# Patient Record
Sex: FEMALE | Race: WHITE | NOT HISPANIC OR LATINO | Employment: PART TIME | URBAN - METROPOLITAN AREA
[De-identification: names, ages, dates, MRNs, and addresses within clinical notes are randomized per-mention and may not be internally consistent; named-entity substitution may affect disease eponyms.]

---

## 2017-01-03 ENCOUNTER — ALLSCRIPTS OFFICE VISIT (OUTPATIENT)
Dept: OTHER | Facility: OTHER | Age: 77
End: 2017-01-03

## 2017-01-03 ENCOUNTER — HOSPITAL ENCOUNTER (OUTPATIENT)
Dept: RADIOLOGY | Facility: HOSPITAL | Age: 77
Discharge: HOME | End: 2017-01-03
Attending: ORTHOPAEDIC SURGERY
Payer: COMMERCIAL

## 2017-01-03 DIAGNOSIS — Z47.1 AFTERCARE FOLLOWING JOINT REPLACEMENT SURGERY: ICD-10-CM

## 2017-01-03 PROCEDURE — 73502 X-RAY EXAM HIP UNI 2-3 VIEWS: CPT

## 2017-01-19 ENCOUNTER — LAB REQUISITION (OUTPATIENT)
Dept: LAB | Facility: HOSPITAL | Age: 77
End: 2017-01-19
Payer: COMMERCIAL

## 2017-01-19 DIAGNOSIS — N30.10 CHRONIC INTERSTITIAL CYSTITIS WITHOUT HEMATURIA: ICD-10-CM

## 2017-01-19 PROCEDURE — 87086 URINE CULTURE/COLONY COUNT: CPT | Performed by: OBSTETRICS & GYNECOLOGY

## 2017-01-21 LAB — BACTERIA UR CULT: NORMAL

## 2017-02-07 ENCOUNTER — ALLSCRIPTS OFFICE VISIT (OUTPATIENT)
Dept: OTHER | Facility: OTHER | Age: 77
End: 2017-02-07

## 2017-02-20 ENCOUNTER — TRANSCRIBE ORDERS (OUTPATIENT)
Dept: LAB | Facility: HOSPITAL | Age: 77
End: 2017-02-20

## 2017-02-20 ENCOUNTER — APPOINTMENT (OUTPATIENT)
Dept: LAB | Facility: HOSPITAL | Age: 77
End: 2017-02-20
Payer: COMMERCIAL

## 2017-02-20 DIAGNOSIS — D64.9 ANEMIA, UNSPECIFIED: Primary | ICD-10-CM

## 2017-02-20 DIAGNOSIS — I10 UNSPECIFIED ESSENTIAL HYPERTENSION: ICD-10-CM

## 2017-02-20 DIAGNOSIS — E03.9 UNSPECIFIED HYPOTHYROIDISM: ICD-10-CM

## 2017-02-20 DIAGNOSIS — D64.9 ANEMIA, UNSPECIFIED: ICD-10-CM

## 2017-02-20 LAB
ALBUMIN SERPL BCP-MCNC: 3.2 G/DL (ref 3.5–5)
ALP SERPL-CCNC: 81 U/L (ref 46–116)
ALT SERPL W P-5'-P-CCNC: 18 U/L (ref 12–78)
ANION GAP SERPL CALCULATED.3IONS-SCNC: 9 MMOL/L (ref 4–13)
AST SERPL W P-5'-P-CCNC: 17 U/L (ref 5–45)
BASOPHILS # BLD AUTO: 0.05 THOUSANDS/ΜL (ref 0–0.1)
BASOPHILS NFR BLD AUTO: 1 % (ref 0–1)
BILIRUB SERPL-MCNC: 0.46 MG/DL (ref 0.2–1)
BUN SERPL-MCNC: 19 MG/DL (ref 5–25)
CALCIUM SERPL-MCNC: 8.9 MG/DL (ref 8.3–10.1)
CHLORIDE SERPL-SCNC: 105 MMOL/L (ref 100–108)
CO2 SERPL-SCNC: 25 MMOL/L (ref 21–32)
CREAT SERPL-MCNC: 0.96 MG/DL (ref 0.6–1.3)
EOSINOPHIL # BLD AUTO: 0.2 THOUSAND/ΜL (ref 0–0.61)
EOSINOPHIL NFR BLD AUTO: 4 % (ref 0–6)
ERYTHROCYTE [DISTWIDTH] IN BLOOD BY AUTOMATED COUNT: 14.3 % (ref 11.6–15.1)
GFR SERPL CREATININE-BSD FRML MDRD: 56.7 ML/MIN/1.73SQ M
GLUCOSE SERPL-MCNC: 89 MG/DL (ref 65–140)
HCT VFR BLD AUTO: 35.1 % (ref 34.8–46.1)
HDLC SERPL-MCNC: 90 MG/DL (ref 40–60)
HGB BLD-MCNC: 11.3 G/DL (ref 11.5–15.4)
LDLC SERPL DIRECT ASSAY-MCNC: 127 MG/DL (ref 0–100)
LYMPHOCYTES # BLD AUTO: 1.33 THOUSANDS/ΜL (ref 0.6–4.47)
LYMPHOCYTES NFR BLD AUTO: 27 % (ref 14–44)
MCH RBC QN AUTO: 29.1 PG (ref 26.8–34.3)
MCHC RBC AUTO-ENTMCNC: 32.2 G/DL (ref 31.4–37.4)
MCV RBC AUTO: 91 FL (ref 82–98)
MONOCYTES # BLD AUTO: 0.5 THOUSAND/ΜL (ref 0.17–1.22)
MONOCYTES NFR BLD AUTO: 10 % (ref 4–12)
NEUTROPHILS # BLD AUTO: 2.77 THOUSANDS/ΜL (ref 1.85–7.62)
NEUTS SEG NFR BLD AUTO: 58 % (ref 43–75)
NRBC BLD AUTO-RTO: 0 /100 WBCS
PLATELET # BLD AUTO: 309 THOUSANDS/UL (ref 149–390)
PMV BLD AUTO: 10.2 FL (ref 8.9–12.7)
POTASSIUM SERPL-SCNC: 3.9 MMOL/L (ref 3.5–5.3)
PROT SERPL-MCNC: 7.7 G/DL (ref 6.4–8.2)
RBC # BLD AUTO: 3.88 MILLION/UL (ref 3.81–5.12)
SODIUM SERPL-SCNC: 139 MMOL/L (ref 136–145)
T4 FREE SERPL-MCNC: 0.77 NG/DL (ref 0.76–1.46)
TSH SERPL DL<=0.05 MIU/L-ACNC: 3.28 UIU/ML (ref 0.36–3.74)
WBC # BLD AUTO: 4.86 THOUSAND/UL (ref 4.31–10.16)

## 2017-02-20 PROCEDURE — 36415 COLL VENOUS BLD VENIPUNCTURE: CPT

## 2017-02-20 PROCEDURE — 85025 COMPLETE CBC W/AUTO DIFF WBC: CPT

## 2017-02-20 PROCEDURE — 84439 ASSAY OF FREE THYROXINE: CPT

## 2017-02-20 PROCEDURE — 83718 ASSAY OF LIPOPROTEIN: CPT

## 2017-02-20 PROCEDURE — 84443 ASSAY THYROID STIM HORMONE: CPT

## 2017-02-20 PROCEDURE — 83721 ASSAY OF BLOOD LIPOPROTEIN: CPT

## 2017-02-20 PROCEDURE — 80053 COMPREHEN METABOLIC PANEL: CPT

## 2017-02-28 ENCOUNTER — HOSPITAL ENCOUNTER (OUTPATIENT)
Dept: RADIOLOGY | Age: 77
Discharge: HOME | End: 2017-02-28
Payer: COMMERCIAL

## 2017-02-28 DIAGNOSIS — Z12.31 OTHER SCREENING MAMMOGRAM: ICD-10-CM

## 2017-02-28 DIAGNOSIS — M81.0 OSTEOPOROSIS, UNSPECIFIED: ICD-10-CM

## 2017-02-28 PROCEDURE — G0202 SCR MAMMO BI INCL CAD: HCPCS

## 2017-02-28 PROCEDURE — 77080 DXA BONE DENSITY AXIAL: CPT

## 2017-03-01 ENCOUNTER — GENERIC CONVERSION - ENCOUNTER (OUTPATIENT)
Dept: OTHER | Facility: OTHER | Age: 77
End: 2017-03-01

## 2017-05-30 ENCOUNTER — ALLSCRIPTS OFFICE VISIT (OUTPATIENT)
Dept: OTHER | Facility: OTHER | Age: 77
End: 2017-05-30

## 2017-07-01 ENCOUNTER — APPOINTMENT (OUTPATIENT)
Dept: LAB | Facility: HOSPITAL | Age: 77
End: 2017-07-01
Payer: COMMERCIAL

## 2017-07-01 ENCOUNTER — TRANSCRIBE ORDERS (OUTPATIENT)
Dept: LAB | Facility: HOSPITAL | Age: 77
End: 2017-07-01

## 2017-07-01 DIAGNOSIS — Z00.8 HEALTH EXAMINATION IN POPULATION SURVEY: ICD-10-CM

## 2017-07-01 DIAGNOSIS — Z00.8 HEALTH EXAMINATION IN POPULATION SURVEY: Primary | ICD-10-CM

## 2017-07-01 LAB
CHOLEST SERPL-MCNC: 245 MG/DL (ref 50–200)
EST. AVERAGE GLUCOSE BLD GHB EST-MCNC: 111 MG/DL
HBA1C MFR BLD: 5.5 % (ref 4.2–6.3)
HDLC SERPL-MCNC: 101 MG/DL (ref 40–60)
LDLC SERPL CALC-MCNC: 124 MG/DL (ref 0–100)
TRIGL SERPL-MCNC: 98 MG/DL

## 2017-07-01 PROCEDURE — 36415 COLL VENOUS BLD VENIPUNCTURE: CPT

## 2017-07-01 PROCEDURE — 80061 LIPID PANEL: CPT

## 2017-07-01 PROCEDURE — 83036 HEMOGLOBIN GLYCOSYLATED A1C: CPT

## 2017-09-05 ENCOUNTER — ALLSCRIPTS OFFICE VISIT (OUTPATIENT)
Dept: OTHER | Facility: OTHER | Age: 77
End: 2017-09-05

## 2017-09-09 ENCOUNTER — TRANSCRIBE ORDERS (OUTPATIENT)
Dept: LAB | Facility: HOSPITAL | Age: 77
End: 2017-09-09

## 2017-09-09 ENCOUNTER — APPOINTMENT (OUTPATIENT)
Dept: LAB | Facility: HOSPITAL | Age: 77
End: 2017-09-09
Attending: INTERNAL MEDICINE
Payer: COMMERCIAL

## 2017-09-09 DIAGNOSIS — E55.9 UNSPECIFIED VITAMIN D DEFICIENCY: ICD-10-CM

## 2017-09-09 DIAGNOSIS — E03.9 UNSPECIFIED HYPOTHYROIDISM: ICD-10-CM

## 2017-09-09 DIAGNOSIS — D64.9 ANEMIA, UNSPECIFIED: ICD-10-CM

## 2017-09-09 DIAGNOSIS — I10 ESSENTIAL HYPERTENSION, MALIGNANT: Primary | ICD-10-CM

## 2017-09-09 DIAGNOSIS — E78.2 MIXED HYPERLIPIDEMIA: ICD-10-CM

## 2017-09-09 DIAGNOSIS — R35.0 URINARY FREQUENCY: ICD-10-CM

## 2017-09-09 DIAGNOSIS — I10 ESSENTIAL HYPERTENSION, MALIGNANT: ICD-10-CM

## 2017-09-09 LAB
25(OH)D3 SERPL-MCNC: 32.8 NG/ML (ref 30–100)
ALBUMIN SERPL BCP-MCNC: 3.4 G/DL (ref 3.5–5)
ALP SERPL-CCNC: 66 U/L (ref 46–116)
ALT SERPL W P-5'-P-CCNC: 9 U/L (ref 12–78)
ANION GAP SERPL CALCULATED.3IONS-SCNC: 6 MMOL/L (ref 4–13)
AST SERPL W P-5'-P-CCNC: 12 U/L (ref 5–45)
BACTERIA UR QL AUTO: ABNORMAL /HPF
BASOPHILS # BLD AUTO: 0.05 THOUSANDS/ΜL (ref 0–0.1)
BASOPHILS NFR BLD AUTO: 1 % (ref 0–1)
BILIRUB SERPL-MCNC: 0.45 MG/DL (ref 0.2–1)
BILIRUB UR QL STRIP: NEGATIVE
BUN SERPL-MCNC: 24 MG/DL (ref 5–25)
CALCIUM SERPL-MCNC: 8.3 MG/DL (ref 8.3–10.1)
CHLORIDE SERPL-SCNC: 110 MMOL/L (ref 100–108)
CHOLEST SERPL-MCNC: 228 MG/DL (ref 50–200)
CLARITY UR: CLEAR
CO2 SERPL-SCNC: 22 MMOL/L (ref 21–32)
COLOR UR: YELLOW
CREAT SERPL-MCNC: 1.34 MG/DL (ref 0.6–1.3)
EOSINOPHIL # BLD AUTO: 0.18 THOUSAND/ΜL (ref 0–0.61)
EOSINOPHIL NFR BLD AUTO: 3 % (ref 0–6)
ERYTHROCYTE [DISTWIDTH] IN BLOOD BY AUTOMATED COUNT: 13.8 % (ref 11.6–15.1)
GFR SERPL CREATININE-BSD FRML MDRD: 39 ML/MIN/1.73SQ M
GLUCOSE P FAST SERPL-MCNC: 78 MG/DL (ref 65–99)
GLUCOSE UR STRIP-MCNC: NEGATIVE MG/DL
HCT VFR BLD AUTO: 33.7 % (ref 34.8–46.1)
HDLC SERPL-MCNC: 106 MG/DL (ref 40–60)
HGB BLD-MCNC: 11.3 G/DL (ref 11.5–15.4)
HGB UR QL STRIP.AUTO: ABNORMAL
HYALINE CASTS #/AREA URNS LPF: ABNORMAL /LPF
KETONES UR STRIP-MCNC: NEGATIVE MG/DL
LDLC SERPL CALC-MCNC: 113 MG/DL (ref 0–100)
LEUKOCYTE ESTERASE UR QL STRIP: ABNORMAL
LYMPHOCYTES # BLD AUTO: 2.01 THOUSANDS/ΜL (ref 0.6–4.47)
LYMPHOCYTES NFR BLD AUTO: 27 % (ref 14–44)
MCH RBC QN AUTO: 29.5 PG (ref 26.8–34.3)
MCHC RBC AUTO-ENTMCNC: 33.5 G/DL (ref 31.4–37.4)
MCV RBC AUTO: 88 FL (ref 82–98)
MONOCYTES # BLD AUTO: 0.61 THOUSAND/ΜL (ref 0.17–1.22)
MONOCYTES NFR BLD AUTO: 8 % (ref 4–12)
NEUTROPHILS # BLD AUTO: 4.47 THOUSANDS/ΜL (ref 1.85–7.62)
NEUTS SEG NFR BLD AUTO: 61 % (ref 43–75)
NITRITE UR QL STRIP: NEGATIVE
NON-SQ EPI CELLS URNS QL MICRO: ABNORMAL /HPF
NRBC BLD AUTO-RTO: 0 /100 WBCS
PH UR STRIP.AUTO: 6.5 [PH] (ref 4.5–8)
PLATELET # BLD AUTO: 282 THOUSANDS/UL (ref 149–390)
PMV BLD AUTO: 9.7 FL (ref 8.9–12.7)
POTASSIUM SERPL-SCNC: 3.8 MMOL/L (ref 3.5–5.3)
PROT SERPL-MCNC: 7.2 G/DL (ref 6.4–8.2)
PROT UR STRIP-MCNC: NEGATIVE MG/DL
RBC # BLD AUTO: 3.83 MILLION/UL (ref 3.81–5.12)
RBC #/AREA URNS AUTO: ABNORMAL /HPF
SODIUM SERPL-SCNC: 138 MMOL/L (ref 136–145)
SP GR UR STRIP.AUTO: 1.02 (ref 1–1.03)
T4 FREE SERPL-MCNC: 0.82 NG/DL (ref 0.76–1.46)
TRIGL SERPL-MCNC: 47 MG/DL
TSH SERPL DL<=0.05 MIU/L-ACNC: 3.68 UIU/ML (ref 0.36–3.74)
UROBILINOGEN UR QL STRIP.AUTO: 0.2 E.U./DL
WBC # BLD AUTO: 7.34 THOUSAND/UL (ref 4.31–10.16)
WBC #/AREA URNS AUTO: ABNORMAL /HPF

## 2017-09-09 PROCEDURE — 81001 URINALYSIS AUTO W/SCOPE: CPT | Performed by: INTERNAL MEDICINE

## 2017-09-09 PROCEDURE — 84439 ASSAY OF FREE THYROXINE: CPT

## 2017-09-09 PROCEDURE — 84443 ASSAY THYROID STIM HORMONE: CPT

## 2017-09-09 PROCEDURE — 80061 LIPID PANEL: CPT

## 2017-09-09 PROCEDURE — 36415 COLL VENOUS BLD VENIPUNCTURE: CPT

## 2017-09-09 PROCEDURE — 80053 COMPREHEN METABOLIC PANEL: CPT

## 2017-09-09 PROCEDURE — 82306 VITAMIN D 25 HYDROXY: CPT

## 2017-09-09 PROCEDURE — 85025 COMPLETE CBC W/AUTO DIFF WBC: CPT

## 2017-09-28 ENCOUNTER — ALLSCRIPTS OFFICE VISIT (OUTPATIENT)
Dept: OTHER | Facility: OTHER | Age: 77
End: 2017-09-28

## 2017-10-09 ENCOUNTER — ALLSCRIPTS OFFICE VISIT (OUTPATIENT)
Dept: OTHER | Facility: OTHER | Age: 77
End: 2017-10-09

## 2017-10-16 ENCOUNTER — LAB REQUISITION (OUTPATIENT)
Dept: LAB | Facility: HOSPITAL | Age: 77
End: 2017-10-16
Payer: COMMERCIAL

## 2017-10-16 DIAGNOSIS — N30.20 OTHER CHRONIC CYSTITIS WITHOUT HEMATURIA: ICD-10-CM

## 2017-10-16 PROCEDURE — 87186 SC STD MICRODIL/AGAR DIL: CPT | Performed by: OBSTETRICS & GYNECOLOGY

## 2017-10-16 PROCEDURE — 87077 CULTURE AEROBIC IDENTIFY: CPT | Performed by: OBSTETRICS & GYNECOLOGY

## 2017-10-16 PROCEDURE — 87086 URINE CULTURE/COLONY COUNT: CPT | Performed by: OBSTETRICS & GYNECOLOGY

## 2017-10-18 LAB — BACTERIA UR CULT: ABNORMAL

## 2017-10-27 PROCEDURE — 87086 URINE CULTURE/COLONY COUNT: CPT | Performed by: OBSTETRICS & GYNECOLOGY

## 2017-10-28 ENCOUNTER — LAB REQUISITION (OUTPATIENT)
Dept: LAB | Facility: HOSPITAL | Age: 77
End: 2017-10-28
Payer: COMMERCIAL

## 2017-10-28 DIAGNOSIS — N30.20 OTHER CHRONIC CYSTITIS WITHOUT HEMATURIA: ICD-10-CM

## 2017-10-29 LAB — BACTERIA UR CULT: NORMAL

## 2017-12-05 ENCOUNTER — GENERIC CONVERSION - ENCOUNTER (OUTPATIENT)
Dept: OTHER | Facility: OTHER | Age: 77
End: 2017-12-05

## 2018-01-10 ENCOUNTER — ALLSCRIPTS OFFICE VISIT (OUTPATIENT)
Dept: OTHER | Facility: OTHER | Age: 78
End: 2018-01-10

## 2018-01-10 NOTE — PSYCH
Message  Message Free Text Note Form: states despite her report of doing "a little better' in her not using the walker "all the time" in the house, she reports uses it when leaving the home due to her report of "unsteady on my feet;" She noted has a neurology appointment for today and and a pain mgt  appointment for 5/1/616 due to her report of persistent pain in her L buttocks  She reports she continues to remain on a medical leave of absence from work and expressed her desire to return to work; she noted, "I will keep you posted "      Active Problems    1  Adjustment disorder with depressed mood (309 0) (F43 21)   2  Aftercare following hip joint replacement surgery (V54 81,V43 64) (Z47 1,Z96 649)   3  Arthralgia of hip, left (719 45) (M25 552)   4  Arthralgia of hip, right (719 45) (M25 551)   5  Bursitis of left hip (726 5) (M70 72)   6  Bursitis of right hip (726 5) (M70 71)   7  Encounter for routine gynecological examination (V72 31) (Z01 419)   8  Encounter for screening mammogram for malignant neoplasm of breast (V76 12)   (Z12 31)   9  First degree uterine prolapse (618 1) (N81 2)   10  Hamstring tendinitis at origin (727 09) (M76 899)   11  History of total hip replacement, right (V43 64) (Z96 641)   12  Inconclusive mammogram (793 82) (R92 2)   13  Osteopenia (733 90) (M85 80)   14  Primary osteoarthritis of right hip (715 15) (M16 11)   15  Sacral insufficiency fracture (733 13) (M84 48XA)   16  Screening for osteoporosis (V82 81) (Z13 820)   17  Spondylolisthesis at L4-L5 level (756 12) (M43 16)   18  Spondylolisthesis at L5-S1 level (756 12) (M43 17)   19  Urinary retention (788 20) (R33 9)   20  Visit for screening mammogram (V76 12) (Z12 31)    Current Meds   1  Calcium TABS; Therapy: (Danielle Ritchie) to Recorded   2  Ibuprofen 600 MG Oral Tablet; TAKE 1 TABLET EVERY 6 HOURS WITH FOOD AS   NEEDED;    Therapy: 66Sjt4334 to (Evaluate:94Uuy6926)  Requested for: 18Apr2016; Last   Rx:18Apr2016 Ordered   3  Methocarbamol 500 MG Oral Tablet; Therapy: (Recorded:14Apr2016) to Recorded   4  Multi-Day Vitamins TABS; Therapy: (Vin Blackmon) to Recorded   5  Ondansetron 4 MG Oral Tablet Dispersible; TAKE 1 TABLET Every 6 hours PRN nausea; Therapy: 28Apr2016 to (Last Rx:28Apr2016)  Requested for: 28Apr2016 Ordered   6  TraMADol HCl - 50 MG Oral Tablet; TAKE 1 TO 2 TABLETS EVERY 6 HOURS AS NEEDED   FOR PAIN;   Therapy: 13Apr2016 to (Evaluate:33Tew7259); Last PZ:36JWQ4932 Ordered    Allergies    1   No Known Drug Allergies    Signatures   Electronically signed by : Parris Webb, MSWLCSWMSW,JOSEMANUELW; May  9 2016  9:30AM EST                       (Author)

## 2018-01-10 NOTE — MISCELLANEOUS
Message   Recorded as Task   Date: 06/13/2016 03:09 PM, Created By: Beatriz Arnold   Task Name: Miscellaneous   Assigned To: [facility]   Regarding Patient: Tian Valdez, Status: Active   Comment:    Beatriz Arnold - 13 Jun 2016 3:09 PM     TASK CREATED  Patient came in after a office visit w/ Dr Guerrero Shed  She stated he would like her to schedule another injection  Is this ok to schedule or should it be a f/u 1st? Patient trying to go back to work 4 hour day on July 1st   Please advise  Thank you  Maria Teresa Rios - 13 Jun 2016 4:19 PM     TASK REPLIED TO: Previously Assigned To [facility],Team  If its the same area of pain, it is too early to repeat injection  Zahraa Edwards - 20 Jun 0831 34:77 AM     TASK EDITED  Patient has same pain  Please advise  Thanks   Zahraa Edwards - 22 Jun 7291 7:30 PM     TASK EDITED  Pt called to inquiry about injection - Explained to early for injection - Patient states she is not sure why Dr Jim Conway suggested this injection as she is not having much pain in that area - She is having pain on the right side  Patient scheduled for OV to discuss tx for 6/30/2016   Maria Teresa Rios - 22 Jun 2016 4:33 PM     TASK REPLIED TO: Previously Assigned To Maria Teresa Rios  If its right side, she needs to be re-evaluated  Will see her during ov  Zahraa Edwards - 23 Jun 7029 5:00 PM     TASK EDITED        Active Problems    1  Adjustment disorder with depressed mood (309 0) (F43 21)   2  Aftercare following hip joint replacement surgery (V54 81,V43 64) (Z47 1,Z96 649)   3  Arthralgia of hip, left (719 45) (M25 552)   4  Arthralgia of hip, right (719 45) (M25 551)   5  Bursitis of left hip (726 5) (M70 72)   6  Bursitis of right hip (726 5) (M70 71)   7  Encounter for routine gynecological examination (V72 31) (Z01 419)   8  Encounter for screening mammogram for malignant neoplasm of breast (V76 12)   (Z12 31)   9   First degree uterine prolapse (618  1) (N81 2)   10  Hamstring tendinitis at origin (727 09) (M76 899)   11  History of total hip replacement, right (V43 64) (Z96 641)   12  Inconclusive mammogram (793 82) (R92 2)   13  Ischial bursitis of left side (726 5) (M70 72)   14  Osteopenia (733 90) (M85 80)   15  Primary osteoarthritis of right hip (715 15) (M16 11)   16  Sacral insufficiency fracture (733 13) (M84 48XA)   17  Screening for osteoporosis (V82 81) (Z13 820)   18  Spondylolisthesis at L4-L5 level (756 12) (M43 16)   19  Spondylolisthesis at L5-S1 level (756 12) (M43 17)   20  Urinary retention (788 20) (R33 9)   21  Visit for screening mammogram (V76 12) (Z12 31)    Current Meds   1  Calcium TABS; Therapy: (Spencer Todd) to Recorded   2  Ibuprofen 600 MG Oral Tablet; TAKE 1 TABLET EVERY 6 HOURS WITH FOOD AS   NEEDED; Therapy: 36Dik4102 to (Evaluate:57Mpz6213)  Requested for: 00NQP4421; Last   Rx:69Ohb1489 Ordered   3  Methocarbamol 500 MG Oral Tablet; Therapy: (Recorded:30Uql7147) to Recorded   4  Multi-Day Vitamins TABS; Therapy: (Spencer Todd) to Recorded   5  Ondansetron 4 MG Oral Tablet Dispersible; TAKE 1 TABLET Every 6 hours PRN nausea; Therapy: 28Ifh5398 to (Last Rx:06Vtg5487)  Requested for: 83Wsb3470 Ordered   6  TraMADol HCl - 50 MG Oral Tablet; TAKE 1 TO 2 TABLETS EVERY 6 HOURS AS   NEEDED FOR PAIN;   Therapy: 56Lpu6225 to (Evaluate:28Htl8774); Last VH:04OWF6881 Ordered    Allergies    1   No Known Drug Allergies    Signatures   Electronically signed by : Jessica Baker, ; Jun 23 2016  1:57PM EST                       (Author)

## 2018-01-11 NOTE — PSYCH
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  reports continues to enjoy participating in the [employer]; discussed her efforts to maintain her quality of life as she continues to come to terms with her granddaughter's passing ; A: presents as having a mostly positive outlook; She continues to "look for the good " P: (G#1 ) will continue to pace her relationship with her daughter re daughter's emotional well-being;   Pain Scale and Suicide Risk [facility]: On a scale of 0 to 10, the patient rates current pain at 0   Current suicide risk is low   Behavioral Health Treatment Plan [facility]: Diagnosis and Treatment Plan explained to patient, patient relates understanding diagnosis and is agreeable to Treatment Plan  Assessment    1   Adjustment disorder with depressed mood (309 0) (F43 21)    Signatures   Electronically signed by : NITZA Mccormick,DANIELLE; Sep 28 2017  1:19PM EST                       (Author)

## 2018-01-11 NOTE — PSYCH
Progress Note  Psychotherapy Provided [facility]: Individual Psychotherapy 45 mins  minutes provided today  Goals addressed in session:   (G#1 ) reports was "cleared" to return to work "with no restrictions" including her report of her being permitted to work an 8 hour day; reports will, again, be able to have alternate week-ends off; "I am kind of glad to be back (to work) " She states had been off from work since July, 2015  She offered comments about her daughter's progress in her bereavement with the loss of her daughter ([place]'s granddaughter) and noted that her daughter has made a new friend who is reportedly more supportive than a long time friend of her daughter's who [place] a stated, "has not stepped up" during her daughter's bereavement  discussed/reviewed the importance of her taking care of herself; A: presents as making progress with her bereavement regarding her granddaughter's passing, not as sad; She is 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  She remains more at ease with her daughter being more accessible to her regarding emotional support; P:(G#1) will continue to work on her revised expectations of what she brings to the relationship with her daughter in response to her granddaughter's passing;   Pain Scale and Suicide Risk [facility]: On a scale of 0 to 10, the patient rates current pain at 0   Current suicide risk is low   Behavioral Health Treatment Plan [facility]: Diagnosis and Treatment Plan explained to patient, patient relates understanding diagnosis and is agreeable to Treatment Plan  Assessment    1   Adjustment disorder with depressed mood (309 0) (F43 21)    Signatures   Electronically signed by : DAISY Atkins; Jan 14 2016  1:55PM EST                       (Author)

## 2018-01-11 NOTE — PSYCH
1  Adjustment disorder with depressed mood (309 0) (F43 21)      Date of Initial Treatment Plan: 10/15/14  Date of Current Treatment Plan: 10/05/16  Treatment Plan 6  Strengths/Personal Resources for Self Care: "I am strong in some points  "  Diagnosis:   Axis I: F43 21   Axis II: deferred   Axis III: reported recently treated regarding bladder issues; Area of Needs: family matters;  Long Term Goals:   #1 I want to continue to improve/maintain my relationship with my daughter  Target Date: 2/5/17      #2 I will continue in my grieving process regarding with my granddaughter's passing  Target Date: 2/5/17         Short Term Objectives:   Goal 1:   A  I will continue to keep space with my daughter  Karri Wolf tto continue to work through my granddaughter's passing  C  I will continue with self-care to help me continue to cope effectively  D  I will continue with my involvement with [facility]  E  I will continue to accept that "It will never be the same "  Target Date: 2/5/17      Goal 2:   A  I will continue to participate in [facility] as needed  B  I will continue to be available to my daughter as she needs me  C  I will continue to make certain I am taking care of myself (ex ,participating in my activities)  D  I will continue making progress in dealing with the anger with my loss ("I feel that will be taken care of in time  ")  Target Date: 2/5/17          GOAL 1: Modality: Individual 1 x per month Target Date: 2/5/17       The person(s) responsible for carrying out the plan is Aruna  GOAL 2: Modality: Individual 1 x per month Target Date: 2/5/17       The person(s) responsible for carrying out the plan is Aruna  The first scheduled review date is 2/5/17  The expected length of service is ongoing  Level of functioning at initial assessment: 60  The highest level of functioning in the past year was 66  The current level of functioning is 66  Patient Signature: _________________________________ Date/Time: ______________       Electronically signed by : Meño Pedersen, MSWLCSWMSW,DANIELLE; Oct  5 2016 10:23AM EST                       (Author)

## 2018-01-11 NOTE — RESULT NOTES
Message   Recorded as Task   Date: 05/25/2016 01:57 PM, Created By: Steven Srivastava   Task Name: Follow Up   Assigned To: [facility],Team   Regarding Patient: Akilah Posada, Status: Active   CommentDierdrfrancesco Loftonlore - 25 May 2016 1:57 PM     TASK CREATED  Pt  is S/P LT ISCHIAL BURSA INJ performed on 5/18/16 by Dr Rashel Funez  no F/U  Lupe Ramos - 26 May 6473 20:48 AM     TASK EDITED  Pt reports no pain she just has some acheyness -   She is able to sit better now  She is going to see ortho in June, she states she will talk to them about maybe some Physical Therapy and she will f/u here PRN  Thanks   Maria Teresa Mallory - 26 May 2016 12:45 PM     TASK REPLIED TO: Previously Assigned To Breann Saldana  Thank you          Signatures   Electronically signed by : Calos Ramos, ; May 26 2016  1:15PM EST                       (Author)

## 2018-01-11 NOTE — RESULT NOTES
Message   Recorded as Task   Date: 12/28/2016 09:55 AM, Created By: Marisa Cruz   Task Name: Follow Up   Assigned To: [facility]   Regarding Patient: Layla Mccarthy, Status: Active   CommentDexter Orn - 28 Dec 8503 3:05 AM     TASK CREATED  S/P RIGHT SIJ INJ ON 12/21/2016 W/ DR Ashley Hernandez - F/U SCHEDULED 2/7/2017 W/ Zahraa Ritter - 29 Dec 1700 54:01 AM     TASK EDITED  Pt reports 50% relief so far - Confirmed f/u - Patient will call if any issues prior to f/u     Aurora Freeman - 29 Dec 2016 1:04 PM     TASK REPLIED TO: Previously Assigned To [address]   Electronically signed by : Chaz Harris, ; Dec 29 2016  2:14PM EST                       (Author)

## 2018-01-11 NOTE — PSYCH
Message  Message Free Text Note Form: was informed "Jyoti " called on 1/29/16 at 8:42 AM apologizing for missing her 1/28/16 counseling appointment; reported she had to work late that day; placed on Feb , 2016 waiting list and scheduled an appointment for March, 2016; Active Problems    1  Adjustment disorder with depressed mood (309 0) (F43 21)   2  Aftercare following hip joint replacement surgery (V54 81,V43 64) (Z47 1,Z96 649)   3  Arthralgia of hip, left (719 45) (M25 552)   4  Arthralgia of hip, right (719 45) (M25 551)   5  Bursitis of left hip (726 5) (M70 72)   6  Bursitis of right hip (726 5) (M70 71)   7  Encounter for routine gynecological examination (V72 31) (Z01 419)   8  Encounter for screening mammogram for malignant neoplasm of breast (V76 12)   (Z12 31)   9  First degree uterine prolapse (618 1) (N81 2)   10  History of total hip replacement, right (V43 64) (Z96 641)   11  Osteopenia (733 90) (M85 80)   12  Primary osteoarthritis of right hip (715 15) (M16 11)   13  Screening for osteoporosis (V82 81) (Z13 820)   14  Visit for screening mammogram (V76 12) (Z12 31)    Current Meds   1  Calcium TABS; Therapy: (0660 303 88 06) to Recorded   2  Multi-Day Vitamins TABS; Therapy: (Recorded:21Jan2016) to Recorded    Allergies    1   No Known Drug Allergies    Signatures   Electronically signed by : DAISY Saenz; Feb 1 2016  9:10AM EST                       (Author)

## 2018-01-12 NOTE — MISCELLANEOUS
Message  Return to work or school:   Cyndi Marino is under my professional care  She was seen in my office on 05/04/2016    She is not able to return to work until June 13, 2016     Further work status will be determined at follow-up visit          Signatures   Electronically signed by : Génesis Boswell, [facility]; May  6 2016  1:17PM EST                       (Author)

## 2018-01-12 NOTE — PSYCH
Message  Message Free Text Note Form: She lm to cancel her 12/14/16 ind  psych  appt  for 9 AM due to a work schedule conflict  Active Problems    1  Adjustment disorder with depressed mood (309 0) (F43 21)   2  Aftercare following hip joint replacement surgery (V54 81,V43 64) (Z47 1,Z96 649)   3  Arthralgia of hip, left (719 45) (M25 552)   4  Arthralgia of hip, right (719 45) (M25 551)   5  Bursitis of left hip (726 5) (M70 72)   6  Bursitis of right hip (726 5) (M70 71)   7  Chronic low back pain (724 2,338 29) (M54 5,G89 29)   8  Chronic pain syndrome (338 4) (G89 4)   9  Continuous opioid dependence (304 01) (F11 20)   10  Encounter for long-term use of opiate analgesic (V58 69) (Z79 891)   11  Encounter for routine gynecological examination (V72 31) (Z01 419)   12  Encounter for screening mammogram for malignant neoplasm of breast (V76 12)    (Z12 31)   13  First degree uterine prolapse (618 1) (N81 2)   14  Hamstring tendinitis at origin (727 09) (M76 899)   15  History of total hip replacement, right (V43 64) (Z96 641)   16  Inconclusive mammogram (793 82) (R92 2)   17  Ischial bursitis of left side (726 5) (M70 72)   18  Osteopenia (733 90) (M85 80)   19  Pain of right sacroiliac joint (724 6) (M53 3)   20  Primary osteoarthritis of right hip (715 15) (M16 11)   21  Right hip pain (719 45) (M25 551)   22  Right inguinal pain (789 09) (R10 30)   23  Sacral insufficiency fracture (733 13) (M84 48XA)   24  Screening for osteoporosis (V82 81) (Z13 820)   25  Spondylolisthesis at L4-L5 level (756 12) (M43 16)   26  Spondylolisthesis at L5-S1 level (756 12) (M43 17)   27  Urinary retention (788 20) (R33 9)   28  Visit for screening mammogram (V76 12) (Z12 31)    Current Meds   1  Ibuprofen 600 MG Oral Tablet; TAKE 1 TABLET TWICE DAILY with MEALS AS NEEDED; Therapy: 18Apr2016 to (Evaluate:69Sai1369)  Requested for: 40Eaf9946; Last   Rx:24Byq9517 Ordered   2   Lidocaine 5 % External Ointment; APPLY TO AFFECTED AREAS AS DIRECTED; Therapy: 25Aug2016 to (Evaluate:73Xri8642)  Requested for: 35Jbr9680; Last   Rx:49Nvs5045 Ordered   3  TraMADol HCl - 50 MG Oral Tablet; TAKE 1 TABLET Daily PRN pain; Therapy: 13Apr2016 to (Evaluate:96Mgy4596); Last Rx:03Nov2016 Ordered    Allergies    1   No Known Drug Allergies    Signatures   Electronically signed by : Prosper Banegas, MSWLCSWMSTARIK,DANIELLE; Dec 12 2016  1:14PM EST                       (Author)

## 2018-01-13 VITALS
SYSTOLIC BLOOD PRESSURE: 127 MMHG | HEART RATE: 102 BPM | DIASTOLIC BLOOD PRESSURE: 75 MMHG | BODY MASS INDEX: 23.59 KG/M2 | WEIGHT: 129 LBS

## 2018-01-13 VITALS
DIASTOLIC BLOOD PRESSURE: 82 MMHG | BODY MASS INDEX: 23.03 KG/M2 | SYSTOLIC BLOOD PRESSURE: 133 MMHG | HEIGHT: 62 IN | WEIGHT: 125.13 LBS | HEART RATE: 112 BPM

## 2018-01-14 VITALS
WEIGHT: 133 LBS | HEIGHT: 62 IN | HEART RATE: 89 BPM | BODY MASS INDEX: 24.48 KG/M2 | SYSTOLIC BLOOD PRESSURE: 146 MMHG | DIASTOLIC BLOOD PRESSURE: 76 MMHG

## 2018-01-14 VITALS
HEART RATE: 93 BPM | DIASTOLIC BLOOD PRESSURE: 77 MMHG | TEMPERATURE: 97.8 F | SYSTOLIC BLOOD PRESSURE: 132 MMHG | BODY MASS INDEX: 23.4 KG/M2 | HEIGHT: 62 IN | WEIGHT: 127.13 LBS

## 2018-01-14 NOTE — MISCELLANEOUS
Message   Recorded as Task   Date: 03/01/2017 10:05 AM, Created By: Editha Paget   Task Name: Follow Up   Assigned To: Alysha Gordillo   Regarding Patient: Vanessa Ferguson, Status: In Progress   Comment:    JaneEvgeny - 01 Mar 2017 10:05 AM     TASK CREATED  Please let [place] know that her BMD is slightly worse, and now she has mild osteoporosis (only -2 6 however)  While it is barely in that range, she might want to see Dr Cezar Beck for further Rx   If not, she can continue with current Rx and we'll follow  I'd rather she do the former, with referral  Eliu Ji - 01 Mar 2017 10:35 AM     TASK IN PROGRESS   Chaz Soler - 01 Mar 2017 10:41 AM     TASK EDITED  Pt is now seeing Jameson Nath as she is convenient for her  I explained about rheumatologist but pt would prefer to just stay on Fosamax and ca and vit d        Active Problems    1  Adjustment disorder with depressed mood (309 0) (F43 21)   2  Aftercare following hip joint replacement surgery (V54 81,V43 64) (Z47 1,Z96 649)   3  Arthralgia of hip, left (719 45) (M25 552)   4  Arthralgia of hip, right (719 45) (M25 551)   5  Bursitis of left hip (726 5) (M70 72)   6  Bursitis of right hip (726 5) (M70 71)   7  Chronic low back pain (724 2,338 29) (M54 5,G89 29)   8  Chronic pain syndrome (338 4) (G89 4)   9  Continuous opioid dependence (304 01) (F11 20)   10  Encounter for long-term use of opiate analgesic (V58 69) (Z79 891)   11  Encounter for routine gynecological examination (V72 31) (Z01 419)   12  Encounter for screening mammogram for malignant neoplasm of breast (V76 12)    (Z12 31)   13  First degree uterine prolapse (618 1) (N81 2)   14  Hamstring tendinitis at origin (727 09) (M76 899)   15  History of total hip replacement, right (V43 64) (Z96 641)   16  Inconclusive mammogram (793 82) (R92 2)   17  Ischial bursitis of left side (726 5) (M70 72)   18  Osteopenia (733 90) (M85 80)   19   Pain of right sacroiliac joint (724 6) (M53 3)   20  Primary osteoarthritis of right hip (715 15) (M16 11)   21  Right hip pain (719 45) (M25 551)   22  Right inguinal pain (789 09) (R10 30)   23  Sacral insufficiency fracture (733 13) (M84 48XA)   24  Screening for osteoporosis (V82 81) (Z13 820)   25  Spondylolisthesis at L4-L5 level (756 12) (M43 16)   26  Spondylolisthesis at L5-S1 level (756 12) (M43 17)   27  Urinary retention (788 20) (R33 9)   28  Visit for screening mammogram (V76 12) (Z12 31)    Current Meds   1  Ibuprofen 600 MG Oral Tablet; TAKE 1 TABLET TWICE DAILY with MEALS AS NEEDED; Therapy: 64Nlh7514 to (Evaluate:57Yov8651)  Requested for: 17Fho4540; Last   Rx:11Cwt7752 Ordered   2  Lidocaine 5 % External Ointment; APPLY TO AFFECTED AREAS AS DIRECTED; Therapy: 39Pwu6923 to (Evaluate:18Wlx4970)  Requested for: 67Xyz9405; Last   Rx:64Xqu6740 Ordered    Allergies    1  No Known Drug Allergies    Signatures   Electronically signed by :  Kip Schreiber, ; Mar  1 2017 10:42AM EST                       (Author)

## 2018-01-14 NOTE — PROGRESS NOTES
Chief Complaint  PVR; Urinary retention  Patient had her Plummer removed @ [facility] with Dr Nya Rojo last week when pessary was placed  Patient stated she has been voiding without difficulty  Active Problems    1  Adjustment disorder with depressed mood (309 0) (F43 21)   2  Aftercare following hip joint replacement surgery (V54 81,V43 64) (Z47 1,Z96 649)   3  Arthralgia of hip, left (719 45) (M25 552)   4  Arthralgia of hip, right (719 45) (M25 551)   5  Bursitis of left hip (726 5) (M70 72)   6  Bursitis of right hip (726 5) (M70 71)   7  Encounter for routine gynecological examination (V72 31) (Z01 419)   8  Encounter for screening mammogram for malignant neoplasm of breast (V76 12)   (Z12 31)   9  First degree uterine prolapse (618 1) (N81 2)   10  Hamstring tendinitis at origin (727 09) (M76 899)   11  History of total hip replacement, right (V43 64) (Z96 641)   12  Inconclusive mammogram (793 82) (R92 2)   13  Osteopenia (733 90) (M85 80)   14  Primary osteoarthritis of right hip (715 15) (M16 11)   15  Sacral insufficiency fracture (733 13) (M84 48XA)   16  Screening for osteoporosis (V82 81) (Z13 820)   17  Spondylolisthesis at L4-L5 level (756 12) (M43 16)   18  Spondylolisthesis at L5-S1 level (756 12) (M43 17)   19  Urinary retention (788 20) (R33 9)   20  Visit for screening mammogram (V76 12) (Z12 31)    Current Meds   1  Calcium TABS; Therapy: (Jono Pickens) to Recorded   2  Ibuprofen 600 MG Oral Tablet; TAKE 1 TABLET EVERY 6 HOURS WITH FOOD AS   NEEDED; Therapy: 92Uif9314 to (Evaluate:82Mog9910)  Requested for: 18Apr2016; Last   Rx:18Apr2016 Ordered   3  Methocarbamol 500 MG Oral Tablet; Therapy: (Recorded:78Zjp0705) to Recorded   4  Multi-Day Vitamins TABS; Therapy: (Jono Pickens) to Recorded   5  Ondansetron 4 MG Oral Tablet Dispersible; TAKE 1 TABLET Every 6 hours PRN nausea; Therapy: 28Apr2016 to (Last Rx:28Apr2016)  Requested for: 28Apr2016 Ordered   6   TraMADol HCl - 50 MG Oral Tablet; TAKE 1 TO 2 TABLETS EVERY 6 HOURS AS   NEEDED FOR PAIN;   Therapy: 50Ymm8232 to (Evaluate:71Ckd8492); Last SILVANA:43ZEY5392 Ordered    Allergies    1  No Known Drug Allergies    Vitals  Signs [Data Includes: Current Encounter]    Heart Rate: 90  Systolic: 333  Diastolic: 82  Height: 5 ft 2 in  Weight: 132 lb   BMI Calculated: 24 14  BSA Calculated: 1 60    Procedure    Procedure: Bladder Ultrasound Post Void Residual    Test indication: Urinary Retention  The procedure's were discussed with the patient  Equipment And Procedure: The patient voided prior to PVR  Amount not measured  U/S Findings: US PVR 42 54 ml  Assessment    1  Urinary retention (788 20) (R33 9)    Plan  Urinary retention    · Follow-up PRN Evaluation and Treatment  Follow-up  Status: Complete - Retrospective  By Protocol Authorization  Done: 43HQF3444 02:02PM   Ordered; For: Urinary retention; Ordered ByLeisa Ing Performed:  Due: 46MUC8497    Discussion/Summary    Discussed follow up with Emmy Boone PA-C  She ordered follow up PRN  Patient was instructed to call office with any urinary symptoms  Future Appointments    Date/Time Provider Specialty Site   07/05/2016 09:00 AM WESLEY Rodriguez  Orthopedic Surgery [facility]   05/16/2016 08:00 AM Nino Astudillo MD Pain Management [facility]   06/13/2016 01:50 PM WESELY Hernandez   Orthopedic Surgery [facility]     Signatures   Electronically signed by : Freddy Aleman, ; May  9 2016  2:03PM EST                       (Author)    Electronically signed by : WESLEY Pimentel ; May 11 2016  8:32PM EST

## 2018-01-15 NOTE — PSYCH
1  Adjustment disorder with depressed mood (309 0) (F43 21)      Date of Initial Treatment Plan: 10/15/14  Date of Current Treatment Plan: 3/15/16  Treatment Plan 5  Strengths/Personal Resources for Self Care: "I am strong in some points  "  Diagnosis:   Axis I: F43 21   Axis II: 799 9     Current Challenges/Problems/Needs: family matters;  Long Term Goals:   #1 I want to continue improve/maintain my relationship with my daughter  Target Date: 7/15/16      #2 I will continue in my grieving process regarding my granddaughter's passing  Target Date: 7/15/16           Short Term Objectives:   Goal 1:   A  I will continue keeping space with my daughter  B  I want to continue to work through my granddaughter's passing  C  I want to continue with self-care to help me continue to cope effectively  D  I will continue with my involvement with [facility]  E  I will continue to accept that "It will never be the same "  Target Date: 7/15/16      Goal 2:   A  I will continue to participate in Grief Share as needed  B  I will continue to be available to my daughter as she needs me  C  I will continue to make certain I am taking care of myself (ex,  participating in my activities)  Target Date: 7/15/16          GOAL 1: Modality: Individual 1 x per month Target Date: 7/15/16         GOAL 2: Modality: Individual 1 x per month Target Date: 7/15/16                The first scheduled review date is 7/15/16  The expected length of service is ongoing  Level of functioning at initial assessment: 60  The highest level of functioning in the past year was 60  The current level of functioning is 66             Patient Signature: _________________________________ Date/Time: ______________       Electronically signed by : Blanka Comer MSWLCSWMSTARIK,DANIELLE; Mar 15 2016 10:01AM EST                       (Author)

## 2018-01-15 NOTE — PSYCH
Treatment Plan Tracking    #1 Treatment Plan not completed within required time limits due to:  Other: 9/38/27did not schedule within the four month time frame required to update tx plan;           Signatures   Electronically signed by : NITZA Gonzalez,DANIELLE; Oct  5 2017  5:01PM EST                       (Author)

## 2018-01-15 NOTE — MISCELLANEOUS
Message  Return to work or school:   Brandy Beltre is under my professional care  She was seen in my office on recent Hospital consult    She is not able to return to work until seen, cleared by Dr Guadalupe Sy   Electronically signed by : Joan Lal, [facility];  Apr 11 2016  4:20PM EST                       (Author)

## 2018-01-15 NOTE — PSYCH
Progress Note  Psychotherapy Provided [facility]: Individual Psychotherapy 45 mins  minutes provided today  Goals addressed in session:   (G# 1) "I am good  " states she and her  have plans to go to the beach this summer and are hoping their daughter will accompany them (she reports her daughter declined to do so last summer due to reported bereavement issues); Regarding the passing of her niece, "I think she was destined to go " She noted, "I cry  I miss her " She elaborated on many concerns about her job  "The relationship with my daughter is getting better  She does not call me all of the time (but) she does return my calls " She stated continues to participate in the [facility] support group which she states has found to be helpful  discussed/reviewed the importance of establishing/maintaining boundaries in her relationships with her family; A: presents as not as consistently sad nor for prolonged periods of time as reported previously; She confirms she wants to keep busy and her job serves as one outlet as well as her participation in her [employer] high [employer] band  P: (G#1 ) will continue with self-care strategies in her ongoing efforts to maintain a quality of life;   Pain Scale and Suicide Risk [facility]: On a scale of 0 to 10, the patient rates current pain at 0   Current suicide risk is low   Behavioral Health Treatment Plan [facility]: Diagnosis and Treatment Plan explained to patient, patient relates understanding diagnosis and is agreeable to Treatment Plan  Assessment    1   Adjustment disorder with depressed mood (309 0) (F43 21)    Signatures   Electronically signed by : DAISY Mathis; Feb 11 2016 11:22AM EST                       (Author)

## 2018-01-15 NOTE — MISCELLANEOUS
Message  Return to work or school:        Patient was recently discharged from the hospital with diagnosis of a sacral fracture  She will be following up in the office 04/20/2016  No work at this time  Signatures   Electronically signed by : Orlin Chester, [facility];  Apr 12 2016  1:15PM EST                       (Author)

## 2018-01-15 NOTE — PSYCH
Message  Message Free Text Note Form: was informed she had left a message today a 11:32 AM to cancel her ind  psych  appt  for 9/26 @ 11AM due to reported schedule conflict; r/s'd for 10/2/56 @ 10 AM;      Active Problems    1  Adjustment disorder with depressed mood (309 0) (F43 21)   2  Aftercare following hip joint replacement surgery (V54 81,V43 64) (Z47 1,Z96 649)   3  Arthralgia of hip, left (719 45) (M25 552)   4  Arthralgia of hip, right (719 45) (M25 551)   5  Bursitis of left hip (726 5) (M70 72)   6  Bursitis of right hip (726 5) (M70 71)   7  Chronic low back pain (724 2,338 29) (M54 5,G89 29)   8  Chronic pain syndrome (338 4) (G89 4)   9  Continuous opioid dependence (304 01) (F11 20)   10  Encounter for long-term use of opiate analgesic (V58 69) (Z79 891)   11  Encounter for routine gynecological examination (V72 31) (Z01 419)   12  Encounter for screening mammogram for malignant neoplasm of breast (V76 12)    (Z12 31)   13  First degree uterine prolapse (618 1) (N81 2)   14  Hamstring tendinitis at origin (727 09) (M76 899)   15  History of total hip replacement, right (V43 64) (Z96 641)   16  Inconclusive mammogram (793 82) (R92 2)   17  Ischial bursitis of left side (726 5) (M70 72)   18  Osteopenia (733 90) (M85 80)   19  Pain of right sacroiliac joint (724 6) (M53 3)   20  Primary osteoarthritis of right hip (715 15) (M16 11)   21  Right hip pain (719 45) (M25 551)   22  Right inguinal pain (789 09) (R10 30)   23  Sacral insufficiency fracture (733 13) (M84 48XA)   24  Screening for osteoporosis (V82 81) (Z13 820)   25  Spondylolisthesis at L4-L5 level (756 12) (M43 16)   26  Spondylolisthesis at L5-S1 level (756 12) (M43 17)   27  Urinary retention (788 20) (R33 9)   28  Visit for screening mammogram (V76 12) (Z12 31)    Current Meds   1  Ibuprofen 600 MG Oral Tablet; TAKE 1 TABLET TWICE DAILY with MEALS AS NEEDED;    Therapy: 18Apr2016 to (Evaluate:44Gja1888)  Requested for: 30Gvg7748; Last Rx: 34JZG9510 Ordered   2  Lidocaine 5 % External Ointment; APPLY TO AFFECTED AREAS AS DIRECTED; Therapy: 43Umg3682 to (Evaluate:45Fzr5944)  Requested for: 33Hms6398; Last   Rx:64Jxv3703 Ordered   3  TraMADol HCl - 50 MG Oral Tablet; TAKE 1 TABLET Daily PRN pain; Therapy: 13Apr2016 to (Evaluate:25Oct2016); Last Rx:75Hsu7817 Ordered    Allergies    1   No Known Drug Allergies    Signatures   Electronically signed by : Parris Webb, MSWLCSWMSTARIK,JOSEMANUELW; Sep 19 2016  1:12PM EST                       (Author)

## 2018-01-15 NOTE — PSYCH
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; will be having occupational therapy and physical therapy; Active Problems    1  Adjustment disorder with depressed mood (309 0) (F43 21)   2  Aftercare following hip joint replacement surgery (V54 81,V43 64) (Z47 1,Z96 649)   3  Arthralgia of hip, left (719 45) (M25 552)   4  Arthralgia of hip, right (719 45) (M25 551)   5  Bursitis of left hip (726 5) (M70 72)   6  Bursitis of right hip (726 5) (M70 71)   7  Encounter for routine gynecological examination (V72 31) (Z01 419)   8  Encounter for screening mammogram for malignant neoplasm of breast (V76 12)   (Z12 31)   9  First degree uterine prolapse (618 1) (N81 2)   10  History of total hip replacement, right (V43 64) (Z96 641)   11  Inconclusive mammogram (793 82) (R92 2)   12  Osteopenia (733 90) (M85 80)   13  Primary osteoarthritis of right hip (715 15) (M16 11)   14  Screening for osteoporosis (V82 81) (Z13 820)   15  Visit for screening mammogram (V76 12) (Z12 31)    Current Meds   1  Calcium TABS; Therapy: (Marshal Villegas) to Recorded   2  Multi-Day Vitamins TABS; Therapy: (Recorded:21Jan2016) to Recorded    Allergies    1   No Known Drug Allergies    Signatures   Electronically signed by : DONTE HowardSWTIANNA,JOSEMANUELW; Apr 11 2016  9:22AM EST                       (Author)

## 2018-01-16 NOTE — MISCELLANEOUS
Message   Recorded as Task   Date: 02/26/2016 09:28 AM, Created By: Soledad Rodríguez   Task Name: Follow Up   Assigned To: Carlo Matias   Regarding Patient: Fabby Connolly, Status: In Progress   Comment:    Evgeny Lara - 26 Feb 2016 9:28 AM     TASK CREATED  While the Nurse Jessica Quintero has (or will) be in touch, please be certain pt knows she is to have additional imaging on right breast  She tends to be anxious, so I would be gentle with the information  Julio Cesar pillai - 29 Feb 2016 7:57 AM     TASK IN PROGRESS   Melissa Ruiz - 29 Feb 2016 8:04 AM     TASK EDITED  spoke with pt - she received a call from [facility] on Friday 2/26 but was at work and couldn't return their call  I explained mammo results to her - she will call the person back who called her on Friday at the [facility])  Active Problems    1  Adjustment disorder with depressed mood (309 0) (F43 21)   2  Aftercare following hip joint replacement surgery (V54 81,V43 64) (Z47 1,Z96 649)   3  Arthralgia of hip, left (719 45) (M25 552)   4  Arthralgia of hip, right (719 45) (M25 551)   5  Bursitis of left hip (726 5) (M70 72)   6  Bursitis of right hip (726 5) (M70 71)   7  Encounter for routine gynecological examination (V72 31) (Z01 419)   8  Encounter for screening mammogram for malignant neoplasm of breast (V76 12)   (Z12 31)   9  First degree uterine prolapse (618 1) (N81 2)   10  History of total hip replacement, right (V43 64) (Z96 641)   11  Osteopenia (733 90) (M85 80)   12  Primary osteoarthritis of right hip (715 15) (M16 11)   13  Screening for osteoporosis (V82 81) (Z13 820)   14  Visit for screening mammogram (V76 12) (Z12 31)    Current Meds   1  Calcium TABS; Therapy: (Johnnie Alert) to Recorded   2  Multi-Day Vitamins TABS; Therapy: (Recorded:21Jan2016) to Recorded    Allergies    1   No Known Drug Allergies    Signatures   Electronically signed by : Fabiola Keane, ; Feb 29 2016 8:04AM JAMIE                       (Author)

## 2018-01-16 NOTE — MISCELLANEOUS
Message   Recorded as Task   Date: 08/02/2016 10:15 AM, Created By: Contreras Ya   Task Name: Call Back   Assigned To: Jack Jauregui   Regarding Patient: Zaid Campbell, Status: In Progress   Comment:    Eileen Briseno - 02 Aug 2016 10:15 AM     TASK CREATED  PT CALLED C/O VAGINAL ITCHING AND REDDNESS  [address] [facility]  HER # [phone]   Cialin Leblanc - 02 Aug 2016 10:18 AM     TASK IN PROGRESS   Cailin Friedman - 02 Aug 2016 10:24 AM     TASK EDITED                 pt states outside of vagina is red and burns to touch, states this started 1 week ago, states she is on ABT for a UTI primary care doctor 2xs daily cipro 500mg,  advised to apply hydrocortisone crm 3xs day on outside of vagina only  call back if this does nto help  Active Problems    1  Adjustment disorder with depressed mood (309 0) (F43 21)   2  Aftercare following hip joint replacement surgery (V54 81,V43 64) (Z47 1,Z96 649)   3  Arthralgia of hip, left (719 45) (M25 552)   4  Arthralgia of hip, right (719 45) (M25 551)   5  Bursitis of left hip (726 5) (M70 72)   6  Bursitis of right hip (726 5) (M70 71)   7  Chronic low back pain (724 2,338 29) (M54 5,G89 29)   8  Chronic pain syndrome (338 4) (G89 4)   9  Continuous opioid dependence (304 01) (F11 20)   10  Encounter for long-term use of opiate analgesic (V58 69) (Z79 891)   11  Encounter for routine gynecological examination (V72 31) (Z01 419)   12  Encounter for screening mammogram for malignant neoplasm of breast (V76 12)    (Z12 31)   13  First degree uterine prolapse (618 1) (N81 2)   14  Hamstring tendinitis at origin (727 09) (M76 899)   15  History of total hip replacement, right (V43 64) (Z96 641)   16  Inconclusive mammogram (793 82) (R92 2)   17  Ischial bursitis of left side (726 5) (M70 72)   18  Osteopenia (733 90) (M85 80)   19  Pain of right sacroiliac joint (724 6) (M53 3)   20  Primary osteoarthritis of right hip (715 15) (M16 11)   21   Right hip pain (719 45) (M25 551)   22  Right inguinal pain (789 09) (R10 30)   23  Sacral insufficiency fracture (733 13) (M84 48XA)   24  Screening for osteoporosis (V82 81) (Z13 820)   25  Spondylolisthesis at L4-L5 level (756 12) (M43 16)   26  Spondylolisthesis at L5-S1 level (756 12) (M43 17)   27  Urinary retention (788 20) (R33 9)   28  Visit for screening mammogram (V76 12) (Z12 31)    Current Meds   1  Ibuprofen 600 MG Oral Tablet; TAKE 1 TABLET TWICE DAILY with MEALS AS NEEDED; Therapy: 62Rzi9547 to (Evaluate:46Esq2557)  Requested for: 66Gol1944; Last   Rx:54Xkm1665 Ordered   2  TraMADol HCl - 50 MG Oral Tablet; TAKE 1 TABLET Daily PRN pain; Therapy: 45Vdb8810 to (Evaluate:08Idm7830); Last Rx:78Gqs7369 Ordered    Allergies    1  No Known Drug Allergies    Signatures   Electronically signed by : Ellen Freitas LPN;  Aug  2 2016 10:24AM EST                       (Author)

## 2018-01-16 NOTE — PSYCH
Progress Note  Psychotherapy Provided [facility]: Individual Psychotherapy 45 mins  minutes provided today  Goals addressed in session:   (G# 1 )  continues to participate in [facility] "when I can get there;" states her work shifts 2 PM - 8:30 PM during weekdays and week-ends from 5:30 AM - 2 PM;  is participating in physical therapy for reported burscistis in the L hip; "I have a prolapsed uterus  I have had it for years " She noted changes in the workplace regarding renovations and staffing issues  She noted she and her daughter's relationship continues to improve which she attributes, in part, to her giving her daughter "space:" discussed her efforts to remain objective in her expectations of hers and her daughter's relationship and her efforts to continue to cope with the loss of her granddaughter; A: presents as feeling better regarding her relationship with her daughter and in her efforts to continue to work through her grieving issues; P:(G# 1 ) will continue maintaining her progress including her participation in her community activities; Pain Scale and Suicide Risk [facility]: On a scale of 0 to 10, the patient rates current pain at 0   Current suicide risk is low   Behavioral Health Treatment Plan [address] [place] [address] [address]: Diagnosis and Treatment Plan explained to patient, patient relates understanding diagnosis and is agreeable to Treatment Plan  Assessment    1   Adjustment disorder with depressed mood (309 0) (F43 21)    Signatures   Electronically signed by : NITZA Barros,DANIELLE; Oct  5 2016 10:58AM EST                       (Author)

## 2018-01-17 NOTE — PSYCH
Progress Note  Psychotherapy Provided [facility]: Individual Psychotherapy 45 mins  minutes provided today  Goals addressed in session:   (G# 1 ) "I've had some issues and referred to a hospitalization in 4/2016  It was Burcitis " She noted had been referred to pain mgt  "I have a prolapsed uterus (and reports had a device inserted which she had noted was helpful)  " states has been participating in phys  ther  for her reported Burcitis; "We (she and her daughter) have moved on to a different (better) level  The relationship with Av Porras (her daughter) has much improved " She states it transporting her daughter to the [facility] for an additional test  She expressed concern about the newly renovated work space re her job  "It is non functional " states via the passing of her granddaughter due to a reported heroin overdose, via contributions in her memory a [employer] Group was started in [facility] which she states is thriving; discussed her ongoing efforts to continue to cope with the loss of her granddaughter including her acknowledgement of giving her daughter "space" to accommodate her daughter's grieving process; A: "I have to think positive today (regarding her daughter's impending breast test)  " She presents as feeling better regarding the status of hers and her daughter's relationship  P:(G#1) will continue to be mindful of her daughter's need for "space" in her grieving process;   Pain Scale and Suicide Risk [facility]: On a scale of 0 to 10, the patient rates current pain at 0   Current suicide risk is low   Behavioral Health Treatment Plan [facility]: Diagnosis and Treatment Plan explained to patient, patient relates understanding diagnosis and is agreeable to Treatment Plan  Assessment    1   Adjustment disorder with depressed mood (309 0) (F43 21)    Signatures   Electronically signed by : Wilman Reid, TIANNALCSWTIANNA,DANIELLE; Dec  1 2016 10:05AM EST (Author)

## 2018-01-18 NOTE — PSYCH
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 who reportedly "called off" during a recent week-end; She shared some recent events with her daughter and reiterated her feeling good about the improvement in their relationship  She states while she is unable to attend the [address] [place] [address] support group, she attends a women's group at her [employer] which she states finds to be satisfying to her  discussed her ongoing efforts at self-care (ex , her decision to continue to work part time and to 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); A: presents as maintaining her sense of humor and in obtaining satisfaction in her relationships with her family and friends; P: (G# 1) will continue to pace her relationship with her daughter and continue with her recreational outlets;   Pain Scale and Suicide Risk [facility]: On a scale of 0 to 10, the patient rates current pain at 0   Current suicide risk is low   Behavioral Health Treatment Plan [facility]: Diagnosis and Treatment Plan explained to patient, patient relates understanding diagnosis and is agreeable to Treatment Plan  Assessment    1   Adjustment disorder with depressed mood (309 0) (F43 21)    Signatures   Electronically signed by : TIANNA AtkinsLCSWMSTARIK,DANIELLE; Oct  9 2017 10:03AM EST                       (Author)

## 2018-01-18 NOTE — PSYCH
1  Adjustment disorder with depressed mood (309 0) (F43 21)      Date of Initial Treatment Plan: 10/15/14  Date of Current Treatment Plan: 9/28/17  Treatment Plan 7  Strengths/Personal Resources for Self Care: "I am strong in some points  "  Diagnosis:   Axis I: F43 21   Axis II: deferred     Area of Needs: family matters;  Long Term Goals:   # 1 I want to continue to improve/maintain my relationship with my daughter  Target Date: 1/28/18      # 2 I will continue in my grieving process regarding with my granddaughter's passing  Target Date: 1/28/18         Short Term Objectives:   Goal 1:   A  I will continue to keep space with my daughter  B  I want to continue to work through my granddaughter's passing  C  I will continue with self-care to help me continue to cope effectively  D  I will continue with my involvement with GriefShare  E  I will continue to accept that "it will never be the same "  Target Date: 1/28/18      Goal 2:   A  I will continue to participate in Grief Share as needed  B  I will continue to be available to my daughter as she needs me  C  I will continue to make certain I am taking care of myself (ex , participating in my activities)  D  I will continue making progress in dealing with the anger with my loss ("I feel that will be taken care of in time  )  Target Date: 1/28/18          GOAL 1: Modality: Individual 1 x per month Target Date: 1/28/18       The person(s) responsible for carrying out the plan is [place]  GOAL 2: Modality: Individual 1 x per month Target Date: 1/28/18       The person(s) responsible for carrying out the plan is [place]  The first scheduled review date is 1/28/18  The expected length of service is 6 months  Level of functioning at initial assessment: 60  The highest level of functioning in the past year was 66             Patient Signature: _________________________________ Date/Time: ______________ Electronically signed by : NITZA Barros LCSW; Sep 28 2017  1:06PM EST                       (Author)

## 2018-01-23 NOTE — PSYCH
Progress Note  Psychotherapy Provided [facility]: Individual Psychotherapy 45 mins  minutes provided today  Goals addressed in session:   (G# 2) "I have been having days off, because the kids (who work with her) go back to college " She noted more current stressors in her workplace  "Reynold Bassett (her  granddaughter) must be around  She came to me " She described her daughter as, "She has moved on with her co workers " She contends her daughter's coworkers have been judgemental of her daughter regarding how her daughter is processing the passing of her daughter ([place]'s granddaughter)  She noted her daughter has been recently promoted at her workplace  "This is good " discussed her own self-care as she continues with her bereavement issues with the passing of her granddaughter; A: She commented while she misses her granddaughter, she is trying to make progress in her bereavement  She remains pleasant and reports 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)  P: (G#2 ) will participate in [address] [place] [address] [place] if/as needed; will continue with her self-care which includes, per her report, spending time with her daughter and her "granddog;"   Pain Scale and Suicide Risk [facility]: On a scale of 0 to 10, the patient rates current pain at 0   Current suicide risk is low   Behavioral Health Treatment Plan Chel[facility] Mayes: Diagnosis and Treatment Plan explained to patient, patient relates understanding diagnosis and is agreeable to Treatment Plan  Assessment    1   Adjustment disorder with depressed mood (309 0) (F43 21)    Signatures   Electronically signed by : Lora Johnson, MSWLCSWMSTARIK,DANIELLE; Dominguez 10 2018 11:22AM EST                       (Author)

## 2018-01-23 NOTE — PSYCH
Date of Initial Treatment Plan: 10/1514  Date of Current Treatment Plan: 1/10/18  Treatment Plan 8  Strengths/Personal Resources for Self Care: "I am strong in some points  "  Diagnosis:   Axis I: F43 21   Axis II: deferred     Area of Needs: family matters;  Long Term Goals:   # 1 I want to continue to improve/maintain my relationship with my daughter  Completion Date: 1/10/18     # 2 I will continue to effectively deal with change  Target Date: 5/10/18         Short Term Objectives:   Goal 1:   Long Term Goal # 1 is completed  Completion Date: 5/10/18I      Goal 2:   A  I will continue to participate in [employer] as I am able  B  I will continue to make sure I am taking care of myself  C  I will continue to have realistic expectations of myself as I deal with stressors  Target Date: 5/10/18          GOAL 1: Modality: Individual Completion Date: 5/10/18       The person(s) responsible for carrying out the plan is [place]  GOAL 2: Modality: Individual 1 x per month Target Date: 5/10/18       The person(s) responsible for carrying out the plan is [place]  The first scheduled review date is 5/10/18  The expected length of service is 6 months  Level of functioning at initial assessment: 60  The highest level of functioning in the past year was unknown  The current level of functioning is 68             Patient Signature: _________________________________ Date/Time: ______________       Electronically signed by : Blanka Comer MSWLCSWMSTARIK,DANIELLE; Dominguez 10 2018 10:00AM EST                       (Author)

## 2018-01-24 VITALS
HEART RATE: 89 BPM | HEIGHT: 62 IN | BODY MASS INDEX: 24.68 KG/M2 | WEIGHT: 134.13 LBS | SYSTOLIC BLOOD PRESSURE: 157 MMHG | DIASTOLIC BLOOD PRESSURE: 65 MMHG

## 2018-02-08 ENCOUNTER — OFFICE VISIT (OUTPATIENT)
Dept: BEHAVIORAL/MENTAL HEALTH CLINIC | Facility: CLINIC | Age: 78
End: 2018-02-08
Payer: COMMERCIAL

## 2018-02-08 DIAGNOSIS — F43.21 ADJUSTMENT DISORDER WITH DEPRESSED MOOD: Primary | ICD-10-CM

## 2018-02-08 PROCEDURE — 90834 PSYTX W PT 45 MINUTES: CPT | Performed by: SOCIAL WORKER

## 2018-02-08 NOTE — PSYCH
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) which she states impacts hers and her colleagues' work load  "  She states continues to enjoy her family including her "granddog (which she states is a type of replacement for granddaughter)  " She spoke of plans for the family to go to the beach  States her daughter continues to co facilitate a Grief Share Group for those who have lost children; She states she continues to participate in the Grief Share with another group regarding her bereavement concerns for her  granddaughter  She states she is looking forward to volunteering at a local [employer] event to support [facility]  Discussed her ongoing efforts to take care of and advocate for herself as she continues with the grief process; A: She confirms she is making progress with her grief which she states is encouraging to her  She confirmed her feeling proud of her daughter's accomplishments including her report of her daughter having had a recent promotion  P:(G# 2) will continue with her process of bereavement and other changes in her life;       Length of time in session: 45 minutes, follow up in 4 month    Goals addressed in session: Goal 1     Pain:      none    0    Current suicide risk : [address]: Diagnosis and Treatment Plan explained to [place], [place] relates understanding diagnosis and is agreeable to Treatment Plan   Yes

## 2018-03-06 ENCOUNTER — OFFICE VISIT (OUTPATIENT)
Dept: OBGYN CLINIC | Facility: HOSPITAL | Age: 78
End: 2018-03-06
Payer: COMMERCIAL

## 2018-03-06 ENCOUNTER — HOSPITAL ENCOUNTER (OUTPATIENT)
Dept: RADIOLOGY | Facility: HOSPITAL | Age: 78
Discharge: HOME | End: 2018-03-06
Attending: ORTHOPAEDIC SURGERY
Payer: COMMERCIAL

## 2018-03-06 VITALS
HEART RATE: 96 BPM | BODY MASS INDEX: 25.14 KG/M2 | SYSTOLIC BLOOD PRESSURE: 136 MMHG | WEIGHT: 136.6 LBS | HEIGHT: 62 IN | DIASTOLIC BLOOD PRESSURE: 77 MMHG

## 2018-03-06 DIAGNOSIS — M25.551 PAIN IN RIGHT HIP: Primary | ICD-10-CM

## 2018-03-06 DIAGNOSIS — Z96.641 HISTORY OF HIP REPLACEMENT, TOTAL, RIGHT: ICD-10-CM

## 2018-03-06 DIAGNOSIS — M70.61 TROCHANTERIC BURSITIS OF RIGHT HIP: ICD-10-CM

## 2018-03-06 PROCEDURE — 73502 X-RAY EXAM HIP UNI 2-3 VIEWS: CPT

## 2018-03-06 PROCEDURE — 20610 DRAIN/INJ JOINT/BURSA W/O US: CPT | Performed by: PHYSICIAN ASSISTANT

## 2018-03-06 PROCEDURE — 99213 OFFICE O/P EST LOW 20 MIN: CPT | Performed by: PHYSICIAN ASSISTANT

## 2018-03-06 RX ORDER — LIDOCAINE HYDROCHLORIDE 10 MG/ML
2 INJECTION, SOLUTION INFILTRATION; PERINEURAL
Status: COMPLETED | OUTPATIENT
Start: 2018-03-06 | End: 2018-03-06

## 2018-03-06 RX ORDER — CONJUGATED ESTROGENS 0.62 MG/G
CREAM VAGINAL
COMMUNITY
Start: 2017-11-29 | End: 2021-09-02 | Stop reason: ALTCHOICE

## 2018-03-06 RX ORDER — BUPIVACAINE HYDROCHLORIDE 2.5 MG/ML
2 INJECTION, SOLUTION INFILTRATION; PERINEURAL
Status: COMPLETED | OUTPATIENT
Start: 2018-03-06 | End: 2018-03-06

## 2018-03-06 RX ORDER — PHENAZOPYRIDINE HYDROCHLORIDE 100 MG/1
TABLET, FILM COATED ORAL
COMMUNITY
Start: 2018-01-25 | End: 2018-10-31 | Stop reason: HOSPADM

## 2018-03-06 RX ORDER — BETAMETHASONE SODIUM PHOSPHATE AND BETAMETHASONE ACETATE 3; 3 MG/ML; MG/ML
12 INJECTION, SUSPENSION INTRA-ARTICULAR; INTRALESIONAL; INTRAMUSCULAR; SOFT TISSUE
Status: COMPLETED | OUTPATIENT
Start: 2018-03-06 | End: 2018-03-06

## 2018-03-06 RX ORDER — ALENDRONATE SODIUM 70 MG/1
TABLET ORAL
Status: ON HOLD | COMMUNITY
Start: 2018-01-25 | End: 2018-10-26 | Stop reason: ALTCHOICE

## 2018-03-06 RX ADMIN — LIDOCAINE HYDROCHLORIDE 2 ML: 10 INJECTION, SOLUTION INFILTRATION; PERINEURAL at 09:33

## 2018-03-06 RX ADMIN — BUPIVACAINE HYDROCHLORIDE 2 ML: 2.5 INJECTION, SOLUTION INFILTRATION; PERINEURAL at 09:33

## 2018-03-06 RX ADMIN — BETAMETHASONE SODIUM PHOSPHATE AND BETAMETHASONE ACETATE 12 MG: 3; 3 INJECTION, SUSPENSION INTRA-ARTICULAR; INTRALESIONAL; INTRAMUSCULAR; SOFT TISSUE at 09:33

## 2018-03-06 NOTE — PROGRESS NOTES
69 y o female with a history of right total hip replacement  She also has past clinical history of need for treatment right hip bursitis  She achieves temporary benefit from periodic injections to the trochanteric flare of her right hip  This is why she presents the office today her last visit was in December 2017  X-rays are year old of her right hip new x-rays were taken on arrival to the office    Review of Systems  Review of systems negative unless otherwise specified in HPI    Past Medical History  Past Medical History:   Diagnosis Date    GERD (gastroesophageal reflux disease) 4/4/2016    Osteoarthritis of hip 4/4/2016    SI (sacroiliac) joint inflammation ([facility] [address] ) 4/4/2016       Past Surgical History  Past Surgical History:   Procedure Laterality Date    JOINT REPLACEMENT      right hip 8/15       Current Medications  Current Outpatient Prescriptions on File Prior to Visit   Medication Sig Dispense Refill    acetaminophen (TYLENOL) 325 mg tablet 975 mg every 8 hours 30 tablet 0    ibuprofen (MOTRIN) 600 mg tablet Take 600 mg by mouth every 6 (six) hours as needed for mild pain  No current facility-administered medications on file prior to visit  Recent Labs [facility])    0  Lab Value Date/Time   HCT 33 7 (L) 09/09/2017 0741   HCT 37 9 11/20/2015 0737   HGB 11 3 (L) 09/09/2017 0741   HGB 12 3 11/20/2015 0737   WBC 7 34 09/09/2017 0741   WBC 6 17 11/20/2015 0737   INR 1 07 04/07/2016 0452   INR 1 03 08/18/2015 0912   GLUCOSE 89 02/20/2017 0826   GLUCOSE 100 11/20/2015 0737   HGBA1C 5 5 07/01/2017 0834   HGBA1C 5 8 (H) 06/16/2015 0941         Physical exam  Patient walks without an assistant device  Patient is able to stand from a seated position without significant irritation or weakness  She has point focal pain to palpation over the lateral flare the trochanteric eminence of her right hip    She has no pain of the intra-articular hip with range of motion    Imaging  Hip x-rays right hip total hip replacement, shows unchanged alignment and placement of total hip components and no signs of loosening    Procedure  Large joint arthrocentesis  Date/Time: 3/6/2018 9:33 AM  Consent given by: patient  Supporting Documentation  Indications: pain   Procedure Details  Location: hip -   Needle size: 22 G  Ultrasound guidance: no  Approach: lateral  Medications administered: 2 mL bupivacaine 0 25 %; 2 mL lidocaine 1 %; 12 mg betamethasone acetate-betamethasone sodium phosphate 6 (3-3) mg/mL    Patient tolerance: patient tolerated the procedure well with no immediate complications  Dressing:  Sterile dressing applied          Assessment/Plan:   69 y  o female patient with history of right total hip replacement and re-ccurrence of lateral based hip pain  She underwent safe injection to the lateral aspect of her proximal femur,     Without event  Her exam was provided by and plan formulated by the attending surgeon    It was my privilege to assist him in its delivery

## 2018-03-12 ENCOUNTER — TRANSCRIBE ORDERS (OUTPATIENT)
Dept: ADMINISTRATIVE | Facility: HOSPITAL | Age: 78
End: 2018-03-12

## 2018-03-12 DIAGNOSIS — Z12.39 SCREENING BREAST EXAMINATION: Primary | ICD-10-CM

## 2018-03-14 ENCOUNTER — HOSPITAL ENCOUNTER (OUTPATIENT)
Dept: RADIOLOGY | Age: 78
Discharge: HOME | End: 2018-03-14
Payer: COMMERCIAL

## 2018-03-14 DIAGNOSIS — Z12.39 SCREENING BREAST EXAMINATION: ICD-10-CM

## 2018-03-14 PROCEDURE — 77067 SCR MAMMO BI INCL CAD: CPT

## 2018-03-14 PROCEDURE — G0124 SCREEN C/V THIN LAYER BY MD: HCPCS | Performed by: PATHOLOGY

## 2018-03-14 PROCEDURE — G0145 SCR C/V CYTO,THINLAYER,RESCR: HCPCS | Performed by: PATHOLOGY

## 2018-03-15 ENCOUNTER — LAB REQUISITION (OUTPATIENT)
Dept: LAB | Facility: HOSPITAL | Age: 78
End: 2018-03-15
Payer: COMMERCIAL

## 2018-03-15 DIAGNOSIS — Z01.419 ENCOUNTER FOR GYNECOLOGICAL EXAMINATION WITHOUT ABNORMAL FINDING: ICD-10-CM

## 2018-03-20 LAB
LAB AP GYN PRIMARY INTERPRETATION: NORMAL
Lab: NORMAL
PATH INTERP SPEC-IMP: NORMAL

## 2018-03-27 ENCOUNTER — HOSPITAL ENCOUNTER (OUTPATIENT)
Dept: MAMMOGRAPHY | Facility: CLINIC | Age: 78
Discharge: HOME | End: 2018-03-27
Payer: COMMERCIAL

## 2018-03-27 ENCOUNTER — HOSPITAL ENCOUNTER (OUTPATIENT)
Dept: ULTRASOUND IMAGING | Facility: CLINIC | Age: 78
Discharge: HOME | End: 2018-03-27
Payer: COMMERCIAL

## 2018-03-27 DIAGNOSIS — R92.8 ABNORMAL MAMMOGRAM: ICD-10-CM

## 2018-03-27 PROCEDURE — G0279 TOMOSYNTHESIS, MAMMO: HCPCS

## 2018-03-27 PROCEDURE — 77065 DX MAMMO INCL CAD UNI: CPT

## 2018-03-28 PROCEDURE — 87086 URINE CULTURE/COLONY COUNT: CPT | Performed by: OBSTETRICS & GYNECOLOGY

## 2018-04-02 ENCOUNTER — LAB REQUISITION (OUTPATIENT)
Dept: LAB | Facility: HOSPITAL | Age: 78
End: 2018-04-02
Payer: COMMERCIAL

## 2018-04-02 DIAGNOSIS — N30.20 OTHER CHRONIC CYSTITIS WITHOUT HEMATURIA: ICD-10-CM

## 2018-04-02 LAB — BACTERIA UR CULT: NORMAL

## 2018-04-11 ENCOUNTER — OFFICE VISIT (OUTPATIENT)
Dept: BEHAVIORAL/MENTAL HEALTH CLINIC | Facility: CLINIC | Age: 78
End: 2018-04-11
Payer: COMMERCIAL

## 2018-04-11 DIAGNOSIS — F43.21 ADJUSTMENT DISORDER WITH DEPRESSED MOOD: Primary | ICD-10-CM

## 2018-04-11 PROCEDURE — 90834 PSYTX W PT 45 MINUTES: CPT | Performed by: SOCIAL WORKER

## 2018-04-11 NOTE — PSYCH
Psychotherapy Provided: Individual Psychotherapy 45 minutes states the (fourth) anniversary of her granddaughter's passing () is approaching and "It is softball season " states her granddaughter excelled in softball;  completed another Grief Share series with plans to participate in another support group in May, 2018; She reviewed details of her granddaughter's   states continues looking forward to her volunteer work for an [address]; She states the event is a social time and enjoys meeting the people  states her daughter "is much better (regarding Aruna's granddaughter's passing);"   has periodic dreams about her granddaughter; discussed/reviewed her ongoing efforts to take care of herself as she works her through the grieving; discussed/reviewed her efforts to maintain the improvements she has helped create in her relationship with her daughter; A: remains cheerful, family-oriented and committed to her part time job; P:(G# 1 ) will continue in her efforts to maintain the positives in her relationship with her daughter;     Length of time in session: 45 minutes, follow up in 1 month    Goals addressed in session: Goal 1     Pain:      none    0    Current suicide risk : [address]: Diagnosis and Treatment Plan explained to [place], [place] relates understanding diagnosis and is agreeable to Treatment Plan   Yes

## 2018-06-12 ENCOUNTER — OFFICE VISIT (OUTPATIENT)
Dept: OBGYN CLINIC | Facility: HOSPITAL | Age: 78
End: 2018-06-12
Payer: COMMERCIAL

## 2018-06-12 ENCOUNTER — APPOINTMENT (OUTPATIENT)
Dept: LAB | Facility: HOSPITAL | Age: 78
End: 2018-06-12
Payer: COMMERCIAL

## 2018-06-12 ENCOUNTER — APPOINTMENT (OUTPATIENT)
Dept: LAB | Facility: HOSPITAL | Age: 78
End: 2018-06-12
Attending: INTERNAL MEDICINE
Payer: COMMERCIAL

## 2018-06-12 ENCOUNTER — TRANSCRIBE ORDERS (OUTPATIENT)
Dept: LAB | Facility: HOSPITAL | Age: 78
End: 2018-06-12

## 2018-06-12 VITALS
HEIGHT: 62 IN | SYSTOLIC BLOOD PRESSURE: 139 MMHG | HEART RATE: 92 BPM | WEIGHT: 136.69 LBS | BODY MASS INDEX: 25.15 KG/M2 | DIASTOLIC BLOOD PRESSURE: 83 MMHG

## 2018-06-12 DIAGNOSIS — I10 ESSENTIAL HYPERTENSION, MALIGNANT: ICD-10-CM

## 2018-06-12 DIAGNOSIS — R35.0 URINARY FREQUENCY: ICD-10-CM

## 2018-06-12 DIAGNOSIS — E03.9 MYXEDEMA HEART DISEASE: ICD-10-CM

## 2018-06-12 DIAGNOSIS — I51.9 MYXEDEMA HEART DISEASE: ICD-10-CM

## 2018-06-12 DIAGNOSIS — Z00.8 HEALTH EXAMINATION IN POPULATION SURVEY: Primary | ICD-10-CM

## 2018-06-12 DIAGNOSIS — Z96.641 HISTORY OF TOTAL HIP REPLACEMENT, RIGHT: ICD-10-CM

## 2018-06-12 DIAGNOSIS — Z00.8 HEALTH EXAMINATION IN POPULATION SURVEY: ICD-10-CM

## 2018-06-12 DIAGNOSIS — D64.9 ANEMIA, UNSPECIFIED TYPE: ICD-10-CM

## 2018-06-12 DIAGNOSIS — M25.551 RIGHT HIP PAIN: Primary | ICD-10-CM

## 2018-06-12 DIAGNOSIS — M70.61 TROCHANTERIC BURSITIS OF RIGHT HIP: ICD-10-CM

## 2018-06-12 LAB
ALBUMIN SERPL BCP-MCNC: 3.7 G/DL (ref 3.5–5)
ALP SERPL-CCNC: 68 U/L (ref 46–116)
ALT SERPL W P-5'-P-CCNC: 17 U/L (ref 12–78)
ANION GAP SERPL CALCULATED.3IONS-SCNC: 6 MMOL/L (ref 4–13)
AST SERPL W P-5'-P-CCNC: 15 U/L (ref 5–45)
BASOPHILS # BLD AUTO: 0.06 THOUSANDS/ΜL (ref 0–0.1)
BASOPHILS NFR BLD AUTO: 1 % (ref 0–1)
BILIRUB SERPL-MCNC: 0.46 MG/DL (ref 0.2–1)
BILIRUB UR QL STRIP: NEGATIVE
BUN SERPL-MCNC: 16 MG/DL (ref 5–25)
CALCIUM SERPL-MCNC: 9.1 MG/DL (ref 8.3–10.1)
CHLORIDE SERPL-SCNC: 107 MMOL/L (ref 100–108)
CHOLEST SERPL-MCNC: 246 MG/DL (ref 50–200)
CLARITY UR: NORMAL
CO2 SERPL-SCNC: 26 MMOL/L (ref 21–32)
COLOR UR: YELLOW
CREAT SERPL-MCNC: 0.95 MG/DL (ref 0.6–1.3)
EOSINOPHIL # BLD AUTO: 0.16 THOUSAND/ΜL (ref 0–0.61)
EOSINOPHIL NFR BLD AUTO: 3 % (ref 0–6)
ERYTHROCYTE [DISTWIDTH] IN BLOOD BY AUTOMATED COUNT: 13.2 % (ref 11.6–15.1)
EST. AVERAGE GLUCOSE BLD GHB EST-MCNC: 108 MG/DL
GFR SERPL CREATININE-BSD FRML MDRD: 58 ML/MIN/1.73SQ M
GLUCOSE P FAST SERPL-MCNC: 89 MG/DL (ref 65–99)
GLUCOSE UR STRIP-MCNC: NEGATIVE MG/DL
HBA1C MFR BLD: 5.4 % (ref 4.2–6.3)
HCT VFR BLD AUTO: 38 % (ref 34.8–46.1)
HDLC SERPL-MCNC: 97 MG/DL (ref 40–60)
HGB BLD-MCNC: 12.1 G/DL (ref 11.5–15.4)
HGB UR QL STRIP.AUTO: NEGATIVE
IMM GRANULOCYTES # BLD AUTO: 0.01 THOUSAND/UL (ref 0–0.2)
IMM GRANULOCYTES NFR BLD AUTO: 0 % (ref 0–2)
KETONES UR STRIP-MCNC: NEGATIVE MG/DL
LDLC SERPL CALC-MCNC: 136 MG/DL (ref 0–100)
LEUKOCYTE ESTERASE UR QL STRIP: NEGATIVE
LYMPHOCYTES # BLD AUTO: 1.45 THOUSANDS/ΜL (ref 0.6–4.47)
LYMPHOCYTES NFR BLD AUTO: 28 % (ref 14–44)
MCH RBC QN AUTO: 29.7 PG (ref 26.8–34.3)
MCHC RBC AUTO-ENTMCNC: 31.8 G/DL (ref 31.4–37.4)
MCV RBC AUTO: 93 FL (ref 82–98)
MONOCYTES # BLD AUTO: 0.57 THOUSAND/ΜL (ref 0.17–1.22)
MONOCYTES NFR BLD AUTO: 11 % (ref 4–12)
NEUTROPHILS # BLD AUTO: 2.93 THOUSANDS/ΜL (ref 1.85–7.62)
NEUTS SEG NFR BLD AUTO: 57 % (ref 43–75)
NITRITE UR QL STRIP: NEGATIVE
NRBC BLD AUTO-RTO: 0 /100 WBCS
PH UR STRIP.AUTO: 7 [PH] (ref 4.5–8)
PLATELET # BLD AUTO: 275 THOUSANDS/UL (ref 149–390)
PMV BLD AUTO: 10.3 FL (ref 8.9–12.7)
POTASSIUM SERPL-SCNC: 4.1 MMOL/L (ref 3.5–5.3)
PROT SERPL-MCNC: 7.5 G/DL (ref 6.4–8.2)
PROT UR STRIP-MCNC: NEGATIVE MG/DL
RBC # BLD AUTO: 4.08 MILLION/UL (ref 3.81–5.12)
SODIUM SERPL-SCNC: 139 MMOL/L (ref 136–145)
SP GR UR STRIP.AUTO: 1.01 (ref 1–1.03)
TRIGL SERPL-MCNC: 64 MG/DL
TSH SERPL DL<=0.05 MIU/L-ACNC: 3.35 UIU/ML (ref 0.36–3.74)
UROBILINOGEN UR QL STRIP.AUTO: 0.2 E.U./DL
WBC # BLD AUTO: 5.18 THOUSAND/UL (ref 4.31–10.16)

## 2018-06-12 PROCEDURE — 84443 ASSAY THYROID STIM HORMONE: CPT

## 2018-06-12 PROCEDURE — 20610 DRAIN/INJ JOINT/BURSA W/O US: CPT | Performed by: ORTHOPAEDIC SURGERY

## 2018-06-12 PROCEDURE — 80053 COMPREHEN METABOLIC PANEL: CPT

## 2018-06-12 PROCEDURE — 99213 OFFICE O/P EST LOW 20 MIN: CPT | Performed by: ORTHOPAEDIC SURGERY

## 2018-06-12 PROCEDURE — 36415 COLL VENOUS BLD VENIPUNCTURE: CPT

## 2018-06-12 PROCEDURE — 83036 HEMOGLOBIN GLYCOSYLATED A1C: CPT

## 2018-06-12 PROCEDURE — 80061 LIPID PANEL: CPT

## 2018-06-12 PROCEDURE — 85025 COMPLETE CBC W/AUTO DIFF WBC: CPT

## 2018-06-12 PROCEDURE — 81003 URINALYSIS AUTO W/O SCOPE: CPT

## 2018-06-12 RX ORDER — BETAMETHASONE SODIUM PHOSPHATE AND BETAMETHASONE ACETATE 3; 3 MG/ML; MG/ML
12 INJECTION, SUSPENSION INTRA-ARTICULAR; INTRALESIONAL; INTRAMUSCULAR; SOFT TISSUE
Status: COMPLETED | OUTPATIENT
Start: 2018-06-12 | End: 2018-06-12

## 2018-06-12 RX ORDER — LIDOCAINE HYDROCHLORIDE 10 MG/ML
2 INJECTION, SOLUTION INFILTRATION; PERINEURAL
Status: COMPLETED | OUTPATIENT
Start: 2018-06-12 | End: 2018-06-12

## 2018-06-12 RX ORDER — BUPIVACAINE HYDROCHLORIDE 2.5 MG/ML
2 INJECTION, SOLUTION INFILTRATION; PERINEURAL
Status: COMPLETED | OUTPATIENT
Start: 2018-06-12 | End: 2018-06-12

## 2018-06-12 RX ADMIN — BUPIVACAINE HYDROCHLORIDE 2 ML: 2.5 INJECTION, SOLUTION INFILTRATION; PERINEURAL at 09:46

## 2018-06-12 RX ADMIN — LIDOCAINE HYDROCHLORIDE 2 ML: 10 INJECTION, SOLUTION INFILTRATION; PERINEURAL at 09:46

## 2018-06-12 RX ADMIN — BETAMETHASONE SODIUM PHOSPHATE AND BETAMETHASONE ACETATE 12 MG: 3; 3 INJECTION, SUSPENSION INTRA-ARTICULAR; INTRALESIONAL; INTRAMUSCULAR; SOFT TISSUE at 09:46

## 2018-06-12 NOTE — PROGRESS NOTES
69 y o female with a Hx of right LAURI and recurrent right trochanteric bursitis in which she has been getting by with intermittent injections with relief  She works part time in [employer]  Review of Systems  Review of systems negative unless otherwise specified in HPI    Past Medical History  Past Medical History:   Diagnosis Date    GERD (gastroesophageal reflux disease) 4/4/2016    Osteoarthritis of hip 4/4/2016    SI (sacroiliac) joint inflammation ([address] ) 4/4/2016       Past Surgical History  Past Surgical History:   Procedure Laterality Date    JOINT REPLACEMENT      right hip 8/15       Current Medications  Current Outpatient Prescriptions on File Prior to Visit   Medication Sig Dispense Refill    acetaminophen (TYLENOL) 325 mg tablet 975 mg every 8 hours 30 tablet 0    alendronate (FOSAMAX) 70 mg tablet       ibuprofen (MOTRIN) 600 mg tablet Take 600 mg by mouth every 6 (six) hours as needed for mild pain   phenazopyridine (PYRIDIUM) 100 mg tablet       PREMARIN vaginal cream        No current facility-administered medications on file prior to visit          Recent Labs [facility])    0  Lab Value Date/Time   HCT 33 7 (L) 09/09/2017 0741   HCT 37 9 11/20/2015 0737   HGB 11 3 (L) 09/09/2017 0741   HGB 12 3 11/20/2015 0737   WBC 7 34 09/09/2017 0741   WBC 6 17 11/20/2015 0737   INR 1 07 04/07/2016 0452   INR 1 03 08/18/2015 0912   GLUCOSE 89 02/20/2017 0826   GLUCOSE 100 11/20/2015 0737   HGBA1C 5 5 07/01/2017 0834   HGBA1C 5 8 (H) 06/16/2015 0941         Physical exam  · General: Awake, Alert, Oriented  · Eyes: Pupils equal, round and reactive to light  · Heart: regular rate and rhythm  · Lungs: No audible wheezing  · Abdomen: soft    Right hip  Well healed incision without signs of infection  Good ROM without pain and 5/5 strength  NVID      Imaging  None performed    Procedure  Large joint arthrocentesis  Date/Time: 6/12/2018 9:46 AM  Consent given by: patient  Site marked: site marked  Supporting Documentation  Indications: pain   Procedure Details  Location: hip - R greater trochanteric bursa  Preparation: Patient was prepped and draped in the usual sterile fashion  Needle size: 22 G  Ultrasound guidance: no  Approach: lateral  Medications administered: 2 mL bupivacaine 0 25 %; 2 mL lidocaine 1 %; 12 mg betamethasone acetate-betamethasone sodium phosphate 6 (3-3) mg/mL    Patient tolerance: patient tolerated the procedure well with no immediate complications  Dressing:  Sterile dressing applied          Assessment/Plan:   69 y  o female with recurrent right trochanteric bursitis, with a Hx of a right LAURI    Repeat bursal injection today, ICE as indicated  WBAT  Total hip precautions  Re-check in 3 months

## 2018-06-21 ENCOUNTER — TELEPHONE (OUTPATIENT)
Dept: BEHAVIORAL/MENTAL HEALTH CLINIC | Facility: CLINIC | Age: 78
End: 2018-06-21

## 2018-07-11 ENCOUNTER — DOCUMENTATION (OUTPATIENT)
Dept: BEHAVIORAL/MENTAL HEALTH CLINIC | Facility: CLINIC | Age: 78
End: 2018-07-11

## 2018-07-11 ENCOUNTER — OFFICE VISIT (OUTPATIENT)
Dept: BEHAVIORAL/MENTAL HEALTH CLINIC | Facility: CLINIC | Age: 78
End: 2018-07-11
Payer: COMMERCIAL

## 2018-07-11 DIAGNOSIS — F43.21 ADJUSTMENT DISORDER WITH DEPRESSED MOOD: Primary | ICD-10-CM

## 2018-07-11 PROCEDURE — 90834 PSYTX W PT 45 MINUTES: CPT | Performed by: SOCIAL WORKER

## 2018-07-11 NOTE — PSYCH
Psychotherapy Provided: Individual Psychotherapy 45 minutes PHQ9 = 0; states has been crying "on and off" during the past two weeks regarding her granddaughter's (Lurdes's) passing 7/12/18 approximately four years ago (allegedly a heroin overdose); "This is sad  I wish she was here  I feel she is out of (her) turmoil " I can watch DVD's of Gaetano Esposito on our trips  My  cannot watch them  My daughter can (watch Lurdes's DVD's)>" She referred to Grief Share which she states had been helpful to her  States her daughter (the daughter who has reportedly lost her daughter to heroin overdose) is participating in Grief Share as a co facilitator  She spokes favorably of her daughter's dog whom she states she and her  watch for their daughter  states she continues to work part time at a [employer]; She spoke of her being able to maintain her positivity with her relationship with her daughter which is very meaningful to her  She spoke favorably of her son whom she states is enjoying his new job  She noted her workplace will be having a new  management program about which she states has heard positive comments  Discussed/reviewed her efforts to maintain her progress in her dealing with her granddaughter's passing including her efforts to maintain a more positive outlook; A: presents as more upbeat and as happy to be able to have a positive relationship with her daughter; P: (G#1 ) will continue in her efforts to maintain a positive relationship with her daughter;    Length of time in session: 45 minutes, follow up in 1 month    Goals addressed in session: Goal 1     Pain:      none    0    Current suicide risk : [address]: Diagnosis and Treatment Plan explained to [place], [place] relates understanding diagnosis and is agreeable to Treatment Plan   Yes

## 2018-07-11 NOTE — PROGRESS NOTES
Treatment Plan Tracking    # 1Treatment Plan not completed within required time limits due to: Client did not schedule within the four month time frame required to update a treatment plan  Maranda Wolf

## 2018-07-19 ENCOUNTER — LAB REQUISITION (OUTPATIENT)
Dept: LAB | Facility: HOSPITAL | Age: 78
End: 2018-07-19
Payer: COMMERCIAL

## 2018-07-19 DIAGNOSIS — N30.10 CHRONIC INTERSTITIAL CYSTITIS WITHOUT HEMATURIA: ICD-10-CM

## 2018-07-19 PROCEDURE — 87086 URINE CULTURE/COLONY COUNT: CPT | Performed by: OBSTETRICS & GYNECOLOGY

## 2018-07-19 PROCEDURE — 87186 SC STD MICRODIL/AGAR DIL: CPT | Performed by: OBSTETRICS & GYNECOLOGY

## 2018-07-19 PROCEDURE — 87077 CULTURE AEROBIC IDENTIFY: CPT | Performed by: OBSTETRICS & GYNECOLOGY

## 2018-07-21 LAB — BACTERIA UR CULT: ABNORMAL

## 2018-07-30 PROCEDURE — 87086 URINE CULTURE/COLONY COUNT: CPT | Performed by: OBSTETRICS & GYNECOLOGY

## 2018-07-31 ENCOUNTER — LAB REQUISITION (OUTPATIENT)
Dept: LAB | Facility: HOSPITAL | Age: 78
End: 2018-07-31
Payer: COMMERCIAL

## 2018-07-31 DIAGNOSIS — N30.20 OTHER CHRONIC CYSTITIS WITHOUT HEMATURIA: ICD-10-CM

## 2018-08-01 LAB — BACTERIA UR CULT: NORMAL

## 2018-09-18 ENCOUNTER — OFFICE VISIT (OUTPATIENT)
Dept: OBGYN CLINIC | Facility: HOSPITAL | Age: 78
End: 2018-09-18
Payer: COMMERCIAL

## 2018-09-18 VITALS
SYSTOLIC BLOOD PRESSURE: 147 MMHG | HEART RATE: 80 BPM | WEIGHT: 137.6 LBS | HEIGHT: 62 IN | DIASTOLIC BLOOD PRESSURE: 77 MMHG | BODY MASS INDEX: 25.32 KG/M2

## 2018-09-18 DIAGNOSIS — Z96.641 HISTORY OF TOTAL RIGHT HIP REPLACEMENT: ICD-10-CM

## 2018-09-18 DIAGNOSIS — M70.61 TROCHANTERIC BURSITIS OF RIGHT HIP: Primary | ICD-10-CM

## 2018-09-18 PROCEDURE — 99213 OFFICE O/P EST LOW 20 MIN: CPT | Performed by: ORTHOPAEDIC SURGERY

## 2018-09-18 PROCEDURE — 20610 DRAIN/INJ JOINT/BURSA W/O US: CPT | Performed by: ORTHOPAEDIC SURGERY

## 2018-09-18 RX ORDER — FLUCONAZOLE 150 MG/1
TABLET ORAL
COMMUNITY
Start: 2018-07-18 | End: 2018-11-15 | Stop reason: ALTCHOICE

## 2018-09-18 RX ORDER — LIDOCAINE HYDROCHLORIDE 10 MG/ML
2 INJECTION, SOLUTION INFILTRATION; PERINEURAL
Status: COMPLETED | OUTPATIENT
Start: 2018-09-18 | End: 2018-09-18

## 2018-09-18 RX ORDER — BETAMETHASONE SODIUM PHOSPHATE AND BETAMETHASONE ACETATE 3; 3 MG/ML; MG/ML
12 INJECTION, SUSPENSION INTRA-ARTICULAR; INTRALESIONAL; INTRAMUSCULAR; SOFT TISSUE
Status: COMPLETED | OUTPATIENT
Start: 2018-09-18 | End: 2018-09-18

## 2018-09-18 RX ORDER — SULFAMETHOXAZOLE AND TRIMETHOPRIM 800; 160 MG/1; MG/1
TABLET ORAL
COMMUNITY
Start: 2018-07-18 | End: 2018-10-31 | Stop reason: HOSPADM

## 2018-09-18 RX ORDER — BUPIVACAINE HYDROCHLORIDE 2.5 MG/ML
2 INJECTION, SOLUTION INFILTRATION; PERINEURAL
Status: COMPLETED | OUTPATIENT
Start: 2018-09-18 | End: 2018-09-18

## 2018-09-18 RX ADMIN — LIDOCAINE HYDROCHLORIDE 2 ML: 10 INJECTION, SOLUTION INFILTRATION; PERINEURAL at 09:59

## 2018-09-18 RX ADMIN — BUPIVACAINE HYDROCHLORIDE 2 ML: 2.5 INJECTION, SOLUTION INFILTRATION; PERINEURAL at 09:59

## 2018-09-18 RX ADMIN — BETAMETHASONE SODIUM PHOSPHATE AND BETAMETHASONE ACETATE 12 MG: 3; 3 INJECTION, SUSPENSION INTRA-ARTICULAR; INTRALESIONAL; INTRAMUSCULAR; SOFT TISSUE at 09:59

## 2018-09-18 NOTE — PATIENT INSTRUCTIONS

## 2018-09-18 NOTE — PROGRESS NOTES
Assessment:  1  Trochanteric bursitis of right hip  Large joint arthrocentesis   2  History of total right hip replacement  Large joint arthrocentesis       Plan:  Greater than 5 years removed right total hip arthroplasty with recurrent trochanteric bursitis  Cortisone injection to her right trochanteric bursa area performed today  Weight-bearing and activities as tolerated  Ice as indicated    To do next visit:  Return in about 3 months (around 12/18/2018) for re-check  Scribe Attestation    I,:   Sundar Mendoza am acting as a scribe while in the presence of the attending physician :        I,:   Pam Iglesias MD personally performed the services described in this documentation    as scribed in my presence :              Subjective:   Laurie is a 69 y o  female who presents for repeat evaluation due to the lateral right hip pain  She has a history of a right total hip arthroplasty greater than 5 years ago with resolution of her groin pain  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   She does find relief with periodic cortisone injections to her trochanteric bursa is her last  was 3 months ago  Review of systems negative unless otherwise specified in HPI    Past Medical History:   Diagnosis Date    GERD (gastroesophageal reflux disease) 4/4/2016    Osteoarthritis of hip 4/4/2016    SI (sacroiliac) joint inflammation ([facility] [address] ) 4/4/2016       Past Surgical History:   Procedure Laterality Date    JOINT REPLACEMENT      right hip 8/15       History reviewed  No pertinent family history  Social History     Occupational History    Not on file       Social History Main Topics    Smoking status: Never Smoker    Smokeless tobacco: Never Used    Alcohol use No    Drug use: No    Sexual activity: Not on file         Current Outpatient Prescriptions:     acetaminophen (TYLENOL) 325 mg tablet, 975 mg every 8 hours, Disp: 30 tablet, Rfl: 0    alendronate (FOSAMAX) 70 mg tablet, , Disp: , Rfl:     fluconazole (DIFLUCAN) 150 mg tablet, , Disp: , Rfl:     ibuprofen (MOTRIN) 600 mg tablet, Take 600 mg by mouth every 6 (six) hours as needed for mild pain , Disp: , Rfl:     phenazopyridine (PYRIDIUM) 100 mg tablet, , Disp: , Rfl:     PREMARIN vaginal cream, , Disp: , Rfl:     sulfamethoxazole-trimethoprim (BACTRIM DS) 800-160 mg per tablet, , Disp: , Rfl:     No Known Allergies         Vitals:    09/18/18 0922   BP: 147/77   Pulse: 80       Objective:          Physical Exam                    Right Hip Exam     Tenderness   The patient is experiencing tenderness in the greater trochanter  Range of Motion   The patient has normal right hip ROM  Muscle Strength   Right hip normal muscle strength: Mild hip ABDs weakness with increased pain laterally with resistance to a ABD  Other   Erythema: absent  Sensation: normal    Comments:    A well-healed incision, no signs of infection    Her calf and thigh are nontender without signs of DVT  NVID            Diagnostics, reviewed and taken today if performed as documented:    None performed      Procedures, if performed today:  Large joint arthrocentesis  Date/Time: 9/18/2018 9:59 AM  Consent given by: patient  Site marked: site marked  Supporting Documentation  Indications: pain   Procedure Details  Location: hip - R greater trochanteric bursa  Preparation: Patient was prepped and draped in the usual sterile fashion  Needle size: 22 G  Ultrasound guidance: no  Approach: lateral  Medications administered: 2 mL bupivacaine 0 25 %; 2 mL lidocaine 1 %; 12 mg betamethasone acetate-betamethasone sodium phosphate 6 (3-3) mg/mL    Patient tolerance: patient tolerated the procedure well with no immediate complications  Dressing:  Sterile dressing applied

## 2018-09-24 ENCOUNTER — LAB REQUISITION (OUTPATIENT)
Dept: LAB | Facility: HOSPITAL | Age: 78
End: 2018-09-24
Payer: COMMERCIAL

## 2018-09-24 DIAGNOSIS — N39.0 URINARY TRACT INFECTION: ICD-10-CM

## 2018-09-24 PROCEDURE — 87086 URINE CULTURE/COLONY COUNT: CPT | Performed by: OBSTETRICS & GYNECOLOGY

## 2018-09-24 PROCEDURE — 87186 SC STD MICRODIL/AGAR DIL: CPT | Performed by: OBSTETRICS & GYNECOLOGY

## 2018-09-24 PROCEDURE — 87077 CULTURE AEROBIC IDENTIFY: CPT | Performed by: OBSTETRICS & GYNECOLOGY

## 2018-09-25 LAB
BACTERIA UR CULT: ABNORMAL
BACTERIA UR CULT: ABNORMAL

## 2018-09-26 ENCOUNTER — OFFICE VISIT (OUTPATIENT)
Dept: BEHAVIORAL/MENTAL HEALTH CLINIC | Facility: CLINIC | Age: 78
End: 2018-09-26
Payer: COMMERCIAL

## 2018-09-26 DIAGNOSIS — F43.21 ADJUSTMENT DISORDER WITH DEPRESSED MOOD: Primary | ICD-10-CM

## 2018-09-26 PROCEDURE — 90834 PSYTX W PT 45 MINUTES: CPT | Performed by: SOCIAL WORKER

## 2018-09-26 NOTE — PSYCH
Psychotherapy Provided: Individual Psychotherapy 45 minutes PHQ 9 = 3; "We had a good time at the beach " "We were spoiled three years before (3 years ago had traveled to [place]) " She reports her daughter "did not want to come back (home following their family beach trip)  " Her daughter ([place]'s granddaughter) is not there (her granddaughter  of a heroin overdose approximately four years ago)  " states her daughter would have been 22years old on 2018; "I am glad she is where she is  It could have been a lot worse  I think my daughter needs closure from the parents (of the other young woman who was allegedly in the presence of her granddaughter at her passing)  " She expressed concern about her workplace and added she will be at her current job for 23 years in   Discussed/reviewed her reported ongoing efforts to "speak up (assertiveness)" at her job; discussed/reviewed AIDET (A: acknowledge, I:  introduce, D: duration, E: explanation, T: thank you); Discussed/reviewed self-care as part of her dealing with the reported changes in her workplace; A: presents as determined to own her job and do well at her job regardless of her reported dissatisfaction; P:(G#1 ) will continue with self-care;      Length of time in session: 45 minutes, follow up in 1 month    Goals addressed in session: Goal 1     Pain:      none    0    Current suicide risk : [facility]: Diagnosis and Treatment Plan explained to [place], [place] relates understanding diagnosis and is agreeable to Treatment Plan   Yes

## 2018-10-02 ENCOUNTER — TRANSCRIBE ORDERS (OUTPATIENT)
Dept: LAB | Facility: HOSPITAL | Age: 78
End: 2018-10-02

## 2018-10-02 ENCOUNTER — APPOINTMENT (OUTPATIENT)
Dept: LAB | Facility: HOSPITAL | Age: 78
End: 2018-10-02
Payer: COMMERCIAL

## 2018-10-02 ENCOUNTER — HOSPITAL ENCOUNTER (OUTPATIENT)
Dept: RADIOLOGY | Facility: HOSPITAL | Age: 78
Discharge: HOME | End: 2018-10-02
Payer: COMMERCIAL

## 2018-10-02 DIAGNOSIS — M15.0 PRIMARY GENERALIZED HYPERTROPHIC OSTEOARTHROSIS: ICD-10-CM

## 2018-10-02 DIAGNOSIS — M81.0 SENILE OSTEOPOROSIS: Primary | ICD-10-CM

## 2018-10-02 DIAGNOSIS — M72.2 PLANTAR FASCIAL FIBROMATOSIS: ICD-10-CM

## 2018-10-02 DIAGNOSIS — E55.9 AVITAMINOSIS D: ICD-10-CM

## 2018-10-02 DIAGNOSIS — M81.0 SENILE OSTEOPOROSIS: ICD-10-CM

## 2018-10-02 DIAGNOSIS — M47.812 CERVICAL SPONDYLOSIS WITHOUT MYELOPATHY: ICD-10-CM

## 2018-10-02 DIAGNOSIS — M47.816 LUMBAR SPONDYLOSIS: ICD-10-CM

## 2018-10-02 LAB
25(OH)D3 SERPL-MCNC: 31 NG/ML (ref 30–100)
ALBUMIN SERPL BCP-MCNC: 3.4 G/DL (ref 3.5–5)
ALP SERPL-CCNC: 65 U/L (ref 46–116)
ALT SERPL W P-5'-P-CCNC: 15 U/L (ref 12–78)
ANION GAP SERPL CALCULATED.3IONS-SCNC: 6 MMOL/L (ref 4–13)
AST SERPL W P-5'-P-CCNC: 11 U/L (ref 5–45)
BASOPHILS # BLD AUTO: 0.05 THOUSANDS/ΜL (ref 0–0.1)
BASOPHILS NFR BLD AUTO: 1 % (ref 0–1)
BILIRUB SERPL-MCNC: 0.34 MG/DL (ref 0.2–1)
BUN SERPL-MCNC: 22 MG/DL (ref 5–25)
CALCIUM SERPL-MCNC: 9.5 MG/DL (ref 8.3–10.1)
CHLORIDE SERPL-SCNC: 109 MMOL/L (ref 100–108)
CO2 SERPL-SCNC: 24 MMOL/L (ref 21–32)
CREAT SERPL-MCNC: 1.12 MG/DL (ref 0.6–1.3)
EOSINOPHIL # BLD AUTO: 0.12 THOUSAND/ΜL (ref 0–0.61)
EOSINOPHIL NFR BLD AUTO: 2 % (ref 0–6)
ERYTHROCYTE [DISTWIDTH] IN BLOOD BY AUTOMATED COUNT: 13.2 % (ref 11.6–15.1)
ERYTHROCYTE [SEDIMENTATION RATE] IN BLOOD: 22 MM/HOUR (ref 0–20)
GFR SERPL CREATININE-BSD FRML MDRD: 47 ML/MIN/1.73SQ M
GLUCOSE P FAST SERPL-MCNC: 84 MG/DL (ref 65–99)
HCT VFR BLD AUTO: 34.1 % (ref 34.8–46.1)
HGB BLD-MCNC: 10.9 G/DL (ref 11.5–15.4)
IMM GRANULOCYTES # BLD AUTO: 0.02 THOUSAND/UL (ref 0–0.2)
IMM GRANULOCYTES NFR BLD AUTO: 0 % (ref 0–2)
LYMPHOCYTES # BLD AUTO: 1.46 THOUSANDS/ΜL (ref 0.6–4.47)
LYMPHOCYTES NFR BLD AUTO: 23 % (ref 14–44)
MCH RBC QN AUTO: 29.9 PG (ref 26.8–34.3)
MCHC RBC AUTO-ENTMCNC: 32 G/DL (ref 31.4–37.4)
MCV RBC AUTO: 93 FL (ref 82–98)
MONOCYTES # BLD AUTO: 0.5 THOUSAND/ΜL (ref 0.17–1.22)
MONOCYTES NFR BLD AUTO: 8 % (ref 4–12)
NEUTROPHILS # BLD AUTO: 4.28 THOUSANDS/ΜL (ref 1.85–7.62)
NEUTS SEG NFR BLD AUTO: 66 % (ref 43–75)
NRBC BLD AUTO-RTO: 0 /100 WBCS
PLATELET # BLD AUTO: 313 THOUSANDS/UL (ref 149–390)
PMV BLD AUTO: 10.4 FL (ref 8.9–12.7)
POTASSIUM SERPL-SCNC: 3.5 MMOL/L (ref 3.5–5.3)
PROT SERPL-MCNC: 6.9 G/DL (ref 6.4–8.2)
PTH-INTACT SERPL-MCNC: 10.9 PG/ML (ref 18.4–80.1)
RBC # BLD AUTO: 3.65 MILLION/UL (ref 3.81–5.12)
SODIUM SERPL-SCNC: 139 MMOL/L (ref 136–145)
WBC # BLD AUTO: 6.43 THOUSAND/UL (ref 4.31–10.16)

## 2018-10-02 PROCEDURE — 85025 COMPLETE CBC W/AUTO DIFF WBC: CPT

## 2018-10-02 PROCEDURE — 86038 ANTINUCLEAR ANTIBODIES: CPT

## 2018-10-02 PROCEDURE — 85652 RBC SED RATE AUTOMATED: CPT

## 2018-10-02 PROCEDURE — 84165 PROTEIN E-PHORESIS SERUM: CPT

## 2018-10-02 PROCEDURE — 87077 CULTURE AEROBIC IDENTIFY: CPT

## 2018-10-02 PROCEDURE — 83516 IMMUNOASSAY NONANTIBODY: CPT

## 2018-10-02 PROCEDURE — 83970 ASSAY OF PARATHORMONE: CPT

## 2018-10-02 PROCEDURE — 36415 COLL VENOUS BLD VENIPUNCTURE: CPT

## 2018-10-02 PROCEDURE — 80053 COMPREHEN METABOLIC PANEL: CPT

## 2018-10-02 PROCEDURE — 87086 URINE CULTURE/COLONY COUNT: CPT

## 2018-10-02 PROCEDURE — 87186 SC STD MICRODIL/AGAR DIL: CPT

## 2018-10-02 PROCEDURE — 72072 X-RAY EXAM THORAC SPINE 3VWS: CPT

## 2018-10-02 PROCEDURE — 82306 VITAMIN D 25 HYDROXY: CPT

## 2018-10-02 PROCEDURE — 84165 PROTEIN E-PHORESIS SERUM: CPT | Performed by: PATHOLOGY

## 2018-10-02 PROCEDURE — 72100 X-RAY EXAM L-S SPINE 2/3 VWS: CPT

## 2018-10-02 PROCEDURE — 81374 HLA I TYPING 1 ANTIGEN LR: CPT

## 2018-10-03 LAB
ALBUMIN SERPL ELPH-MCNC: 3.81 G/DL (ref 3.5–5)
ALBUMIN SERPL ELPH-MCNC: 58.6 % (ref 52–65)
ALPHA1 GLOB SERPL ELPH-MCNC: 0.36 G/DL (ref 0.1–0.4)
ALPHA1 GLOB SERPL ELPH-MCNC: 5.5 % (ref 2.5–5)
ALPHA2 GLOB SERPL ELPH-MCNC: 0.7 G/DL (ref 0.4–1.2)
ALPHA2 GLOB SERPL ELPH-MCNC: 10.7 % (ref 7–13)
BETA GLOB ABNORMAL SERPL ELPH-MCNC: 0.36 G/DL (ref 0.4–0.8)
BETA1 GLOB SERPL ELPH-MCNC: 5.6 % (ref 5–13)
BETA2 GLOB SERPL ELPH-MCNC: 4.9 % (ref 2–8)
BETA2+GAMMA GLOB SERPL ELPH-MCNC: 0.32 G/DL (ref 0.2–0.5)
GAMMA GLOB ABNORMAL SERPL ELPH-MCNC: 0.96 G/DL (ref 0.5–1.6)
GAMMA GLOB SERPL ELPH-MCNC: 14.7 % (ref 12–22)
IGG/ALB SER: 1.42 {RATIO} (ref 1.1–1.8)
PROT PATTERN SERPL ELPH-IMP: ABNORMAL
PROT SERPL-MCNC: 6.5 G/DL (ref 6.4–8.2)
RYE IGE QN: NEGATIVE
TTG IGA SER-ACNC: <2 U/ML (ref 0–3)
TTG IGG SER-ACNC: <2 U/ML (ref 0–5)

## 2018-10-04 LAB — BACTERIA UR CULT: ABNORMAL

## 2018-10-08 LAB — HLA-B27 QL NAA+PROBE: NEGATIVE

## 2018-10-10 ENCOUNTER — LAB REQUISITION (OUTPATIENT)
Dept: LAB | Facility: HOSPITAL | Age: 78
End: 2018-10-10
Payer: COMMERCIAL

## 2018-10-10 DIAGNOSIS — N30.20 OTHER CHRONIC CYSTITIS WITHOUT HEMATURIA: ICD-10-CM

## 2018-10-10 PROCEDURE — 87086 URINE CULTURE/COLONY COUNT: CPT | Performed by: OBSTETRICS & GYNECOLOGY

## 2018-10-10 PROCEDURE — 87077 CULTURE AEROBIC IDENTIFY: CPT | Performed by: OBSTETRICS & GYNECOLOGY

## 2018-10-10 PROCEDURE — 87186 SC STD MICRODIL/AGAR DIL: CPT | Performed by: OBSTETRICS & GYNECOLOGY

## 2018-10-12 LAB — BACTERIA UR CULT: ABNORMAL

## 2018-10-13 ENCOUNTER — OFFICE VISIT (OUTPATIENT)
Dept: URGENT CARE | Age: 78
End: 2018-10-13
Payer: COMMERCIAL

## 2018-10-13 VITALS
HEIGHT: 61 IN | RESPIRATION RATE: 20 BRPM | OXYGEN SATURATION: 99 % | TEMPERATURE: 97.9 F | WEIGHT: 131 LBS | HEART RATE: 94 BPM | SYSTOLIC BLOOD PRESSURE: 147 MMHG | BODY MASS INDEX: 24.73 KG/M2 | DIASTOLIC BLOOD PRESSURE: 92 MMHG

## 2018-10-13 DIAGNOSIS — R05.9 COUGH: ICD-10-CM

## 2018-10-13 DIAGNOSIS — J04.0 ACUTE LARYNGITIS: Primary | ICD-10-CM

## 2018-10-13 PROCEDURE — 99213 OFFICE O/P EST LOW 20 MIN: CPT | Performed by: NURSE PRACTITIONER

## 2018-10-13 PROCEDURE — S9088 SERVICES PROVIDED IN URGENT: HCPCS | Performed by: NURSE PRACTITIONER

## 2018-10-13 RX ORDER — PREDNISONE 10 MG/1
TABLET ORAL
Qty: 24 TABLET | Refills: 0 | Status: ON HOLD | OUTPATIENT
Start: 2018-10-13 | End: 2018-10-26 | Stop reason: ALTCHOICE

## 2018-10-13 NOTE — PATIENT INSTRUCTIONS
You have a cough and laryngitis that appear to be viral   You have been placed on steroids for inflammation of the vocal cords  You are to continue deslym  Drink lots of water  Follow up with your PCP  Go to the ED if symptoms worsen  Laryngitis   WHAT YOU NEED TO KNOW:   Laryngitis is when your larynx is swollen because of an infection or irritation  The larynx is also called the voice box because it contains your vocal cords  Your vocal cords also swell and change shape, which can cause your voice to sound different  DISCHARGE INSTRUCTIONS:   Take your medicine as directed  Contact your healthcare provider if you think your medicine is not helping or if you have side effects  Tell him of her if you are allergic to any medicine  Keep a list of the medicines, vitamins, and herbs you take  Include the amounts, and when and why you take them  Bring the list or the pill bottles to follow-up visits  Carry your medicine list with you in case of an emergency  Prevent laryngitis:   · Rest your voice:  Do not shout or scream if you get laryngitis often  This will help prevent swelling and irritation of your larynx  · Avoid irritants and harmful substances:  Do not breathe in chemicals or allergens, such as pollen  Alcohol and tobacco can also irritate your larynx  · Avoid foods and liquids that can cause acid reflux: These may include carbonated drinks, spicy foods and sauces, citrus fruit, peppermint, and chocolate  · Avoid the spread of germs:        [facility] your hands often with soap and water  Carry germ-killing gel with you  You can use the gel to clean your hands when there is no soap and water available  ¨ Do not touch your eyes, nose, or mouth unless you have washed your hands first     ¨ Always cover your mouth when you cough  Cough into a tissue or your shirtsleeve so you do not spread germs from your hands  ¨ Try to avoid people who have a cold or the flu   If you are sick, stay away from others as much as possible  Follow up with your healthcare provider as directed:  Write down your questions so you remember to ask them during your visits  Contact your healthcare provider if:   · You have a fever  · You feel large, tender lumps in your neck  · You are hoarse for more than 7 days  · You have new or increased throat pain  · You have questions about your condition or care  Return to the emergency department if:   · Your throat is bleeding  · You are hoarse for more than 7 days and your chest feels tight  · You are drooling and have trouble swallowing  · You have trouble breathing  © 2017 [facility] Information is for End User's use only and may not be sold, redistributed or otherwise used for commercial purposes  All illustrations and images included in CareNotes® are the copyrighted property of [employer]  or [employer]  The above information is an  only  It is not intended as medical advice for individual conditions or treatments  Talk to your doctor, nurse or pharmacist before following any medical regimen to see if it is safe and effective for you

## 2018-10-13 NOTE — PROGRESS NOTES
[facility]        NAME: Mayte Blankenship is a 69 y o  female  : 1940    MRN: [id number]  DATE: 2018  TIME: 10:28 AM    Assessment and Plan   Acute laryngitis [J04 0]  1  Acute laryngitis  predniSONE 10 mg tablet   2  Cough  predniSONE 10 mg tablet         Patient Instructions       Follow up with PCP in 3-5 days  Proceed to  ER if symptoms worsen  You have a cough and laryngitis that appear to be viral   You have been placed on steroids for inflammation of the vocal cords  You are to continue deslym  Drink lots of water    Chief Complaint     Chief Complaint   Patient presents with    Cough     patient states she been having laryngitis for 2 weeks  cough for 3 days  History of Present Illness       This is a 69year old female who states she has had a cough and laryngitis for about 1-2 weeks  Denies fevers, chills, v/d  She is on bactrim DS for a bladder infection and is nauseated  She states that she has been taking delsym  She is gargling as well  She states she is clearing her throat at lot  She has not seen her PCP for this  Cough         Review of Systems   Review of Systems   Constitutional: Negative  HENT: Negative  Eyes: Negative  Respiratory: Positive for cough  Cardiovascular: Negative  Gastrointestinal: Negative  Endocrine: Negative  Genitourinary: Negative  Musculoskeletal:        Rib pain    Skin: Negative  Allergic/Immunologic: Negative  Neurological: Negative  Hematological: Negative  Psychiatric/Behavioral: Negative            Current Medications       Current Outpatient Prescriptions:     acetaminophen (TYLENOL) 325 mg tablet, 975 mg every 8 hours, Disp: 30 tablet, Rfl: 0    alendronate (FOSAMAX) 70 mg tablet, , Disp: , Rfl:     fluconazole (DIFLUCAN) 150 mg tablet, , Disp: , Rfl:     ibuprofen (MOTRIN) 600 mg tablet, Take 600 mg by mouth every 6 (six) hours as needed for mild pain , Disp: , Rfl:    phenazopyridine (PYRIDIUM) 100 mg tablet, , Disp: , Rfl:     predniSONE 10 mg tablet, Take 5 tabs po x 2 days; 4 tabs po x 2 days; 3 tabs po x 1 day; 2 tabs po x 1 day; 1 tab po x 1 day , Disp: 24 tablet, Rfl: 0    PREMARIN vaginal cream, , Disp: , Rfl:     sulfamethoxazole-trimethoprim (BACTRIM DS) 800-160 mg per tablet, , Disp: , Rfl:     Current Allergies     Allergies as of 10/13/2018    (No Known Allergies)            The following portions of the patient's history were reviewed and updated as appropriate: allergies, current medications, past family history, past medical history, past social history, past surgical history and problem list      Past Medical History:   Diagnosis Date    GERD (gastroesophageal reflux disease) 4/4/2016    Osteoarthritis of hip 4/4/2016    SI (sacroiliac) joint inflammation ([facility] [address] ) 4/4/2016       Past Surgical History:   Procedure Laterality Date    JOINT REPLACEMENT      right hip 8/15       History reviewed  No pertinent family history  Medications have been verified  Objective   /92   Pulse 94   Temp 97 9 °F (36 6 °C) (Temporal)   Resp 20   Ht 5' 1" (1 549 m)   Wt 59 4 kg (131 lb)   SpO2 99%   BMI 24 75 kg/m²        Physical Exam     Physical Exam   Constitutional: She is oriented to person, place, and time  She appears well-developed and well-nourished  No distress  HENT:   Head: Normocephalic and atraumatic  Right Ear: External ear normal    Left Ear: External ear normal    + PND   Oropharyngeal injection  + laryngitis with speaking    Eyes: Pupils are equal, round, and reactive to light  Conjunctivae and EOM are normal    Neck: Normal range of motion  Neck supple  Cardiovascular: Normal rate, regular rhythm and normal heart sounds  Pulmonary/Chest: Effort normal and breath sounds normal  No respiratory distress  She has no wheezes  She has no rales  She exhibits no tenderness  Abdominal: Soft   Bowel sounds are normal  Musculoskeletal: Normal range of motion  Neurological: She is alert and oriented to person, place, and time  She has normal reflexes  Skin: Skin is warm and dry  She is not diaphoretic  Psychiatric: She has a normal mood and affect  Her behavior is normal  Judgment and thought content normal    Nursing note and vitals reviewed

## 2018-10-13 NOTE — LETTER
October 13, 2018     Patient: Yeyo Good   YOB: 1940   Date of Visit: 10/13/2018       To Whom It May Concern: It is my medical opinion that Yeyo Good may return to work on 10/15/18  If you have any questions or concerns, please don't hesitate to call           Sincerely,        CYDNEY Lo    CC: No Recipients

## 2018-10-13 NOTE — LETTER
October 13, 2018     Patient: Mickey Garcia   YOB: 1940   Date of Visit: 10/13/2018       To Whom It May Concern: It is my medical opinion that Mickey Garcia may return to work on 10/15/18  If you have any questions or concerns, please don't hesitate to call           Sincerely,        CYDNEY Magaña    CC: No Recipients

## 2018-10-26 ENCOUNTER — APPOINTMENT (EMERGENCY)
Dept: RADIOLOGY | Facility: HOSPITAL | Age: 78
DRG: 683 | End: 2018-10-26
Payer: COMMERCIAL

## 2018-10-26 ENCOUNTER — HOSPITAL ENCOUNTER (INPATIENT)
Facility: HOSPITAL | Age: 78
LOS: 5 days | Discharge: HOME | DRG: 683 | End: 2018-10-31
Attending: EMERGENCY MEDICINE | Admitting: HOSPITALIST
Payer: COMMERCIAL

## 2018-10-26 ENCOUNTER — APPOINTMENT (INPATIENT)
Dept: RADIOLOGY | Facility: HOSPITAL | Age: 78
DRG: 683 | End: 2018-10-26
Payer: COMMERCIAL

## 2018-10-26 DIAGNOSIS — E83.52 HYPERCALCEMIA: ICD-10-CM

## 2018-10-26 DIAGNOSIS — R41.82 ALTERED MENTAL STATUS: Primary | ICD-10-CM

## 2018-10-26 DIAGNOSIS — R93.89 CHEST X-RAY ABNORMALITY: ICD-10-CM

## 2018-10-26 DIAGNOSIS — N39.0 ENTEROCOCCUS UTI: ICD-10-CM

## 2018-10-26 DIAGNOSIS — N17.9 AKI (ACUTE KIDNEY INJURY) (HCC): ICD-10-CM

## 2018-10-26 DIAGNOSIS — B95.2 ENTEROCOCCUS UTI: ICD-10-CM

## 2018-10-26 DIAGNOSIS — I10 ACCELERATED HYPERTENSION: ICD-10-CM

## 2018-10-26 PROBLEM — R41.0 DISORIENTATION: Status: ACTIVE | Noted: 2018-10-26

## 2018-10-26 LAB
ALBUMIN SERPL BCP-MCNC: 3.4 G/DL (ref 3.5–5)
ALP SERPL-CCNC: 72 U/L (ref 46–116)
ALT SERPL W P-5'-P-CCNC: 18 U/L (ref 12–78)
ANION GAP SERPL CALCULATED.3IONS-SCNC: 8 MMOL/L (ref 4–13)
AST SERPL W P-5'-P-CCNC: 20 U/L (ref 5–45)
ATRIAL RATE: 80 BPM
ATRIAL RATE: 90 BPM
BACTERIA UR QL AUTO: ABNORMAL /HPF
BASOPHILS # BLD AUTO: 0.1 THOUSANDS/ΜL (ref 0–0.1)
BASOPHILS NFR BLD AUTO: 1 % (ref 0–1)
BILIRUB SERPL-MCNC: 0.34 MG/DL (ref 0.2–1)
BILIRUB UR QL STRIP: NEGATIVE
BUN SERPL-MCNC: 30 MG/DL (ref 5–25)
CALCIUM SERPL-MCNC: 15.2 MG/DL (ref 8.3–10.1)
CHLORIDE SERPL-SCNC: 98 MMOL/L (ref 100–108)
CLARITY UR: CLEAR
CO2 SERPL-SCNC: 28 MMOL/L (ref 21–32)
COLOR UR: YELLOW
COLOR, POC: NORMAL
CREAT SERPL-MCNC: 3.68 MG/DL (ref 0.6–1.3)
EOSINOPHIL # BLD AUTO: 0.06 THOUSAND/ΜL (ref 0–0.61)
EOSINOPHIL NFR BLD AUTO: 1 % (ref 0–6)
ERYTHROCYTE [DISTWIDTH] IN BLOOD BY AUTOMATED COUNT: 12.6 % (ref 11.6–15.1)
GFR SERPL CREATININE-BSD FRML MDRD: 11 ML/MIN/1.73SQ M
GLUCOSE SERPL-MCNC: 101 MG/DL (ref 65–140)
GLUCOSE SERPL-MCNC: 113 MG/DL (ref 65–140)
GLUCOSE UR STRIP-MCNC: NEGATIVE MG/DL
HCT VFR BLD AUTO: 35.2 % (ref 34.8–46.1)
HGB BLD-MCNC: 11.4 G/DL (ref 11.5–15.4)
HGB UR QL STRIP.AUTO: ABNORMAL
HYALINE CASTS #/AREA URNS LPF: ABNORMAL /LPF
IMM GRANULOCYTES # BLD AUTO: 0.05 THOUSAND/UL (ref 0–0.2)
IMM GRANULOCYTES NFR BLD AUTO: 0 % (ref 0–2)
KETONES UR STRIP-MCNC: NEGATIVE MG/DL
LACTATE SERPL-SCNC: 1.4 MMOL/L (ref 0.5–2)
LEUKOCYTE ESTERASE UR QL STRIP: ABNORMAL
LYMPHOCYTES # BLD AUTO: 0.88 THOUSANDS/ΜL (ref 0.6–4.47)
LYMPHOCYTES NFR BLD AUTO: 7 % (ref 14–44)
MCH RBC QN AUTO: 29.3 PG (ref 26.8–34.3)
MCHC RBC AUTO-ENTMCNC: 32.4 G/DL (ref 31.4–37.4)
MCV RBC AUTO: 91 FL (ref 82–98)
MONOCYTES # BLD AUTO: 0.68 THOUSAND/ΜL (ref 0.17–1.22)
MONOCYTES NFR BLD AUTO: 6 % (ref 4–12)
NEUTROPHILS # BLD AUTO: 10.27 THOUSANDS/ΜL (ref 1.85–7.62)
NEUTS SEG NFR BLD AUTO: 85 % (ref 43–75)
NITRITE UR QL STRIP: NEGATIVE
NON-SQ EPI CELLS URNS QL MICRO: ABNORMAL /HPF
NRBC BLD AUTO-RTO: 0 /100 WBCS
P AXIS: 78 DEGREES
P AXIS: 79 DEGREES
PH UR STRIP.AUTO: 7 [PH] (ref 4.5–8)
PLATELET # BLD AUTO: 321 THOUSANDS/UL (ref 149–390)
PMV BLD AUTO: 10.2 FL (ref 8.9–12.7)
POTASSIUM SERPL-SCNC: 3.3 MMOL/L (ref 3.5–5.3)
PR INTERVAL: 184 MS
PR INTERVAL: 200 MS
PROCALCITONIN SERPL-MCNC: 0.09 NG/ML
PROT SERPL-MCNC: 7.8 G/DL (ref 6.4–8.2)
PROT UR STRIP-MCNC: NEGATIVE MG/DL
QRS AXIS: 34 DEGREES
QRS AXIS: 38 DEGREES
QRSD INTERVAL: 74 MS
QRSD INTERVAL: 74 MS
QT INTERVAL: 326 MS
QT INTERVAL: 342 MS
QTC INTERVAL: 394 MS
QTC INTERVAL: 398 MS
RBC # BLD AUTO: 3.89 MILLION/UL (ref 3.81–5.12)
RBC #/AREA URNS AUTO: ABNORMAL /HPF
S PNEUM AG UR QL: NEGATIVE
SODIUM SERPL-SCNC: 134 MMOL/L (ref 136–145)
SP GR UR STRIP.AUTO: 1.01 (ref 1–1.03)
T WAVE AXIS: 66 DEGREES
T WAVE AXIS: 67 DEGREES
TROPONIN I SERPL-MCNC: <0.02 NG/ML
TSH SERPL DL<=0.05 MIU/L-ACNC: 2.81 UIU/ML (ref 0.36–3.74)
UROBILINOGEN UR QL STRIP.AUTO: 0.2 E.U./DL
VENTRICULAR RATE: 80 BPM
VENTRICULAR RATE: 90 BPM
WBC # BLD AUTO: 12.04 THOUSAND/UL (ref 4.31–10.16)
WBC #/AREA URNS AUTO: ABNORMAL /HPF

## 2018-10-26 PROCEDURE — 87631 RESP VIRUS 3-5 TARGETS: CPT | Performed by: HOSPITALIST

## 2018-10-26 PROCEDURE — 87086 URINE CULTURE/COLONY COUNT: CPT

## 2018-10-26 PROCEDURE — 80053 COMPREHEN METABOLIC PANEL: CPT | Performed by: EMERGENCY MEDICINE

## 2018-10-26 PROCEDURE — 81001 URINALYSIS AUTO W/SCOPE: CPT

## 2018-10-26 PROCEDURE — 87077 CULTURE AEROBIC IDENTIFY: CPT

## 2018-10-26 PROCEDURE — 87186 SC STD MICRODIL/AGAR DIL: CPT

## 2018-10-26 PROCEDURE — 96361 HYDRATE IV INFUSION ADD-ON: CPT

## 2018-10-26 PROCEDURE — 71046 X-RAY EXAM CHEST 2 VIEWS: CPT

## 2018-10-26 PROCEDURE — 85025 COMPLETE CBC W/AUTO DIFF WBC: CPT | Performed by: EMERGENCY MEDICINE

## 2018-10-26 PROCEDURE — 71250 CT THORAX DX C-: CPT

## 2018-10-26 PROCEDURE — 36415 COLL VENOUS BLD VENIPUNCTURE: CPT | Performed by: EMERGENCY MEDICINE

## 2018-10-26 PROCEDURE — 87081 CULTURE SCREEN ONLY: CPT | Performed by: HOSPITALIST

## 2018-10-26 PROCEDURE — 83605 ASSAY OF LACTIC ACID: CPT | Performed by: EMERGENCY MEDICINE

## 2018-10-26 PROCEDURE — 70450 CT HEAD/BRAIN W/O DYE: CPT

## 2018-10-26 PROCEDURE — 84145 PROCALCITONIN (PCT): CPT | Performed by: HOSPITALIST

## 2018-10-26 PROCEDURE — 96374 THER/PROPH/DIAG INJ IV PUSH: CPT

## 2018-10-26 PROCEDURE — 72125 CT NECK SPINE W/O DYE: CPT

## 2018-10-26 PROCEDURE — 82948 REAGENT STRIP/BLOOD GLUCOSE: CPT

## 2018-10-26 PROCEDURE — 93010 ELECTROCARDIOGRAM REPORT: CPT | Performed by: INTERNAL MEDICINE

## 2018-10-26 PROCEDURE — 87040 BLOOD CULTURE FOR BACTERIA: CPT | Performed by: EMERGENCY MEDICINE

## 2018-10-26 PROCEDURE — 87449 NOS EACH ORGANISM AG IA: CPT | Performed by: HOSPITALIST

## 2018-10-26 PROCEDURE — 84484 ASSAY OF TROPONIN QUANT: CPT | Performed by: EMERGENCY MEDICINE

## 2018-10-26 PROCEDURE — 99223 1ST HOSP IP/OBS HIGH 75: CPT | Performed by: HOSPITALIST

## 2018-10-26 PROCEDURE — 84443 ASSAY THYROID STIM HORMONE: CPT | Performed by: EMERGENCY MEDICINE

## 2018-10-26 PROCEDURE — 93005 ELECTROCARDIOGRAM TRACING: CPT

## 2018-10-26 PROCEDURE — 99285 EMERGENCY DEPT VISIT HI MDM: CPT

## 2018-10-26 RX ORDER — SODIUM CHLORIDE 9 MG/ML
50 INJECTION, SOLUTION INTRAVENOUS CONTINUOUS
Status: DISCONTINUED | OUTPATIENT
Start: 2018-10-26 | End: 2018-10-30

## 2018-10-26 RX ORDER — PHENAZOPYRIDINE HYDROCHLORIDE 100 MG/1
100 TABLET, FILM COATED ORAL
Status: DISCONTINUED | OUTPATIENT
Start: 2018-10-27 | End: 2018-10-31 | Stop reason: HOSPADM

## 2018-10-26 RX ORDER — HYDRALAZINE HYDROCHLORIDE 20 MG/ML
5 INJECTION INTRAMUSCULAR; INTRAVENOUS EVERY 6 HOURS PRN
Status: DISCONTINUED | OUTPATIENT
Start: 2018-10-26 | End: 2018-10-31 | Stop reason: HOSPADM

## 2018-10-26 RX ORDER — ACETAMINOPHEN 325 MG/1
650 TABLET ORAL EVERY 6 HOURS PRN
Status: DISCONTINUED | OUTPATIENT
Start: 2018-10-26 | End: 2018-10-31 | Stop reason: HOSPADM

## 2018-10-26 RX ORDER — HYDRALAZINE HYDROCHLORIDE 25 MG/1
25 TABLET, FILM COATED ORAL EVERY 8 HOURS SCHEDULED
Status: DISCONTINUED | OUTPATIENT
Start: 2018-10-26 | End: 2018-10-30

## 2018-10-26 RX ADMIN — CEFTRIAXONE SODIUM 1000 MG: 10 INJECTION, POWDER, FOR SOLUTION INTRAVENOUS at 12:39

## 2018-10-26 RX ADMIN — SODIUM CHLORIDE 100 ML/HR: 0.9 INJECTION, SOLUTION INTRAVENOUS at 15:30

## 2018-10-26 RX ADMIN — SODIUM CHLORIDE 100 ML/HR: 0.9 INJECTION, SOLUTION INTRAVENOUS at 21:17

## 2018-10-26 RX ADMIN — HYDRALAZINE HYDROCHLORIDE 25 MG: 25 TABLET, FILM COATED ORAL at 21:17

## 2018-10-26 RX ADMIN — SODIUM CHLORIDE 1000 ML: 0.9 INJECTION, SOLUTION INTRAVENOUS at 11:47

## 2018-10-26 RX ADMIN — HYDRALAZINE HYDROCHLORIDE 5 MG: 20 INJECTION INTRAMUSCULAR; INTRAVENOUS at 14:23

## 2018-10-26 RX ADMIN — AZITHROMYCIN MONOHYDRATE 500 MG: 500 INJECTION, POWDER, LYOPHILIZED, FOR SOLUTION INTRAVENOUS at 12:53

## 2018-10-26 RX ADMIN — SODIUM CHLORIDE 1000 ML: 0.9 INJECTION, SOLUTION INTRAVENOUS at 12:58
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
## 2018-10-26 NOTE — ASSESSMENT & PLAN NOTE
Baseline creatinine  1 2-1 3  Presents with creatinine 3 68  History of NSAID use with accelerated hypertension admission    Continue IV fluid hydration  Renal sonogram  Monitor urine output
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
## 2018-10-26 NOTE — ASSESSMENT & PLAN NOTE
Oriented x3  Infiltrates found on chest x-ray  Pharyngitis for over 1 month with chronic nonproductive cough  Disorientation for more than 3 days as per family  Recently treated for Proteus mirabilis urinary tract infection with Bactrim outpatient    Plan  Continue ceftriaxone azithromycin  Follow-up procalcitonin  Follow-up urine culture  Vital signs  Follow-up a m   Labs  IV fluid hydration

## 2018-10-26 NOTE — ASSESSMENT & PLAN NOTE
Systolic pressure >823  No medications  Give hydralazine   Avoid diuretics for now due to renal impairment

## 2018-10-27 ENCOUNTER — APPOINTMENT (INPATIENT)
Dept: RADIOLOGY | Facility: HOSPITAL | Age: 78
DRG: 683 | End: 2018-10-27
Payer: COMMERCIAL

## 2018-10-27 PROBLEM — E83.52 HYPERCALCEMIA: Status: ACTIVE | Noted: 2018-10-27

## 2018-10-27 PROBLEM — G93.40 ENCEPHALOPATHY: Status: ACTIVE | Noted: 2018-10-26

## 2018-10-27 PROBLEM — E87.6 HYPOKALEMIA: Status: ACTIVE | Noted: 2018-10-27

## 2018-10-27 PROBLEM — D64.9 ANEMIA: Status: ACTIVE | Noted: 2018-10-27

## 2018-10-27 PROBLEM — D72.829 LEUKOCYTOSIS: Status: ACTIVE | Noted: 2018-10-27

## 2018-10-27 LAB
25(OH)D3 SERPL-MCNC: 26.3 NG/ML (ref 30–100)
ALBUMIN SERPL BCP-MCNC: 2.4 G/DL (ref 3.5–5)
ALP SERPL-CCNC: 62 U/L (ref 46–116)
ALT SERPL W P-5'-P-CCNC: 14 U/L (ref 12–78)
ANION GAP SERPL CALCULATED.3IONS-SCNC: 7 MMOL/L (ref 4–13)
ANION GAP SERPL CALCULATED.3IONS-SCNC: 7 MMOL/L (ref 4–13)
AST SERPL W P-5'-P-CCNC: 13 U/L (ref 5–45)
BILIRUB SERPL-MCNC: 0.28 MG/DL (ref 0.2–1)
BUN SERPL-MCNC: 26 MG/DL (ref 5–25)
BUN SERPL-MCNC: 28 MG/DL (ref 5–25)
CALCIUM SERPL-MCNC: 11.8 MG/DL (ref 8.3–10.1)
CALCIUM SERPL-MCNC: 12.1 MG/DL (ref 8.3–10.1)
CHLORIDE SERPL-SCNC: 105 MMOL/L (ref 100–108)
CHLORIDE SERPL-SCNC: 105 MMOL/L (ref 100–108)
CO2 SERPL-SCNC: 26 MMOL/L (ref 21–32)
CO2 SERPL-SCNC: 26 MMOL/L (ref 21–32)
CREAT SERPL-MCNC: 3 MG/DL (ref 0.6–1.3)
CREAT SERPL-MCNC: 3.11 MG/DL (ref 0.6–1.3)
ERYTHROCYTE [DISTWIDTH] IN BLOOD BY AUTOMATED COUNT: 12.8 % (ref 11.6–15.1)
FLUAV AG SPEC QL: NORMAL
FLUBV AG SPEC QL: NORMAL
GFR SERPL CREATININE-BSD FRML MDRD: 14 ML/MIN/1.73SQ M
GFR SERPL CREATININE-BSD FRML MDRD: 14 ML/MIN/1.73SQ M
GLUCOSE SERPL-MCNC: 91 MG/DL (ref 65–140)
GLUCOSE SERPL-MCNC: 99 MG/DL (ref 65–140)
HCT VFR BLD AUTO: 27.9 % (ref 34.8–46.1)
HGB BLD-MCNC: 8.9 G/DL (ref 11.5–15.4)
L PNEUMO1 AG UR QL IA.RAPID: NEGATIVE
MAGNESIUM SERPL-MCNC: 1.9 MG/DL (ref 1.6–2.6)
MCH RBC QN AUTO: 29.3 PG (ref 26.8–34.3)
MCHC RBC AUTO-ENTMCNC: 31.9 G/DL (ref 31.4–37.4)
MCV RBC AUTO: 92 FL (ref 82–98)
PHOSPHATE SERPL-MCNC: 3.3 MG/DL (ref 2.3–4.1)
PLATELET # BLD AUTO: 240 THOUSANDS/UL (ref 149–390)
PLATELET # BLD AUTO: 268 THOUSANDS/UL (ref 149–390)
PMV BLD AUTO: 10.9 FL (ref 8.9–12.7)
PMV BLD AUTO: 9.8 FL (ref 8.9–12.7)
POTASSIUM SERPL-SCNC: 2.7 MMOL/L (ref 3.5–5.3)
POTASSIUM SERPL-SCNC: 3 MMOL/L (ref 3.5–5.3)
PROT SERPL-MCNC: 5.9 G/DL (ref 6.4–8.2)
PTH-INTACT SERPL-MCNC: 6.9 PG/ML (ref 18.4–80.1)
RBC # BLD AUTO: 3.04 MILLION/UL (ref 3.81–5.12)
RSV B RNA SPEC QL NAA+PROBE: NORMAL
SODIUM SERPL-SCNC: 138 MMOL/L (ref 136–145)
SODIUM SERPL-SCNC: 138 MMOL/L (ref 136–145)
WBC # BLD AUTO: 7.63 THOUSAND/UL (ref 4.31–10.16)

## 2018-10-27 PROCEDURE — 83970 ASSAY OF PARATHORMONE: CPT | Performed by: INTERNAL MEDICINE

## 2018-10-27 PROCEDURE — 94760 N-INVAS EAR/PLS OXIMETRY 1: CPT

## 2018-10-27 PROCEDURE — 84166 PROTEIN E-PHORESIS/URINE/CSF: CPT | Performed by: INTERNAL MEDICINE

## 2018-10-27 PROCEDURE — 80053 COMPREHEN METABOLIC PANEL: CPT | Performed by: HOSPITALIST

## 2018-10-27 PROCEDURE — 74176 CT ABD & PELVIS W/O CONTRAST: CPT

## 2018-10-27 PROCEDURE — 84166 PROTEIN E-PHORESIS/URINE/CSF: CPT | Performed by: PATHOLOGY

## 2018-10-27 PROCEDURE — 90670 PCV13 VACCINE IM: CPT | Performed by: HOSPITALIST

## 2018-10-27 PROCEDURE — 84165 PROTEIN E-PHORESIS SERUM: CPT | Performed by: PATHOLOGY

## 2018-10-27 PROCEDURE — 83735 ASSAY OF MAGNESIUM: CPT | Performed by: HOSPITALIST

## 2018-10-27 PROCEDURE — 84165 PROTEIN E-PHORESIS SERUM: CPT | Performed by: INTERNAL MEDICINE

## 2018-10-27 PROCEDURE — 85027 COMPLETE CBC AUTOMATED: CPT | Performed by: HOSPITALIST

## 2018-10-27 PROCEDURE — 99232 SBSQ HOSP IP/OBS MODERATE 35: CPT | Performed by: NURSE PRACTITIONER

## 2018-10-27 PROCEDURE — 82306 VITAMIN D 25 HYDROXY: CPT | Performed by: INTERNAL MEDICINE

## 2018-10-27 PROCEDURE — 84100 ASSAY OF PHOSPHORUS: CPT | Performed by: HOSPITALIST

## 2018-10-27 PROCEDURE — 99252 IP/OBS CONSLTJ NEW/EST SF 35: CPT | Performed by: INTERNAL MEDICINE

## 2018-10-27 PROCEDURE — 80048 BASIC METABOLIC PNL TOTAL CA: CPT | Performed by: INTERNAL MEDICINE

## 2018-10-27 PROCEDURE — 85049 AUTOMATED PLATELET COUNT: CPT | Performed by: HOSPITALIST

## 2018-10-27 RX ORDER — POTASSIUM CHLORIDE 20 MEQ/1
40 TABLET, EXTENDED RELEASE ORAL ONCE
Status: COMPLETED | OUTPATIENT
Start: 2018-10-27 | End: 2018-10-27

## 2018-10-27 RX ORDER — POTASSIUM CHLORIDE 20 MEQ/1
40 TABLET, EXTENDED RELEASE ORAL ONCE
Status: CANCELLED | OUTPATIENT
Start: 2018-10-27

## 2018-10-27 RX ADMIN — SODIUM CHLORIDE 100 ML/HR: 0.9 INJECTION, SOLUTION INTRAVENOUS at 05:30

## 2018-10-27 RX ADMIN — PHENAZOPYRIDINE HYDROCHLORIDE 100 MG: 100 TABLET ORAL at 12:16

## 2018-10-27 RX ADMIN — POTASSIUM CHLORIDE 40 MEQ: 1500 TABLET, EXTENDED RELEASE ORAL at 16:42

## 2018-10-27 RX ADMIN — POTASSIUM CHLORIDE 40 MEQ: 1500 TABLET, EXTENDED RELEASE ORAL at 08:24

## 2018-10-27 RX ADMIN — PNEUMOCOCCAL 13-VALENT CONJUGATE VACCINE 0.5 ML: 2.2; 2.2; 2.2; 2.2; 2.2; 4.4; 2.2; 2.2; 2.2; 2.2; 2.2; 2.2; 2.2 INJECTION, SUSPENSION INTRAMUSCULAR at 16:46

## 2018-10-27 RX ADMIN — PHENAZOPYRIDINE HYDROCHLORIDE 100 MG: 100 TABLET ORAL at 08:06

## 2018-10-27 RX ADMIN — HYDRALAZINE HYDROCHLORIDE 25 MG: 25 TABLET, FILM COATED ORAL at 21:21

## 2018-10-27 RX ADMIN — PHENAZOPYRIDINE HYDROCHLORIDE 100 MG: 100 TABLET ORAL at 16:42

## 2018-10-27 RX ADMIN — HYDRALAZINE HYDROCHLORIDE 25 MG: 25 TABLET, FILM COATED ORAL at 13:43

## 2018-10-27 RX ADMIN — ENOXAPARIN SODIUM 30 MG: 30 INJECTION SUBCUTANEOUS at 08:06

## 2018-10-27 RX ADMIN — SODIUM CHLORIDE 100 ML/HR: 0.9 INJECTION, SOLUTION INTRAVENOUS at 15:46

## 2018-10-27 RX ADMIN — HYDRALAZINE HYDROCHLORIDE 25 MG: 25 TABLET, FILM COATED ORAL at 05:32

## 2018-10-27 RX ADMIN — POTASSIUM CHLORIDE 40 MEQ: 1500 TABLET, EXTENDED RELEASE ORAL at 21:21

## 2018-10-27 NOTE — PLAN OF CARE
DISCHARGE PLANNING     Discharge to home or other facility with appropriate resources Progressing        GENITOURINARY - ADULT     Maintains or returns to baseline urinary function Progressing     Absence of urinary retention Progressing        INFECTION - ADULT     Absence or prevention of progression during hospitalization Progressing        Knowledge Deficit     Patient/family/caregiver demonstrates understanding of disease process, treatment plan, medications, and discharge instructions Progressing        PAIN - ADULT     Verbalizes/displays adequate comfort level or baseline comfort level Progressing        Potential for Falls     Patient will remain free of falls Progressing        RESPIRATORY - ADULT     Achieves optimal ventilation and oxygenation Progressing        SAFETY ADULT     Maintain or return to baseline ADL function Progressing     Maintain or return mobility status to optimal level Progressing
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
## 2018-10-27 NOTE — ASSESSMENT & PLAN NOTE
Unclear etiology   Recently treated on 10/13 /18 for laryngitis on steroids completed after 7 day taper  Also treated prior to that for uti on bactrim which pt was still on when she was admitted  There was suspicion for pna due to chest xray will , however ct ro these findings  Wbc count improving with hydration pt reports no fever  procalcitonin negative > 0 06 would stop all abx treatment at this time

## 2018-10-27 NOTE — ASSESSMENT & PLAN NOTE
Seems to have progressed since admission sp 2 liters iv fluids and normal saline at 100ml/hr   H &H 27 9/ 8 9 now no signs of obvious sign of bleeding   Pt reports taking some sort of a vitamin taking

## 2018-10-27 NOTE — PROGRESS NOTES
Progress Note - [place] 1940, 69 y o  female MRN: [id number]    Unit/Bed#: [facility] 701-01 Encounter: [id number]    Primary Care Provider: Therese Melendrez DO   Date and time admitted to hospital: 10/26/2018 10:20 AM        Hypercalcemia   Assessment & Plan    POA not noted to be chronic, unclear etiology   Discussed with nephrology , much appreciated   Pt on home supplement "caltrate" reports daily use of possibly two tabs a day   Prior pth subtherapeutic will obtain repeat  Order repeat labs at 1400, spep /upep bmp, vit d   Improving overall from admission with iv fluid hydration   Will obtain ct abdomen and pelvis r/o malignancy      * Encephalopathy   Assessment & Plan    Oriented x 2 pt is unable to answer questions appropriately (will answer one question with another)   Infiltrates found on chest x-ray,r/o infectious process  Pharyngitis noted at pcp office visit on 10/13 with chronic nonproductive cough, treated with po prednisone   Disorientation for more than 3 days as per family, just rode into her garage  Recently treated for Proteus mirabilis urinary tract infection with Bactrim outpatient for an apparent 10 day course  Will now stop all abx treatment, obtain more labs see below, monitor vitals and hydrate pt          VINNY (acute kidney injury) ([facility] [address] )   Assessment & Plan    Baseline creatinine  1 2-1 3  Presents with creatinine 3 68 down in 24 hrs to 3 11   History of NSAID us but very limited and occasional per pt   Appreciated nephrology  Also noted to be hypokalemic and hypercalcemic   Will obtain renal ultrasound and ct abdomen and pelvis wo contrast        Accelerated hypertension   Assessment & Plan    Systolic pressure >996  No medications  Give hydralazine   Avoid diuretics for now due to renal impairment     Osteoarthritis of hip   Assessment & Plan    Stable  On calcium supplement will stop at this time due to hypercalcemia      Hypokalemia   Assessment & Plan    In setting of bhargavi  repleted thus far with 40meq   Will chk labs later and replete as needed      Anemia   Assessment & Plan    Seems to have progressed since admission sp 2 liters iv fluids and normal saline at 100ml/hr   H &H 27  9 now no signs of obvious sign of bleeding   Pt reports taking some sort of a vitamin taking      Leukocytosis   Assessment & Plan    Unclear etiology   Recently treated on 10/13 /18 for laryngitis on steroids completed after 7 day taper  Also treated prior to that for uti on bactrim which pt was still on when she was admitted  There was suspicion for pna due to chest xray will , however ct ro these findings  Wbc count improving with hydration pt reports no fever  procalcitonin negative > 0 06 would stop all abx treatment at this time            VTE Pharmacologic Prophylaxis:   Pharmacologic: Enoxaparin (Lovenox)  Mechanical VTE Prophylaxis in Place: Yes    Patient Centered Rounds: I have performed bedside rounds with nursing staff today  Discussions with Specialists or Other Care Team Provider: nursing     Education and Discussions with Family / Patient: patient     Time Spent for Care: 30 minutes  More than 50% of total time spent on counseling and coordination of care as described above  Current Length of Stay: 1 day(s)    Current Patient Status: Inpatient   Certification Statement: The patient will continue to require additional inpatient hospital stay due to ongoing treatment for hypercalcemia and bhargavi     Discharge Plan: home when medically stable     Code Status: Level 1 - Full Code      Subjective:   Significant discussion with attending , nephrology and nursing   Pt is confused although appears better than admission  Pt with no symptoms at this time  Denies n/v/d       Objective:     Vitals:   Temp (24hrs), Av °F (36 7 °C), Min:97 9 °F (36 6 °C), Max:98 3 °F (36 8 °C)    Temp:  [97 9 °F (36 6 °C)-98 3 °F (36 8 °C)] 97 9 °F (36 6 °C)  HR:  [78-92] 80  Resp:  [15-27] 16  BP: (146-191)/(70-88) 168/71  SpO2:  [92 %-99 %] 94 %  Body mass index is 24 19 kg/m²  Input and Output Summary (last 24 hours): Intake/Output Summary (Last 24 hours) at 10/27/18 1135  Last data filed at 10/27/18 0801   Gross per 24 hour   Intake             1650 ml   Output             1050 ml   Net              600 ml       Physical Exam:     Physical Exam   Constitutional: She appears well-developed  No distress  HENT:   Head: Normocephalic    midforehead abrasion nasal abrasion    Eyes: Conjunctivae are normal  Right eye exhibits no discharge  Left eye exhibits no discharge  No scleral icterus  Neck: Normal range of motion  No JVD present  No tracheal deviation present  No thyromegaly present  Cardiovascular: Exam reveals no gallop  No murmur heard  Pulmonary/Chest: No stridor  No respiratory distress  She has no wheezes  She has no rales  She exhibits no tenderness  Abdominal: She exhibits no distension  There is no tenderness  There is no rebound and no guarding  Musculoskeletal: She exhibits no edema, tenderness or deformity  Lymphadenopathy:     She has no cervical adenopathy  Neurological: She is alert  X 2 and very forgetful and distracted    Skin: No rash noted  She is not diaphoretic  No erythema  No pallor     Psychiatric:   Talkative          Additional Data:     Labs:      Results from last 7 days  Lab Units 10/27/18  0506 10/26/18  1147   WBC Thousand/uL 7 63 12 04*   HEMOGLOBIN g/dL 8 9* 11 4*   HEMATOCRIT % 27 9* 35 2   PLATELETS Thousands/uL 240 321   NEUTROS PCT %  --  85*   LYMPHS PCT %  --  7*   MONOS PCT %  --  6   EOS PCT %  --  1       Results from last 7 days  Lab Units 10/27/18  0506   SODIUM mmol/L 138   POTASSIUM mmol/L 2 7*   CHLORIDE mmol/L 105   CO2 mmol/L 26   BUN mg/dL 26*   CREATININE mg/dL 3 11*   ANION GAP mmol/L 7   CALCIUM mg/dL 12 1*   ALBUMIN g/dL 2 4*   TOTAL BILIRUBIN mg/dL 0 28   ALK PHOS U/L 62   ALT U/L 14   AST U/L 13           Results from last 7 days  Lab Units 10/26/18  1109   POC GLUCOSE mg/dl 113           Results from last 7 days  Lab Units 10/26/18  2004 10/26/18  1244   LACTIC ACID mmol/L  --  1 4   PROCALCITONIN ng/ml 0 09  --            * I Have Reviewed All Lab Data Listed Above  * Additional Pertinent Lab Tests Reviewed: All Labs Within Last 24 Hours Reviewed    Imaging:    Imaging Reports Reviewed Today Include: reviewed     Recent Cultures (last 7 days):       Results from last 7 days  Lab Units 10/26/18  2120 10/26/18  2004   INFLUENZA A PCR   --  None Detected   INFLUENZA B PCR   --  None Detected   RSV PCR   --  None Detected   LEGIONELLA URINARY ANTIGEN  Negative  --        Last 24 Hours Medication List:     Current Facility-Administered Medications:  acetaminophen 650 mg Oral Q6H PRN Renetta Hartley MD    enoxaparin 30 mg Subcutaneous Daily Renetta Hartley MD    hydrALAZINE 5 mg Intravenous Q6H PRN Renetta Hartley MD    hydrALAZINE 25 mg Oral Q8H [address] Renetta Hartley MD    phenazopyridine 100 mg Oral TID With Meals Renetta Hartley MD    pneumococcal 13-valent conjugate vaccine 0 5 mL Intramuscular Prior to discharge Renetta Hartley MD    sodium chloride 100 mL/hr Intravenous Continuous Renetta Hartley MD Last Rate: 100 mL/hr (10/27/18 0530)        Today, Patient Was Seen By: CYDNEY Malave    ** Please Note: Dictation voice to text software may have been used in the creation of this document   **

## 2018-10-27 NOTE — ASSESSMENT & PLAN NOTE
Baseline creatinine  1 2-1 3  Presents with creatinine 3 68 down in 24 hrs to 3 11   History of NSAID us but very limited and occasional per pt   Appreciated nephrology  Also noted to be hypokalemic and hypercalcemic   Will obtain renal ultrasound and ct abdomen and pelvis wo contrast

## 2018-10-27 NOTE — RESPIRATORY THERAPY NOTE
RT Protocol Note  [place] 69 y o  female MRN: [id number]  Unit/Bed#: [facility] 701-01 Encounter: [id number]    Assessment    Principal Problem:    Disorientation  Active Problems:    Osteoarthritis of hip    VINNY (acute kidney injury) ([facility] )    Accelerated hypertension      Home Pulmonary Medications:  na       Past Medical History:   Diagnosis Date    GERD (gastroesophageal reflux disease) 4/4/2016    Osteoarthritis of hip 4/4/2016    SI (sacroiliac) joint inflammation ([place] [address] ) 4/4/2016    Substance addiction ([place] [address] )      Social History     Social History    Marital status: /Civil Union     Spouse name: N/A    Number of children: N/A    Years of education: N/A     Social History Main Topics    Smoking status: Never Smoker    Smokeless tobacco: Never Used    Alcohol use No    Drug use: Yes     Types: Prescription      Comment: rocovering last used 1996    Sexual activity: Not Asked     Other Topics Concern    None     Social History Narrative    None       Subjective         Objective    Physical Exam:   Assessment Type: Assess only  General Appearance: Alert, Awake  Respiratory Pattern: Normal  Chest Assessment: Chest expansion symmetrical  Bilateral Breath Sounds: Clear, Diminished  Cough: Non-productive  O2 Device: RA    Vitals:  Blood pressure 153/72, pulse 89, temperature 97 9 °F (36 6 °C), temperature source Oral, resp  rate 16, height 5' 1" (1 549 m), weight 58 1 kg (128 lb), SpO2 96 %  Imaging and other studies: I have personally reviewed pertinent reports  O2 Device: RA     Plan    Respiratory Plan: No distress/Pulmonary history        Resp Comments: Pt  evaluated at bedside per Respiratory Protocol  Pt  admitted for Change in Mental Status  Pt's breath sounds diminished but clear bilaterally and is is no distress at this time  Will D/C Respiratory Protocol at this time
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
## 2018-10-27 NOTE — ASSESSMENT & PLAN NOTE
Systolic pressure >416  No medications  Give hydralazine   Avoid diuretics for now due to renal impairment

## 2018-10-27 NOTE — MEDICAL STUDENT
[address] Internal Medicine Progress Note  Patient: Elvira Sales 69 y o  female   MRN: [id number]  PCP: Zeb Piedra DO  [facility] Encounter: [id number]  Date Of Visit: 10/27/18    Assessment:    Principal Problem:    Disorientation  Active Problems:    Osteoarthritis of hip    VINNY (acute kidney injury) ([facility] [address] )    Accelerated hypertension      Plan:    · Encephalopathy  · Disoriented/confused for several days prior to admission per family, reported to have driven her car into her garage  · Alert and oriented on admission, on exam today she answers orientation questions appropriately but becomes tangential   · Recent Proteus mirabilis UTI, being treated with PO bactrim as outpatient  · Also seen as an outpatient on 10/13 for chronic cough, diagnosed with viral pharyngitis and started on 7 day prednisone taper at that time  · Slight leukocytosis on admission that has now corrected following 2L NSS bolus   · Afebrile, hypertensive this admission  · Procalcitonin 0 9  · UA with WBC 30-50 and small amount of leukocytes  · Found to have VINNY with creat 3 6 on admission, correcting to 3 1 this am  · CXR chest showed new right upper lobe opacity, CT chest shows no acute pathology  · CT head/spine with no acute findings  · Disorientation/confusion likely multifactorial, including metabolic disturbance, hypercalcinemia, possible infection and recent steroid use  · Will continue to monitor mental status    · Acute kidney injury  · Baseline creat 1 1-1 2  · Creat 3 68 on admission  · Hypercalcemic at 15, hypokalemic at 2 7  · Recent Proteus mirabilis UTI being treated with bactrim as outpt  · Pt states the bactrim had been making her nauseous and reports poor PO intake over several days prior to admission, along with decreased urine output and feeling of "bladder pressure" at that time  · UA with WBC 30-50 and small amount of leukocytes  · Creat down to 3 1 this am following 2L IVF  · Nephrology consulted  · Renal US, CT abd/pelv wo contrast pending  · Trend BMP, continue with IVF fluids  · Avoid nephrotoxins     · Hypercalcemia  · Calcium 15 on admission, trending down to 12 this am following 2L NSS  · Calcium normal less than a month ago   · Possibly related to bactrim use, dehydration, unknown quantity of calcium supplementation  · Will check renal US to r/o hydro, CT abd/pelvis to r/o malignancy   · Nephrology ordering spep, upep, vit D, repeat BMP  · Continue with IV hydration and trend labs    · Hypokalemia  · K 2 7 this am, 40meq PO given  · Repeat BMP this afternoon, replace K as needed    · Leukocytosis  · Unclear etiology  · WBC slightly elevated at 12 on admission  · Trending down to 7 this am  · Recently treated for UTI with bactrim and laryngitis with prednisone as outpt  · UA with WBC 30-50 and small amount of leukocytes, strep pneumo/ legionella negative  · CXR showed RUL opacity that was not seen on CT chest  · Procal 0 9, urine culture and blood cultures pending  · Flu panel negative  · No signs or symptoms of active infection currently, will stop Abx pending culture results    · Anemia  · Baseline Hgb 11-12, 10 9 on admission  · Trending down to 8 9 this am  · Likely dilutional given IVF boluses, but could be due to acute kidney failure   · No active signs of bleeding   · Will trend daily CBC    · Hypertension  · No hx of HTN  · -190 this admission  · Likely elevated in setting of VINNY  · Scheduled hydralazine 25mg Q8hr, PRN IV hydralazine for SBP>160       VTE Pharmacologic Prophylaxis:   Pharmacologic: Enoxaparin (Lovenox)  Mechanical VTE Prophylaxis in Place: Yes    Patient Centered Rounds: I have performed bedside rounds with nursing staff today  Discussions with Specialists or Other Care Team Provider: Bedside, RN    Education and Discussions with Family / Patient: Patient    Time Spent for Care: 45 minutes    More than 50% of total time spent on counseling and coordination of care as described above  Current Length of Stay: 1 day(s)    Current Patient Status: Inpatient   Certification Statement: The patient will continue to require additional inpatient hospital stay due to acute kidney injury, possible pneumonia/UTI    Discharge Plan / Estimated Discharge Date: discharge date unknown at this time    Code Status: Level 1 - Full Code      Subjective:   Patient is alert and oriented, pleasant  Denies chest pain, palpitations, N/V, SOB, abd pain, dysuria, dizziness, lightheadedness  Only positive is for infrequent non-productive cough which has improved since admission  Plan of care reviewed and patient expressed understanding  Objective:     Vitals:   Temp (24hrs), Av 2 °F (36 8 °C), Min:97 9 °F (36 6 °C), Max:98 5 °F (36 9 °C)    Temp:  [97 9 °F (36 6 °C)-98 5 °F (36 9 °C)] 97 9 °F (36 6 °C)  HR:  [] 80  Resp:  [15-27] 16  BP: (146-194)/(70-89) 168/71  SpO2:  [92 %-99 %] 94 %  Body mass index is 24 19 kg/m²  Input and Output Summary (last 24 hours): Intake/Output Summary (Last 24 hours) at 10/27/18 0930  Last data filed at 10/27/18 0801   Gross per 24 hour   Intake             1650 ml   Output             1050 ml   Net              600 ml       Physical Exam:     Physical Exam   Constitutional: She is oriented to person, place, and time  She appears well-developed and well-nourished  No distress  HENT:   Head: Normocephalic and atraumatic  Mouth/Throat: No oropharyngeal exudate  Eyes: Pupils are equal, round, and reactive to light  Conjunctivae and EOM are normal  Right eye exhibits no discharge  Left eye exhibits no discharge  No scleral icterus  Neck: Normal range of motion  No JVD present  No tracheal deviation present  Cardiovascular: Normal rate, regular rhythm, normal heart sounds and intact distal pulses  Exam reveals no gallop and no friction rub  Pulmonary/Chest: Effort normal  No stridor  No respiratory distress  She has no wheezes   She has no rales  She exhibits no tenderness  Diminished LLL breath sounds   Abdominal: Soft  Bowel sounds are normal  She exhibits no distension and no mass  There is no tenderness  There is no rebound and no guarding  Musculoskeletal: Normal range of motion  She exhibits no edema, tenderness or deformity  Neurological: She is alert and oriented to person, place, and time  No cranial nerve deficit  Coordination normal    Skin: Skin is warm and dry  No rash noted  She is not diaphoretic  No erythema  No pallor  Psychiatric: She has a normal mood and affect  Her behavior is normal  Judgment and thought content normal          Additional Data:     Labs:      Results from last 7 days  Lab Units 10/27/18  0506 10/26/18  1147   WBC Thousand/uL 7 63 12 04*   HEMOGLOBIN g/dL 8 9* 11 4*   HEMATOCRIT % 27 9* 35 2   PLATELETS Thousands/uL 240 321   NEUTROS PCT %  --  85*   LYMPHS PCT %  --  7*   MONOS PCT %  --  6   EOS PCT %  --  1       Results from last 7 days  Lab Units 10/27/18  0506   SODIUM mmol/L 138   POTASSIUM mmol/L 2 7*   CHLORIDE mmol/L 105   CO2 mmol/L 26   BUN mg/dL 26*   CREATININE mg/dL 3 11*   CALCIUM mg/dL 12 1*   ALK PHOS U/L 62   ALT U/L 14   AST U/L 13           * I Have Reviewed All Lab Data Listed Above  * Additional Pertinent Lab Tests Reviewed: All Labs For Current Hospital Admission Reviewed    Imaging:    Imaging Reports Reviewed Today Include: All  Imaging Personally Reviewed by Myself Includes:   All    Recent Cultures (last 7 days):       Results from last 7 days  Lab Units 10/26/18  2120 10/26/18  2004   INFLUENZA A PCR   --  None Detected   INFLUENZA B PCR   --  None Detected   RSV PCR   --  None Detected   LEGIONELLA URINARY ANTIGEN  Negative  --        Last 24 Hours Medication List:     Current Facility-Administered Medications:  acetaminophen 650 mg Oral Q6H PRN Ladonna Denis MD    cefTRIAXone 1,000 mg Intravenous Q24H Ladonna Denis MD    And        azithromycin 500 mg Intravenous Q24H Darrell Pryor MD    enoxaparin 30 mg Subcutaneous Daily Darrell Pryor MD    hydrALAZINE 5 mg Intravenous Q6H PRN Darrell Pryor MD    hydrALAZINE 25 mg Oral Q8H [facility] Darrell Pryor MD    phenazopyridine 100 mg Oral TID With Meals Darrell Pryor MD    pneumococcal 13-valent conjugate vaccine 0 5 mL Intramuscular Prior to discharge Darrell Pryor MD    sodium chloride 100 mL/hr Intravenous Continuous Darrell Pryor MD Last Rate: 100 mL/hr (10/27/18 0530)        Today, Patient Was Seen By: Timmy Chapman, Student Nurse Practitioner    ** Please Note: This note has been constructed using a voice recognition system   **

## 2018-10-27 NOTE — ASSESSMENT & PLAN NOTE
POA not noted to be chronic, unclear etiology   Discussed with nephrology , much appreciated   Pt on home supplement "caltrate" reports daily use of possibly two tabs a day   Prior pth subtherapeutic will obtain repeat  Order repeat labs at 1400, spep /upep bmp, vit d   Improving overall from admission with iv fluid hydration   Will obtain ct abdomen and pelvis r/o malignancy

## 2018-10-28 ENCOUNTER — APPOINTMENT (INPATIENT)
Dept: RADIOLOGY | Facility: HOSPITAL | Age: 78
DRG: 683 | End: 2018-10-28
Payer: COMMERCIAL

## 2018-10-28 LAB
ANION GAP SERPL CALCULATED.3IONS-SCNC: 8 MMOL/L (ref 4–13)
BACTERIA UR CULT: ABNORMAL
BACTERIA UR CULT: ABNORMAL
BASOPHILS # BLD AUTO: 0.1 THOUSANDS/ΜL (ref 0–0.1)
BASOPHILS NFR BLD AUTO: 1 % (ref 0–1)
BUN SERPL-MCNC: 25 MG/DL (ref 5–25)
CALCIUM SERPL-MCNC: 11.1 MG/DL (ref 8.3–10.1)
CHLORIDE SERPL-SCNC: 109 MMOL/L (ref 100–108)
CO2 SERPL-SCNC: 24 MMOL/L (ref 21–32)
CREAT SERPL-MCNC: 2.7 MG/DL (ref 0.6–1.3)
EOSINOPHIL # BLD AUTO: 0.27 THOUSAND/ΜL (ref 0–0.61)
EOSINOPHIL NFR BLD AUTO: 3 % (ref 0–6)
ERYTHROCYTE [DISTWIDTH] IN BLOOD BY AUTOMATED COUNT: 12.9 % (ref 11.6–15.1)
GFR SERPL CREATININE-BSD FRML MDRD: 16 ML/MIN/1.73SQ M
GLUCOSE SERPL-MCNC: 90 MG/DL (ref 65–140)
HCT VFR BLD AUTO: 32.4 % (ref 34.8–46.1)
HGB BLD-MCNC: 10.1 G/DL (ref 11.5–15.4)
IMM GRANULOCYTES # BLD AUTO: 0.02 THOUSAND/UL (ref 0–0.2)
IMM GRANULOCYTES NFR BLD AUTO: 0 % (ref 0–2)
LYMPHOCYTES # BLD AUTO: 1.17 THOUSANDS/ΜL (ref 0.6–4.47)
LYMPHOCYTES NFR BLD AUTO: 14 % (ref 14–44)
MCH RBC QN AUTO: 28.9 PG (ref 26.8–34.3)
MCHC RBC AUTO-ENTMCNC: 31.2 G/DL (ref 31.4–37.4)
MCV RBC AUTO: 93 FL (ref 82–98)
MONOCYTES # BLD AUTO: 0.64 THOUSAND/ΜL (ref 0.17–1.22)
MONOCYTES NFR BLD AUTO: 8 % (ref 4–12)
MRSA NOSE QL CULT: NORMAL
NEUTROPHILS # BLD AUTO: 6.24 THOUSANDS/ΜL (ref 1.85–7.62)
NEUTS SEG NFR BLD AUTO: 74 % (ref 43–75)
NRBC BLD AUTO-RTO: 0 /100 WBCS
PLATELET # BLD AUTO: 316 THOUSANDS/UL (ref 149–390)
PMV BLD AUTO: 10.4 FL (ref 8.9–12.7)
POTASSIUM SERPL-SCNC: 3.4 MMOL/L (ref 3.5–5.3)
RBC # BLD AUTO: 3.49 MILLION/UL (ref 3.81–5.12)
SODIUM SERPL-SCNC: 141 MMOL/L (ref 136–145)
WBC # BLD AUTO: 8.44 THOUSAND/UL (ref 4.31–10.16)

## 2018-10-28 PROCEDURE — 99232 SBSQ HOSP IP/OBS MODERATE 35: CPT | Performed by: NURSE PRACTITIONER

## 2018-10-28 PROCEDURE — 76770 US EXAM ABDO BACK WALL COMP: CPT

## 2018-10-28 PROCEDURE — 80048 BASIC METABOLIC PNL TOTAL CA: CPT | Performed by: NURSE PRACTITIONER

## 2018-10-28 PROCEDURE — 85025 COMPLETE CBC W/AUTO DIFF WBC: CPT | Performed by: NURSE PRACTITIONER

## 2018-10-28 PROCEDURE — 99232 SBSQ HOSP IP/OBS MODERATE 35: CPT | Performed by: INTERNAL MEDICINE

## 2018-10-28 RX ORDER — HYDRALAZINE HYDROCHLORIDE 50 MG/1
50 TABLET, FILM COATED ORAL EVERY 8 HOURS SCHEDULED
Status: CANCELLED | OUTPATIENT
Start: 2018-10-28

## 2018-10-28 RX ORDER — POTASSIUM CHLORIDE 20 MEQ/1
40 TABLET, EXTENDED RELEASE ORAL ONCE
Status: COMPLETED | OUTPATIENT
Start: 2018-10-28 | End: 2018-10-28

## 2018-10-28 RX ADMIN — POTASSIUM CHLORIDE 40 MEQ: 1500 TABLET, EXTENDED RELEASE ORAL at 10:41

## 2018-10-28 RX ADMIN — ENOXAPARIN SODIUM 30 MG: 30 INJECTION SUBCUTANEOUS at 08:05

## 2018-10-28 RX ADMIN — HYDRALAZINE HYDROCHLORIDE 25 MG: 25 TABLET, FILM COATED ORAL at 05:32

## 2018-10-28 RX ADMIN — PHENAZOPYRIDINE HYDROCHLORIDE 100 MG: 100 TABLET ORAL at 08:06

## 2018-10-28 RX ADMIN — SODIUM CHLORIDE 100 ML/HR: 0.9 INJECTION, SOLUTION INTRAVENOUS at 23:23

## 2018-10-28 RX ADMIN — HYDRALAZINE HYDROCHLORIDE 25 MG: 25 TABLET, FILM COATED ORAL at 13:36

## 2018-10-28 RX ADMIN — PHENAZOPYRIDINE HYDROCHLORIDE 100 MG: 100 TABLET ORAL at 17:16

## 2018-10-28 RX ADMIN — SODIUM CHLORIDE 100 ML/HR: 0.9 INJECTION, SOLUTION INTRAVENOUS at 10:43

## 2018-10-28 RX ADMIN — PHENAZOPYRIDINE HYDROCHLORIDE 100 MG: 100 TABLET ORAL at 13:36

## 2018-10-28 RX ADMIN — HYDRALAZINE HYDROCHLORIDE 25 MG: 25 TABLET, FILM COATED ORAL at 20:19

## 2018-10-28 NOTE — ASSESSMENT & PLAN NOTE
Systolic pressure >081  No medications  Give hydralazine   Avoid diuretics for now due to renal impairment

## 2018-10-28 NOTE — PROGRESS NOTES
Progress Note - [place] 1940, 69 y o  female MRN: [id number]    Unit/Bed#: [facility] 701-01 Encounter: [id number]    Primary Care Provider: Jabari Romero DO   Date and time admitted to hospital: 10/26/2018 10:20 AM        Hypercalcemia   Assessment & Plan    POA not noted to be chronic, unclear etiology   Discussed with nephrology , much appreciated   Pt on home supplement "caltrate" reports daily use of possibly two tabs a day   Prior pth subtherapeutic will obtain repeat  Order repeat labs at 1400, spep /upep bmp, vit d   Improving overall from admission with iv fluid hydration   Will obtain ct abdomen and pelvis r/o malignancy      * Encephalopathy   Assessment & Plan    Oriented x 2 pt is unable to answer questions appropriately (will answer one question with another)   Infiltrates found on chest x-ray,r/o infectious process  Pharyngitis noted at pcp office visit on 10/13 with chronic nonproductive cough, treated with po prednisone   Disorientation for more than 3 days as per family, just rode into her garage  Recently treated for Proteus mirabilis urinary tract infection with Bactrim outpatient for an apparent 10 day course  Will now stop all abx treatment, obtain more labs see below, monitor vitals and hydrate pt          VINNY (acute kidney injury) ([facility] [address] )   Assessment & Plan    Baseline creatinine  1 2-1 3  Presents with creatinine 3 68 down in 24 hrs to 3 11   History of NSAID us but very limited and occasional per pt   Appreciated nephrology  Also noted to be hypokalemic and hypercalcemic   Will obtain renal ultrasound and ct abdomen and pelvis wo contrast        Accelerated hypertension   Assessment & Plan    Systolic pressure >067  No medications  Give hydralazine   Avoid diuretics for now due to renal impairment     Osteoarthritis of hip   Assessment & Plan    Stable  On calcium supplement will stop at this time due to hypercalcemia      Hypokalemia   Assessment & Plan    In setting of bhargavi  repleted thus far with 40meq   Will chk labs later and replete as needed      Anemia   Assessment & Plan    Seems to have progressed since admission sp 2 liters iv fluids and normal saline at 100ml/hr   H &H 27 9/ 8 9 now no signs of obvious sign of bleeding   Pt reports taking some sort of a vitamin taking      Leukocytosis   Assessment & Plan    Unclear etiology   Recently treated on 10/13 /18 for laryngitis on steroids completed after 7 day taper  Also treated prior to that for uti on bactrim which pt was still on when she was admitted  There was suspicion for pna due to chest xray will , however ct ro these findings  Wbc count improving with hydration pt reports no fever  procalcitonin negative > 0 06 would stop all abx treatment at this time            VTE Pharmacologic Prophylaxis:   Pharmacologic: Enoxaparin (Lovenox)  Mechanical VTE Prophylaxis in Place: Yes    Patient Centered Rounds: I have performed bedside rounds with nursing staff today  Discussions with Specialists or Other Care Team Provider: nursing   Education and Discussions with Family / Patient: patient and her  at the bedside     Time Spent for Care: 45 minutes  More than 50% of total time spent on counseling and coordination of care as described above  Current Length of Stay: 2 day(s)    Current Patient Status: Inpatient   Certification Statement: The patient will continue to require additional inpatient hospital stay due to ongoing need for iv fluid hydration pt with elevated creatinine bhargavi above baseline     Discharge Plan: home when medically stable     Code Status: Level 1 - Full Code      Subjective:   Pt is of clearer mind  Pt per  has been having on and off confusion at baseline and this is her baseline  Understands still needs iv fluids due to above noted labs  Pt has no pain no sob no chest pain or palpitations  Pt reports no problems with voiding   No n/v/d needs to focus on eating and drinking she admits this  She understands should not be taking her calcium supplement  Objective:     Vitals:   Temp (24hrs), Av 1 °F (36 7 °C), Min:97 5 °F (36 4 °C), Max:98 3 °F (36 8 °C)    Temp:  [97 5 °F (36 4 °C)-98 3 °F (36 8 °C)] 98 3 °F (36 8 °C)  HR:  [79-88] 83  Resp:  [16] 16  BP: (150-171)/(67-92) 169/79  SpO2:  [97 %-98 %] 98 %  Body mass index is 24 19 kg/m²  Input and Output Summary (last 24 hours): Intake/Output Summary (Last 24 hours) at 10/28/18 1035  Last data filed at 10/28/18 0801   Gross per 24 hour   Intake          2058  33 ml   Output             1570 ml   Net           488 33 ml       Physical Exam:     Physical Exam   Constitutional: She is oriented to person, place, and time  She appears well-developed  No distress  HENT:   Head: Normocephalic  Pt healing facial abrasions forehead nasal bone area and cheeks   Eyes: Conjunctivae are normal  Right eye exhibits no discharge  Left eye exhibits no discharge  No scleral icterus  Neck: No JVD present  No tracheal deviation present  No thyromegaly present  Cardiovascular: Normal rate and regular rhythm  Exam reveals no gallop and no friction rub  No murmur heard  Pulmonary/Chest: No stridor  She has no wheezes  She has no rales (fine bl crackles )  She exhibits no tenderness  Abdominal: Soft  She exhibits no distension and no mass  There is no tenderness  There is no rebound and no guarding  Musculoskeletal: She exhibits no edema, tenderness or deformity  Lymphadenopathy:     She has no cervical adenopathy  Neurological: She is oriented to person, place, and time  Skin: No rash noted  She is not diaphoretic  No erythema  No pallor  Psychiatric: She has a normal mood and affect           Additional Data:     Labs:      Results from last 7 days  Lab Units 10/28/18  0512   WBC Thousand/uL 8 44   HEMOGLOBIN g/dL 10 1*   HEMATOCRIT % 32 4*   PLATELETS Thousands/uL 316   NEUTROS PCT % 74   LYMPHS PCT % 14   MONOS PCT % 8   EOS PCT % 3       Results from last 7 days  Lab Units 10/28/18  0512  10/27/18  0506   SODIUM mmol/L 141  < > 138   POTASSIUM mmol/L 3 4*  < > 2 7*   CHLORIDE mmol/L 109*  < > 105   CO2 mmol/L 24  < > 26   BUN mg/dL 25  < > 26*   CREATININE mg/dL 2 70*  < > 3 11*   ANION GAP mmol/L 8  < > 7   CALCIUM mg/dL 11 1*  < > 12 1*   ALBUMIN g/dL  --   --  2 4*   TOTAL BILIRUBIN mg/dL  --   --  0 28   ALK PHOS U/L  --   --  62   ALT U/L  --   --  14   AST U/L  --   --  13   < > = values in this interval not displayed  Results from last 7 days  Lab Units 10/26/18  1109   POC GLUCOSE mg/dl 113           Results from last 7 days  Lab Units 10/26/18  2004 10/26/18  1244   LACTIC ACID mmol/L  --  1 4   PROCALCITONIN ng/ml 0 09  --            * I Have Reviewed All Lab Data Listed Above  * Additional Pertinent Lab Tests Reviewed: All Labs Within Last 24 Hours Reviewed    Imaging:    Imaging Reports Reviewed Today Include: reviewed     Recent Cultures (last 7 days):       Results from last 7 days  Lab Units 10/26/18  2120 10/26/18  2004 10/26/18  1233 10/26/18  1229   BLOOD CULTURE   --   --  No Growth at 24 hrs    No Growth at 24 hrs   --    URINE CULTURE   --   --   --  10,000-19,000 cfu/ml Gram Negative Jameson Enteric Like*  <10,000 cfu/ml Proteus species*   INFLUENZA A PCR   --  None Detected  --   --    INFLUENZA B PCR   --  None Detected  --   --    RSV PCR   --  None Detected  --   --    LEGIONELLA URINARY ANTIGEN  Negative  --   --   --        Last 24 Hours Medication List:     Current Facility-Administered Medications:  acetaminophen 650 mg Oral Q6H PRN Jack Killian MD    enoxaparin 30 mg Subcutaneous Daily Jack Killian MD    hydrALAZINE 5 mg Intravenous Q6H PRN Jack Killian MD    hydrALAZINE 25 mg Oral Q8H [address] Jack Killian MD    phenazopyridine 100 mg Oral TID With Meals Jack Killian MD    potassium chloride 40 mEq Oral Once Glennie Dye, CRNP    sodium chloride 100 mL/hr Intravenous Continuous Olivia Criss Saucedo MD Last Rate: 100 mL/hr (10/27/18 3811)        Today, Patient Was Seen By: CYDNEY Jimenez    ** Please Note: Dictation voice to text software may have been used in the creation of this document   **

## 2018-10-28 NOTE — PROGRESS NOTES
NEPHROLOGY PROGRESS NOTE   Laurie 69 y o  female MRN: [id number]  Unit/Bed#: [facility] 701-01 Encounter: [id number]      ASSESSMENT & PLAN:  1  Acute kidney injury (POA) on top of CKD stage IIIA with a baseline creatinine around 0 9-1 1  VINNY multifactorial in the setting of recent Bactrim use, hypercalcemia, and pre- renal component due poor p o  Intake  Continue with IVF resuscitation  Serum creatinine improving from 3 68 on admission down to 2 7 this morning  Avoid hypotension, no NSAIDs      2  Hypercalcemia, unknown etiology  PTH appropriately suppressed, vitamin-D mildly low - do not supplement due to hypercalcemia, SPEP no monoclonal bands and UPEP pending  CT scan of the chest, abdomen and pelvis WITHOUT contrast did not show any abnormalities  Patient was taking a with Vit D 1 tablets daily as an outpatient as per her daughter  Continue with intravenously fluids, serum calcium down to 11 1 from 15 on admission  Follow serial labs      3  Hypokalemia, continue with replacement, follow labs in the afternoon and continue with replacement as needed      4  Altered mental status, suspected secondary to acute renal failure and hypercalcemia, continue with intravenously fluids, mental status improved  Recently treated for urinary tract infection  Follow pancultures      5  Hypertension, continue with intravenously fluids, avoid hypotension in the setting of acute renal failure      6  Anemia, follow H&H and transfusion as needed, consider checking iron studies      7  Recent episode UTI treated with Bactrim  Discussed with SLIM team     SUBJECTIVE:  Feeling OK, no SOB or CP, no abdominal pain  Family a bedside      OBJECTIVE:  Current Weight: Weight - Scale: 58 1 kg (128 lb)  Vitals:    10/28/18 0703   BP: 169/79   Pulse: 83   Resp: 16   Temp: 98 3 °F (36 8 °C)   SpO2: 98%       Intake/Output Summary (Last 24 hours) at 10/28/18 1046  Last data filed at 10/28/18 0801   Gross per 24 hour   Intake 2058 33 ml   Output             1570 ml   Net           488 33 ml     General: conscious, cooperative, in not acute distress  Eyes: conjunctivae pale, anicteric sclerae  ENT: lips and mucous membranes moist  Neck: supple, no JVD  Chest: clear breath sounds bilateral, no crackles, ronchus or wheezings  CVS: distinct S1 & S2, normal rate, regular rhythm  Abdomen: soft, non-tender, non-distended, normoactive bowel sounds  Extremities: no edema of both legs  Skin: no rash, pale  Neuro: awake, alert, oriented    Medications:    Current Facility-Administered Medications:     acetaminophen (TYLENOL) tablet 650 mg, 650 mg, Oral, Q6H PRN, Renetta Hartley MD    enoxaparin (LOVENOX) subcutaneous injection 30 mg, 30 mg, Subcutaneous, Daily, Renetta Hartley MD, 30 mg at 10/28/18 0805    hydrALAZINE (APRESOLINE) injection 5 mg, 5 mg, Intravenous, Q6H PRN, Renetta Hartley MD, 5 mg at 10/26/18 1423    hydrALAZINE (APRESOLINE) tablet 25 mg, 25 mg, Oral, Q8H [facility], Renetta Hartley MD, 25 mg at 10/28/18 0532    phenazopyridine (PYRIDIUM) tablet 100 mg, 100 mg, Oral, TID With Meals, Renetta Hartley MD, 100 mg at 10/28/18 0806    sodium chloride 0 9 % infusion, 100 mL/hr, Intravenous, Continuous, Renetta Hartley MD, Last Rate: 100 mL/hr at 10/28/18 1043, 100 mL/hr at 10/28/18 1043    Invasive Devices:        Lab Results:     Results from last 7 days  Lab Units 10/28/18  0512 10/27/18  1417 10/27/18  0506 10/26/18  1147  10/26/18  1146   WBC Thousand/uL 8 44  --  7 63 12 04*  --   --    HEMOGLOBIN g/dL 10 1*  --  8 9* 11 4*  --   --    HEMATOCRIT % 32 4*  --  27 9* 35 2  --   --    PLATELETS Thousands/uL 316 268 240 321  < >  --    SODIUM mmol/L 141 138 138  --   --  134*   POTASSIUM mmol/L 3 4* 3 0* 2 7*  --   --  3 3*   CHLORIDE mmol/L 109* 105 105  --   --  98*   CO2 mmol/L 24 26 26  --   --  28   BUN mg/dL 25 28* 26*  --   --  30*   CREATININE mg/dL 2 70* 3 00* 3 11*  --   --  3 68*   CALCIUM mg/dL 11 1* 11 8* 12 1*  -- --  15 2*   MAGNESIUM mg/dL  --   --  1 9  --   --   --    PHOSPHORUS mg/dL  --   --  3 3  --   --   --    ALK PHOS U/L  --   --  62  --   --  72   ALT U/L  --   --  14  --   --  18   AST U/L  --   --  13  --   --  20   < > = values in this interval not displayed  Previous work up:  PTH 6 9  25 - Vit D 26 3  SPEP no monoclonal gammopathy  UPEP pending  CT scan chest, abdomen and pelvis w/o contrast - no acute abnormlities        Portions of the record may have been created with voice recognition software  Occasional wrong word or "sound a like" substitutions may have occurred due to the inherent limitations of voice recognition software  Read the chart carefully and recognize, using context, where substitutions have occurred  If you have any questions, please contact the dictating provider

## 2018-10-28 NOTE — MEDICAL STUDENT
[address] Internal Medicine Progress Note  Patient: Mickey Garcia 69 y o  female   MRN: [id number]  PCP: Amelie Jernigan DO  Unit/Bed#: [facility] 701-01 Encounter: [id number]  Date Of Visit: 10/28/18    Assessment:    Principal Problem:    Encephalopathy  Active Problems:    Osteoarthritis of hip    VINNY (acute kidney injury) ([address] )    Accelerated hypertension    Hypercalcemia    Leukocytosis    Anemia    Hypokalemia      Plan:    · Encephalopathy  ? Disoriented/confused for several days prior to admission per family, reported to have driven her car into her garage  ? Alert and oriented on admission, on exam today she answers orientation questions appropriately   ? Recent Proteus mirabilis UTI, being treated with PO bactrim as outpatient  ? Also seen as an outpatient on 10/13 for chronic cough, diagnosed with viral pharyngitis and started on 7 day prednisone taper at that time  ? Slight leukocytosis on admission that has now corrected following IVF   ? Afebrile, hypertensive this admission  ? Procalcitonin 0 9  ? Found to have VINNY with creat 3 6 on admission, correcting to 2 7 this am  ? CXR chest showed new right upper lobe opacity, CT chest shows no acute pathology  ? CT head/spine with no acute findings  ? Disorientation/confusion likely multifactorial, including metabolic disturbance, hypercalcemia and recent steroid use, but is significantly improved from yesterday  ? Will continue to monitor mental status     · Acute kidney injury  ? Baseline creat 1 1-1 2  ? Creat 3 68 on admission  ? Hypercalcemic at 13, hypokalemic at 2 7  ? Recent Proteus mirabilis UTI being treated with bactrim as outpt  ? Pt states the bactrim had been making her nauseous and reports poor PO intake over several days prior to admission, along with decreased urine output and feeling of "bladder pressure" at that time  ? UA with WBC 30-50 and small amount of leukocytes  ? Creat down to 2 7 this am   ? Nephrology following  ?  Renal US pending, CT abd/pelvis without acute findings  ? Trend BMP, continue with IVF fluids  ? Avoid nephrotoxins      · Hypercalcemia  ? Calcium 15 on admission, trending down to 11 1 this am  ? Calcium normal less than a month ago   ? Possibly related to bactrim use, dehydration, unknown quantity of calcium supplementation  ? Will check renal US to r/o hydro (pending), CT abd/pelvis without acute findings  ? PTH 6 9, Vit D 25-hydroxy 26 3  ? Spep, upep in process  ? Continue with IV hydration and trend labs     · Hypokalemia  ? K 3 4 this am, 40meq PO K ordered  ? Repeat BMP tomorrow am, replace K as needed     · Leukocytosis  ? Unclear etiology  ? WBC slightly elevated at 12 on admission  ? Trending down to 8 44 this am  ? Recently treated for UTI with bactrim and laryngitis with prednisone as outpt  ? UA with WBC 30-50 and small amount of leukocytes, strep pneumo/ legionella negative  · Urine cx prelim results show 10,000-19,000 cfu/ml Gram Negative Jameson Enteric Like, <10,000 cfu/ml Proteus species   · No signs of active UTI, will hold abx for now  ? CXR showed RUL opacity that was not seen on CT chest  ? Procal 0 9, blood cultures with no growth at 24hrs  ? Flu panel negative  ? No signs or symptoms of active infection currently     · Anemia  ? Baseline Hgb 11-12, 10 9 on admission  ? Hgb 10 1 this am  ? Likely dilutional given IVF hydration, but could be due to acute kidney failure   ? No active signs of bleeding   ? Will trend daily CBC     · Hypertension  ? Not on home medications for HTN  ? -190 this admission  ? Likely elevated in setting of VINNY  ? Scheduled hydralazine 25mg Q8hr, PRN IV hydralazine for SBP>160       VTE Pharmacologic Prophylaxis:   Pharmacologic: Enoxaparin (Lovenox)  Mechanical VTE Prophylaxis in Place: Yes    Patient Centered Rounds: I have performed bedside rounds with nursing staff today      Discussions with Specialists or Other Care Team Provider: Bedside RN, Nephrology    Education and Discussions with Family / Patient: Patient    Time Spent for Care: 45 minutes  More than 50% of total time spent on counseling and coordination of care as described above  Current Length of Stay: 2 day(s)    Current Patient Status: Inpatient   Certification Statement: The patient will continue to require additional inpatient hospital stay due to acute kidney injury not yet resolved    Discharge Plan / Estimated Discharge Date: likely discharge to home, discharge date unknown at this time    Code Status: Level 1 - Full Code      Subjective:   Alert and awake, pleasant   at bedside  Denies chest pain, palpitations, N/V, SOB, abd pain, dysuria, dizziness, lightheadedness  No complaints at this time  Plan of care reviewed and patient expressed understanding  Objective:     Vitals:   Temp (24hrs), Av 1 °F (36 7 °C), Min:97 5 °F (36 4 °C), Max:98 3 °F (36 8 °C)    Temp:  [97 5 °F (36 4 °C)-98 3 °F (36 8 °C)] 98 3 °F (36 8 °C)  HR:  [79-88] 83  Resp:  [16] 16  BP: (150-171)/(67-92) 169/79  SpO2:  [97 %-98 %] 98 %  Body mass index is 24 19 kg/m²  Input and Output Summary (last 24 hours): Intake/Output Summary (Last 24 hours) at 10/28/18 0913  Last data filed at 10/28/18 0801   Gross per 24 hour   Intake          2058  33 ml   Output             1570 ml   Net           488 33 ml       Physical Exam:     Physical Exam   Constitutional: She is oriented to person, place, and time  She appears well-developed and well-nourished  No distress  HENT:   Head: Normocephalic and atraumatic  Mouth/Throat: Oropharynx is clear and moist  No oropharyngeal exudate  Eyes: Pupils are equal, round, and reactive to light  Conjunctivae are normal  Right eye exhibits no discharge  Left eye exhibits no discharge  No scleral icterus  Neck: Normal range of motion  No JVD present  No tracheal deviation present  Cardiovascular: Normal rate, regular rhythm, normal heart sounds and intact distal pulses    Exam reveals no gallop and no friction rub  No murmur heard  Pulmonary/Chest: Effort normal and breath sounds normal  No stridor  No respiratory distress  She has no wheezes  She has no rales  She exhibits no tenderness  Abdominal: Soft  Bowel sounds are normal  She exhibits no distension and no mass  There is no tenderness  There is no rebound and no guarding  Musculoskeletal: Normal range of motion  She exhibits no edema, tenderness or deformity  Neurological: She is alert and oriented to person, place, and time  No cranial nerve deficit  Skin: Skin is warm and dry  No rash noted  She is not diaphoretic  No erythema  No pallor  Psychiatric: She has a normal mood and affect  Her behavior is normal  Judgment and thought content normal          Additional Data:     Labs:      Results from last 7 days  Lab Units 10/28/18  0512   WBC Thousand/uL 8 44   HEMOGLOBIN g/dL 10 1*   HEMATOCRIT % 32 4*   PLATELETS Thousands/uL 316   NEUTROS PCT % 74   LYMPHS PCT % 14   MONOS PCT % 8   EOS PCT % 3       Results from last 7 days  Lab Units 10/28/18  0512  10/27/18  0506   SODIUM mmol/L 141  < > 138   POTASSIUM mmol/L 3 4*  < > 2 7*   CHLORIDE mmol/L 109*  < > 105   CO2 mmol/L 24  < > 26   BUN mg/dL 25  < > 26*   CREATININE mg/dL 2 70*  < > 3 11*   CALCIUM mg/dL 11 1*  < > 12 1*   ALK PHOS U/L  --   --  62   ALT U/L  --   --  14   AST U/L  --   --  13   < > = values in this interval not displayed  * I Have Reviewed All Lab Data Listed Above  * Additional Pertinent Lab Tests Reviewed: All Labs For Current Hospital Admission Reviewed    Imaging:    Imaging Reports Reviewed Today Include: All  Imaging Personally Reviewed by Myself Includes: All    Recent Cultures (last 7 days):       Results from last 7 days  Lab Units 10/26/18  2120 10/26/18  2004 10/26/18  1233 10/26/18  1229   BLOOD CULTURE   --   --  No Growth at 24 hrs    No Growth at 24 hrs   --    URINE CULTURE   --   --   --  10,000-19,000 cfu/ml Gram Negative Jameson Enteric Like*  <10,000 cfu/ml Proteus species*   INFLUENZA A PCR   --  None Detected  --   --    INFLUENZA B PCR   --  None Detected  --   --    RSV PCR   --  None Detected  --   --    LEGIONELLA URINARY ANTIGEN  Negative  --   --   --        Last 24 Hours Medication List:     Current Facility-Administered Medications:  acetaminophen 650 mg Oral Q6H PRN Vedia Aid, MD    enoxaparin 30 mg Subcutaneous Daily Vedia Aid, MD    hydrALAZINE 5 mg Intravenous Q6H PRN Vedia Aid, MD    hydrALAZINE 25 mg Oral Q8H [facility] Vedia Aid, MD    phenazopyridine 100 mg Oral TID With Meals Vedia Aid, MD    potassium chloride 40 mEq Oral Once Aquino Sprout, CRNP    sodium chloride 100 mL/hr Intravenous Continuous Vedia Aid, MD Last Rate: 100 mL/hr (10/27/18 7182)        Today, Patient Was Seen By: Lazarus Som, Student Nurse Practitioner    ** Please Note: This note has been constructed using a voice recognition system   **

## 2018-10-28 NOTE — PLAN OF CARE
Problem: Potential for Falls  Goal: Patient will remain free of falls  INTERVENTIONS:  - Assess patient frequently for physical needs  -  Identify cognitive and physical deficits and behaviors that affect risk of falls    -  [place] fall precautions as indicated by assessment   - Educate patient/family on patient safety including physical limitations  - Instruct patient to call for assistance with activity based on assessment  - Modify environment to reduce risk of injury  - Consider OT/PT consult to assist with strengthening/mobility   Outcome: Progressing      Problem: PAIN - ADULT  Goal: Verbalizes/displays adequate comfort level or baseline comfort level  Interventions:  - Encourage patient to monitor pain and request assistance  - Assess pain using appropriate pain scale  - Administer analgesics based on type and severity of pain and evaluate response  - Implement non-pharmacological measures as appropriate and evaluate response  - Consider cultural and social influences on pain and pain management  - Notify physician/advanced practitioner if interventions unsuccessful or patient reports new pain   Outcome: Progressing      Problem: INFECTION - ADULT  Goal: Absence or prevention of progression during hospitalization  INTERVENTIONS:  - Assess and monitor for signs and symptoms of infection  - Monitor lab/diagnostic results  - Monitor all insertion sites, i e  indwelling lines, tubes, and drains  - Monitor endotracheal (as able) and nasal secretions for changes in amount and color  - [place] appropriate cooling/warming therapies per order  - Administer medications as ordered  - Instruct and encourage patient and family to use good hand hygiene technique  - Identify and instruct in appropriate isolation precautions for identified infection/condition   Outcome: Progressing      Problem: SAFETY ADULT  Goal: Maintain or return to baseline ADL function  INTERVENTIONS:  -  Assess patient's ability to carry out ADLs; assess patient's baseline for ADL function and identify physical deficits which impact ability to perform ADLs (bathing, care of mouth/teeth, toileting, grooming, dressing, etc )  - Assess/evaluate cause of self-care deficits   - Assess range of motion  - Assess patient's mobility; develop plan if impaired  - Assess patient's need for assistive devices and provide as appropriate  - Encourage maximum independence but intervene and supervise when necessary  ¯ Involve family in performance of ADLs  ¯ Assess for home care needs following discharge   ¯ Request OT consult to assist with ADL evaluation and planning for discharge  ¯ Provide patient education as appropriate   Outcome: Progressing    Goal: Maintain or return mobility status to optimal level  INTERVENTIONS:  - Assess patient's baseline mobility status (ambulation, transfers, stairs, etc )    - Identify cognitive and physical deficits and behaviors that affect mobility  - Identify mobility aids required to assist with transfers and/or ambulation (gait belt, sit-to-stand, lift, walker, cane, etc )  - [place] fall precautions as indicated by assessment  - Record patient progress and toleration of activity level on Mobility SBAR; progress patient to next Phase/Stage  - Instruct patient to call for assistance with activity based on assessment  - Request Rehabilitation consult to assist with strengthening/weightbearing, etc    Outcome: Progressing      Problem: DISCHARGE PLANNING  Goal: Discharge to home or other facility with appropriate resources  INTERVENTIONS:  - Identify barriers to discharge w/patient and caregiver  - Arrange for needed discharge resources and transportation as appropriate  - Identify discharge learning needs (meds, wound care, etc )  - Arrange for interpretive services to assist at discharge as needed  - Refer to Case Management Department for coordinating discharge planning if the patient needs post-hospital services based on physician/advanced practitioner order or complex needs related to functional status, cognitive ability, or social support system   Outcome: Progressing      Problem: Knowledge Deficit  Goal: Patient/family/caregiver demonstrates understanding of disease process, treatment plan, medications, and discharge instructions  Complete learning assessment and assess knowledge base    Interventions:  - Provide teaching at level of understanding  - Provide teaching via preferred learning methods   Outcome: Progressing      Problem: RESPIRATORY - ADULT  Goal: Achieves optimal ventilation and oxygenation  INTERVENTIONS:  - Assess for changes in respiratory status  - Assess for changes in mentation and behavior  - Position to facilitate oxygenation and minimize respiratory effort  - Oxygen administration by appropriate delivery method based on oxygen saturation (per order) or ABGs  - Initiate smoking cessation education as indicated  - Encourage broncho-pulmonary hygiene including cough, deep breathe, Incentive Spirometry  - Assess the need for suctioning and aspirate as needed  - Assess and instruct to report SOB or any respiratory difficulty  - Respiratory Therapy support as indicated   Outcome: Progressing      Problem: GENITOURINARY - ADULT  Goal: Maintains or returns to baseline urinary function  INTERVENTIONS:  - Assess urinary function  - Encourage oral fluids to ensure adequate hydration  - Administer IV fluids as ordered to ensure adequate hydration  - Administer ordered medications as needed  - Offer frequent toileting  - Follow urinary retention protocol if ordered   Outcome: Progressing    Goal: Absence of urinary retention  INTERVENTIONS:  - Assess patient's ability to void and empty bladder  - Monitor I/O  - Bladder scan as needed  - Discuss with physician/AP medications to alleviate retention as needed  - Discuss catheterization for long term situations as appropriate   Outcome: Progressing

## 2018-10-29 LAB
ALBUMIN SERPL ELPH-MCNC: 3.13 G/DL (ref 3.5–5)
ALBUMIN SERPL ELPH-MCNC: 52.2 % (ref 52–65)
ALBUMIN UR ELPH-MCNC: 100 %
ALPHA1 GLOB MFR UR ELPH: 0 %
ALPHA1 GLOB SERPL ELPH-MCNC: 0.46 G/DL (ref 0.1–0.4)
ALPHA1 GLOB SERPL ELPH-MCNC: 7.7 % (ref 2.5–5)
ALPHA2 GLOB MFR UR ELPH: 0 %
ALPHA2 GLOB SERPL ELPH-MCNC: 0.82 G/DL (ref 0.4–1.2)
ALPHA2 GLOB SERPL ELPH-MCNC: 13.6 % (ref 7–13)
ANION GAP SERPL CALCULATED.3IONS-SCNC: 7 MMOL/L (ref 4–13)
B-GLOBULIN MFR UR ELPH: 0 %
BASOPHILS # BLD AUTO: 0.08 THOUSANDS/ΜL (ref 0–0.1)
BASOPHILS NFR BLD AUTO: 1 % (ref 0–1)
BETA GLOB ABNORMAL SERPL ELPH-MCNC: 0.44 G/DL (ref 0.4–0.8)
BETA1 GLOB SERPL ELPH-MCNC: 7.3 % (ref 5–13)
BETA2 GLOB SERPL ELPH-MCNC: 5.8 % (ref 2–8)
BETA2+GAMMA GLOB SERPL ELPH-MCNC: 0.35 G/DL (ref 0.2–0.5)
BUN SERPL-MCNC: 22 MG/DL (ref 5–25)
CALCIUM SERPL-MCNC: 9.8 MG/DL (ref 8.3–10.1)
CHLORIDE SERPL-SCNC: 111 MMOL/L (ref 100–108)
CO2 SERPL-SCNC: 24 MMOL/L (ref 21–32)
CREAT SERPL-MCNC: 2.18 MG/DL (ref 0.6–1.3)
EOSINOPHIL # BLD AUTO: 0.14 THOUSAND/ΜL (ref 0–0.61)
EOSINOPHIL NFR BLD AUTO: 2 % (ref 0–6)
ERYTHROCYTE [DISTWIDTH] IN BLOOD BY AUTOMATED COUNT: 12.8 % (ref 11.6–15.1)
GAMMA GLOB ABNORMAL SERPL ELPH-MCNC: 0.8 G/DL (ref 0.5–1.6)
GAMMA GLOB MFR UR ELPH: 0 %
GAMMA GLOB SERPL ELPH-MCNC: 13.4 % (ref 12–22)
GFR SERPL CREATININE-BSD FRML MDRD: 21 ML/MIN/1.73SQ M
GLUCOSE SERPL-MCNC: 88 MG/DL (ref 65–140)
HCT VFR BLD AUTO: 28.1 % (ref 34.8–46.1)
HGB BLD-MCNC: 8.7 G/DL (ref 11.5–15.4)
IGG/ALB SER: 1.09 {RATIO} (ref 1.1–1.8)
IMM GRANULOCYTES # BLD AUTO: 0.02 THOUSAND/UL (ref 0–0.2)
IMM GRANULOCYTES NFR BLD AUTO: 0 % (ref 0–2)
LYMPHOCYTES # BLD AUTO: 0.97 THOUSANDS/ΜL (ref 0.6–4.47)
LYMPHOCYTES NFR BLD AUTO: 15 % (ref 14–44)
MCH RBC QN AUTO: 29 PG (ref 26.8–34.3)
MCHC RBC AUTO-ENTMCNC: 31 G/DL (ref 31.4–37.4)
MCV RBC AUTO: 94 FL (ref 82–98)
MONOCYTES # BLD AUTO: 0.55 THOUSAND/ΜL (ref 0.17–1.22)
MONOCYTES NFR BLD AUTO: 9 % (ref 4–12)
NEUTROPHILS # BLD AUTO: 4.74 THOUSANDS/ΜL (ref 1.85–7.62)
NEUTS SEG NFR BLD AUTO: 73 % (ref 43–75)
NRBC BLD AUTO-RTO: 0 /100 WBCS
PLATELET # BLD AUTO: 271 THOUSANDS/UL (ref 149–390)
PMV BLD AUTO: 10.6 FL (ref 8.9–12.7)
POTASSIUM SERPL-SCNC: 3.5 MMOL/L (ref 3.5–5.3)
PROT PATTERN SERPL ELPH-IMP: ABNORMAL
PROT PATTERN UR ELPH-IMP: ABNORMAL
PROT SERPL-MCNC: 6 G/DL (ref 6.4–8.2)
PROT UR-MCNC: 19 MG/DL
RBC # BLD AUTO: 3 MILLION/UL (ref 3.81–5.12)
SODIUM SERPL-SCNC: 142 MMOL/L (ref 136–145)
WBC # BLD AUTO: 6.5 THOUSAND/UL (ref 4.31–10.16)

## 2018-10-29 PROCEDURE — 99232 SBSQ HOSP IP/OBS MODERATE 35: CPT | Performed by: INTERNAL MEDICINE

## 2018-10-29 PROCEDURE — 99232 SBSQ HOSP IP/OBS MODERATE 35: CPT | Performed by: NURSE PRACTITIONER

## 2018-10-29 PROCEDURE — 85025 COMPLETE CBC W/AUTO DIFF WBC: CPT | Performed by: NURSE PRACTITIONER

## 2018-10-29 PROCEDURE — 80048 BASIC METABOLIC PNL TOTAL CA: CPT | Performed by: NURSE PRACTITIONER

## 2018-10-29 RX ORDER — POTASSIUM CHLORIDE 20 MEQ/1
40 TABLET, EXTENDED RELEASE ORAL ONCE
Status: COMPLETED | OUTPATIENT
Start: 2018-10-29 | End: 2018-10-29

## 2018-10-29 RX ADMIN — PHENAZOPYRIDINE HYDROCHLORIDE 100 MG: 100 TABLET ORAL at 11:26

## 2018-10-29 RX ADMIN — HYDRALAZINE HYDROCHLORIDE 25 MG: 25 TABLET, FILM COATED ORAL at 21:08

## 2018-10-29 RX ADMIN — HYDRALAZINE HYDROCHLORIDE 5 MG: 20 INJECTION INTRAMUSCULAR; INTRAVENOUS at 15:29

## 2018-10-29 RX ADMIN — ENOXAPARIN SODIUM 30 MG: 30 INJECTION SUBCUTANEOUS at 07:52

## 2018-10-29 RX ADMIN — PHENAZOPYRIDINE HYDROCHLORIDE 100 MG: 100 TABLET ORAL at 07:51

## 2018-10-29 RX ADMIN — POTASSIUM CHLORIDE 40 MEQ: 1500 TABLET, EXTENDED RELEASE ORAL at 10:21

## 2018-10-29 RX ADMIN — HYDRALAZINE HYDROCHLORIDE 25 MG: 25 TABLET, FILM COATED ORAL at 14:00

## 2018-10-29 RX ADMIN — PHENAZOPYRIDINE HYDROCHLORIDE 100 MG: 100 TABLET ORAL at 15:30

## 2018-10-29 RX ADMIN — HYDRALAZINE HYDROCHLORIDE 25 MG: 25 TABLET, FILM COATED ORAL at 05:04

## 2018-10-29 NOTE — PROGRESS NOTES
Progress Note - [place] 1940, 69 y o  female MRN: [id number]    Unit/Bed#: [facility] 701-01 Encounter: [id number]    Primary Care Provider: Reinier Melissa DO   Date and time admitted to hospital: 10/26/2018 10:20 AM    Daughter Claudia Belcher [phone], updated about plan of care     Hypercalcemia   Assessment & Plan    POA not noted to be chronic, unclear etiology although some suspicion for Vinny with decreased calcium excretion plus pts use of vit d and caltrate two tabs daily   Discussed with nephrology , much appreciated   Prior pth  Appropriately low in this situation   spep /upep  ARE NOT YET RETURNED, PLEASE FOLLOW THEY ARE IN PROCESS   bmp, vit d   Improving overall from admission with iv fluid hydration   Will obtain ct abdomen and pelvis r/o malignancy     * Encephalopathy   Assessment & Plan    Oriented x 3  Now with significant improvement in mental status , per  pt is doing better but has been getting a little more forgetful   Infiltrates found on chest x-ray,r/o infectious process, pt with no fever mild uri   Pharyngitis noted at pcp office visit on 10/13 with chronic nonproductive cough, treated with po prednisone   Disorientation for more than 3 days as per family, just rode into her garage prior to admission   Recently treated for Proteus mirabilis urinary tract infection with Bactrim outpatient for an apparent 10 day course   all abx treatment stopped on admissiont, obtain more labs see below, monitor vitals and hydrate pt          VINNY (acute kidney injury) ([facility] [address] )   Assessment & Plan    Baseline creatinine  1 2-1 3  Presents with creatinine 3 68 down in 24 hrs to 2 18  History of NSAID us but very limited and occasional per pt   Appreciated nephrology  Also noted to be hypokalemic and hypercalcemic now improving with iv fluid hydration   Will obtain renal ultrasound and ct abdomen and pelvis wo contrast        Accelerated hypertension   Assessment & Plan    Systolic pressure >131  No medications  Give hydralazine   Avoid diuretics for now due to renal impairment     Osteoarthritis of hip   Assessment & Plan    Stable  On calcium supplement will stop at this time due to hypercalcemia      Hypokalemia   Assessment & Plan    In setting of bhargavi, today 3 5  repleted thus far with 40meq per nephrology   Will chk labs later and replete as needed      Anemia   Assessment & Plan    Seems to have progressed since admission sp 2 liters iv fluids and normal saline at 100ml/hr  Continue to clamp in the am  H &H 28 1/8 7 now no signs of obvious sign of bleeding   Pt reports taking some sort of a vitamin taking      Leukocytosis   Assessment & Plan    Unclear etiology   Recently treated on 10/13 /18 for laryngitis on steroids completed after 7 day taper  Also treated prior to that for uti on bactrim which pt was still on when she was admitted  There was suspicion for pna due to chest xray will , however ct ro these findings  Wbc count improving with hydration pt reports no fever  procalcitonin negative > 0 06 would stop all abx treatment at this time            VTE Pharmacologic Prophylaxis:   Pharmacologic: Enoxaparin (Lovenox)  Mechanical VTE Prophylaxis in Place: Yes    Patient Centered Rounds: I have performed bedside rounds with nursing staff today  Discussions with Specialists or Other Care Team Provider: nursing     Education and Discussions with Family / Patient: patient     Time Spent for Care: 30 minutes  More than 50% of total time spent on counseling and coordination of care as described above  Current Length of Stay: 3 day(s)    Current Patient Status: Inpatient   Certification Statement: The patient will continue to require additional inpatient hospital stay due to discharge home when medically stable     Discharge Plan: Home when medically stable likely Wednesday sp one day off iv fluids to chk response    Code Status: Level 1 - Full Code      Subjective:   Patient doing lot better today  Alert and oriented x3  Pleasant states that she is trying to eat and hydrate well  Eating lots of fruit  No nausea vomiting or diarrhea normal bowel movement this morning  No chest pain palpitations discussed removing telemetry  Objective:     Vitals:   Temp (24hrs), Av 2 °F (36 8 °C), Min:98 °F (36 7 °C), Max:98 4 °F (36 9 °C)    Temp:  [98 °F (36 7 °C)-98 4 °F (36 9 °C)] 98 4 °F (36 9 °C)  HR:  [80-94] 80  Resp:  [16-18] 18  BP: (154-178)/() 162/82  SpO2:  [97 %-99 %] 97 %  Body mass index is 24 19 kg/m²  Input and Output Summary (last 24 hours): Intake/Output Summary (Last 24 hours) at 10/29/18 1129  Last data filed at 10/29/18 1022   Gross per 24 hour   Intake          2323 33 ml   Output             1575 ml   Net           748 33 ml       Physical Exam:     Physical Exam   Constitutional: She is oriented to person, place, and time  She appears well-developed and well-nourished  No distress  HENT:   Head: Normocephalic and atraumatic  Mouth/Throat: No oropharyngeal exudate  Eyes: Conjunctivae are normal  Right eye exhibits no discharge  Left eye exhibits no discharge  No scleral icterus  Neck: No JVD present  No tracheal deviation present  No thyromegaly present  Cardiovascular: Normal rate  Exam reveals no gallop and no friction rub  No murmur heard  Pulmonary/Chest: No stridor  No respiratory distress  She has no wheezes  She has rales (fine crackles )  She exhibits no tenderness  Abdominal: Soft  She exhibits no distension and no mass  There is no tenderness  There is no rebound and no guarding  Musculoskeletal: She exhibits no edema, tenderness or deformity  Lymphadenopathy:     She has no cervical adenopathy  Neurological: She is oriented to person, place, and time  Skin: No rash noted  She is not diaphoretic  No erythema  No pallor  Forehead and nasal abrasion intact light scabbing    Psychiatric: She has a normal mood and affect           Additional Data:     Labs:      Results from last 7 days  Lab Units 10/29/18  0452   WBC Thousand/uL 6 50   HEMOGLOBIN g/dL 8 7*   HEMATOCRIT % 28 1*   PLATELETS Thousands/uL 271   NEUTROS PCT % 73   LYMPHS PCT % 15   MONOS PCT % 9   EOS PCT % 2       Results from last 7 days  Lab Units 10/29/18  0452  10/27/18  0506   SODIUM mmol/L 142  < > 138   POTASSIUM mmol/L 3 5  < > 2 7*   CHLORIDE mmol/L 111*  < > 105   CO2 mmol/L 24  < > 26   BUN mg/dL 22  < > 26*   CREATININE mg/dL 2 18*  < > 3 11*   ANION GAP mmol/L 7  < > 7   CALCIUM mg/dL 9 8  < > 12 1*   ALBUMIN g/dL  --   --  2 4*   TOTAL BILIRUBIN mg/dL  --   --  0 28   ALK PHOS U/L  --   --  62   ALT U/L  --   --  14   AST U/L  --   --  13   < > = values in this interval not displayed  Results from last 7 days  Lab Units 10/26/18  1109   POC GLUCOSE mg/dl 113           Results from last 7 days  Lab Units 10/26/18  2004 10/26/18  1244   LACTIC ACID mmol/L  --  1 4   PROCALCITONIN ng/ml 0 09  --            * I Have Reviewed All Lab Data Listed Above  * Additional Pertinent Lab Tests Reviewed: All Labs Within Last 24 Hours Reviewed    Imaging:    Imaging Reports Reviewed Today Include: reviewed     Recent Cultures (last 7 days):       Results from last 7 days  Lab Units 10/26/18  2120 10/26/18  2004 10/26/18  1233 10/26/18  1229   BLOOD CULTURE   --   --  No Growth at 48 hrs  No Growth at 48 hrs    --    URINE CULTURE   --   --   --  10,000-19,000 cfu/ml Klebsiella pneumoniae*  <10,000 cfu/ml Proteus species*   INFLUENZA A PCR   --  None Detected  --   --    INFLUENZA B PCR   --  None Detected  --   --    RSV PCR   --  None Detected  --   --    LEGIONELLA URINARY ANTIGEN  Negative  --   --   --        Last 24 Hours Medication List:     Current Facility-Administered Medications:  acetaminophen 650 mg Oral Q6H PRN Jeniffer Garcia MD    enoxaparin 30 mg Subcutaneous Daily Jeniffer Garcia MD    hydrALAZINE 5 mg Intravenous Q6H PRN Jeniffer Garcia MD    hydrALAZINE 25 mg Oral Q8H [address] Asim Morales MD    phenazopyridine 100 mg Oral TID With Meals Asim Morales MD    sodium chloride 50 mL/hr Intravenous Continuous Beatris Hatchet, MD Last Rate: 50 mL/hr (10/29/18 1022)        Today, Patient Was Seen By: CYDNEY Melendez    ** Please Note: Dictation voice to text software may have been used in the creation of this document   **

## 2018-10-29 NOTE — ASSESSMENT & PLAN NOTE
Seems to have progressed since admission sp 2 liters iv fluids and normal saline at 100ml/hr  Continue to clamp in the am  H &H 28 1/8 7 now no signs of obvious sign of bleeding   Pt reports taking some sort of a vitamin taking

## 2018-10-29 NOTE — ASSESSMENT & PLAN NOTE
POA not noted to be chronic, unclear etiology although some suspicion for Deyanira with decreased calcium excretion plus pts use of vit d and caltrate two tabs daily   Discussed with nephrology , much appreciated   Prior pth  Appropriately low in this situation   spep /upep  ARE NOT YET RETURNED, PLEASE FOLLOW THEY ARE IN PROCESS   bmp, vit d   Improving overall from admission with iv fluid hydration   Will obtain ct abdomen and pelvis r/o malignancy

## 2018-10-29 NOTE — ASSESSMENT & PLAN NOTE
Systolic pressure >930  No medications  Give hydralazine   Avoid diuretics for now due to renal impairment

## 2018-10-29 NOTE — ASSESSMENT & PLAN NOTE
In setting of bhargavi, today 3 5  repleted thus far with 40meq per nephrology   Will chk labs later and replete as needed

## 2018-10-29 NOTE — RESTORATIVE TECHNICIAN NOTE
Restorative Specialist Mobility Note       Activity: Ambulate in [place], Ambulate in room, Bathroom privileges, Chair, Stand at bedside (Educated/encouraged pt to ambulate with assistance 3-4 x's/day  Chair alarm on   Pt callbell, phone/tray within reach  )     Assistive Device: Front wheel walker       Kevin MANDEL, Restorative Technician, [employer] [facility]

## 2018-10-29 NOTE — ASSESSMENT & PLAN NOTE
Oriented x 3  Now with significant improvement in mental status , per  pt is doing better but has been getting a little more forgetful   Infiltrates found on chest x-ray,r/o infectious process, pt with no fever mild uri   Pharyngitis noted at pcp office visit on 10/13 with chronic nonproductive cough, treated with po prednisone   Disorientation for more than 3 days as per family, just rode into her garage prior to admission   Recently treated for Proteus mirabilis urinary tract infection with Bactrim outpatient for an apparent 10 day course   all abx treatment stopped on admissiont, obtain more labs see below, monitor vitals and hydrate pt

## 2018-10-29 NOTE — PROGRESS NOTES
Progress Note - Nephrology   Keren Melo 69 y o  female MRN: [id number]  Unit/Bed#: [facility] 701-01 Encounter: [id number]    ASSESSMENT AND PLAN:  1  VINNY:  Baseline creatinine approximately 1 0 mg/dL suggestive CKD stage 3  VINNY felt secondary to Bactrim/hypercalcemia with vaso constriction/prerenal secondary to poor oral intake  Creatinine continues to improve on IV fluids and improvement with hypercalcemia  Recommendations:  -continue IV fluids and Hep-Lock in the next 24 hr if continues to improve  -continue to monitor and treat hypercalcemia  2  Hypercalcemia:  Unclear etiology  In retrospect, it may have been a combination of the VINNY with decrease calcium excretion plus vitamin-D plus dehydration  Calcium improving to 9 8  Workup:  -PTH 6 9 appropriately low  -TSH 2 8 normal  -vitamin-D 25:26 3 low  -SPEP normal  -CT of the chest abdomen pelvis was negative for any significant pathology he  Recommendations: Workup:  -Ace level  -PTH related protein  -UPEP/light chains  -vitamin-D 1, 25 0H level  -vitamin A level  Treatment:  -continue slow isotonic fluids  -avoid any calcium/vitamin-D products  3  Hypertension:  Still slightly elevated  Adjust medications but avoid significant hypotension given VINNY  4  Change in mental status has resolved  Felt secondary to hypercalcemia and VINNY and UT I   5  Recent UTI treated with Bactrim  6  Replete borderline hypokalemia  Subjective:   Patient feels great  No chest pain or shortness of Breath  No nausea vomiting or diarrhea  Urinating well  Ambulating  Objective:     Vitals: Blood pressure 162/82, pulse 80, temperature 98 4 °F (36 9 °C), temperature source Oral, resp  rate 18, height 5' 1" (1 549 m), weight 58 1 kg (128 lb), SpO2 97 %  ,Body mass index is 24 19 kg/m²      Weight (last 2 days)     None            Intake/Output Summary (Last 24 hours) at 10/29/18 0923  Last data filed at 10/29/18 0701   Gross per 24 hour   Intake          2503 33 ml Output             1650 ml   Net           853 33 ml            Physical Exam: General:  No acute distress walking around the halls at times  Skin:  No Acute rash  Eyes:  No Scleral icterus  ENT:  Moist mucous membranes  Neck:  Supple, no jugular venous distention  Chest:  Clear to auscultation  CVS:  Regular rate and rhythm without evidence of a cardiac rub or gallops  Abdomen:  Soft and nontender with normal bowel sounds  Extremities:  No edema no cyanosis  Neuro:  No gross and focality  Psych:  Alert and oriented                Medications:    Scheduled Meds:  Current Facility-Administered Medications:  acetaminophen 650 mg Oral Q6H PRN Dick Escalona MD    enoxaparin 30 mg Subcutaneous Daily Dick Escalona MD    hydrALAZINE 5 mg Intravenous Q6H PRN Dick Escaolna MD    hydrALAZINE 25 mg Oral Q8H [address] Dick Escalona MD    phenazopyridine 100 mg Oral TID With Meals Dick Escalona MD    sodium chloride 100 mL/hr Intravenous Continuous Dick Escalona MD Last Rate: 100 mL/hr (10/28/18 2323)       PRN Meds:   acetaminophen    hydrALAZINE    Continuous Infusions:  sodium chloride 100 mL/hr Last Rate: 100 mL/hr (10/28/18 2323)       Lab, Imaging and other studies: I have personally reviewed pertinent labs    Laboratory Results:    Results from last 7 days  Lab Units 10/29/18  0452 10/28/18  0512 10/27/18  1417 10/27/18  0506 10/26/18  1147 10/26/18  1146   WBC Thousand/uL 6 50 8 44  --  7 63 12 04*  --    HEMOGLOBIN g/dL 8 7* 10 1*  --  8 9* 11 4*  --    HEMATOCRIT % 28 1* 32 4*  --  27 9* 35 2  --    PLATELETS Thousands/uL 271 316 268 240 321  --    SODIUM mmol/L 142 141 138 138  --  134*   POTASSIUM mmol/L 3 5 3 4* 3 0* 2 7*  --  3 3*   CHLORIDE mmol/L 111* 109* 105 105  --  98*   CO2 mmol/L 24 24 26 26  --  28   BUN mg/dL 22 25 28* 26*  --  30*   CREATININE mg/dL 2 18* 2 70* 3 00* 3 11*  --  3 68*   CALCIUM mg/dL 9 8 11 1* 11 8* 12 1*  --  15 2*   MAGNESIUM mg/dL  --   --   --  1 9  --   --    PHOSPHORUS mg/dL  --   --   --  3 3  --   --      Urinalysis: Lab Results   Component Value Date    COLORU completed 10/26/2018    COLORU Yellow 10/26/2018    COLORU Reddish brown 04/26/2016    CLARITYU Clear 10/26/2018    CLARITYU Cloudy 04/26/2016    SPECGRAV 1 015 10/26/2018    SPECGRAV 1 020 04/26/2016    PHUR 7 0 10/26/2018    PHUR 6 04/26/2016    LEUKOCYTESUR Small (A) 10/26/2018    LEUKOCYTESUR large 04/26/2016    NITRITE Negative 10/26/2018    NITRITE positive 04/26/2016    PROTEINUA Negative 10/26/2018    PROTEINUA neg 04/26/2016    GLUCOSEU Negative 10/26/2018    GLUCOSEU Negative 11/20/2015    KETONESU Negative 10/26/2018    KETONESU neg 04/26/2016    BILIRUBINUR Negative 10/26/2018    BILIRUBINUR Negative 11/20/2015    BLOODU Trace (A) 10/26/2018    BLOODU large 04/26/2016     ABGs: No results found for: [facility]  Radiology review:     Portions of the record may have been created with voice recognition software   Occasional wrong word or "sound a like" substitutions may have occurred due to the inherent limitations of voice recognition software   Read the chart carefully and recognize, using context, where substitutions have occurred

## 2018-10-29 NOTE — ASSESSMENT & PLAN NOTE
Baseline creatinine  1 2-1 3  Presents with creatinine 3 68 down in 24 hrs to 2 18  History of NSAID us but very limited and occasional per pt   Appreciated nephrology  Also noted to be hypokalemic and hypercalcemic now improving with iv fluid hydration   Will obtain renal ultrasound and ct abdomen and pelvis wo contrast

## 2018-10-30 LAB
ANION GAP SERPL CALCULATED.3IONS-SCNC: 8 MMOL/L (ref 4–13)
BUN SERPL-MCNC: 21 MG/DL (ref 5–25)
CA-I BLD-SCNC: 1.18 MMOL/L (ref 1.12–1.32)
CALCIUM SERPL-MCNC: 8.7 MG/DL (ref 8.3–10.1)
CHLORIDE SERPL-SCNC: 110 MMOL/L (ref 100–108)
CO2 SERPL-SCNC: 21 MMOL/L (ref 21–32)
CREAT SERPL-MCNC: 1.82 MG/DL (ref 0.6–1.3)
GFR SERPL CREATININE-BSD FRML MDRD: 26 ML/MIN/1.73SQ M
GLUCOSE SERPL-MCNC: 92 MG/DL (ref 65–140)
PHOSPHATE SERPL-MCNC: 1.8 MG/DL (ref 2.3–4.1)
POTASSIUM SERPL-SCNC: 3.2 MMOL/L (ref 3.5–5.3)
SODIUM SERPL-SCNC: 139 MMOL/L (ref 136–145)

## 2018-10-30 PROCEDURE — 84590 ASSAY OF VITAMIN A: CPT | Performed by: INTERNAL MEDICINE

## 2018-10-30 PROCEDURE — 82652 VIT D 1 25-DIHYDROXY: CPT | Performed by: INTERNAL MEDICINE

## 2018-10-30 PROCEDURE — 99232 SBSQ HOSP IP/OBS MODERATE 35: CPT | Performed by: NURSE PRACTITIONER

## 2018-10-30 PROCEDURE — 83883 ASSAY NEPHELOMETRY NOT SPEC: CPT | Performed by: INTERNAL MEDICINE

## 2018-10-30 PROCEDURE — 84100 ASSAY OF PHOSPHORUS: CPT | Performed by: INTERNAL MEDICINE

## 2018-10-30 PROCEDURE — 82164 ANGIOTENSIN I ENZYME TEST: CPT | Performed by: INTERNAL MEDICINE

## 2018-10-30 PROCEDURE — 80048 BASIC METABOLIC PNL TOTAL CA: CPT | Performed by: INTERNAL MEDICINE

## 2018-10-30 PROCEDURE — 99232 SBSQ HOSP IP/OBS MODERATE 35: CPT | Performed by: INTERNAL MEDICINE

## 2018-10-30 PROCEDURE — 82330 ASSAY OF CALCIUM: CPT | Performed by: INTERNAL MEDICINE

## 2018-10-30 PROCEDURE — 82397 CHEMILUMINESCENT ASSAY: CPT | Performed by: INTERNAL MEDICINE

## 2018-10-30 RX ORDER — AMLODIPINE BESYLATE 5 MG/1
5 TABLET ORAL DAILY
Status: DISCONTINUED | OUTPATIENT
Start: 2018-10-30 | End: 2018-10-31 | Stop reason: HOSPADM

## 2018-10-30 RX ORDER — POTASSIUM CHLORIDE 20 MEQ/1
40 TABLET, EXTENDED RELEASE ORAL 2 TIMES DAILY
Status: COMPLETED | OUTPATIENT
Start: 2018-10-30 | End: 2018-10-30

## 2018-10-30 RX ADMIN — PHENAZOPYRIDINE HYDROCHLORIDE 100 MG: 100 TABLET ORAL at 08:26

## 2018-10-30 RX ADMIN — SODIUM CHLORIDE 50 ML/HR: 0.9 INJECTION, SOLUTION INTRAVENOUS at 05:34

## 2018-10-30 RX ADMIN — POTASSIUM CHLORIDE 40 MEQ: 1500 TABLET, EXTENDED RELEASE ORAL at 11:50

## 2018-10-30 RX ADMIN — DIBASIC SODIUM PHOSPHATE, MONOBASIC POTASSIUM PHOSPHATE AND MONOBASIC SODIUM PHOSPHATE 1 TABLET: 852; 155; 130 TABLET ORAL at 17:28

## 2018-10-30 RX ADMIN — PHENAZOPYRIDINE HYDROCHLORIDE 100 MG: 100 TABLET ORAL at 17:28

## 2018-10-30 RX ADMIN — AMLODIPINE BESYLATE 5 MG: 5 TABLET ORAL at 11:51

## 2018-10-30 RX ADMIN — DIBASIC SODIUM PHOSPHATE, MONOBASIC POTASSIUM PHOSPHATE AND MONOBASIC SODIUM PHOSPHATE 1 TABLET: 852; 155; 130 TABLET ORAL at 11:50

## 2018-10-30 RX ADMIN — ENOXAPARIN SODIUM 30 MG: 30 INJECTION SUBCUTANEOUS at 08:26

## 2018-10-30 RX ADMIN — PHENAZOPYRIDINE HYDROCHLORIDE 100 MG: 100 TABLET ORAL at 11:50

## 2018-10-30 RX ADMIN — HYDRALAZINE HYDROCHLORIDE 25 MG: 25 TABLET, FILM COATED ORAL at 05:30

## 2018-10-30 RX ADMIN — POTASSIUM CHLORIDE 40 MEQ: 1500 TABLET, EXTENDED RELEASE ORAL at 17:27

## 2018-10-30 NOTE — ASSESSMENT & PLAN NOTE
POA not noted to be chronic, unclear etiology although some suspicion for Deyanira with decreased calcium excretion plus pts use of vit d and caltrate two tabs daily   Discussed with nephrology , much appreciated   Prior pth  Appropriately low in this situation   spep /upep now negative reviewed with dr Suleman Nguyen  bmp, vit d   Improving overall from admission with iv fluid hydration   ct abdomen and pelvis r/o malignancy with no contrast dt deyanira, now negative  Bone scan ordered by nephro to completely ro metastatic disease   Once returned pt may be discharged

## 2018-10-30 NOTE — UTILIZATION REVIEW
Admission Authorization Determination    Receipt Date/Time:  Incoming or Outgoing:  Type of communication (fax, voicemail, e-mail, portal, etc ):  Insurance:  :  Phone:  Fax:  Type of Determination (Approved vs Denied): Authorization Number:  Approved Days:  Denied Days:  Last Approved Day (LAD or LCD):  Next Review Date (NRD):  Notes pertaining to the determination:     Continued Stay Review    Date: 10-30-18      Vital Signs: /78 Comment: HR 95  Pulse 98   Temp 98 4 °F (36 9 °C) (Oral)   Resp 18   Ht 5' 1" (1 549 m)   Wt 58 1 kg (128 lb)   SpO2 96%   BMI 24 19 kg/m²     Medications:       acetaminophen 650 mg Oral Q6H PRN   amLODIPine 5 mg Oral Daily   enoxaparin 30 mg Subcutaneous Daily   hydrALAZINE 5 mg Intravenous Q6H PRN   phenazopyridine 100 mg Oral TID With Meals   potassium chloride 40 mEq Oral BID   potassium-sodium phosphateS 1 tablet Oral TID With Meals     Abnormal Labs/Diagnostic Results:     Lab Units 10/30/18  0511 10/29/18  0452 10/28/18  0512 10/27/18  1417 10/27/18  0506 10/26/18  1147 10/26/18  1146   HEMOGLOBIN g/dL  --  8 7* 10 1*  --  8 9* 11 4*  --    HEMATOCRIT %  --  28 1* 32 4*  --  27 9* 35 2  --    POTASSIUM mmol/L 3 2* 3 5 3 4* 3 0* 2 7*  --  3 3*   CHLORIDE mmol/L 110* 111* 109* 105 105  --  98*   BUN mg/dL 21 22 25 28* 26*  --  30*   CREATININE mg/dL 1 82* 2 18* 2 70* 3 00* 3 11*  --  3 68*   CALCIUM mg/dL 8 7 9 8 11 1* 11 8* 12 1*  --  15 2*   PHOSPHORUS mg/dL 1 8*  --   --   --  3 3  --   --                46-year-old female who presented with confusion  Recently treated with for UTI on Bactrim  She was found to have significant hypercalcemia along with VINNY with max calcium of 15 and a creatinine of 3 68  Nephrology following her for hypercalcemia/VINNY  Assessment/Plan:    Nephrology     VINNY:  Baseline creatinine approximately 1 0 mg/dL suggestive CKD stage 3    VINNY felt secondary to Bactrim/hypercalcemia with vaso constriction/prerenal secondary to poor oral intake  The creatinine continues to improve down 1 82  Recommendations: Workup:  -Ace level:  Pending  -PTH related protein:  Pending  -light chains:  Pending  -vitamin-D 1, 25 0H level :  pending  -vitamin A level:  Pending  TO COMPLETE THE WORKUP I WOULD ORDER A BONE SCAN WHOLE BODY  Treatment:  -Hep-Lock IV fluids  -avoid any calcium/vitamin-D products  3  Hypertension:  Still slightly elevated   Adjust medications but avoid significant hypotension given VINNY  Please see orders  4  Change in mental status has resolved   Felt secondary to hypercalcemia and VINNY and UT I   5  Recent UTI treated with Bactrim  Avoid Bactrim  Nitrofurantoin for prophylaxis  6  Replete  hypokalemia    recheck magnesium in a m    7  Replete hypophosphatemia    Discharge Plan: TO BE DETERMINED

## 2018-10-30 NOTE — PLAN OF CARE
DISCHARGE PLANNING     Discharge to home or other facility with appropriate resources Progressing        DISCHARGE PLANNING - CARE MANAGEMENT     Discharge to post-acute care or home with appropriate resources Progressing        GENITOURINARY - ADULT     Maintains or returns to baseline urinary function Progressing     Absence of urinary retention Progressing        INFECTION - ADULT     Absence or prevention of progression during hospitalization Progressing        Knowledge Deficit     Patient/family/caregiver demonstrates understanding of disease process, treatment plan, medications, and discharge instructions Progressing        Nutrition/Hydration-ADULT     Nutrient/Hydration intake appropriate for improving, restoring or maintaining nutritional needs Progressing        PAIN - ADULT     Verbalizes/displays adequate comfort level or baseline comfort level Progressing        Potential for Falls     Patient will remain free of falls Progressing        RESPIRATORY - ADULT     Achieves optimal ventilation and oxygenation Progressing        SAFETY ADULT     Maintain or return to baseline ADL function Progressing     Maintain or return mobility status to optimal level Progressing

## 2018-10-30 NOTE — ASSESSMENT & PLAN NOTE
Oriented x 3  Now with significant improvement in mental status , per  pt is doing better but has been getting a little more forgetful, will have pt fu with geriatrics sp discharge   Infiltrates found on chest x-ray,r/o infectious process, pt with no fever mild uri   Pharyngitis noted at pcp office visit on 10/13 with chronic nonproductive cough, treated with po prednisone   Disorientation for more than 3 days as per family, just rode into her garage prior to admission   Recently treated for Proteus mirabilis urinary tract infection with Bactrim outpatient for an apparent 10 day course   all abx treatment stopped on admission,  Labs in am

## 2018-10-30 NOTE — MEDICAL STUDENT
[facility] Progress Note  Patient: Napolean Mortimer 69 y o  female   MRN: [id number]  PCP: Raven Schilling DO  [facility] Encounter: [id number]  Date Of Visit: 10/30/18    Assessment:    Principal Problem:    Encephalopathy  Active Problems:    Osteoarthritis of hip    VINNY (acute kidney injury) ([facility] [address] )    Accelerated hypertension    Hypercalcemia    Leukocytosis    Anemia    Hypokalemia      Plan:    · Hypercalcemia  · POA, not chronic, although unclear etiology  · Believe to be related to elements of dehydration, VINNY, plus patient uses vit D and caltrate as home medications   · Bone scan scheduled for today to r/o malignancy  · CT scan negative for malignancy, unable to complete with contrast d/t kidney function   · SPEP/UPEP negative, reviewed with nephrology, no bands presents  · Continue trend BMP  · Stop IVF  · Appreciate nephrology input  · Calcium 8 7 today, improving    ·  Encephalopathy  · Oriented x3  · Patient has elements of forgetfulness, somewhat tangential in thought processes   · Disoriented for more than 3 days PTA, per family, drove drove car into garage, has abrasions on face   · Recently treated with Bactrim x10 days for Proteus urinary tract infection      · Acute Kidney Injury  · Baseline creatinine 1 2-1 3, on admission 3 68  · Currently 1 82  · Occasional NSAID use at home  · Appreciate nephrology input  · Patient eating and drinking, stop IVF  · Renal US 10/28 - Normal sonographic appearance of the kidneys and urinary bladder       · Accelerated Hypertension  · Systolic BP >503  · initially started on hydralazine 25 mg Q8  · Nephrology changed to amlodipine 5mg   · Patient "never had any blood pressure issues before" - no home BP pressure medications      · Hypokalemia   · In setting of VINNY, K 3 2 today  · Replete 40 meq x 2, total of 80 meq today     · Anemia  · Continues to trend down, no obvious signs of bleeding, patient is not symptomatic   · H&H 8 7/28 1    VTE Pharmacologic Prophylaxis:   Pharmacologic: Enoxaparin (Lovenox)  Mechanical VTE Prophylaxis in Place: Yes    Patient Centered Rounds: I have performed bedside rounds with nursing staff today  Discussions with Specialists or Other Care Team Provider: nephrology    Education and Discussions with Family / Patient: patient    Time Spent for Care: 1 hour  More than 50% of total time spent on counseling and coordination of care as described above  Current Length of Stay: 4 day(s)    Current Patient Status: Inpatient   Certification Statement: The patient will continue to require additional inpatient hospital stay due to bone scan, lab trend    Discharge Plan / Estimated Discharge Date: 24 hours    Code Status: Level 1 - Full Code      Subjective:   Patient seen sitting at bedside  States that she is feeling better today, thought processes are slightly convoluted this AM  She is asking for a new urine culture to be drawn and sent to Dr Lamar Ayala as she was supposed to have a f/u with her today in  the office as she was supposed to have a repeat culture with her  Patient was educated about the signs and symptoms of a UTI and that with her pessary the pressure she feels that she is probably colonized she probably is not having recurrent UTI's and probably should not be getting antibiotics unless she has true urinary tract infection symptoms - burning, pain, fatigue, etc  Patient otherwise denies chest pain, headaches, dizziness, fatigue, burning or pressure or pain with urination  She is excited at the prospect of going home and walked with PT this morning  Objective:     Vitals:   Temp (24hrs), Av 6 °F (37 °C), Min:98 3 °F (36 8 °C), Max:99 °F (37 2 °C)    Temp:  [98 3 °F (36 8 °C)-99 °F (37 2 °C)] 98 4 °F (36 9 °C)  HR:  [92-98] 98  Resp:  [18] 18  BP: (158-170)/(72-90) 170/90  SpO2:  [96 %-98 %] 96 %  Body mass index is 24 19 kg/m²  Input and Output Summary (last 24 hours):        Intake/Output Summary (Last 24 hours) at 10/30/18 1036  Last data filed at 10/30/18 0901   Gross per 24 hour   Intake              460 ml   Output             1100 ml   Net             -640 ml       Physical Exam:     Physical Exam   Constitutional: She is oriented to person, place, and time  She appears well-developed and well-nourished  HENT:   Head: Normocephalic and atraumatic  Eyes: Right eye exhibits no discharge  Left eye exhibits no discharge  Neck: Normal range of motion  Neck supple  Cardiovascular: Normal rate and regular rhythm  Pulmonary/Chest: Effort normal  No respiratory distress  She has no wheezes  She has no rales  She exhibits no tenderness  Abdominal: Soft  Bowel sounds are normal  She exhibits no distension  There is no tenderness  Musculoskeletal: Normal range of motion  She exhibits edema (trace LE edema)  She exhibits no tenderness  Neurological: She is alert and oriented to person, place, and time  Skin: Skin is warm and dry  Abrasion (forehead) noted  Psychiatric: She has a normal mood and affect  Her behavior is normal  Her speech is tangential            Additional Data:     Labs:      Results from last 7 days  Lab Units 10/29/18  0452   WBC Thousand/uL 6 50   HEMOGLOBIN g/dL 8 7*   HEMATOCRIT % 28 1*   PLATELETS Thousands/uL 271   NEUTROS PCT % 73   LYMPHS PCT % 15   MONOS PCT % 9   EOS PCT % 2       Results from last 7 days  Lab Units 10/30/18  0511  10/27/18  0506   SODIUM mmol/L 139  < > 138   POTASSIUM mmol/L 3 2*  < > 2 7*   CHLORIDE mmol/L 110*  < > 105   CO2 mmol/L 21  < > 26   BUN mg/dL 21  < > 26*   CREATININE mg/dL 1 82*  < > 3 11*   CALCIUM mg/dL 8 7  < > 12 1*   ALK PHOS U/L  --   --  62   ALT U/L  --   --  14   AST U/L  --   --  13   < > = values in this interval not displayed  * I Have Reviewed All Lab Data Listed Above  * Additional Pertinent Lab Tests Reviewed:  All Labs For Current Hospital Admission Reviewed    Imaging:    Imaging Reports Reviewed Today Include: us kidney and bladder  Imaging Personally Reviewed by Myself Includes:  none    Recent Cultures (last 7 days):       Results from last 7 days  Lab Units 10/26/18  2120 10/26/18  2004 10/26/18  1233 10/26/18  1229   BLOOD CULTURE   --   --  No Growth at 72 hrs  No Growth at 72 hrs   --    URINE CULTURE   --   --   --  10,000-19,000 cfu/ml Klebsiella pneumoniae*  <10,000 cfu/ml Proteus species*   INFLUENZA A PCR   --  None Detected  --   --    INFLUENZA B PCR   --  None Detected  --   --    RSV PCR   --  None Detected  --   --    LEGIONELLA URINARY ANTIGEN  Negative  --   --   --        Last 24 Hours Medication List:     Current Facility-Administered Medications:  acetaminophen 650 mg Oral Q6H PRN Gosia Her MD    enoxaparin 30 mg Subcutaneous Daily Gosia Her MD    hydrALAZINE 5 mg Intravenous Q6H PRN Gosia Her MD    hydrALAZINE 25 mg Oral Q8H [address] Gosia Her MD    phenazopyridine 100 mg Oral TID With Meals Gosia Her MD    sodium chloride 50 mL/hr Intravenous Continuous Ana Navarro MD Last Rate: 50 mL/hr (10/30/18 0534)        Today, Patient Was Seen By: Jazmin Shields RN    ** Please Note: This note has been constructed using a voice recognition system   **

## 2018-10-30 NOTE — ASSESSMENT & PLAN NOTE
Systolic pressure >640  No medications at home discussed with nephro reluctant to increase meds at this timme  Given hydralazine po   Avoid diuretics for now due to renal impairment

## 2018-10-30 NOTE — RESTORATIVE TECHNICIAN NOTE
Restorative Specialist Mobility Note       Activity: Ambulate in [place], Ambulate in room, Bathroom privileges, Chair, Stand at bedside (Educated/encouraged pt to ambulate with assistance 3-4 x's/day  Chair alarm on   Pt callbell, phone/tray within reach )     Assistive Device: Lelo MANDEL, Restorative Technician, [employer]

## 2018-10-30 NOTE — ASSESSMENT & PLAN NOTE
Baseline creatinine  1 2-1 3  Presents with creatinine 3 68 trending down 1 82 today  History of NSAID us but very limited and occasional per pt   Appreciated nephrology  Also noted to be hypokalemic and hypercalcemic now improving with iv fluid hydration   renal ultrasound and ct abdomen and pelvis wo contrast both negative

## 2018-10-30 NOTE — PROGRESS NOTES
Progress Note - Nephrology   Cyndy Knox 69 y o  female MRN: [id number]  Unit/Bed#: [facility] 701-01 Encounter: [id number]    ASSESSMENT AND PLAN:  50-year-old female who presented with confusion  Recently treated with for UTI on Bactrim  She was found to have significant hypercalcemia along with VINNY with max calcium of 15 and a creatinine of 3 68  We are following her for hypercalcemia/VINNY:    1  VINNY:  Baseline creatinine approximately 1 0 mg/dL suggestive CKD stage 3  VINNY felt secondary to Bactrim/hypercalcemia with vaso constriction/prerenal secondary to poor oral intake  The creatinine continues to improve down 1 82  Recommendations:  -Hep-Lock IV fluids  -recheck creatinine in a m  2  Hypercalcemia:  Unclear etiology  In retrospect, it may have been a combination of the VINNY with decrease calcium excretion plus vitamin-D plus dehydration  Calcium improving to 8 7  Workup:  -PTH 6 9 appropriately low  -TSH 2 8 normal  -vitamin-D 25:26 3 low  -SPEP normal/UPEP normal  -CT of the chest abdomen pelvis was negative for any significant pathology he  Recommendations: Workup:  -Ace level:  Pending  -PTH related protein:  Pending  -light chains:  Pending  -vitamin-D 1, 25 0H level :  pending  -vitamin A level:  Pending  TO COMPLETE THE WORKUP I WOULD ORDER A BONE SCAN WHOLE BODY  Treatment:  -Hep-Lock IV fluids  -avoid any calcium/vitamin-D products  3  Hypertension:  Still slightly elevated  Adjust medications but avoid significant hypotension given VINNY  Please see orders  4  Change in mental status has resolved  Felt secondary to hypercalcemia and VINNY and UT I   5  Recent UTI treated with Bactrim  Avoid Bactrim  Nitrofurantoin for prophylaxis  6  Replete  hypokalemia  recheck magnesium in a m  7  Replete hypophosphatemia        Subjective: The patient is overall doing well  No chest pain or shortness of Breath per no nausea vomiting diarrhea  Urinating well  Eating well      Objective: Vitals: Blood pressure 170/90, pulse 98, temperature 98 4 °F (36 9 °C), temperature source Oral, resp  rate 18, height 5' 1" (1 549 m), weight 58 1 kg (128 lb), SpO2 96 %  ,Body mass index is 24 19 kg/m²  Weight (last 2 days)     None            Intake/Output Summary (Last 24 hours) at 10/30/18 1044  Last data filed at 10/30/18 0901   Gross per 24 hour   Intake              460 ml   Output             1100 ml   Net             -640 ml            Physical Exam: General:  No acute distress sitting out of bed  Skin:  No acute rash  Eyes:  No scleral icterus  ENT:  Moist mucous membranes  Neck:  Supple, no jugular venous distention  Chest:  Clear to auscultation  CVS:  Regular rate and rhythm without a cardiac rub or gallops  Abdomen:  Soft and nontender with normal bowel sounds  Extremities:  No edema and no cyanosis  Neuro:  Grossly intact  Psych:  Alert and oriented                Medications:    Scheduled Meds:  Current Facility-Administered Medications:  acetaminophen 650 mg Oral Q6H PRN Felipe Gil MD    enoxaparin 30 mg Subcutaneous Daily Felipe Gil MD    hydrALAZINE 5 mg Intravenous Q6H PRN Felipe Gil MD    hydrALAZINE 25 mg Oral Q8H [address] Felipe Gil MD    phenazopyridine 100 mg Oral TID With Meals Felipe Gil MD    sodium chloride 50 mL/hr Intravenous Continuous Shaila Baldwin MD Last Rate: 50 mL/hr (10/30/18 0534)       PRN Meds:   acetaminophen    hydrALAZINE    Continuous Infusions:  sodium chloride 50 mL/hr Last Rate: 50 mL/hr (10/30/18 0534)       Lab, Imaging and other studies: I have personally reviewed pertinent labs    Laboratory Results:    Results from last 7 days  Lab Units 10/30/18  0511 10/29/18  0452 10/28/18  0512 10/27/18  1417 10/27/18  0506 10/26/18  1147 10/26/18  1146   WBC Thousand/uL  --  6 50 8 44  --  7 63 12 04*  --    HEMOGLOBIN g/dL  --  8 7* 10 1*  --  8 9* 11 4*  --    HEMATOCRIT %  --  28 1* 32 4*  --  27 9* 35 2  --    PLATELETS Thousands/uL  --  271 316 268 240 321  --    SODIUM mmol/L 139 142 141 138 138  --  134*   POTASSIUM mmol/L 3 2* 3 5 3 4* 3 0* 2 7*  --  3 3*   CHLORIDE mmol/L 110* 111* 109* 105 105  --  98*   CO2 mmol/L 21 24 24 26 26  --  28   BUN mg/dL 21 22 25 28* 26*  --  30*   CREATININE mg/dL 1 82* 2 18* 2 70* 3 00* 3 11*  --  3 68*   CALCIUM mg/dL 8 7 9 8 11 1* 11 8* 12 1*  --  15 2*   MAGNESIUM mg/dL  --   --   --   --  1 9  --   --    PHOSPHORUS mg/dL 1 8*  --   --   --  3 3  --   --      Urinalysis: Lab Results   Component Value Date    COLORU completed 10/26/2018    COLORU Yellow 10/26/2018    COLORU Reddish brown 04/26/2016    CLARITYU Clear 10/26/2018    CLARITYU Cloudy 04/26/2016    SPECGRAV 1 015 10/26/2018    SPECGRAV 1 020 04/26/2016    PHUR 7 0 10/26/2018    PHUR 6 04/26/2016    LEUKOCYTESUR Small (A) 10/26/2018    LEUKOCYTESUR large 04/26/2016    NITRITE Negative 10/26/2018    NITRITE positive 04/26/2016    PROTEINUA Negative 10/26/2018    PROTEINUA neg 04/26/2016    GLUCOSEU Negative 10/26/2018    GLUCOSEU Negative 11/20/2015    KETONESU Negative 10/26/2018    KETONESU neg 04/26/2016    BILIRUBINUR Negative 10/26/2018    BILIRUBINUR Negative 11/20/2015    BLOODU Trace (A) 10/26/2018    BLOODU large 04/26/2016     ABGs: No results found for: [facility]  Radiology review:     Portions of the record may have been created with voice recognition software   Occasional wrong word or "sound a like" substitutions may have occurred due to the inherent limitations of voice recognition software   Read the chart carefully and recognize, using context, where substitutions have occurred

## 2018-10-31 ENCOUNTER — APPOINTMENT (INPATIENT)
Dept: RADIOLOGY | Facility: HOSPITAL | Age: 78
DRG: 683 | End: 2018-10-31
Payer: COMMERCIAL

## 2018-10-31 ENCOUNTER — TELEPHONE (OUTPATIENT)
Dept: NEPHROLOGY | Facility: CLINIC | Age: 78
End: 2018-10-31

## 2018-10-31 VITALS
OXYGEN SATURATION: 98 % | BODY MASS INDEX: 24.17 KG/M2 | RESPIRATION RATE: 18 BRPM | WEIGHT: 128 LBS | HEIGHT: 61 IN | SYSTOLIC BLOOD PRESSURE: 145 MMHG | TEMPERATURE: 98 F | DIASTOLIC BLOOD PRESSURE: 85 MMHG | HEART RATE: 92 BPM

## 2018-10-31 DIAGNOSIS — N17.9 AKI (ACUTE KIDNEY INJURY) (HCC): Primary | ICD-10-CM

## 2018-10-31 DIAGNOSIS — E83.52 HYPERCALCEMIA: ICD-10-CM

## 2018-10-31 LAB
1,25(OH)2D3 SERPL-MCNC: 5.8 PG/ML (ref 19.9–79.3)
ACE SERPL-CCNC: 25 U/L (ref 14–82)
ANION GAP SERPL CALCULATED.3IONS-SCNC: 6 MMOL/L (ref 4–13)
BACTERIA BLD CULT: NORMAL
BACTERIA BLD CULT: NORMAL
BASOPHILS # BLD AUTO: 0.08 THOUSANDS/ΜL (ref 0–0.1)
BASOPHILS NFR BLD AUTO: 1 % (ref 0–1)
BUN SERPL-MCNC: 22 MG/DL (ref 5–25)
CALCIUM SERPL-MCNC: 8.3 MG/DL (ref 8.3–10.1)
CHLORIDE SERPL-SCNC: 111 MMOL/L (ref 100–108)
CO2 SERPL-SCNC: 22 MMOL/L (ref 21–32)
CREAT SERPL-MCNC: 1.74 MG/DL (ref 0.6–1.3)
EOSINOPHIL # BLD AUTO: 0.18 THOUSAND/ΜL (ref 0–0.61)
EOSINOPHIL NFR BLD AUTO: 3 % (ref 0–6)
ERYTHROCYTE [DISTWIDTH] IN BLOOD BY AUTOMATED COUNT: 13.2 % (ref 11.6–15.1)
GFR SERPL CREATININE-BSD FRML MDRD: 28 ML/MIN/1.73SQ M
GLUCOSE SERPL-MCNC: 86 MG/DL (ref 65–140)
HCT VFR BLD AUTO: 27.8 % (ref 34.8–46.1)
HGB BLD-MCNC: 8.8 G/DL (ref 11.5–15.4)
IMM GRANULOCYTES # BLD AUTO: 0.02 THOUSAND/UL (ref 0–0.2)
IMM GRANULOCYTES NFR BLD AUTO: 0 % (ref 0–2)
KAPPA LC FREE SER-MCNC: 38.9 MG/L (ref 3.3–19.4)
KAPPA LC FREE/LAMBDA FREE SER: 1.11 {RATIO} (ref 0.26–1.65)
LAMBDA LC FREE SERPL-MCNC: 35.1 MG/L (ref 5.7–26.3)
LYMPHOCYTES # BLD AUTO: 1.3 THOUSANDS/ΜL (ref 0.6–4.47)
LYMPHOCYTES NFR BLD AUTO: 20 % (ref 14–44)
MAGNESIUM SERPL-MCNC: 1.5 MG/DL (ref 1.6–2.6)
MCH RBC QN AUTO: 29.3 PG (ref 26.8–34.3)
MCHC RBC AUTO-ENTMCNC: 31.7 G/DL (ref 31.4–37.4)
MCV RBC AUTO: 93 FL (ref 82–98)
MONOCYTES # BLD AUTO: 0.57 THOUSAND/ΜL (ref 0.17–1.22)
MONOCYTES NFR BLD AUTO: 9 % (ref 4–12)
NEUTROPHILS # BLD AUTO: 4.28 THOUSANDS/ΜL (ref 1.85–7.62)
NEUTS SEG NFR BLD AUTO: 67 % (ref 43–75)
NRBC BLD AUTO-RTO: 0 /100 WBCS
PHOSPHATE SERPL-MCNC: 2.1 MG/DL (ref 2.3–4.1)
PLATELET # BLD AUTO: 270 THOUSANDS/UL (ref 149–390)
PMV BLD AUTO: 10.3 FL (ref 8.9–12.7)
POTASSIUM SERPL-SCNC: 3.6 MMOL/L (ref 3.5–5.3)
RBC # BLD AUTO: 3 MILLION/UL (ref 3.81–5.12)
SODIUM SERPL-SCNC: 139 MMOL/L (ref 136–145)
WBC # BLD AUTO: 6.43 THOUSAND/UL (ref 4.31–10.16)

## 2018-10-31 PROCEDURE — 83735 ASSAY OF MAGNESIUM: CPT | Performed by: INTERNAL MEDICINE

## 2018-10-31 PROCEDURE — 84100 ASSAY OF PHOSPHORUS: CPT | Performed by: INTERNAL MEDICINE

## 2018-10-31 PROCEDURE — 80048 BASIC METABOLIC PNL TOTAL CA: CPT | Performed by: INTERNAL MEDICINE

## 2018-10-31 PROCEDURE — 99232 SBSQ HOSP IP/OBS MODERATE 35: CPT | Performed by: INTERNAL MEDICINE

## 2018-10-31 PROCEDURE — 78306 BONE IMAGING WHOLE BODY: CPT

## 2018-10-31 PROCEDURE — 85025 COMPLETE CBC W/AUTO DIFF WBC: CPT | Performed by: NURSE PRACTITIONER

## 2018-10-31 PROCEDURE — A9503 TC99M MEDRONATE: HCPCS

## 2018-10-31 RX ORDER — PHENAZOPYRIDINE HYDROCHLORIDE 100 MG/1
100 TABLET, FILM COATED ORAL
Qty: 15 TABLET | Refills: 0 | Status: SHIPPED | OUTPATIENT
Start: 2018-10-31 | End: 2018-11-05

## 2018-10-31 RX ORDER — AMLODIPINE BESYLATE 5 MG/1
5 TABLET ORAL DAILY
Qty: 30 TABLET | Refills: 1 | Status: SHIPPED | OUTPATIENT
Start: 2018-11-01 | End: 2019-01-02 | Stop reason: SDUPTHER

## 2018-10-31 RX ORDER — POTASSIUM CHLORIDE 20 MEQ/1
40 TABLET, EXTENDED RELEASE ORAL ONCE
Status: COMPLETED | OUTPATIENT
Start: 2018-10-31 | End: 2018-10-31

## 2018-10-31 RX ORDER — MAGNESIUM SULFATE HEPTAHYDRATE 40 MG/ML
2 INJECTION, SOLUTION INTRAVENOUS ONCE
Status: COMPLETED | OUTPATIENT
Start: 2018-10-31 | End: 2018-10-31

## 2018-10-31 RX ADMIN — DIBASIC SODIUM PHOSPHATE, MONOBASIC POTASSIUM PHOSPHATE AND MONOBASIC SODIUM PHOSPHATE 1 TABLET: 852; 155; 130 TABLET ORAL at 11:24

## 2018-10-31 RX ADMIN — PHENAZOPYRIDINE HYDROCHLORIDE 100 MG: 100 TABLET ORAL at 11:33

## 2018-10-31 RX ADMIN — AMLODIPINE BESYLATE 5 MG: 5 TABLET ORAL at 08:16

## 2018-10-31 RX ADMIN — POTASSIUM CHLORIDE 40 MEQ: 1500 TABLET, EXTENDED RELEASE ORAL at 11:24

## 2018-10-31 RX ADMIN — MAGNESIUM SULFATE HEPTAHYDRATE 2 G: 40 INJECTION, SOLUTION INTRAVENOUS at 11:25

## 2018-10-31 RX ADMIN — ENOXAPARIN SODIUM 30 MG: 30 INJECTION SUBCUTANEOUS at 08:16

## 2018-10-31 RX ADMIN — DIBASIC SODIUM PHOSPHATE, MONOBASIC POTASSIUM PHOSPHATE AND MONOBASIC SODIUM PHOSPHATE 1 TABLET: 852; 155; 130 TABLET ORAL at 08:16

## 2018-10-31 RX ADMIN — PHENAZOPYRIDINE HYDROCHLORIDE 100 MG: 100 TABLET ORAL at 08:16

## 2018-10-31 NOTE — PROGRESS NOTES
RN approached pt re wearing  pumps  Pt refused  Pt educated about purpose of  pumps and associated risks  Pt continued to refuse  Will continue to monitor

## 2018-10-31 NOTE — PROGRESS NOTES
Progress Note - Nephrology   Mickey Garcia 69 y o  female MRN: [id number]  Unit/Bed#: [facility] 701-01 Encounter: [id number]    ASSESSMENT AND PLAN:  61-year-old female who presented with confusion  Recently treated with for UTI on Bactrim  She was found to have significant hypercalcemia along with VINNY with max calcium of 15 and a creatinine of 3 68  We are following her for hypercalcemia/VINNY:     1  VINNY:  Baseline creatinine approximately 1 0 mg/dL suggestive CKD stage 3  VINNY felt secondary to Bactrim/hypercalcemia with vaso constriction/prerenal secondary to poor oral intake  The creatinine continues to improve down 1 74  Recommendations:  -recheck labs on Monday 11/05/2018  -oral intake  2  Hypercalcemia:  Unclear etiology   In retrospect, it may have been a combination of the VINNY with decreased calcium excretion plus vitamin-D plus dehydration  Calcium improving to 8 3  Workup:  -PTH 6 9 appropriately low  -TSH 2 8 normal  -vitamin-D 25:26 3 low  -SPEP normal/UPEP normal  -CT of the chest abdomen pelvis was negative for any significant pathology he  Recommendations: Workup:  -Ace level:  PENDING  -PTH related protein:  PENDING  -light chains:  Pending  -vitamin-D 1, 25 0H level :  PENDING  -vitamin A level:   PENDING  -bone scan:  PENDING  -avoid any calcium/vitamin-D products  3  Hypertension:  OVERALL IMPROVED ON AMLODIPINE 5 MG DAILY  MAINTAIN THIS AS AN OUTPATIENT  4  Change in mental status has resolved   Felt secondary to hypercalcemia and VINNY and UT I   5  Recent UTI treated with Bactrim  Avoid Bactrim  Nitrofurantoin for prophylaxis  6  Replete  hypokalemia  REPLETE WITH 1 DOSE OF POTASSIUM  7  Replete hypophosphatemia, improved  I WOULD DISCHARGE ON PHOSPHORUS SUPPLEMENTATION FOR 2 MORE DAYS 3 TIMES A DAY  8  Hypomagnesemia:  Replete with magnesium sulfate  Recheck as outpatient  9  Anemia:  Stable with hemoglobin 8 8  This most likely is related to blood drawn VINNY  No overt bleeding  Monitor as an outpatient      10  Other issues:  -GERD  -osteoarthritis of hip    Will follow as an outpatient  I have contacted the office  Patient can be discharged today  Discussed with [facility]  Subjective:   Patient is asymptomatic  No chest pain or shortness of Breath per no nausea vomiting or diarrhea  Urinating well  Ambulating well  Objective:     Vitals: Blood pressure 145/85, pulse 92, temperature 98 °F (36 7 °C), temperature source Oral, resp  rate 18, height 5' 1" (1 549 m), weight 58 1 kg (128 lb), SpO2 98 %  ,Body mass index is 24 19 kg/m²  Weight (last 2 days)     None            Intake/Output Summary (Last 24 hours) at 10/31/18 0905  Last data filed at 10/31/18 0557   Gross per 24 hour   Intake          2495 83 ml   Output              200 ml   Net          2295 83 ml            Physical Exam: General:  No acute distress  Skin:  No acute rash  Eyes:  No scleral icterus noninjected  ENT:  Moist mucous membranes  Neck:  Supple, no jugular venous distention  Chest:  Clear to auscultation  CVS:  No rubs or gallops appreciable, regular rate rhythm  Abdomen:  Soft and nontender with normal bowel sounds  Extremities:  No edema no cyanosis  Neuro:  No gross focality  Psych:  Alert and oriented                Medications:    Scheduled Meds:  Current Facility-Administered Medications:  acetaminophen 650 mg Oral Q6H PRN Minal Meehan MD   amLODIPine 5 mg Oral Daily Shannan Barrow MD   enoxaparin 30 mg Subcutaneous Daily Minal Meehan MD   hydrALAZINE 5 mg Intravenous Q6H PRN Minal Meehan MD   magnesium sulfate 2 g Intravenous Once Shannan Barrow MD   phenazopyridine 100 mg Oral TID With Meals Minal Meehan MD   potassium-sodium phosphateS 1 tablet Oral TID With Meals Shannan Barrow MD       PRN Meds:   acetaminophen    hydrALAZINE    Continuous Infusions:     Lab, Imaging and other studies: I have personally reviewed pertinent labs    Laboratory Results:    Results from last 7 days  Lab Units 10/31/18  0506 10/30/18  0511 10/29/18  0452 10/28/18  0512 10/27/18  1417 10/27/18  0506 10/26/18  1147 10/26/18  1146   WBC Thousand/uL 6 43  --  6 50 8 44  --  7 63 12 04*  --    HEMOGLOBIN g/dL 8 8*  --  8 7* 10 1*  --  8 9* 11 4*  --    HEMATOCRIT % 27 8*  --  28 1* 32 4*  --  27 9* 35 2  --    PLATELETS Thousands/uL 270  --  271 316 268 240 321  --    SODIUM mmol/L 139 139 142 141 138 138  --  134*   POTASSIUM mmol/L 3 6 3 2* 3 5 3 4* 3 0* 2 7*  --  3 3*   CHLORIDE mmol/L 111* 110* 111* 109* 105 105  --  98*   CO2 mmol/L 22 21 24 24 26 26  --  28   BUN mg/dL 22 21 22 25 28* 26*  --  30*   CREATININE mg/dL 1 74* 1 82* 2 18* 2 70* 3 00* 3 11*  --  3 68*   CALCIUM mg/dL 8 3 8 7 9 8 11 1* 11 8* 12 1*  --  15 2*   MAGNESIUM mg/dL 1 5*  --   --   --   --  1 9  --   --    PHOSPHORUS mg/dL 2 1* 1 8*  --   --   --  3 3  --   --      Urinalysis: Lab Results   Component Value Date    COLORU completed 10/26/2018    COLORU Yellow 10/26/2018    COLORU Reddish brown 04/26/2016    CLARITYU Clear 10/26/2018    CLARITYU Cloudy 04/26/2016    SPECGRAV 1 015 10/26/2018    SPECGRAV 1 020 04/26/2016    PHUR 7 0 10/26/2018    PHUR 6 04/26/2016    LEUKOCYTESUR Small (A) 10/26/2018    LEUKOCYTESUR large 04/26/2016    NITRITE Negative 10/26/2018    NITRITE positive 04/26/2016    PROTEINUA Negative 10/26/2018    PROTEINUA neg 04/26/2016    GLUCOSEU Negative 10/26/2018    GLUCOSEU Negative 11/20/2015    KETONESU Negative 10/26/2018    KETONESU neg 04/26/2016    BILIRUBINUR Negative 10/26/2018    BILIRUBINUR Negative 11/20/2015    BLOODU Trace (A) 10/26/2018    BLOODU large 04/26/2016     ABGs: No results found for: [address]  Radiology review:     Portions of the record may have been created with voice recognition software   Occasional wrong word or "sound a like" substitutions may have occurred due to the inherent limitations of voice recognition software   Read the chart carefully and recognize, using context, where substitutions have occurred

## 2018-10-31 NOTE — TELEPHONE ENCOUNTER
Per Dr Callie Irizarry:    Debbie Poole,  This patient is being discharged from [facility] today  Her name is Laurie  :  1940:    She was admitted with VINNY in hypercalcemia  My note below will delineate the course and labs that are pending  SHE NEEDS A BASIC METABOLIC PROFILE / PHOSPHORUS / MAGNESIUM / CBC/ IRON STUDIES ON 2018  I WOULD LIKE TO SEE HER IN ABOUT 2 WEEKS OR SO IF POSSIBLE,  [facility] BUT SHE IS WILLING TO GO TO [place] FOR THE 1ST VISIT  I WOULD NEED PROBABLY 45 MINUTES IF POSSIBLE OTHERWISE 30 MINUTES WOULD BE ACCEPTABLE  PLEASE OBTAIN THE PENDING STUDIES

## 2018-10-31 NOTE — DISCHARGE INSTRUCTIONS
Checks labs as recommended by Dr Bree Fam on Monday November 5th    Follow up with family doctor and/or Dr Bree Fam for results of bone scan, PTH related protein    Avoid any calcium/vitamin D products     Avoid Bactrim
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
## 2018-10-31 NOTE — RESTORATIVE TECHNICIAN NOTE
Restorative Specialist Mobility Note       Activity: Ambulate in [place], Ambulate in room, Bathroom privileges, Chair, Stand at bedside (Educated/encouraged pt to ambulate with assistance 3-4 x's/day   Pt callbell, phone/tray within reach )     Assistive Device: Pito MANDEL, Restorative Technician, [employer] [facility]

## 2018-10-31 NOTE — ASSESSMENT & PLAN NOTE
Unclear etiology   Recently treated on 10/13 /18 for laryngitis on steroids completed after 7 day taper  Also treated prior to that for uti on bactrim which pt was still on when she was admitted  There was suspicion for pna due to chest xray will , however ct ro these findings  Wbc count improving with hydration pt reports no fever  procalcitonin negative > 0 06  abx stopped day two of admissiion

## 2018-10-31 NOTE — ASSESSMENT & PLAN NOTE
In setting of bhargavi, today 3 2 rrepleted today  repleted 40meq bid x1 per nephrology   Will chk labs later and replete as needed

## 2018-10-31 NOTE — PROGRESS NOTES
Pt resting comfortably in the chair; a/o x4; neuro checks wnl ; abrasions noted to forehead/nose; right arm nsl intact; no complaints at this time

## 2018-10-31 NOTE — TELEPHONE ENCOUNTER
Will call the patient when she is discharged to set up the hospital follow up  Labs will be ordered

## 2018-10-31 NOTE — PROGRESS NOTES
Progress Note - [place] 1940, 69 y o  female MRN: [id number]    [facility] Encounter: [id number]    Primary Care Provider: Sudie Felty, DO   Date and time admitted to hospital: 10/26/2018 10:20 AM        Hypercalcemia   Assessment & Plan    POA not noted to be chronic, unclear etiology although some suspicion for Vinny with decreased calcium excretion plus pts use of vit d and caltrate two tabs daily   Discussed with nephrology , much appreciated   Prior pth  Appropriately low in this situation   spep /upep now negative reviewed with dr Nasir Stafford  bmp, vit d   Improving overall from admission with iv fluid hydration   ct abdomen and pelvis r/o malignancy with no contrast dt vinny, now negative  Bone scan ordered by nephro to completely ro metastatic disease   Once returned pt may be discharged      * Encephalopathy   Assessment & Plan    Oriented x 3  Now with significant improvement in mental status , per  pt is doing better but has been getting a little more forgetful, will have pt fu with geriatrics sp discharge   Infiltrates found on chest x-ray,r/o infectious process, pt with no fever mild uri   Pharyngitis noted at pcp office visit on 10/13 with chronic nonproductive cough, treated with po prednisone   Disorientation for more than 3 days as per family, just rode into her garage prior to admission   Recently treated for Proteus mirabilis urinary tract infection with Bactrim outpatient for an apparent 10 day course   all abx treatment stopped on admission,  Labs in am         Anemia   Assessment & Plan    Seems to have progressed since admission sp 2 liters iv fluids and normal saline at 100ml/hr  Continue to clamp in the am  H &H 28 1/8 7 now no signs of obvious sign of bleeding   Pt reports taking some sort of a vitamin taking      VINNY (acute kidney injury) ([facility] [address] )   Assessment & Plan    Baseline creatinine  1 2-1 3  Presents with creatinine 3 68 trending down 1 82 today  History of NSAID us but very limited and occasional per pt   Appreciated nephrology  Also noted to be hypokalemic and hypercalcemic now improving with iv fluid hydration   renal ultrasound and ct abdomen and pelvis wo contrast both negative        Accelerated hypertension   Assessment & Plan    Systolic pressure >847  No medications at home discussed with nephro reluctant to increase meds at this timme  Given hydralazine po   Avoid diuretics for now due to renal impairment     Osteoarthritis of hip   Assessment & Plan    Stable  On calcium supplement will stop at this time due to hypercalcemia      Hypokalemia   Assessment & Plan    In setting of bhargavi, today 3 2 rrepleted today  repleted 40meq bid x1 per nephrology   Will chk labs later and replete as needed      Leukocytosis   Assessment & Plan    Unclear etiology   Recently treated on 10/13 /18 for laryngitis on steroids completed after 7 day taper  Also treated prior to that for uti on bactrim which pt was still on when she was admitted  There was suspicion for pna due to chest xray will , however ct ro these findings  Wbc count improving with hydration pt reports no fever  procalcitonin negative > 0 06  abx stopped day two of admissiion             VTE Pharmacologic Prophylaxis:   Pharmacologic: Enoxaparin (Lovenox)  Mechanical VTE Prophylaxis in Place: Yes    Patient Centered Rounds: I have performed bedside rounds with nursing staff today  Discussions with Specialists or Other Care Team Provider: nursing    Education and Discussions with Family / Patient: patient    Time Spent for Care: 30 minutes  More than 50% of total time spent on counseling and coordination of care as described above      Current Length of Stay: 5 day(s)    Current Patient Status: Inpatient   Certification Statement: The patient will continue to require additional inpatient hospital stay due to ongoing monitoring of calcium and bone scan    Discharge Plan: home at discharge    Code Status: Level 1 - Full Code      Subjective:   Pt is very talkative sometimes conversation is a little disjointed  Pt is very excited to see dr Cary Gilliland  She wants to see him at her next appointment  She is not having pain mobility is improving  No sob or chest pain /palpitations   bms normal and voiding a lot she states    Objective:     Vitals:   Temp (24hrs), Av 1 °F (36 7 °C), Min:97 8 °F (36 6 °C), Max:98 4 °F (36 9 °C)    Temp:  [97 8 °F (36 6 °C)-98 4 °F (36 9 °C)] 98 °F (36 7 °C)  HR:  [95-98] 96  Resp:  [18] 18  BP: (138-170)/(78-90) 142/84  SpO2:  [96 %-98 %] 98 %  Body mass index is 24 19 kg/m²  Input and Output Summary (last 24 hours): Intake/Output Summary (Last 24 hours) at 10/31/18 0259  Last data filed at 10/30/18 1700   Gross per 24 hour   Intake          2135 83 ml   Output              750 ml   Net          1385 83 ml       Physical Exam:     Physical Exam   Constitutional: She is oriented to person, place, and time  She appears well-developed  No distress  HENT:   Head: Normocephalic  Small facial (forehead and nasal) abrasion now a little open    Eyes: Conjunctivae are normal  Right eye exhibits no discharge  Left eye exhibits no discharge  No scleral icterus  Neck: No JVD present  No tracheal deviation present  No thyromegaly present  Cardiovascular: Normal rate  Exam reveals no gallop and no friction rub  No murmur heard  Pulmonary/Chest: Effort normal  No stridor  No respiratory distress  She has no wheezes  She has no rales  She exhibits no tenderness  Abdominal: Soft  Bowel sounds are normal  She exhibits no distension and no mass  There is no tenderness  There is no rebound and no guarding  Musculoskeletal: Normal range of motion  She exhibits no edema, tenderness or deformity  Lymphadenopathy:     She has no cervical adenopathy  Neurological: She is oriented to person, place, and time  Skin: Skin is warm and dry  No rash noted  She is not diaphoretic   No erythema  No pallor  Psychiatric: She has a normal mood and affect  Additional Data:     Labs:      Results from last 7 days  Lab Units 10/29/18  0452   WBC Thousand/uL 6 50   HEMOGLOBIN g/dL 8 7*   HEMATOCRIT % 28 1*   PLATELETS Thousands/uL 271   NEUTROS PCT % 73   LYMPHS PCT % 15   MONOS PCT % 9   EOS PCT % 2       Results from last 7 days  Lab Units 10/30/18  0511  10/27/18  0506   SODIUM mmol/L 139  < > 138   POTASSIUM mmol/L 3 2*  < > 2 7*   CHLORIDE mmol/L 110*  < > 105   CO2 mmol/L 21  < > 26   BUN mg/dL 21  < > 26*   CREATININE mg/dL 1 82*  < > 3 11*   ANION GAP mmol/L 8  < > 7   CALCIUM mg/dL 8 7  < > 12 1*   ALBUMIN g/dL  --   --  2 4*   TOTAL BILIRUBIN mg/dL  --   --  0 28   ALK PHOS U/L  --   --  62   ALT U/L  --   --  14   AST U/L  --   --  13   < > = values in this interval not displayed  Results from last 7 days  Lab Units 10/26/18  1109   POC GLUCOSE mg/dl 113           Results from last 7 days  Lab Units 10/26/18  2004 10/26/18  1244   LACTIC ACID mmol/L  --  1 4   PROCALCITONIN ng/ml 0 09  --            * I Have Reviewed All Lab Data Listed Above  * Additional Pertinent Lab Tests Reviewed: All Labs Within Last 24 Hours Reviewed    Imaging:    Imaging Reports Reviewed Today Include: reviewed     Recent Cultures (last 7 days):       Results from last 7 days  Lab Units 10/26/18  2120 10/26/18  2004 10/26/18  1233 10/26/18  1229   BLOOD CULTURE   --   --  No Growth After 4 Days  No Growth After 4 Days    --    URINE CULTURE   --   --   --  10,000-19,000 cfu/ml Klebsiella pneumoniae*  <10,000 cfu/ml Proteus species*   INFLUENZA A PCR   --  None Detected  --   --    INFLUENZA B PCR   --  None Detected  --   --    RSV PCR   --  None Detected  --   --    LEGIONELLA URINARY ANTIGEN  Negative  --   --   --        Last 24 Hours Medication List:     Current Facility-Administered Medications:  acetaminophen 650 mg Oral Q6H PRN Carlita Stevens MD   amLODIPine 5 mg Oral Daily Khai Zaragoza MD   enoxaparin 30 mg Subcutaneous Daily Jack Killian MD   hydrALAZINE 5 mg Intravenous Q6H PRN Jack Killian MD   phenazopyridine 100 mg Oral TID With Meals Jack Killian MD   potassium-sodium phosphateS 1 tablet Oral TID With Meals Ricky Leal MD        Today, Patient Was Seen By: CYDNEY Garvin    ** Please Note: Dictation voice to text software may have been used in the creation of this document   **

## 2018-10-31 NOTE — ASSESSMENT & PLAN NOTE
· SBP improved, started on Norvasc, continue at discharge  · Follow up with nephrology and PCP after discharge

## 2018-11-02 ENCOUNTER — TELEPHONE (OUTPATIENT)
Dept: OTHER | Facility: HOSPITAL | Age: 78
End: 2018-11-02

## 2018-11-02 DIAGNOSIS — M16.9 OSTEOARTHRITIS OF HIP, UNSPECIFIED LATERALITY, UNSPECIFIED OSTEOARTHRITIS TYPE: Primary | ICD-10-CM

## 2018-11-02 LAB — VIT A SERPL-MCNC: 56.7 UG/DL (ref 36.4–108)

## 2018-11-02 NOTE — TELEPHONE ENCOUNTER
The patient called regarding results of her bone scan:  -arthritic changes  -right hip changes? Loosening of hardware no corresponding CT findings  -mild uptake in the right sacroiliac region could not rule out underlying fracture  -band across right distal radius thought from post trauma  No evidence of cancer  Recommendations:  -please order an x-ray plain of the right hip compare with bone scan  -please order x-ray plain of the right sacroiliac region to rule out fracture  -please order x-ray plain of the right forearm right radial area to rule out trauma please refer to bone scan  Patient agreed with this workup  Answered all questions

## 2018-11-03 NOTE — ASSESSMENT & PLAN NOTE
· Baseline creatinine  1 o per renal  · Presented with creatinine 3 68 trended down to 1 74  · History of NSAID us but very limited and occasional per pt   · Nephrology follow up after discharge  · Also noted to be hypokalemic and hypercalcemic but improved with supplementation  · renal ultrasound and ct abdomen and pelvis wo contrast both negative

## 2018-11-03 NOTE — ASSESSMENT & PLAN NOTE
· Present on admission, suspected secondary to hypercalcemia and VINNY  · Mental status appears at baseline, per  has been getting a little more forgetful, suggest OP follow up with geriatrics after discharge

## 2018-11-03 NOTE — ASSESSMENT & PLAN NOTE
· POA unclear etiology although some suspicion for Deyanira with decreased calcium excretion plus pts use of vit d and caltrate two tabs daily   · Nephrology following   · PTH 6 9  · TSH normal   · SPEP/UPEP normal  · Improving overall from admission with iv fluid hydration   · ct abdomen and pelvis r/o malignancy with no contrast dt deyanira, now negative  · Bone scan ordered by nephro to eval for metastatic disease   OP follow with renal  · Hold all calcium containing products

## 2018-11-05 ENCOUNTER — HOSPITAL ENCOUNTER (OUTPATIENT)
Dept: RADIOLOGY | Facility: HOSPITAL | Age: 78
Discharge: HOME | End: 2018-11-05
Attending: INTERNAL MEDICINE
Payer: COMMERCIAL

## 2018-11-05 ENCOUNTER — TRANSCRIBE ORDERS (OUTPATIENT)
Dept: RADIOLOGY | Facility: HOSPITAL | Age: 78
End: 2018-11-05

## 2018-11-05 ENCOUNTER — APPOINTMENT (OUTPATIENT)
Dept: LAB | Facility: HOSPITAL | Age: 78
End: 2018-11-05
Attending: INTERNAL MEDICINE
Payer: COMMERCIAL

## 2018-11-05 DIAGNOSIS — E83.52 HYPERCALCEMIA: ICD-10-CM

## 2018-11-05 DIAGNOSIS — M16.9 OSTEOARTHRITIS OF HIP, UNSPECIFIED LATERALITY, UNSPECIFIED OSTEOARTHRITIS TYPE: ICD-10-CM

## 2018-11-05 DIAGNOSIS — N17.9 AKI (ACUTE KIDNEY INJURY) (HCC): ICD-10-CM

## 2018-11-05 LAB
ANION GAP SERPL CALCULATED.3IONS-SCNC: 7 MMOL/L (ref 4–13)
BASOPHILS # BLD MANUAL: 0.11 THOUSAND/UL (ref 0–0.1)
BASOPHILS NFR MAR MANUAL: 2 % (ref 0–1)
BUN SERPL-MCNC: 13 MG/DL (ref 5–25)
BURR CELLS BLD QL SMEAR: PRESENT
CALCIUM SERPL-MCNC: 8.6 MG/DL (ref 8.3–10.1)
CHLORIDE SERPL-SCNC: 108 MMOL/L (ref 100–108)
CO2 SERPL-SCNC: 24 MMOL/L (ref 21–32)
CREAT SERPL-MCNC: 1.45 MG/DL (ref 0.6–1.3)
EOSINOPHIL # BLD MANUAL: 0.11 THOUSAND/UL (ref 0–0.4)
EOSINOPHIL NFR BLD MANUAL: 2 % (ref 0–6)
ERYTHROCYTE [DISTWIDTH] IN BLOOD BY AUTOMATED COUNT: 13.2 % (ref 11.6–15.1)
FERRITIN SERPL-MCNC: 128 NG/ML (ref 8–388)
GFR SERPL CREATININE-BSD FRML MDRD: 35 ML/MIN/1.73SQ M
GIANT PLATELETS BLD QL SMEAR: PRESENT
GLUCOSE SERPL-MCNC: 109 MG/DL (ref 65–140)
HCT VFR BLD AUTO: 31 % (ref 34.8–46.1)
HGB BLD-MCNC: 9.7 G/DL (ref 11.5–15.4)
IRON SATN MFR SERPL: 28 %
IRON SERPL-MCNC: 75 UG/DL (ref 50–170)
LYMPHOCYTES # BLD AUTO: 0.91 THOUSAND/UL (ref 0.6–4.47)
LYMPHOCYTES # BLD AUTO: 17 % (ref 14–44)
MAGNESIUM SERPL-MCNC: 1.8 MG/DL (ref 1.6–2.6)
MCH RBC QN AUTO: 29.4 PG (ref 26.8–34.3)
MCHC RBC AUTO-ENTMCNC: 31.3 G/DL (ref 31.4–37.4)
MCV RBC AUTO: 94 FL (ref 82–98)
MONOCYTES # BLD AUTO: 0.16 THOUSAND/UL (ref 0–1.22)
MONOCYTES NFR BLD: 3 % (ref 4–12)
NEUTROPHILS # BLD MANUAL: 3.78 THOUSAND/UL (ref 1.85–7.62)
NEUTS SEG NFR BLD AUTO: 71 % (ref 43–75)
NRBC BLD AUTO-RTO: 0 /100 WBCS
OVALOCYTES BLD QL SMEAR: PRESENT
PHOSPHATE SERPL-MCNC: 2.7 MG/DL (ref 2.3–4.1)
PLATELET # BLD AUTO: 346 THOUSANDS/UL (ref 149–390)
PLATELET BLD QL SMEAR: ADEQUATE
PMV BLD AUTO: 10.4 FL (ref 8.9–12.7)
POIKILOCYTOSIS BLD QL SMEAR: PRESENT
POTASSIUM SERPL-SCNC: 3.5 MMOL/L (ref 3.5–5.3)
PTH RELATED PROT SERPL-SCNC: <2 PMOL/L
RBC # BLD AUTO: 3.3 MILLION/UL (ref 3.81–5.12)
RBC MORPH BLD: PRESENT
SODIUM SERPL-SCNC: 139 MMOL/L (ref 136–145)
TIBC SERPL-MCNC: 272 UG/DL (ref 250–450)
VARIANT LYMPHS # BLD AUTO: 5 %
WBC # BLD AUTO: 5.33 THOUSAND/UL (ref 4.31–10.16)

## 2018-11-05 PROCEDURE — 36415 COLL VENOUS BLD VENIPUNCTURE: CPT

## 2018-11-05 PROCEDURE — 85027 COMPLETE CBC AUTOMATED: CPT

## 2018-11-05 PROCEDURE — 80048 BASIC METABOLIC PNL TOTAL CA: CPT

## 2018-11-05 PROCEDURE — 72202 X-RAY EXAM SI JOINTS 3/> VWS: CPT

## 2018-11-05 PROCEDURE — 73502 X-RAY EXAM HIP UNI 2-3 VIEWS: CPT

## 2018-11-05 PROCEDURE — 82728 ASSAY OF FERRITIN: CPT

## 2018-11-05 PROCEDURE — 73090 X-RAY EXAM OF FOREARM: CPT

## 2018-11-05 PROCEDURE — 85007 BL SMEAR W/DIFF WBC COUNT: CPT

## 2018-11-05 PROCEDURE — 84100 ASSAY OF PHOSPHORUS: CPT

## 2018-11-05 PROCEDURE — 83550 IRON BINDING TEST: CPT

## 2018-11-05 PROCEDURE — 83735 ASSAY OF MAGNESIUM: CPT

## 2018-11-05 PROCEDURE — 83540 ASSAY OF IRON: CPT

## 2018-11-06 ENCOUNTER — TELEPHONE (OUTPATIENT)
Dept: NEPHROLOGY | Facility: CLINIC | Age: 78
End: 2018-11-06

## 2018-11-06 NOTE — TELEPHONE ENCOUNTER
Sonal Linder called the office and stated that there was significant findings in the patients hip X-ray  Results final in chart

## 2018-11-08 ENCOUNTER — TELEPHONE (OUTPATIENT)
Dept: NEPHROLOGY | Facility: CLINIC | Age: 78
End: 2018-11-08

## 2018-11-08 NOTE — TELEPHONE ENCOUNTER
I attempted to reach the patient and there was a busy signal  I will try to call her again at a later time

## 2018-11-08 NOTE — TELEPHONE ENCOUNTER
Patient would like a call back regarding Xray orders she received in the mail, she is stating that she already had them done in November   ? Please give patient a call back

## 2018-11-13 ENCOUNTER — TELEPHONE (OUTPATIENT)
Dept: NEPHROLOGY | Facility: CLINIC | Age: 78
End: 2018-11-13

## 2018-11-13 NOTE — TELEPHONE ENCOUNTER
I just got the hip results back  She needs to call and make an appointment with her orthopedic surgeon that did the hip surgery in the next couple of weeks given the questionable loose screw

## 2018-11-13 NOTE — TELEPHONE ENCOUNTER
From Dr Viki Pagan:  just got the hip results back  She needs to call and make an appointment with her orthopedic surgeon that did the hip surgery in the next couple of weeks given the questionable loose screw      I called to notify patient of above message but there was no answer    I left a message for patient to return call to office on home and cell number    Thank you

## 2018-11-14 NOTE — TELEPHONE ENCOUNTER
I spoke with [place], she stated that she does have an appt with her ortho surgeon in December  I told her that Dr Callie Irizarry would like for her to be seen sooner due to a questionable loose screw  She will give them a call and try to get a sooner appt

## 2018-11-14 NOTE — TELEPHONE ENCOUNTER
[place] called back to let us know that she was able to move her ortho appointment up to November 29th  She also has an appt tomorrow with Dr Kendell Blank so they can discuss further

## 2018-11-15 ENCOUNTER — OFFICE VISIT (OUTPATIENT)
Dept: NEPHROLOGY | Facility: CLINIC | Age: 78
End: 2018-11-15
Payer: COMMERCIAL

## 2018-11-15 VITALS
HEIGHT: 61 IN | HEART RATE: 88 BPM | WEIGHT: 132.6 LBS | DIASTOLIC BLOOD PRESSURE: 62 MMHG | BODY MASS INDEX: 25.04 KG/M2 | SYSTOLIC BLOOD PRESSURE: 136 MMHG

## 2018-11-15 DIAGNOSIS — I12.9 HYPERTENSIVE CHRONIC KIDNEY DISEASE WITH STAGE 1 THROUGH STAGE 4 CHRONIC KIDNEY DISEASE, OR UNSPECIFIED CHRONIC KIDNEY DISEASE: ICD-10-CM

## 2018-11-15 DIAGNOSIS — N17.9 AKI (ACUTE KIDNEY INJURY) (HCC): Primary | ICD-10-CM

## 2018-11-15 DIAGNOSIS — E83.39 HYPOPHOSPHATEMIA: ICD-10-CM

## 2018-11-15 DIAGNOSIS — E87.6 HYPOKALEMIA: ICD-10-CM

## 2018-11-15 DIAGNOSIS — E83.42 HYPOMAGNESEMIA: ICD-10-CM

## 2018-11-15 DIAGNOSIS — E83.52 HYPERCALCEMIA: ICD-10-CM

## 2018-11-15 PROCEDURE — 99215 OFFICE O/P EST HI 40 MIN: CPT | Performed by: INTERNAL MEDICINE

## 2018-11-15 NOTE — LETTER
November 15, 2018     Tomas Pizano DO  [address] [facility]  [address]    Patient: Olimpia Kulkarni   YOB: 1940   Date of Visit: 11/15/2018       Dear Dr America Paulson: Thank you for referring Olimpia Kulkarni to me for evaluation  Below are my notes for this consultation  If you have questions, please do not hesitate to call me  I look forward to following your patient along with you  Sincerely,        Dionisio Gonzalez MD        CC: DO Andrade Che MD Jude Leather, MD  11/15/2018  3:34 PM  Sign at close encounter  RENAL FOLLOW UP NOTE: td    ASSESSMENT AND PLAN:  50-year-old female who presented with confusion   Recently treated with for UTI on Bactrim   She was found to have significant hypercalcemia along with VINNY with max calcium of 15 and a creatinine of 3 68   We are following her for hypercalcemia/VINNY:     1  VINNY:  Baseline creatinine approximately 1 0 mg/dL suggestive CKD stage 3  VINNY felt secondary to Bactrim/hypercalcemia with vaso constriction/prerenal secondary to poor oral intake  The creatinine  improved down to 1 74  Current creatinine:  1 45 which continues to trend in the proper direction  Recommendations:  -recheck labs in 1-2 weeks  -avoid Bactrim  2  Hypercalcemia:  Unclear etiology   In retrospect, it may have been a combination of the VINNY with decreased calcium excretion plus vitamin-D plus dehydration    Calcium improving to 8 3 upon discharge and is now 8 6  Workup:  -PTH 6 9 appropriately low  -TSH 2 8 normal  -vitamin-D 25:26 3 low  -SPEP normal/UPEP normal  -CT of the chest abdomen pelvis was negative for any significant pathology   -Ace level:   Normal  -PTH related protein:   Normal  -light chains:   Normal  -vitamin-D 1, 25 0H level :   Appropriately low  -vitamin A level:   Normal  -bone scan:   Demonstrated:     1   Scattered foci of increased radiotracer uptake as noted above likely related to underlying arthritic changes  2   Mildly increased periprosthetic radiotracer uptake at the right hip in the acetabular region, loosening of hardware is not excluded  No corresponding CT findings here  3  Asymmetric linear radiotracer uptake in the right sacroiliac region  No corresponding findings here on prior CT, underlying insufficiency fracture is not excluded  This could be further evaluated with MRI  4   Band of increased radiotracer uptake at the right distal radius  This could be posttraumatic  Correlate for prior injury here  Consider further evaluating with x-rays as warranted  Follow-up x-rays:  -forearm films showed chronic medial and lateral epicondylitis and arthritis  -sacroiliac joints are unremarkable  -hip films:  Possible loosening of superior acetabular screw and right hip arthroplasty    Recommendations:  -avoid any calcium/vitamin-D products  PERMANENTLY AVOID THIAZIDE DIURETICS  -follow up with Orthopedic surgery regarding the right hip  3  Hypertension:   Currently on amlodipine  Blood pressure elevated in the office  Recommendations:  -secondary workup:  · Aldosterone/renin ratio  · Pheochromocytoma:  Plasma free metanephrines  · Renal artery duplex  · TSH was normal  -Treatment:  · Continue amlodipine  · Avoid Ace inhibitors/angiotensin receptor 2 blocker/diuretics for the moment given VINNY diuretics  This is because the hypercalcemia  · IF BLOOD PRESSURE REMAINS ELEVATED CONSIDERATION FOR INCREASING AMLODIPINE, IF RENAL FUNCTION NORMALIZES CONSIDERATION FOR ADDING TORSEMIDE A VERY SMALL DOSE AND/OR POSSIBLY AN ACE-INHIBITOR ANGIOTENSIN RECEPTOR 2 BLOCKER  POSSIBLY A BETA-BLOCKER AS WELL IF SHE REMAINS MILDLY BORDERLINE TACHYCARDIC  · Push nonmedical regimen especially exercise, and avoidance of salt and mild weight loss  4  Change in mental status has resolved   Felt secondary to hypercalcemia and VINNY and UT I  This has resolved  5  Recent UTI treated with Bactrim   Avoid Bactrim   Nitrofurantoin for prophylaxis  6  Hypokalemia:  Remains borderline at 3 5  Increase potassium in the diet and recheck  7  Hypophosphatemia:  Resolved with a phosphorus of 2 7  8  Hypomagnesemia:   Resolved with magnesium level 1 8  9  Anemia:    last hemoglobin on hospital is 8 8  This most likely is related to blood draws VINNY  No overt bleeding  Monitor as an outpatient     -hemoglobin improving at 9 7 recheck at this time   -iron studies excellent with 28% saturation and 120 a ferritin   10  Other issues:  -GERD  -osteoarthritis of hip:  Recommend follow-up with Orthopedic surgery    PATIENT INSTRUCTIONS:    Patient Instructions   1  No medication changes today  2   Please take 1 week a blood pressure readings on your left arm at home  You will do morning and evening in the seated position  Take the morning readings before taking any medications  Take the evening readings closer to bedtime  After the week of readings please send them into the office  3   In about 2 weeks please go for repeat lab work in the morning but nonfasting  4  Please go for the ultrasound of your kidney arteries in the next few weeks at your convenience  5   Follow-up in 3 months with me:  -please bring in 1 week a blood pressure readings morning and evening and sitting and standing  When you stay and keep your arm supported at heart level  -please go for fasting lab work prior to your appointment  6  General instructions:  -avoid salt  -avoid medications such as Motrin, Naprosyn, ibuprofen, Aleve or Advil or Celebrex as they can affect your kidney function; you can use Tylenol as needed for pain or fevers if you have no liver problems  -avoid medications such as Sudafed or other medications with decongestants as they can raise your blood pressure  -try to exercise at least 30 minutes at least 3 days a week with an ultimate goal of 5 days a week  -try to lose 5-10 lb by your next visit  -please avoid calcium products, tums, or vitamin-D products at this time  7  Follow-up with your orthopedic surgeon regarding her right hip as outlined in our discussion  8   PLEASE INCREASE THE POTASSIUM IN YOUR DIET WHICH INCLUDES A LOT OF FRUITS AND VEGETABLES  Subjective: The patient overall is feeling well  No fevers chills cough or colds  Good appetite and good energy  No urinary symptoms including foamy urine or blood in the urine  No gastrointestinal symptoms  No cardiovascular symptoms including swelling of the legs  No headaches, dizziness or lightheadedness  Blood pressure medications:  -amlodipine 5 milligrams daily    ROS:  See HPI, otherwise review of systems as completely reviewed with the patient are negative    Past Medical History:   Diagnosis Date    GERD (gastroesophageal reflux disease) 4/4/2016    Osteoarthritis of hip 4/4/2016    SI (sacroiliac) joint inflammation ([facility] [address] ) 4/4/2016    Substance addiction ([facility] )      Past Surgical History:   Procedure Laterality Date    JOINT REPLACEMENT      right hip 8/15     No family history kidney disease  No family history of hypertension but her father's history is unknown   reports that she has never smoked  She has never used smokeless tobacco  She reports that she uses drugs, including Prescription  She reports that she does not drink alcohol  I COMPLETELY REVIEWED THE PAST MEDICAL HISTORY/PAST SURGICAL HISTORY/SOCIAL HISTORY/FAMILY HISTORY/AND MEDICATIONS  AND UPDATED ALL    Objective:     Vitals:   Vitals:    11/15/18 1418   BP: 136/62   Pulse: 88    BP sitting on left:  162/80 with a heart rate of 80 and regular  BP standing on left:  156/78 with a heart rate of 100 regular    Weight (last 2 days)     Date/Time   Weight    11/15/18 1418  60 1 (132 6)            Body mass index is 25 05 kg/m²      Physical Exam: General:  No acute distress  Skin:  No acute rash  Eyes:  No scleral icterus, noninjected  ENT:  Moist mucous membranes  Neck:  Supple, no jugular venous distention  Back   No CVAT  Chest:  Clear to auscultation and percussion  CVS:  Regular rate and rhythm without a rub, murmurs or gallops  Abdomen:  Soft and nontender with normal bowel sounds  Extremities:  No cyanosis and no edema  Neuro:  Grossly intact  Psych:  Alert, oriented x3 and appropriate      Medications:    Current Outpatient Prescriptions:     acetaminophen (TYLENOL) 325 mg tablet, 975 mg every 8 hours, Disp: 30 tablet, Rfl: 0    amLODIPine (NORVASC) 5 mg tablet, Take 1 tablet (5 mg total) by mouth daily, Disp: 30 tablet, Rfl: 1    PREMARIN vaginal cream, , Disp: , Rfl:     potassium-sodium phosphateS (K-PHOS,PHOSPHA 250) 155-852-130 mg tablet, Take 1 tablet by mouth 3 (three) times a day with meals for 2 days, Disp: 6 tablet, Rfl: 0    Lab, Imaging and other studies: I have personally reviewed pertinent labs    Laboratory Results:  Results for orders placed or performed in visit on 29/74/71   Basic metabolic panel   Result Value Ref Range    Sodium 139 136 - 145 mmol/L    Potassium 3 5 3 5 - 5 3 mmol/L    Chloride 108 100 - 108 mmol/L    CO2 24 21 - 32 mmol/L    ANION GAP 7 4 - 13 mmol/L    BUN 13 5 - 25 mg/dL    Creatinine 1 45 (H) 0 60 - 1 30 mg/dL    Glucose 109 65 - 140 mg/dL    Calcium 8 6 8 3 - 10 1 mg/dL    eGFR 35 ml/min/1 73sq m   Phosphorus   Result Value Ref Range    Phosphorus 2 7 2 3 - 4 1 mg/dL   Magnesium   Result Value Ref Range    Magnesium 1 8 1 6 - 2 6 mg/dL   CBC and differential   Result Value Ref Range    WBC 5 33 4 31 - 10 16 Thousand/uL    RBC 3 30 (L) 3 81 - 5 12 Million/uL    Hemoglobin 9 7 (L) 11 5 - 15 4 g/dL    Hematocrit 31 0 (L) 34 8 - 46 1 %    MCV 94 82 - 98 fL    MCH 29 4 26 8 - 34 3 pg    MCHC 31 3 (L) 31 4 - 37 4 g/dL    RDW 13 2 11 6 - 15 1 %    MPV 10 4 8 9 - 12 7 fL    Platelets 563 371 - 322 Thousands/uL    nRBC 0 /100 WBCs   Iron Saturation %   Result Value Ref Range    Iron Saturation 28 %    TIBC 272 250 - 450 ug/dL    Iron 75 50 - 170 ug/dL Ferritin   Result Value Ref Range    Ferritin 128 8 - 388 ng/mL   Manual Differential(PHLEBS Do Not Order)   Result Value Ref Range    Segmented % 71 43 - 75 %    Lymphocytes % 17 14 - 44 %    Monocytes % 3 (L) 4 - 12 %    Eosinophils, % 2 0 - 6 %    Basophils % 2 (H) 0 - 1 %    Atypical Lymphocytes % 5 (H) <=0 %    Absolute Neutrophils 3 78 1 85 - 7 62 Thousand/uL    Lymphocytes Absolute 0 91 0 60 - 4 47 Thousand/uL    Monocytes Absolute 0 16 0 00 - 1 22 Thousand/uL    Eosinophils Absolute 0 11 0 00 - 0 40 Thousand/uL    Basophils Absolute 0 11 (H) 0 00 - 0 10 Thousand/uL    Total Counted      RBC Morphology Present     [place] Cells Present     Ovalocytes Present     Poikilocytes Present     Platelet Estimate Adequate Adequate    Giant PLTs Present              Invalid input(s): ALBUMIN      Radiology review:   chest X-ray    Ultrasound      Portions of the record may have been created with voice recognition software   Occasional wrong word or "sound a like" substitutions may have occurred due to the inherent limitations of voice recognition software   Read the chart carefully and recognize, using context, where substitutions have occurred

## 2018-11-15 NOTE — PATIENT INSTRUCTIONS
1   No medication changes today  2   Please take 1 week a blood pressure readings on your left arm at home  You will do morning and evening in the seated position  Take the morning readings before taking any medications  Take the evening readings closer to bedtime  After the week of readings please send them into the office  3   In about 2 weeks please go for repeat lab work in the morning but nonfasting  4  Please go for the ultrasound of your kidney arteries in the next few weeks at your convenience  5   Follow-up in 3 months with me:  -please bring in 1 week a blood pressure readings morning and evening and sitting and standing  When you stay and keep your arm supported at heart level  -please go for fasting lab work prior to your appointment  -[employer] BLOOD PRESSURE MACHINE AT THE NEXT VISIT TO CORRELATE WITH THE OFFICE MACHINE  6  General instructions:  -avoid salt  -avoid medications such as Motrin, Naprosyn, ibuprofen, Aleve or Advil or Celebrex as they can affect your kidney function; you can use Tylenol as needed for pain or fevers if you have no liver problems  -avoid medications such as Sudafed or other medications with decongestants as they can raise your blood pressure  -try to exercise at least 30 minutes at least 3 days a week with an ultimate goal of 5 days a week  -try to lose 5-10 lb by your next visit  -please avoid calcium products, tums, or vitamin-D products at this time  7  Follow-up with your orthopedic surgeon regarding her right hip as outlined in our discussion  8   PLEASE INCREASE THE POTASSIUM IN YOUR DIET WHICH INCLUDES A LOT OF FRUITS AND VEGETABLES

## 2018-11-15 NOTE — PROGRESS NOTES
RENAL FOLLOW UP NOTE: td    ASSESSMENT AND PLAN:  52-year-old female who presented with confusion   Recently treated with for UTI on Bactrim   She was found to have significant hypercalcemia along with VINNY with max calcium of 15 and a creatinine of 3 68   We are following her for hypercalcemia/VINNY:     1  VINNY:  Baseline creatinine approximately 1 0 mg/dL suggestive CKD stage 3  VINNY felt secondary to Bactrim/hypercalcemia with vaso constriction/prerenal secondary to poor oral intake  The creatinine  improved down to 1 74  Current creatinine:  1 45 which continues to trend in the proper direction  Recommendations:  -recheck labs in 1-2 weeks  -avoid Bactrim  2  Hypercalcemia:  Unclear etiology   In retrospect, it may have been a combination of the VINNY with decreased calcium excretion plus vitamin-D plus dehydration  Calcium improving to 8 3 upon discharge and is now 8 6  Workup:  -PTH 6 9 appropriately low  -TSH 2 8 normal  -vitamin-D 25:26 3 low  -SPEP normal/UPEP normal  -CT of the chest abdomen pelvis was negative for any significant pathology   -Ace level:   Normal  -PTH related protein:   Normal  -light chains:   Normal  -vitamin-D 1, 25 0H level :   Appropriately low  -vitamin A level:   Normal  -bone scan:   Demonstrated:     1   Scattered foci of increased radiotracer uptake as noted above likely related to underlying arthritic changes  2   Mildly increased periprosthetic radiotracer uptake at the right hip in the acetabular region, loosening of hardware is not excluded  No corresponding CT findings here  3  Asymmetric linear radiotracer uptake in the right sacroiliac region  No corresponding findings here on prior CT, underlying insufficiency fracture is not excluded  This could be further evaluated with MRI  4   Band of increased radiotracer uptake at the right distal radius  This could be posttraumatic  Correlate for prior injury here    Consider further evaluating with x-rays as warranted  Follow-up x-rays:  -forearm films showed chronic medial and lateral epicondylitis and arthritis  -sacroiliac joints are unremarkable  -hip films:  Possible loosening of superior acetabular screw and right hip arthroplasty    Recommendations:  -avoid any calcium/vitamin-D products  PERMANENTLY AVOID THIAZIDE DIURETICS  -follow up with Orthopedic surgery regarding the right hip  3  Hypertension:   Currently on amlodipine  Blood pressure elevated in the office  Recommendations:  -secondary workup:  · Aldosterone/renin ratio  · Pheochromocytoma:  Plasma free metanephrines  · Renal artery duplex  · TSH was normal  -Treatment:  · Continue amlodipine  · Avoid Ace inhibitors/angiotensin receptor 2 blocker/diuretics for the moment given VINNY diuretics  This is because the hypercalcemia  · IF BLOOD PRESSURE REMAINS ELEVATED CONSIDERATION FOR INCREASING AMLODIPINE, IF RENAL FUNCTION NORMALIZES CONSIDERATION FOR ADDING TORSEMIDE A VERY SMALL DOSE AND/OR POSSIBLY AN ACE-INHIBITOR ANGIOTENSIN RECEPTOR 2 BLOCKER  POSSIBLY A BETA-BLOCKER AS WELL IF SHE REMAINS MILDLY BORDERLINE TACHYCARDIC  · Push nonmedical regimen especially exercise, and avoidance of salt and mild weight loss  4  Change in mental status has resolved   Felt secondary to hypercalcemia and VINNY and UT I  This has resolved  5  Recent UTI treated with Bactrim   Avoid Bactrim   Nitrofurantoin for prophylaxis  6  Hypokalemia:  Remains borderline at 3 5  Increase potassium in the diet and recheck  7  Hypophosphatemia:  Resolved with a phosphorus of 2 7  8  Hypomagnesemia:   Resolved with magnesium level 1 8  9  Anemia:   last hemoglobin on hospital is 8 8  This most likely is related to blood draws VINNY  No overt bleeding  Monitor as an outpatient     -hemoglobin improving at 9 7 recheck at this time   -iron studies excellent with 28% saturation and 120 a ferritin   10   Other issues:  -GERD  -osteoarthritis of hip:  Recommend follow-up with Orthopedic surgery    PATIENT INSTRUCTIONS:    Patient Instructions   1  No medication changes today  2   Please take 1 week a blood pressure readings on your left arm at home  You will do morning and evening in the seated position  Take the morning readings before taking any medications  Take the evening readings closer to bedtime  After the week of readings please send them into the office  3   In about 2 weeks please go for repeat lab work in the morning but nonfasting  4  Please go for the ultrasound of your kidney arteries in the next few weeks at your convenience  5   Follow-up in 3 months with me:  -please bring in 1 week a blood pressure readings morning and evening and sitting and standing  When you stay and keep your arm supported at heart level  -please go for fasting lab work prior to your appointment  -[employer] BLOOD PRESSURE MACHINE AT THE NEXT VISIT TO CORRELATE WITH THE OFFICE MACHINE  6  General instructions:  -avoid salt  -avoid medications such as Motrin, Naprosyn, ibuprofen, Aleve or Advil or Celebrex as they can affect your kidney function; you can use Tylenol as needed for pain or fevers if you have no liver problems  -avoid medications such as Sudafed or other medications with decongestants as they can raise your blood pressure  -try to exercise at least 30 minutes at least 3 days a week with an ultimate goal of 5 days a week  -try to lose 5-10 lb by your next visit  -please avoid calcium products, tums, or vitamin-D products at this time  7  Follow-up with your orthopedic surgeon regarding her right hip as outlined in our discussion  8   PLEASE INCREASE THE POTASSIUM IN YOUR DIET WHICH INCLUDES A LOT OF FRUITS AND VEGETABLES  I Spent an additional 25 minutes plus the 25 minutes appointment time reviewing all the results in counseling the patient and her  regarding the cause and course of the acute kidney injury, high calcium and also blood pressure    Answered all questions  Subjective: The patient overall is feeling well  No fevers chills cough or colds  Good appetite and good energy  No urinary symptoms including foamy urine or blood in the urine  No gastrointestinal symptoms  No cardiovascular symptoms including swelling of the legs  No headaches, dizziness or lightheadedness  Blood pressure medications:  -amlodipine 5 milligrams daily    ROS:  See HPI, otherwise review of systems as completely reviewed with the patient are negative    Past Medical History:   Diagnosis Date    GERD (gastroesophageal reflux disease) 4/4/2016    Osteoarthritis of hip 4/4/2016    SI (sacroiliac) joint inflammation ([facility] [address] ) 4/4/2016    Substance addiction ([facility] )      Past Surgical History:   Procedure Laterality Date    JOINT REPLACEMENT      right hip 8/15     No family history kidney disease  No family history of hypertension but her father's history is unknown   reports that she has never smoked  She has never used smokeless tobacco  She reports that she uses drugs, including Prescription  She reports that she does not drink alcohol  I COMPLETELY REVIEWED THE PAST MEDICAL HISTORY/PAST SURGICAL HISTORY/SOCIAL HISTORY/FAMILY HISTORY/AND MEDICATIONS  AND UPDATED ALL    Objective:     Vitals:   Vitals:    11/15/18 1418   BP: 136/62   Pulse: 88    BP sitting on left:  162/80 with a heart rate of 80 and regular  BP standing on left:  156/78 with a heart rate of 100 regular    Weight (last 2 days)     Date/Time   Weight    11/15/18 1418  60 1 (132 6)            Body mass index is 25 05 kg/m²      Physical Exam: General:  No acute distress  Skin:  No acute rash  Eyes:  No scleral icterus, noninjected  ENT:  Moist mucous membranes  Neck:  Supple, no jugular venous distention  Back   No CVAT  Chest:  Clear to auscultation and percussion  CVS:  Regular rate and rhythm without a rub, murmurs or gallops  Abdomen:  Soft and nontender with normal bowel sounds  Extremities:  No cyanosis and no edema  Neuro:  Grossly intact  Psych:  Alert, oriented x3 and appropriate      Medications:    Current Outpatient Prescriptions:     acetaminophen (TYLENOL) 325 mg tablet, 975 mg every 8 hours, Disp: 30 tablet, Rfl: 0    amLODIPine (NORVASC) 5 mg tablet, Take 1 tablet (5 mg total) by mouth daily, Disp: 30 tablet, Rfl: 1    PREMARIN vaginal cream, , Disp: , Rfl:     potassium-sodium phosphateS (K-PHOS,PHOSPHA 250) 155-852-130 mg tablet, Take 1 tablet by mouth 3 (three) times a day with meals for 2 days, Disp: 6 tablet, Rfl: 0    Lab, Imaging and other studies: I have personally reviewed pertinent labs    Laboratory Results:  Results for orders placed or performed in visit on 91/65/93   Basic metabolic panel   Result Value Ref Range    Sodium 139 136 - 145 mmol/L    Potassium 3 5 3 5 - 5 3 mmol/L    Chloride 108 100 - 108 mmol/L    CO2 24 21 - 32 mmol/L    ANION GAP 7 4 - 13 mmol/L    BUN 13 5 - 25 mg/dL    Creatinine 1 45 (H) 0 60 - 1 30 mg/dL    Glucose 109 65 - 140 mg/dL    Calcium 8 6 8 3 - 10 1 mg/dL    eGFR 35 ml/min/1 73sq m   Phosphorus   Result Value Ref Range    Phosphorus 2 7 2 3 - 4 1 mg/dL   Magnesium   Result Value Ref Range    Magnesium 1 8 1 6 - 2 6 mg/dL   CBC and differential   Result Value Ref Range    WBC 5 33 4 31 - 10 16 Thousand/uL    RBC 3 30 (L) 3 81 - 5 12 Million/uL    Hemoglobin 9 7 (L) 11 5 - 15 4 g/dL    Hematocrit 31 0 (L) 34 8 - 46 1 %    MCV 94 82 - 98 fL    MCH 29 4 26 8 - 34 3 pg    MCHC 31 3 (L) 31 4 - 37 4 g/dL    RDW 13 2 11 6 - 15 1 %    MPV 10 4 8 9 - 12 7 fL    Platelets 873 072 - 057 Thousands/uL    nRBC 0 /100 WBCs   Iron Saturation %   Result Value Ref Range    Iron Saturation 28 %    TIBC 272 250 - 450 ug/dL    Iron 75 50 - 170 ug/dL   Ferritin   Result Value Ref Range    Ferritin 128 8 - 388 ng/mL   Manual Differential(PHLEBS Do Not Order)   Result Value Ref Range    Segmented % 71 43 - 75 %    Lymphocytes % 17 14 - 44 %    Monocytes % 3 (L) 4 - 12 %    Eosinophils, % 2 0 - 6 %    Basophils % 2 (H) 0 - 1 %    Atypical Lymphocytes % 5 (H) <=0 %    Absolute Neutrophils 3 78 1 85 - 7 62 Thousand/uL    Lymphocytes Absolute 0 91 0 60 - 4 47 Thousand/uL    Monocytes Absolute 0 16 0 00 - 1 22 Thousand/uL    Eosinophils Absolute 0 11 0 00 - 0 40 Thousand/uL    Basophils Absolute 0 11 (H) 0 00 - 0 10 Thousand/uL    Total Counted      RBC Morphology Present     [place] Cells Present     Ovalocytes Present     Poikilocytes Present     Platelet Estimate Adequate Adequate    Giant PLTs Present              Invalid input(s): ALBUMIN      Radiology review:   chest X-ray    Ultrasound      Portions of the record may have been created with voice recognition software   Occasional wrong word or "sound a like" substitutions may have occurred due to the inherent limitations of voice recognition software   Read the chart carefully and recognize, using context, where substitutions have occurred

## 2018-11-16 ENCOUNTER — HOSPITAL ENCOUNTER (OUTPATIENT)
Dept: NON INVASIVE DIAGNOSTICS | Facility: CLINIC | Age: 78
Discharge: HOME | End: 2018-11-16
Payer: COMMERCIAL

## 2018-11-16 DIAGNOSIS — E83.39 HYPOPHOSPHATEMIA: ICD-10-CM

## 2018-11-16 DIAGNOSIS — E83.42 HYPOMAGNESEMIA: ICD-10-CM

## 2018-11-16 DIAGNOSIS — E87.6 HYPOKALEMIA: ICD-10-CM

## 2018-11-16 DIAGNOSIS — N17.9 AKI (ACUTE KIDNEY INJURY) (HCC): ICD-10-CM

## 2018-11-16 DIAGNOSIS — E83.52 HYPERCALCEMIA: ICD-10-CM

## 2018-11-16 DIAGNOSIS — I12.9 HYPERTENSIVE CHRONIC KIDNEY DISEASE WITH STAGE 1 THROUGH STAGE 4 CHRONIC KIDNEY DISEASE, OR UNSPECIFIED CHRONIC KIDNEY DISEASE: ICD-10-CM

## 2018-11-16 PROCEDURE — 93975 VASCULAR STUDY: CPT | Performed by: SURGERY

## 2018-11-16 PROCEDURE — 93975 VASCULAR STUDY: CPT

## 2018-11-29 ENCOUNTER — OFFICE VISIT (OUTPATIENT)
Dept: OBGYN CLINIC | Facility: HOSPITAL | Age: 78
End: 2018-11-29
Payer: COMMERCIAL

## 2018-11-29 VITALS
HEART RATE: 96 BPM | BODY MASS INDEX: 24.92 KG/M2 | HEIGHT: 61 IN | SYSTOLIC BLOOD PRESSURE: 166 MMHG | WEIGHT: 132 LBS | DIASTOLIC BLOOD PRESSURE: 74 MMHG

## 2018-11-29 DIAGNOSIS — M70.61 TROCHANTERIC BURSITIS OF RIGHT HIP: Primary | ICD-10-CM

## 2018-11-29 PROCEDURE — 20610 DRAIN/INJ JOINT/BURSA W/O US: CPT | Performed by: ORTHOPAEDIC SURGERY

## 2018-11-29 PROCEDURE — 99213 OFFICE O/P EST LOW 20 MIN: CPT | Performed by: ORTHOPAEDIC SURGERY

## 2018-11-29 RX ORDER — BUPIVACAINE HYDROCHLORIDE 2.5 MG/ML
2 INJECTION, SOLUTION INFILTRATION; PERINEURAL
Status: COMPLETED | OUTPATIENT
Start: 2018-11-29 | End: 2018-11-29

## 2018-11-29 RX ORDER — BETAMETHASONE SODIUM PHOSPHATE AND BETAMETHASONE ACETATE 3; 3 MG/ML; MG/ML
12 INJECTION, SUSPENSION INTRA-ARTICULAR; INTRALESIONAL; INTRAMUSCULAR; SOFT TISSUE
Status: COMPLETED | OUTPATIENT
Start: 2018-11-29 | End: 2018-11-29

## 2018-11-29 RX ORDER — LIDOCAINE HYDROCHLORIDE 10 MG/ML
2 INJECTION, SOLUTION INFILTRATION; PERINEURAL
Status: COMPLETED | OUTPATIENT
Start: 2018-11-29 | End: 2018-11-29

## 2018-11-29 RX ADMIN — BETAMETHASONE SODIUM PHOSPHATE AND BETAMETHASONE ACETATE 12 MG: 3; 3 INJECTION, SUSPENSION INTRA-ARTICULAR; INTRALESIONAL; INTRAMUSCULAR; SOFT TISSUE at 14:58

## 2018-11-29 RX ADMIN — LIDOCAINE HYDROCHLORIDE 2 ML: 10 INJECTION, SOLUTION INFILTRATION; PERINEURAL at 14:58

## 2018-11-29 RX ADMIN — BUPIVACAINE HYDROCHLORIDE 2 ML: 2.5 INJECTION, SOLUTION INFILTRATION; PERINEURAL at 14:58

## 2018-11-29 NOTE — PROGRESS NOTES
78 y o female presents 2 months since her last visit  In the intervening time frame, she has been in the mid to the hospital for acute kidney injury, an x-ray was obtained while in-house, there was cause for concern for her to have loosening of her acetabular implant  She denies groin pain, she admits to return of rather predictable lateral right hip pain  Review of Systems  Review of systems negative unless otherwise specified in HPI    Past Medical History  Past Medical History:   Diagnosis Date    GERD (gastroesophageal reflux disease) 4/4/2016    Osteoarthritis of hip 4/4/2016    SI (sacroiliac) joint inflammation (H[facility]) 4/4/2016    Substance addiction ([facility] [address] )        Past Surgical History  Past Surgical History:   Procedure Laterality Date    JOINT REPLACEMENT      right hip 8/15       Current Medications  Current Outpatient Prescriptions on File Prior to Visit   Medication Sig Dispense Refill    acetaminophen (TYLENOL) 325 mg tablet 975 mg every 8 hours 30 tablet 0    amLODIPine (NORVASC) 5 mg tablet Take 1 tablet (5 mg total) by mouth daily 30 tablet 1    potassium-sodium phosphateS (K-PHOS,PHOSPHA 250) 155-852-130 mg tablet Take 1 tablet by mouth 3 (three) times a day with meals for 2 days 6 tablet 0    PREMARIN vaginal cream        No current facility-administered medications on file prior to visit          Recent Labs [facility])    0  Lab Value Date/Time   HCT 31 0 (L) 11/05/2018 0933   HCT 37 9 11/20/2015 0737   HGB 9 7 (L) 11/05/2018 0933   HGB 12 3 11/20/2015 0737   WBC 5 33 11/05/2018 0933   WBC 6 17 11/20/2015 0737   INR 1 07 04/07/2016 0452   INR 1 03 08/18/2015 0912   ESR 22 (H) 10/02/2018 1138   GLUCOSE 100 11/20/2015 0737   HGBA1C 5 4 06/12/2018 0912   HGBA1C 5 8 (H) 06/16/2015 0941         Physical exam  · General: Awake, Alert, Oriented  · Eyes: Pupils equal, round and reactive to light  · Heart: regular rate and rhythm  · Lungs: No audible wheezing  · Abdomen: soft  Gait pattern has very little antalgia  She does have a hunched over gait pattern  Right hip is heel postal incision  She has marked tenderness the patient the trochanteric flare  There is no erythema or fluctuance  There is no groin pain with motion  Imaging  I have personally reviewed x-rays of the right hip and my interpretation is as follows: There is no interval change in the position of the right acetabular implant as reviewed on serial x-rays including most recent 1    Procedure  An injection of corticosteroid is provided for the right greater troch bursa  It is documented below    Large joint arthrocentesis  Date/Time: 11/29/2018 2:58 PM  Supporting Documentation  Indications: pain   Procedure Details  Location: hip - R greater trochanteric bursa  Needle size: 20 G  Approach: lateral  Medications administered: 2 mL bupivacaine 0 25 %; 2 mL lidocaine 1 %; 12 mg betamethasone acetate-betamethasone sodium phosphate 6 (3-3) mg/mL    Patient tolerance: patient tolerated the procedure well with no immediate complications  Dressing:  Sterile dressing applied          Assessment/Plan:   69 y  o female who has no evidence of a loose acetabular implant, today's exam appears consistent with trochanteric bursitis in the right hip  An injection of corticosteroid is indicated for pain relief purposes  It is advised, except, administers outlined above    I would welcome the opportunity see this patient back in the office in 3 months time for repeat physical exam

## 2018-12-04 ENCOUNTER — DOCUMENTATION (OUTPATIENT)
Dept: NEPHROLOGY | Facility: CLINIC | Age: 78
End: 2018-12-04

## 2018-12-04 ENCOUNTER — DOCUMENTATION (OUTPATIENT)
Dept: BEHAVIORAL/MENTAL HEALTH CLINIC | Facility: CLINIC | Age: 78
End: 2018-12-04

## 2018-12-04 ENCOUNTER — OFFICE VISIT (OUTPATIENT)
Dept: BEHAVIORAL/MENTAL HEALTH CLINIC | Facility: CLINIC | Age: 78
End: 2018-12-04
Payer: COMMERCIAL

## 2018-12-04 DIAGNOSIS — F43.21 ADJUSTMENT DISORDER WITH DEPRESSED MOOD: ICD-10-CM

## 2018-12-04 PROCEDURE — 90834 PSYTX W PT 45 MINUTES: CPT | Performed by: SOCIAL WORKER

## 2018-12-04 NOTE — PROGRESS NOTES
Home blood pressures:  -a m :  142/82  -p m :  135/83  Heart rate is 90s-low 100s  Recommendation:  1  Toprol XL 25 mg daily given mild tachycardia persistent hypertension  2  Please wait 2 weeks and then take another week a blood pressure readings morning and evening  Make sure to do the morning readings before taking any medications

## 2018-12-04 NOTE — PSYCH
Psychotherapy Provided: Individual Psychotherapy 45 minutes  was hospitalized (at the end of October, 2018 until early November, 2018) for renal failure which she contends was brought on by some medications,including Bactrum; She reports is no longer taking Fozomax (sp?)  She reported had become dehydrated  states currently drinks flavored water ("I get sick from regular water "); "I have Dr Callie Irizarry (nephrologist)  " states, as a result of the rerorted renal failure has, now developed HTN; states her daughter continues to participate in [employer] [place] (13 week program) as a participant and as a facilitator; regarding the passing of her granddaughter, "This is a hard time  We know she is out of turmoil "  Discussed/reviewed coping mechanisms and self-care strategies regarding the holidays and her reported grief; A: presents as trying to stay positive and shared openly the changes in her workplace and her efforts to cope; She reported feels good about her recuperation from her reported hospitalization and noted she was "frightened" by the experience with her report of the medical staff being unable to account for the reported occurrence of kidney failure;   P:(G#1) will continue with self-care;    Length of time in session: 45 minutes, follow up in 1 month    Goals addressed in session: Goal 1     Pain:      none    0    Current suicide risk : [address]: Diagnosis and Treatment Plan explained to [place] relates understanding diagnosis and is agreeable to Treatment Plan   Yes

## 2018-12-05 DIAGNOSIS — N17.9 AKI (ACUTE KIDNEY INJURY) (HCC): ICD-10-CM

## 2018-12-05 DIAGNOSIS — R00.0 TACHYCARDIA: ICD-10-CM

## 2018-12-05 DIAGNOSIS — I10 ESSENTIAL HYPERTENSION: Primary | ICD-10-CM

## 2018-12-05 RX ORDER — METOPROLOL SUCCINATE 25 MG/1
25 TABLET, EXTENDED RELEASE ORAL DAILY
Qty: 30 TABLET | Refills: 5 | Status: SHIPPED | OUTPATIENT
Start: 2018-12-05 | End: 2019-05-28 | Stop reason: SDUPTHER

## 2018-12-05 NOTE — PROGRESS NOTES
I spoke to patient and she is aware that she has to start Toprol XL 25 mg daily due to mild tachycardia persistent hypertension  Patient is also aware to wait 2 weeks and take another BP reading morning and evening, morning before any medications

## 2018-12-19 ENCOUNTER — TRANSCRIBE ORDERS (OUTPATIENT)
Dept: LAB | Facility: HOSPITAL | Age: 78
End: 2018-12-19

## 2018-12-19 ENCOUNTER — APPOINTMENT (OUTPATIENT)
Dept: LAB | Facility: HOSPITAL | Age: 78
End: 2018-12-19
Attending: INTERNAL MEDICINE
Payer: COMMERCIAL

## 2018-12-19 DIAGNOSIS — E78.2 MIXED HYPERLIPIDEMIA: ICD-10-CM

## 2018-12-19 DIAGNOSIS — R35.0 URINARY FREQUENCY: ICD-10-CM

## 2018-12-19 DIAGNOSIS — I10 ESSENTIAL HYPERTENSION, MALIGNANT: ICD-10-CM

## 2018-12-19 DIAGNOSIS — I51.9 MYXEDEMA HEART DISEASE: ICD-10-CM

## 2018-12-19 DIAGNOSIS — E03.9 MYXEDEMA HEART DISEASE: ICD-10-CM

## 2018-12-19 DIAGNOSIS — D64.9 ANEMIA, UNSPECIFIED TYPE: Primary | ICD-10-CM

## 2018-12-19 LAB
ALBUMIN SERPL BCP-MCNC: 3.5 G/DL (ref 3.5–5)
ALP SERPL-CCNC: 76 U/L (ref 46–116)
ALT SERPL W P-5'-P-CCNC: 22 U/L (ref 12–78)
ANION GAP SERPL CALCULATED.3IONS-SCNC: 4 MMOL/L (ref 4–13)
AST SERPL W P-5'-P-CCNC: 18 U/L (ref 5–45)
BACTERIA UR QL AUTO: ABNORMAL /HPF
BASOPHILS # BLD AUTO: 0.06 THOUSANDS/ΜL (ref 0–0.1)
BASOPHILS NFR BLD AUTO: 1 % (ref 0–1)
BILIRUB SERPL-MCNC: 0.32 MG/DL (ref 0.2–1)
BILIRUB UR QL STRIP: NEGATIVE
BUN SERPL-MCNC: 21 MG/DL (ref 5–25)
CALCIUM SERPL-MCNC: 9.1 MG/DL (ref 8.3–10.1)
CHLORIDE SERPL-SCNC: 108 MMOL/L (ref 100–108)
CHOLEST SERPL-MCNC: 243 MG/DL (ref 50–200)
CLARITY UR: ABNORMAL
CO2 SERPL-SCNC: 26 MMOL/L (ref 21–32)
COLOR UR: YELLOW
CREAT SERPL-MCNC: 1.33 MG/DL (ref 0.6–1.3)
EOSINOPHIL # BLD AUTO: 0.15 THOUSAND/ΜL (ref 0–0.61)
EOSINOPHIL NFR BLD AUTO: 3 % (ref 0–6)
ERYTHROCYTE [DISTWIDTH] IN BLOOD BY AUTOMATED COUNT: 13.6 % (ref 11.6–15.1)
GFR SERPL CREATININE-BSD FRML MDRD: 39 ML/MIN/1.73SQ M
GLUCOSE P FAST SERPL-MCNC: 90 MG/DL (ref 65–99)
GLUCOSE UR STRIP-MCNC: NEGATIVE MG/DL
HCT VFR BLD AUTO: 32.7 % (ref 34.8–46.1)
HDLC SERPL-MCNC: 79 MG/DL (ref 40–60)
HGB BLD-MCNC: 10.2 G/DL (ref 11.5–15.4)
HGB UR QL STRIP.AUTO: ABNORMAL
HYALINE CASTS #/AREA URNS LPF: ABNORMAL /LPF
IMM GRANULOCYTES # BLD AUTO: 0.02 THOUSAND/UL (ref 0–0.2)
IMM GRANULOCYTES NFR BLD AUTO: 0 % (ref 0–2)
KETONES UR STRIP-MCNC: NEGATIVE MG/DL
LDLC SERPL CALC-MCNC: 145 MG/DL (ref 0–100)
LEUKOCYTE ESTERASE UR QL STRIP: ABNORMAL
LYMPHOCYTES # BLD AUTO: 1.56 THOUSANDS/ΜL (ref 0.6–4.47)
LYMPHOCYTES NFR BLD AUTO: 31 % (ref 14–44)
MCH RBC QN AUTO: 29.8 PG (ref 26.8–34.3)
MCHC RBC AUTO-ENTMCNC: 31.2 G/DL (ref 31.4–37.4)
MCV RBC AUTO: 96 FL (ref 82–98)
MONOCYTES # BLD AUTO: 0.38 THOUSAND/ΜL (ref 0.17–1.22)
MONOCYTES NFR BLD AUTO: 8 % (ref 4–12)
NEUTROPHILS # BLD AUTO: 2.82 THOUSANDS/ΜL (ref 1.85–7.62)
NEUTS SEG NFR BLD AUTO: 57 % (ref 43–75)
NITRITE UR QL STRIP: NEGATIVE
NON-SQ EPI CELLS URNS QL MICRO: ABNORMAL /HPF
NRBC BLD AUTO-RTO: 0 /100 WBCS
PH UR STRIP.AUTO: 6.5 [PH] (ref 4.5–8)
PLATELET # BLD AUTO: 320 THOUSANDS/UL (ref 149–390)
PMV BLD AUTO: 10.1 FL (ref 8.9–12.7)
POTASSIUM SERPL-SCNC: 4.1 MMOL/L (ref 3.5–5.3)
PROT SERPL-MCNC: 7.2 G/DL (ref 6.4–8.2)
PROT UR STRIP-MCNC: ABNORMAL MG/DL
RBC # BLD AUTO: 3.42 MILLION/UL (ref 3.81–5.12)
RBC #/AREA URNS AUTO: ABNORMAL /HPF
SODIUM SERPL-SCNC: 138 MMOL/L (ref 136–145)
SP GR UR STRIP.AUTO: 1.02 (ref 1–1.03)
TRIGL SERPL-MCNC: 94 MG/DL
TSH SERPL DL<=0.05 MIU/L-ACNC: 4.31 UIU/ML (ref 0.36–3.74)
UROBILINOGEN UR QL STRIP.AUTO: 0.2 E.U./DL
WBC # BLD AUTO: 4.99 THOUSAND/UL (ref 4.31–10.16)
WBC #/AREA URNS AUTO: ABNORMAL /HPF

## 2018-12-19 PROCEDURE — 85025 COMPLETE CBC W/AUTO DIFF WBC: CPT

## 2018-12-19 PROCEDURE — 80061 LIPID PANEL: CPT

## 2018-12-19 PROCEDURE — 84443 ASSAY THYROID STIM HORMONE: CPT

## 2018-12-19 PROCEDURE — 80053 COMPREHEN METABOLIC PANEL: CPT

## 2018-12-19 PROCEDURE — 36415 COLL VENOUS BLD VENIPUNCTURE: CPT

## 2018-12-19 PROCEDURE — 81001 URINALYSIS AUTO W/SCOPE: CPT

## 2018-12-31 ENCOUNTER — TELEPHONE (OUTPATIENT)
Dept: OTHER | Facility: HOSPITAL | Age: 78
End: 2018-12-31

## 2019-01-02 ENCOUNTER — DOCUMENTATION (OUTPATIENT)
Dept: BEHAVIORAL/MENTAL HEALTH CLINIC | Facility: CLINIC | Age: 79
End: 2019-01-02

## 2019-01-02 ENCOUNTER — OFFICE VISIT (OUTPATIENT)
Dept: BEHAVIORAL/MENTAL HEALTH CLINIC | Facility: CLINIC | Age: 79
End: 2019-01-02
Payer: COMMERCIAL

## 2019-01-02 DIAGNOSIS — E87.6 HYPOKALEMIA: ICD-10-CM

## 2019-01-02 DIAGNOSIS — N17.9 AKI (ACUTE KIDNEY INJURY) (HCC): Primary | ICD-10-CM

## 2019-01-02 DIAGNOSIS — I10 ACCELERATED HYPERTENSION: ICD-10-CM

## 2019-01-02 DIAGNOSIS — E83.42 HYPOMAGNESEMIA: ICD-10-CM

## 2019-01-02 DIAGNOSIS — E83.51 HYPOCALCEMIA: ICD-10-CM

## 2019-01-02 DIAGNOSIS — F43.21 ADJUSTMENT DISORDER WITH DEPRESSED MOOD: Primary | ICD-10-CM

## 2019-01-02 DIAGNOSIS — I12.9 HYPERTENSIVE CHRONIC KIDNEY DISEASE, UNSPECIFIED CKD STAGE: ICD-10-CM

## 2019-01-02 PROCEDURE — 90834 PSYTX W PT 45 MINUTES: CPT | Performed by: SOCIAL WORKER

## 2019-01-02 RX ORDER — AMLODIPINE BESYLATE 5 MG/1
5 TABLET ORAL 2 TIMES DAILY
Qty: 180 TABLET | Refills: 3 | Status: SHIPPED | OUTPATIENT
Start: 2019-01-02 | End: 2019-07-18

## 2019-01-02 NOTE — PROGRESS NOTES
Jayne Lopez  1940       Date of Initial Treatment Plan:10/15/14  Date of Current Treatment Plan: 01/02/19    Treatment Plan Number: 10    Strengths/Personal Resources for Self Care: " I am strong In some points "    Diagnosis:   F43 21    Area of Needs: family matters; Long Term Goal 1: AI will continue to effectively to deal change  Target Date: 5/2/19  Completion Date: n/a         Short Term Objectives for Goal 1: AI will continue to support my daughter in Grief Share  B  I will continue to make sure I am taking care of myself  C  I will continue to have realistic expectations of myself as I deal  with stressors       Target Date: 5/2/19  Completion Date: n/a      GOAL 1: Modality: Individual 1x per month   Completion Date n/a and The person(s) responsible for carrying out the plan is  "Aruna"      [address]: Diagnosis and Treatment Plan explained to [place], [place] relates understanding diagnosis and is agreeable to Treatment Plan  yes      Client Comments : Please share your thoughts, feelings, need and/or experiences regarding your treatment plan:       __________________________________________________________________    __________________________________________________________________    __________________________________________________________________    __________________________________________________________________    _______________________________________                Patient signature, Date Time: __________________________________________             Physician cosigner signature, Date, Time: ________________________________

## 2019-01-02 NOTE — PROGRESS NOTES
Patient's blood pressure readings at home:  -a m :  152/88  -p m :  163/90  No orthostatic changes  Recommendations:  -amlodipine 5 mg in morning, but now add 2 5 mg in the evening  -wait 2-3 weeks and then send another week a blood pressure readings morning and evening, just sitting  The morning readings before taking any medications    Watch for swelling as a side effect from the increase amlodipine and call if the patient develops any  -please have the patient go for a basic metabolic profile at this time nonfasting

## 2019-01-02 NOTE — PSYCH
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  She noted her Ivon holiday was good (as she continues to grieve for the loss of her granddaughter jessie three years ago) despite her complaint about working for 3 5 hours on New Year's Day  She noted, "We decorated (for Ivon), and we had a brunch  It was something different (noting family losses) "  She noted the one year anniversary of her broth-in-law's passing on 18   "I know that she (her  granddaughter) is around " discussed/reviewed self-care and bereavement issues regarding the loss of her granddaughter and her job; A: presents as making efforts to continue to effectively cope with the loss of her granddaughter and the reported issues at her part time job; She remains assertive (per her reports at her job) as a key way of coping  She reports her efforts to be available to her daughter as the latter reportedly is also grieving  She reported her daughter's tests "were benign "  P: (G# 1) will continue to take care of herself re her grieving;    Length of time in session: 45 minutes, follow up in 1 month    Goals addressed in session: Goal 1     Pain:      none    0    Current suicide risk : [address]: Diagnosis and Treatment Plan explained to [place] relates understanding diagnosis and is agreeable to Treatment Plan   Yes

## 2019-01-02 NOTE — PROGRESS NOTES
Treatment Plan Tracking    # 1Treatment Plan not completed within required time limits due to: Client did not schedule within the four month time frame required to update txt plan;

## 2019-01-02 NOTE — TELEPHONE ENCOUNTER
BMP in system pt aware to have it done once received  Per pt she stopped her amlodipine when she started her metoprolol, aware she needs to start amlodipine 5 mg in morning and  2 5 in the evening  She is aware to watch for swelling due to increase in amlodipine  Aware to wait 2-3 weeks and take another week of blood pressure readings

## 2019-01-07 ENCOUNTER — APPOINTMENT (OUTPATIENT)
Dept: LAB | Facility: HOSPITAL | Age: 79
End: 2019-01-07
Attending: INTERNAL MEDICINE
Payer: COMMERCIAL

## 2019-01-07 ENCOUNTER — TELEPHONE (OUTPATIENT)
Dept: NEPHROLOGY | Facility: CLINIC | Age: 79
End: 2019-01-07

## 2019-01-07 DIAGNOSIS — N17.9 AKI (ACUTE KIDNEY INJURY) (HCC): ICD-10-CM

## 2019-01-07 DIAGNOSIS — E87.6 HYPOKALEMIA: ICD-10-CM

## 2019-01-07 DIAGNOSIS — E83.42 HYPOMAGNESEMIA: ICD-10-CM

## 2019-01-07 DIAGNOSIS — I12.9 HYPERTENSIVE CHRONIC KIDNEY DISEASE, UNSPECIFIED CKD STAGE: ICD-10-CM

## 2019-01-07 DIAGNOSIS — E83.51 HYPOCALCEMIA: ICD-10-CM

## 2019-01-07 LAB
ANION GAP SERPL CALCULATED.3IONS-SCNC: 6 MMOL/L (ref 4–13)
BUN SERPL-MCNC: 18 MG/DL (ref 5–25)
CALCIUM SERPL-MCNC: 8.9 MG/DL (ref 8.3–10.1)
CHLORIDE SERPL-SCNC: 108 MMOL/L (ref 100–108)
CO2 SERPL-SCNC: 25 MMOL/L (ref 21–32)
CREAT SERPL-MCNC: 1.09 MG/DL (ref 0.6–1.3)
GFR SERPL CREATININE-BSD FRML MDRD: 49 ML/MIN/1.73SQ M
GLUCOSE P FAST SERPL-MCNC: 89 MG/DL (ref 65–99)
POTASSIUM SERPL-SCNC: 4.2 MMOL/L (ref 3.5–5.3)
SODIUM SERPL-SCNC: 139 MMOL/L (ref 136–145)

## 2019-01-07 PROCEDURE — 36415 COLL VENOUS BLD VENIPUNCTURE: CPT

## 2019-01-07 PROCEDURE — 80048 BASIC METABOLIC PNL TOTAL CA: CPT

## 2019-01-31 ENCOUNTER — TELEPHONE (OUTPATIENT)
Dept: PSYCHIATRY | Facility: CLINIC | Age: 79
End: 2019-01-31

## 2019-02-04 ENCOUNTER — OFFICE VISIT (OUTPATIENT)
Dept: BEHAVIORAL/MENTAL HEALTH CLINIC | Facility: CLINIC | Age: 79
End: 2019-02-04
Payer: COMMERCIAL

## 2019-02-04 DIAGNOSIS — F43.20 ADJUSTMENT DISORDER, UNSPECIFIED TYPE: Primary | ICD-10-CM

## 2019-02-04 PROCEDURE — 90834 PSYTX W PT 45 MINUTES: CPT | Performed by: SOCIAL WORKER

## 2019-02-04 NOTE — PSYCH
Psychotherapy Provided: Individual Psychotherapy 45 minutes  She noted additional concerns about her workplace (as a continuum from her most recent appt on 1/2/19)  She also shared some positives about her daughter (who had lost her daughter to a drug overdose approximately 2 3- years ago)  Discussed communication strategies (re self-advocacy) regarding her reported concerns in the workplace; A: presents as wanting to do her best to cope with the loss of her granddaughter and as wanting to do a good job at her part time position she has held for a number of years; She remains pleasant and as enjoying being around people and as being productive  Regarding self-care, she maintains a positive attitude about her life despite her report of ongoing medical issues and her efforts to also advocate for herself in that area of her life  P: (G#1) will continue to advocate for herself;    Length of time in session: 45 minutes, follow up in 1 month    Goals addressed in session: Goal 1     Pain:      none    0    Current suicide risk : [address]: Diagnosis and Treatment Plan explained to [place], [place] relates understanding diagnosis and is agreeable to Treatment Plan   Yes

## 2019-02-26 ENCOUNTER — OFFICE VISIT (OUTPATIENT)
Dept: OBGYN CLINIC | Facility: HOSPITAL | Age: 79
End: 2019-02-26
Payer: COMMERCIAL

## 2019-02-26 VITALS
SYSTOLIC BLOOD PRESSURE: 129 MMHG | WEIGHT: 139 LBS | HEART RATE: 91 BPM | BODY MASS INDEX: 26.24 KG/M2 | HEIGHT: 61 IN | DIASTOLIC BLOOD PRESSURE: 79 MMHG

## 2019-02-26 DIAGNOSIS — M70.61 TROCHANTERIC BURSITIS OF RIGHT HIP: Primary | ICD-10-CM

## 2019-02-26 DIAGNOSIS — M25.551 RIGHT HIP PAIN: ICD-10-CM

## 2019-02-26 PROCEDURE — 99213 OFFICE O/P EST LOW 20 MIN: CPT | Performed by: ORTHOPAEDIC SURGERY

## 2019-02-26 PROCEDURE — 20610 DRAIN/INJ JOINT/BURSA W/O US: CPT | Performed by: ORTHOPAEDIC SURGERY

## 2019-02-26 RX ORDER — BUPIVACAINE HYDROCHLORIDE 2.5 MG/ML
2 INJECTION, SOLUTION INFILTRATION; PERINEURAL
Status: COMPLETED | OUTPATIENT
Start: 2019-02-26 | End: 2019-02-26

## 2019-02-26 RX ORDER — BETAMETHASONE SODIUM PHOSPHATE AND BETAMETHASONE ACETATE 3; 3 MG/ML; MG/ML
12 INJECTION, SUSPENSION INTRA-ARTICULAR; INTRALESIONAL; INTRAMUSCULAR; SOFT TISSUE
Status: COMPLETED | OUTPATIENT
Start: 2019-02-26 | End: 2019-02-26

## 2019-02-26 RX ORDER — ATORVASTATIN CALCIUM 10 MG/1
TABLET, FILM COATED ORAL
COMMUNITY
Start: 2018-12-28 | End: 2021-08-26 | Stop reason: SDUPTHER

## 2019-02-26 RX ORDER — IBUPROFEN 800 MG/1
TABLET ORAL
COMMUNITY
Start: 2019-02-16 | End: 2019-08-30

## 2019-02-26 RX ORDER — LIDOCAINE HYDROCHLORIDE 10 MG/ML
2 INJECTION, SOLUTION INFILTRATION; PERINEURAL
Status: COMPLETED | OUTPATIENT
Start: 2019-02-26 | End: 2019-02-26

## 2019-02-26 RX ADMIN — BUPIVACAINE HYDROCHLORIDE 2 ML: 2.5 INJECTION, SOLUTION INFILTRATION; PERINEURAL at 10:41

## 2019-02-26 RX ADMIN — LIDOCAINE HYDROCHLORIDE 2 ML: 10 INJECTION, SOLUTION INFILTRATION; PERINEURAL at 10:41

## 2019-02-26 RX ADMIN — BETAMETHASONE SODIUM PHOSPHATE AND BETAMETHASONE ACETATE 12 MG: 3; 3 INJECTION, SUSPENSION INTRA-ARTICULAR; INTRALESIONAL; INTRAMUSCULAR; SOFT TISSUE at 10:41

## 2019-02-26 NOTE — PATIENT INSTRUCTIONS

## 2019-02-26 NOTE — PROGRESS NOTES
Assessment:  1  Trochanteric bursitis of right hip         Plan:  Patient received a right trochanteric bursa injection today  Ice today   Activities as tolerated    To do next visit:  Return in about 3 months (around 5/26/2019)  The above stated was discussed in layman's terms and the patient expressed understanding  All questions were answered to the patient's satisfaction  Scribe Attestation    I,:   Cyndie Dhruv am acting as a scribe while in the presence of the attending physician :        I,:   Valeria Sommer MD personally performed the services described in this documentation    as scribed in my presence :              Subjective:   Laurie is a 69 y o  female who presents today with a chief complaint of right lateral hip pain  Patient has a history of a right total hip arthroplasty  Patient denies any groin pain today  Patient complains of only lateral hip pain over the trochanteric bursa  Pain worsens with WB activities and when lying on the right side  Patient received an injection on 11/29/18 which lasted until recently  Review of systems negative unless otherwise specified in HPI    Past Medical History:   Diagnosis Date    GERD (gastroesophageal reflux disease) 4/4/2016    Osteoarthritis of hip 4/4/2016    SI (sacroiliac) joint inflammation ([address] ) 4/4/2016    Substance addiction ([facility] )        Past Surgical History:   Procedure Laterality Date    JOINT REPLACEMENT      right hip 8/15       History reviewed  No pertinent family history      Social History     Occupational History    Not on file   Tobacco Use    Smoking status: Never Smoker    Smokeless tobacco: Never Used   Substance and Sexual Activity    Alcohol use: No    Drug use: Yes     Types: Prescription     Comment: rocovering last used 1996    Sexual activity: Not on file         Current Outpatient Medications:     acetaminophen (TYLENOL) 325 mg tablet, 975 mg every 8 hours, Disp: 30 tablet, Rfl: 0   amLODIPine (NORVASC) 5 mg tablet, Take 1 tablet (5 mg total) by mouth 2 (two) times a day 1 tablet in the Morning, 1/2 in the evening , Disp: 180 tablet, Rfl: 3    atorvastatin (LIPITOR) 10 mg tablet, , Disp: , Rfl:     ibuprofen (MOTRIN) 800 mg tablet, , Disp: , Rfl:     metoprolol succinate (TOPROL-XL) 25 mg 24 hr tablet, Take 1 tablet (25 mg total) by mouth daily, Disp: 30 tablet, Rfl: 5    potassium-sodium phosphateS (K-PHOS,PHOSPHA 250) 155-852-130 mg tablet, Take 1 tablet by mouth 3 (three) times a day with meals for 2 days, Disp: 6 tablet, Rfl: 0    PREMARIN vaginal cream, , Disp: , Rfl:     Allergies   Allergen Reactions    Bactrim [Sulfamethoxazole-Trimethoprim]      VINNY            Vitals:    02/26/19 1027   BP: 129/79   Pulse: 91       Objective:                    Right Hip Exam     Tenderness   The patient is experiencing tenderness in the greater trochanter  Range of Motion   The patient has normal right hip ROM  Muscle Strength   The patient has normal right hip strength      Other   Erythema: absent  Sensation: normal  Pulse: present            Diagnostics, reviewed and taken today if performed as documented:    None performed      Procedures, if performed today:    Large joint arthrocentesis: R greater trochanteric bursa  Date/Time: 2/26/2019 10:41 AM  Consent given by: patient  Site marked: site marked  Timeout: Immediately prior to procedure a time out was called to verify the correct patient, procedure, equipment, support staff and site/side marked as required   Supporting Documentation  Indications: pain   Procedure Details  Location: hip - R greater trochanteric bursa  Preparation: Patient was prepped and draped in the usual sterile fashion  Needle size: 22 G  Ultrasound guidance: no  Approach: lateral  Medications administered: 2 mL bupivacaine 0 25 %; 2 mL lidocaine 1 %; 12 mg betamethasone acetate-betamethasone sodium phosphate 6 (3-3) mg/mL    Patient tolerance: patient tolerated the procedure well with no immediate complications  Dressing:  Sterile dressing applied            Portions of the record may have been created with voice recognition software   Occasional wrong word or "sound a like" substitutions may have occurred due to the inherent limitations of voice recognition software   Read the chart carefully and recognize, using context, where substitutions have occurred

## 2019-03-04 ENCOUNTER — OFFICE VISIT (OUTPATIENT)
Dept: BEHAVIORAL/MENTAL HEALTH CLINIC | Facility: CLINIC | Age: 79
End: 2019-03-04
Payer: COMMERCIAL

## 2019-03-04 DIAGNOSIS — F43.20 ADJUSTMENT DISORDER, UNSPECIFIED TYPE: Primary | ICD-10-CM

## 2019-03-04 PROCEDURE — 90834 PSYTX W PT 45 MINUTES: CPT | Performed by: SOCIAL WORKER

## 2019-03-04 NOTE — PSYCH
Psychotherapy Provided: Individual Psychotherapy 45 minutes "It is hard, at this age, to do this (her job)  " states her son, Magnolia Tan, age 48, will be moving in with Aruna and her  due to reported difficulties with his current rental arrangement; She offered no complaints about this change in her son's liviing arrangements  states is looking forward to a local community dinner of an organization to which she has belonged for many years; states every high school softball season is a difficult time for her daughter (with the loss of Aruna's granddaughter who was reportedly an accomplished ); discussed/reviewed her own grieving and the importance of self-care; A: presents as working to continue to adjust/accept her granddaughter's passing; She remains active and social in her life  P: (G# 1) will continue to advocate for herself and maintain her social contacts and interests; Length of time in session: 45 minutes, follow up in 1 month    Goals addressed in session: Goal 1     Pain:      none    0    Current suicide risk : [address]: Diagnosis and Treatment Plan explained to [place] relates understanding diagnosis and is agreeable to Treatment Plan   Yes

## 2019-03-07 ENCOUNTER — TELEPHONE (OUTPATIENT)
Dept: NEPHROLOGY | Facility: CLINIC | Age: 79
End: 2019-03-07

## 2019-03-07 ENCOUNTER — DOCUMENTATION (OUTPATIENT)
Dept: OTHER | Facility: HOSPITAL | Age: 79
End: 2019-03-07

## 2019-03-07 ENCOUNTER — APPOINTMENT (OUTPATIENT)
Dept: LAB | Facility: HOSPITAL | Age: 79
End: 2019-03-07
Attending: INTERNAL MEDICINE
Payer: COMMERCIAL

## 2019-03-07 ENCOUNTER — LAB REQUISITION (OUTPATIENT)
Dept: LAB | Facility: HOSPITAL | Age: 79
End: 2019-03-07
Payer: COMMERCIAL

## 2019-03-07 DIAGNOSIS — N17.9 AKI (ACUTE KIDNEY INJURY) (HCC): ICD-10-CM

## 2019-03-07 DIAGNOSIS — E87.6 HYPOKALEMIA: ICD-10-CM

## 2019-03-07 DIAGNOSIS — E83.39 HYPOPHOSPHATEMIA: ICD-10-CM

## 2019-03-07 DIAGNOSIS — E83.52 HYPERCALCEMIA: ICD-10-CM

## 2019-03-07 DIAGNOSIS — I12.9 HYPERTENSIVE CHRONIC KIDNEY DISEASE WITH STAGE 1 THROUGH STAGE 4 CHRONIC KIDNEY DISEASE, OR UNSPECIFIED CHRONIC KIDNEY DISEASE: ICD-10-CM

## 2019-03-07 DIAGNOSIS — N30.20 OTHER CHRONIC CYSTITIS WITHOUT HEMATURIA: ICD-10-CM

## 2019-03-07 DIAGNOSIS — E83.42 HYPOMAGNESEMIA: ICD-10-CM

## 2019-03-07 LAB
25(OH)D3 SERPL-MCNC: 28.8 NG/ML (ref 30–100)
ALBUMIN SERPL BCP-MCNC: 3.6 G/DL (ref 3.5–5)
ALP SERPL-CCNC: 88 U/L (ref 46–116)
ALT SERPL W P-5'-P-CCNC: 16 U/L (ref 12–78)
ANION GAP SERPL CALCULATED.3IONS-SCNC: 8 MMOL/L (ref 4–13)
AST SERPL W P-5'-P-CCNC: 18 U/L (ref 5–45)
BACTERIA UR QL AUTO: ABNORMAL /HPF
BILIRUB SERPL-MCNC: 0.55 MG/DL (ref 0.2–1)
BILIRUB UR QL STRIP: NEGATIVE
BUN SERPL-MCNC: 18 MG/DL (ref 5–25)
CALCIUM SERPL-MCNC: 9.3 MG/DL (ref 8.3–10.1)
CHLORIDE SERPL-SCNC: 108 MMOL/L (ref 100–108)
CHOLEST SERPL-MCNC: 181 MG/DL (ref 50–200)
CLARITY UR: ABNORMAL
CO2 SERPL-SCNC: 20 MMOL/L (ref 21–32)
COLOR UR: YELLOW
CREAT SERPL-MCNC: 1.25 MG/DL (ref 0.6–1.3)
CREAT UR-MCNC: 88.1 MG/DL
ERYTHROCYTE [DISTWIDTH] IN BLOOD BY AUTOMATED COUNT: 12.7 % (ref 11.6–15.1)
FERRITIN SERPL-MCNC: 37 NG/ML (ref 8–388)
GFR SERPL CREATININE-BSD FRML MDRD: 42 ML/MIN/1.73SQ M
GLUCOSE P FAST SERPL-MCNC: 81 MG/DL (ref 65–99)
GLUCOSE UR STRIP-MCNC: NEGATIVE MG/DL
HCT VFR BLD AUTO: 37.9 % (ref 34.8–46.1)
HDLC SERPL-MCNC: 101 MG/DL (ref 40–60)
HGB BLD-MCNC: 11.7 G/DL (ref 11.5–15.4)
HGB UR QL STRIP.AUTO: ABNORMAL
IRON SATN MFR SERPL: 21 %
IRON SERPL-MCNC: 103 UG/DL (ref 50–170)
KETONES UR STRIP-MCNC: NEGATIVE MG/DL
LDLC SERPL CALC-MCNC: 68 MG/DL (ref 0–100)
LEUKOCYTE ESTERASE UR QL STRIP: ABNORMAL
MAGNESIUM SERPL-MCNC: 2.4 MG/DL (ref 1.6–2.6)
MCH RBC QN AUTO: 28.8 PG (ref 26.8–34.3)
MCHC RBC AUTO-ENTMCNC: 30.9 G/DL (ref 31.4–37.4)
MCV RBC AUTO: 93 FL (ref 82–98)
NITRITE UR QL STRIP: POSITIVE
NON-SQ EPI CELLS URNS QL MICRO: ABNORMAL /HPF
PH UR STRIP.AUTO: 6.5 [PH]
PHOSPHATE SERPL-MCNC: 4 MG/DL (ref 2.3–4.1)
PLATELET # BLD AUTO: 257 THOUSANDS/UL (ref 149–390)
PMV BLD AUTO: 10.8 FL (ref 8.9–12.7)
POTASSIUM SERPL-SCNC: 4.3 MMOL/L (ref 3.5–5.3)
PROT SERPL-MCNC: 7.6 G/DL (ref 6.4–8.2)
PROT UR STRIP-MCNC: ABNORMAL MG/DL
PROT UR-MCNC: 46 MG/DL
PROT/CREAT UR: 0.52 MG/G{CREAT} (ref 0–0.1)
PTH-INTACT SERPL-MCNC: 10.8 PG/ML (ref 18.4–80.1)
RBC # BLD AUTO: 4.06 MILLION/UL (ref 3.81–5.12)
RBC #/AREA URNS AUTO: ABNORMAL /HPF
SODIUM SERPL-SCNC: 136 MMOL/L (ref 136–145)
SP GR UR STRIP.AUTO: 1.02 (ref 1–1.03)
TIBC SERPL-MCNC: 488 UG/DL (ref 250–450)
TRIGL SERPL-MCNC: 61 MG/DL
UROBILINOGEN UR QL STRIP.AUTO: 0.2 E.U./DL
WBC # BLD AUTO: 7.53 THOUSAND/UL (ref 4.31–10.16)
WBC #/AREA URNS AUTO: ABNORMAL /HPF

## 2019-03-07 PROCEDURE — 87186 SC STD MICRODIL/AGAR DIL: CPT | Performed by: OBSTETRICS & GYNECOLOGY

## 2019-03-07 PROCEDURE — 82728 ASSAY OF FERRITIN: CPT

## 2019-03-07 PROCEDURE — 82570 ASSAY OF URINE CREATININE: CPT

## 2019-03-07 PROCEDURE — 83540 ASSAY OF IRON: CPT

## 2019-03-07 PROCEDURE — 87086 URINE CULTURE/COLONY COUNT: CPT | Performed by: OBSTETRICS & GYNECOLOGY

## 2019-03-07 PROCEDURE — 87077 CULTURE AEROBIC IDENTIFY: CPT | Performed by: OBSTETRICS & GYNECOLOGY

## 2019-03-07 PROCEDURE — 83735 ASSAY OF MAGNESIUM: CPT

## 2019-03-07 PROCEDURE — 81001 URINALYSIS AUTO W/SCOPE: CPT

## 2019-03-07 PROCEDURE — 82306 VITAMIN D 25 HYDROXY: CPT

## 2019-03-07 PROCEDURE — 36415 COLL VENOUS BLD VENIPUNCTURE: CPT

## 2019-03-07 PROCEDURE — 85027 COMPLETE CBC AUTOMATED: CPT

## 2019-03-07 PROCEDURE — 84100 ASSAY OF PHOSPHORUS: CPT

## 2019-03-07 PROCEDURE — 80053 COMPREHEN METABOLIC PANEL: CPT

## 2019-03-07 PROCEDURE — 80061 LIPID PANEL: CPT

## 2019-03-07 PROCEDURE — 83550 IRON BINDING TEST: CPT

## 2019-03-07 PROCEDURE — 83970 ASSAY OF PARATHORMONE: CPT

## 2019-03-07 PROCEDURE — 84156 ASSAY OF PROTEIN URINE: CPT

## 2019-03-07 NOTE — TELEPHONE ENCOUNTER
I spoke to pt and she stated that she is not having and symptoms only pressure when urinating  She stated that she has a pessary for a while now  Pt stated that she was hesitant on taking antibiotics but is concerned that the infection will not go away on its own

## 2019-03-07 NOTE — PROGRESS NOTES
PATIENT HAS SOME PYURIA/MICROSCOPIC HEMATURIA AND BACTERIURIA  HOWEVER SHE HAS A PESSARY AND IS FOLLOWED VERY CLOSELY BY UROGYNECOLOGY WHO TREATS POTENTIAL URINARY TRACT INFECTIONS ON AN ONGOING BASIS AS NEEDED  THE PATIENT HAS NO URINARY SYMPTOMS AT THIS TIME NO FEVERS TO SUGGEST UTI  MOST LIKELY SHE IS COLONIZED  I WOULD JUST REFER HER BACK TO UROGYNECOLOGY  PLEASE SEND UROGYNECOLOGY THE RESULTS OF THE UA

## 2019-03-07 NOTE — TELEPHONE ENCOUNTER
----- Message from Megan Chang MD sent at 3/7/2019  1:46 PM EST -----  Patient's urinalysis demonstrates white blood cells and red blood cells  And bacteria  PLEASE FIND OUT IF THE PATIENT IS HAVING ANY URINARY SYMPTOMS SUCH AS BURNING FREQUENCY OR FEVERS  IF SHE IS PLEASE LET ME KNOW RIGHT AWAY  ON THAT WE WILL ORDER URINE CULTURE

## 2019-03-08 ENCOUNTER — TELEPHONE (OUTPATIENT)
Dept: NEPHROLOGY | Facility: CLINIC | Age: 79
End: 2019-03-08

## 2019-03-09 LAB — BACTERIA UR CULT: ABNORMAL

## 2019-03-11 ENCOUNTER — OFFICE VISIT (OUTPATIENT)
Dept: NEPHROLOGY | Facility: CLINIC | Age: 79
End: 2019-03-11
Payer: COMMERCIAL

## 2019-03-11 VITALS
SYSTOLIC BLOOD PRESSURE: 112 MMHG | WEIGHT: 135.6 LBS | DIASTOLIC BLOOD PRESSURE: 56 MMHG | HEIGHT: 63 IN | BODY MASS INDEX: 24.03 KG/M2 | HEART RATE: 83 BPM

## 2019-03-11 DIAGNOSIS — I12.9 HYPERTENSIVE CHRONIC KIDNEY DISEASE WITH STAGE 1 THROUGH STAGE 4 CHRONIC KIDNEY DISEASE, OR UNSPECIFIED CHRONIC KIDNEY DISEASE: Primary | ICD-10-CM

## 2019-03-11 DIAGNOSIS — E55.9 VITAMIN D DEFICIENCY: ICD-10-CM

## 2019-03-11 DIAGNOSIS — E78.5 DYSLIPIDEMIA: ICD-10-CM

## 2019-03-11 DIAGNOSIS — N18.30 ANEMIA OF CHRONIC RENAL FAILURE, STAGE 3 (MODERATE) (HCC): ICD-10-CM

## 2019-03-11 DIAGNOSIS — E61.1 IRON DEFICIENCY: ICD-10-CM

## 2019-03-11 DIAGNOSIS — E83.52 HYPERCALCEMIA: ICD-10-CM

## 2019-03-11 DIAGNOSIS — D63.1 ANEMIA OF CHRONIC RENAL FAILURE, STAGE 3 (MODERATE) (HCC): ICD-10-CM

## 2019-03-11 DIAGNOSIS — N18.30 CHRONIC KIDNEY DISEASE, STAGE III (MODERATE) (HCC): ICD-10-CM

## 2019-03-11 PROCEDURE — 99214 OFFICE O/P EST MOD 30 MIN: CPT | Performed by: INTERNAL MEDICINE

## 2019-03-11 RX ORDER — DOXYCYCLINE HYCLATE 100 MG/1
100 CAPSULE ORAL 2 TIMES DAILY
COMMUNITY
Start: 2019-03-07 | End: 2019-08-30

## 2019-03-11 NOTE — LETTER
March 11, 2019     Nilson Syed DO  [address] [facility]  [address]    Patient: Bonnee Mcardle   YOB: 1940   Date of Visit: 3/11/2019       Dear Dr Lauryn Cowart: Thank you for referring Bonnee Mcardle to me for evaluation  Below are my notes for this consultation  If you have questions, please do not hesitate to call me  I look forward to following your patient along with you  Sincerely,        Divine Cosme MD        CC: Nicole Keith, Clarita Crocker, MD Francyne Angelucci, MD Deward Copier, MD  3/11/2019 11:25 AM  Sign at close encounter  RENAL FOLLOW UP NOTE: td    ASSESSMENT AND PLAN:  1   CKD stage 3 :  · Etiology:  AK I from Bactrim/hypercalcemia along with poor oral intake  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/arteriolar nephrosclerosis  · Baseline creatinine:  1 0  · Current creatinine:  1 25 slightly increased compared to 1 09 as of 1/7/19  · Urine protein creatinine ratio:  0 52 g at baseline  · UA:  Innumerable WBCs/innumerable bacteria/20-30 RBCs/trace proteinuria  This is in the setting of a pessary with chronic colonization  Would recommend follow-up with Uro gynecology  Of note:  Klebsiella was the organism; currently on doxycycline for 7 days per the urogynecology  Recommendations:  · Treat hypertension-please see below  · Treat dyslipidemia-please see below  · Maintain proteinuria less than 1 g or as low as possible  · Avoid nephrotoxic agents such as NSAIDs, patient counseled as such  2   Volume:  Euvolemic  3  Hypertension:  Secondary workup:  Was negative  · No current readings at this time  · Goal blood pressure:  Less than 135/85  Recommendations:  ·   Automated Oscillimetric reading was outstanding, I will have her do 1 week a blood pressure readings at this time  No adjustments at this time  4   Electrolytes:  Mild metabolic acidosis, recheck at this time may have been spurious    5   Mineral bone disorder:  · Calcium/magnesium/phosphorus:  All acceptable including a calcium 9 3  · PTH intact:  10 8 which is actually low  · Vitamin-D:  28 8:  VITAMIN-D SUPPLEMENTATION  6  Dyslipidemia:  · Goal LDL:  Less than 100  · Current lipid profile:  LDL 68//triglycerides 61    Recommendations:  No treatment  7  Anemia:  Acceptable at 11 7  IRON SATURATION IS ADEQUATE BUT FERRITIN LOW RECOMMEND ORAL IRON AS TOLERATED  8  Other problems:  · GERD  · Osteoarthritis of right hip          PATIENT INSTRUCTIONS:    Patient Instructions   1  Medication changes today:  · Please take over-the-counter iron 2 to 3 times a week as tolerated in regards to constipation, you can take a stool softener  · Please take vitamin-D over-the-counter 1000 units daily    2  Please send in 1 week a blood pressure readings now morning evening just sitting   -the morning readings are before taking any medications  -the evening readings are any time in the evening closer to bedtime    3  Please go for nonfasting lab work at your convenience in the next week    4  Follow-up in 4 months:  -please bring in 1 week a blood pressure readings morning evening, sitting and standing:  · Take the morning readings before any medications  · Take the evening readings closer to bedtime  · When taking standing readings, keep your arm supported at heart level and not dangling  -please go for fasting lab work prior to your appointment    5  General instructions:  -avoid salt  -avoid medications such as Motrin, Naprosyn, ibuprofen, Aleve or Advil or Celebrex as they can affect your kidney function; you can use Tylenol as needed for pain or fevers if you have no liver problems  -avoid medications such as Sudafed or other medications with decongestants as they can raise your blood pressure  -try to exercise at least 30 minutes at least 3 days a week with an ultimate goal of 5 days a week  -try to lose 5-10 lb by your next visit  -please discuss with Dr Hiram Awad about treating osteoporosis          Subjective: The patient overall is feeling well  No fevers chills cough or colds  Good appetite and good energy  No urinary symptoms including foamy urine or blood in the urine  She has chronic pressure but this is a very chronic symptom nothing acute  No gastrointestinal symptoms  No cardiovascular symptoms including swelling of the legs  No headaches, dizziness or lightheadedness  Blood pressure medications:  -Toprol XL 25 mg daily  -amlodipine 5 mg twice a day    ROS:  See HPI, otherwise review of systems as completely reviewed with the patient are negative    Past Medical History:   Diagnosis Date    GERD (gastroesophageal reflux disease) 4/4/2016    Osteoarthritis of hip 4/4/2016    SI ([facility]roiliac) joint inflammation ([facility] ) 4/4/2016    Substance addiction ([facility] )      Past Surgical History:   Procedure Laterality Date    JOINT REPLACEMENT      right hip 8/15     History reviewed  No pertinent family history  reports that she has never smoked  She has never used smokeless tobacco  She reports that she has current or past drug history  Drug: Prescription  She reports that she does not drink alcohol  I COMPLETELY REVIEWED THE PAST MEDICAL HISTORY/PAST SURGICAL HISTORY/SOCIAL HISTORY/FAMILY HISTORY/AND MEDICATIONS  AND UPDATED ALL    Objective:     Vitals:   Vitals:    03/11/19 1044   BP: 112/56   Pulse: 83     BP sitting on left:  126/70 with a heart rate of 84 and regular; same on right  BP standing on right:  124/70 with a heart of 100 regular    Weight (last 2 days)     Date/Time   Weight    03/11/19 1044   61 5 (135 6)            Wt Readings from Last 3 Encounters:   03/11/19 61 5 kg (135 lb 9 6 oz)   02/26/19 63 kg (139 lb)   11/29/18 59 9 kg (132 lb)     Body mass index is 24 02 kg/m²      Physical Exam: General:  No acute distress  Skin:  No acute rash  Eyes:  No scleral icterus, noninjected  ENT:  Moist mucous membranes  Neck:  Supple, no jugular venous distention  Back   No CVAT  Chest:  Clear to auscultation and percussion, good respiratory effort  CVS:  Regular rate and rhythm without a rub, murmurs or gallops  Abdomen:  Soft and nontender with normal bowel sounds  Extremities:  No cyanosis and no edema  Neuro:  Grossly intact  Psych:  Alert, oriented x3 and appropriate      Medications:    Current Outpatient Medications:     acetaminophen (TYLENOL) 325 mg tablet, 975 mg every 8 hours, Disp: 30 tablet, Rfl: 0    amLODIPine (NORVASC) 5 mg tablet, Take 1 tablet (5 mg total) by mouth 2 (two) times a day 1 tablet in the Morning, 1/2 in the evening , Disp: 180 tablet, Rfl: 3    atorvastatin (LIPITOR) 10 mg tablet, , Disp: , Rfl:     doxycycline hyclate (VIBRAMYCIN) 100 mg capsule, Take 100 mg by mouth 2 (two) times a day, Disp: , Rfl:     metoprolol succinate (TOPROL-XL) 25 mg 24 hr tablet, Take 1 tablet (25 mg total) by mouth daily, Disp: 30 tablet, Rfl: 5    PREMARIN vaginal cream, , Disp: , Rfl:     ibuprofen (MOTRIN) 800 mg tablet, , Disp: , Rfl:     potassium-sodium phosphateS (K-PHOS,PHOSPHA 250) 155-852-130 mg tablet, Take 1 tablet by mouth 3 (three) times a day with meals for 2 days (Patient not taking: Reported on 3/11/2019), Disp: 6 tablet, Rfl: 0    Lab, Imaging and other studies: I have personally reviewed pertinent labs    Laboratory Results:  Results for orders placed or performed in visit on 03/07/19   Urine culture   Result Value Ref Range    Urine Culture >100,000 cfu/ml Klebsiella pneumoniae (A)        Susceptibility    Klebsiella pneumoniae - CHALO     ZID Performed Yes       Amoxicillin + Clavulanate <4/2 Susceptible ug/ml     Ampicillin ($$) 16 00 Resistant ug/ml     Ampicillin + Sulbactam ($) 4/2 Susceptible ug/ml     Aztreonam ($$$)  <4 Susceptible ug/ml     Cefazolin ($) <2 00 Susceptible ug/ml     Ciprofloxacin ($)  <1 00 Susceptible ug/ml     Gentamicin ($$) <1 Susceptible ug/ml     Levofloxacin ($) <0 25 Susceptible ug/ml Nitrofurantoin <32 Susceptible ug/ml     Tetracycline <4 Susceptible ug/ml     Tobramycin ($) <1 Susceptible ug/ml     Trimethoprim + Sulfamethoxazole ($$$) <2/38 Susceptible ug/ml       Results from last 7 days   Lab Units 03/07/19  0930   WBC Thousand/uL 7 53   HEMOGLOBIN g/dL 11 7   HEMATOCRIT % 37 9   PLATELETS Thousands/uL 257   POTASSIUM mmol/L 4 3   CHLORIDE mmol/L 108   CO2 mmol/L 20*   BUN mg/dL 18   CREATININE mg/dL 1 25   CALCIUM mg/dL 9 3   MAGNESIUM mg/dL 2 4   PHOSPHORUS mg/dL 4 0         Radiology review:   chest X-ray    Ultrasound      Portions of the record may have been created with voice recognition software   Occasional wrong word or "sound a like" substitutions may have occurred due to the inherent limitations of voice recognition software   Read the chart carefully and recognize, using context, where substitutions have occurred

## 2019-03-11 NOTE — PATIENT INSTRUCTIONS
1  Medication changes today:  · Please take over-the-counter iron 2 to 3 times a week as tolerated in regards to constipation, you can take a stool softener  · Please take vitamin-D over-the-counter 1000 units daily    2  Please send in 1 week a blood pressure readings now morning evening just sitting   -the morning readings are before taking any medications  -the evening readings are any time in the evening closer to bedtime    3  Please go for nonfasting lab work at your convenience in the next week    4  Follow-up in 4 months:  -please bring in 1 week a blood pressure readings morning evening, sitting and standing:  · Take the morning readings before any medications  · Take the evening readings closer to bedtime  · When taking standing readings, keep your arm supported at heart level and not dangling  -please go for fasting lab work prior to your appointment    5  General instructions:  -avoid salt  -avoid medications such as Motrin, Naprosyn, ibuprofen, Aleve or Advil or Celebrex as they can affect your kidney function; you can use Tylenol as needed for pain or fevers if you have no liver problems  -avoid medications such as Sudafed or other medications with decongestants as they can raise your blood pressure  -try to exercise at least 30 minutes at least 3 days a week with an ultimate goal of 5 days a week  -try to lose 5-10 lb by your next visit  -please discuss with Dr Ronny Álvarez about treating osteoporosis

## 2019-03-11 NOTE — PROGRESS NOTES
RENAL FOLLOW UP NOTE: td    ASSESSMENT AND PLAN:  1   CKD stage 3 :  · Etiology:  AK I from Bactrim/hypercalcemia along with poor oral intake  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/arteriolar nephrosclerosis  · Baseline creatinine:  1 0  · Current creatinine:  1 25 slightly increased compared to 1 09 as of 1/7/19  · Urine protein creatinine ratio:  0 52 g at baseline  · UA:  Innumerable WBCs/innumerable bacteria/20-30 RBCs/trace proteinuria  This is in the setting of a pessary with chronic colonization  Would recommend follow-up with Uro gynecology  Of note:  Klebsiella was the organism; currently on doxycycline for 7 days per the urogynecology  Recommendations:  · Treat hypertension-please see below  · Treat dyslipidemia-please see below  · Maintain proteinuria less than 1 g or as low as possible  · Avoid nephrotoxic agents such as NSAIDs, patient counseled as such  2   Volume:  Euvolemic  3  Hypertension:  Secondary workup:  Was negative  · No current readings at this time  · Goal blood pressure:  Less than 135/85  Recommendations:  ·   Automated Oscillimetric reading was outstanding, I will have her do 1 week a blood pressure readings at this time  No adjustments at this time  4   Electrolytes:  Mild metabolic acidosis, recheck at this time may have been spurious  5   Mineral bone disorder:  · Calcium/magnesium/phosphorus:  All acceptable including a calcium 9 3  · PTH intact:  10 8 which is actually low  · Vitamin-D:  28 8:  VITAMIN-D SUPPLEMENTATION  6  Dyslipidemia:  · Goal LDL:  Less than 100  · Current lipid profile:  LDL 68//triglycerides 61    Recommendations:  No treatment  7  Anemia:  Acceptable at 11 7  IRON SATURATION IS ADEQUATE BUT FERRITIN LOW RECOMMEND ORAL IRON AS TOLERATED  8  Other problems:  · GERD  · Osteoarthritis of right hip          PATIENT INSTRUCTIONS:    Patient Instructions   1   Medication changes today:  · Please take over-the-counter iron 2 to 3 times a week as tolerated in regards to constipation, you can take a stool softener  · Please take vitamin-D over-the-counter 1000 units daily    2  Please send in 1 week a blood pressure readings now morning evening just sitting   -the morning readings are before taking any medications  -the evening readings are any time in the evening closer to bedtime    3  Please go for nonfasting lab work at your convenience in the next week    4  Follow-up in 4 months:  -please bring in 1 week a blood pressure readings morning evening, sitting and standing:  · Take the morning readings before any medications  · Take the evening readings closer to bedtime  · When taking standing readings, keep your arm supported at heart level and not dangling  -please go for fasting lab work prior to your appointment    5  General instructions:  -avoid salt  -avoid medications such as Motrin, Naprosyn, ibuprofen, Aleve or Advil or Celebrex as they can affect your kidney function; you can use Tylenol as needed for pain or fevers if you have no liver problems  -avoid medications such as Sudafed or other medications with decongestants as they can raise your blood pressure  -try to exercise at least 30 minutes at least 3 days a week with an ultimate goal of 5 days a week  -try to lose 5-10 lb by your next visit  -please discuss with Dr Marcso Ordonez about treating osteoporosis          Subjective: The patient overall is feeling well  No fevers chills cough or colds  Good appetite and good energy  No urinary symptoms including foamy urine or blood in the urine    She has chronic pressure but this is a very chronic symptom nothing acute  No gastrointestinal symptoms  No cardiovascular symptoms including swelling of the legs  No headaches, dizziness or lightheadedness  Blood pressure medications:  -Toprol XL 25 mg daily  -amlodipine 5 mg twice a day    ROS:  See HPI, otherwise review of systems as completely reviewed with the patient are negative    Past Medical History:   Diagnosis Date    GERD (gastroesophageal reflux disease) 4/4/2016    Osteoarthritis of hip 4/4/2016    SI (sacroiliac) [facility] [address] [address] 4/4/2016    Substance addiction ([facility] )      Past Surgical History:   Procedure Laterality Date    JOINT REPLACEMENT      right hip 8/15     History reviewed  No pertinent family history  reports that she has never smoked  She has never used smokeless tobacco  She reports that she has current or past drug history  Drug: Prescription  She reports that she does not drink alcohol  I COMPLETELY REVIEWED THE PAST MEDICAL HISTORY/PAST SURGICAL HISTORY/SOCIAL HISTORY/FAMILY HISTORY/AND MEDICATIONS  AND UPDATED ALL    Objective:     Vitals:   Vitals:    03/11/19 1044   BP: 112/56   Pulse: 83     BP sitting on left:  126/70 with a heart rate of 84 and regular; same on right  BP standing on right:  124/70 with a heart of 100 regular    Weight (last 2 days)     Date/Time   Weight    03/11/19 1044   61 5 (135 6)            Wt Readings from Last 3 Encounters:   03/11/19 61 5 kg (135 lb 9 6 oz)   02/26/19 63 kg (139 lb)   11/29/18 59 9 kg (132 lb)     Body mass index is 24 02 kg/m²      Physical Exam: General:  No acute distress  Skin:  No acute rash  Eyes:  No scleral icterus, noninjected  ENT:  Moist mucous membranes  Neck:  Supple, no jugular venous distention  Back   No CVAT  Chest:  Clear to auscultation and percussion, good respiratory effort  CVS:  Regular rate and rhythm without a rub, murmurs or gallops  Abdomen:  Soft and nontender with normal bowel sounds  Extremities:  No cyanosis and no edema  Neuro:  Grossly intact  Psych:  Alert, oriented x3 and appropriate      Medications:    Current Outpatient Medications:     acetaminophen (TYLENOL) 325 mg tablet, 975 mg every 8 hours, Disp: 30 tablet, Rfl: 0    amLODIPine (NORVASC) 5 mg tablet, Take 1 tablet (5 mg total) by mouth 2 (two) times a day 1 tablet in the Morning, 1/2 in the evening , Disp: 180 tablet, Rfl: 3    atorvastatin (LIPITOR) 10 mg tablet, , Disp: , Rfl:     doxycycline hyclate (VIBRAMYCIN) 100 mg capsule, Take 100 mg by mouth 2 (two) times a day, Disp: , Rfl:     metoprolol succinate (TOPROL-XL) 25 mg 24 hr tablet, Take 1 tablet (25 mg total) by mouth daily, Disp: 30 tablet, Rfl: 5    PREMARIN vaginal cream, , Disp: , Rfl:     ibuprofen (MOTRIN) 800 mg tablet, , Disp: , Rfl:     potassium-sodium phosphateS (K-PHOS,PHOSPHA 250) 155-852-130 mg tablet, Take 1 tablet by mouth 3 (three) times a day with meals for 2 days (Patient not taking: Reported on 3/11/2019), Disp: 6 tablet, Rfl: 0    Lab, Imaging and other studies: I have personally reviewed pertinent labs    Laboratory Results:  Results for orders placed or performed in visit on 03/07/19   Urine culture   Result Value Ref Range    Urine Culture >100,000 cfu/ml Klebsiella pneumoniae (A)        Susceptibility    Klebsiella pneumoniae - CHALO     ZID Performed Yes       Amoxicillin + Clavulanate <4/2 Susceptible ug/ml     Ampicillin ($$) 16 00 Resistant ug/ml     Ampicillin + Sulbactam ($) 4/2 Susceptible ug/ml     Aztreonam ($$$)  <4 Susceptible ug/ml     Cefazolin ($) <2 00 Susceptible ug/ml     Ciprofloxacin ($)  <1 00 Susceptible ug/ml     Gentamicin ($$) <1 Susceptible ug/ml     Levofloxacin ($) <0 25 Susceptible ug/ml     Nitrofurantoin <32 Susceptible ug/ml     Tetracycline <4 Susceptible ug/ml     Tobramycin ($) <1 Susceptible ug/ml     Trimethoprim + Sulfamethoxazole ($$$) <2/38 Susceptible ug/ml       Results from last 7 days   Lab Units 03/07/19  0930   WBC Thousand/uL 7 53   HEMOGLOBIN g/dL 11 7   HEMATOCRIT % 37 9   PLATELETS Thousands/uL 257   POTASSIUM mmol/L 4 3   CHLORIDE mmol/L 108   CO2 mmol/L 20*   BUN mg/dL 18   CREATININE mg/dL 1 25   CALCIUM mg/dL 9 3   MAGNESIUM mg/dL 2 4   PHOSPHORUS mg/dL 4 0         Radiology review:   chest X-ray    Ultrasound      Portions of the record may have been created with voice recognition software   Occasional wrong word or "sound a like" substitutions may have occurred due to the inherent limitations of voice recognition software   Read the chart carefully and recognize, using context, where substitutions have occurred

## 2019-03-12 ENCOUNTER — LAB REQUISITION (OUTPATIENT)
Dept: LAB | Facility: HOSPITAL | Age: 79
End: 2019-03-12
Payer: COMMERCIAL

## 2019-03-12 DIAGNOSIS — Z01.419 ENCOUNTER FOR GYNECOLOGICAL EXAMINATION WITHOUT ABNORMAL FINDING: ICD-10-CM

## 2019-03-12 PROCEDURE — G0145 SCR C/V CYTO,THINLAYER,RESCR: HCPCS | Performed by: OBSTETRICS & GYNECOLOGY

## 2019-03-15 LAB
LAB AP GYN PRIMARY INTERPRETATION: NORMAL
Lab: NORMAL
PATH INTERP SPEC-IMP: NORMAL

## 2019-03-18 ENCOUNTER — TELEPHONE (OUTPATIENT)
Dept: NEPHROLOGY | Facility: CLINIC | Age: 79
End: 2019-03-18

## 2019-03-18 NOTE — TELEPHONE ENCOUNTER
Patient came into the office asking if it was ok if she took the Vitamin D 300 over the counter she didn't want to take anything without checking it with you first

## 2019-03-20 ENCOUNTER — HOSPITAL ENCOUNTER (OUTPATIENT)
Dept: RADIOLOGY | Age: 79
Discharge: HOME | End: 2019-03-20
Payer: COMMERCIAL

## 2019-03-20 VITALS — HEIGHT: 62 IN | WEIGHT: 135 LBS | BODY MASS INDEX: 24.84 KG/M2

## 2019-03-20 DIAGNOSIS — Z12.31 ENCOUNTER FOR SCREENING MAMMOGRAM FOR MALIGNANT NEOPLASM OF BREAST: ICD-10-CM

## 2019-03-20 PROCEDURE — 77067 SCR MAMMO BI INCL CAD: CPT

## 2019-03-20 PROCEDURE — 77063 BREAST TOMOSYNTHESIS BI: CPT

## 2019-03-21 ENCOUNTER — TELEPHONE (OUTPATIENT)
Dept: NEPHROLOGY | Facility: CLINIC | Age: 79
End: 2019-03-21

## 2019-03-21 ENCOUNTER — APPOINTMENT (OUTPATIENT)
Dept: LAB | Facility: HOSPITAL | Age: 79
End: 2019-03-21
Attending: INTERNAL MEDICINE
Payer: COMMERCIAL

## 2019-03-21 DIAGNOSIS — E55.9 VITAMIN D DEFICIENCY: ICD-10-CM

## 2019-03-21 DIAGNOSIS — N18.30 ANEMIA OF CHRONIC RENAL FAILURE, STAGE 3 (MODERATE) (HCC): ICD-10-CM

## 2019-03-21 DIAGNOSIS — I12.9 HYPERTENSIVE CHRONIC KIDNEY DISEASE WITH STAGE 1 THROUGH STAGE 4 CHRONIC KIDNEY DISEASE, OR UNSPECIFIED CHRONIC KIDNEY DISEASE: ICD-10-CM

## 2019-03-21 DIAGNOSIS — E78.5 DYSLIPIDEMIA: ICD-10-CM

## 2019-03-21 DIAGNOSIS — N18.30 CHRONIC KIDNEY DISEASE, STAGE III (MODERATE) (HCC): ICD-10-CM

## 2019-03-21 DIAGNOSIS — E78.5 DYSLIPIDEMIA: Primary | ICD-10-CM

## 2019-03-21 DIAGNOSIS — D63.1 ANEMIA OF CHRONIC RENAL FAILURE, STAGE 3 (MODERATE) (HCC): ICD-10-CM

## 2019-03-21 DIAGNOSIS — E61.1 IRON DEFICIENCY: ICD-10-CM

## 2019-03-21 DIAGNOSIS — E83.52 HYPERCALCEMIA: ICD-10-CM

## 2019-03-21 LAB
ANION GAP SERPL CALCULATED.3IONS-SCNC: 7 MMOL/L (ref 4–13)
BUN SERPL-MCNC: 16 MG/DL (ref 5–25)
CALCIUM SERPL-MCNC: 9 MG/DL (ref 8.3–10.1)
CHLORIDE SERPL-SCNC: 111 MMOL/L (ref 100–108)
CO2 SERPL-SCNC: 24 MMOL/L (ref 21–32)
CREAT SERPL-MCNC: 1.2 MG/DL (ref 0.6–1.3)
GFR SERPL CREATININE-BSD FRML MDRD: 44 ML/MIN/1.73SQ M
GLUCOSE P FAST SERPL-MCNC: 112 MG/DL (ref 65–99)
POTASSIUM SERPL-SCNC: 3.4 MMOL/L (ref 3.5–5.3)
SODIUM SERPL-SCNC: 142 MMOL/L (ref 136–145)

## 2019-03-21 PROCEDURE — 36415 COLL VENOUS BLD VENIPUNCTURE: CPT

## 2019-03-21 PROCEDURE — 80048 BASIC METABOLIC PNL TOTAL CA: CPT

## 2019-03-21 NOTE — TELEPHONE ENCOUNTER
----- Message from Earlene Love MD sent at 3/21/2019 12:41 PM EDT -----  Her kidney function looks good and her bicarbonate is good; however her potassium is low:  Recommendations:  1  Please check with her to see if she is having any nausea vomiting or diarrhea  2  Please have her eat a high potassium diet including fruits and vegetables  3   Repeat a potassium in 1 week

## 2019-03-21 NOTE — TELEPHONE ENCOUNTER
I spoke with pt and she is aware that labs look good except potassium being low  She is aware that she will have to increase her potassium in food  Aware potassium need to be checked in 1 week  Pt has no nausea,vomitting or diarrhea

## 2019-03-27 ENCOUNTER — TELEPHONE (OUTPATIENT)
Dept: NEPHROLOGY | Facility: CLINIC | Age: 79
End: 2019-03-27

## 2019-03-27 NOTE — TELEPHONE ENCOUNTER
Carrie Mathews from Dr Read Hands office called ([phone]) wanting to know if the pt is okay to get Prolia shots every 6 months  They wanted Dr Mario Alberto Aguilar to approve this for her first before they start

## 2019-03-28 ENCOUNTER — LAB REQUISITION (OUTPATIENT)
Dept: LAB | Facility: HOSPITAL | Age: 79
End: 2019-03-28
Payer: COMMERCIAL

## 2019-03-28 DIAGNOSIS — N30.20 OTHER CHRONIC CYSTITIS WITHOUT HEMATURIA: ICD-10-CM

## 2019-03-28 PROCEDURE — 87086 URINE CULTURE/COLONY COUNT: CPT | Performed by: OBSTETRICS & GYNECOLOGY

## 2019-03-28 NOTE — TELEPHONE ENCOUNTER
That should be acceptable as her GFR is greater than 30 which is the caution Ciho Certain Level so this is cleared by me

## 2019-03-29 LAB — BACTERIA UR CULT: NORMAL

## 2019-04-01 ENCOUNTER — LAB (OUTPATIENT)
Dept: LAB | Facility: HOSPITAL | Age: 79
End: 2019-04-01
Attending: INTERNAL MEDICINE
Payer: COMMERCIAL

## 2019-04-01 ENCOUNTER — TRANSCRIBE ORDERS (OUTPATIENT)
Dept: LAB | Facility: HOSPITAL | Age: 79
End: 2019-04-01

## 2019-04-01 DIAGNOSIS — N18.30 ANEMIA OF CHRONIC RENAL FAILURE, STAGE 3 (MODERATE) (HCC): ICD-10-CM

## 2019-04-01 DIAGNOSIS — N18.9 CHRONIC KIDNEY DISEASE, UNSPECIFIED CKD STAGE: ICD-10-CM

## 2019-04-01 DIAGNOSIS — I12.9 HYPERTENSIVE CHRONIC KIDNEY DISEASE WITH STAGE 1 THROUGH STAGE 4 CHRONIC KIDNEY DISEASE, OR UNSPECIFIED CHRONIC KIDNEY DISEASE: ICD-10-CM

## 2019-04-01 DIAGNOSIS — N18.30 CHRONIC KIDNEY DISEASE, STAGE III (MODERATE) (HCC): ICD-10-CM

## 2019-04-01 DIAGNOSIS — D63.1 ANEMIA OF CHRONIC RENAL FAILURE, STAGE 3 (MODERATE) (HCC): ICD-10-CM

## 2019-04-01 DIAGNOSIS — E78.5 DYSLIPIDEMIA: ICD-10-CM

## 2019-04-01 DIAGNOSIS — E78.2 MIXED HYPERLIPIDEMIA: Primary | ICD-10-CM

## 2019-04-01 LAB
ANION GAP SERPL CALCULATED.3IONS-SCNC: 5 MMOL/L (ref 4–13)
BUN SERPL-MCNC: 18 MG/DL (ref 5–25)
CALCIUM SERPL-MCNC: 8.6 MG/DL (ref 8.3–10.1)
CHLORIDE SERPL-SCNC: 110 MMOL/L (ref 100–108)
CO2 SERPL-SCNC: 24 MMOL/L (ref 21–32)
CREAT SERPL-MCNC: 1.25 MG/DL (ref 0.6–1.3)
GFR SERPL CREATININE-BSD FRML MDRD: 42 ML/MIN/1.73SQ M
GLUCOSE P FAST SERPL-MCNC: 84 MG/DL (ref 65–99)
POTASSIUM SERPL-SCNC: 3.9 MMOL/L (ref 3.5–5.3)
PTH-INTACT SERPL-MCNC: 29.9 PG/ML (ref 18.4–80.1)
SODIUM SERPL-SCNC: 139 MMOL/L (ref 136–145)

## 2019-04-01 PROCEDURE — 36415 COLL VENOUS BLD VENIPUNCTURE: CPT

## 2019-04-01 PROCEDURE — 80048 BASIC METABOLIC PNL TOTAL CA: CPT

## 2019-04-01 PROCEDURE — 83970 ASSAY OF PARATHORMONE: CPT

## 2019-04-04 ENCOUNTER — OFFICE VISIT (OUTPATIENT)
Dept: BEHAVIORAL/MENTAL HEALTH CLINIC | Facility: CLINIC | Age: 79
End: 2019-04-04
Payer: COMMERCIAL

## 2019-04-04 DIAGNOSIS — F43.21 ADJUSTMENT DISORDER WITH DEPRESSED MOOD: Primary | ICD-10-CM

## 2019-04-04 PROCEDURE — 90834 PSYTX W PT 45 MINUTES: CPT | Performed by: SOCIAL WORKER

## 2019-04-09 NOTE — TELEPHONE ENCOUNTER
I spoke to the patient and she was upset, saying Dr Xiao Santizo office was supposed to contact us about Prolia  I assured her that they did on 3/27, and they were informed the patient can have the shot as long as her GFR was stable and above 30  She seemed to understand and said she will call Dr Xiao Santizo' office to set up her first shot

## 2019-04-22 ENCOUNTER — APPOINTMENT (OUTPATIENT)
Dept: LAB | Facility: HOSPITAL | Age: 79
End: 2019-04-22
Attending: INTERNAL MEDICINE
Payer: COMMERCIAL

## 2019-04-22 DIAGNOSIS — N18.9 CHRONIC KIDNEY DISEASE, UNSPECIFIED CKD STAGE: ICD-10-CM

## 2019-04-22 DIAGNOSIS — E78.2 MIXED HYPERLIPIDEMIA: ICD-10-CM

## 2019-04-22 LAB
ALBUMIN SERPL BCP-MCNC: 3.4 G/DL (ref 3.5–5)
ALP SERPL-CCNC: 68 U/L (ref 46–116)
ALT SERPL W P-5'-P-CCNC: 15 U/L (ref 12–78)
ANION GAP SERPL CALCULATED.3IONS-SCNC: 6 MMOL/L (ref 4–13)
AST SERPL W P-5'-P-CCNC: 11 U/L (ref 5–45)
BILIRUB SERPL-MCNC: 0.36 MG/DL (ref 0.2–1)
BUN SERPL-MCNC: 28 MG/DL (ref 5–25)
CALCIUM ALBUM COR SERPL-MCNC: 11.7 MG/DL (ref 8.3–10.1)
CALCIUM SERPL-MCNC: 11.2 MG/DL (ref 8.3–10.1)
CHLORIDE SERPL-SCNC: 104 MMOL/L (ref 100–108)
CO2 SERPL-SCNC: 27 MMOL/L (ref 21–32)
CREAT SERPL-MCNC: 1.79 MG/DL (ref 0.6–1.3)
GFR SERPL CREATININE-BSD FRML MDRD: 27 ML/MIN/1.73SQ M
GLUCOSE P FAST SERPL-MCNC: 92 MG/DL (ref 65–99)
LDLC SERPL DIRECT ASSAY-MCNC: 64 MG/DL (ref 0–100)
POTASSIUM SERPL-SCNC: 3.3 MMOL/L (ref 3.5–5.3)
PROT SERPL-MCNC: 7.2 G/DL (ref 6.4–8.2)
SODIUM SERPL-SCNC: 137 MMOL/L (ref 136–145)

## 2019-04-22 PROCEDURE — 80053 COMPREHEN METABOLIC PANEL: CPT

## 2019-04-22 PROCEDURE — 83721 ASSAY OF BLOOD LIPOPROTEIN: CPT

## 2019-04-22 PROCEDURE — 36415 COLL VENOUS BLD VENIPUNCTURE: CPT

## 2019-04-27 ENCOUNTER — APPOINTMENT (OUTPATIENT)
Dept: LAB | Facility: HOSPITAL | Age: 79
End: 2019-04-27
Attending: INTERNAL MEDICINE
Payer: COMMERCIAL

## 2019-04-27 ENCOUNTER — TRANSCRIBE ORDERS (OUTPATIENT)
Dept: LAB | Facility: HOSPITAL | Age: 79
End: 2019-04-27

## 2019-04-27 DIAGNOSIS — I10 ESSENTIAL HYPERTENSION, MALIGNANT: ICD-10-CM

## 2019-04-27 DIAGNOSIS — M81.0 SENILE OSTEOPOROSIS: ICD-10-CM

## 2019-04-27 DIAGNOSIS — I10 ESSENTIAL HYPERTENSION, MALIGNANT: Primary | ICD-10-CM

## 2019-04-27 LAB
ALBUMIN SERPL BCP-MCNC: 3.5 G/DL (ref 3.5–5)
ALP SERPL-CCNC: 74 U/L (ref 46–116)
ALT SERPL W P-5'-P-CCNC: 15 U/L (ref 12–78)
ANION GAP SERPL CALCULATED.3IONS-SCNC: 5 MMOL/L (ref 4–13)
AST SERPL W P-5'-P-CCNC: 12 U/L (ref 5–45)
BILIRUB SERPL-MCNC: 0.34 MG/DL (ref 0.2–1)
BUN SERPL-MCNC: 20 MG/DL (ref 5–25)
CALCIUM SERPL-MCNC: 9.3 MG/DL (ref 8.3–10.1)
CHLORIDE SERPL-SCNC: 111 MMOL/L (ref 100–108)
CO2 SERPL-SCNC: 24 MMOL/L (ref 21–32)
CREAT SERPL-MCNC: 1.48 MG/DL (ref 0.6–1.3)
GFR SERPL CREATININE-BSD FRML MDRD: 34 ML/MIN/1.73SQ M
GLUCOSE P FAST SERPL-MCNC: 104 MG/DL (ref 65–99)
POTASSIUM SERPL-SCNC: 3.6 MMOL/L (ref 3.5–5.3)
PROT SERPL-MCNC: 7.4 G/DL (ref 6.4–8.2)
SODIUM SERPL-SCNC: 140 MMOL/L (ref 136–145)

## 2019-04-27 PROCEDURE — 80053 COMPREHEN METABOLIC PANEL: CPT

## 2019-04-27 PROCEDURE — 36415 COLL VENOUS BLD VENIPUNCTURE: CPT

## 2019-05-09 RX ORDER — PHENAZOPYRIDINE HYDROCHLORIDE 100 MG/1
TABLET, FILM COATED ORAL
COMMUNITY
Start: 2019-03-14 | End: 2019-08-30

## 2019-05-09 RX ORDER — LIDOCAINE 50 MG/G
OINTMENT TOPICAL
COMMUNITY
Start: 2019-03-14 | End: 2021-09-02 | Stop reason: ALTCHOICE

## 2019-05-14 ENCOUNTER — OFFICE VISIT (OUTPATIENT)
Dept: OBGYN CLINIC | Facility: HOSPITAL | Age: 79
End: 2019-05-14
Payer: COMMERCIAL

## 2019-05-14 VITALS
DIASTOLIC BLOOD PRESSURE: 70 MMHG | WEIGHT: 133 LBS | HEART RATE: 91 BPM | BODY MASS INDEX: 24.48 KG/M2 | HEIGHT: 62 IN | SYSTOLIC BLOOD PRESSURE: 115 MMHG

## 2019-05-14 DIAGNOSIS — M25.551 RIGHT HIP PAIN: ICD-10-CM

## 2019-05-14 DIAGNOSIS — M70.61 TROCHANTERIC BURSITIS OF RIGHT HIP: Primary | ICD-10-CM

## 2019-05-14 PROCEDURE — 20610 DRAIN/INJ JOINT/BURSA W/O US: CPT | Performed by: ORTHOPAEDIC SURGERY

## 2019-05-14 PROCEDURE — 99213 OFFICE O/P EST LOW 20 MIN: CPT | Performed by: ORTHOPAEDIC SURGERY

## 2019-05-14 RX ORDER — BETAMETHASONE SODIUM PHOSPHATE AND BETAMETHASONE ACETATE 3; 3 MG/ML; MG/ML
6 INJECTION, SUSPENSION INTRA-ARTICULAR; INTRALESIONAL; INTRAMUSCULAR; SOFT TISSUE
Status: COMPLETED | OUTPATIENT
Start: 2019-05-14 | End: 2019-05-14

## 2019-05-14 RX ORDER — LIDOCAINE HYDROCHLORIDE 20 MG/ML
2 INJECTION, SOLUTION EPIDURAL; INFILTRATION; INTRACAUDAL; PERINEURAL
Status: COMPLETED | OUTPATIENT
Start: 2019-05-14 | End: 2019-05-14

## 2019-05-14 RX ORDER — BUPIVACAINE HYDROCHLORIDE 2.5 MG/ML
2 INJECTION, SOLUTION INFILTRATION; PERINEURAL
Status: COMPLETED | OUTPATIENT
Start: 2019-05-14 | End: 2019-05-14

## 2019-05-14 RX ADMIN — LIDOCAINE HYDROCHLORIDE 2 ML: 20 INJECTION, SOLUTION EPIDURAL; INFILTRATION; INTRACAUDAL; PERINEURAL at 10:08

## 2019-05-14 RX ADMIN — BETAMETHASONE SODIUM PHOSPHATE AND BETAMETHASONE ACETATE 6 MG: 3; 3 INJECTION, SUSPENSION INTRA-ARTICULAR; INTRALESIONAL; INTRAMUSCULAR; SOFT TISSUE at 10:08

## 2019-05-14 RX ADMIN — BUPIVACAINE HYDROCHLORIDE 2 ML: 2.5 INJECTION, SOLUTION INFILTRATION; PERINEURAL at 10:08

## 2019-05-20 ENCOUNTER — DOCUMENTATION (OUTPATIENT)
Dept: BEHAVIORAL/MENTAL HEALTH CLINIC | Facility: CLINIC | Age: 79
End: 2019-05-20

## 2019-05-20 ENCOUNTER — OFFICE VISIT (OUTPATIENT)
Dept: BEHAVIORAL/MENTAL HEALTH CLINIC | Facility: CLINIC | Age: 79
End: 2019-05-20
Payer: COMMERCIAL

## 2019-05-20 DIAGNOSIS — F43.20 ADJUSTMENT DISORDER, UNSPECIFIED TYPE: Primary | ICD-10-CM

## 2019-05-20 PROCEDURE — 90834 PSYTX W PT 45 MINUTES: CPT | Performed by: SOCIAL WORKER

## 2019-05-28 DIAGNOSIS — N17.9 AKI (ACUTE KIDNEY INJURY) (HCC): ICD-10-CM

## 2019-05-28 DIAGNOSIS — I10 ESSENTIAL HYPERTENSION: ICD-10-CM

## 2019-05-28 DIAGNOSIS — R00.0 TACHYCARDIA: ICD-10-CM

## 2019-05-28 RX ORDER — METOPROLOL SUCCINATE 25 MG/1
25 TABLET, EXTENDED RELEASE ORAL DAILY
Qty: 90 TABLET | Refills: 3 | Status: SHIPPED | OUTPATIENT
Start: 2019-05-28 | End: 2020-04-02

## 2019-06-17 ENCOUNTER — OFFICE VISIT (OUTPATIENT)
Dept: BEHAVIORAL/MENTAL HEALTH CLINIC | Facility: CLINIC | Age: 79
End: 2019-06-17
Payer: COMMERCIAL

## 2019-06-17 DIAGNOSIS — F43.20 ADJUSTMENT DISORDER, UNSPECIFIED TYPE: Primary | ICD-10-CM

## 2019-06-17 PROCEDURE — 90834 PSYTX W PT 45 MINUTES: CPT | Performed by: SOCIAL WORKER

## 2019-07-10 ENCOUNTER — APPOINTMENT (OUTPATIENT)
Dept: LAB | Facility: HOSPITAL | Age: 79
End: 2019-07-10
Attending: INTERNAL MEDICINE
Payer: COMMERCIAL

## 2019-07-10 DIAGNOSIS — N18.30 CHRONIC KIDNEY DISEASE, STAGE III (MODERATE) (HCC): ICD-10-CM

## 2019-07-10 DIAGNOSIS — E83.52 HYPERCALCEMIA: ICD-10-CM

## 2019-07-10 DIAGNOSIS — N18.30 ANEMIA OF CHRONIC RENAL FAILURE, STAGE 3 (MODERATE) (HCC): ICD-10-CM

## 2019-07-10 DIAGNOSIS — I12.9 HYPERTENSIVE CHRONIC KIDNEY DISEASE WITH STAGE 1 THROUGH STAGE 4 CHRONIC KIDNEY DISEASE, OR UNSPECIFIED CHRONIC KIDNEY DISEASE: ICD-10-CM

## 2019-07-10 DIAGNOSIS — E78.5 DYSLIPIDEMIA: ICD-10-CM

## 2019-07-10 DIAGNOSIS — E55.9 VITAMIN D DEFICIENCY: ICD-10-CM

## 2019-07-10 DIAGNOSIS — D63.1 ANEMIA OF CHRONIC RENAL FAILURE, STAGE 3 (MODERATE) (HCC): ICD-10-CM

## 2019-07-10 DIAGNOSIS — E61.1 IRON DEFICIENCY: ICD-10-CM

## 2019-07-10 LAB
25(OH)D3 SERPL-MCNC: 34.9 NG/ML (ref 30–100)
ALBUMIN SERPL BCP-MCNC: 3.7 G/DL (ref 3.5–5)
ALP SERPL-CCNC: 60 U/L (ref 46–116)
ALT SERPL W P-5'-P-CCNC: 19 U/L (ref 12–78)
ANION GAP SERPL CALCULATED.3IONS-SCNC: 8 MMOL/L (ref 4–13)
AST SERPL W P-5'-P-CCNC: 14 U/L (ref 5–45)
BILIRUB SERPL-MCNC: 0.47 MG/DL (ref 0.2–1)
BUN SERPL-MCNC: 20 MG/DL (ref 5–25)
CALCIUM SERPL-MCNC: 8.9 MG/DL (ref 8.3–10.1)
CHLORIDE SERPL-SCNC: 111 MMOL/L (ref 100–108)
CO2 SERPL-SCNC: 21 MMOL/L (ref 21–32)
CREAT SERPL-MCNC: 1.24 MG/DL (ref 0.6–1.3)
CREAT UR-MCNC: 106 MG/DL
ERYTHROCYTE [DISTWIDTH] IN BLOOD BY AUTOMATED COUNT: 13.1 % (ref 11.6–15.1)
FERRITIN SERPL-MCNC: 54 NG/ML (ref 8–388)
GFR SERPL CREATININE-BSD FRML MDRD: 42 ML/MIN/1.73SQ M
GLUCOSE P FAST SERPL-MCNC: 85 MG/DL (ref 65–99)
HCT VFR BLD AUTO: 33.2 % (ref 34.8–46.1)
HGB BLD-MCNC: 10.4 G/DL (ref 11.5–15.4)
IRON SATN MFR SERPL: 27 %
IRON SERPL-MCNC: 81 UG/DL (ref 50–170)
MAGNESIUM SERPL-MCNC: 2.2 MG/DL (ref 1.6–2.6)
MCH RBC QN AUTO: 29.5 PG (ref 26.8–34.3)
MCHC RBC AUTO-ENTMCNC: 31.3 G/DL (ref 31.4–37.4)
MCV RBC AUTO: 94 FL (ref 82–98)
PHOSPHATE SERPL-MCNC: 3.9 MG/DL (ref 2.3–4.1)
PLATELET # BLD AUTO: 238 THOUSANDS/UL (ref 149–390)
PMV BLD AUTO: 10.5 FL (ref 8.9–12.7)
POTASSIUM SERPL-SCNC: 3.6 MMOL/L (ref 3.5–5.3)
PROT SERPL-MCNC: 7 G/DL (ref 6.4–8.2)
PROT UR-MCNC: 48 MG/DL
PROT/CREAT UR: 0.45 MG/G{CREAT} (ref 0–0.1)
PTH-INTACT SERPL-MCNC: 16.2 PG/ML (ref 18.4–80.1)
RBC # BLD AUTO: 3.53 MILLION/UL (ref 3.81–5.12)
SODIUM SERPL-SCNC: 140 MMOL/L (ref 136–145)
TIBC SERPL-MCNC: 302 UG/DL (ref 250–450)
WBC # BLD AUTO: 7.01 THOUSAND/UL (ref 4.31–10.16)

## 2019-07-10 PROCEDURE — 84100 ASSAY OF PHOSPHORUS: CPT

## 2019-07-10 PROCEDURE — 82306 VITAMIN D 25 HYDROXY: CPT

## 2019-07-10 PROCEDURE — 83970 ASSAY OF PARATHORMONE: CPT

## 2019-07-10 PROCEDURE — 84156 ASSAY OF PROTEIN URINE: CPT

## 2019-07-10 PROCEDURE — 83735 ASSAY OF MAGNESIUM: CPT

## 2019-07-10 PROCEDURE — 82570 ASSAY OF URINE CREATININE: CPT

## 2019-07-10 PROCEDURE — 85027 COMPLETE CBC AUTOMATED: CPT

## 2019-07-10 PROCEDURE — 83550 IRON BINDING TEST: CPT

## 2019-07-10 PROCEDURE — 36415 COLL VENOUS BLD VENIPUNCTURE: CPT

## 2019-07-10 PROCEDURE — 80053 COMPREHEN METABOLIC PANEL: CPT

## 2019-07-10 PROCEDURE — 83540 ASSAY OF IRON: CPT

## 2019-07-10 PROCEDURE — 82728 ASSAY OF FERRITIN: CPT

## 2019-07-15 ENCOUNTER — SOCIAL WORK (OUTPATIENT)
Dept: BEHAVIORAL/MENTAL HEALTH CLINIC | Facility: CLINIC | Age: 79
End: 2019-07-15
Payer: COMMERCIAL

## 2019-07-15 DIAGNOSIS — F43.20 ADJUSTMENT DISORDER, UNSPECIFIED TYPE: Primary | ICD-10-CM

## 2019-07-15 PROCEDURE — 90834 PSYTX W PT 45 MINUTES: CPT | Performed by: SOCIAL WORKER

## 2019-07-15 NOTE — PSYCH
Psychotherapy Provided: Individual Psychotherapy 45 minutes "It was not a good day on Friday (July 12, 2019) " states that date was five year anniversary of the passing of her granddaughter, Aicha Curran, from a reported drug overdose; She reviewed some of what she recalled on the day of her granddaughter's passing; states July 3 "was the last time Jess Gresham (her daughter) was with Aicha Curran; "They Dorys Jimenes and Aicha Curran) had a good day, and that was it  Then on the 7th (of July), she (Aicha Curran) went into cardiac arrest " "We are keeping up with the [facility] " "I feel she (her grandaughter) is better off " "I think she (the new Temple ) is trying to bring back traditions in the Temple "  She noted she is missing the  who has changed churches within the denomination within the past two months  She elaborated on her job with some of the reported frustrations  Discussed/reviewed her ongoing efforts to put things in perspective regarding what she can handle and what she would need to address re her personal life and work life; discussed/reviewed her efforts at self-care regarding her ongoing bereavement regarding her granddaughter;  A: presents as making efforts to identify the positives in her life despite the reports challenges; P: (G#1) will continue with self-care;     Length of time in session: 45 minutes, follow up in 1 month    Goals addressed in session: Goal 1     Pain:      none    0    Current suicide risk : Rosemary 1153: Diagnosis and Treatment Plan explained to [place] relates understanding diagnosis and is agreeable to Treatment Plan   Yes

## 2019-07-17 NOTE — PROGRESS NOTES
RENAL FOLLOW UP NOTE: td    ASSESSMENT AND PLAN:  1   CKD stage 3 :  · Etiology:  AK I from Bactrim/hypercalcemia along with poor oral intake  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/arteriolar nephrosclerosis  · Baseline creatinine:  1 1 -1 79  · Current creatinine:  1 24 within her normal range at this time  · Urine protein creatinine ratio:  0 45 g at goal  Recommendations:  · Treat hypertension-please see below  · Treat dyslipidemia-please see below  · Maintain proteinuria less than 1 g or as low as possible  · Avoid nephrotoxic agents such as NSAIDs, patient counseled as such  2   Volume:  Euvolemic  3  Hypertension:  Secondary workup:  Was negative  · Home readings:  None at this time  Readings in the office or actually low although no orthostatic symptoms  · Goal blood pressure:  Less than 135/85  Recommendations:  ·  Push nonmedical regimen especially weight loss/isotonic exercise and low-salt diet  · I would decrease amlodipine to 5 mg at nighttime only and recheck blood pressures at home  4   Electrolytes:    · Mild metabolic acidosis:  Seems to have resolved at this time  · Borderline hypokalemia:  CONSIDER SPIRONOLACTONE AND A HIGH POTASSIUM DIET  5  Mineral bone disorder:  · Calcium/magnesium/phosphorus:  All acceptable including a calcium 8 9  · PTH intact:   16 2 which is actually low  · Vitamin-D:  34 9 at goal on supplementation  6  Dyslipidemia:  · Goal LDL:  Less than 100  · Current lipid profile:  LDL 68//triglycerides 61   from 03/07/2019  Recommendations:  No treatment  Repeat next visit  7  Anemia:   · Hemoglobin:  Slight decrease to 10 4 but this is been ongoing  · Iron studies:  Saturation 27%/ferritin 54:  REPLETE  · Stool for occult blood x3  · Multiple myeloma evaluation 10/2018 was negative  · TO CONSIDER GI EVALUATION  · TO CONSIDER HEMATOLOGY EVALUATION  8    Other problems:  · GERD  · Osteoarthritis of right hip       PATIENT INSTRUCTIONS:    Patient Instructions   1  Medication changes today:  -please decrease amlodipine to just 5 mg at bedtime  -please start spironolactone 25 mg daily:  Please call if you developed any breast soreness or tenderness    2  Please eat a high potassium diet: We will give you a diet sheet to help with this  In particular fruits and vegetables are good for potassium  3  Please go for nonfasting lab work 1-2 weeks after making the above medication changes  4  Please wait 2 weeks after making the medication changes, and then send in 1 week a blood pressure readings morning evening:  -please do the morning readings before taking any medications  -please do the evening readings before bedtime and before taking any of your nighttime medications      5  We will set up iron infusion to supplement your iron through the outpatient infusion center  6  I am going to consult Gastroenterology evaluation given low iron studies and your low blood count/anemia which we will help to set up  7  We will also consult hematology to also guide us regarding her low blood count/anemia; again we will help this at this appointment up  8   Please go for a repeat CBC/blood count in 1 month nonfasting    9  Follow-up in 4 months:  -please bring in 1 week a blood pressure readings morning evening, sitting and standing:  · Take the morning readings before any medications  · Take the evening readings closer to bedtime  · When taking standing readings, keep your arm supported at heart level and not dangling  -please go for fasting lab work prior to your appointment    10  General instructions:  -avoid salt  -avoid medications such as Motrin, Naprosyn, ibuprofen, Aleve or Advil or Celebrex as they can affect your kidney function; you can use Tylenol as needed for pain or fevers if you have no liver problems  -avoid medications such as Sudafed or other medications with decongestants as they can raise your blood pressure  -try to exercise at least 30 minutes at least 3 days a week with an ultimate goal of 5 days a week           Subjective: The patient overall is feeling well  No fevers chills cough or colds  Good appetite and good energy  No urinary symptoms including foamy urine or blood in the urine  No gastrointestinal symptoms  No cardiovascular symptoms including swelling of the legs  No headaches, dizziness or lightheadedness  Blood pressure medications:  -Toprol XL 25 mg daily  -amlodipine 5 mg twice a day    ROS:  See HPI, otherwise review of systems as completely reviewed with the patient are negative    Past Medical History:   Diagnosis Date    GERD (gastroesophageal reflux disease) 4/4/2016    Osteoarthritis of hip 4/4/2016    SI (sacroiliac) joint inflammation ([facility] [address] ) 4/4/2016    Substance addiction ([facility] )      Past Surgical History:   Procedure Laterality Date    BREAST EXCISIONAL BIOPSY Left 2000    benign    JOINT REPLACEMENT      right hip 8/15     Family History   Problem Relation Age of Onset    Breast cancer Daughter 48    Breast cancer Maternal Aunt 79      reports that she has never smoked  She has never used smokeless tobacco  She reports that she has current or past drug history  Drug: Prescription  She reports that she does not drink alcohol  I COMPLETELY REVIEWED THE PAST MEDICAL HISTORY/PAST SURGICAL HISTORY/SOCIAL HISTORY/FAMILY HISTORY/AND MEDICATIONS  AND UPDATED ALL    Objective:     Vitals:   Vitals:    07/18/19 0942   BP: 106/54   Pulse: 85   Resp: 18    BP sitting on left:  114/66 with a heart rate of 88 and regular  BP standing on left:  118/66 with a heart rate of 96 and regular    Weight (last 2 days)     Date/Time   Weight    07/18/19 0942   61 7 (136)            Wt Readings from Last 3 Encounters:   07/18/19 61 7 kg (136 lb)   05/14/19 60 3 kg (133 lb)   03/20/19 61 2 kg (135 lb)       Body mass index is 24 87 kg/m²      Physical Exam: General:  No acute distress  Skin:  No acute rash  Eyes: No scleral icterus, noninjected  ENT:  Moist mucous membranes  Neck:  Supple, no jugular venous distention  Back   No CVAT  Chest:  Clear to auscultation and percussion, good respiratory effort  CVS:  Regular rate and rhythm without a rub, murmurs or gallops  Abdomen:  Soft and nontender with normal bowel sounds  Extremities:  No cyanosis and no edema  Neuro:  Grossly intact  Psych:  Alert, oriented x3 and appropriate      Medications:    Current Outpatient Medications:     acetaminophen (TYLENOL) 325 mg tablet, 975 mg every 8 hours, Disp: 30 tablet, Rfl: 0    amLODIPine (NORVASC) 5 mg tablet, Take 1 tablet (5 mg total) by mouth daily at bedtime 1 tablet in the Morning, 1/2 in the evening , Disp: 180 tablet, Rfl: 3    atorvastatin (LIPITOR) 10 mg tablet, , Disp: , Rfl:     doxycycline hyclate (VIBRAMYCIN) 100 mg capsule, Take 100 mg by mouth 2 (two) times a day, Disp: , Rfl:     lidocaine (XYLOCAINE) 5 % ointment, , Disp: , Rfl:     metoprolol succinate (TOPROL-XL) 25 mg 24 hr tablet, Take 1 tablet (25 mg total) by mouth daily, Disp: 90 tablet, Rfl: 3    phenazopyridine (PYRIDIUM) 100 mg tablet, , Disp: , Rfl:     PREMARIN vaginal cream, , Disp: , Rfl:     ibuprofen (MOTRIN) 800 mg tablet, , Disp: , Rfl:     potassium-sodium phosphateS (K-PHOS,PHOSPHA 250) 155-852-130 mg tablet, Take 1 tablet by mouth 3 (three) times a day with meals for 2 days (Patient not taking: Reported on 3/11/2019), Disp: 6 tablet, Rfl: 0    spironolactone (ALDACTONE) 25 mg tablet, Take 1 tablet (25 mg total) by mouth daily, Disp: 30 tablet, Rfl: 5    Lab, Imaging and other studies: I have personally reviewed pertinent labs    Laboratory Results:  Results for orders placed or performed in visit on 07/10/19   Comprehensive metabolic panel   Result Value Ref Range    Sodium 140 136 - 145 mmol/L    Potassium 3 6 3 5 - 5 3 mmol/L    Chloride 111 (H) 100 - 108 mmol/L    CO2 21 21 - 32 mmol/L    ANION GAP 8 4 - 13 mmol/L    BUN 20 5 - 25 mg/dL    Creatinine 1 24 0 60 - 1 30 mg/dL    Glucose, Fasting 85 65 - 99 mg/dL    Calcium 8 9 8 3 - 10 1 mg/dL    AST 14 5 - 45 U/L    ALT 19 12 - 78 U/L    Alkaline Phosphatase 60 46 - 116 U/L    Total Protein 7 0 6 4 - 8 2 g/dL    Albumin 3 7 3 5 - 5 0 g/dL    Total Bilirubin 0 47 0 20 - 1 00 mg/dL    eGFR 42 ml/min/1 73sq m   CBC   Result Value Ref Range    WBC 7 01 4 31 - 10 16 Thousand/uL    RBC 3 53 (L) 3 81 - 5 12 Million/uL    Hemoglobin 10 4 (L) 11 5 - 15 4 g/dL    Hematocrit 33 2 (L) 34 8 - 46 1 %    MCV 94 82 - 98 fL    MCH 29 5 26 8 - 34 3 pg    MCHC 31 3 (L) 31 4 - 37 4 g/dL    RDW 13 1 11 6 - 15 1 %    Platelets 439 838 - 968 Thousands/uL    MPV 10 5 8 9 - 12 7 fL   Iron Saturation %   Result Value Ref Range    Iron Saturation 27 %    TIBC 302 250 - 450 ug/dL    Iron 81 50 - 170 ug/dL   Ferritin   Result Value Ref Range    Ferritin 54 8 - 388 ng/mL   Magnesium   Result Value Ref Range    Magnesium 2 2 1 6 - 2 6 mg/dL   Phosphorus   Result Value Ref Range    Phosphorus 3 9 2 3 - 4 1 mg/dL   Protein / creatinine ratio, urine   Result Value Ref Range    Creatinine, Ur 106 0 mg/dL    Protein Urine Random 48 mg/dL    Prot/Creat Ratio, Ur 0 45 (H) 0 00 - 0 10   Vitamin D 25 hydroxy   Result Value Ref Range    Vit D, 25-Hydroxy 34 9 30 0 - 100 0 ng/mL   PTH, intact   Result Value Ref Range    PTH 16 2 (L) 18 4 - 80 1 pg/mL             Invalid input(s): ALBUMIN      Radiology review:   chest X-ray    Ultrasound      Portions of the record may have been created with voice recognition software  Occasional wrong word or "sound a like" substitutions may have occurred due to the inherent limitations of voice recognition software  Read the chart carefully and recognize, using context, where substitutions have occurred

## 2019-07-18 ENCOUNTER — OFFICE VISIT (OUTPATIENT)
Dept: NEPHROLOGY | Facility: CLINIC | Age: 79
End: 2019-07-18
Payer: COMMERCIAL

## 2019-07-18 VITALS
BODY MASS INDEX: 25.03 KG/M2 | SYSTOLIC BLOOD PRESSURE: 106 MMHG | RESPIRATION RATE: 18 BRPM | HEIGHT: 62 IN | HEART RATE: 85 BPM | DIASTOLIC BLOOD PRESSURE: 54 MMHG | WEIGHT: 136 LBS

## 2019-07-18 DIAGNOSIS — E83.52 HYPERCALCEMIA: ICD-10-CM

## 2019-07-18 DIAGNOSIS — E87.6 HYPOKALEMIA: ICD-10-CM

## 2019-07-18 DIAGNOSIS — N18.30 CHRONIC KIDNEY DISEASE, STAGE III (MODERATE) (HCC): ICD-10-CM

## 2019-07-18 DIAGNOSIS — E55.9 VITAMIN D DEFICIENCY: ICD-10-CM

## 2019-07-18 DIAGNOSIS — E78.5 DYSLIPIDEMIA: ICD-10-CM

## 2019-07-18 DIAGNOSIS — I12.9 HYPERTENSIVE CHRONIC KIDNEY DISEASE WITH STAGE 1 THROUGH STAGE 4 CHRONIC KIDNEY DISEASE, OR UNSPECIFIED CHRONIC KIDNEY DISEASE: Primary | ICD-10-CM

## 2019-07-18 DIAGNOSIS — I10 ACCELERATED HYPERTENSION: ICD-10-CM

## 2019-07-18 DIAGNOSIS — E61.1 IRON DEFICIENCY: ICD-10-CM

## 2019-07-18 DIAGNOSIS — N18.30 ANEMIA OF CHRONIC RENAL FAILURE, STAGE 3 (MODERATE) (HCC): ICD-10-CM

## 2019-07-18 DIAGNOSIS — D63.1 ANEMIA OF CHRONIC RENAL FAILURE, STAGE 3 (MODERATE) (HCC): ICD-10-CM

## 2019-07-18 PROCEDURE — 3074F SYST BP LT 130 MM HG: CPT | Performed by: INTERNAL MEDICINE

## 2019-07-18 PROCEDURE — 99214 OFFICE O/P EST MOD 30 MIN: CPT | Performed by: INTERNAL MEDICINE

## 2019-07-18 RX ORDER — AMLODIPINE BESYLATE 5 MG/1
5 TABLET ORAL
Qty: 180 TABLET | Refills: 3 | Status: SHIPPED | OUTPATIENT
Start: 2019-07-18 | End: 2019-09-03

## 2019-07-18 RX ORDER — SODIUM CHLORIDE 9 MG/ML
20 INJECTION, SOLUTION INTRAVENOUS ONCE
Status: CANCELLED | OUTPATIENT
Start: 2019-07-22

## 2019-07-18 RX ORDER — SPIRONOLACTONE 25 MG/1
25 TABLET ORAL DAILY
Qty: 30 TABLET | Refills: 5 | Status: SHIPPED | OUTPATIENT
Start: 2019-07-18 | End: 2020-01-14 | Stop reason: SDUPTHER

## 2019-07-18 NOTE — PATIENT INSTRUCTIONS
1  Medication changes today:  -please decrease amlodipine to just 5 mg at bedtime  -please start spironolactone 25 mg daily:  Please call if you developed any breast soreness or tenderness    2  Please eat a high potassium diet: We will give you a diet sheet to help with this  In particular fruits and vegetables are good for potassium  3  Please go for nonfasting lab work 1-2 weeks after making the above medication changes  4  Please wait 2 weeks after making the medication changes, and then send in 1 week a blood pressure readings morning evening:  -please do the morning readings before taking any medications  -please do the evening readings before bedtime and before taking any of your nighttime medications      5  We will set up iron infusion to supplement your iron through the outpatient infusion center  6  I am going to consult Gastroenterology evaluation given low iron studies and your low blood count/anemia which we will help to set up  7  We will also consult hematology to also guide us regarding her low blood count/anemia; again we will help this at this appointment up  8   Please go for a repeat CBC/blood count in 1 month nonfasting    9  Follow-up in 4 months:  -please bring in 1 week a blood pressure readings morning evening, sitting and standing:  · Take the morning readings before any medications  · Take the evening readings closer to bedtime  · When taking standing readings, keep your arm supported at heart level and not dangling  -please go for fasting lab work prior to your appointment    10  General instructions:  -avoid salt  -avoid medications such as Motrin, Naprosyn, ibuprofen, Aleve or Advil or Celebrex as they can affect your kidney function; you can use Tylenol as needed for pain or fevers if you have no liver problems  -avoid medications such as Sudafed or other medications with decongestants as they can raise your blood pressure  -try to exercise at least 30 minutes at least 3 days a week with an ultimate goal of 5 days a week

## 2019-07-18 NOTE — LETTER
July 18, 2019     Yolande Johnson DO  [address]    Patient: Bladimir Laws   YOB: 1940   Date of Visit: 7/18/2019       Dear Dr Sj Pro: Thank you for referring Bladimir Laws to me for evaluation  Below are my notes for this consultation  If you have questions, please do not hesitate to call me  I look forward to following your patient along with you  Sincerely,        Nabil Rosen MD        CC: No Recipients  Nabil Rosen MD  7/18/2019 10:17 AM  Sign at close encounter  RENAL FOLLOW UP NOTE: td    ASSESSMENT AND PLAN:  1   CKD stage 3 :  · Etiology:  AK I from Bactrim/hypercalcemia along with poor oral intake  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/arteriolar nephrosclerosis  · Baseline creatinine:   1 1 -1 79  · Current creatinine:   1 24 within her normal range at this time  · Urine protein creatinine ratio:  0  45 g at goal  Recommendations:  · Treat hypertension-please see below  · Treat dyslipidemia-please see below  · Maintain proteinuria less than 1 g or as low as possible  · Avoid nephrotoxic agents such as NSAIDs, patient counseled as such  2   Volume:  Euvolemic  3  Hypertension:  Secondary workup:  Was negative  · Home readings:  None at this time  Readings in the office or actually low although no orthostatic symptoms  · Goal blood pressure:  Less than 135/85  Recommendations:  ·   Push nonmedical regimen especially weight loss/isotonic exercise and low-salt diet  · I would decrease amlodipine to 5 mg at nighttime only and recheck blood pressures at home  4   Electrolytes:    · Mild metabolic acidosis:  Seems to have resolved at this time  · Borderline hypokalemia:  CONSIDER SPIRONOLACTONE AND A HIGH POTASSIUM DIET  5    Mineral bone disorder:  · Calcium/magnesium/phosphorus:  All acceptable including a calcium 8 9  · PTH intact:   16 2 which is actually low  · Vitamin-D:   34 9 at goal on supplementation  6  Dyslipidemia:  · Goal LDL:  Less than 100  · Current lipid profile:  LDL 68//triglycerides 61    from 03/07/2019  Recommendations:  No treatment  Repeat next visit  7  Anemia:   · Hemoglobin:  Slight decrease to 10 4 but this is been ongoing  · Iron studies:  Saturation 27%/ferritin 54:  REPLETE  · Stool for occult blood x3  · Multiple myeloma evaluation 10/2018 was negative  · TO CONSIDER GI EVALUATION  · TO CONSIDER HEMATOLOGY EVALUATION  8  Other problems:  · GERD  · Osteoarthritis of right hip       PATIENT INSTRUCTIONS:    Patient Instructions   1  Medication changes today:  -please decrease amlodipine to just 5 mg at bedtime  -please start spironolactone 25 mg daily:  Please call if you developed any breast soreness or tenderness    2  Please eat a high potassium diet: We will give you a diet sheet to help with this  In particular fruits and vegetables are good for potassium  3  Please go for nonfasting lab work 1-2 weeks after making the above medication changes  4  Please wait 2 weeks after making the medication changes, and then send in 1 week a blood pressure readings morning evening:  -please do the morning readings before taking any medications  -please do the evening readings before bedtime and before taking any of your nighttime medications      5  We will set up iron infusion to supplement your iron through the outpatient infusion center  6  I am going to consult Gastroenterology evaluation given low iron studies and your low blood count/anemia which we will help to set up  7  We will also consult hematology to also guide us regarding her low blood count/anemia; again we will help this at this appointment up  8   Please go for a repeat CBC/blood count in 1 month nonfasting    9   Follow-up in 4 months:  -please bring in 1 week a blood pressure readings morning evening, sitting and standing:  · Take the morning readings before any medications  · Take the evening readings closer to bedtime  · When taking standing readings, keep your arm supported at heart level and not dangling  -please go for fasting lab work prior to your appointment    10  General instructions:  -avoid salt  -avoid medications such as Motrin, Naprosyn, ibuprofen, Aleve or Advil or Celebrex as they can affect your kidney function; you can use Tylenol as needed for pain or fevers if you have no liver problems  -avoid medications such as Sudafed or other medications with decongestants as they can raise your blood pressure  -try to exercise at least 30 minutes at least 3 days a week with an ultimate goal of 5 days a week           Subjective: The patient overall is feeling well  No fevers chills cough or colds  Good appetite and good energy  No urinary symptoms including foamy urine or blood in the urine  No gastrointestinal symptoms  No cardiovascular symptoms including swelling of the legs  No headaches, dizziness or lightheadedness  Blood pressure medications:  -Toprol XL 25 mg daily  -amlodipine 5 mg twice a day    ROS:  See HPI, otherwise review of systems as completely reviewed with the patient are negative    Past Medical History:   Diagnosis Date    GERD (gastroesophageal reflux disease) 4/4/2016    Osteoarthritis of hip 4/4/2016    SI (sacroiliac) joint inflammation ([facility] [address] ) 4/4/2016    Substance addiction ([facility] )      Past Surgical History:   Procedure Laterality Date    BREAST EXCISIONAL BIOPSY Left 2000    benign    JOINT REPLACEMENT      right hip 8/15     Family History   Problem Relation Age of Onset    Breast cancer Daughter 48    Breast cancer Maternal Aunt 79      reports that she has never smoked  She has never used smokeless tobacco  She reports that she has current or past drug history  Drug: Prescription  She reports that she does not drink alcohol      I COMPLETELY REVIEWED THE PAST MEDICAL HISTORY/PAST SURGICAL HISTORY/SOCIAL HISTORY/FAMILY HISTORY/AND MEDICATIONS  AND UPDATED ALL    Objective:     Vitals:   Vitals:    07/18/19 0942   BP: 106/54   Pulse: 85   Resp: 18    BP sitting on left:  114/66 with a heart rate of 88 and regular  BP standing on left:  118/66 with a heart rate of 96 and regular    Weight (last 2 days)     Date/Time   Weight    07/18/19 0942   61 7 (136)            Wt Readings from Last 3 Encounters:   07/18/19 61 7 kg (136 lb)   05/14/19 60 3 kg (133 lb)   03/20/19 61 2 kg (135 lb)       Body mass index is 24 87 kg/m²      Physical Exam: General:  No acute distress  Skin:  No acute rash  Eyes:  No scleral icterus, noninjected  ENT:  Moist mucous membranes  Neck:  Supple, no jugular venous distention  Back   No CVAT  Chest:  Clear to auscultation and percussion, good respiratory effort  CVS:  Regular rate and rhythm without a rub, murmurs or gallops  Abdomen:  Soft and nontender with normal bowel sounds  Extremities:  No cyanosis and no edema  Neuro:  Grossly intact  Psych:  Alert, oriented x3 and appropriate      Medications:    Current Outpatient Medications:     acetaminophen (TYLENOL) 325 mg tablet, 975 mg every 8 hours, Disp: 30 tablet, Rfl: 0    amLODIPine (NORVASC) 5 mg tablet, Take 1 tablet (5 mg total) by mouth daily at bedtime 1 tablet in the Morning, 1/2 in the evening , Disp: 180 tablet, Rfl: 3    atorvastatin (LIPITOR) 10 mg tablet, , Disp: , Rfl:     doxycycline hyclate (VIBRAMYCIN) 100 mg capsule, Take 100 mg by mouth 2 (two) times a day, Disp: , Rfl:     lidocaine (XYLOCAINE) 5 % ointment, , Disp: , Rfl:     metoprolol succinate (TOPROL-XL) 25 mg 24 hr tablet, Take 1 tablet (25 mg total) by mouth daily, Disp: 90 tablet, Rfl: 3    phenazopyridine (PYRIDIUM) 100 mg tablet, , Disp: , Rfl:     PREMARIN vaginal cream, , Disp: , Rfl:     ibuprofen (MOTRIN) 800 mg tablet, , Disp: , Rfl:     potassium-sodium phosphateS (K-PHOS,PHOSPHA 250) 155-852-130 mg tablet, Take 1 tablet by mouth 3 (three) times a day with meals for 2 days (Patient not taking: Reported on 3/11/2019), Disp: 6 tablet, Rfl: 0    spironolactone (ALDACTONE) 25 mg tablet, Take 1 tablet (25 mg total) by mouth daily, Disp: 30 tablet, Rfl: 5    Lab, Imaging and other studies: I have personally reviewed pertinent labs    Laboratory Results:  Results for orders placed or performed in visit on 07/10/19   Comprehensive metabolic panel   Result Value Ref Range    Sodium 140 136 - 145 mmol/L    Potassium 3 6 3 5 - 5 3 mmol/L    Chloride 111 (H) 100 - 108 mmol/L    CO2 21 21 - 32 mmol/L    ANION GAP 8 4 - 13 mmol/L    BUN 20 5 - 25 mg/dL    Creatinine 1 24 0 60 - 1 30 mg/dL    Glucose, Fasting 85 65 - 99 mg/dL    Calcium 8 9 8 3 - 10 1 mg/dL    AST 14 5 - 45 U/L    ALT 19 12 - 78 U/L    Alkaline Phosphatase 60 46 - 116 U/L    Total Protein 7 0 6 4 - 8 2 g/dL    Albumin 3 7 3 5 - 5 0 g/dL    Total Bilirubin 0 47 0 20 - 1 00 mg/dL    eGFR 42 ml/min/1 73sq m   CBC   Result Value Ref Range    WBC 7 01 4 31 - 10 16 Thousand/uL    RBC 3 53 (L) 3 81 - 5 12 Million/uL    Hemoglobin 10 4 (L) 11 5 - 15 4 g/dL    Hematocrit 33 2 (L) 34 8 - 46 1 %    MCV 94 82 - 98 fL    MCH 29 5 26 8 - 34 3 pg    MCHC 31 3 (L) 31 4 - 37 4 g/dL    RDW 13 1 11 6 - 15 1 %    Platelets 661 161 - 275 Thousands/uL    MPV 10 5 8 9 - 12 7 fL   Iron Saturation %   Result Value Ref Range    Iron Saturation 27 %    TIBC 302 250 - 450 ug/dL    Iron 81 50 - 170 ug/dL   Ferritin   Result Value Ref Range    Ferritin 54 8 - 388 ng/mL   Magnesium   Result Value Ref Range    Magnesium 2 2 1 6 - 2 6 mg/dL   Phosphorus   Result Value Ref Range    Phosphorus 3 9 2 3 - 4 1 mg/dL   Protein / creatinine ratio, urine   Result Value Ref Range    Creatinine, Ur 106 0 mg/dL    Protein Urine Random 48 mg/dL    Prot/Creat Ratio, Ur 0 45 (H) 0 00 - 0 10   Vitamin D 25 hydroxy   Result Value Ref Range    Vit D, 25-Hydroxy 34 9 30 0 - 100 0 ng/mL   PTH, intact   Result Value Ref Range    PTH 16 2 (L) 18 4 - 80 1 pg/mL             Invalid input(s): ALBUMIN      Radiology review:   chest X-ray    Ultrasound      Portions of the record may have been created with voice recognition software  Occasional wrong word or "sound a like" substitutions may have occurred due to the inherent limitations of voice recognition software  Read the chart carefully and recognize, using context, where substitutions have occurred

## 2019-07-26 PROBLEM — Z12.11 SCREENING FOR COLON CANCER: Status: ACTIVE | Noted: 2019-07-26

## 2019-07-29 ENCOUNTER — HOSPITAL ENCOUNTER (OUTPATIENT)
Dept: INFUSION CENTER | Facility: HOSPITAL | Age: 79
Discharge: HOME | End: 2019-07-29
Attending: INTERNAL MEDICINE
Payer: COMMERCIAL

## 2019-07-29 VITALS
HEART RATE: 76 BPM | DIASTOLIC BLOOD PRESSURE: 62 MMHG | RESPIRATION RATE: 20 BRPM | SYSTOLIC BLOOD PRESSURE: 128 MMHG | TEMPERATURE: 97.6 F

## 2019-07-29 DIAGNOSIS — I10 ACCELERATED HYPERTENSION: ICD-10-CM

## 2019-07-29 DIAGNOSIS — E87.6 HYPOKALEMIA: ICD-10-CM

## 2019-07-29 DIAGNOSIS — N18.30 CHRONIC KIDNEY DISEASE, STAGE III (MODERATE) (HCC): ICD-10-CM

## 2019-07-29 DIAGNOSIS — N39.0 ENTEROCOCCUS UTI: ICD-10-CM

## 2019-07-29 DIAGNOSIS — D63.1 ANEMIA OF CHRONIC RENAL FAILURE, STAGE 3 (MODERATE) (HCC): ICD-10-CM

## 2019-07-29 DIAGNOSIS — E83.42 HYPOMAGNESEMIA: ICD-10-CM

## 2019-07-29 DIAGNOSIS — Z96.641 HISTORY OF TOTAL RIGHT HIP REPLACEMENT: ICD-10-CM

## 2019-07-29 DIAGNOSIS — M25.551 RIGHT HIP PAIN: ICD-10-CM

## 2019-07-29 DIAGNOSIS — E78.5 DYSLIPIDEMIA: ICD-10-CM

## 2019-07-29 DIAGNOSIS — B95.2 ENTEROCOCCUS UTI: ICD-10-CM

## 2019-07-29 DIAGNOSIS — E55.9 VITAMIN D DEFICIENCY: ICD-10-CM

## 2019-07-29 DIAGNOSIS — M70.61 TROCHANTERIC BURSITIS OF RIGHT HIP: ICD-10-CM

## 2019-07-29 DIAGNOSIS — E83.39 HYPOPHOSPHATEMIA: ICD-10-CM

## 2019-07-29 DIAGNOSIS — R33.9 URINARY RETENTION: ICD-10-CM

## 2019-07-29 DIAGNOSIS — E83.52 HYPERCALCEMIA: Primary | ICD-10-CM

## 2019-07-29 DIAGNOSIS — I12.9 HYPERTENSIVE CHRONIC KIDNEY DISEASE WITH STAGE 1 THROUGH STAGE 4 CHRONIC KIDNEY DISEASE, OR UNSPECIFIED CHRONIC KIDNEY DISEASE: ICD-10-CM

## 2019-07-29 DIAGNOSIS — E61.1 IRON DEFICIENCY: ICD-10-CM

## 2019-07-29 DIAGNOSIS — M54.16 LEFT LUMBAR RADICULOPATHY: ICD-10-CM

## 2019-07-29 DIAGNOSIS — M46.1 SI (SACROILIAC) JOINT INFLAMMATION (HCC): ICD-10-CM

## 2019-07-29 DIAGNOSIS — N17.9 AKI (ACUTE KIDNEY INJURY) (HCC): ICD-10-CM

## 2019-07-29 DIAGNOSIS — N18.30 ANEMIA OF CHRONIC RENAL FAILURE, STAGE 3 (MODERATE) (HCC): ICD-10-CM

## 2019-07-29 DIAGNOSIS — G93.40 ENCEPHALOPATHY: ICD-10-CM

## 2019-07-29 PROCEDURE — 96365 THER/PROPH/DIAG IV INF INIT: CPT

## 2019-07-29 RX ORDER — SODIUM CHLORIDE 9 MG/ML
20 INJECTION, SOLUTION INTRAVENOUS ONCE
Status: CANCELLED | OUTPATIENT
Start: 2019-08-05

## 2019-07-29 RX ORDER — SODIUM CHLORIDE 9 MG/ML
20 INJECTION, SOLUTION INTRAVENOUS ONCE
Status: COMPLETED | OUTPATIENT
Start: 2019-07-29 | End: 2019-07-29

## 2019-07-29 RX ADMIN — FERUMOXYTOL 510 MG: 510 INJECTION INTRAVENOUS at 08:51

## 2019-07-29 RX ADMIN — SODIUM CHLORIDE 20 ML/HR: 0.9 INJECTION, SOLUTION INTRAVENOUS at 08:51
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
## 2019-07-29 NOTE — PLAN OF CARE
Problem: Potential for Falls  Goal: Patient will remain free of falls  Description  INTERVENTIONS:  - Assess patient frequently for physical needs  -  Identify cognitive and physical deficits and behaviors that affect risk of falls    -  [place] fall precautions as indicated by assessment   - Educate patient/family on patient safety including physical limitations  - Instruct patient to call for assistance with activity based on assessment  - Modify environment to reduce risk of injury  - Consider OT/PT consult to assist with strengthening/mobility  Outcome: Progressing

## 2019-07-31 ENCOUNTER — LAB REQUISITION (OUTPATIENT)
Dept: LAB | Facility: HOSPITAL | Age: 79
End: 2019-07-31
Payer: COMMERCIAL

## 2019-07-31 ENCOUNTER — TELEPHONE (OUTPATIENT)
Dept: NEPHROLOGY | Facility: CLINIC | Age: 79
End: 2019-07-31

## 2019-07-31 ENCOUNTER — TELEPHONE (OUTPATIENT)
Dept: OTHER | Facility: HOSPITAL | Age: 79
End: 2019-07-31

## 2019-07-31 DIAGNOSIS — N30.10 CHRONIC INTERSTITIAL CYSTITIS WITHOUT HEMATURIA: ICD-10-CM

## 2019-07-31 PROCEDURE — 87186 SC STD MICRODIL/AGAR DIL: CPT | Performed by: OBSTETRICS & GYNECOLOGY

## 2019-07-31 PROCEDURE — 87086 URINE CULTURE/COLONY COUNT: CPT | Performed by: OBSTETRICS & GYNECOLOGY

## 2019-07-31 PROCEDURE — 87077 CULTURE AEROBIC IDENTIFY: CPT | Performed by: OBSTETRICS & GYNECOLOGY

## 2019-07-31 NOTE — TELEPHONE ENCOUNTER
I called the patient regarding her urinary symptoms: now seen by urogynecology who is ruling out a UTI and will let me know how she is feeling  She was wondering if it could be the spironolactone but I told her that typically not a true diuretic but more potassium-sparing  She agrees with the plan

## 2019-07-31 NOTE — TELEPHONE ENCOUNTER
The patient called the office this afternoon stating that Dr Clive Jaquez put her on Spironolactone and she isn't doing well with being put on a water pill  She also thinks that she could possibly have a UTI

## 2019-07-31 NOTE — TELEPHONE ENCOUNTER
She just called back and said she has not heard back from anyone yet  Can you please give her a phone call back  Her call back number is [phone]  Thank you!

## 2019-08-02 LAB — BACTERIA UR CULT: ABNORMAL

## 2019-08-05 ENCOUNTER — HOSPITAL ENCOUNTER (OUTPATIENT)
Dept: INFUSION CENTER | Facility: HOSPITAL | Age: 79
Discharge: HOME | End: 2019-08-05
Attending: INTERNAL MEDICINE
Payer: COMMERCIAL

## 2019-08-05 VITALS
DIASTOLIC BLOOD PRESSURE: 59 MMHG | TEMPERATURE: 96.7 F | HEART RATE: 86 BPM | RESPIRATION RATE: 18 BRPM | SYSTOLIC BLOOD PRESSURE: 118 MMHG

## 2019-08-05 DIAGNOSIS — E55.9 VITAMIN D DEFICIENCY: ICD-10-CM

## 2019-08-05 DIAGNOSIS — E78.5 DYSLIPIDEMIA: ICD-10-CM

## 2019-08-05 DIAGNOSIS — R33.9 URINARY RETENTION: ICD-10-CM

## 2019-08-05 DIAGNOSIS — E83.42 HYPOMAGNESEMIA: ICD-10-CM

## 2019-08-05 DIAGNOSIS — B95.2 ENTEROCOCCUS UTI: ICD-10-CM

## 2019-08-05 DIAGNOSIS — N39.0 ENTEROCOCCUS UTI: ICD-10-CM

## 2019-08-05 DIAGNOSIS — N18.30 CHRONIC KIDNEY DISEASE, STAGE III (MODERATE) (HCC): ICD-10-CM

## 2019-08-05 DIAGNOSIS — M25.551 RIGHT HIP PAIN: ICD-10-CM

## 2019-08-05 DIAGNOSIS — E83.39 HYPOPHOSPHATEMIA: ICD-10-CM

## 2019-08-05 DIAGNOSIS — M70.61 TROCHANTERIC BURSITIS OF RIGHT HIP: ICD-10-CM

## 2019-08-05 DIAGNOSIS — I10 ACCELERATED HYPERTENSION: ICD-10-CM

## 2019-08-05 DIAGNOSIS — E87.6 HYPOKALEMIA: ICD-10-CM

## 2019-08-05 DIAGNOSIS — Z96.641 HISTORY OF TOTAL RIGHT HIP REPLACEMENT: ICD-10-CM

## 2019-08-05 DIAGNOSIS — G93.40 ENCEPHALOPATHY: ICD-10-CM

## 2019-08-05 DIAGNOSIS — D63.1 ANEMIA OF CHRONIC RENAL FAILURE, STAGE 3 (MODERATE) (HCC): ICD-10-CM

## 2019-08-05 DIAGNOSIS — M46.1 SI (SACROILIAC) JOINT INFLAMMATION (HCC): ICD-10-CM

## 2019-08-05 DIAGNOSIS — M54.16 LEFT LUMBAR RADICULOPATHY: ICD-10-CM

## 2019-08-05 DIAGNOSIS — N18.30 ANEMIA OF CHRONIC RENAL FAILURE, STAGE 3 (MODERATE) (HCC): ICD-10-CM

## 2019-08-05 DIAGNOSIS — I12.9 HYPERTENSIVE CHRONIC KIDNEY DISEASE WITH STAGE 1 THROUGH STAGE 4 CHRONIC KIDNEY DISEASE, OR UNSPECIFIED CHRONIC KIDNEY DISEASE: ICD-10-CM

## 2019-08-05 DIAGNOSIS — E61.1 IRON DEFICIENCY: Primary | ICD-10-CM

## 2019-08-05 DIAGNOSIS — E83.52 HYPERCALCEMIA: ICD-10-CM

## 2019-08-05 DIAGNOSIS — N17.9 AKI (ACUTE KIDNEY INJURY) (HCC): ICD-10-CM

## 2019-08-05 PROCEDURE — 96365 THER/PROPH/DIAG IV INF INIT: CPT

## 2019-08-05 RX ORDER — SODIUM CHLORIDE 9 MG/ML
20 INJECTION, SOLUTION INTRAVENOUS ONCE
Status: COMPLETED | OUTPATIENT
Start: 2019-08-05 | End: 2019-08-05

## 2019-08-05 RX ORDER — SODIUM CHLORIDE 9 MG/ML
20 INJECTION, SOLUTION INTRAVENOUS ONCE
Status: CANCELLED | OUTPATIENT
Start: 2019-08-05

## 2019-08-05 RX ADMIN — FERUMOXYTOL 510 MG: 510 INJECTION INTRAVENOUS at 09:36

## 2019-08-05 RX ADMIN — SODIUM CHLORIDE 20 ML/HR: 0.9 INJECTION, SOLUTION INTRAVENOUS at 09:16

## 2019-08-05 NOTE — PLAN OF CARE
Problem: Potential for Falls  Goal: Patient will remain free of falls  Description  INTERVENTIONS:  - Assess patient frequently for physical needs  -  Identify cognitive and physical deficits and behaviors that affect risk of falls    -  [place] fall precautions as indicated by assessment   - Educate patient/family on patient safety including physical limitations  - Instruct patient to call for assistance with activity based on assessment  - Modify environment to reduce risk of injury  - Consider OT/PT consult to assist with strengthening/mobility  Outcome: Progressing

## 2019-08-07 ENCOUNTER — TELEPHONE (OUTPATIENT)
Dept: NEPHROLOGY | Facility: CLINIC | Age: 79
End: 2019-08-07

## 2019-08-07 ENCOUNTER — APPOINTMENT (OUTPATIENT)
Dept: LAB | Facility: HOSPITAL | Age: 79
End: 2019-08-07
Attending: INTERNAL MEDICINE
Payer: COMMERCIAL

## 2019-08-07 DIAGNOSIS — E61.1 IRON DEFICIENCY: ICD-10-CM

## 2019-08-07 DIAGNOSIS — N18.30 CHRONIC KIDNEY DISEASE, STAGE III (MODERATE) (HCC): ICD-10-CM

## 2019-08-07 DIAGNOSIS — D63.1 ANEMIA OF CHRONIC RENAL FAILURE, STAGE 3 (MODERATE) (HCC): ICD-10-CM

## 2019-08-07 DIAGNOSIS — I12.9 HYPERTENSIVE CHRONIC KIDNEY DISEASE WITH STAGE 1 THROUGH STAGE 4 CHRONIC KIDNEY DISEASE, OR UNSPECIFIED CHRONIC KIDNEY DISEASE: ICD-10-CM

## 2019-08-07 DIAGNOSIS — E78.5 DYSLIPIDEMIA: ICD-10-CM

## 2019-08-07 DIAGNOSIS — N18.30 ANEMIA OF CHRONIC RENAL FAILURE, STAGE 3 (MODERATE) (HCC): ICD-10-CM

## 2019-08-07 DIAGNOSIS — E87.6 HYPOKALEMIA: ICD-10-CM

## 2019-08-07 DIAGNOSIS — I10 ACCELERATED HYPERTENSION: ICD-10-CM

## 2019-08-07 DIAGNOSIS — E55.9 VITAMIN D DEFICIENCY: ICD-10-CM

## 2019-08-07 DIAGNOSIS — E83.52 HYPERCALCEMIA: ICD-10-CM

## 2019-08-07 LAB
ANION GAP SERPL CALCULATED.3IONS-SCNC: 5 MMOL/L (ref 4–13)
BUN SERPL-MCNC: 27 MG/DL (ref 5–25)
CALCIUM SERPL-MCNC: 9.5 MG/DL (ref 8.3–10.1)
CHLORIDE SERPL-SCNC: 107 MMOL/L (ref 100–108)
CO2 SERPL-SCNC: 26 MMOL/L (ref 21–32)
CREAT SERPL-MCNC: 1.31 MG/DL (ref 0.6–1.3)
ERYTHROCYTE [DISTWIDTH] IN BLOOD BY AUTOMATED COUNT: 12.8 % (ref 11.6–15.1)
GFR SERPL CREATININE-BSD FRML MDRD: 39 ML/MIN/1.73SQ M
GLUCOSE SERPL-MCNC: 91 MG/DL (ref 65–140)
HCT VFR BLD AUTO: 34.9 % (ref 34.8–46.1)
HGB BLD-MCNC: 11.2 G/DL (ref 11.5–15.4)
MCH RBC QN AUTO: 29.9 PG (ref 26.8–34.3)
MCHC RBC AUTO-ENTMCNC: 32.1 G/DL (ref 31.4–37.4)
MCV RBC AUTO: 93 FL (ref 82–98)
PLATELET # BLD AUTO: 262 THOUSANDS/UL (ref 149–390)
PMV BLD AUTO: 10.4 FL (ref 8.9–12.7)
POTASSIUM SERPL-SCNC: 3.7 MMOL/L (ref 3.5–5.3)
RBC # BLD AUTO: 3.74 MILLION/UL (ref 3.81–5.12)
SODIUM SERPL-SCNC: 138 MMOL/L (ref 136–145)
WBC # BLD AUTO: 7.33 THOUSAND/UL (ref 4.31–10.16)

## 2019-08-07 PROCEDURE — 36415 COLL VENOUS BLD VENIPUNCTURE: CPT

## 2019-08-07 PROCEDURE — 85027 COMPLETE CBC AUTOMATED: CPT

## 2019-08-07 PROCEDURE — 80048 BASIC METABOLIC PNL TOTAL CA: CPT

## 2019-08-07 NOTE — TELEPHONE ENCOUNTER
----- Message from Kendra Gottlieb MD sent at 8/7/2019 11:50 AM EDT -----  Please let the patient know that labs overall looks stable including her hemoglobin which has improved

## 2019-08-14 ENCOUNTER — LAB REQUISITION (OUTPATIENT)
Dept: LAB | Facility: HOSPITAL | Age: 79
End: 2019-08-14
Payer: COMMERCIAL

## 2019-08-14 DIAGNOSIS — N30.10 CHRONIC INTERSTITIAL CYSTITIS WITHOUT HEMATURIA: ICD-10-CM

## 2019-08-14 PROCEDURE — 87186 SC STD MICRODIL/AGAR DIL: CPT | Performed by: OBSTETRICS & GYNECOLOGY

## 2019-08-14 PROCEDURE — 87077 CULTURE AEROBIC IDENTIFY: CPT | Performed by: OBSTETRICS & GYNECOLOGY

## 2019-08-14 PROCEDURE — 87086 URINE CULTURE/COLONY COUNT: CPT | Performed by: OBSTETRICS & GYNECOLOGY

## 2019-08-15 RX ORDER — FLUCONAZOLE 100 MG/1
TABLET ORAL
COMMUNITY
Start: 2019-07-31 | End: 2019-08-30

## 2019-08-15 RX ORDER — NITROFURANTOIN 25; 75 MG/1; MG/1
CAPSULE ORAL
COMMUNITY
Start: 2019-07-31 | End: 2019-08-30

## 2019-08-16 LAB — BACTERIA UR CULT: ABNORMAL

## 2019-08-19 ENCOUNTER — TELEPHONE (OUTPATIENT)
Dept: BEHAVIORAL/MENTAL HEALTH CLINIC | Facility: CLINIC | Age: 79
End: 2019-08-19

## 2019-08-20 ENCOUNTER — OFFICE VISIT (OUTPATIENT)
Dept: OBGYN CLINIC | Facility: HOSPITAL | Age: 79
End: 2019-08-20
Payer: COMMERCIAL

## 2019-08-20 VITALS
HEIGHT: 62 IN | DIASTOLIC BLOOD PRESSURE: 68 MMHG | WEIGHT: 130 LBS | SYSTOLIC BLOOD PRESSURE: 118 MMHG | HEART RATE: 87 BPM | BODY MASS INDEX: 23.92 KG/M2

## 2019-08-20 DIAGNOSIS — M70.61 TROCHANTERIC BURSITIS OF RIGHT HIP: Primary | ICD-10-CM

## 2019-08-20 PROCEDURE — 20610 DRAIN/INJ JOINT/BURSA W/O US: CPT | Performed by: ORTHOPAEDIC SURGERY

## 2019-08-20 PROCEDURE — 99213 OFFICE O/P EST LOW 20 MIN: CPT | Performed by: ORTHOPAEDIC SURGERY

## 2019-08-20 RX ORDER — LIDOCAINE HYDROCHLORIDE 10 MG/ML
2 INJECTION, SOLUTION INFILTRATION; PERINEURAL
Status: COMPLETED | OUTPATIENT
Start: 2019-08-20 | End: 2019-08-20

## 2019-08-20 RX ORDER — BETAMETHASONE SODIUM PHOSPHATE AND BETAMETHASONE ACETATE 3; 3 MG/ML; MG/ML
12 INJECTION, SUSPENSION INTRA-ARTICULAR; INTRALESIONAL; INTRAMUSCULAR; SOFT TISSUE
Status: COMPLETED | OUTPATIENT
Start: 2019-08-20 | End: 2019-08-20

## 2019-08-20 RX ORDER — BUPIVACAINE HYDROCHLORIDE 2.5 MG/ML
2 INJECTION, SOLUTION INFILTRATION; PERINEURAL
Status: COMPLETED | OUTPATIENT
Start: 2019-08-20 | End: 2019-08-20

## 2019-08-20 RX ADMIN — BUPIVACAINE HYDROCHLORIDE 2 ML: 2.5 INJECTION, SOLUTION INFILTRATION; PERINEURAL at 10:12

## 2019-08-20 RX ADMIN — LIDOCAINE HYDROCHLORIDE 2 ML: 10 INJECTION, SOLUTION INFILTRATION; PERINEURAL at 10:12

## 2019-08-20 RX ADMIN — BETAMETHASONE SODIUM PHOSPHATE AND BETAMETHASONE ACETATE 12 MG: 3; 3 INJECTION, SUSPENSION INTRA-ARTICULAR; INTRALESIONAL; INTRAMUSCULAR; SOFT TISSUE at 10:12

## 2019-08-20 NOTE — PROGRESS NOTES
Assessment:  1  Trochanteric bursitis of right hip         Plan:  The patient is provided with right trochanteric bursa steroid injection  She should return in 3 months  To do next visit:  Return in about 3 months (around 11/20/2019)  The above stated was discussed in layman's terms and the patient expressed understanding  All questions were answered to the patient's satisfaction  Scribe Attestation    I,:   Clarita Nageotte am acting as a scribe while in the presence of the attending physician :        I,:   Antonette Og MD personally performed the services described in this documentation    as scribed in my presence :              Subjective:   Laurie is a 67 y o  female who presents for follow up of right hip pain  She is s/p right trochanteric bursa injection 5/14/19  Today she complains of return of right lateral hip pain  She rates her pain at 2-3/10 and describes it as an ache  She will use occasional tylenol          Review of systems negative unless otherwise specified in HPI    Past Medical History:   Diagnosis Date    GERD (gastroesophageal reflux disease) 4/4/2016    Osteoarthritis of hip 4/4/2016    SI (sacroiliac) joint inflammation (HCC) 4/4/2016    Substance addiction ([facility] [address] )        Past Surgical History:   Procedure Laterality Date    BREAST EXCISIONAL BIOPSY Left 2000    benign    JOINT REPLACEMENT      right hip 8/15       Family History   Problem Relation Age of Onset    Breast cancer Daughter 48    Breast cancer Maternal Aunt 79       Social History     Occupational History    Not on file   Tobacco Use    Smoking status: Never Smoker    Smokeless tobacco: Never Used   Substance and Sexual Activity    Alcohol use: No    Drug use: Yes     Types: Prescription     Comment: rocovering last used 1996    Sexual activity: Not on file         Current Outpatient Medications:     acetaminophen (TYLENOL) 325 mg tablet, 975 mg every 8 hours, Disp: 30 tablet, Rfl: 0   amLODIPine (NORVASC) 5 mg tablet, Take 1 tablet (5 mg total) by mouth daily at bedtime 1 tablet in the Morning, 1/2 in the evening , Disp: 180 tablet, Rfl: 3    atorvastatin (LIPITOR) 10 mg tablet, , Disp: , Rfl:     doxycycline hyclate (VIBRAMYCIN) 100 mg capsule, Take 100 mg by mouth 2 (two) times a day, Disp: , Rfl:     fluconazole (DIFLUCAN) 100 mg tablet, , Disp: , Rfl:     ibuprofen (MOTRIN) 800 mg tablet, , Disp: , Rfl:     lidocaine (XYLOCAINE) 5 % ointment, , Disp: , Rfl:     metoprolol succinate (TOPROL-XL) 25 mg 24 hr tablet, Take 1 tablet (25 mg total) by mouth daily, Disp: 90 tablet, Rfl: 3    nitrofurantoin (MACROBID) 100 mg capsule, , Disp: , Rfl:     phenazopyridine (PYRIDIUM) 100 mg tablet, , Disp: , Rfl:     potassium-sodium phosphateS (K-PHOS,PHOSPHA 250) 155-852-130 mg tablet, Take 1 tablet by mouth 3 (three) times a day with meals for 2 days (Patient not taking: Reported on 3/11/2019), Disp: 6 tablet, Rfl: 0    PREMARIN vaginal cream, , Disp: , Rfl:     spironolactone (ALDACTONE) 25 mg tablet, Take 1 tablet (25 mg total) by mouth daily, Disp: 30 tablet, Rfl: 5    Allergies   Allergen Reactions    Bactrim [Sulfamethoxazole-Trimethoprim]      VINNY            Vitals:    08/20/19 1004   BP: 118/68   Pulse: 87       Objective:  Physical exam  · General: Awake, Alert, Oriented  · Eyes: Pupils equal, round and reactive to light  · Heart: regular rate and rhythm  · Lungs: No audible wheezing  · Abdomen: soft                    Ortho Exam   Right hip:  TTP lateral trochanteric bursa  Good arc of motion  Patient sits comfortably in chair with hip flexed at 90 degrees  Patient stands from seated position without assistance  Calf compartments soft and supple  Sensation intact  Toes are warm sensate and mobile        Diagnostics, reviewed and taken today if performed as documented:    None performed     Procedures, if performed today:    Large joint arthrocentesis: R greater trochanteric bursa  Date/Time: 8/20/2019 10:12 AM  Consent given by: patient  Site marked: site marked  Supporting Documentation  Indications: pain   Procedure Details  Location: hip - R greater trochanteric bursa  Needle size: 22 G  Ultrasound guidance: no  Approach: lateral  Medications administered: 12 mg betamethasone acetate-betamethasone sodium phosphate 6 (3-3) mg/mL; 2 mL bupivacaine 0 25 %; 2 mL lidocaine 1 %    Patient tolerance: patient tolerated the procedure well with no immediate complications  Dressing:  Sterile dressing applied            Portions of the record may have been created with voice recognition software  Occasional wrong word or "sound a like" substitutions may have occurred due to the inherent limitations of voice recognition software  Read the chart carefully and recognize, using context, where substitutions have occurred

## 2019-08-28 ENCOUNTER — TELEPHONE (OUTPATIENT)
Dept: BEHAVIORAL/MENTAL HEALTH CLINIC | Facility: CLINIC | Age: 79
End: 2019-08-28

## 2019-08-29 ENCOUNTER — ANESTHESIA EVENT (OUTPATIENT)
Dept: GASTROENTEROLOGY | Facility: HOSPITAL | Age: 79
End: 2019-08-29

## 2019-08-29 RX ORDER — SODIUM CHLORIDE 9 MG/ML
100 INJECTION, SOLUTION INTRAVENOUS CONTINUOUS
Status: CANCELLED | OUTPATIENT
Start: 2019-08-29

## 2019-08-30 ENCOUNTER — ANESTHESIA (OUTPATIENT)
Dept: GASTROENTEROLOGY | Facility: HOSPITAL | Age: 79
End: 2019-08-30

## 2019-08-30 ENCOUNTER — HOSPITAL ENCOUNTER (OUTPATIENT)
Dept: GASTROENTEROLOGY | Facility: HOSPITAL | Age: 79
Setting detail: OUTPATIENT SURGERY
Discharge: HOME | End: 2019-08-30
Attending: COLON & RECTAL SURGERY | Admitting: COLON & RECTAL SURGERY
Payer: COMMERCIAL

## 2019-08-30 VITALS
RESPIRATION RATE: 16 BRPM | TEMPERATURE: 96.5 F | SYSTOLIC BLOOD PRESSURE: 119 MMHG | WEIGHT: 130 LBS | BODY MASS INDEX: 23.92 KG/M2 | OXYGEN SATURATION: 99 % | HEIGHT: 62 IN | HEART RATE: 83 BPM | DIASTOLIC BLOOD PRESSURE: 83 MMHG

## 2019-08-30 DIAGNOSIS — N18.30 ANEMIA ASSOCIATED WITH STAGE 3 CHRONIC RENAL FAILURE (HCC): ICD-10-CM

## 2019-08-30 DIAGNOSIS — D63.1 ANEMIA ASSOCIATED WITH STAGE 3 CHRONIC RENAL FAILURE (HCC): ICD-10-CM

## 2019-08-30 PROCEDURE — 88305 TISSUE EXAM BY PATHOLOGIST: CPT | Performed by: PATHOLOGY

## 2019-08-30 PROCEDURE — 45384 COLONOSCOPY W/LESION REMOVAL: CPT | Performed by: INTERNAL MEDICINE

## 2019-08-30 PROCEDURE — 43235 EGD DIAGNOSTIC BRUSH WASH: CPT | Performed by: INTERNAL MEDICINE

## 2019-08-30 PROCEDURE — 45384 COLONOSCOPY W/LESION REMOVAL: CPT | Performed by: COLON & RECTAL SURGERY

## 2019-08-30 RX ORDER — SODIUM CHLORIDE 9 MG/ML
INJECTION, SOLUTION INTRAVENOUS CONTINUOUS PRN
Status: DISCONTINUED | OUTPATIENT
Start: 2019-08-30 | End: 2019-08-30 | Stop reason: SURG

## 2019-08-30 RX ORDER — PROPOFOL 10 MG/ML
INJECTION, EMULSION INTRAVENOUS AS NEEDED
Status: DISCONTINUED | OUTPATIENT
Start: 2019-08-30 | End: 2019-08-30 | Stop reason: SURG

## 2019-08-30 RX ORDER — PROPOFOL 10 MG/ML
INJECTION, EMULSION INTRAVENOUS CONTINUOUS PRN
Status: DISCONTINUED | OUTPATIENT
Start: 2019-08-30 | End: 2019-08-30 | Stop reason: SURG

## 2019-08-30 RX ORDER — MELATONIN
1000 DAILY
COMMUNITY
End: 2019-12-10

## 2019-08-30 RX ORDER — LIDOCAINE HYDROCHLORIDE 10 MG/ML
INJECTION, SOLUTION EPIDURAL; INFILTRATION; INTRACAUDAL; PERINEURAL AS NEEDED
Status: DISCONTINUED | OUTPATIENT
Start: 2019-08-30 | End: 2019-08-30 | Stop reason: SURG

## 2019-08-30 RX ORDER — GLYCOPYRROLATE 0.2 MG/ML
INJECTION INTRAMUSCULAR; INTRAVENOUS AS NEEDED
Status: DISCONTINUED | OUTPATIENT
Start: 2019-08-30 | End: 2019-08-30 | Stop reason: SURG

## 2019-08-30 RX ORDER — DIPHENHYDRAMINE HYDROCHLORIDE 50 MG/ML
INJECTION INTRAMUSCULAR; INTRAVENOUS AS NEEDED
Status: DISCONTINUED | OUTPATIENT
Start: 2019-08-30 | End: 2019-08-30 | Stop reason: SURG

## 2019-08-30 RX ORDER — ONDANSETRON 2 MG/ML
INJECTION INTRAMUSCULAR; INTRAVENOUS AS NEEDED
Status: DISCONTINUED | OUTPATIENT
Start: 2019-08-30 | End: 2019-08-30 | Stop reason: SURG

## 2019-08-30 RX ADMIN — PROPOFOL 60 MG: 10 INJECTION, EMULSION INTRAVENOUS at 08:19

## 2019-08-30 RX ADMIN — DIPHENHYDRAMINE HYDROCHLORIDE 12.5 MG: 50 INJECTION, SOLUTION INTRAMUSCULAR; INTRAVENOUS at 08:38

## 2019-08-30 RX ADMIN — LIDOCAINE HYDROCHLORIDE 25 MG: 10 INJECTION, SOLUTION EPIDURAL; INFILTRATION; INTRACAUDAL; PERINEURAL at 08:18

## 2019-08-30 RX ADMIN — ONDANSETRON 4 MG: 2 INJECTION INTRAMUSCULAR; INTRAVENOUS at 08:33

## 2019-08-30 RX ADMIN — LIDOCAINE HYDROCHLORIDE 25 MG: 10 INJECTION, SOLUTION EPIDURAL; INFILTRATION; INTRACAUDAL; PERINEURAL at 08:52

## 2019-08-30 RX ADMIN — GLYCOPYRROLATE 0.2 MG: 0.2 INJECTION, SOLUTION INTRAMUSCULAR; INTRAVENOUS at 08:27

## 2019-08-30 RX ADMIN — PROPOFOL 100 MCG/KG/MIN: 10 INJECTION, EMULSION INTRAVENOUS at 08:20

## 2019-08-30 RX ADMIN — PROPOFOL 40 MG: 10 INJECTION, EMULSION INTRAVENOUS at 08:32

## 2019-08-30 RX ADMIN — SODIUM CHLORIDE: 0.9 INJECTION, SOLUTION INTRAVENOUS at 08:10

## 2019-08-30 NOTE — ANESTHESIA POSTPROCEDURE EVALUATION
Post-Op Assessment Note    CV Status:  Stable  Pain Score: 0    Pain management: adequate     Mental Status:  Somnolent   Hydration Status:  Euvolemic   PONV Controlled:  Controlled   Airway Patency:  Patent   Post Op Vitals Reviewed: Yes      Staff: CRNA           /61 (08/30/19 0907)    Temp     Pulse 82 (08/30/19 0907)   Resp 16 (08/30/19 0907)    SpO2 100 % (08/30/19 0907)

## 2019-08-30 NOTE — ANESTHESIA PREPROCEDURE EVALUATION
Review of Systems/Medical History  Patient summary reviewed  Chart reviewed  No history of anesthetic complications     Cardiovascular  Exercise tolerance (METS): >4,  Hypertension ,    Pulmonary  Negative pulmonary ROS        GI/Hepatic    GERD , Bowel prep       Chronic kidney disease stage 3,        Endo/Other  Negative endo/other ROS      GYN  Negative gynecology ROS          Hematology  Anemia anemia of chronic disease,     Musculoskeletal    Arthritis     Neurology  Negative neurology ROS      Psychology   Negative psychology ROS              Physical Exam    Airway    Mallampati score: II  TM Distance: >3 FB       Dental   No notable dental hx     Cardiovascular      Pulmonary      Other Findings       Lab Results   Component Value Date    WBC 7 33 08/07/2019    HGB 11 2 (L) 08/07/2019     08/07/2019     Lab Results   Component Value Date    SODIUM 138 08/07/2019    K 3 7 08/07/2019    BUN 27 (H) 08/07/2019    CREATININE 1 31 (H) 08/07/2019    GLUCOSE 100 11/20/2015     Anesthesia Plan  ASA Score- 2     Anesthesia Type- IV sedation with anesthesia with ASA Monitors  Additional Monitors:   Airway Plan:         Plan Factors-    Induction- intravenous  Postoperative Plan-     Informed Consent- Anesthetic plan and risks discussed with patient and spouse  I personally reviewed this patient with the CRNA  Discussed and agreed on the Anesthesia Plan with the CRNA  Brian Cain

## 2019-08-30 NOTE — H&P
History and Physical   Colon and Rectal Surgery   Radha Miranda 67 y o  female MRN: [id number]  Unit/Bed#:  Encounter: [id number]  08/30/19   8:10 AM      No chief complaint on file  History of Present Illness   HPI:  Radha Miranda is a 67 y o  female who presents with screening colonoscopy        Historical Information   Past Medical History:   Diagnosis Date    Chronic kidney disease     GERD (gastroesophageal reflux disease) 4/4/2016    Hypertension     Osteoarthritis of hip 4/4/2016    [facility]ro[facility]ac[facility] inflammation ([facility] ) 4/4/2016    Substance addiction ([place] [address] )      Past Surgical History:   Procedure Laterality Date    BREAST EXCISIONAL BIOPSY Left 2000    benign    JOINT REPLACEMENT      right hip 8/15    TONSILLECTOMY         Meds/Allergies       (Not in a hospital admission)      Current Outpatient Medications:     amLODIPine (NORVASC) 5 mg tablet, Take 1 tablet (5 mg total) by mouth daily at bedtime 1 tablet in the Morning, 1/2 in the evening , Disp: 180 tablet, Rfl: 3    atorvastatin (LIPITOR) 10 mg tablet, , Disp: , Rfl:     cholecalciferol (VITAMIN D3) 1,000 units tablet, Take 1,000 Units by mouth daily, Disp: , Rfl:     lidocaine (XYLOCAINE) 5 % ointment, , Disp: , Rfl:     metoprolol succinate (TOPROL-XL) 25 mg 24 hr tablet, Take 1 tablet (25 mg total) by mouth daily, Disp: 90 tablet, Rfl: 3    PREMARIN vaginal cream, , Disp: , Rfl:     spironolactone (ALDACTONE) 25 mg tablet, Take 1 tablet (25 mg total) by mouth daily, Disp: 30 tablet, Rfl: 5    potassium-sodium phosphateS (K-PHOS,PHOSPHA 250) 155-852-130 mg tablet, Take 1 tablet by mouth 3 (three) times a day with meals for 2 days (Patient not taking: Reported on 3/11/2019), Disp: 6 tablet, Rfl: 0    Allergies   Allergen Reactions    Bactrim [Sulfamethoxazole-Trimethoprim]      VINNY         Social History   Social History     Substance and Sexual Activity   Alcohol Use No     Social History     Substance and Sexual Activity Drug Use Yes    Types: Prescription    Comment: rocovering last used 1996     Social History     Tobacco Use   Smoking Status Never Smoker   Smokeless Tobacco Never Used         Family History:   Family History   Problem Relation Age of Onset    Breast cancer Daughter 48    Breast cancer Maternal Aunt 70         Objective     Current Vitals:   Blood Pressure: 114/56 (08/30/19 0718)  Pulse: 81 (08/30/19 0718)  Temperature: (!) 96 5 °F (35 8 °C) (08/30/19 0718)  Temp Source: Tympanic (08/30/19 0718)  Respirations: 16 (08/30/19 0718)  Height: 5' 2" (157 5 cm) (08/30/19 0718)  Weight - Scale: 59 kg (130 lb) (08/30/19 0718)  SpO2: 98 % (08/30/19 0718)  No intake or output data in the 24 hours ending 08/30/19 0810    Physical Exam:  General: No acute distress  Eyes: Normal   ENT: Normal   Neck: No JVD  Pulm: Normal in A&P  CV: NSR no murmur  Abdomen: Soft and normal on palpation, no mass, no tenderness, no guarding  Rectal: Normal sphincter tone, no perianal skin lesions  Extremities: Normal  Lymphatics: Normal        Lab Results: I have personally reviewed pertinent lab results  Imaging: I have personally reviewed pertinent reports  Patient was consented by myself for procedure as explained earlier with all the risks and benefits described  All questions answered  ASSESSMENT:  Coleen Garcia is a 67 y o  female who presents with screening colonoscopy        PLAN:  colonoscopy

## 2019-09-03 ENCOUNTER — TELEPHONE (OUTPATIENT)
Dept: NEPHROLOGY | Facility: CLINIC | Age: 79
End: 2019-09-03

## 2019-09-03 RX ORDER — AMLODIPINE BESYLATE 2.5 MG/1
2.5 TABLET ORAL 2 TIMES DAILY
COMMUNITY
End: 2021-02-23 | Stop reason: SDUPTHER

## 2019-09-03 NOTE — TELEPHONE ENCOUNTER
The blood pressure still too low  Therefore,  1  Decrease amlodipine to 2 5 mg at bedtime and stop the morning dose  2   Wait 2-3 weeks and then recheck blood pressures morning evening with usual recommendations:  -the morning readings before any medications  -the evening readings prior to supper time  Then please send them in  Should the blood pressure significantly increased to greater than 140/90 call

## 2019-09-03 NOTE — TELEPHONE ENCOUNTER
Pt advised of Dr Dorothy Garg' recommendation to decrease Amlodipine to 2 5 mg  PM (AM dose had already been D/C)

## 2019-09-03 NOTE — TELEPHONE ENCOUNTER
She stopped by the office and dropped off her blood pressure reading log  8/1: 117/69  8/2: 120/68  8/2: 104/59  8/3: 100/61  8/3: 115/67  8/4: 105/63  8/5: 113/66  8/5: 118/65  8/6: 108/52  8/7: 125/63  8/8: 110/59  8/9: 105/56    Her call back number is [phone]

## 2019-09-06 ENCOUNTER — CONSULT (OUTPATIENT)
Dept: HEMATOLOGY ONCOLOGY | Facility: CLINIC | Age: 79
End: 2019-09-06
Payer: COMMERCIAL

## 2019-09-06 VITALS
WEIGHT: 129 LBS | BODY MASS INDEX: 23.74 KG/M2 | RESPIRATION RATE: 14 BRPM | DIASTOLIC BLOOD PRESSURE: 76 MMHG | HEART RATE: 85 BPM | SYSTOLIC BLOOD PRESSURE: 114 MMHG | TEMPERATURE: 98.7 F | HEIGHT: 62 IN

## 2019-09-06 DIAGNOSIS — E55.9 VITAMIN D DEFICIENCY: ICD-10-CM

## 2019-09-06 DIAGNOSIS — N18.30 CHRONIC KIDNEY DISEASE, STAGE III (MODERATE) (HCC): ICD-10-CM

## 2019-09-06 DIAGNOSIS — N18.30 ANEMIA OF CHRONIC RENAL FAILURE, STAGE 3 (MODERATE) (HCC): ICD-10-CM

## 2019-09-06 DIAGNOSIS — I10 ACCELERATED HYPERTENSION: ICD-10-CM

## 2019-09-06 DIAGNOSIS — I12.9 HYPERTENSIVE CHRONIC KIDNEY DISEASE WITH STAGE 1 THROUGH STAGE 4 CHRONIC KIDNEY DISEASE, OR UNSPECIFIED CHRONIC KIDNEY DISEASE: ICD-10-CM

## 2019-09-06 DIAGNOSIS — E87.6 HYPOKALEMIA: ICD-10-CM

## 2019-09-06 DIAGNOSIS — D50.8 OTHER IRON DEFICIENCY ANEMIA: Primary | ICD-10-CM

## 2019-09-06 DIAGNOSIS — E61.1 IRON DEFICIENCY: ICD-10-CM

## 2019-09-06 DIAGNOSIS — E83.52 HYPERCALCEMIA: ICD-10-CM

## 2019-09-06 DIAGNOSIS — E78.5 DYSLIPIDEMIA: ICD-10-CM

## 2019-09-06 DIAGNOSIS — D63.1 ANEMIA OF CHRONIC RENAL FAILURE, STAGE 3 (MODERATE) (HCC): ICD-10-CM

## 2019-09-06 PROCEDURE — 99244 OFF/OP CNSLTJ NEW/EST MOD 40: CPT | Performed by: INTERNAL MEDICINE

## 2019-09-06 NOTE — PROGRESS NOTES
Oncology Consult Note  Fernando Smith 67 y o  female MRN: [id number]  Unit/Bed#:  Encounter: [id number]      Presenting Complaint:  Anemia of chronic kidney disease    History of Presenting Illness:    60-year-old  female who works in [employer] with past medical history of hypertension, GERD, left lumbar radiculopathy, UTI with Enterococcus, sacroiliitis, had been followed by Nephrology for acute renal failure on October 2018, responded very well to conservative treatment    She was noticed to have anemia, normocytic    On 08/07/2019 WBC 7 3, hemoglobin 11 2, MCV 93, platelets 370328, creatinine 1 3, BUN 27, calcium 9 5, ferritin 54    On 07/10/2019 hemoglobin 10 4, MCV 94    On 03/07/2019 hemoglobin 11 7    On 10/28/2018 hemoglobin 10 1    Colonoscopy showed no evidence of bleeding as well as EGD    Denies any headache blurred vision nausea vomiting diarrhea constipation dysuria hematuria melena hematochezia weight changes skin rash tingling or numbness of the lower extremities, subjective lymphadenopathy    Review of Systems - As stated in the HPI otherwise the fourteen point review of systems was negative      Past Medical History:   Diagnosis Date    Chronic kidney disease     GERD (gastroesophageal reflux disease) 4/4/2016    Hypertension     Osteoarthritis of hip 4/4/2016    SI (sacroiliac) joint inflammation ([facility] [address] ) 4/4/2016    Substance addiction ([facility] )        Social History     Socioeconomic History    Marital status: /Civil Union     Spouse name: None    Number of children: None    Years of education: None    Highest education level: None   Occupational History    None   Social Needs    Financial resource strain: None    Food insecurity:     Worry: None     Inability: None    Transportation needs:     Medical: None     Non-medical: None   Tobacco Use    Smoking status: Never Smoker    Smokeless tobacco: Never Used   Substance and Sexual Activity    Alcohol use: No    Drug use: Yes     Types: Prescription     Comment: neil last used 1996    Sexual activity: None   Lifestyle    Physical activity:     Days per week: None     Minutes per session: None    Stress: None   Relationships    Social connections:     Talks on phone: None     Gets together: None     Attends Jain service: None     Active member of club or organization: None     Attends meetings of clubs or organizations: None     Relationship status: None    Intimate partner violence:     Fear of current or ex partner: None     Emotionally abused: None     Physically abused: None     Forced sexual activity: None   Other Topics Concern    None   Social History Narrative    None       Family History   Problem Relation Age of Onset    Breast cancer Daughter 48    Breast cancer Maternal Aunt 70       Allergies   Allergen Reactions    Bactrim [Sulfamethoxazole-Trimethoprim]      VINNY         Current Outpatient Medications:     amLODIPine (NORVASC) 2 5 mg tablet, Take 2 5 mg by mouth daily at bedtime, Disp: , Rfl:     atorvastatin (LIPITOR) 10 mg tablet, , Disp: , Rfl:     cholecalciferol (VITAMIN D3) 1,000 units tablet, Take 1,000 Units by mouth daily, Disp: , Rfl:     lidocaine (XYLOCAINE) 5 % ointment, , Disp: , Rfl:     metoprolol succinate (TOPROL-XL) 25 mg 24 hr tablet, Take 1 tablet (25 mg total) by mouth daily, Disp: 90 tablet, Rfl: 3    potassium-sodium phosphateS (K-PHOS,PHOSPHA 250) 155-852-130 mg tablet, Take 1 tablet by mouth 3 (three) times a day with meals for 2 days (Patient not taking: Reported on 3/11/2019), Disp: 6 tablet, Rfl: 0    PREMARIN vaginal cream, , Disp: , Rfl:     spironolactone (ALDACTONE) 25 mg tablet, Take 1 tablet (25 mg total) by mouth daily, Disp: 30 tablet, Rfl: 5      /76   Pulse 85   Temp 98 7 °F (37 1 °C)   Resp 14   Ht 5' 2" (1 575 m)   Wt 58 5 kg (129 lb)   BMI 23 59 kg/m²       General Appearance:    Alert, oriented        Eyes:    PERRL   Ears:    Normal external ear canals, both ears   Nose:   Nares normal, septum midline   Throat:   Mucosa moist  Pharynx without injection  Neck:   Supple       Lungs:     Clear to auscultation bilaterally   Chest Wall:    No tenderness or deformity    Heart:    Regular rate and rhythm       Abdomen:     Soft, non-tender, bowel sounds +, no organomegaly           Extremities:   Extremities no cyanosis or edema       Skin:   no rash or icterus  Lymph nodes:   Cervical, supraclavicular, and axillary nodes normal   Neurologic:   CNII-XII intact, normal strength, sensation and reflexes     Throughout               No results found for this or any previous visit (from the past 48 hour(s))  No results found  ECOG :0      Assessment and plan:   Anemia normocytic most likely secondary to chronic kidney disease grade 3, I noticed the patient has anemia whenever creatinine goes up I reversed with never creatinine comes back down, extensive workup by Nephrology showed no evidence of monoclonal proteins in the urine or in the blood, normal iron studies, vitamin B12, TSH    Current hemoglobin of 11 2, no evidence of thrombocytopenia or leukopenia    At this time I suggest CBC every 6-12 months or earlier if needed, no need to follow up with medical oncology

## 2019-09-20 ENCOUNTER — DOCUMENTATION (OUTPATIENT)
Dept: NEPHROLOGY | Facility: CLINIC | Age: 79
End: 2019-09-20

## 2019-09-20 NOTE — PROGRESS NOTES
Home BP readings:  -a m :  112/59  -p m :  117/72 based on only 1 reading    I would stop amlodipine completely just continue Toprol XL  Please wait approximately 2-3 weeks at least and then take an additional week a blood pressure readings morning and evening just sitting:  Again the readings before any medications  And the evening readings are before supper

## 2019-10-07 ENCOUNTER — LAB REQUISITION (OUTPATIENT)
Dept: LAB | Facility: HOSPITAL | Age: 79
End: 2019-10-07
Payer: COMMERCIAL

## 2019-10-07 DIAGNOSIS — N30.10 INTERSTITIAL CYSTITIS (CHRONIC) WITHOUT HEMATURIA: ICD-10-CM

## 2019-10-07 PROCEDURE — 87077 CULTURE AEROBIC IDENTIFY: CPT | Performed by: OBSTETRICS & GYNECOLOGY

## 2019-10-07 PROCEDURE — 87086 URINE CULTURE/COLONY COUNT: CPT | Performed by: OBSTETRICS & GYNECOLOGY

## 2019-10-07 PROCEDURE — 87186 SC STD MICRODIL/AGAR DIL: CPT | Performed by: OBSTETRICS & GYNECOLOGY

## 2019-10-10 LAB — BACTERIA UR CULT: ABNORMAL

## 2019-10-14 ENCOUNTER — APPOINTMENT (OUTPATIENT)
Dept: LAB | Facility: HOSPITAL | Age: 79
End: 2019-10-14
Payer: COMMERCIAL

## 2019-10-14 ENCOUNTER — TRANSCRIBE ORDERS (OUTPATIENT)
Dept: LAB | Facility: HOSPITAL | Age: 79
End: 2019-10-14

## 2019-10-14 DIAGNOSIS — M81.0 SENILE OSTEOPOROSIS: Primary | ICD-10-CM

## 2019-10-14 DIAGNOSIS — Z79.899 ENCOUNTER FOR LONG-TERM (CURRENT) USE OF OTHER MEDICATIONS: ICD-10-CM

## 2019-10-14 DIAGNOSIS — M81.0 SENILE OSTEOPOROSIS: ICD-10-CM

## 2019-10-14 LAB
ALBUMIN SERPL BCP-MCNC: 3.6 G/DL (ref 3.5–5)
ALP SERPL-CCNC: 73 U/L (ref 46–116)
ALT SERPL W P-5'-P-CCNC: 20 U/L (ref 12–78)
ANION GAP SERPL CALCULATED.3IONS-SCNC: 6 MMOL/L (ref 4–13)
AST SERPL W P-5'-P-CCNC: 13 U/L (ref 5–45)
BASOPHILS # BLD AUTO: 0.07 THOUSANDS/ΜL (ref 0–0.1)
BASOPHILS NFR BLD AUTO: 1 % (ref 0–1)
BILIRUB SERPL-MCNC: 0.45 MG/DL (ref 0.2–1)
BUN SERPL-MCNC: 32 MG/DL (ref 5–25)
CALCIUM SERPL-MCNC: 9.8 MG/DL (ref 8.3–10.1)
CHLORIDE SERPL-SCNC: 112 MMOL/L (ref 100–108)
CO2 SERPL-SCNC: 24 MMOL/L (ref 21–32)
CREAT SERPL-MCNC: 1.33 MG/DL (ref 0.6–1.3)
EOSINOPHIL # BLD AUTO: 0.11 THOUSAND/ΜL (ref 0–0.61)
EOSINOPHIL NFR BLD AUTO: 2 % (ref 0–6)
ERYTHROCYTE [DISTWIDTH] IN BLOOD BY AUTOMATED COUNT: 13.5 % (ref 11.6–15.1)
ERYTHROCYTE [SEDIMENTATION RATE] IN BLOOD: 14 MM/HOUR (ref 0–20)
GFR SERPL CREATININE-BSD FRML MDRD: 38 ML/MIN/1.73SQ M
GLUCOSE SERPL-MCNC: 71 MG/DL (ref 65–140)
HCT VFR BLD AUTO: 35.6 % (ref 34.8–46.1)
HGB BLD-MCNC: 11.3 G/DL (ref 11.5–15.4)
IMM GRANULOCYTES # BLD AUTO: 0.02 THOUSAND/UL (ref 0–0.2)
IMM GRANULOCYTES NFR BLD AUTO: 0 % (ref 0–2)
LYMPHOCYTES # BLD AUTO: 1.35 THOUSANDS/ΜL (ref 0.6–4.47)
LYMPHOCYTES NFR BLD AUTO: 24 % (ref 14–44)
MCH RBC QN AUTO: 30.4 PG (ref 26.8–34.3)
MCHC RBC AUTO-ENTMCNC: 31.7 G/DL (ref 31.4–37.4)
MCV RBC AUTO: 96 FL (ref 82–98)
MONOCYTES # BLD AUTO: 0.6 THOUSAND/ΜL (ref 0.17–1.22)
MONOCYTES NFR BLD AUTO: 11 % (ref 4–12)
NEUTROPHILS # BLD AUTO: 3.53 THOUSANDS/ΜL (ref 1.85–7.62)
NEUTS SEG NFR BLD AUTO: 62 % (ref 43–75)
NRBC BLD AUTO-RTO: 0 /100 WBCS
PLATELET # BLD AUTO: 241 THOUSANDS/UL (ref 149–390)
PMV BLD AUTO: 10.2 FL (ref 8.9–12.7)
POTASSIUM SERPL-SCNC: 3.8 MMOL/L (ref 3.5–5.3)
PROT SERPL-MCNC: 7 G/DL (ref 6.4–8.2)
RBC # BLD AUTO: 3.72 MILLION/UL (ref 3.81–5.12)
SODIUM SERPL-SCNC: 142 MMOL/L (ref 136–145)
WBC # BLD AUTO: 5.68 THOUSAND/UL (ref 4.31–10.16)

## 2019-10-14 PROCEDURE — 80053 COMPREHEN METABOLIC PANEL: CPT

## 2019-10-14 PROCEDURE — 85025 COMPLETE CBC W/AUTO DIFF WBC: CPT

## 2019-10-14 PROCEDURE — 36415 COLL VENOUS BLD VENIPUNCTURE: CPT

## 2019-10-14 PROCEDURE — 85652 RBC SED RATE AUTOMATED: CPT

## 2019-10-17 DIAGNOSIS — E87.6 HYPOKALEMIA: ICD-10-CM

## 2019-10-17 DIAGNOSIS — I12.9 HYPERTENSIVE CHRONIC KIDNEY DISEASE WITH STAGE 1 THROUGH STAGE 4 CHRONIC KIDNEY DISEASE, OR UNSPECIFIED CHRONIC KIDNEY DISEASE: ICD-10-CM

## 2019-10-17 DIAGNOSIS — N18.30 ANEMIA OF CHRONIC RENAL FAILURE, STAGE 3 (MODERATE) (HCC): ICD-10-CM

## 2019-10-17 DIAGNOSIS — E78.5 DYSLIPIDEMIA: ICD-10-CM

## 2019-10-17 DIAGNOSIS — E55.9 VITAMIN D DEFICIENCY: ICD-10-CM

## 2019-10-17 DIAGNOSIS — E83.52 HYPERCALCEMIA: ICD-10-CM

## 2019-10-17 DIAGNOSIS — D63.1 ANEMIA OF CHRONIC RENAL FAILURE, STAGE 3 (MODERATE) (HCC): ICD-10-CM

## 2019-10-17 DIAGNOSIS — E61.1 IRON DEFICIENCY: ICD-10-CM

## 2019-10-17 DIAGNOSIS — N18.30 CHRONIC KIDNEY DISEASE, STAGE III (MODERATE) (HCC): ICD-10-CM

## 2019-10-17 DIAGNOSIS — I10 ACCELERATED HYPERTENSION: ICD-10-CM

## 2019-10-17 RX ORDER — SPIRONOLACTONE 25 MG/1
25 TABLET ORAL DAILY
Qty: 30 TABLET | Refills: 5 | Status: CANCELLED | OUTPATIENT
Start: 2019-10-17

## 2019-10-17 NOTE — TELEPHONE ENCOUNTER
Patient called requesting a refill of spironolactone 25 mg PO QD  I noticed 5 refills were sent in on 7/2019  Sanjuanita Del Cid know I would call the pharmacy and confirm she still has refills remaining  Called the pharmacy and she does still have two refills remaining  They will fill one  [place] has been notified that her medication is being refilled at this time

## 2019-10-24 ENCOUNTER — TRANSCRIBE ORDERS (OUTPATIENT)
Dept: ADMINISTRATIVE | Facility: HOSPITAL | Age: 79
End: 2019-10-24

## 2019-10-24 DIAGNOSIS — Z13.820 SPECIAL SCREENING FOR OSTEOPOROSIS: Primary | ICD-10-CM

## 2019-10-28 ENCOUNTER — DOCUMENTATION (OUTPATIENT)
Dept: BEHAVIORAL/MENTAL HEALTH CLINIC | Facility: CLINIC | Age: 79
End: 2019-10-28

## 2019-10-28 NOTE — PROGRESS NOTES
10/28/19 @ 10: 15 AM called her as she had not yet arrived for her ind psych appt for today at 10 AM  Her  answered the phone and noted that she had to work today  This appointment will be cancelled

## 2019-10-28 NOTE — PROGRESS NOTES
Treatment Plan Tracking    # 1Treatment Plan not completed within required time limits due to: Client cancelled her ind psych appt due to her having to work  Andrew Perez

## 2019-10-29 ENCOUNTER — APPOINTMENT (OUTPATIENT)
Dept: LAB | Facility: HOSPITAL | Age: 79
End: 2019-10-29
Attending: INTERNAL MEDICINE
Payer: COMMERCIAL

## 2019-10-29 ENCOUNTER — TRANSCRIBE ORDERS (OUTPATIENT)
Dept: LAB | Facility: HOSPITAL | Age: 79
End: 2019-10-29

## 2019-10-29 DIAGNOSIS — E78.2 MIXED HYPERLIPIDEMIA: ICD-10-CM

## 2019-10-29 DIAGNOSIS — E03.9 MYXEDEMA HEART DISEASE: ICD-10-CM

## 2019-10-29 DIAGNOSIS — D64.9 ANEMIA, UNSPECIFIED TYPE: ICD-10-CM

## 2019-10-29 DIAGNOSIS — I51.9 MYXEDEMA HEART DISEASE: ICD-10-CM

## 2019-10-29 DIAGNOSIS — R35.0 URINARY FREQUENCY: ICD-10-CM

## 2019-10-29 DIAGNOSIS — I10 ESSENTIAL HYPERTENSION, MALIGNANT: ICD-10-CM

## 2019-10-29 DIAGNOSIS — I10 ESSENTIAL HYPERTENSION, MALIGNANT: Primary | ICD-10-CM

## 2019-10-29 LAB
ALBUMIN SERPL BCP-MCNC: 3.5 G/DL (ref 3.5–5)
ALP SERPL-CCNC: 71 U/L (ref 46–116)
ALT SERPL W P-5'-P-CCNC: 25 U/L (ref 12–78)
ANION GAP SERPL CALCULATED.3IONS-SCNC: 6 MMOL/L (ref 4–13)
AST SERPL W P-5'-P-CCNC: 19 U/L (ref 5–45)
BACTERIA UR QL AUTO: ABNORMAL /HPF
BASOPHILS # BLD AUTO: 0.05 THOUSANDS/ΜL (ref 0–0.1)
BASOPHILS NFR BLD AUTO: 1 % (ref 0–1)
BILIRUB SERPL-MCNC: 0.44 MG/DL (ref 0.2–1)
BILIRUB UR QL STRIP: NEGATIVE
BUN SERPL-MCNC: 18 MG/DL (ref 5–25)
CALCIUM SERPL-MCNC: 8.7 MG/DL (ref 8.3–10.1)
CHLORIDE SERPL-SCNC: 114 MMOL/L (ref 100–108)
CHOLEST SERPL-MCNC: 178 MG/DL (ref 50–200)
CLARITY UR: CLEAR
CO2 SERPL-SCNC: 22 MMOL/L (ref 21–32)
COLOR UR: YELLOW
CREAT SERPL-MCNC: 1.07 MG/DL (ref 0.6–1.3)
EOSINOPHIL # BLD AUTO: 0.13 THOUSAND/ΜL (ref 0–0.61)
EOSINOPHIL NFR BLD AUTO: 3 % (ref 0–6)
ERYTHROCYTE [DISTWIDTH] IN BLOOD BY AUTOMATED COUNT: 13.6 % (ref 11.6–15.1)
GFR SERPL CREATININE-BSD FRML MDRD: 50 ML/MIN/1.73SQ M
GLUCOSE P FAST SERPL-MCNC: 96 MG/DL (ref 65–99)
GLUCOSE UR STRIP-MCNC: NEGATIVE MG/DL
HCT VFR BLD AUTO: 33 % (ref 34.8–46.1)
HDLC SERPL-MCNC: 90 MG/DL
HGB BLD-MCNC: 10.4 G/DL (ref 11.5–15.4)
HGB UR QL STRIP.AUTO: ABNORMAL
IMM GRANULOCYTES # BLD AUTO: 0.02 THOUSAND/UL (ref 0–0.2)
IMM GRANULOCYTES NFR BLD AUTO: 0 % (ref 0–2)
KETONES UR STRIP-MCNC: NEGATIVE MG/DL
LDLC SERPL CALC-MCNC: 78 MG/DL (ref 0–100)
LDLC SERPL DIRECT ASSAY-MCNC: 80 MG/DL (ref 0–100)
LEUKOCYTE ESTERASE UR QL STRIP: ABNORMAL
LYMPHOCYTES # BLD AUTO: 1.38 THOUSANDS/ΜL (ref 0.6–4.47)
LYMPHOCYTES NFR BLD AUTO: 27 % (ref 14–44)
MCH RBC QN AUTO: 30.1 PG (ref 26.8–34.3)
MCHC RBC AUTO-ENTMCNC: 31.5 G/DL (ref 31.4–37.4)
MCV RBC AUTO: 95 FL (ref 82–98)
MONOCYTES # BLD AUTO: 0.54 THOUSAND/ΜL (ref 0.17–1.22)
MONOCYTES NFR BLD AUTO: 11 % (ref 4–12)
NEUTROPHILS # BLD AUTO: 3.03 THOUSANDS/ΜL (ref 1.85–7.62)
NEUTS SEG NFR BLD AUTO: 58 % (ref 43–75)
NITRITE UR QL STRIP: NEGATIVE
NON-SQ EPI CELLS URNS QL MICRO: ABNORMAL /HPF
NONHDLC SERPL-MCNC: 88 MG/DL
NRBC BLD AUTO-RTO: 0 /100 WBCS
PH UR STRIP.AUTO: 6 [PH]
PLATELET # BLD AUTO: 217 THOUSANDS/UL (ref 149–390)
PMV BLD AUTO: 10 FL (ref 8.9–12.7)
POTASSIUM SERPL-SCNC: 3.9 MMOL/L (ref 3.5–5.3)
PROT SERPL-MCNC: 6.7 G/DL (ref 6.4–8.2)
PROT UR STRIP-MCNC: ABNORMAL MG/DL
RBC # BLD AUTO: 3.46 MILLION/UL (ref 3.81–5.12)
RBC #/AREA URNS AUTO: ABNORMAL /HPF
SODIUM SERPL-SCNC: 142 MMOL/L (ref 136–145)
SP GR UR STRIP.AUTO: 1.01 (ref 1–1.03)
TRIGL SERPL-MCNC: 50 MG/DL
TSH SERPL DL<=0.05 MIU/L-ACNC: 3.42 UIU/ML (ref 0.36–3.74)
UROBILINOGEN UR QL STRIP.AUTO: 0.2 E.U./DL
WBC # BLD AUTO: 5.15 THOUSAND/UL (ref 4.31–10.16)
WBC #/AREA URNS AUTO: ABNORMAL /HPF

## 2019-10-29 PROCEDURE — 36415 COLL VENOUS BLD VENIPUNCTURE: CPT

## 2019-10-29 PROCEDURE — 81001 URINALYSIS AUTO W/SCOPE: CPT

## 2019-10-29 PROCEDURE — 84443 ASSAY THYROID STIM HORMONE: CPT

## 2019-10-29 PROCEDURE — 80061 LIPID PANEL: CPT

## 2019-10-29 PROCEDURE — 85025 COMPLETE CBC W/AUTO DIFF WBC: CPT

## 2019-10-29 PROCEDURE — 80053 COMPREHEN METABOLIC PANEL: CPT

## 2019-10-29 PROCEDURE — 83721 ASSAY OF BLOOD LIPOPROTEIN: CPT

## 2019-10-30 PROCEDURE — 87086 URINE CULTURE/COLONY COUNT: CPT | Performed by: OBSTETRICS & GYNECOLOGY

## 2019-10-31 ENCOUNTER — LAB REQUISITION (OUTPATIENT)
Dept: LAB | Facility: HOSPITAL | Age: 79
End: 2019-10-31
Payer: COMMERCIAL

## 2019-10-31 DIAGNOSIS — N30.10 INTERSTITIAL CYSTITIS (CHRONIC) WITHOUT HEMATURIA: ICD-10-CM

## 2019-11-01 LAB — BACTERIA UR CULT: NORMAL

## 2019-11-21 RX ORDER — IBUPROFEN 800 MG/1
TABLET ORAL
Refills: 3 | COMMUNITY
Start: 2019-10-10 | End: 2019-12-10

## 2019-11-21 RX ORDER — CIPROFLOXACIN 500 MG/1
TABLET, FILM COATED ORAL
COMMUNITY
Start: 2019-08-20 | End: 2019-12-10

## 2019-11-21 RX ORDER — DOXYCYCLINE HYCLATE 100 MG/1
CAPSULE ORAL
Refills: 0 | COMMUNITY
Start: 2019-10-07 | End: 2019-12-10

## 2019-11-21 RX ORDER — FLUCONAZOLE 150 MG/1
TABLET ORAL
COMMUNITY
Start: 2019-08-20 | End: 2019-12-10

## 2019-11-25 ENCOUNTER — SOCIAL WORK (OUTPATIENT)
Dept: BEHAVIORAL/MENTAL HEALTH CLINIC | Facility: CLINIC | Age: 79
End: 2019-11-25
Payer: COMMERCIAL

## 2019-11-25 ENCOUNTER — DOCUMENTATION (OUTPATIENT)
Dept: BEHAVIORAL/MENTAL HEALTH CLINIC | Facility: CLINIC | Age: 79
End: 2019-11-25

## 2019-11-25 DIAGNOSIS — F43.20 ADJUSTMENT DISORDER, UNSPECIFIED TYPE: Primary | ICD-10-CM

## 2019-11-25 PROCEDURE — 90834 PSYTX W PT 45 MINUTES: CPT | Performed by: SOCIAL WORKER

## 2019-11-25 NOTE — BH TREATMENT PLAN
[place]  1940       Date of Initial Treatment Plan: 10/15/14    Date of Current Treatment Plan: 11/25/19    Treatment Plan Number 12    Strengths/Personal Resources for Self Care: "I am a strong in some points "    Diagnosis:   1  Adjustment disorder, unspecified type         Area of Needs:  family matters; Long Term Goal 1: AI will continue to effectively deal with change  Target Date: 3/25/20  Completion Date: n/a         Short Term Objectives for Goal 1: AI will continue to support my daugther in [place]  B  I will continue to make sure I am taking care of myself  C  I will continue to have realistic expectations of myself as I deal with stressors  Target Date: 3/25/20  Completion Date:  n/a      GOAL 1: Modality: Individual 1x per month   Completion Date n/a and The person(s) responsible for carrying out the plan is  [address]: Diagnosis and Treatment Plan explained to [place], [place] relates understanding diagnosis and is agreeable to Treatment Plan         Client Comments : Please share your thoughts, feelings, need and/or experiences regarding your treatment plan: yes

## 2019-11-25 NOTE — PROGRESS NOTES
Treatment Plan Tracking    # 1Treatment Plan not completed within required time limits due to: Client did not schedule a counseling appointment within the four month time frame required to update the txt plan  Jony Murcia

## 2019-11-25 NOTE — PSYCH
Psychotherapy Provided: Individual Psychotherapy 45 minutes "I can't wait to tell you Montserrat Kemp (her daughter's dog) [place]!" She elaborated on some positive experiences with her family at the beach  She states she had her annual "girls" event in the [facility] with her family to include her sisters, her daughters and nieces  She shared other updates about her family including extended family within and out of the state  "As the years go by, it gets harder (to deal with the passing of her granddaughter, Yulia Alonso)  I cry " states her adult son, Jeronimo Gibson, "moved home   for now; It is an adjustment (10:30 AM - 7 PM every day with alternate weekends off)  He (Jeronimo Gibson) helps my  do things, cut the grass, shovel snow "  She noted 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  "He was good " discussed/reviewed her ongoing efforts to cope with her granddaughter's passing as well as with the reported work stressors; A: "I like to work and be around people " She continues to maintain her boundaries with her daughter respecting her privacy in grieving  She values her relationships with others  P: (G#1 ) will continue with self-care;    Length of time in session: 45 minutes, follow up in 2 week    Goals addressed in session: Goal 1     Pain:      none    0    Current suicide risk : [address]: Diagnosis and Treatment Plan explained to [place], [place] relates understanding diagnosis and is agreeable to Treatment Plan   Yes

## 2019-11-26 ENCOUNTER — PREP FOR PROCEDURE (OUTPATIENT)
Dept: OBGYN CLINIC | Facility: HOSPITAL | Age: 79
End: 2019-11-26

## 2019-11-26 ENCOUNTER — OFFICE VISIT (OUTPATIENT)
Dept: OBGYN CLINIC | Facility: HOSPITAL | Age: 79
End: 2019-11-26
Payer: COMMERCIAL

## 2019-11-26 VITALS
HEART RATE: 86 BPM | HEIGHT: 62 IN | BODY MASS INDEX: 24.48 KG/M2 | SYSTOLIC BLOOD PRESSURE: 129 MMHG | DIASTOLIC BLOOD PRESSURE: 70 MMHG | WEIGHT: 133 LBS

## 2019-11-26 DIAGNOSIS — M70.61 GREATER TROCHANTERIC BURSITIS OF RIGHT HIP: ICD-10-CM

## 2019-11-26 DIAGNOSIS — G89.29 CHRONIC BILATERAL LOW BACK PAIN WITHOUT SCIATICA: Primary | ICD-10-CM

## 2019-11-26 DIAGNOSIS — M54.50 CHRONIC BILATERAL LOW BACK PAIN WITHOUT SCIATICA: Primary | ICD-10-CM

## 2019-11-26 DIAGNOSIS — M54.50 CHRONIC BILATERAL LOW BACK PAIN WITHOUT SCIATICA: ICD-10-CM

## 2019-11-26 DIAGNOSIS — G89.29 CHRONIC BILATERAL LOW BACK PAIN WITHOUT SCIATICA: ICD-10-CM

## 2019-11-26 DIAGNOSIS — M25.551 RIGHT HIP PAIN: Primary | ICD-10-CM

## 2019-11-26 PROBLEM — Z96.641 HISTORY OF TOTAL RIGHT HIP REPLACEMENT: Status: RESOLVED | Noted: 2018-09-18 | Resolved: 2019-11-26

## 2019-11-26 PROCEDURE — 20610 DRAIN/INJ JOINT/BURSA W/O US: CPT | Performed by: ORTHOPAEDIC SURGERY

## 2019-11-26 PROCEDURE — 99213 OFFICE O/P EST LOW 20 MIN: CPT | Performed by: ORTHOPAEDIC SURGERY

## 2019-11-26 RX ORDER — LIDOCAINE HYDROCHLORIDE 10 MG/ML
2 INJECTION, SOLUTION INFILTRATION; PERINEURAL
Status: COMPLETED | OUTPATIENT
Start: 2019-11-26 | End: 2019-11-26

## 2019-11-26 RX ORDER — BETAMETHASONE SODIUM PHOSPHATE AND BETAMETHASONE ACETATE 3; 3 MG/ML; MG/ML
6 INJECTION, SUSPENSION INTRA-ARTICULAR; INTRALESIONAL; INTRAMUSCULAR; SOFT TISSUE
Status: COMPLETED | OUTPATIENT
Start: 2019-11-26 | End: 2019-11-26

## 2019-11-26 RX ORDER — BUPIVACAINE HYDROCHLORIDE 2.5 MG/ML
2 INJECTION, SOLUTION INFILTRATION; PERINEURAL
Status: COMPLETED | OUTPATIENT
Start: 2019-11-26 | End: 2019-11-26

## 2019-11-26 RX ADMIN — BETAMETHASONE SODIUM PHOSPHATE AND BETAMETHASONE ACETATE 6 MG: 3; 3 INJECTION, SUSPENSION INTRA-ARTICULAR; INTRALESIONAL; INTRAMUSCULAR; SOFT TISSUE at 10:01

## 2019-11-26 RX ADMIN — LIDOCAINE HYDROCHLORIDE 2 ML: 10 INJECTION, SOLUTION INFILTRATION; PERINEURAL at 10:01

## 2019-11-26 RX ADMIN — BUPIVACAINE HYDROCHLORIDE 2 ML: 2.5 INJECTION, SOLUTION INFILTRATION; PERINEURAL at 10:01

## 2019-11-26 NOTE — PROGRESS NOTES
Orthopaedic Surgery - Office Note  [place] (67 y o  female)   : 1940   MRN: [id number]  Encounter Date: 2019    Chief Complaint   Patient presents with    Right Hip - Follow-up       Assessment / Plan  Right greater trochanter bursitis  Low back pain    · CSI of right trochanteric bursa was performed  · CT guided injections to both SI joints will be scheduled today  · No follow-ups on file  History of Present Illness  [place] is a 67 y o  female who present today for recheck of her right lateral hip  She routinely receives an injection for right greater trochanter bursitis  She continues to have excellent relief after her cortisone injections  Her pain has just recently returned since August   She is also noting bilateral low back pain without 1 specific injury  This has been relatively chronic in nature  She does have a history of seeing spine surgery as well as pain management  Review of Systems  Pertinent items are noted in HPI  All other systems were reviewed and are negative  Physical Exam  /70   Pulse 86   Ht 5' 2" (1 575 m)   Wt 60 3 kg (133 lb)   BMI 24 33 kg/m²   Cons: Appears well  No apparent distress  Psych: Alert  Oriented x3  Mood and affect normal   Eyes: PERRLA, EOMI  Resp: Normal effort  No audible wheezing or stridor  CV: Palpable pulse  No discernable arrhythmia  No LE edema  Lymph:  No palpable cervical, axillary, or inguinal lymphadenopathy  Skin: Warm  No palpable masses  No visible lesions  Neuro: Normal muscle tone  Normal and symmetric DTR's  Right Hip Exam    Inspection:  Incisions healed  Palpation:  point tenderness at right lateral grater trochanter  Bilateral SI joint tenderness present as well  ROM:  Not tested  Strength:  Not tested         Studies Reviewed  No studies to review    Large joint arthrocentesis: R greater trochanteric bursa  Date/Time: 2019 10:01 AM  Consent given by: patient  Supporting Documentation  Indications: pain   Procedure Details  Location: hip - R greater trochanteric bursa  Needle size: 22 G (spinal)  Ultrasound guidance: no  Approach: lateral  Medications administered: 2 mL lidocaine 1 %; 6 mg betamethasone acetate-betamethasone sodium phosphate 6 (3-3) mg/mL; 2 mL bupivacaine 0 25 %    Patient tolerance: patient tolerated the procedure well with no immediate complications             Medical, Surgical, Family, and Social History  The patient's medical history, family history, and social history, were reviewed and updated as appropriate      Past Medical History:   Diagnosis Date    Chronic kidney disease     GERD (gastroesophageal reflux disease) 4/4/2016    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    Hypertension     Osteoarthritis of hip 4/4/2016    SI (sacroiliac) joint inflammation (HCC) 4/4/2016    Substance addiction ([facility] [address] )        Past Surgical History:   Procedure Laterality Date    BREAST EXCISIONAL BIOPSY Left 2000    benign    JOINT REPLACEMENT      right hip 8/15    TONSILLECTOMY         Family History   Problem Relation Age of Onset    Breast cancer Daughter 48    Breast cancer Maternal Aunt 79       Social History     Occupational History    Not on file   Tobacco Use    Smoking status: Never Smoker    Smokeless tobacco: Never Used   Substance and Sexual Activity    Alcohol use: No    Drug use: Yes     Types: Prescription     Comment: rocovering last used 1996    Sexual activity: Not on file       Allergies   Allergen Reactions    Bactrim [Sulfamethoxazole-Trimethoprim]      VINNY         Current Outpatient Medications:     amLODIPine (NORVASC) 2 5 mg tablet, Take 2 5 mg by mouth daily at bedtime, Disp: , Rfl:     atorvastatin (LIPITOR) 10 mg tablet, , Disp: , Rfl:     cholecalciferol (VITAMIN D3) 1,000 units tablet, Take 1,000 Units by mouth daily, Disp: , Rfl:     ciprofloxacin (CIPRO) 500 mg tablet, , Disp: , Rfl:     doxycycline hyclate (VIBRAMYCIN) 100 mg capsule, , Disp: , Rfl: 0    fluconazole (DIFLUCAN) 150 mg tablet, , Disp: , Rfl:     ibuprofen (MOTRIN) 800 mg tablet, , Disp: , Rfl: 3    lidocaine (XYLOCAINE) 5 % ointment, , Disp: , Rfl:     metoprolol succinate (TOPROL-XL) 25 mg 24 hr tablet, Take 1 tablet (25 mg total) by mouth daily, Disp: 90 tablet, Rfl: 3    potassium-sodium phosphateS (K-PHOS,PHOSPHA 250) 155-852-130 mg tablet, Take 1 tablet by mouth 3 (three) times a day with meals for 2 days (Patient not taking: Reported on 3/11/2019), Disp: 6 tablet, Rfl: 0    PREMARIN vaginal cream, , Disp: , Rfl:     spironolactone (ALDACTONE) 25 mg tablet, Take 1 tablet (25 mg total) by mouth daily, Disp: 30 tablet, Rfl: 5      Rashid De León PA-C    Scribe Attestation    I,:    am acting as a scribe while in the presence of the attending physician :        I,:    personally performed the services described in this documentation    as scribed in my presence :

## 2019-11-29 ENCOUNTER — TELEPHONE (OUTPATIENT)
Dept: OBGYN CLINIC | Facility: HOSPITAL | Age: 79
End: 2019-11-29

## 2019-11-29 NOTE — TELEPHONE ENCOUNTER
Alanna Livingston    Patient would like call back asap about testing authorization  She is concerned about timing with holiday and test scheduled so soon

## 2019-12-02 ENCOUNTER — TELEPHONE (OUTPATIENT)
Dept: OBGYN CLINIC | Facility: CLINIC | Age: 79
End: 2019-12-02

## 2019-12-02 NOTE — TELEPHONE ENCOUNTER
I spoke with procedure scheduling and the nurse states that no contrast dye will be used for SI joint injection  Patient made aware

## 2019-12-02 NOTE — TELEPHONE ENCOUNTER
Dr Jassi Last is scheduled for a CT SI joint injection 12/3/19 and needs to know if this is going to affect her kidneys  She spoke with her kidney specialist who told her to check with you  Please call her [phone]  It is ok to leave a voicemail or with her  Tomamickey Franciss    Thank you

## 2019-12-03 ENCOUNTER — TRANSCRIBE ORDERS (OUTPATIENT)
Dept: RADIOLOGY | Facility: HOSPITAL | Age: 79
End: 2019-12-03

## 2019-12-03 ENCOUNTER — HOSPITAL ENCOUNTER (OUTPATIENT)
Dept: RADIOLOGY | Facility: HOSPITAL | Age: 79
Discharge: HOME | End: 2019-12-03
Admitting: RADIOLOGY
Payer: COMMERCIAL

## 2019-12-03 DIAGNOSIS — G89.29 CHRONIC BILATERAL LOW BACK PAIN WITHOUT SCIATICA: ICD-10-CM

## 2019-12-03 DIAGNOSIS — M54.50 CHRONIC BILATERAL LOW BACK PAIN WITHOUT SCIATICA: ICD-10-CM

## 2019-12-03 PROCEDURE — G0260 INJ FOR SACROILIAC JT ANESTH: HCPCS

## 2019-12-03 RX ORDER — BUPIVACAINE HYDROCHLORIDE 2.5 MG/ML
2 INJECTION, SOLUTION EPIDURAL; INFILTRATION; INTRACAUDAL ONCE
Status: COMPLETED | OUTPATIENT
Start: 2019-12-03 | End: 2019-12-03

## 2019-12-03 RX ORDER — METHYLPREDNISOLONE ACETATE 80 MG/ML
80 INJECTION, SUSPENSION INTRA-ARTICULAR; INTRALESIONAL; INTRAMUSCULAR; SOFT TISSUE ONCE
Status: COMPLETED | OUTPATIENT
Start: 2019-12-03 | End: 2019-12-03

## 2019-12-03 RX ADMIN — METHYLPREDNISOLONE ACETATE 80 MG: 80 INJECTION, SUSPENSION INTRA-ARTICULAR; INTRALESIONAL; INTRAMUSCULAR; SOFT TISSUE at 08:45

## 2019-12-03 RX ADMIN — BUPIVACAINE HYDROCHLORIDE 2 ML: 2.5 INJECTION, SOLUTION EPIDURAL; INFILTRATION; INTRACAUDAL at 08:44

## 2019-12-05 ENCOUNTER — TELEPHONE (OUTPATIENT)
Dept: NEPHROLOGY | Facility: CLINIC | Age: 79
End: 2019-12-05

## 2019-12-05 ENCOUNTER — APPOINTMENT (OUTPATIENT)
Dept: LAB | Facility: HOSPITAL | Age: 79
End: 2019-12-05
Attending: INTERNAL MEDICINE
Payer: COMMERCIAL

## 2019-12-05 DIAGNOSIS — E83.52 HYPERCALCEMIA: ICD-10-CM

## 2019-12-05 DIAGNOSIS — I12.9 HYPERTENSIVE CHRONIC KIDNEY DISEASE WITH STAGE 1 THROUGH STAGE 4 CHRONIC KIDNEY DISEASE, OR UNSPECIFIED CHRONIC KIDNEY DISEASE: ICD-10-CM

## 2019-12-05 DIAGNOSIS — N18.30 CHRONIC KIDNEY DISEASE, STAGE III (MODERATE) (HCC): ICD-10-CM

## 2019-12-05 DIAGNOSIS — N18.30 CHRONIC KIDNEY DISEASE, STAGE III (MODERATE) (HCC): Primary | ICD-10-CM

## 2019-12-05 DIAGNOSIS — E78.5 DYSLIPIDEMIA: ICD-10-CM

## 2019-12-05 DIAGNOSIS — D63.1 ANEMIA OF CHRONIC RENAL FAILURE, STAGE 3 (MODERATE) (HCC): ICD-10-CM

## 2019-12-05 DIAGNOSIS — I10 ACCELERATED HYPERTENSION: ICD-10-CM

## 2019-12-05 DIAGNOSIS — E55.9 VITAMIN D DEFICIENCY: ICD-10-CM

## 2019-12-05 DIAGNOSIS — N18.30 ANEMIA OF CHRONIC RENAL FAILURE, STAGE 3 (MODERATE) (HCC): ICD-10-CM

## 2019-12-05 DIAGNOSIS — E87.6 HYPOKALEMIA: ICD-10-CM

## 2019-12-05 DIAGNOSIS — E61.1 IRON DEFICIENCY: ICD-10-CM

## 2019-12-05 LAB
ALBUMIN SERPL BCP-MCNC: 3.8 G/DL (ref 3.5–5)
ALP SERPL-CCNC: 69 U/L (ref 46–116)
ALT SERPL W P-5'-P-CCNC: 17 U/L (ref 12–78)
ANION GAP SERPL CALCULATED.3IONS-SCNC: 4 MMOL/L (ref 4–13)
AST SERPL W P-5'-P-CCNC: 12 U/L (ref 5–45)
BILIRUB SERPL-MCNC: 0.43 MG/DL (ref 0.2–1)
BUN SERPL-MCNC: 26 MG/DL (ref 5–25)
CALCIUM ALBUM COR SERPL-MCNC: 10.6 MG/DL (ref 8.3–10.1)
CALCIUM SERPL-MCNC: 10.4 MG/DL (ref 8.3–10.1)
CHLORIDE SERPL-SCNC: 113 MMOL/L (ref 100–108)
CO2 SERPL-SCNC: 23 MMOL/L (ref 21–32)
CREAT SERPL-MCNC: 1.34 MG/DL (ref 0.6–1.3)
CREAT UR-MCNC: 116 MG/DL
ERYTHROCYTE [DISTWIDTH] IN BLOOD BY AUTOMATED COUNT: 12.9 % (ref 11.6–15.1)
FERRITIN SERPL-MCNC: 372 NG/ML (ref 8–388)
GFR SERPL CREATININE-BSD FRML MDRD: 38 ML/MIN/1.73SQ M
GLUCOSE P FAST SERPL-MCNC: 106 MG/DL (ref 65–99)
HCT VFR BLD AUTO: 35.5 % (ref 34.8–46.1)
HGB BLD-MCNC: 11.5 G/DL (ref 11.5–15.4)
IRON SATN MFR SERPL: 36 %
IRON SERPL-MCNC: 93 UG/DL (ref 50–170)
MAGNESIUM SERPL-MCNC: 2 MG/DL (ref 1.6–2.6)
MCH RBC QN AUTO: 30.9 PG (ref 26.8–34.3)
MCHC RBC AUTO-ENTMCNC: 32.4 G/DL (ref 31.4–37.4)
MCV RBC AUTO: 95 FL (ref 82–98)
PHOSPHATE SERPL-MCNC: 3.5 MG/DL (ref 2.3–4.1)
PLATELET # BLD AUTO: 284 THOUSANDS/UL (ref 149–390)
PMV BLD AUTO: 10.6 FL (ref 8.9–12.7)
POTASSIUM SERPL-SCNC: 3.6 MMOL/L (ref 3.5–5.3)
PROT SERPL-MCNC: 7.2 G/DL (ref 6.4–8.2)
PROT UR-MCNC: 38 MG/DL
PROT/CREAT UR: 0.33 MG/G{CREAT} (ref 0–0.1)
PTH-INTACT SERPL-MCNC: 6.4 PG/ML (ref 18.4–80.1)
RBC # BLD AUTO: 3.72 MILLION/UL (ref 3.81–5.12)
SODIUM SERPL-SCNC: 140 MMOL/L (ref 136–145)
TIBC SERPL-MCNC: 261 UG/DL (ref 250–450)
WBC # BLD AUTO: 9.2 THOUSAND/UL (ref 4.31–10.16)

## 2019-12-05 PROCEDURE — 83735 ASSAY OF MAGNESIUM: CPT

## 2019-12-05 PROCEDURE — 83540 ASSAY OF IRON: CPT

## 2019-12-05 PROCEDURE — 84100 ASSAY OF PHOSPHORUS: CPT

## 2019-12-05 PROCEDURE — 83550 IRON BINDING TEST: CPT

## 2019-12-05 PROCEDURE — 82570 ASSAY OF URINE CREATININE: CPT

## 2019-12-05 PROCEDURE — 82728 ASSAY OF FERRITIN: CPT

## 2019-12-05 PROCEDURE — 36415 COLL VENOUS BLD VENIPUNCTURE: CPT

## 2019-12-05 PROCEDURE — 83970 ASSAY OF PARATHORMONE: CPT

## 2019-12-05 PROCEDURE — 80053 COMPREHEN METABOLIC PANEL: CPT

## 2019-12-05 PROCEDURE — 85027 COMPLETE CBC AUTOMATED: CPT

## 2019-12-05 PROCEDURE — 84156 ASSAY OF PROTEIN URINE: CPT

## 2019-12-05 NOTE — TELEPHONE ENCOUNTER
----- Message from Ayush Bonner MD sent at 12/5/2019  9:28 AM EST -----  The patient's calcium slightly elevated  I would hold the vitamin-D  Make sure she is on no calcium products or Tums    Repeat a basic metabolic profile and an ionized calcium and 1 week

## 2019-12-05 NOTE — TELEPHONE ENCOUNTER
I left a message for patient and advised per Dr Bellamy to hold vitamin d ,calcium product or tums   Blood work will be repeated in 1 week

## 2019-12-09 NOTE — PROGRESS NOTES
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/arteriolar nephrosclerosis  · Baseline creatinine:  1 1 -1 79  · Current creatinine:  1 34 within her normal range at this time  · Urine protein creatinine ratio:  0 33 g at goal  Recommendations:  · Treat hypertension-please see below  · Treat dyslipidemia-please see below  · Maintain proteinuria less than 1 g or as low as possible  · Avoid nephrotoxic agents such as NSAIDs, patient counseled as such  2   Volume:  Euvolemic  3   Hypertension:  Secondary workup:  Was negative  · Home readings:  none at this time    · Goal blood pressure:  Less than 130/80 given CKD  Recommendations:  ·  Push nonmedical regimen especially weight loss/isotonic exercise and low-salt diet  · Medication changes today:  The patient will send in 1 week of readings and we will make appropriate changes at that time if necessary  4   Electrolytes:    · Mild metabolic acidosis:  Resolved  · Borderline hypokalemia:  High potassium diet/consider spironolactone  5   Mineral bone disorder:  · Calcium/magnesium/phosphorus:  Magnesium and phosphorus are normal  -Calcium is elevated at 10 4:  She is on vitamin-D:  At the very least I would repeat an SPEP/UPEP/light chain ratio/ionized calcium/PTH related protein/ACE level/a m    Cortisol/TSH  -Stay off all calcium and vitamin-D products; patient counseled as such  · PTH intact:   6 4 which is actually low  · Vitamin-D:  34 9 at goal on supplementation  6   Dyslipidemia:  · Goal LDL:  Less than 100  · Current lipid profile:  LDL 78/HDL 90/triglycerides 50  Recommendations:  Patient is at goal  7   Anemia:   · Hemoglobin:  Actually better at 11 5  · Iron studies:  Good iron studies with a saturation of 36% and ferritin of 372  · Stool for occult blood x3  · Multiple myeloma evaluation 10/2018 was negative  · GI evaluation:  EGD with just esophagitis; colonoscopy with benign polyp  · Heme consultation:  Felt anemia which was normocytic secondary to chronic kidney disease  No further recommendations at this time  8   Other problems:  · GERD  · Osteoarthritis of right hip  · Colonic polyp       PATIENT INSTRUCTIONS:    Patient Instructions   1  Medication changes today:  · Please stop taking the vitamin-D     2  Please make sure to eat a high potassium diet including a lot of fruits and vegetables  We will also do give you a diet sheet to help guide you  3  Please wait 2 weeks after making the above medication change and go for lab work in the morning but nonfasting     4  Please take 1 week a blood pressure readings now morning evening sitting and standing as follows:  · Take the morning readings before any medications  · Take the evening readings before supper and before taking any medications at that time  · When taking standing readings, keep your arm supported at heart level and not dangling  · Make sure you are sitting with your back supported in feet on the ground on crossed  · Make sure you have not taken any coffee or caffeine products or exercised or smoke cigarettes at least 30 minutes before taking your blood pressure  Then please send your blood pressure readings into the office    5  Follow-up labs in 3 months fasting    6  Follow-up appointment in 4 months:  -please bring in 1 week a blood pressure readings morning evening, sitting and standing is outlined above  -PLEASE BRING IN YOUR BLOOD PRESSURE MACHINE TO CORRELATE WITH THE OFFICE MACHINE AT THE NEXT VISIT  -please go for fasting lab work prior to your appointment    7  General instructions:   AVOID SALT BUT NOT ADDING AN READING LABELS TO MAKE SURE THERE IS LOW-SALT IN THE FOOD THAT YOU ARE EATING     Avoid nonsteroidal anti-inflammatory drugs such as Naprosyn, ibuprofen, Aleve, Advil, Celebrex, etc   You can use Tylenol as needed if you do not have any liver condition to be concerned about     Avoid medications such as Sudafed or decongestants and antihistamines that contained the D component which is the decongestant  You can take antihistamines without the decongestant or D component   Try to avoid medications such as pantoprazole or  Protonix/Nexium or Esomeprazole)/Prilosec or omeprazole/Prevacid or lansoprazole/AcipHex or Rabeprazole  If you are able to, use Pepcid as this is safer for your kidneys   Try to exercise at least 30 minutes 3 days a week to begin with with an ultimate goal of 5 days a week for at least 30 minutes       Please do not drink more than 2 glasses of alcohol/wine on a daily basis as this may contribute to your high blood pressure  Subjective: There has been no hospitalizations or acute illnesses since last visit  The patient overall is feeling well  No fevers chills cough or colds    Good appetite and good energy  No hematuria, dysuria, voiding symptoms or foamy urine  No gastrointestinal symptoms  No cardiovascular symptoms including swelling of the legs  No headaches, dizziness or lightheadedness  Blood pressure medications:  · Spironolactone 25 mg daily  · Toprol XL 25 mg daily in the morning  · Apparently not taking amlodipine at bedtime    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    Hypertension     Osteoarthritis of hip 4/4/2016    SI [facility]ro[facility]ac) [facility] ([facility] [address] [facility] 4/4/2016    [facility]      Past Surgical History:   Procedure Laterality Date    BREAST EXCISIONAL BIOPSY Left 2000    benign    JOINT REPLACEMENT      right hip 8/15    TONSILLECTOMY       Family History   Problem Relation Age of Onset    Breast cancer Daughter 48    Breast cancer Maternal Aunt 70      reports that she has never smoked  She has never used smokeless tobacco  She reports that she has current or past drug history  Drug: Prescription  She reports that she does not drink alcohol  I COMPLETELY REVIEWED THE PAST MEDICAL HISTORY/PAST SURGICAL HISTORY/SOCIAL HISTORY/FAMILY HISTORY/AND MEDICATIONS  AND UPDATED ALL    Objective:     Vitals:   Vitals:    12/10/19 0951   BP: 128/62   Pulse: 84   Resp: 18    BP sitting on right:  138/64 with a heart rate of 80 and regular  BP standing on right:  132/76 with a heart rate of 84 and regular    Weight (last 2 days)     Date/Time   Weight    12/10/19 0951   57 6 (127)            Wt Readings from Last 3 Encounters:   12/10/19 57 6 kg (127 lb)   11/26/19 60 3 kg (133 lb)   09/06/19 58 5 kg (129 lb)       Body mass index is 23 23 kg/m²      Physical Exam: General:  No acute distress  Skin:  No acute rash  Eyes:  No scleral icterus, noninjected, no discharge from eyes  ENT:  Moist mucous membranes  Neck:  Supple, no jugular venous distention, no lymphadenopathy and no thyromegaly  Back   No CVAT  Chest:  Clear to auscultation and percussion, good respiratory effort  CVS:  Regular rate and rhythm without a rub, murmurs or gallops  Abdomen:  Soft and nontender with normal bowel sounds  Extremities:  No cyanosis and no edema, no arthritic changes, normal range of motion  Neuro:  Grossly intact  Psych:  Alert, oriented x3 and appropriate      Medications:    Current Outpatient Medications:     amLODIPine (NORVASC) 2 5 mg tablet, Take 2 5 mg by mouth daily at bedtime, Disp: , Rfl:     atorvastatin (LIPITOR) 10 mg tablet, , Disp: , Rfl:     lidocaine (XYLOCAINE) 5 % ointment, , Disp: , Rfl:     metoprolol succinate (TOPROL-XL) 25 mg 24 hr tablet, Take 1 tablet (25 mg total) by mouth daily, Disp: 90 tablet, Rfl: 3    PREMARIN vaginal cream, , Disp: , Rfl:     spironolactone (ALDACTONE) 25 mg tablet, Take 1 tablet (25 mg total) by mouth daily, Disp: 30 tablet, Rfl: 5    Lab, Imaging and other studies: I have personally reviewed pertinent labs  Laboratory Results:  Results for orders placed or performed in visit on 12/05/19   Comprehensive metabolic panel   Result Value Ref Range    Sodium 140 136 - 145 mmol/L    Potassium 3 6 3 5 - 5 3 mmol/L    Chloride 113 (H) 100 - 108 mmol/L    CO2 23 21 - 32 mmol/L    ANION GAP 4 4 - 13 mmol/L    BUN 26 (H) 5 - 25 mg/dL    Creatinine 1 34 (H) 0 60 - 1 30 mg/dL    Glucose, Fasting 106 (H) 65 - 99 mg/dL    Calcium 10 4 (H) 8 3 - 10 1 mg/dL    Corrected Calcium 10 6 (H) 8 3 - 10 1 mg/dL    AST 12 5 - 45 U/L    ALT 17 12 - 78 U/L    Alkaline Phosphatase 69 46 - 116 U/L    Total Protein 7 2 6 4 - 8 2 g/dL    Albumin 3 8 3 5 - 5 0 g/dL    Total Bilirubin 0 43 0 20 - 1 00 mg/dL    eGFR 38 ml/min/1 73sq m   CBC   Result Value Ref Range    WBC 9 20 4  31 - 10 16 Thousand/uL    RBC 3 72 (L) 3 81 - 5 12 Million/uL    Hemoglobin 11 5 11 5 - 15 4 g/dL    Hematocrit 35 5 34 8 - 46 1 %    MCV 95 82 - 98 fL    MCH 30 9 26 8 - 34 3 pg    MCHC 32 4 31 4 - 37 4 g/dL    RDW 12 9 11 6 - 15 1 %    Platelets 613 678 - 249 Thousands/uL    MPV 10 6 8 9 - 12 7 fL   Iron Saturation %   Result Value Ref Range    Iron Saturation 36 %    TIBC 261 250 - 450 ug/dL    Iron 93 50 - 170 ug/dL   Ferritin   Result Value Ref Range    Ferritin 372 8 - 388 ng/mL   Magnesium   Result Value Ref Range    Magnesium 2 0 1 6 - 2 6 mg/dL   Phosphorus   Result Value Ref Range    Phosphorus 3 5 2 3 - 4 1 mg/dL   Protein / creatinine ratio, urine   Result Value Ref Range    Creatinine, Ur 116 0 mg/dL    Protein Urine Random 38 mg/dL    Prot/Creat Ratio, Ur 0 33 (H) 0 00 - 0 10   PTH, intact   Result Value Ref Range    PTH 6 4 (L) 18 4 - 80 1 pg/mL       Results from last 7 days   Lab Units 12/05/19  0742   WBC Thousand/uL 9 20   HEMOGLOBIN g/dL 11 5   HEMATOCRIT % 35 5   PLATELETS Thousands/uL 284   POTASSIUM mmol/L 3 6   CHLORIDE mmol/L 113*   CO2 mmol/L 23   BUN mg/dL 26*   CREATININE mg/dL 1 34*   CALCIUM mg/dL 10 4*   MAGNESIUM mg/dL 2 0   PHOSPHORUS mg/dL 3 5         Radiology review:   chest X-ray    Ultrasound      Portions of the record may have been created with voice recognition software  Occasional wrong word or "sound a like" substitutions may have occurred due to the inherent limitations of voice recognition software  Read the chart carefully and recognize, using context, where substitutions have occurred

## 2019-12-10 ENCOUNTER — OFFICE VISIT (OUTPATIENT)
Dept: NEPHROLOGY | Facility: CLINIC | Age: 79
End: 2019-12-10
Payer: COMMERCIAL

## 2019-12-10 VITALS
HEART RATE: 84 BPM | WEIGHT: 127 LBS | BODY MASS INDEX: 23.37 KG/M2 | HEIGHT: 62 IN | SYSTOLIC BLOOD PRESSURE: 128 MMHG | RESPIRATION RATE: 18 BRPM | DIASTOLIC BLOOD PRESSURE: 62 MMHG

## 2019-12-10 DIAGNOSIS — E55.9 VITAMIN D DEFICIENCY: ICD-10-CM

## 2019-12-10 DIAGNOSIS — N18.30 ANEMIA OF CHRONIC RENAL FAILURE, STAGE 3 (MODERATE) (HCC): ICD-10-CM

## 2019-12-10 DIAGNOSIS — I12.9 HYPERTENSIVE CHRONIC KIDNEY DISEASE WITH STAGE 1 THROUGH STAGE 4 CHRONIC KIDNEY DISEASE, OR UNSPECIFIED CHRONIC KIDNEY DISEASE: Primary | ICD-10-CM

## 2019-12-10 DIAGNOSIS — D63.1 ANEMIA OF CHRONIC RENAL FAILURE, STAGE 3 (MODERATE) (HCC): ICD-10-CM

## 2019-12-10 DIAGNOSIS — E87.6 HYPOKALEMIA: ICD-10-CM

## 2019-12-10 DIAGNOSIS — E78.5 DYSLIPIDEMIA: ICD-10-CM

## 2019-12-10 DIAGNOSIS — E61.1 IRON DEFICIENCY: ICD-10-CM

## 2019-12-10 DIAGNOSIS — E83.52 HYPERCALCEMIA: ICD-10-CM

## 2019-12-10 DIAGNOSIS — N18.30 CHRONIC KIDNEY DISEASE, STAGE III (MODERATE) (HCC): ICD-10-CM

## 2019-12-10 PROCEDURE — 99214 OFFICE O/P EST MOD 30 MIN: CPT | Performed by: INTERNAL MEDICINE

## 2019-12-10 NOTE — LETTER
December 10, 2019     Venkata Pimentel DO  [address] [facility]  [address]    Patient: Tani Braun   YOB: 1940   Date of Visit: 12/10/2019       Dear Dr Elysia Leon: Thank you for referring Tani Braun to me for evaluation  Below are my notes for this consultation  If you have questions, please do not hesitate to call me  I look forward to following your patient along with you  Sincerely,        Ayan Cosme MD        CC: MD Pee Day CRNP Eustacio Copier, MD  12/10/2019 10:30 AM  Sign at close encounter  RENAL FOLLOW UP NOTE: td    ASSESSMENT AND PLAN:  1  Neela Littlejohn :  · Etiology:  AK I from Bactrim/hypercalcemia along with poor oral intake   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/arteriolar nephrosclerosis  · Baseline creatinine:  1 1 -1 79  · Current creatinine:  1  34 within her normal range at this time  · Urine protein creatinine ratio:  0 33 g at goal  Recommendations:  · Treat hypertension-please see below  · Treat dyslipidemia-please see below  · Maintain proteinuria less than 1 g or as low as possible  · Avoid nephrotoxic agents such as NSAIDs, patient counseled as such  2   Volume:  Euvolemic  3   Hypertension:  Secondary workup:  Was negative  · Home readings:   none at this time    · Goal blood pressure:  Less than 130/80 given CKD  Recommendations:  ·  Push nonmedical regimen especially weight loss/isotonic exercise and low-salt diet  · Medication changes today:  The patient will send in 1 week of readings and we will make appropriate changes at that time if necessary    4   Electrolytes:    · Mild metabolic acidosis:  Resolved  · Borderline hypokalemia:  High potassium diet/consider spironolactone  5   Mineral bone disorder:  · Calcium/magnesium/phosphorus:  Magnesium and phosphorus are normal  -Calcium is elevated at 10 4:  She is on vitamin-D:  At the very least I would repeat an SPEP/UPEP/light chain ratio/ionized calcium/PTH related protein/ACE level/a m  Cortisol/TSH  -Stay off all calcium and vitamin-D products; patient counseled as such  · PTH intact:    6 4 which is actually low  · Vitamin-D:  34 9 at goal on supplementation  6   Dyslipidemia:  · Goal LDL:  Less than 100  · Current lipid profile:  LDL 78/HDL 90/triglycerides 50  Recommendations:  Patient is at goal  7   Anemia:   · Hemoglobin:  Actually better at 11 5  · Iron studies:  Good iron studies with a saturation of 36% and ferritin of 372  · Stool for occult blood x3  · Multiple myeloma evaluation 10/2018 was negative  · GI evaluation:  EGD with just esophagitis; colonoscopy with benign polyp  · Heme consultation:  Felt anemia which was normocytic secondary to chronic kidney disease  No further recommendations at this time  8   Other problems:  · GERD  · Osteoarthritis of right hip  · Colonic polyp       PATIENT INSTRUCTIONS:    Patient Instructions   1  Medication changes today:  · Please stop taking the vitamin-D     2  Please make sure to eat a high potassium diet including a lot of fruits and vegetables  We will also do give you a diet sheet to help guide you  3  Please wait 2 weeks after making the above medication change and go for lab work in the morning but nonfasting     4  Please take 1 week a blood pressure readings now morning evening sitting and standing as follows:  · Take the morning readings before any medications  · Take the evening readings before supper and before taking any medications at that time  · When taking standing readings, keep your arm supported at heart level and not dangling  · Make sure you are sitting with your back supported in feet on the ground on crossed  · Make sure you have not taken any coffee or caffeine products or exercised or smoke cigarettes at least 30 minutes before taking your blood pressure  Then please send your blood pressure readings into the office    5   Follow-up labs in 3 months fasting    6  Follow-up appointment in 4 months:  -please bring in 1 week a blood pressure readings morning evening, sitting and standing is outlined above  -PLEASE BRING IN YOUR BLOOD PRESSURE MACHINE TO CORRELATE WITH THE OFFICE MACHINE AT THE NEXT VISIT  -please go for fasting lab work prior to your appointment    7  General instructions:   AVOID SALT BUT NOT ADDING AN READING LABELS TO MAKE SURE THERE IS LOW-SALT IN THE FOOD THAT YOU ARE EATING     Avoid nonsteroidal anti-inflammatory drugs such as Naprosyn, ibuprofen, Aleve, Advil, Celebrex, etc   You can use Tylenol as needed if you do not have any liver condition to be concerned about     Avoid medications such as Sudafed or decongestants and antihistamines that contained the D component which is the decongestant  You can take antihistamines without the decongestant or D component   Try to avoid medications such as pantoprazole or  Protonix/Nexium or Esomeprazole)/Prilosec or omeprazole/Prevacid or lansoprazole/AcipHex or Rabeprazole  If you are able to, use Pepcid as this is safer for your kidneys   Try to exercise at least 30 minutes 3 days a week to begin with with an ultimate goal of 5 days a week for at least 30 minutes       Please do not drink more than 2 glasses of alcohol/wine on a daily basis as this may contribute to your high blood pressure  Subjective: There has been no hospitalizations or acute illnesses since last visit  The patient overall is feeling well  No fevers chills cough or colds    Good appetite and good energy  No hematuria, dysuria, voiding symptoms or foamy urine  No gastrointestinal symptoms  No cardiovascular symptoms including swelling of the legs  No headaches, dizziness or lightheadedness  Blood pressure medications:  · Spironolactone 25 mg daily  · Toprol XL 25 mg daily in the morning  · Apparently not taking amlodipine at bedtime    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    Hypertension     Osteoarthritis of hip 4/4/2016    SI (sacroiliac) joint inflammation (HCC) 4/4/2016    Substance addiction ([address] )      Past Surgical History:   Procedure Laterality Date    BREAST EXCISIONAL BIOPSY Left 2000    benign    JOINT REPLACEMENT      right hip 8/15    TONSILLECTOMY       Family History   Problem Relation Age of Onset    Breast cancer Daughter 48    Breast cancer Maternal Aunt 79      reports that she has never smoked  She has never used smokeless tobacco  She reports that she has current or past drug history  Drug: Prescription  She reports that she does not drink alcohol  I COMPLETELY REVIEWED THE PAST MEDICAL HISTORY/PAST SURGICAL HISTORY/SOCIAL HISTORY/FAMILY HISTORY/AND MEDICATIONS  AND UPDATED ALL    Objective:     Vitals:   Vitals:    12/10/19 0951   BP: 128/62   Pulse: 84   Resp: 18    BP sitting on right:  138/64 with a heart rate of 80 and regular  BP standing on right:  132/76 with a heart rate of 84 and regular    Weight (last 2 days)     Date/Time   Weight    12/10/19 0951   57 6 (127)            Wt Readings from Last 3 Encounters:   12/10/19 57 6 kg (127 lb)   11/26/19 60 3 kg (133 lb)   09/06/19 58 5 kg (129 lb)       Body mass index is 23 23 kg/m²      Physical Exam: General:  No acute distress  Skin:  No acute rash  Eyes:  No scleral icterus, noninjected, no discharge from eyes  ENT:  Moist mucous membranes  Neck:  Supple, no jugular venous distention, no lymphadenopathy and no thyromegaly  Back   No CVAT  Chest:  Clear to auscultation and percussion, good respiratory effort  CVS:  Regular rate and rhythm without a rub, murmurs or gallops  Abdomen:  Soft and nontender with normal bowel sounds  Extremities:  No cyanosis and no edema, no arthritic changes, normal range of motion  Neuro:  Grossly intact  Psych:  Alert, oriented x3 and appropriate      Medications:    Current Outpatient Medications:     amLODIPine (NORVASC) 2 5 mg tablet, Take 2 5 mg by mouth daily at bedtime, Disp: , Rfl:     atorvastatin (LIPITOR) 10 mg tablet, , Disp: , Rfl:     lidocaine (XYLOCAINE) 5 % ointment, , Disp: , Rfl:     metoprolol succinate (TOPROL-XL) 25 mg 24 hr tablet, Take 1 tablet (25 mg total) by mouth daily, Disp: 90 tablet, Rfl: 3    PREMARIN vaginal cream, , Disp: , Rfl:     spironolactone (ALDACTONE) 25 mg tablet, Take 1 tablet (25 mg total) by mouth daily, Disp: 30 tablet, Rfl: 5    Lab, Imaging and other studies: I have personally reviewed pertinent labs  Laboratory Results:  Results for orders placed or performed in visit on 12/05/19   Comprehensive metabolic panel   Result Value Ref Range    Sodium 140 136 - 145 mmol/L    Potassium 3 6 3 5 - 5 3 mmol/L    Chloride 113 (H) 100 - 108 mmol/L    CO2 23 21 - 32 mmol/L    ANION GAP 4 4 - 13 mmol/L    BUN 26 (H) 5 - 25 mg/dL    Creatinine 1 34 (H) 0 60 - 1 30 mg/dL    Glucose, Fasting 106 (H) 65 - 99 mg/dL    Calcium 10 4 (H) 8 3 - 10 1 mg/dL    Corrected Calcium 10 6 (H) 8 3 - 10 1 mg/dL    AST 12 5 - 45 U/L    ALT 17 12 - 78 U/L    Alkaline Phosphatase 69 46 - 116 U/L    Total Protein 7 2 6 4 - 8 2 g/dL    Albumin 3 8 3 5 - 5 0 g/dL    Total Bilirubin 0 43 0 20 - 1 00 mg/dL    eGFR 38 ml/min/1 73sq m   CBC   Result Value Ref Range    WBC 9 20 4  31 - 10 16 Thousand/uL    RBC 3 72 (L) 3 81 - 5 12 Million/uL    Hemoglobin 11 5 11 5 - 15 4 g/dL    Hematocrit 35 5 34 8 - 46 1 %    MCV 95 82 - 98 fL    MCH 30 9 26 8 - 34 3 pg    MCHC 32 4 31 4 - 37 4 g/dL    RDW 12 9 11 6 - 15 1 %    Platelets 648 028 - 860 Thousands/uL    MPV 10 6 8 9 - 12 7 fL   Iron Saturation %   Result Value Ref Range    Iron Saturation 36 %    TIBC 261 250 - 450 ug/dL    Iron 93 50 - 170 ug/dL   Ferritin   Result Value Ref Range    Ferritin 372 8 - 388 ng/mL   Magnesium   Result Value Ref Range    Magnesium 2 0 1 6 - 2 6 mg/dL   Phosphorus   Result Value Ref Range    Phosphorus 3 5 2 3 - 4 1 mg/dL   Protein / creatinine ratio, urine   Result Value Ref Range    Creatinine, Ur 116 0 mg/dL    Protein Urine Random 38 mg/dL    Prot/Creat Ratio, Ur 0 33 (H) 0 00 - 0 10   PTH, intact   Result Value Ref Range    PTH 6 4 (L) 18 4 - 80 1 pg/mL       Results from last 7 days   Lab Units 12/05/19  0742   WBC Thousand/uL 9 20   HEMOGLOBIN g/dL 11 5   HEMATOCRIT % 35 5   PLATELETS Thousands/uL 284   POTASSIUM mmol/L 3 6   CHLORIDE mmol/L 113*   CO2 mmol/L 23   BUN mg/dL 26*   CREATININE mg/dL 1 34*   CALCIUM mg/dL 10 4*   MAGNESIUM mg/dL 2 0   PHOSPHORUS mg/dL 3 5         Radiology review:   chest X-ray    Ultrasound      Portions of the record may have been created with voice recognition software  Occasional wrong word or "sound a like" substitutions may have occurred due to the inherent limitations of voice recognition software  Read the chart carefully and recognize, using context, where substitutions have occurred

## 2019-12-10 NOTE — PATIENT INSTRUCTIONS
1  Medication changes today:  · Please stop taking the vitamin-D     2  Please make sure to eat a high potassium diet including a lot of fruits and vegetables  We will also do give you a diet sheet to help guide you  3  Please wait 2 weeks after making the above medication change and go for lab work in the morning but nonfasting     4  Please take 1 week a blood pressure readings now morning evening sitting and standing as follows:  · Take the morning readings before any medications  · Take the evening readings before supper and before taking any medications at that time  · When taking standing readings, keep your arm supported at heart level and not dangling  · Make sure you are sitting with your back supported in feet on the ground on crossed  · Make sure you have not taken any coffee or caffeine products or exercised or smoke cigarettes at least 30 minutes before taking your blood pressure  Then please send your blood pressure readings into the office    5  Follow-up labs in 3 months fasting    6  Follow-up appointment in 4 months:  -please bring in 1 week a blood pressure readings morning evening, sitting and standing is outlined above  -PLEASE BRING IN YOUR BLOOD PRESSURE MACHINE TO CORRELATE WITH THE OFFICE MACHINE AT THE NEXT VISIT  -please go for fasting lab work prior to your appointment    7  General instructions:   AVOID SALT BUT NOT ADDING AN READING LABELS TO MAKE SURE THERE IS LOW-SALT IN THE FOOD THAT YOU ARE EATING     Avoid nonsteroidal anti-inflammatory drugs such as Naprosyn, ibuprofen, Aleve, Advil, Celebrex, etc   You can use Tylenol as needed if you do not have any liver condition to be concerned about     Avoid medications such as Sudafed or decongestants and antihistamines that contained the D component which is the decongestant  You can take antihistamines without the decongestant or D component       Try to avoid medications such as pantoprazole or  Protonix/Nexium or Esomeprazole)/Prilosec or omeprazole/Prevacid or lansoprazole/AcipHex or Rabeprazole  If you are able to, use Pepcid as this is safer for your kidneys   Try to exercise at least 30 minutes 3 days a week to begin with with an ultimate goal of 5 days a week for at least 30 minutes       Please do not drink more than 2 glasses of alcohol/wine on a daily basis as this may contribute to your high blood pressure

## 2019-12-18 ENCOUNTER — SOCIAL WORK (OUTPATIENT)
Dept: BEHAVIORAL/MENTAL HEALTH CLINIC | Facility: CLINIC | Age: 79
End: 2019-12-18
Payer: COMMERCIAL

## 2019-12-18 DIAGNOSIS — F43.20 ADJUSTMENT DISORDER, UNSPECIFIED TYPE: Primary | ICD-10-CM

## 2019-12-18 PROCEDURE — 90834 PSYTX W PT 45 MINUTES: CPT | Performed by: SOCIAL WORKER

## 2019-12-18 NOTE — PSYCH
Psychotherapy Provided: Individual Psychotherapy 45 minutes She noted her daughter (who had lost a 21year old daughter to a herion overdose within the past five years) and her son-in-law will be adopting a 16year old girl via the [employer]  "She reminds me a little bit of Eladio Elias (the granddaughter referred to in the previous statement)  " "This a very hard time for Justine Garcia (her daughter) and us  I cried (during this past weekend) "  She reminisced at length, about her granddaughter (more so than during more recent previous counseling sessions)  "I am hoping that this girl (may be a good thing for Justine Garcia and Antonio Lubin)   " She noted her daughter described Aruna's  granddaughter as having been "a challenging child "  She expressed concerns about her job yet reports she continues to enjoy getting out of the house and being around people  Discussed/reviewed her efforts to maintain her quality of life via her part time job and her volunteering in the community; A: While she presents today with being more mindful of her granddaughter, she remains positive and continues to participate in the [employer] bereavement program P: (G# 1) will continue with her self care;      Length of time in session: 45 minutes, follow up in 1 month    Goals addressed in session: Goal 1     Pain:      none    0    Current suicide risk : [address]: Diagnosis and Treatment Plan explained to [place] relates understanding diagnosis and is agreeable to Treatment Plan   Yes

## 2019-12-23 ENCOUNTER — HOSPITAL ENCOUNTER (OUTPATIENT)
Dept: RADIOLOGY | Facility: HOSPITAL | Age: 79
Discharge: HOME | End: 2019-12-23
Attending: ORTHOPAEDIC SURGERY
Payer: COMMERCIAL

## 2019-12-23 DIAGNOSIS — M54.50 CHRONIC BILATERAL LOW BACK PAIN WITHOUT SCIATICA: ICD-10-CM

## 2019-12-23 DIAGNOSIS — G89.29 CHRONIC BILATERAL LOW BACK PAIN WITHOUT SCIATICA: ICD-10-CM

## 2019-12-23 PROCEDURE — G0260 INJ FOR SACROILIAC JT ANESTH: HCPCS

## 2019-12-23 RX ORDER — BUPIVACAINE HYDROCHLORIDE 2.5 MG/ML
2 INJECTION, SOLUTION EPIDURAL; INFILTRATION; INTRACAUDAL ONCE
Status: COMPLETED | OUTPATIENT
Start: 2019-12-23 | End: 2019-12-23

## 2019-12-23 RX ORDER — METHYLPREDNISOLONE ACETATE 80 MG/ML
80 INJECTION, SUSPENSION INTRA-ARTICULAR; INTRALESIONAL; INTRAMUSCULAR; SOFT TISSUE ONCE
Status: COMPLETED | OUTPATIENT
Start: 2019-12-23 | End: 2019-12-23

## 2019-12-23 RX ADMIN — BUPIVACAINE HYDROCHLORIDE 2 ML: 2.5 INJECTION, SOLUTION EPIDURAL; INFILTRATION; INTRACAUDAL at 09:12

## 2019-12-23 RX ADMIN — METHYLPREDNISOLONE ACETATE 80 MG: 80 INJECTION, SUSPENSION INTRA-ARTICULAR; INTRALESIONAL; INTRAMUSCULAR; SOFT TISSUE at 09:13

## 2019-12-24 ENCOUNTER — APPOINTMENT (OUTPATIENT)
Dept: LAB | Facility: HOSPITAL | Age: 79
End: 2019-12-24
Attending: INTERNAL MEDICINE
Payer: COMMERCIAL

## 2019-12-24 DIAGNOSIS — N18.30 ANEMIA OF CHRONIC RENAL FAILURE, STAGE 3 (MODERATE) (HCC): ICD-10-CM

## 2019-12-24 DIAGNOSIS — E78.5 DYSLIPIDEMIA: ICD-10-CM

## 2019-12-24 DIAGNOSIS — E83.52 HYPERCALCEMIA: ICD-10-CM

## 2019-12-24 DIAGNOSIS — E87.6 HYPOKALEMIA: ICD-10-CM

## 2019-12-24 DIAGNOSIS — E61.1 IRON DEFICIENCY: ICD-10-CM

## 2019-12-24 DIAGNOSIS — N18.30 CHRONIC KIDNEY DISEASE, STAGE III (MODERATE) (HCC): ICD-10-CM

## 2019-12-24 DIAGNOSIS — E55.9 VITAMIN D DEFICIENCY: ICD-10-CM

## 2019-12-24 DIAGNOSIS — D63.1 ANEMIA OF CHRONIC RENAL FAILURE, STAGE 3 (MODERATE) (HCC): ICD-10-CM

## 2019-12-24 DIAGNOSIS — I12.9 HYPERTENSIVE CHRONIC KIDNEY DISEASE WITH STAGE 1 THROUGH STAGE 4 CHRONIC KIDNEY DISEASE, OR UNSPECIFIED CHRONIC KIDNEY DISEASE: ICD-10-CM

## 2019-12-24 LAB
ALBUMIN SERPL BCP-MCNC: 3.6 G/DL (ref 3.5–5)
ALP SERPL-CCNC: 62 U/L (ref 46–116)
ALT SERPL W P-5'-P-CCNC: 20 U/L (ref 12–78)
ANION GAP SERPL CALCULATED.3IONS-SCNC: 4 MMOL/L (ref 4–13)
AST SERPL W P-5'-P-CCNC: 11 U/L (ref 5–45)
BILIRUB SERPL-MCNC: 0.34 MG/DL (ref 0.2–1)
BUN SERPL-MCNC: 15 MG/DL (ref 5–25)
CA-I BLD-SCNC: 1.29 MMOL/L (ref 1.12–1.32)
CALCIUM SERPL-MCNC: 9.6 MG/DL (ref 8.3–10.1)
CHLORIDE SERPL-SCNC: 111 MMOL/L (ref 100–108)
CO2 SERPL-SCNC: 25 MMOL/L (ref 21–32)
CREAT SERPL-MCNC: 1.28 MG/DL (ref 0.6–1.3)
CREAT UR-MCNC: 231 MG/DL
ERYTHROCYTE [DISTWIDTH] IN BLOOD BY AUTOMATED COUNT: 13 % (ref 11.6–15.1)
GFR SERPL CREATININE-BSD FRML MDRD: 40 ML/MIN/1.73SQ M
GLUCOSE P FAST SERPL-MCNC: 107 MG/DL (ref 65–99)
HCT VFR BLD AUTO: 36.1 % (ref 34.8–46.1)
HGB BLD-MCNC: 11.6 G/DL (ref 11.5–15.4)
MAGNESIUM SERPL-MCNC: 1.8 MG/DL (ref 1.6–2.6)
MCH RBC QN AUTO: 30.7 PG (ref 26.8–34.3)
MCHC RBC AUTO-ENTMCNC: 32.1 G/DL (ref 31.4–37.4)
MCV RBC AUTO: 96 FL (ref 82–98)
PHOSPHATE SERPL-MCNC: 2.8 MG/DL (ref 2.3–4.1)
PLATELET # BLD AUTO: 223 THOUSANDS/UL (ref 149–390)
PMV BLD AUTO: 10 FL (ref 8.9–12.7)
POTASSIUM SERPL-SCNC: 3.6 MMOL/L (ref 3.5–5.3)
PROT SERPL-MCNC: 7.1 G/DL (ref 6.4–8.2)
PROT UR-MCNC: 49 MG/DL
PROT/CREAT UR: 0.21 MG/G{CREAT} (ref 0–0.1)
PTH-INTACT SERPL-MCNC: 15.9 PG/ML (ref 18.4–80.1)
RBC # BLD AUTO: 3.78 MILLION/UL (ref 3.81–5.12)
SODIUM SERPL-SCNC: 140 MMOL/L (ref 136–145)
TSH SERPL DL<=0.05 MIU/L-ACNC: 2.42 UIU/ML (ref 0.36–3.74)
WBC # BLD AUTO: 8.16 THOUSAND/UL (ref 4.31–10.16)

## 2019-12-24 PROCEDURE — 84443 ASSAY THYROID STIM HORMONE: CPT

## 2019-12-24 PROCEDURE — 80053 COMPREHEN METABOLIC PANEL: CPT

## 2019-12-24 PROCEDURE — 82570 ASSAY OF URINE CREATININE: CPT

## 2019-12-24 PROCEDURE — 84100 ASSAY OF PHOSPHORUS: CPT

## 2019-12-24 PROCEDURE — 82652 VIT D 1 25-DIHYDROXY: CPT

## 2019-12-24 PROCEDURE — 84165 PROTEIN E-PHORESIS SERUM: CPT | Performed by: PATHOLOGY

## 2019-12-24 PROCEDURE — 83735 ASSAY OF MAGNESIUM: CPT

## 2019-12-24 PROCEDURE — 83970 ASSAY OF PARATHORMONE: CPT

## 2019-12-24 PROCEDURE — 84166 PROTEIN E-PHORESIS/URINE/CSF: CPT | Performed by: PATHOLOGY

## 2019-12-24 PROCEDURE — 82397 CHEMILUMINESCENT ASSAY: CPT

## 2019-12-24 PROCEDURE — 83883 ASSAY NEPHELOMETRY NOT SPEC: CPT

## 2019-12-24 PROCEDURE — 84166 PROTEIN E-PHORESIS/URINE/CSF: CPT | Performed by: INTERNAL MEDICINE

## 2019-12-24 PROCEDURE — 84156 ASSAY OF PROTEIN URINE: CPT

## 2019-12-24 PROCEDURE — 85027 COMPLETE CBC AUTOMATED: CPT

## 2019-12-24 PROCEDURE — 82164 ANGIOTENSIN I ENZYME TEST: CPT

## 2019-12-24 PROCEDURE — 82330 ASSAY OF CALCIUM: CPT

## 2019-12-24 PROCEDURE — 36415 COLL VENOUS BLD VENIPUNCTURE: CPT

## 2019-12-24 PROCEDURE — 84165 PROTEIN E-PHORESIS SERUM: CPT

## 2019-12-26 ENCOUNTER — HOSPITAL ENCOUNTER (OUTPATIENT)
Dept: RADIOLOGY | Age: 79
Discharge: HOME | End: 2019-12-26
Payer: COMMERCIAL

## 2019-12-26 ENCOUNTER — APPOINTMENT (OUTPATIENT)
Dept: LAB | Age: 79
End: 2019-12-26
Payer: COMMERCIAL

## 2019-12-26 ENCOUNTER — TELEPHONE (OUTPATIENT)
Dept: NEPHROLOGY | Facility: CLINIC | Age: 79
End: 2019-12-26

## 2019-12-26 DIAGNOSIS — E61.1 IRON DEFICIENCY: ICD-10-CM

## 2019-12-26 DIAGNOSIS — N18.30 CHRONIC KIDNEY DISEASE, STAGE III (MODERATE) (HCC): ICD-10-CM

## 2019-12-26 DIAGNOSIS — I12.9 HYPERTENSIVE CHRONIC KIDNEY DISEASE WITH STAGE 1 THROUGH STAGE 4 CHRONIC KIDNEY DISEASE, OR UNSPECIFIED CHRONIC KIDNEY DISEASE: ICD-10-CM

## 2019-12-26 DIAGNOSIS — E55.9 VITAMIN D DEFICIENCY: ICD-10-CM

## 2019-12-26 DIAGNOSIS — Z13.820 SPECIAL SCREENING FOR OSTEOPOROSIS: ICD-10-CM

## 2019-12-26 DIAGNOSIS — E87.6 HYPOKALEMIA: ICD-10-CM

## 2019-12-26 DIAGNOSIS — E78.5 DYSLIPIDEMIA: ICD-10-CM

## 2019-12-26 DIAGNOSIS — D63.1 ANEMIA OF CHRONIC RENAL FAILURE, STAGE 3 (MODERATE) (HCC): ICD-10-CM

## 2019-12-26 DIAGNOSIS — E83.52 HYPERCALCEMIA: ICD-10-CM

## 2019-12-26 DIAGNOSIS — I12.9 HYPERTENSIVE CHRONIC KIDNEY DISEASE WITH STAGE 1 THROUGH STAGE 4 CHRONIC KIDNEY DISEASE, OR UNSPECIFIED CHRONIC KIDNEY DISEASE: Primary | ICD-10-CM

## 2019-12-26 DIAGNOSIS — N18.30 ANEMIA OF CHRONIC RENAL FAILURE, STAGE 3 (MODERATE) (HCC): ICD-10-CM

## 2019-12-26 LAB
1,25(OH)2D3 SERPL-MCNC: 42.1 PG/ML (ref 19.9–79.3)
ACE SERPL-CCNC: 23 U/L (ref 14–82)
ALBUMIN SERPL ELPH-MCNC: 4.13 G/DL (ref 3.5–5)
ALBUMIN SERPL ELPH-MCNC: 59.9 % (ref 52–65)
ALBUMIN UR ELPH-MCNC: 100 %
ALPHA1 GLOB MFR UR ELPH: 0 %
ALPHA1 GLOB SERPL ELPH-MCNC: 0.35 G/DL (ref 0.1–0.4)
ALPHA1 GLOB SERPL ELPH-MCNC: 5.1 % (ref 2.5–5)
ALPHA2 GLOB MFR UR ELPH: 0 %
ALPHA2 GLOB SERPL ELPH-MCNC: 0.81 G/DL (ref 0.4–1.2)
ALPHA2 GLOB SERPL ELPH-MCNC: 11.7 % (ref 7–13)
B-GLOBULIN MFR UR ELPH: 0 %
BETA GLOB ABNORMAL SERPL ELPH-MCNC: 0.37 G/DL (ref 0.4–0.8)
BETA1 GLOB SERPL ELPH-MCNC: 5.4 % (ref 5–13)
BETA2 GLOB SERPL ELPH-MCNC: 4.7 % (ref 2–8)
BETA2+GAMMA GLOB SERPL ELPH-MCNC: 0.32 G/DL (ref 0.2–0.5)
CORTIS AM PEAK SERPL-MCNC: 4.9 UG/DL (ref 4.2–22.4)
GAMMA GLOB ABNORMAL SERPL ELPH-MCNC: 0.91 G/DL (ref 0.5–1.6)
GAMMA GLOB MFR UR ELPH: 0 %
GAMMA GLOB SERPL ELPH-MCNC: 13.2 % (ref 12–22)
IGG/ALB SER: 1.49 {RATIO} (ref 1.1–1.8)
KAPPA LC FREE SER-MCNC: 22.8 MG/L (ref 3.3–19.4)
KAPPA LC FREE/LAMBDA FREE SER: 1.45 {RATIO} (ref 0.26–1.65)
LAMBDA LC FREE SERPL-MCNC: 15.7 MG/L (ref 5.7–26.3)
PROT PATTERN SERPL ELPH-IMP: ABNORMAL
PROT PATTERN UR ELPH-IMP: ABNORMAL
PROT SERPL-MCNC: 6.9 G/DL (ref 6.4–8.2)
PROT UR-MCNC: 51 MG/DL

## 2019-12-26 PROCEDURE — 82533 TOTAL CORTISOL: CPT

## 2019-12-26 PROCEDURE — 77080 DXA BONE DENSITY AXIAL: CPT

## 2019-12-26 NOTE — TELEPHONE ENCOUNTER
----- Message from Faith Abarca MD sent at 12/24/2019 12:55 PM EST -----  All the labs look quite good    Potassium still slightly low normal 3 6  -make sure she is taking spironolactone 25 a day  -increase potassium in her diet  -just recheck a potassium level 1-2 weeks nonfasting

## 2019-12-26 NOTE — TELEPHONE ENCOUNTER
I spoke to patient she is aware that labs are stable and slight low k   -patient is taking her spironolactone 25 daily  -will increase intake with fruits and vegetables  Potassium slip mailed out  Susan Pedraza

## 2020-01-03 LAB — PTH RELATED PROT SERPL-SCNC: <2 PMOL/L

## 2020-01-06 ENCOUNTER — SOCIAL WORK (OUTPATIENT)
Dept: BEHAVIORAL/MENTAL HEALTH CLINIC | Facility: CLINIC | Age: 80
End: 2020-01-06
Payer: COMMERCIAL

## 2020-01-06 DIAGNOSIS — F43.20 ADJUSTMENT DISORDER, UNSPECIFIED TYPE: Primary | ICD-10-CM

## 2020-01-06 PROCEDURE — 90834 PSYTX W PT 45 MINUTES: CPT | Performed by: SOCIAL WORKER

## 2020-01-06 NOTE — PSYCH
Psychotherapy Provided: Individual Psychotherapy 45 minutes states her daughter has started the process of applying to be a  (along with her ) with a 16year old foster female child being considered; "It is hard, because my son-in-law (her daughter's ) is adopted with no known information on his biological background  He came up with this (option of their being foster parents) " She reviewed the circumstances surrounding her granddaughter's passing (more than two years ago) including some historical events of her grandddaughter's life  She noted her daughter will continue with her participation (leadership role) with the [employer] having been a participant with the passing her of carolther  She noted, "I have been saying no more (often regarding her being asked to work on her days off)  " She expressed concern about her perception of staffing issues as part of the issue at her job site  Discussed/reviewed the ongoing importance of her ownself care including her maintaining her boundaries with her daughter; A: presents as content at this time with her life and continues to assert herself in her job; P: (G#1 ) will continue with her self care including her assertiveness when needed;        Length of time in session: 45 minutes, follow up in 2 week    Goals addressed in session: Goal 1     Pain:      none    0    Current suicide risk : [address]: Diagnosis and Treatment Plan explained to [place], [place] relates understanding diagnosis and is agreeable to Treatment Plan   Yes

## 2020-01-08 ENCOUNTER — DOCUMENTATION (OUTPATIENT)
Dept: NEPHROLOGY | Facility: CLINIC | Age: 80
End: 2020-01-08

## 2020-01-08 NOTE — PROGRESS NOTES
Home blood pressure readings:  · A m :  126/71 without orthostatic change  · P m :  124/68 without orthostatic changes  · Heart rate:  50-70 range    Patient's blood pressure is at goal no changes

## 2020-01-13 ENCOUNTER — APPOINTMENT (OUTPATIENT)
Dept: LAB | Facility: HOSPITAL | Age: 80
End: 2020-01-13
Attending: INTERNAL MEDICINE
Payer: COMMERCIAL

## 2020-01-13 DIAGNOSIS — E87.6 HYPOKALEMIA: ICD-10-CM

## 2020-01-13 DIAGNOSIS — I12.9 HYPERTENSIVE CHRONIC KIDNEY DISEASE WITH STAGE 1 THROUGH STAGE 4 CHRONIC KIDNEY DISEASE, OR UNSPECIFIED CHRONIC KIDNEY DISEASE: ICD-10-CM

## 2020-01-13 DIAGNOSIS — N18.30 CHRONIC KIDNEY DISEASE, STAGE III (MODERATE) (HCC): ICD-10-CM

## 2020-01-13 LAB — POTASSIUM SERPL-SCNC: 4 MMOL/L (ref 3.5–5.3)

## 2020-01-13 PROCEDURE — 84132 ASSAY OF SERUM POTASSIUM: CPT

## 2020-01-13 PROCEDURE — 36415 COLL VENOUS BLD VENIPUNCTURE: CPT

## 2020-01-14 DIAGNOSIS — E87.6 HYPOKALEMIA: ICD-10-CM

## 2020-01-14 DIAGNOSIS — E83.52 HYPERCALCEMIA: ICD-10-CM

## 2020-01-14 DIAGNOSIS — N18.30 ANEMIA OF CHRONIC RENAL FAILURE, STAGE 3 (MODERATE) (HCC): ICD-10-CM

## 2020-01-14 DIAGNOSIS — E55.9 VITAMIN D DEFICIENCY: ICD-10-CM

## 2020-01-14 DIAGNOSIS — I12.9 HYPERTENSIVE CHRONIC KIDNEY DISEASE WITH STAGE 1 THROUGH STAGE 4 CHRONIC KIDNEY DISEASE, OR UNSPECIFIED CHRONIC KIDNEY DISEASE: ICD-10-CM

## 2020-01-14 DIAGNOSIS — E61.1 IRON DEFICIENCY: ICD-10-CM

## 2020-01-14 DIAGNOSIS — E78.5 DYSLIPIDEMIA: ICD-10-CM

## 2020-01-14 DIAGNOSIS — D63.1 ANEMIA OF CHRONIC RENAL FAILURE, STAGE 3 (MODERATE) (HCC): ICD-10-CM

## 2020-01-14 DIAGNOSIS — I10 ACCELERATED HYPERTENSION: ICD-10-CM

## 2020-01-14 DIAGNOSIS — N18.30 CHRONIC KIDNEY DISEASE, STAGE III (MODERATE) (HCC): ICD-10-CM

## 2020-01-14 RX ORDER — SPIRONOLACTONE 25 MG/1
25 TABLET ORAL DAILY
Qty: 30 TABLET | Refills: 5 | Status: SHIPPED | OUTPATIENT
Start: 2020-01-14 | End: 2020-07-13 | Stop reason: SDUPTHER

## 2020-01-20 ENCOUNTER — SOCIAL WORK (OUTPATIENT)
Dept: BEHAVIORAL/MENTAL HEALTH CLINIC | Facility: CLINIC | Age: 80
End: 2020-01-20
Payer: COMMERCIAL

## 2020-01-20 DIAGNOSIS — F43.20 ADJUSTMENT DISORDER, UNSPECIFIED TYPE: Primary | ICD-10-CM

## 2020-01-20 PROCEDURE — 90834 PSYTX W PT 45 MINUTES: CPT | Performed by: SOCIAL WORKER

## 2020-01-20 NOTE — PSYCH
Psychotherapy Provided: Individual Psychotherapy 45 minutes She shared updates about her department at her job and her efforts to cope  "I miss Agata Powers (her  granddaughter)  As the years go by it gets harder " She states is looking forward to a family beach vacation from 20-20  She noted the status of her daughter's and son-in-law's efforts to adopt a 16year old girl via a local non profit organization  She speculated her daughter's decision to adopt an adolescent    Girl is part of her effort to grieve the loss of her own daughter (referred to at the beginning of this document)  Discussed/reviewed her efforts to continue to heal from her granddaughter's passing and her understanding the subtle boundaries to be acknowledged between her and her daughter; A: She continues to participate as a volunteer in [employer] from which she derives much satisfaction  She maintains a sense of humor and enjoys some of her relationships at her workplace  P: (G#1 ) will continue to participate in [employer] and maintain her other outlets for self care;      Length of time in session: 45 minutes, follow up in 2 week    Goals addressed in session: Goal 1     Pain:      none    0    Current suicide risk : [address]: Diagnosis and Treatment Plan explained to [place], [place] relates understanding diagnosis and is agreeable to Treatment Plan   Yes

## 2020-02-20 RX ORDER — IBUPROFEN 800 MG/1
800 TABLET ORAL EVERY 8 HOURS PRN
COMMUNITY
Start: 2019-12-26 | End: 2020-10-08 | Stop reason: HOSPADM

## 2020-02-20 RX ORDER — DOXYCYCLINE HYCLATE 100 MG/1
CAPSULE ORAL
COMMUNITY
Start: 2020-01-17 | End: 2020-10-08 | Stop reason: HOSPADM

## 2020-02-24 ENCOUNTER — DOCUMENTATION (OUTPATIENT)
Dept: BEHAVIORAL/MENTAL HEALTH CLINIC | Facility: CLINIC | Age: 80
End: 2020-02-24

## 2020-02-25 ENCOUNTER — OFFICE VISIT (OUTPATIENT)
Dept: OBGYN CLINIC | Facility: HOSPITAL | Age: 80
End: 2020-02-25
Payer: COMMERCIAL

## 2020-02-25 VITALS
HEIGHT: 62 IN | HEART RATE: 83 BPM | DIASTOLIC BLOOD PRESSURE: 67 MMHG | WEIGHT: 132 LBS | SYSTOLIC BLOOD PRESSURE: 126 MMHG | BODY MASS INDEX: 24.29 KG/M2

## 2020-02-25 DIAGNOSIS — M70.61 GREATER TROCHANTERIC BURSITIS OF RIGHT HIP: ICD-10-CM

## 2020-02-25 DIAGNOSIS — M47.818 SI JOINT ARTHRITIS: Primary | ICD-10-CM

## 2020-02-25 PROCEDURE — 20610 DRAIN/INJ JOINT/BURSA W/O US: CPT | Performed by: ORTHOPAEDIC SURGERY

## 2020-02-25 PROCEDURE — 99213 OFFICE O/P EST LOW 20 MIN: CPT | Performed by: ORTHOPAEDIC SURGERY

## 2020-02-25 RX ORDER — BUPIVACAINE HYDROCHLORIDE 2.5 MG/ML
2 INJECTION, SOLUTION INFILTRATION; PERINEURAL
Status: COMPLETED | OUTPATIENT
Start: 2020-02-25 | End: 2020-02-25

## 2020-02-25 RX ORDER — BETAMETHASONE SODIUM PHOSPHATE AND BETAMETHASONE ACETATE 3; 3 MG/ML; MG/ML
6 INJECTION, SUSPENSION INTRA-ARTICULAR; INTRALESIONAL; INTRAMUSCULAR; SOFT TISSUE
Status: COMPLETED | OUTPATIENT
Start: 2020-02-25 | End: 2020-02-25

## 2020-02-25 RX ORDER — LIDOCAINE HYDROCHLORIDE 5 MG/ML
2 INJECTION, SOLUTION INFILTRATION; PERINEURAL
Status: COMPLETED | OUTPATIENT
Start: 2020-02-25 | End: 2020-02-25

## 2020-02-25 RX ADMIN — BUPIVACAINE HYDROCHLORIDE 2 ML: 2.5 INJECTION, SOLUTION INFILTRATION; PERINEURAL at 09:58

## 2020-02-25 RX ADMIN — BETAMETHASONE SODIUM PHOSPHATE AND BETAMETHASONE ACETATE 6 MG: 3; 3 INJECTION, SUSPENSION INTRA-ARTICULAR; INTRALESIONAL; INTRAMUSCULAR; SOFT TISSUE at 09:58

## 2020-02-25 RX ADMIN — LIDOCAINE HYDROCHLORIDE 2 ML: 5 INJECTION, SOLUTION INFILTRATION; PERINEURAL at 09:58

## 2020-02-25 NOTE — PROGRESS NOTES
2/21/2020 @ 10: 26 AM lm to cancel her ind psych appt for 2/26/2020 @ 9 AM due to a reported schedule conflict;

## 2020-02-25 NOTE — PROGRESS NOTES
Assessment/Plan:  Right-sided greater trochanteric bursitis    Given her recent success with the CT-guided SI joint injections, we will arrange for her to have repeat injections when her symptoms recur  Cortisone injection fracture to the right greater trochanteric bursa today for symptom relief     Diagnoses and all orders for this visit:    SI joint arthritis  -     CT SI joint injection; Future    Other orders  -     ibuprofen (MOTRIN) 800 mg tablet  -     doxycycline hyclate (VIBRAMYCIN) 100 mg capsule          Subjective:      Patient ID: Hank David is a 67 y o  female  78-year-old female presents today for repeat evaluation regarding ongoing right greater trochanteric bursitis  She has most recently seen in the follow-up last year and underwent cortisone injection to the right greater trochanteric bursa for symptom relief  She states she received approximately 3 months of relief pain from this recent injection  Of note, she has also received recent bilateral SI joint injection under CT guidance which have helped her SI joint pain significantly  The following portions of the patient's history were reviewed and updated as appropriate: allergies, current medications, past family history, past medical history, past social history, past surgical history and problem list     Review of Systems   Constitutional: Negative  Negative for chills and fever  HENT: Negative  Respiratory: Negative  Negative for shortness of breath and wheezing  Cardiovascular: Negative  Negative for chest pain and palpitations  Gastrointestinal: Negative  Negative for diarrhea, nausea and vomiting  Endocrine: Negative  Genitourinary: Negative  Skin: Negative  Neurological: Negative  Hematological: Negative  Psychiatric/Behavioral: Negative            Objective:      /67   Pulse 83   Ht 5' 2" (1 575 m)   Wt 59 9 kg (132 lb)   BMI 24 14 kg/m²          Physical Exam   Constitutional: She is oriented to person, place, and time  She appears well-developed and well-nourished  HENT:   Head: Normocephalic and atraumatic  Neck: Normal range of motion  Cardiovascular: Normal rate and intact distal pulses  Pulmonary/Chest: Effort normal  She has no wheezes  Abdominal: Soft  She exhibits no distension  Musculoskeletal:   Right lower extremity:  -tenderness elicited over greater trochanteric region  -motor sensory grossly intact distally  -limb warm well perfused  -no erythema or skin irritation over the greater trochanteric region   Neurological: She is alert and oriented to person, place, and time  Skin: Skin is warm and dry  Psychiatric: She has a normal mood and affect           Large joint arthrocentesis: R greater trochanteric bursa  Date/Time: 2/25/2020 9:58 AM  Consent given by: patient  Site marked: site marked  Supporting Documentation  Indications: pain   Procedure Details  Location: hip - R greater trochanteric bursa  Approach: lateral  Medications administered: 2 mL bupivacaine 0 25 %; 2 mL lidocaine 0 5 %; 6 mg betamethasone acetate-betamethasone sodium phosphate 6 (3-3) mg/mL    Patient tolerance: patient tolerated the procedure well with no immediate complications  Dressing:  Sterile dressing applied

## 2020-03-02 ENCOUNTER — HOSPITAL ENCOUNTER (OUTPATIENT)
Dept: RADIOLOGY | Facility: HOSPITAL | Age: 80
Discharge: HOME | End: 2020-03-02
Attending: ORTHOPAEDIC SURGERY

## 2020-03-09 ENCOUNTER — HOSPITAL ENCOUNTER (OUTPATIENT)
Dept: RADIOLOGY | Facility: HOSPITAL | Age: 80
Discharge: HOME | End: 2020-03-09
Attending: ORTHOPAEDIC SURGERY
Payer: COMMERCIAL

## 2020-03-09 ENCOUNTER — TELEPHONE (OUTPATIENT)
Dept: NEPHROLOGY | Facility: CLINIC | Age: 80
End: 2020-03-09

## 2020-03-09 ENCOUNTER — TRANSCRIBE ORDERS (OUTPATIENT)
Dept: RADIOLOGY | Facility: HOSPITAL | Age: 80
End: 2020-03-09

## 2020-03-09 DIAGNOSIS — Z00.8 HEALTH EXAMINATION IN POPULATION SURVEYS: Primary | ICD-10-CM

## 2020-03-09 DIAGNOSIS — I10 ESSENTIAL HYPERTENSION, BENIGN: ICD-10-CM

## 2020-03-09 DIAGNOSIS — M47.818 SI JOINT ARTHRITIS: ICD-10-CM

## 2020-03-09 DIAGNOSIS — E78.5 HYPERLIPIDEMIA, UNSPECIFIED HYPERLIPIDEMIA TYPE: ICD-10-CM

## 2020-03-09 PROCEDURE — 64451 NJX AA&/STRD NRV NRVTG SI JT: CPT

## 2020-03-09 RX ORDER — METHYLPREDNISOLONE ACETATE 80 MG/ML
80 INJECTION, SUSPENSION INTRA-ARTICULAR; INTRALESIONAL; INTRAMUSCULAR; SOFT TISSUE
Status: DISCONTINUED | OUTPATIENT
Start: 2020-03-09 | End: 2020-03-10 | Stop reason: HOSPADM

## 2020-03-09 RX ORDER — BUPIVACAINE HYDROCHLORIDE 2.5 MG/ML
10 INJECTION, SOLUTION EPIDURAL; INFILTRATION; INTRACAUDAL
Status: DISCONTINUED | OUTPATIENT
Start: 2020-03-09 | End: 2020-03-10 | Stop reason: HOSPADM

## 2020-03-09 NOTE — TELEPHONE ENCOUNTER
I called and left message for patient to call back and schedule April follow up with Dr Glenroy Ingram in our [place] office

## 2020-03-17 ENCOUNTER — TELEPHONE (OUTPATIENT)
Dept: PSYCHIATRY | Facility: CLINIC | Age: 80
End: 2020-03-17

## 2020-03-20 ENCOUNTER — HOSPITAL ENCOUNTER (OUTPATIENT)
Dept: RADIOLOGY | Facility: HOSPITAL | Age: 80
Discharge: HOME | End: 2020-03-20
Attending: ORTHOPAEDIC SURGERY

## 2020-03-23 ENCOUNTER — TELEPHONE (OUTPATIENT)
Dept: NEPHROLOGY | Facility: CLINIC | Age: 80
End: 2020-03-23

## 2020-03-24 ENCOUNTER — APPOINTMENT (OUTPATIENT)
Dept: LAB | Facility: HOSPITAL | Age: 80
End: 2020-03-24
Payer: COMMERCIAL

## 2020-03-24 ENCOUNTER — APPOINTMENT (OUTPATIENT)
Dept: LAB | Facility: HOSPITAL | Age: 80
End: 2020-03-24
Attending: INTERNAL MEDICINE
Payer: COMMERCIAL

## 2020-03-24 DIAGNOSIS — E78.5 HYPERLIPIDEMIA, UNSPECIFIED HYPERLIPIDEMIA TYPE: ICD-10-CM

## 2020-03-24 DIAGNOSIS — I10 ESSENTIAL HYPERTENSION, BENIGN: ICD-10-CM

## 2020-03-24 DIAGNOSIS — Z00.8 HEALTH EXAMINATION IN POPULATION SURVEYS: ICD-10-CM

## 2020-03-24 LAB
ALBUMIN SERPL BCP-MCNC: 3.5 G/DL (ref 3.5–5)
ALP SERPL-CCNC: 64 U/L (ref 46–116)
ALT SERPL W P-5'-P-CCNC: 32 U/L (ref 12–78)
ANION GAP SERPL CALCULATED.3IONS-SCNC: 4 MMOL/L (ref 4–13)
AST SERPL W P-5'-P-CCNC: 18 U/L (ref 5–45)
BILIRUB SERPL-MCNC: 0.44 MG/DL (ref 0.2–1)
BUN SERPL-MCNC: 20 MG/DL (ref 5–25)
CALCIUM SERPL-MCNC: 8.9 MG/DL (ref 8.3–10.1)
CHLORIDE SERPL-SCNC: 108 MMOL/L (ref 100–108)
CHOLEST SERPL-MCNC: 195 MG/DL (ref 50–200)
CO2 SERPL-SCNC: 26 MMOL/L (ref 21–32)
CREAT SERPL-MCNC: 1.2 MG/DL (ref 0.6–1.3)
EST. AVERAGE GLUCOSE BLD GHB EST-MCNC: 117 MG/DL
GFR SERPL CREATININE-BSD FRML MDRD: 43 ML/MIN/1.73SQ M
GLUCOSE P FAST SERPL-MCNC: 101 MG/DL (ref 65–99)
HBA1C MFR BLD: 5.7 %
HDLC SERPL-MCNC: 108 MG/DL
LDLC SERPL CALC-MCNC: 73 MG/DL (ref 0–100)
LDLC SERPL DIRECT ASSAY-MCNC: 85 MG/DL (ref 0–100)
NONHDLC SERPL-MCNC: 87 MG/DL
POTASSIUM SERPL-SCNC: 3.9 MMOL/L (ref 3.5–5.3)
PROT SERPL-MCNC: 7 G/DL (ref 6.4–8.2)
SODIUM SERPL-SCNC: 138 MMOL/L (ref 136–145)
TRIGL SERPL-MCNC: 71 MG/DL

## 2020-03-24 PROCEDURE — 36415 COLL VENOUS BLD VENIPUNCTURE: CPT

## 2020-03-24 PROCEDURE — 80061 LIPID PANEL: CPT

## 2020-03-24 PROCEDURE — 83036 HEMOGLOBIN GLYCOSYLATED A1C: CPT

## 2020-03-24 PROCEDURE — 83721 ASSAY OF BLOOD LIPOPROTEIN: CPT

## 2020-03-24 PROCEDURE — 80053 COMPREHEN METABOLIC PANEL: CPT

## 2020-04-02 ENCOUNTER — TELEMEDICINE (OUTPATIENT)
Dept: NEPHROLOGY | Facility: CLINIC | Age: 80
End: 2020-04-02
Payer: COMMERCIAL

## 2020-04-02 DIAGNOSIS — E83.52 HYPERCALCEMIA: ICD-10-CM

## 2020-04-02 DIAGNOSIS — I12.9 HYPERTENSIVE CHRONIC KIDNEY DISEASE WITH STAGE 1 THROUGH STAGE 4 CHRONIC KIDNEY DISEASE, OR UNSPECIFIED CHRONIC KIDNEY DISEASE: Primary | ICD-10-CM

## 2020-04-02 DIAGNOSIS — E61.1 IRON DEFICIENCY: ICD-10-CM

## 2020-04-02 DIAGNOSIS — N18.30 CHRONIC KIDNEY DISEASE, STAGE III (MODERATE) (HCC): ICD-10-CM

## 2020-04-02 DIAGNOSIS — E55.9 VITAMIN D DEFICIENCY: ICD-10-CM

## 2020-04-02 DIAGNOSIS — E87.6 HYPOKALEMIA: ICD-10-CM

## 2020-04-02 DIAGNOSIS — E78.5 DYSLIPIDEMIA: ICD-10-CM

## 2020-04-02 DIAGNOSIS — N18.30 ANEMIA OF CHRONIC RENAL FAILURE, STAGE 3 (MODERATE) (HCC): ICD-10-CM

## 2020-04-02 DIAGNOSIS — D63.1 ANEMIA OF CHRONIC RENAL FAILURE, STAGE 3 (MODERATE) (HCC): ICD-10-CM

## 2020-04-02 PROCEDURE — 99442 PR PHYS/QHP TELEPHONE EVALUATION 11-20 MIN: CPT | Performed by: INTERNAL MEDICINE

## 2020-04-06 ENCOUNTER — TELEMEDICINE (OUTPATIENT)
Dept: BEHAVIORAL/MENTAL HEALTH CLINIC | Facility: CLINIC | Age: 80
End: 2020-04-06
Payer: COMMERCIAL

## 2020-04-06 DIAGNOSIS — F43.20 ADJUSTMENT DISORDER, UNSPECIFIED TYPE: Primary | ICD-10-CM

## 2020-04-06 PROCEDURE — 90832 PSYTX W PT 30 MINUTES: CPT | Performed by: SOCIAL WORKER

## 2020-04-16 ENCOUNTER — APPOINTMENT (OUTPATIENT)
Dept: LAB | Facility: HOSPITAL | Age: 80
End: 2020-04-16
Payer: COMMERCIAL

## 2020-04-16 DIAGNOSIS — E55.9 AVITAMINOSIS D: ICD-10-CM

## 2020-04-16 DIAGNOSIS — Z79.899 ENCOUNTER FOR LONG-TERM (CURRENT) USE OF OTHER MEDICATIONS: ICD-10-CM

## 2020-04-16 DIAGNOSIS — N18.9 CHRONIC KIDNEY DISEASE, UNSPECIFIED CKD STAGE: Primary | ICD-10-CM

## 2020-04-16 LAB
25(OH)D3 SERPL-MCNC: 29.5 NG/ML (ref 30–100)
ANION GAP SERPL CALCULATED.3IONS-SCNC: 7 MMOL/L (ref 4–13)
BUN SERPL-MCNC: 12 MG/DL (ref 5–25)
CALCIUM SERPL-MCNC: 9.6 MG/DL (ref 8.3–10.1)
CHLORIDE SERPL-SCNC: 110 MMOL/L (ref 100–108)
CO2 SERPL-SCNC: 26 MMOL/L (ref 21–32)
CREAT SERPL-MCNC: 1.12 MG/DL (ref 0.6–1.3)
GFR SERPL CREATININE-BSD FRML MDRD: 47 ML/MIN/1.73SQ M
GLUCOSE P FAST SERPL-MCNC: 84 MG/DL (ref 65–99)
POTASSIUM SERPL-SCNC: 3.8 MMOL/L (ref 3.5–5.3)
SODIUM SERPL-SCNC: 143 MMOL/L (ref 136–145)

## 2020-04-16 PROCEDURE — 36415 COLL VENOUS BLD VENIPUNCTURE: CPT

## 2020-04-16 PROCEDURE — 82306 VITAMIN D 25 HYDROXY: CPT

## 2020-04-16 PROCEDURE — 80048 BASIC METABOLIC PNL TOTAL CA: CPT

## 2020-04-23 ENCOUNTER — TELEPHONE (OUTPATIENT)
Dept: NEPHROLOGY | Facility: CLINIC | Age: 80
End: 2020-04-23

## 2020-04-23 DIAGNOSIS — I12.9 HYPERTENSIVE CHRONIC KIDNEY DISEASE WITH STAGE 1 THROUGH STAGE 4 CHRONIC KIDNEY DISEASE, OR UNSPECIFIED CHRONIC KIDNEY DISEASE: ICD-10-CM

## 2020-04-23 DIAGNOSIS — N18.30 CHRONIC KIDNEY DISEASE, STAGE III (MODERATE) (HCC): Primary | ICD-10-CM

## 2020-04-24 ENCOUNTER — TELEPHONE (OUTPATIENT)
Dept: RADIOLOGY | Facility: HOSPITAL | Age: 80
End: 2020-04-24

## 2020-05-04 ENCOUNTER — DOCUMENTATION (OUTPATIENT)
Dept: BEHAVIORAL/MENTAL HEALTH CLINIC | Facility: CLINIC | Age: 80
End: 2020-05-04

## 2020-05-04 ENCOUNTER — SOCIAL WORK (OUTPATIENT)
Dept: BEHAVIORAL/MENTAL HEALTH CLINIC | Facility: CLINIC | Age: 80
End: 2020-05-04
Payer: COMMERCIAL

## 2020-05-04 DIAGNOSIS — F43.20 ADJUSTMENT DISORDER, UNSPECIFIED TYPE: Primary | ICD-10-CM

## 2020-05-04 PROCEDURE — 90834 PSYTX W PT 45 MINUTES: CPT | Performed by: SOCIAL WORKER

## 2020-05-11 DIAGNOSIS — I12.9 HYPERTENSIVE CHRONIC KIDNEY DISEASE WITH STAGE 1 THROUGH STAGE 4 CHRONIC KIDNEY DISEASE, OR UNSPECIFIED CHRONIC KIDNEY DISEASE: Primary | ICD-10-CM

## 2020-05-11 RX ORDER — METOPROLOL SUCCINATE 25 MG/1
25 TABLET, EXTENDED RELEASE ORAL DAILY
Qty: 90 TABLET | Refills: 3 | Status: SHIPPED | OUTPATIENT
Start: 2020-05-11 | End: 2021-02-23

## 2020-05-11 RX ORDER — METOPROLOL SUCCINATE 25 MG/1
25 TABLET, EXTENDED RELEASE ORAL DAILY
COMMUNITY
End: 2020-05-11 | Stop reason: SDUPTHER

## 2020-05-14 ENCOUNTER — APPOINTMENT (OUTPATIENT)
Dept: LAB | Facility: HOSPITAL | Age: 80
End: 2020-05-14
Attending: INTERNAL MEDICINE
Payer: COMMERCIAL

## 2020-05-14 ENCOUNTER — DOCUMENTATION (OUTPATIENT)
Dept: BEHAVIORAL/MENTAL HEALTH CLINIC | Facility: CLINIC | Age: 80
End: 2020-05-14

## 2020-05-14 ENCOUNTER — TELEPHONE (OUTPATIENT)
Dept: NEPHROLOGY | Facility: CLINIC | Age: 80
End: 2020-05-14

## 2020-05-14 ENCOUNTER — TRANSCRIBE ORDERS (OUTPATIENT)
Dept: RADIOLOGY | Facility: HOSPITAL | Age: 80
End: 2020-05-14

## 2020-05-14 DIAGNOSIS — N18.30 CHRONIC KIDNEY DISEASE, STAGE III (MODERATE) (HCC): ICD-10-CM

## 2020-05-14 DIAGNOSIS — I12.9 HYPERTENSIVE CHRONIC KIDNEY DISEASE WITH STAGE 1 THROUGH STAGE 4 CHRONIC KIDNEY DISEASE, OR UNSPECIFIED CHRONIC KIDNEY DISEASE: ICD-10-CM

## 2020-05-14 LAB
ANION GAP SERPL CALCULATED.3IONS-SCNC: 4 MMOL/L (ref 4–13)
BUN SERPL-MCNC: 15 MG/DL (ref 5–25)
CALCIUM SERPL-MCNC: 9.3 MG/DL (ref 8.3–10.1)
CHLORIDE SERPL-SCNC: 109 MMOL/L (ref 100–108)
CO2 SERPL-SCNC: 27 MMOL/L (ref 21–32)
CREAT SERPL-MCNC: 1.16 MG/DL (ref 0.6–1.3)
GFR SERPL CREATININE-BSD FRML MDRD: 45 ML/MIN/1.73SQ M
GLUCOSE SERPL-MCNC: 99 MG/DL (ref 65–140)
POTASSIUM SERPL-SCNC: 4.1 MMOL/L (ref 3.5–5.3)
SODIUM SERPL-SCNC: 140 MMOL/L (ref 136–145)

## 2020-05-14 PROCEDURE — 80048 BASIC METABOLIC PNL TOTAL CA: CPT

## 2020-05-14 PROCEDURE — 36415 COLL VENOUS BLD VENIPUNCTURE: CPT

## 2020-05-19 ENCOUNTER — TRANSCRIBE ORDERS (OUTPATIENT)
Dept: ADMINISTRATIVE | Facility: HOSPITAL | Age: 80
End: 2020-05-19

## 2020-05-19 DIAGNOSIS — Z12.31 ENCOUNTER FOR SCREENING MAMMOGRAM FOR MALIGNANT NEOPLASM OF BREAST: Primary | ICD-10-CM

## 2020-05-20 ENCOUNTER — HOSPITAL ENCOUNTER (OUTPATIENT)
Dept: RADIOLOGY | Age: 80
Discharge: HOME | End: 2020-05-20
Payer: COMMERCIAL

## 2020-05-20 VITALS — WEIGHT: 132 LBS | BODY MASS INDEX: 24.29 KG/M2 | HEIGHT: 62 IN

## 2020-05-20 DIAGNOSIS — Z12.31 ENCOUNTER FOR SCREENING MAMMOGRAM FOR MALIGNANT NEOPLASM OF BREAST: ICD-10-CM

## 2020-05-20 PROCEDURE — 77067 SCR MAMMO BI INCL CAD: CPT

## 2020-05-20 PROCEDURE — 77063 BREAST TOMOSYNTHESIS BI: CPT

## 2020-05-26 ENCOUNTER — OFFICE VISIT (OUTPATIENT)
Dept: OBGYN CLINIC | Facility: HOSPITAL | Age: 80
End: 2020-05-26
Payer: COMMERCIAL

## 2020-05-26 VITALS
HEIGHT: 62 IN | SYSTOLIC BLOOD PRESSURE: 113 MMHG | BODY MASS INDEX: 25.4 KG/M2 | DIASTOLIC BLOOD PRESSURE: 71 MMHG | HEART RATE: 85 BPM | WEIGHT: 138 LBS

## 2020-05-26 DIAGNOSIS — M70.61 GREATER TROCHANTERIC BURSITIS OF RIGHT HIP: Primary | ICD-10-CM

## 2020-05-26 PROCEDURE — 20610 DRAIN/INJ JOINT/BURSA W/O US: CPT | Performed by: ORTHOPAEDIC SURGERY

## 2020-05-26 PROCEDURE — 99213 OFFICE O/P EST LOW 20 MIN: CPT | Performed by: ORTHOPAEDIC SURGERY

## 2020-05-26 RX ORDER — BETAMETHASONE SODIUM PHOSPHATE AND BETAMETHASONE ACETATE 3; 3 MG/ML; MG/ML
12 INJECTION, SUSPENSION INTRA-ARTICULAR; INTRALESIONAL; INTRAMUSCULAR; SOFT TISSUE
Status: COMPLETED | OUTPATIENT
Start: 2020-05-26 | End: 2020-05-26

## 2020-05-26 RX ORDER — BUPIVACAINE HYDROCHLORIDE 2.5 MG/ML
2 INJECTION, SOLUTION INFILTRATION; PERINEURAL
Status: COMPLETED | OUTPATIENT
Start: 2020-05-26 | End: 2020-05-26

## 2020-05-26 RX ORDER — LIDOCAINE HYDROCHLORIDE 10 MG/ML
2 INJECTION, SOLUTION INFILTRATION; PERINEURAL
Status: COMPLETED | OUTPATIENT
Start: 2020-05-26 | End: 2020-05-26

## 2020-05-26 RX ADMIN — BUPIVACAINE HYDROCHLORIDE 2 ML: 2.5 INJECTION, SOLUTION INFILTRATION; PERINEURAL at 10:22

## 2020-05-26 RX ADMIN — BETAMETHASONE SODIUM PHOSPHATE AND BETAMETHASONE ACETATE 12 MG: 3; 3 INJECTION, SUSPENSION INTRA-ARTICULAR; INTRALESIONAL; INTRAMUSCULAR; SOFT TISSUE at 10:22

## 2020-05-26 RX ADMIN — LIDOCAINE HYDROCHLORIDE 2 ML: 10 INJECTION, SOLUTION INFILTRATION; PERINEURAL at 10:22

## 2020-05-29 ENCOUNTER — HOSPITAL ENCOUNTER (OUTPATIENT)
Dept: RADIOLOGY | Facility: HOSPITAL | Age: 80
Discharge: HOME | End: 2020-05-29
Attending: ORTHOPAEDIC SURGERY
Payer: COMMERCIAL

## 2020-05-29 DIAGNOSIS — M47.818 SI JOINT ARTHRITIS: ICD-10-CM

## 2020-05-29 DIAGNOSIS — M53.3 SACROILIAC PAIN: ICD-10-CM

## 2020-05-29 DIAGNOSIS — M53.3 SACROILIAC PAIN: Primary | ICD-10-CM

## 2020-05-29 PROCEDURE — 64451 NJX AA&/STRD NRV NRVTG SI JT: CPT

## 2020-05-29 RX ORDER — METHYLPREDNISOLONE ACETATE 80 MG/ML
80 INJECTION, SUSPENSION INTRA-ARTICULAR; INTRALESIONAL; INTRAMUSCULAR; SOFT TISSUE ONCE
Status: DISCONTINUED | OUTPATIENT
Start: 2020-05-29 | End: 2020-05-29

## 2020-05-29 RX ORDER — BUPIVACAINE HYDROCHLORIDE 2.5 MG/ML
2 INJECTION, SOLUTION EPIDURAL; INFILTRATION; INTRACAUDAL ONCE
Status: COMPLETED | OUTPATIENT
Start: 2020-05-29 | End: 2020-05-29

## 2020-05-29 RX ORDER — METHYLPREDNISOLONE ACETATE 80 MG/ML
80 INJECTION, SUSPENSION INTRA-ARTICULAR; INTRALESIONAL; INTRAMUSCULAR; SOFT TISSUE ONCE
Status: COMPLETED | OUTPATIENT
Start: 2020-05-29 | End: 2020-05-29

## 2020-05-29 RX ORDER — BUPIVACAINE HYDROCHLORIDE 2.5 MG/ML
2 INJECTION, SOLUTION EPIDURAL; INFILTRATION; INTRACAUDAL ONCE
Status: DISCONTINUED | OUTPATIENT
Start: 2020-05-29 | End: 2020-05-29

## 2020-05-29 RX ADMIN — BUPIVACAINE HYDROCHLORIDE 2 ML: 2.5 INJECTION, SOLUTION EPIDURAL; INFILTRATION; INTRACAUDAL at 09:22

## 2020-05-29 RX ADMIN — METHYLPREDNISOLONE ACETATE 80 MG: 80 INJECTION, SUSPENSION INTRA-ARTICULAR; INTRALESIONAL; INTRAMUSCULAR; SOFT TISSUE at 09:22

## 2020-06-03 ENCOUNTER — SOCIAL WORK (OUTPATIENT)
Dept: BEHAVIORAL/MENTAL HEALTH CLINIC | Facility: CLINIC | Age: 80
End: 2020-06-03
Payer: COMMERCIAL

## 2020-06-03 DIAGNOSIS — F43.20 ADJUSTMENT DISORDER, UNSPECIFIED TYPE: Primary | ICD-10-CM

## 2020-06-03 PROCEDURE — 90834 PSYTX W PT 45 MINUTES: CPT | Performed by: SOCIAL WORKER

## 2020-06-08 ENCOUNTER — HOSPITAL ENCOUNTER (OUTPATIENT)
Dept: MAMMOGRAPHY | Facility: CLINIC | Age: 80
Discharge: HOME | End: 2020-06-08
Payer: COMMERCIAL

## 2020-06-08 ENCOUNTER — HOSPITAL ENCOUNTER (OUTPATIENT)
Dept: ULTRASOUND IMAGING | Facility: CLINIC | Age: 80
Discharge: HOME | End: 2020-06-08
Payer: COMMERCIAL

## 2020-06-08 VITALS — HEIGHT: 62 IN | BODY MASS INDEX: 25.4 KG/M2 | WEIGHT: 138 LBS | TEMPERATURE: 97.2 F

## 2020-06-08 DIAGNOSIS — R92.8 ABNORMAL MAMMOGRAM: ICD-10-CM

## 2020-06-08 DIAGNOSIS — Z12.31 OTHER SCREENING MAMMOGRAM: ICD-10-CM

## 2020-06-08 PROCEDURE — G0279 TOMOSYNTHESIS, MAMMO: HCPCS

## 2020-06-08 PROCEDURE — 76642 ULTRASOUND BREAST LIMITED: CPT

## 2020-06-08 PROCEDURE — 77065 DX MAMMO INCL CAD UNI: CPT

## 2020-06-12 ENCOUNTER — TRANSCRIBE ORDERS (OUTPATIENT)
Dept: ADMINISTRATIVE | Facility: HOSPITAL | Age: 80
End: 2020-06-12

## 2020-06-12 DIAGNOSIS — R92.8 ABNORMAL MAMMOGRAM: Primary | ICD-10-CM

## 2020-07-13 DIAGNOSIS — E78.5 DYSLIPIDEMIA: ICD-10-CM

## 2020-07-13 DIAGNOSIS — E61.1 IRON DEFICIENCY: ICD-10-CM

## 2020-07-13 DIAGNOSIS — I10 ACCELERATED HYPERTENSION: ICD-10-CM

## 2020-07-13 DIAGNOSIS — E55.9 VITAMIN D DEFICIENCY: ICD-10-CM

## 2020-07-13 DIAGNOSIS — D63.1 ANEMIA OF CHRONIC RENAL FAILURE, STAGE 3 (MODERATE) (HCC): ICD-10-CM

## 2020-07-13 DIAGNOSIS — E87.6 HYPOKALEMIA: ICD-10-CM

## 2020-07-13 DIAGNOSIS — E83.52 HYPERCALCEMIA: ICD-10-CM

## 2020-07-13 DIAGNOSIS — N18.30 ANEMIA OF CHRONIC RENAL FAILURE, STAGE 3 (MODERATE) (HCC): ICD-10-CM

## 2020-07-13 DIAGNOSIS — I12.9 HYPERTENSIVE CHRONIC KIDNEY DISEASE WITH STAGE 1 THROUGH STAGE 4 CHRONIC KIDNEY DISEASE, OR UNSPECIFIED CHRONIC KIDNEY DISEASE: ICD-10-CM

## 2020-07-13 DIAGNOSIS — N18.30 CHRONIC KIDNEY DISEASE, STAGE III (MODERATE) (HCC): ICD-10-CM

## 2020-07-13 RX ORDER — SPIRONOLACTONE 25 MG/1
25 TABLET ORAL DAILY
Qty: 30 TABLET | Refills: 5 | Status: SHIPPED | OUTPATIENT
Start: 2020-07-13 | End: 2021-08-19 | Stop reason: SDUPTHER

## 2020-07-30 ENCOUNTER — TELEPHONE (OUTPATIENT)
Dept: NEPHROLOGY | Facility: CLINIC | Age: 80
End: 2020-07-30

## 2020-08-01 ENCOUNTER — APPOINTMENT (OUTPATIENT)
Dept: LAB | Facility: HOSPITAL | Age: 80
End: 2020-08-01
Attending: INTERNAL MEDICINE
Payer: COMMERCIAL

## 2020-08-01 DIAGNOSIS — E61.1 IRON DEFICIENCY: ICD-10-CM

## 2020-08-01 DIAGNOSIS — E87.6 HYPOKALEMIA: ICD-10-CM

## 2020-08-01 DIAGNOSIS — E83.52 HYPERCALCEMIA: ICD-10-CM

## 2020-08-01 DIAGNOSIS — N18.30 ANEMIA OF CHRONIC RENAL FAILURE, STAGE 3 (MODERATE) (HCC): ICD-10-CM

## 2020-08-01 DIAGNOSIS — E55.9 VITAMIN D DEFICIENCY: ICD-10-CM

## 2020-08-01 DIAGNOSIS — D63.1 ANEMIA OF CHRONIC RENAL FAILURE, STAGE 3 (MODERATE) (HCC): ICD-10-CM

## 2020-08-01 DIAGNOSIS — E78.5 DYSLIPIDEMIA: ICD-10-CM

## 2020-08-01 DIAGNOSIS — I12.9 HYPERTENSIVE CHRONIC KIDNEY DISEASE WITH STAGE 1 THROUGH STAGE 4 CHRONIC KIDNEY DISEASE, OR UNSPECIFIED CHRONIC KIDNEY DISEASE: ICD-10-CM

## 2020-08-01 DIAGNOSIS — N18.30 CHRONIC KIDNEY DISEASE, STAGE III (MODERATE) (HCC): ICD-10-CM

## 2020-08-01 LAB
25(OH)D3 SERPL-MCNC: 44.5 NG/ML (ref 30–100)
ALBUMIN SERPL BCP-MCNC: 3.7 G/DL (ref 3.5–5)
ALP SERPL-CCNC: 66 U/L (ref 46–116)
ALT SERPL W P-5'-P-CCNC: 17 U/L (ref 12–78)
ANION GAP SERPL CALCULATED.3IONS-SCNC: 6 MMOL/L (ref 4–13)
AST SERPL W P-5'-P-CCNC: 13 U/L (ref 5–45)
BILIRUB SERPL-MCNC: 0.45 MG/DL (ref 0.2–1)
BUN SERPL-MCNC: 15 MG/DL (ref 5–25)
CALCIUM SERPL-MCNC: 9.4 MG/DL (ref 8.3–10.1)
CHLORIDE SERPL-SCNC: 112 MMOL/L (ref 100–108)
CHOLEST SERPL-MCNC: 179 MG/DL (ref 50–200)
CK SERPL-CCNC: 67 U/L (ref 26–192)
CO2 SERPL-SCNC: 23 MMOL/L (ref 21–32)
CREAT SERPL-MCNC: 1.26 MG/DL (ref 0.6–1.3)
CREAT UR-MCNC: 129 MG/DL
ERYTHROCYTE [DISTWIDTH] IN BLOOD BY AUTOMATED COUNT: 13.4 % (ref 11.6–15.1)
FERRITIN SERPL-MCNC: 299 NG/ML (ref 8–388)
GFR SERPL CREATININE-BSD FRML MDRD: 41 ML/MIN/1.73SQ M
GLUCOSE P FAST SERPL-MCNC: 92 MG/DL (ref 65–99)
HCT VFR BLD AUTO: 36.5 % (ref 34.8–46.1)
HDLC SERPL-MCNC: 88 MG/DL
HGB BLD-MCNC: 12 G/DL (ref 11.5–15.4)
IRON SATN MFR SERPL: 35 %
IRON SERPL-MCNC: 94 UG/DL (ref 50–170)
LDLC SERPL CALC-MCNC: 78 MG/DL (ref 0–100)
MAGNESIUM SERPL-MCNC: 2.4 MG/DL (ref 1.6–2.6)
MCH RBC QN AUTO: 31 PG (ref 26.8–34.3)
MCHC RBC AUTO-ENTMCNC: 32.9 G/DL (ref 31.4–37.4)
MCV RBC AUTO: 94 FL (ref 82–98)
PLATELET # BLD AUTO: 280 THOUSANDS/UL (ref 149–390)
PMV BLD AUTO: 10.6 FL (ref 8.9–12.7)
POTASSIUM SERPL-SCNC: 4.1 MMOL/L (ref 3.5–5.3)
PROT SERPL-MCNC: 7 G/DL (ref 6.4–8.2)
PROT UR-MCNC: 44 MG/DL
PROT/CREAT UR: 0.34 MG/G{CREAT} (ref 0–0.1)
RBC # BLD AUTO: 3.87 MILLION/UL (ref 3.81–5.12)
SODIUM SERPL-SCNC: 141 MMOL/L (ref 136–145)
TIBC SERPL-MCNC: 272 UG/DL (ref 250–450)
TRIGL SERPL-MCNC: 67 MG/DL
WBC # BLD AUTO: 5.97 THOUSAND/UL (ref 4.31–10.16)

## 2020-08-01 PROCEDURE — 83550 IRON BINDING TEST: CPT

## 2020-08-01 PROCEDURE — 83735 ASSAY OF MAGNESIUM: CPT

## 2020-08-01 PROCEDURE — 80061 LIPID PANEL: CPT

## 2020-08-01 PROCEDURE — 82728 ASSAY OF FERRITIN: CPT

## 2020-08-01 PROCEDURE — 85027 COMPLETE CBC AUTOMATED: CPT

## 2020-08-01 PROCEDURE — 82306 VITAMIN D 25 HYDROXY: CPT

## 2020-08-01 PROCEDURE — 36415 COLL VENOUS BLD VENIPUNCTURE: CPT

## 2020-08-01 PROCEDURE — 80053 COMPREHEN METABOLIC PANEL: CPT

## 2020-08-01 PROCEDURE — 82550 ASSAY OF CK (CPK): CPT

## 2020-08-01 PROCEDURE — 83540 ASSAY OF IRON: CPT

## 2020-08-01 PROCEDURE — 82570 ASSAY OF URINE CREATININE: CPT

## 2020-08-01 PROCEDURE — 84156 ASSAY OF PROTEIN URINE: CPT

## 2020-08-04 NOTE — PROGRESS NOTES
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/arteriolar nephrosclerosis  · Baseline creatinine:  1 1 -1 79  · Current creatinine:  1 26 at baseline  · Urine protein creatinine ratio:  0 34 g at goal (from December 2019)  Recommendations:  · Treat hypertension-please see below  · Treat dyslipidemia-please see below  · Maintain proteinuria less than 1 g or as low as possible  · Avoid nephrotoxic agents such as NSAIDs, patient counseled as such  2   Volume:  Euvolemic  3   Hypertension:  Secondary workup:  Was negative  · Home readings:  None at this time     · Goal blood pressure:  Less than 130/80 given CKD  Recommendations:  ·  Push nonmedical regimen especially weight loss/isotonic exercise and low-salt diet  · Medication changes today:  No changes pending follow-up readings  4   Electrolytes:    · Mild metabolic acidosis:  Resolved  · Borderline hypokalemia:  resolved on spironolactone with a potassium of 3 9  5   Mineral bone disorder:  · Calcium/magnesium:  Both normal  · Current calcium normal at 9 4  Recent workup in December:  · PTH intact 15 9  · PTH related protein negative  · Ace level negative  · A m   Cortisol 4 9  · TSH normal  · SPEP/UPEP and light chain ratio negative  · Vitamin-D was 34 9 in July; vitamin-D 1, 25 was normal at 42 1 in December  · PTH intact:   6 4 which is actually low  · Vitamin-D:  44 5 at goal on supplementation  6   Dyslipidemia:  · Goal LDL:  Less than 100  · Current lipid profile:  LDL 78/HDL 88/triglycerides 67  Recommendations:  Patient is at goal   7   Anemia:   · Hemoglobin:  Normal at 12 0  · Iron studies:  Good iron studies  · Stool for occult blood x3 not done at this time  · Multiple myeloma evaluation  was negative as outlined above  · GI evaluation:  EGD with just esophagitis; colonoscopy with benign polyp  · Heme consultation:  Felt anemia which was normocytic secondary to chronic kidney disease   No further recommendations at this time  8   Other problems:  · GERD  · Osteoarthritis of right hip  · Colonic polyp    PATIENT INSTRUCTIONS:    Patient Instructions   1  Medication changes today:   No medication changes today:    2  PLEASE CALL WITH ALL OF YOUR MEDICATIONS AT THIS TIME       3  Please take 1 week a blood pressure readings now ,  MORNING AND EVENING, SITTING AND STANDING as follows:  · TAKE THE MORNING READINGS BEFORE ANY MEDICATIONS AND WHEN YOU ARE RELAX FOR SEVERAL MINUTES  · TAKE THE EVENING READINGS BEFORE SUPPER/DINNER AND BEFORE ANY MEDICATIONS AND AGAIN WHEN YOU ARE RELAX FOR SEVERAL MINUTES  · PLEASE INCLUDE HEART RATE WITH YOUR BLOOD PRESSURE READINGS  · When taking standing readings, keep your arm supported at heart level and not dangling  · Make sure you are sitting with your back supported and feet on the ground and do not cross your legs or feet  · Make sure you have not taken any coffee or caffeine products or exercised or smoke cigarettes at least 30 minutes before taking your blood pressure  Then please mail these readings into the office    4  In 3 months:  · Please go for nonfasting lab work  · Please take 1 week a blood pressure readings morning evening, sitting and standing is outlined above in mail those into the office      5  Follow-up in 6  months   Please bring in 1 week a blood pressure readings morning evening, sitting and standing is outlined above   PLEASE BRING IN YOUR BLOOD PRESSURE MACHINE FOR CORRELATION WITH THE OFFICE MACHINE AT THE NEXT VISIT   Please go for fasting lab work 1-2 weeks prior to your appointment      6   General instructions:   AVOID SALT BUT NOT ADDING AN READING LABELS TO MAKE SURE THERE IS LOW-SALT IN THE FOOD THAT YOU ARE EATING     Avoid nonsteroidal anti-inflammatory drugs such as Naprosyn, ibuprofen, Aleve, Advil, Celebrex, Meloxicam (Mobic) etc   You can use Tylenol as needed if you do not have any liver condition to be concerned about     Avoid medications such as Sudafed or decongestants and antihistamines that contained the D component which is the decongestant  You can take antihistamines without the decongestant or D component   Try to avoid medications such as pantoprazole or  Protonix/Nexium or Esomeprazole)/Prilosec or omeprazole/Prevacid or lansoprazole/AcipHex or Rabeprazole  If you are able to, use Pepcid as this is safer for your kidneys   Try to exercise at least 30 minutes 3 days a week to begin with with an ultimate goal of 5 days a week for at least 30 minutes       Please do not drink more than 2 glasses of alcohol/wine on a daily basis as this may contribute to your high blood pressure  Subjective: There has been no hospitalizations or acute illnesses since last visit  The patient overall is feeling well  No fevers, chills, or cough or colds  Good appetite and good energy  No hematuria, dysuria, voiding symptoms or foamy urine  No gastrointestinal symptoms  No cardiovascular symptoms including swelling of the legs  No headaches, dizziness or lightheadedness  Blood pressure medications:  · Spironolactone 25 mg daily  · Toprol XL 25 mg daily in the morning? · Amlodipine 2 5 mg daily in the morning?     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    Hypertension     Osteoarthritis of hip 4/4/2016    SI ([facility]ro[facility]ac) [facility] ([facility] [address] ) 4/4/2016    [facility]      Past Surgical History:   Procedure Laterality Date    BREAST EXCISIONAL BIOPSY Left 2000    benign    JOINT REPLACEMENT      right hip 8/15    TONSILLECTOMY       Family History   Problem Relation Age of Onset    No Known Problems Mother     No Known Problems Father     Breast cancer Daughter 48    Breast cancer Maternal Aunt 79    No Known Problems Sister     No Known Problems Maternal Grandmother     No Known Problems Maternal Grandfather     No Known Problems Paternal Grandmother     No Known Problems Paternal Grandfather     No Known Problems Son     No Known Problems Sister     No Known Problems Sister       reports that she has never smoked  She has never used smokeless tobacco  She reports current drug use  Drug: Prescription  She reports that she does not drink alcohol  I COMPLETELY REVIEWED THE PAST MEDICAL HISTORY/PAST SURGICAL HISTORY/SOCIAL HISTORY/FAMILY HISTORY/AND MEDICATIONS  AND UPDATED ALL    Objective:     Vitals:   BP sitting on left:  116/66 with a heart rate 80 and regular  BP standing on left:  116/68 with a heart rate of 84 and regular  Vitals:    08/07/20 1253   BP: 126/62   Pulse: 88   Resp: 16   Temp: 98 1 °F (36 7 °C)       Weight (last 2 days)     Date/Time   Weight    08/07/20 1253   60 8 (134)            Wt Readings from Last 3 Encounters:   08/07/20 60 8 kg (134 lb)   06/08/20 62 6 kg (138 lb)   05/26/20 62 6 kg (138 lb)       Body mass index is 24 51 kg/m²      Physical Exam: General:  No acute distress  Skin:  No acute rash  Eyes:  No scleral icterus, noninjected, no discharge from eyes  ENT:  Moist mucous membranes  Neck:  Supple, no jugular venous distention, trachea is midline, no lymphadenopathy and no thyromegaly  Back   No CVAT  Chest:  Clear to auscultation and percussion, good respiratory effort  CVS:  Regular rate and rhythm without a rub, or gallops or murmurs  Abdomen:  Soft and nontender with normal bowel sounds  Extremities:  No cyanosis and no edema, moderate hand arthritic changes, normal range of motion  Neuro:  Grossly intact  Psych:  Alert, oriented x3 and appropriate      Medications:    Current Outpatient Medications:     amLODIPine (NORVASC) 2 5 mg tablet, Take 2 5 mg by mouth daily at bedtime, Disp: , Rfl:     atorvastatin (LIPITOR) 10 mg tablet, , Disp: , Rfl:     doxycycline hyclate (VIBRAMYCIN) 100 mg capsule, , Disp: , Rfl:     ibuprofen (MOTRIN) 800 mg tablet, 800 mg every 8 (eight) hours as needed Rarely takes the motrin, Disp: , Rfl:     lidocaine (XYLOCAINE) 5 % ointment, , Disp: , Rfl:     metoprolol succinate (TOPROL-XL) 25 mg 24 hr tablet, Take 1 tablet (25 mg total) by mouth daily, Disp: 90 tablet, Rfl: 3    PREMARIN vaginal cream, , Disp: , Rfl:     spironolactone (ALDACTONE) 25 mg tablet, Take 1 tablet (25 mg total) by mouth daily, Disp: 30 tablet, Rfl: 5    Lab, Imaging and other studies: I have personally reviewed pertinent labs    Laboratory Results:  Results for orders placed or performed in visit on 08/01/20   Comprehensive metabolic panel   Result Value Ref Range    Sodium 141 136 - 145 mmol/L    Potassium 4 1 3 5 - 5 3 mmol/L    Chloride 112 (H) 100 - 108 mmol/L    CO2 23 21 - 32 mmol/L    ANION GAP 6 4 - 13 mmol/L    BUN 15 5 - 25 mg/dL    Creatinine 1 26 0 60 - 1 30 mg/dL    Glucose, Fasting 92 65 - 99 mg/dL    Calcium 9 4 8 3 - 10 1 mg/dL    AST 13 5 - 45 U/L    ALT 17 12 - 78 U/L    Alkaline Phosphatase 66 46 - 116 U/L    Total Protein 7 0 6 4 - 8 2 g/dL    Albumin 3 7 3 5 - 5 0 g/dL    Total Bilirubin 0 45 0 20 - 1 00 mg/dL    eGFR 41 ml/min/1 73sq m   CK   Result Value Ref Range    Total CK 67 26 - 192 U/L   CBC   Result Value Ref Range    WBC 5 97 4 31 - 10 16 Thousand/uL    RBC 3 87 3 81 - 5 12 Million/uL    Hemoglobin 12 0 11 5 - 15 4 g/dL    Hematocrit 36 5 34 8 - 46 1 %    MCV 94 82 - 98 fL    MCH 31 0 26 8 - 34 3 pg    MCHC 32 9 31 4 - 37 4 g/dL    RDW 13 4 11 6 - 15 1 %    Platelets 834 428 - 304 Thousands/uL    MPV 10 6 8 9 - 12 7 fL   Ferritin   Result Value Ref Range    Ferritin 299 8 - 388 ng/mL   Iron Saturation %   Result Value Ref Range    Iron Saturation 35 %    TIBC 272 250 - 450 ug/dL    Iron 94 50 - 170 ug/dL   Lipid Panel with Direct LDL reflex   Result Value Ref Range    Cholesterol 179 50 - 200 mg/dL    Triglycerides 67 <=150 mg/dL    HDL, Direct 88 >=40 mg/dL    LDL Calculated 78 0 - 100 mg/dL   Magnesium   Result Value Ref Range    Magnesium 2 4 1 6 - 2 6 mg/dL   Protein / creatinine ratio, urine   Result Value Ref Range    Creatinine, Ur 129 0 mg/dL    Protein Urine Random 44 mg/dL    Prot/Creat Ratio, Ur 0 34 (H) 0 00 - 0 10   Vitamin D 25 hydroxy   Result Value Ref Range    Vit D, 25-Hydroxy 44 5 30 0 - 100 0 ng/mL       Results from last 7 days   Lab Units 08/01/20  0851   WBC Thousand/uL 5 97   HEMOGLOBIN g/dL 12 0   HEMATOCRIT % 36 5   PLATELETS Thousands/uL 280   POTASSIUM mmol/L 4 1   CHLORIDE mmol/L 112*   CO2 mmol/L 23   BUN mg/dL 15   CREATININE mg/dL 1 26   CALCIUM mg/dL 9 4   MAGNESIUM mg/dL 2 4         Radiology review:   chest X-ray    Ultrasound      Portions of the record may have been created with voice recognition software  Occasional wrong word or "sound a like" substitutions may have occurred due to the inherent limitations of voice recognition software  Read the chart carefully and recognize, using context, where substitutions have occurred

## 2020-08-07 ENCOUNTER — OFFICE VISIT (OUTPATIENT)
Dept: NEPHROLOGY | Facility: CLINIC | Age: 80
End: 2020-08-07
Payer: COMMERCIAL

## 2020-08-07 VITALS
BODY MASS INDEX: 24.66 KG/M2 | TEMPERATURE: 98.1 F | WEIGHT: 134 LBS | DIASTOLIC BLOOD PRESSURE: 62 MMHG | RESPIRATION RATE: 16 BRPM | HEART RATE: 88 BPM | SYSTOLIC BLOOD PRESSURE: 126 MMHG | HEIGHT: 62 IN

## 2020-08-07 DIAGNOSIS — N18.30 ANEMIA OF CHRONIC RENAL FAILURE, STAGE 3 (MODERATE) (HCC): ICD-10-CM

## 2020-08-07 DIAGNOSIS — D63.1 ANEMIA OF CHRONIC RENAL FAILURE, STAGE 3 (MODERATE) (HCC): ICD-10-CM

## 2020-08-07 DIAGNOSIS — E83.52 HYPERCALCEMIA: ICD-10-CM

## 2020-08-07 DIAGNOSIS — E61.1 IRON DEFICIENCY: ICD-10-CM

## 2020-08-07 DIAGNOSIS — E55.9 VITAMIN D DEFICIENCY: ICD-10-CM

## 2020-08-07 DIAGNOSIS — I12.9 HYPERTENSIVE CHRONIC KIDNEY DISEASE WITH STAGE 1 THROUGH STAGE 4 CHRONIC KIDNEY DISEASE, OR UNSPECIFIED CHRONIC KIDNEY DISEASE: Primary | ICD-10-CM

## 2020-08-07 DIAGNOSIS — E78.5 DYSLIPIDEMIA: ICD-10-CM

## 2020-08-07 DIAGNOSIS — N18.30 CHRONIC KIDNEY DISEASE, STAGE III (MODERATE) (HCC): ICD-10-CM

## 2020-08-07 PROCEDURE — 99214 OFFICE O/P EST MOD 30 MIN: CPT | Performed by: INTERNAL MEDICINE

## 2020-08-07 NOTE — PATIENT INSTRUCTIONS
1  Medication changes today:   No medication changes today:    2  PLEASE CALL WITH ALL OF YOUR MEDICATIONS AT THIS TIME       3  Please take 1 week a blood pressure readings now ,  MORNING AND EVENING, SITTING AND STANDING as follows:  · TAKE THE MORNING READINGS BEFORE ANY MEDICATIONS AND WHEN YOU ARE RELAX FOR SEVERAL MINUTES  · TAKE THE EVENING READINGS BEFORE SUPPER/DINNER AND BEFORE ANY MEDICATIONS AND AGAIN WHEN YOU ARE RELAX FOR SEVERAL MINUTES  · PLEASE INCLUDE HEART RATE WITH YOUR BLOOD PRESSURE READINGS  · When taking standing readings, keep your arm supported at heart level and not dangling  · Make sure you are sitting with your back supported and feet on the ground and do not cross your legs or feet  · Make sure you have not taken any coffee or caffeine products or exercised or smoke cigarettes at least 30 minutes before taking your blood pressure  Then please mail these readings into the office    4  In 3 months:  · Please go for nonfasting lab work  · Please take 1 week a blood pressure readings morning evening, sitting and standing is outlined above in mail those into the office      5  Follow-up in 6  months   Please bring in 1 week a blood pressure readings morning evening, sitting and standing is outlined above   PLEASE BRING IN YOUR BLOOD PRESSURE MACHINE FOR CORRELATION WITH THE OFFICE MACHINE AT THE NEXT VISIT   Please go for fasting lab work 1-2 weeks prior to your appointment      6  General instructions:   AVOID SALT BUT NOT ADDING AN READING LABELS TO MAKE SURE THERE IS LOW-SALT IN THE FOOD THAT YOU ARE EATING     Avoid nonsteroidal anti-inflammatory drugs such as Naprosyn, ibuprofen, Aleve, Advil, Celebrex, Meloxicam (Mobic) etc   You can use Tylenol as needed if you do not have any liver condition to be concerned about     Avoid medications such as Sudafed or decongestants and antihistamines that contained the D component which is the decongestant    You can take antihistamines without the decongestant or D component   Try to avoid medications such as pantoprazole or  Protonix/Nexium or Esomeprazole)/Prilosec or omeprazole/Prevacid or lansoprazole/AcipHex or Rabeprazole  If you are able to, use Pepcid as this is safer for your kidneys   Try to exercise at least 30 minutes 3 days a week to begin with with an ultimate goal of 5 days a week for at least 30 minutes       Please do not drink more than 2 glasses of alcohol/wine on a daily basis as this may contribute to your high blood pressure

## 2020-08-07 NOTE — LETTER
August 7, 2020     Shubham Bonner DO  [facility]  [address]    Patient: Hudson Taylor   YOB: 1940   Date of Visit: 8/7/2020       Dear Dr Karrie Edwards: Thank you for referring Hudson Taylor to me for evaluation  Below are my notes for this consultation  If you have questions, please do not hesitate to call me  I look forward to following your patient along with you  Sincerely,        Rio Figueroa MD        CC: MD Rio Rollins MD  8/7/2020  1:20 PM  Sign when Signing Visit  RENAL FOLLOW UP NOTE: td    ASSESSMENT AND PLAN:  1  Paz Esquivel YMFVR 9 :  · Etiology:  AK I from Bactrim/hypercalcemia along with poor oral intake   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/arteriolar nephrosclerosis  · Baseline creatinine:  1 1 -1 79  · Current creatinine:  1 26 at baseline  · Urine protein creatinine ratio:  0 34 g at goal (from December 2019)  Recommendations:  · Treat hypertension-please see below  · Treat dyslipidemia-please see below  · Maintain proteinuria less than 1 g or as low as possible  · Avoid nephrotoxic agents such as NSAIDs, patient counseled as such  2   Volume:  Euvolemic  3   Hypertension:  Secondary workup:  Was negative  · Home readings:  None at this time     · Goal blood pressure:  Less than 130/80 given CKD  Recommendations:  ·  Push nonmedical regimen especially weight loss/isotonic exercise and low-salt diet  · Medication changes today:  No changes pending follow-up readings  4   Electrolytes:    · Mild metabolic acidosis:  Resolved  · Borderline hypokalemia:  resolved on spironolactone with a potassium of 3 9  5   Mineral bone disorder:  · Calcium/magnesium:  Both normal  · Current calcium normal at 9 4  Recent workup in December:  · PTH intact 15 9  · PTH related protein negative  · Ace level negative  · A m   Cortisol 4 9  · TSH normal  · SPEP/UPEP and light chain ratio negative  · Vitamin-D was 34 9 in July; vitamin-D 1, 25 was normal at 42 1 in December  · PTH intact:   6 4 which is actually low  · Vitamin-D:  44 5 at goal on supplementation  6   Dyslipidemia:  · Goal LDL:  Less than 100  · Current lipid profile:  LDL 78/HDL 88/triglycerides 67  Recommendations:  Patient is at goal   7   Anemia:   · Hemoglobin:  Normal at 12 0  · Iron studies:  Good iron studies  · Stool for occult blood x3 not done at this time  · Multiple myeloma evaluation  was negative as outlined above  · GI evaluation:  EGD with just esophagitis; colonoscopy with benign polyp  · Heme consultation:  Felt anemia which was normocytic secondary to chronic kidney disease   No further recommendations at this time  8   Other problems:  · GERD  · Osteoarthritis of right hip  · Colonic polyp    PATIENT INSTRUCTIONS:    Patient Instructions   1  Medication changes today:   No medication changes today:    2  PLEASE CALL WITH ALL OF YOUR MEDICATIONS AT THIS TIME       3  Please take 1 week a blood pressure readings now ,  MORNING AND EVENING, SITTING AND STANDING as follows:  · TAKE THE MORNING READINGS BEFORE ANY MEDICATIONS AND WHEN YOU ARE RELAX FOR SEVERAL MINUTES  · TAKE THE EVENING READINGS BEFORE SUPPER/DINNER AND BEFORE ANY MEDICATIONS AND AGAIN WHEN YOU ARE RELAX FOR SEVERAL MINUTES  · PLEASE INCLUDE HEART RATE WITH YOUR BLOOD PRESSURE READINGS  · When taking standing readings, keep your arm supported at heart level and not dangling  · Make sure you are sitting with your back supported and feet on the ground and do not cross your legs or feet  · Make sure you have not taken any coffee or caffeine products or exercised or smoke cigarettes at least 30 minutes before taking your blood pressure  Then please mail these readings into the office    4  In 3 months:  · Please go for nonfasting lab work  · Please take 1 week a blood pressure readings morning evening, sitting and standing is outlined above in mail those into the office      5   Follow-up in 6  months   Please bring in 1 week a blood pressure readings morning evening, sitting and standing is outlined above   PLEASE BRING IN YOUR BLOOD PRESSURE MACHINE FOR CORRELATION WITH THE OFFICE MACHINE AT THE NEXT VISIT   Please go for fasting lab work 1-2 weeks prior to your appointment      6  General instructions:   AVOID SALT BUT NOT ADDING AN READING LABELS TO MAKE SURE THERE IS LOW-SALT IN THE FOOD THAT YOU ARE EATING     Avoid nonsteroidal anti-inflammatory drugs such as Naprosyn, ibuprofen, Aleve, Advil, Celebrex, Meloxicam (Mobic) etc   You can use Tylenol as needed if you do not have any liver condition to be concerned about     Avoid medications such as Sudafed or decongestants and antihistamines that contained the D component which is the decongestant  You can take antihistamines without the decongestant or D component   Try to avoid medications such as pantoprazole or  Protonix/Nexium or Esomeprazole)/Prilosec or omeprazole/Prevacid or lansoprazole/AcipHex or Rabeprazole  If you are able to, use Pepcid as this is safer for your kidneys   Try to exercise at least 30 minutes 3 days a week to begin with with an ultimate goal of 5 days a week for at least 30 minutes       Please do not drink more than 2 glasses of alcohol/wine on a daily basis as this may contribute to your high blood pressure  Subjective: There has been no hospitalizations or acute illnesses since last visit  The patient overall is feeling well  No fevers, chills, or cough or colds  Good appetite and good energy  No hematuria, dysuria, voiding symptoms or foamy urine  No gastrointestinal symptoms  No cardiovascular symptoms including swelling of the legs  No headaches, dizziness or lightheadedness  Blood pressure medications:  · Spironolactone 25 mg daily  · Toprol XL 25 mg daily in the morning? · Amlodipine 2 5 mg daily in the morning?     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    Hypertension     Osteoarthritis of hip 4/4/2016    SI (sacroiliac) joint inflammation (HCC) 4/4/2016    Substance addiction ([address] )      Past Surgical History:   Procedure Laterality Date    BREAST EXCISIONAL BIOPSY Left 2000    benign    JOINT REPLACEMENT      right hip 8/15    TONSILLECTOMY       Family History   Problem Relation Age of Onset    No Known Problems Mother     No Known Problems Father     Breast cancer Daughter 48    Breast cancer Maternal Aunt 79    No Known Problems Sister     No Known Problems Maternal Grandmother     No Known Problems Maternal Grandfather     No Known Problems Paternal Grandmother     No Known Problems Paternal Grandfather     No Known Problems Son     No Known Problems Sister     No Known Problems Sister       reports that she has never smoked  She has never used smokeless tobacco  She reports current drug use  Drug: Prescription  She reports that she does not drink alcohol  I COMPLETELY REVIEWED THE PAST MEDICAL HISTORY/PAST SURGICAL HISTORY/SOCIAL HISTORY/FAMILY HISTORY/AND MEDICATIONS  AND UPDATED ALL    Objective:     Vitals:   BP sitting on left:  116/66 with a heart rate 80 and regular  BP standing on left:  116/68 with a heart rate of 84 and regular  Vitals:    08/07/20 1253   BP: 126/62   Pulse: 88   Resp: 16   Temp: 98 1 °F (36 7 °C)       Weight (last 2 days)     Date/Time   Weight    08/07/20 1253   60 8 (134)            Wt Readings from Last 3 Encounters:   08/07/20 60 8 kg (134 lb)   06/08/20 62 6 kg (138 lb)   05/26/20 62 6 kg (138 lb)       Body mass index is 24 51 kg/m²      Physical Exam: General:  No acute distress  Skin:  No acute rash  Eyes:  No scleral icterus, noninjected, no discharge from eyes  ENT:  Moist mucous membranes  Neck:  Supple, no jugular venous distention, trachea is midline, no lymphadenopathy and no thyromegaly  Back   No CVAT  Chest:  Clear to auscultation and percussion, good respiratory effort  CVS:  Regular rate and rhythm without a rub, or gallops or murmurs  Abdomen:  Soft and nontender with normal bowel sounds  Extremities:  No cyanosis and no edema, moderate hand arthritic changes, normal range of motion  Neuro:  Grossly intact  Psych:  Alert, oriented x3 and appropriate      Medications:    Current Outpatient Medications:     amLODIPine (NORVASC) 2 5 mg tablet, Take 2 5 mg by mouth daily at bedtime, Disp: , Rfl:     atorvastatin (LIPITOR) 10 mg tablet, , Disp: , Rfl:     doxycycline hyclate (VIBRAMYCIN) 100 mg capsule, , Disp: , Rfl:     ibuprofen (MOTRIN) 800 mg tablet, 800 mg every 8 (eight) hours as needed Rarely takes the motrin, Disp: , Rfl:     lidocaine (XYLOCAINE) 5 % ointment, , Disp: , Rfl:     metoprolol succinate (TOPROL-XL) 25 mg 24 hr tablet, Take 1 tablet (25 mg total) by mouth daily, Disp: 90 tablet, Rfl: 3    PREMARIN vaginal cream, , Disp: , Rfl:     spironolactone (ALDACTONE) 25 mg tablet, Take 1 tablet (25 mg total) by mouth daily, Disp: 30 tablet, Rfl: 5    Lab, Imaging and other studies: I have personally reviewed pertinent labs    Laboratory Results:  Results for orders placed or performed in visit on 08/01/20   Comprehensive metabolic panel   Result Value Ref Range    Sodium 141 136 - 145 mmol/L    Potassium 4 1 3 5 - 5 3 mmol/L    Chloride 112 (H) 100 - 108 mmol/L    CO2 23 21 - 32 mmol/L    ANION GAP 6 4 - 13 mmol/L    BUN 15 5 - 25 mg/dL    Creatinine 1 26 0 60 - 1 30 mg/dL    Glucose, Fasting 92 65 - 99 mg/dL    Calcium 9 4 8 3 - 10 1 mg/dL    AST 13 5 - 45 U/L    ALT 17 12 - 78 U/L    Alkaline Phosphatase 66 46 - 116 U/L    Total Protein 7 0 6 4 - 8 2 g/dL    Albumin 3 7 3 5 - 5 0 g/dL    Total Bilirubin 0 45 0 20 - 1 00 mg/dL    eGFR 41 ml/min/1 73sq m   CK   Result Value Ref Range    Total CK 67 26 - 192 U/L   CBC   Result Value Ref Range    WBC 5 97 4 31 - 10 16 Thousand/uL    RBC 3 87 3 81 - 5 12 Million/uL    Hemoglobin 12 0 11 5 - 15 4 g/dL    Hematocrit 36 5 34 8 - 46 1 %    MCV 94 82 - 98 fL    MCH 31 0 26 8 - 34 3 pg    MCHC 32 9 31 4 - 37 4 g/dL    RDW 13 4 11 6 - 15 1 %    Platelets 804 522 - 204 Thousands/uL    MPV 10 6 8 9 - 12 7 fL   Ferritin   Result Value Ref Range    Ferritin 299 8 - 388 ng/mL   Iron Saturation %   Result Value Ref Range    Iron Saturation 35 %    TIBC 272 250 - 450 ug/dL    Iron 94 50 - 170 ug/dL   Lipid Panel with Direct LDL reflex   Result Value Ref Range    Cholesterol 179 50 - 200 mg/dL    Triglycerides 67 <=150 mg/dL    HDL, Direct 88 >=40 mg/dL    LDL Calculated 78 0 - 100 mg/dL   Magnesium   Result Value Ref Range    Magnesium 2 4 1 6 - 2 6 mg/dL   Protein / creatinine ratio, urine   Result Value Ref Range    Creatinine, Ur 129 0 mg/dL    Protein Urine Random 44 mg/dL    Prot/Creat Ratio, Ur 0 34 (H) 0 00 - 0 10   Vitamin D 25 hydroxy   Result Value Ref Range    Vit D, 25-Hydroxy 44 5 30 0 - 100 0 ng/mL       Results from last 7 days   Lab Units 08/01/20  0851   WBC Thousand/uL 5 97   HEMOGLOBIN g/dL 12 0   HEMATOCRIT % 36 5   PLATELETS Thousands/uL 280   POTASSIUM mmol/L 4 1   CHLORIDE mmol/L 112*   CO2 mmol/L 23   BUN mg/dL 15   CREATININE mg/dL 1 26   CALCIUM mg/dL 9 4   MAGNESIUM mg/dL 2 4         Radiology review:   chest X-ray    Ultrasound      Portions of the record may have been created with voice recognition software  Occasional wrong word or "sound a like" substitutions may have occurred due to the inherent limitations of voice recognition software  Read the chart carefully and recognize, using context, where substitutions have occurred

## 2020-08-24 ENCOUNTER — DOCUMENTATION (OUTPATIENT)
Dept: NEPHROLOGY | Facility: CLINIC | Age: 80
End: 2020-08-24

## 2020-08-24 NOTE — PROGRESS NOTES
Blood pressure readings:  -a m :  115/64 orthostatic changes  -p  m :  115/69 without orthostatic changes    As long as the patient is feeling well no medication changes at this time

## 2020-08-25 ENCOUNTER — OFFICE VISIT (OUTPATIENT)
Dept: OBGYN CLINIC | Facility: HOSPITAL | Age: 80
End: 2020-08-25
Payer: COMMERCIAL

## 2020-08-25 VITALS
HEART RATE: 90 BPM | BODY MASS INDEX: 24.51 KG/M2 | DIASTOLIC BLOOD PRESSURE: 76 MMHG | WEIGHT: 133.2 LBS | HEIGHT: 62 IN | SYSTOLIC BLOOD PRESSURE: 127 MMHG

## 2020-08-25 DIAGNOSIS — M70.61 TROCHANTERIC BURSITIS OF RIGHT HIP: ICD-10-CM

## 2020-08-25 DIAGNOSIS — M25.551 RIGHT HIP PAIN: Primary | ICD-10-CM

## 2020-08-25 PROCEDURE — 99212 OFFICE O/P EST SF 10 MIN: CPT | Performed by: PHYSICIAN ASSISTANT

## 2020-08-25 PROCEDURE — 20610 DRAIN/INJ JOINT/BURSA W/O US: CPT | Performed by: PHYSICIAN ASSISTANT

## 2020-08-25 RX ORDER — LIDOCAINE HYDROCHLORIDE 10 MG/ML
2 INJECTION, SOLUTION INFILTRATION; PERINEURAL
Status: COMPLETED | OUTPATIENT
Start: 2020-08-25 | End: 2020-08-25

## 2020-08-25 RX ORDER — BETAMETHASONE SODIUM PHOSPHATE AND BETAMETHASONE ACETATE 3; 3 MG/ML; MG/ML
6 INJECTION, SUSPENSION INTRA-ARTICULAR; INTRALESIONAL; INTRAMUSCULAR; SOFT TISSUE
Status: COMPLETED | OUTPATIENT
Start: 2020-08-25 | End: 2020-08-25

## 2020-08-25 RX ADMIN — LIDOCAINE HYDROCHLORIDE 2 ML: 10 INJECTION, SOLUTION INFILTRATION; PERINEURAL at 10:33

## 2020-08-25 RX ADMIN — BETAMETHASONE SODIUM PHOSPHATE AND BETAMETHASONE ACETATE 6 MG: 3; 3 INJECTION, SUSPENSION INTRA-ARTICULAR; INTRALESIONAL; INTRAMUSCULAR; SOFT TISSUE at 10:33

## 2020-08-25 NOTE — PROGRESS NOTES
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    Hypertension     Osteoarthritis of hip 4/4/2016    SI (sacroiliac) joint inflammation (HCC) 4/4/2016    Substance addiction ([address] )        Past Surgical History:   Procedure Laterality Date    BREAST EXCISIONAL BIOPSY Left 2000    benign    JOINT REPLACEMENT      right hip 8/15    TONSILLECTOMY         Family History   Problem Relation Age of Onset    No Known Problems Mother     No Known Problems Father     Breast cancer Daughter 48    Breast cancer Maternal Aunt 79    No Known Problems Sister     No Known Problems Maternal Grandmother     No Known Problems Maternal Grandfather     No Known Problems Paternal Grandmother     No Known Problems Paternal Grandfather     No Known Problems Son     No Known Problems Sister     No Known Problems Sister        Social History     Tobacco Use    Smoking status: Never Smoker    Smokeless tobacco: Never Used   Substance Use Topics    Alcohol use: No    Drug use: Yes     Types: Prescription     Comment: neil last used 1996     Exam;    She has point focal pain to palpation over the greater trochanteric flare of her proximal femur    Large joint arthrocentesis: R greater trochanteric bursa  Date/Time: 8/25/2020 10:33 AM  Consent given by: patient  Procedure Details  Location: hip - R greater trochanteric bursa  Needle size: 22 G  Approach: lateral  Medications administered: 2 mL lidocaine 1 %; 6 mg betamethasone acetate-betamethasone sodium phosphate 6 (3-3) mg/mL    Patient tolerance: patient tolerated the procedure well with no immediate complications  Dressing:  Sterile dressing applied        Impression;    Right hip pain  Right hip greater trochanteric bursitis    Plan;    She received a well placed injection to the lateral hip, to the trochanteric bursa  We will see her in 3 additional months in follow-up  Her experience was supervised by and plan formulated by the attending surgeon it was my privilege to assist him in its delivery

## 2020-09-24 NOTE — PROGRESS NOTES
MEDICARE WELLNESS VISIT NOTE      HISTORY OF PRESENT ILLNESS:   Porfirio Izquierdo presents for his Subsequent Annual Medicare Wellness Visit.      Patient Care Team:  Melissa Stoddard DO as PCP - General (Family Practice)  Shayy Callahan MD as Neurologist (Neurology)        Patient Active Problem List   Diagnosis   • GERD (gastroesophageal reflux disease)   • Hypothyroidism   • Dyslipidemia   • Prediabetes   • Attention deficit hyperactivity disorder (ADHD)   • Obesity, unspecified   • HTN (hypertension)   • Social phobia   • Bipolar I disorder, most recent episode (or current) depressed, severe, specified as with psychotic behavior (CMS/[facility])   • Memory loss   • Balance problem   • LILIAN (generalized anxiety disorder)         Past Medical History:   Diagnosis Date   • ADHD (attention deficit hyperactivity disorder)    • Anxiety    • Arthritis    • Bipolar disorder NOS    • Chronic pain    • Colon polyp 09/07/2011    tubular adenoma   • Depression    • Diabetes mellitus (CMS/[facility])     pre diabetes   • Essential (primary) hypertension    • Gastro-oesophageal reflux disease    • High cholesterol    • Obesity    • Obesity    • Obsessive-compulsive disorder    • Sleep apnea    • Thyroid condition          Past Surgical History:   Procedure Laterality Date   • Appendectomy     • Colonoscopy diagnostic  09/07/2011    Dr. Hunt- sigmoid colon polyp- tubular adenoma.   • Colonoscopy diagnostic  07/16/2019    Marilee, hx polyps, no polyps, 5 years   • Tonsillectomy and adenoidectomy           Social History     Tobacco Use   • Smoking status: Never Smoker   • Smokeless tobacco: Never Used   Substance Use Topics   • Alcohol use: No     Alcohol/week: 0.0 standard drinks   • Drug use: No     Drug use:    Drug Use:    No              Family History   Problem Relation Age of Onset   • Psychiatric Mother    • Depression Mother    • Anxiety disorder Mother    • High blood pressure Father    • Asthma Father    • Stroke Father    • Heart  Treatment Plan Tracking    # 1Treatment Plan not completed within required time limits due to: Client presented with emotional/behavorial issues that required clinical intervention  Margarette Carlos disease Father    • High cholesterol Father    • Cancer Father    • Psychiatric Father    • Anxiety disorder Sister    • Obsesive Compulsive Disorder Sister    • Depression Sister    • Depression Maternal Aunt    • Anxiety disorder Maternal Aunt        Current Outpatient Medications   Medication Sig Dispense Refill   • sertraline (ZOLOFT) 100 MG tablet Take 2 tablets by mouth daily. Indications: Generalized Anxiety Disorder 60 tablet 5   • QUEtiapine (SEROquel) 400 MG tablet Take 1 tablet by mouth 2 times daily. Indications: Bipolar 1 tab q am and hs. 60 tablet 5   • divalproex (DEPAKOTE ER) 500 MG 24 hr ER tablet Take 3 tablets by mouth nightly. Indications: MIXED BIPOLAR AFFECTIVE DISORDER 90 tablet 5   • clonazePAM (KlonoPIN) 0.5 MG tablet Take 1 tablet by mouth 3 times daily. Indications: severe anxiety \"caution, may cause sedation, fall risk.\" d/c lorazepam. 90 tablet 5   • buPROPion XL (WELLBUTRIN XL) 300 MG 24 hr tablet Take 1 tablet by mouth daily. 30 tablet 11   • atorvastatin (LIPITOR) 40 MG tablet TAKE 1 TABLET BY MOUTH DAILY 30 tablet 5   • levothyroxine 100 MCG tablet TAKE 1 TABLET BY MOUTH DAILY 90 tablet 1   • omeprazole (PRILOSEC) 40 MG capsule TAKE 1 CAPSULE BY MOUTH TWICE DAILY 180 capsule 1   • lisinopril (ZESTRIL) 10 MG tablet TAKE 1 TABLET BY MOUTH DAILY 90 tablet 1   • aspirin (ECOTRIN) 81 MG EC tablet Take 81 mg by mouth daily.     • vitamin - therapeutic multivitamins w/minerals (CENTRUM SILVER,THERA-M) Tab Take 1 tablet by mouth daily.     • ARIPiprazole (ABILIFY) 5 MG tablet Take 1 tablet by mouth daily. Indications: Bipolar 30 tablet 5   • nitroGLYcerin (NITROSTAT) 0.4 MG sublingual tablet ONE TABLET UNDER TONGUE AS NEEDED FOR CHEST PAIN EVERY 5 MINUTES 25 tablet 0     No current facility-administered medications for this visit.       Vision and Hearing screens: not applicable    Advance Directive:   The patient has the following documents:  Living Will  Power of  for Health  Care    Cognitive Assessment: no evidence of cognitive dysfunction by direct observation    Recent PHQ 2/9 Score    PHQ 2:  Date Adult PHQ 2 Score Adult PHQ 2 Interpretation   9/24/2020 6 Further screening needed       PHQ 9:  Date Adult PHQ 9 Score Adult PHQ 9 Interpretation   9/24/2020 17 Moderately Severe Depression       DEPRESSION ASSESSMENT/PLAN:  Depression screening is negative no further plan needed.     Body mass index is 33.8 kg/m².    Needed Screening/Treatment:   Immunizations reviewed and patient needs: Influenza and Herpes Zoster  Needed follow up:  None    See orders.   See Patient Instructions section.   Return in about 1 year (around 9/24/2021) for Medicare Wellness Visit AND 6-8 wks for recheck; sooner as needed.   ---------------------------------------------    [facility] AMBULATORY ENCOUNTER  FAMILY PRACTICE OFFICE VISIT    CHIEF COMPLAINT:    Medicare Wellness Visit (Subsequent) and Follow-up      SUBJECTIVE:    Porfirio Izquierdo is a 60 year old male who presents for medicare wellness exam and follow up. Pt is ok with flu vaccine today.    Pt also f/u HTN, dyslipidemia, and hypothyroidism. Recent TSH slightly abnormal with normal free T4.    Pt f/u on left cheek swelling due to dental issues. Using liquid advil with good relief, along with gentle compression. Pt plans to find a new dentist for these sx.    Pt also mentions depression, anxiety, following up with psychiatry. Added abilify recently. Tolerating so far, but unsure if helping.     Pt also f/u on right elbow pain radiates from right shoulder and into right wrist, which has been on/off for about 6 months. Has not done PT as he was concerned about covid-19. Is doing home stretching. No injury.       REVIEW OF SYSTEMS:    Constitutional:  Denies fever or chills.    Eyes:  Denies blurred vision, eye erythema, or eye discharge.    Ears, Nose, Throat:  Denies nasal congestion, sore throat, or ear pain.  Respiratory:  Denies cough, wheezing, or  shortness of breath.   Cardiovascular:  Denies chest pain, edema, or palpitations.     Gastrointestinal:  Denies abdominal pain, nausea, vomiting, black or bloody stools, constipation, or diarrhea.   Genitourinary:  Denies dysuria, frequency, urgency, or hematuria.    Musculoskeletal:  Denies muscle weakness, muscle cramps, or joint swelling.   Integument:  Denies rash or skin lesions of concern.  Neurologic:  Denies headache, mental status changes, or sensory changes.   Endocrine:  Denies fatigue, sweats, or unexplained weight loss or weight gain.  Lymphatic:  Denies swollen glands, easy bruising, or easy bleeding.   Psychiatric:  Denies depression or anxiety, suicidal ideation, or anhedonia.        OBJECTIVE:  PROBLEM LIST:    Patient Active Problem List   Diagnosis   • GERD (gastroesophageal reflux disease)   • Hypothyroidism   • Dyslipidemia   • Prediabetes   • Attention deficit hyperactivity disorder (ADHD)   • Obesity, unspecified   • HTN (hypertension)   • Social phobia   • Bipolar I disorder, most recent episode (or current) depressed, severe, specified as with psychotic behavior (CMS/HCC)   • Memory loss   • Balance problem   • LILIAN (generalized anxiety disorder)       PAST HISTORIES:  I have reviewed the past medical history, family history, social history, medications and allergies listed in the medical record as obtained by my nursing staff and support staff and agree with their documentation.    Past Medical History:   Diagnosis Date   • ADHD (attention deficit hyperactivity disorder)    • Anxiety    • Arthritis    • Bipolar disorder NOS    • Chronic pain    • Colon polyp 09/07/2011    tubular adenoma   • Depression    • Diabetes mellitus (CMS/HCC)     pre diabetes   • Essential (primary) hypertension    • Gastro-oesophageal reflux disease    • High cholesterol    • Obesity    • Obesity    • Obsessive-compulsive disorder    • Sleep apnea    • Thyroid condition      Past Surgical History:   Procedure  Laterality Date   • Appendectomy     • Colonoscopy diagnostic  09/07/2011    Dr. Hunt- sigmoid colon polyp- tubular adenoma.   • Colonoscopy diagnostic  07/16/2019    Affalireza, hx polyps, no polyps, 5 years   • Tonsillectomy and adenoidectomy       Social History     Tobacco Use   • Smoking status: Never Smoker   • Smokeless tobacco: Never Used   Substance Use Topics   • Alcohol use: No     Alcohol/week: 0.0 standard drinks   • Drug use: No     Family History   Problem Relation Age of Onset   • Psychiatric Mother    • Depression Mother    • Anxiety disorder Mother    • High blood pressure Father    • Asthma Father    • Stroke Father    • Heart disease Father    • High cholesterol Father    • Cancer Father    • Psychiatric Father    • Anxiety disorder Sister    • Obsesive Compulsive Disorder Sister    • Depression Sister    • Depression Maternal Aunt    • Anxiety disorder Maternal Aunt      Current Outpatient Medications   Medication Sig   • sertraline (ZOLOFT) 100 MG tablet Take 2 tablets by mouth daily. Indications: Generalized Anxiety Disorder   • QUEtiapine (SEROquel) 400 MG tablet Take 1 tablet by mouth 2 times daily. Indications: Bipolar 1 tab q am and hs.   • divalproex (DEPAKOTE ER) 500 MG 24 hr ER tablet Take 3 tablets by mouth nightly. Indications: MIXED BIPOLAR AFFECTIVE DISORDER   • clonazePAM (KlonoPIN) 0.5 MG tablet Take 1 tablet by mouth 3 times daily. Indications: severe anxiety \"caution, may cause sedation, fall risk.\" d/c lorazepam.   • buPROPion XL (WELLBUTRIN XL) 300 MG 24 hr tablet Take 1 tablet by mouth daily.   • atorvastatin (LIPITOR) 40 MG tablet TAKE 1 TABLET BY MOUTH DAILY   • levothyroxine 100 MCG tablet TAKE 1 TABLET BY MOUTH DAILY   • omeprazole (PRILOSEC) 40 MG capsule TAKE 1 CAPSULE BY MOUTH TWICE DAILY   • lisinopril (ZESTRIL) 10 MG tablet TAKE 1 TABLET BY MOUTH DAILY   • aspirin (ECOTRIN) 81 MG EC tablet Take 81 mg by mouth daily.   • vitamin - therapeutic multivitamins w/minerals  (CENTRUM SILVER,THERA-M) Tab Take 1 tablet by mouth daily.   • ARIPiprazole (ABILIFY) 5 MG tablet Take 1 tablet by mouth daily. Indications: Bipolar   • nitroGLYcerin (NITROSTAT) 0.4 MG sublingual tablet ONE TABLET UNDER TONGUE AS NEEDED FOR CHEST PAIN EVERY 5 MINUTES     No current facility-administered medications for this visit.      ALLERGIES:   Allergen Reactions   • Bee Sting HIVES   • Sulfur RASH   • Penicillins Other (See Comments)     Unknown, since childhood.   • Sulfa Antibiotics Other (See Comments)     Unknown, since childhood.       PHYSICAL EXAM:  Visit Vitals  /72 (BP Location: RUE - Right upper extremity, Patient Position: Sitting)   Pulse 93   Ht 6' (1.829 m)   Wt 113 kg   SpO2 96%   BMI 33.80 kg/m²       Pulse Ox Interpretation:  Within normal limits.  General:   Alert, cooperative, conversive in no acute distress.  Skin:  Warm and dry without suspicious rash or skin lesions on exposed skin.    Head:  Normocephalic, atraumatic.   Neck:  Trachea is midline. Neck supple and nontender.   Eyes:  Normal conjunctivae and sclerae. No eye discharge. PERRL.   ENT: Oral mucous membranes are moist and pink. TMs and EACs nonbulging, nontender, and nonerythematous. Left buccal mucosa mild swelling and tenderness but no oral lesions.  Cardiovascular: Regular rate and rhythm without murmur.  Respiratory:   Normal respiratory effort.  Clear to auscultation. No wheezes, rales or rhonchi.  Musculoskeletal:  No deformity or edema. Gait intact. Muscle strength 5/5 of BUE and BLE. TTP to right shoulder biceps groove and deltoid.  Neurologic:  Oriented x3. CNII-XII intact. Speech normal. Mental status intact. Brachioradialis and patellar DTR +2/4 bilaterally.  Psychiatric:  Cooperative.  Appropriate mood and affect.      ASSESSMENT:   1. Medicare annual wellness visit, subsequent    2. Hypothyroidism, unspecified type    3. Essential hypertension    4. Chronic right shoulder pain    5. Need for influenza  vaccination    6. Screening PSA (prostate specific antigen)    7. Dyslipidemia    8. Dental infection        PLAN:    Reviewed and discussed with pt medical history, anticipatory guidance, health maintenance, diagnoses, and management.  Medicare wellness exam done as noted.  Discussed recent test results.  Advised pt to continue behavioral health/psychiatrist follow ups and to ask them about changing abilify to alternative if not effective. Pt agrees to do so.  Labs to get done 1 month: FLP, TSH, CMP, PSA screen.  Rx clindamycin course for dental infection/abscess.  Order Right shoulder x-ray.  Flu vaccination given today. Pt tolerated well.  Advised pt to follow up in 6-8 wks for recheck; sooner as needed.    Orders Placed This Encounter   • XR Shoulder 3 View Right   • INFLUENZA QUADRIVALENT SPLIT PRES FREE 0.5 ML VACC, IM (FLULAVAL,FLUARIX,FLUZONE)   • PSA   • Lipid Panel With Reflex   • CBC with Automated Differential   • Comprehensive Metabolic Panel   • Thyroid Stimulating Hormone Reflex   • clindamycin (CLEOCIN) 150 MG capsule       Return in about 1 year (around 9/24/2021) for Medicare Wellness Visit AND 6-8 wks for recheck; sooner as needed.    Future Appointments   Date Time Provider Department Center   11/17/2020  4:30 PM Melissa Stoddard DO [facility]   11/19/2020  2:45 PM Rickie Smith MD [facility]       Instructions provided as documented in the After Visit Summary.    Discussion summary:  Discussed: above plan and management and warning signs. Medications and their side effects and benefits, if prescribed, were also discussed. Advised patient to return to clinic or contact office as soon as possible if any questions or concerns.  Plan discussed with and agreed to by: patient.  Disposition: stable.  Discharged: home.    Medical decision making:  The above plan was discussed with patient and/or patient's guardian, who understand(s) and agree(s) to above plan. The total time, including more  than 1/2 of time spent on counseling and/or coordination of care was 35 minutes.    Melissa Stoddard, DO

## 2020-09-26 ENCOUNTER — TRANSCRIBE ORDERS (OUTPATIENT)
Dept: LAB | Facility: HOSPITAL | Age: 80
End: 2020-09-26

## 2020-09-26 ENCOUNTER — LAB (OUTPATIENT)
Dept: LAB | Facility: HOSPITAL | Age: 80
End: 2020-09-26
Payer: COMMERCIAL

## 2020-09-26 DIAGNOSIS — E03.9 MYXEDEMA HEART DISEASE: ICD-10-CM

## 2020-09-26 DIAGNOSIS — E08.69 DIABETES MELLITUS DUE TO UNDERLYING CONDITION WITH OTHER SPECIFIED COMPLICATION, UNSPECIFIED WHETHER LONG TERM INSULIN USE (HCC): ICD-10-CM

## 2020-09-26 DIAGNOSIS — M81.0 SENILE OSTEOPOROSIS: Primary | ICD-10-CM

## 2020-09-26 DIAGNOSIS — R35.0 URINARY FREQUENCY: ICD-10-CM

## 2020-09-26 DIAGNOSIS — D64.9 ANEMIA, UNSPECIFIED TYPE: ICD-10-CM

## 2020-09-26 DIAGNOSIS — M81.0 SENILE OSTEOPOROSIS: ICD-10-CM

## 2020-09-26 DIAGNOSIS — Z79.899 ENCOUNTER FOR LONG-TERM (CURRENT) USE OF OTHER MEDICATIONS: ICD-10-CM

## 2020-09-26 DIAGNOSIS — E87.6 HYPOPOTASSEMIA: ICD-10-CM

## 2020-09-26 DIAGNOSIS — I51.9 MYXEDEMA HEART DISEASE: ICD-10-CM

## 2020-09-26 DIAGNOSIS — E78.5 HYPERLIPIDEMIA, UNSPECIFIED HYPERLIPIDEMIA TYPE: ICD-10-CM

## 2020-09-26 LAB
ALBUMIN SERPL BCP-MCNC: 3.4 G/DL (ref 3.5–5)
ALP SERPL-CCNC: 52 U/L (ref 46–116)
ALT SERPL W P-5'-P-CCNC: 16 U/L (ref 12–78)
ANION GAP SERPL CALCULATED.3IONS-SCNC: 7 MMOL/L (ref 4–13)
AST SERPL W P-5'-P-CCNC: 15 U/L (ref 5–45)
BACTERIA UR QL AUTO: ABNORMAL /HPF
BASOPHILS # BLD AUTO: 0.08 THOUSANDS/ΜL (ref 0–0.1)
BASOPHILS NFR BLD AUTO: 1 % (ref 0–1)
BILIRUB SERPL-MCNC: 0.39 MG/DL (ref 0.2–1)
BILIRUB UR QL STRIP: NEGATIVE
BUN SERPL-MCNC: 15 MG/DL (ref 5–25)
CALCIUM ALBUM COR SERPL-MCNC: 9.5 MG/DL (ref 8.3–10.1)
CALCIUM SERPL-MCNC: 9 MG/DL (ref 8.3–10.1)
CHLORIDE SERPL-SCNC: 112 MMOL/L (ref 100–108)
CHOLEST SERPL-MCNC: 167 MG/DL (ref 50–200)
CLARITY UR: ABNORMAL
CO2 SERPL-SCNC: 23 MMOL/L (ref 21–32)
COLOR UR: YELLOW
CREAT SERPL-MCNC: 1.24 MG/DL (ref 0.6–1.3)
EOSINOPHIL # BLD AUTO: 0.21 THOUSAND/ΜL (ref 0–0.61)
EOSINOPHIL NFR BLD AUTO: 3 % (ref 0–6)
ERYTHROCYTE [DISTWIDTH] IN BLOOD BY AUTOMATED COUNT: 13.2 % (ref 11.6–15.1)
EST. AVERAGE GLUCOSE BLD GHB EST-MCNC: 117 MG/DL
GFR SERPL CREATININE-BSD FRML MDRD: 41 ML/MIN/1.73SQ M
GLUCOSE P FAST SERPL-MCNC: 95 MG/DL (ref 65–99)
GLUCOSE UR STRIP-MCNC: NEGATIVE MG/DL
HBA1C MFR BLD: 5.7 %
HCT VFR BLD AUTO: 33.4 % (ref 34.8–46.1)
HDLC SERPL-MCNC: 70 MG/DL
HGB BLD-MCNC: 11 G/DL (ref 11.5–15.4)
HGB UR QL STRIP.AUTO: ABNORMAL
HYALINE CASTS #/AREA URNS LPF: ABNORMAL /LPF
IMM GRANULOCYTES # BLD AUTO: 0.04 THOUSAND/UL (ref 0–0.2)
IMM GRANULOCYTES NFR BLD AUTO: 1 % (ref 0–2)
KETONES UR STRIP-MCNC: NEGATIVE MG/DL
LDLC SERPL CALC-MCNC: 75 MG/DL (ref 0–100)
LEUKOCYTE ESTERASE UR QL STRIP: ABNORMAL
LYMPHOCYTES # BLD AUTO: 1.46 THOUSANDS/ΜL (ref 0.6–4.47)
LYMPHOCYTES NFR BLD AUTO: 21 % (ref 14–44)
MCH RBC QN AUTO: 30.4 PG (ref 26.8–34.3)
MCHC RBC AUTO-ENTMCNC: 32.9 G/DL (ref 31.4–37.4)
MCV RBC AUTO: 92 FL (ref 82–98)
MONOCYTES # BLD AUTO: 0.66 THOUSAND/ΜL (ref 0.17–1.22)
MONOCYTES NFR BLD AUTO: 10 % (ref 4–12)
NEUTROPHILS # BLD AUTO: 4.42 THOUSANDS/ΜL (ref 1.85–7.62)
NEUTS SEG NFR BLD AUTO: 64 % (ref 43–75)
NITRITE UR QL STRIP: POSITIVE
NON-SQ EPI CELLS URNS QL MICRO: ABNORMAL /HPF
NONHDLC SERPL-MCNC: 97 MG/DL
NRBC BLD AUTO-RTO: 0 /100 WBCS
PH UR STRIP.AUTO: 6.5 [PH]
PLATELET # BLD AUTO: 298 THOUSANDS/UL (ref 149–390)
PMV BLD AUTO: 9.8 FL (ref 8.9–12.7)
POTASSIUM SERPL-SCNC: 3.4 MMOL/L (ref 3.5–5.3)
PROT SERPL-MCNC: 6.8 G/DL (ref 6.4–8.2)
PROT UR STRIP-MCNC: ABNORMAL MG/DL
RBC # BLD AUTO: 3.62 MILLION/UL (ref 3.81–5.12)
RBC #/AREA URNS AUTO: ABNORMAL /HPF
SODIUM SERPL-SCNC: 142 MMOL/L (ref 136–145)
SP GR UR STRIP.AUTO: 1.02 (ref 1–1.03)
TRIGL SERPL-MCNC: 110 MG/DL
TSH SERPL DL<=0.05 MIU/L-ACNC: 3.29 UIU/ML (ref 0.36–3.74)
UROBILINOGEN UR QL STRIP.AUTO: 0.2 E.U./DL
WBC # BLD AUTO: 6.87 THOUSAND/UL (ref 4.31–10.16)
WBC #/AREA URNS AUTO: ABNORMAL /HPF

## 2020-09-26 PROCEDURE — 80053 COMPREHEN METABOLIC PANEL: CPT

## 2020-09-26 PROCEDURE — 85025 COMPLETE CBC W/AUTO DIFF WBC: CPT

## 2020-09-26 PROCEDURE — 87086 URINE CULTURE/COLONY COUNT: CPT

## 2020-09-26 PROCEDURE — 83036 HEMOGLOBIN GLYCOSYLATED A1C: CPT

## 2020-09-26 PROCEDURE — 80061 LIPID PANEL: CPT

## 2020-09-26 PROCEDURE — 87186 SC STD MICRODIL/AGAR DIL: CPT

## 2020-09-26 PROCEDURE — 36415 COLL VENOUS BLD VENIPUNCTURE: CPT

## 2020-09-26 PROCEDURE — 84443 ASSAY THYROID STIM HORMONE: CPT

## 2020-09-26 PROCEDURE — 87077 CULTURE AEROBIC IDENTIFY: CPT

## 2020-09-26 PROCEDURE — 81001 URINALYSIS AUTO W/SCOPE: CPT

## 2020-09-28 LAB — BACTERIA UR CULT: ABNORMAL

## 2020-10-04 ENCOUNTER — HOSPITAL ENCOUNTER (INPATIENT)
Facility: HOSPITAL | Age: 80
LOS: 3 days | Discharge: HOME | DRG: 382 | End: 2020-10-08
Attending: EMERGENCY MEDICINE | Admitting: SURGERY
Payer: COMMERCIAL

## 2020-10-04 DIAGNOSIS — R10.9 ABDOMINAL PAIN: ICD-10-CM

## 2020-10-04 DIAGNOSIS — R11.10 NON-INTRACTABLE VOMITING: ICD-10-CM

## 2020-10-04 DIAGNOSIS — K63.1 DUODENAL PERFORATION (HCC): Chronic | ICD-10-CM

## 2020-10-04 DIAGNOSIS — K26.5 DUODENAL ULCER WITH PERFORATION (HCC): Primary | ICD-10-CM

## 2020-10-04 PROCEDURE — 99285 EMERGENCY DEPT VISIT HI MDM: CPT | Performed by: EMERGENCY MEDICINE

## 2020-10-04 PROCEDURE — 99285 EMERGENCY DEPT VISIT HI MDM: CPT

## 2020-10-05 ENCOUNTER — APPOINTMENT (INPATIENT)
Dept: RADIOLOGY | Facility: HOSPITAL | Age: 80
DRG: 382 | End: 2020-10-05
Payer: COMMERCIAL

## 2020-10-05 ENCOUNTER — APPOINTMENT (EMERGENCY)
Dept: RADIOLOGY | Facility: HOSPITAL | Age: 80
DRG: 382 | End: 2020-10-05
Payer: COMMERCIAL

## 2020-10-05 LAB
ALBUMIN SERPL BCP-MCNC: 3.6 G/DL (ref 3.5–5)
ALP SERPL-CCNC: 62 U/L (ref 46–116)
ALT SERPL W P-5'-P-CCNC: 15 U/L (ref 12–78)
ANION GAP SERPL CALCULATED.3IONS-SCNC: 4 MMOL/L (ref 4–13)
ANION GAP SERPL CALCULATED.3IONS-SCNC: 5 MMOL/L (ref 4–13)
AST SERPL W P-5'-P-CCNC: 14 U/L (ref 5–45)
ATRIAL RATE: 78 BPM
BASOPHILS # BLD AUTO: 0.07 THOUSANDS/ΜL (ref 0–0.1)
BASOPHILS NFR BLD AUTO: 1 % (ref 0–1)
BILIRUB SERPL-MCNC: 0.48 MG/DL (ref 0.2–1)
BUN SERPL-MCNC: 8 MG/DL (ref 5–25)
BUN SERPL-MCNC: 9 MG/DL (ref 5–25)
CALCIUM SERPL-MCNC: 10.1 MG/DL (ref 8.3–10.1)
CALCIUM SERPL-MCNC: 9.5 MG/DL (ref 8.3–10.1)
CHLORIDE SERPL-SCNC: 110 MMOL/L (ref 100–108)
CHLORIDE SERPL-SCNC: 110 MMOL/L (ref 100–108)
CO2 SERPL-SCNC: 24 MMOL/L (ref 21–32)
CO2 SERPL-SCNC: 25 MMOL/L (ref 21–32)
CREAT SERPL-MCNC: 1.17 MG/DL (ref 0.6–1.3)
CREAT SERPL-MCNC: 1.22 MG/DL (ref 0.6–1.3)
EOSINOPHIL # BLD AUTO: 0.01 THOUSAND/ΜL (ref 0–0.61)
EOSINOPHIL NFR BLD AUTO: 0 % (ref 0–6)
ERYTHROCYTE [DISTWIDTH] IN BLOOD BY AUTOMATED COUNT: 13.2 % (ref 11.6–15.1)
GFR SERPL CREATININE-BSD FRML MDRD: 42 ML/MIN/1.73SQ M
GFR SERPL CREATININE-BSD FRML MDRD: 44 ML/MIN/1.73SQ M
GLUCOSE SERPL-MCNC: 143 MG/DL (ref 65–140)
GLUCOSE SERPL-MCNC: 90 MG/DL (ref 65–140)
HCT VFR BLD AUTO: 36.9 % (ref 34.8–46.1)
HGB BLD-MCNC: 12 G/DL (ref 11.5–15.4)
IMM GRANULOCYTES # BLD AUTO: 0.06 THOUSAND/UL (ref 0–0.2)
IMM GRANULOCYTES NFR BLD AUTO: 0 % (ref 0–2)
LIPASE SERPL-CCNC: 120 U/L (ref 73–393)
LYMPHOCYTES # BLD AUTO: 1.16 THOUSANDS/ΜL (ref 0.6–4.47)
LYMPHOCYTES NFR BLD AUTO: 8 % (ref 14–44)
MCH RBC QN AUTO: 30 PG (ref 26.8–34.3)
MCHC RBC AUTO-ENTMCNC: 32.5 G/DL (ref 31.4–37.4)
MCV RBC AUTO: 92 FL (ref 82–98)
MONOCYTES # BLD AUTO: 0.63 THOUSAND/ΜL (ref 0.17–1.22)
MONOCYTES NFR BLD AUTO: 4 % (ref 4–12)
NEUTROPHILS # BLD AUTO: 13.39 THOUSANDS/ΜL (ref 1.85–7.62)
NEUTS SEG NFR BLD AUTO: 87 % (ref 43–75)
NRBC BLD AUTO-RTO: 0 /100 WBCS
P AXIS: 62 DEGREES
PLATELET # BLD AUTO: 296 THOUSANDS/UL (ref 149–390)
PMV BLD AUTO: 9.8 FL (ref 8.9–12.7)
POTASSIUM SERPL-SCNC: 3.5 MMOL/L (ref 3.5–5.3)
POTASSIUM SERPL-SCNC: 4.1 MMOL/L (ref 3.5–5.3)
PR INTERVAL: 166 MS
PROT SERPL-MCNC: 7.3 G/DL (ref 6.4–8.2)
QRS AXIS: 19 DEGREES
QRSD INTERVAL: 82 MS
QT INTERVAL: 356 MS
QTC INTERVAL: 395 MS
RBC # BLD AUTO: 4 MILLION/UL (ref 3.81–5.12)
SODIUM SERPL-SCNC: 138 MMOL/L (ref 136–145)
SODIUM SERPL-SCNC: 140 MMOL/L (ref 136–145)
T WAVE AXIS: 35 DEGREES
TROPONIN I SERPL-MCNC: <0.02 NG/ML
VENTRICULAR RATE: 74 BPM
WBC # BLD AUTO: 15.32 THOUSAND/UL (ref 4.31–10.16)

## 2020-10-05 PROCEDURE — 84484 ASSAY OF TROPONIN QUANT: CPT | Performed by: EMERGENCY MEDICINE

## 2020-10-05 PROCEDURE — 93010 ELECTROCARDIOGRAM REPORT: CPT | Performed by: INTERNAL MEDICINE

## 2020-10-05 PROCEDURE — 36415 COLL VENOUS BLD VENIPUNCTURE: CPT | Performed by: EMERGENCY MEDICINE

## 2020-10-05 PROCEDURE — 80048 BASIC METABOLIC PNL TOTAL CA: CPT | Performed by: STUDENT IN AN ORGANIZED HEALTH CARE EDUCATION/TRAINING PROGRAM

## 2020-10-05 PROCEDURE — C9113 INJ PANTOPRAZOLE SODIUM, VIA: HCPCS | Performed by: STUDENT IN AN ORGANIZED HEALTH CARE EDUCATION/TRAINING PROGRAM

## 2020-10-05 PROCEDURE — G1004 CDSM NDSC: HCPCS

## 2020-10-05 PROCEDURE — 93005 ELECTROCARDIOGRAM TRACING: CPT

## 2020-10-05 PROCEDURE — 74177 CT ABD & PELVIS W/CONTRAST: CPT

## 2020-10-05 PROCEDURE — 85025 COMPLETE CBC W/AUTO DIFF WBC: CPT | Performed by: EMERGENCY MEDICINE

## 2020-10-05 PROCEDURE — 99223 1ST HOSP IP/OBS HIGH 75: CPT | Performed by: SURGERY

## 2020-10-05 PROCEDURE — 96374 THER/PROPH/DIAG INJ IV PUSH: CPT

## 2020-10-05 PROCEDURE — 83690 ASSAY OF LIPASE: CPT | Performed by: EMERGENCY MEDICINE

## 2020-10-05 PROCEDURE — 80053 COMPREHEN METABOLIC PANEL: CPT | Performed by: EMERGENCY MEDICINE

## 2020-10-05 PROCEDURE — 74018 RADEX ABDOMEN 1 VIEW: CPT

## 2020-10-05 PROCEDURE — C9113 INJ PANTOPRAZOLE SODIUM, VIA: HCPCS | Performed by: EMERGENCY MEDICINE

## 2020-10-05 RX ORDER — MAGNESIUM HYDROXIDE/ALUMINUM HYDROXICE/SIMETHICONE 120; 1200; 1200 MG/30ML; MG/30ML; MG/30ML
30 SUSPENSION ORAL ONCE
Status: COMPLETED | OUTPATIENT
Start: 2020-10-05 | End: 2020-10-05

## 2020-10-05 RX ORDER — FLUCONAZOLE 2 MG/ML
400 INJECTION, SOLUTION INTRAVENOUS EVERY 24 HOURS
Status: DISCONTINUED | OUTPATIENT
Start: 2020-10-05 | End: 2020-10-07

## 2020-10-05 RX ORDER — LIDOCAINE HYDROCHLORIDE 20 MG/ML
1 JELLY TOPICAL ONCE
Status: COMPLETED | OUTPATIENT
Start: 2020-10-05 | End: 2020-10-05

## 2020-10-05 RX ORDER — DEXTROSE AND SODIUM CHLORIDE 5; .9 G/100ML; G/100ML
85 INJECTION, SOLUTION INTRAVENOUS CONTINUOUS
Status: DISCONTINUED | OUTPATIENT
Start: 2020-10-05 | End: 2020-10-07

## 2020-10-05 RX ORDER — SODIUM CHLORIDE, SODIUM LACTATE, POTASSIUM CHLORIDE, CALCIUM CHLORIDE 600; 310; 30; 20 MG/100ML; MG/100ML; MG/100ML; MG/100ML
100 INJECTION, SOLUTION INTRAVENOUS CONTINUOUS
Status: DISCONTINUED | OUTPATIENT
Start: 2020-10-05 | End: 2020-10-05

## 2020-10-05 RX ORDER — ONDANSETRON 2 MG/ML
4 INJECTION INTRAMUSCULAR; INTRAVENOUS EVERY 6 HOURS PRN
Status: DISCONTINUED | OUTPATIENT
Start: 2020-10-05 | End: 2020-10-08 | Stop reason: HOSPADM

## 2020-10-05 RX ORDER — ONDANSETRON 2 MG/ML
4 INJECTION INTRAMUSCULAR; INTRAVENOUS ONCE
Status: COMPLETED | OUTPATIENT
Start: 2020-10-05 | End: 2020-10-05

## 2020-10-05 RX ORDER — PANTOPRAZOLE SODIUM 40 MG/1
40 INJECTION, POWDER, FOR SOLUTION INTRAVENOUS EVERY 12 HOURS SCHEDULED
Status: DISCONTINUED | OUTPATIENT
Start: 2020-10-05 | End: 2020-10-07

## 2020-10-05 RX ORDER — CEFAZOLIN SODIUM 1 G/50ML
1000 SOLUTION INTRAVENOUS EVERY 8 HOURS
Status: DISCONTINUED | OUTPATIENT
Start: 2020-10-05 | End: 2020-10-07

## 2020-10-05 RX ORDER — FAMOTIDINE 20 MG/1
20 TABLET, FILM COATED ORAL ONCE
Status: COMPLETED | OUTPATIENT
Start: 2020-10-05 | End: 2020-10-05

## 2020-10-05 RX ORDER — HEPARIN SODIUM 5000 [USP'U]/ML
5000 INJECTION, SOLUTION INTRAVENOUS; SUBCUTANEOUS EVERY 8 HOURS SCHEDULED
Status: DISCONTINUED | OUTPATIENT
Start: 2020-10-05 | End: 2020-10-08 | Stop reason: HOSPADM

## 2020-10-05 RX ORDER — HYDROMORPHONE HCL/PF 1 MG/ML
0.2 SYRINGE (ML) INJECTION
Status: DISCONTINUED | OUTPATIENT
Start: 2020-10-05 | End: 2020-10-08 | Stop reason: HOSPADM

## 2020-10-05 RX ADMIN — HEPARIN SODIUM 5000 UNITS: 5000 INJECTION INTRAVENOUS; SUBCUTANEOUS at 11:57

## 2020-10-05 RX ADMIN — FLUCONAZOLE IN SODIUM CHLORIDE 400 MG: 2 INJECTION, SOLUTION INTRAVENOUS at 10:01

## 2020-10-05 RX ADMIN — HEPARIN SODIUM 5000 UNITS: 5000 INJECTION INTRAVENOUS; SUBCUTANEOUS at 21:47

## 2020-10-05 RX ADMIN — DEXTROSE AND SODIUM CHLORIDE 85 ML/HR: 5; .9 INJECTION, SOLUTION INTRAVENOUS at 20:17

## 2020-10-05 RX ADMIN — LIDOCAINE HYDROCHLORIDE 1 APPLICATION: 20 JELLY TOPICAL at 04:03

## 2020-10-05 RX ADMIN — HYDROMORPHONE HYDROCHLORIDE 0.2 MG: 1 INJECTION, SOLUTION INTRAMUSCULAR; INTRAVENOUS; SUBCUTANEOUS at 11:57

## 2020-10-05 RX ADMIN — SODIUM CHLORIDE, SODIUM LACTATE, POTASSIUM CHLORIDE, AND CALCIUM CHLORIDE 100 ML/HR: .6; .31; .03; .02 INJECTION, SOLUTION INTRAVENOUS at 14:54

## 2020-10-05 RX ADMIN — METRONIDAZOLE 500 MG: 500 INJECTION, SOLUTION INTRAVENOUS at 12:25

## 2020-10-05 RX ADMIN — CEFAZOLIN SODIUM 1000 MG: 1 SOLUTION INTRAVENOUS at 17:00

## 2020-10-05 RX ADMIN — SODIUM CHLORIDE 80 MG: 9 INJECTION, SOLUTION INTRAVENOUS at 03:08

## 2020-10-05 RX ADMIN — CEFAZOLIN SODIUM 1000 MG: 1 SOLUTION INTRAVENOUS at 13:12

## 2020-10-05 RX ADMIN — PANTOPRAZOLE SODIUM 40 MG: 40 INJECTION, POWDER, FOR SOLUTION INTRAVENOUS at 10:01

## 2020-10-05 RX ADMIN — PANTOPRAZOLE SODIUM 40 MG: 40 INJECTION, POWDER, FOR SOLUTION INTRAVENOUS at 20:18

## 2020-10-05 RX ADMIN — ONDANSETRON 4 MG: 2 INJECTION INTRAMUSCULAR; INTRAVENOUS at 00:58

## 2020-10-05 RX ADMIN — ALUMINUM HYDROXIDE, MAGNESIUM HYDROXIDE, AND SIMETHICONE 30 ML: 200; 200; 20 SUSPENSION ORAL at 01:02

## 2020-10-05 RX ADMIN — HYDROMORPHONE HYDROCHLORIDE 0.2 MG: 1 INJECTION, SOLUTION INTRAMUSCULAR; INTRAVENOUS; SUBCUTANEOUS at 20:27

## 2020-10-05 RX ADMIN — FAMOTIDINE 20 MG: 20 TABLET ORAL at 01:02

## 2020-10-05 RX ADMIN — IOHEXOL 100 ML: 350 INJECTION, SOLUTION INTRAVENOUS at 01:51

## 2020-10-05 RX ADMIN — METRONIDAZOLE 500 MG: 500 INJECTION, SOLUTION INTRAVENOUS at 16:54

## 2020-10-05 RX ADMIN — HYDROMORPHONE HYDROCHLORIDE 0.2 MG: 1 INJECTION, SOLUTION INTRAMUSCULAR; INTRAVENOUS; SUBCUTANEOUS at 15:35

## 2020-10-06 LAB
ANION GAP SERPL CALCULATED.3IONS-SCNC: 2 MMOL/L (ref 4–13)
BASOPHILS # BLD AUTO: 0.08 THOUSANDS/ΜL (ref 0–0.1)
BASOPHILS NFR BLD AUTO: 1 % (ref 0–1)
BUN SERPL-MCNC: 8 MG/DL (ref 5–25)
CALCIUM SERPL-MCNC: 8.7 MG/DL (ref 8.3–10.1)
CHLORIDE SERPL-SCNC: 113 MMOL/L (ref 100–108)
CO2 SERPL-SCNC: 24 MMOL/L (ref 21–32)
CREAT SERPL-MCNC: 1.25 MG/DL (ref 0.6–1.3)
EOSINOPHIL # BLD AUTO: 0.23 THOUSAND/ΜL (ref 0–0.61)
EOSINOPHIL NFR BLD AUTO: 3 % (ref 0–6)
ERYTHROCYTE [DISTWIDTH] IN BLOOD BY AUTOMATED COUNT: 13.4 % (ref 11.6–15.1)
GFR SERPL CREATININE-BSD FRML MDRD: 41 ML/MIN/1.73SQ M
GLUCOSE SERPL-MCNC: 116 MG/DL (ref 65–140)
HCT VFR BLD AUTO: 33.4 % (ref 34.8–46.1)
HGB BLD-MCNC: 10.8 G/DL (ref 11.5–15.4)
IMM GRANULOCYTES # BLD AUTO: 0.04 THOUSAND/UL (ref 0–0.2)
IMM GRANULOCYTES NFR BLD AUTO: 1 % (ref 0–2)
LYMPHOCYTES # BLD AUTO: 1.43 THOUSANDS/ΜL (ref 0.6–4.47)
LYMPHOCYTES NFR BLD AUTO: 18 % (ref 14–44)
MCH RBC QN AUTO: 30.3 PG (ref 26.8–34.3)
MCHC RBC AUTO-ENTMCNC: 32.3 G/DL (ref 31.4–37.4)
MCV RBC AUTO: 94 FL (ref 82–98)
MONOCYTES # BLD AUTO: 0.66 THOUSAND/ΜL (ref 0.17–1.22)
MONOCYTES NFR BLD AUTO: 8 % (ref 4–12)
NEUTROPHILS # BLD AUTO: 5.57 THOUSANDS/ΜL (ref 1.85–7.62)
NEUTS SEG NFR BLD AUTO: 69 % (ref 43–75)
NRBC BLD AUTO-RTO: 0 /100 WBCS
PLATELET # BLD AUTO: 227 THOUSANDS/UL (ref 149–390)
PMV BLD AUTO: 11 FL (ref 8.9–12.7)
POTASSIUM SERPL-SCNC: 3.1 MMOL/L (ref 3.5–5.3)
RBC # BLD AUTO: 3.56 MILLION/UL (ref 3.81–5.12)
SODIUM SERPL-SCNC: 139 MMOL/L (ref 136–145)
WBC # BLD AUTO: 8.01 THOUSAND/UL (ref 4.31–10.16)

## 2020-10-06 PROCEDURE — 80048 BASIC METABOLIC PNL TOTAL CA: CPT | Performed by: SURGERY

## 2020-10-06 PROCEDURE — 85025 COMPLETE CBC W/AUTO DIFF WBC: CPT | Performed by: SURGERY

## 2020-10-06 PROCEDURE — 99232 SBSQ HOSP IP/OBS MODERATE 35: CPT | Performed by: SURGERY

## 2020-10-06 PROCEDURE — C9113 INJ PANTOPRAZOLE SODIUM, VIA: HCPCS | Performed by: STUDENT IN AN ORGANIZED HEALTH CARE EDUCATION/TRAINING PROGRAM

## 2020-10-06 RX ADMIN — PANTOPRAZOLE SODIUM 40 MG: 40 INJECTION, POWDER, FOR SOLUTION INTRAVENOUS at 08:37

## 2020-10-06 RX ADMIN — DEXTROSE AND SODIUM CHLORIDE 85 ML/HR: 5; .9 INJECTION, SOLUTION INTRAVENOUS at 08:32

## 2020-10-06 RX ADMIN — CEFAZOLIN SODIUM 1000 MG: 1 SOLUTION INTRAVENOUS at 01:56

## 2020-10-06 RX ADMIN — PANTOPRAZOLE SODIUM 40 MG: 40 INJECTION, POWDER, FOR SOLUTION INTRAVENOUS at 20:06

## 2020-10-06 RX ADMIN — HEPARIN SODIUM 5000 UNITS: 5000 INJECTION INTRAVENOUS; SUBCUTANEOUS at 05:58

## 2020-10-06 RX ADMIN — METRONIDAZOLE 500 MG: 500 INJECTION, SOLUTION INTRAVENOUS at 18:00

## 2020-10-06 RX ADMIN — CEFAZOLIN SODIUM 1000 MG: 1 SOLUTION INTRAVENOUS at 18:54

## 2020-10-06 RX ADMIN — METRONIDAZOLE 500 MG: 500 INJECTION, SOLUTION INTRAVENOUS at 09:02

## 2020-10-06 RX ADMIN — HYDROMORPHONE HYDROCHLORIDE 0.2 MG: 1 INJECTION, SOLUTION INTRAMUSCULAR; INTRAVENOUS; SUBCUTANEOUS at 19:58

## 2020-10-06 RX ADMIN — DEXTROSE AND SODIUM CHLORIDE 85 ML/HR: 5; .9 INJECTION, SOLUTION INTRAVENOUS at 20:06

## 2020-10-06 RX ADMIN — METRONIDAZOLE 500 MG: 500 INJECTION, SOLUTION INTRAVENOUS at 01:22

## 2020-10-06 RX ADMIN — FLUCONAZOLE IN SODIUM CHLORIDE 400 MG: 2 INJECTION, SOLUTION INTRAVENOUS at 08:57

## 2020-10-06 RX ADMIN — HEPARIN SODIUM 5000 UNITS: 5000 INJECTION INTRAVENOUS; SUBCUTANEOUS at 22:27

## 2020-10-06 RX ADMIN — HEPARIN SODIUM 5000 UNITS: 5000 INJECTION INTRAVENOUS; SUBCUTANEOUS at 15:27

## 2020-10-06 RX ADMIN — HYDROMORPHONE HYDROCHLORIDE 0.2 MG: 1 INJECTION, SOLUTION INTRAMUSCULAR; INTRAVENOUS; SUBCUTANEOUS at 09:28

## 2020-10-06 RX ADMIN — CEFAZOLIN SODIUM 1000 MG: 1 SOLUTION INTRAVENOUS at 12:08

## 2020-10-07 ENCOUNTER — APPOINTMENT (INPATIENT)
Dept: RADIOLOGY | Facility: HOSPITAL | Age: 80
DRG: 382 | End: 2020-10-07
Payer: COMMERCIAL

## 2020-10-07 PROBLEM — K63.1 DUODENAL PERFORATION (HCC): Chronic | Status: ACTIVE | Noted: 2020-10-07

## 2020-10-07 LAB
ANION GAP SERPL CALCULATED.3IONS-SCNC: 5 MMOL/L (ref 4–13)
ANION GAP SERPL CALCULATED.3IONS-SCNC: 8 MMOL/L (ref 4–13)
BASOPHILS # BLD AUTO: 0.06 THOUSANDS/ΜL (ref 0–0.1)
BASOPHILS NFR BLD AUTO: 1 % (ref 0–1)
BUN SERPL-MCNC: 3 MG/DL (ref 5–25)
BUN SERPL-MCNC: 4 MG/DL (ref 5–25)
CALCIUM SERPL-MCNC: 7.6 MG/DL (ref 8.3–10.1)
CALCIUM SERPL-MCNC: 8.3 MG/DL (ref 8.3–10.1)
CHLORIDE SERPL-SCNC: 114 MMOL/L (ref 100–108)
CHLORIDE SERPL-SCNC: 114 MMOL/L (ref 100–108)
CO2 SERPL-SCNC: 22 MMOL/L (ref 21–32)
CO2 SERPL-SCNC: 24 MMOL/L (ref 21–32)
CREAT SERPL-MCNC: 1.04 MG/DL (ref 0.6–1.3)
CREAT SERPL-MCNC: 1.06 MG/DL (ref 0.6–1.3)
EOSINOPHIL # BLD AUTO: 0.19 THOUSAND/ΜL (ref 0–0.61)
EOSINOPHIL NFR BLD AUTO: 3 % (ref 0–6)
ERYTHROCYTE [DISTWIDTH] IN BLOOD BY AUTOMATED COUNT: 13.3 % (ref 11.6–15.1)
GFR SERPL CREATININE-BSD FRML MDRD: 50 ML/MIN/1.73SQ M
GFR SERPL CREATININE-BSD FRML MDRD: 51 ML/MIN/1.73SQ M
GLUCOSE SERPL-MCNC: 101 MG/DL (ref 65–140)
GLUCOSE SERPL-MCNC: 122 MG/DL (ref 65–140)
HCT VFR BLD AUTO: 29.9 % (ref 34.8–46.1)
HGB BLD-MCNC: 9.7 G/DL (ref 11.5–15.4)
IMM GRANULOCYTES # BLD AUTO: 0.03 THOUSAND/UL (ref 0–0.2)
IMM GRANULOCYTES NFR BLD AUTO: 1 % (ref 0–2)
LYMPHOCYTES # BLD AUTO: 0.93 THOUSANDS/ΜL (ref 0.6–4.47)
LYMPHOCYTES NFR BLD AUTO: 16 % (ref 14–44)
MAGNESIUM SERPL-MCNC: 2.1 MG/DL (ref 1.6–2.6)
MCH RBC QN AUTO: 30.6 PG (ref 26.8–34.3)
MCHC RBC AUTO-ENTMCNC: 32.4 G/DL (ref 31.4–37.4)
MCV RBC AUTO: 94 FL (ref 82–98)
MONOCYTES # BLD AUTO: 0.48 THOUSAND/ΜL (ref 0.17–1.22)
MONOCYTES NFR BLD AUTO: 8 % (ref 4–12)
NEUTROPHILS # BLD AUTO: 4.26 THOUSANDS/ΜL (ref 1.85–7.62)
NEUTS SEG NFR BLD AUTO: 71 % (ref 43–75)
NRBC BLD AUTO-RTO: 0 /100 WBCS
PLATELET # BLD AUTO: 209 THOUSANDS/UL (ref 149–390)
PMV BLD AUTO: 10.7 FL (ref 8.9–12.7)
POTASSIUM SERPL-SCNC: 2.5 MMOL/L (ref 3.5–5.3)
POTASSIUM SERPL-SCNC: 2.8 MMOL/L (ref 3.5–5.3)
RBC # BLD AUTO: 3.17 MILLION/UL (ref 3.81–5.12)
SODIUM SERPL-SCNC: 143 MMOL/L (ref 136–145)
SODIUM SERPL-SCNC: 144 MMOL/L (ref 136–145)
WBC # BLD AUTO: 5.95 THOUSAND/UL (ref 4.31–10.16)

## 2020-10-07 PROCEDURE — 80048 BASIC METABOLIC PNL TOTAL CA: CPT | Performed by: PHYSICIAN ASSISTANT

## 2020-10-07 PROCEDURE — C9113 INJ PANTOPRAZOLE SODIUM, VIA: HCPCS | Performed by: STUDENT IN AN ORGANIZED HEALTH CARE EDUCATION/TRAINING PROGRAM

## 2020-10-07 PROCEDURE — 99232 SBSQ HOSP IP/OBS MODERATE 35: CPT | Performed by: SURGERY

## 2020-10-07 PROCEDURE — 85025 COMPLETE CBC W/AUTO DIFF WBC: CPT | Performed by: STUDENT IN AN ORGANIZED HEALTH CARE EDUCATION/TRAINING PROGRAM

## 2020-10-07 PROCEDURE — 83735 ASSAY OF MAGNESIUM: CPT | Performed by: PHYSICIAN ASSISTANT

## 2020-10-07 PROCEDURE — 74240 X-RAY XM UPR GI TRC 1CNTRST: CPT

## 2020-10-07 PROCEDURE — 80048 BASIC METABOLIC PNL TOTAL CA: CPT | Performed by: STUDENT IN AN ORGANIZED HEALTH CARE EDUCATION/TRAINING PROGRAM

## 2020-10-07 RX ORDER — AMOXICILLIN AND CLAVULANATE POTASSIUM 875; 125 MG/1; MG/1
1 TABLET, FILM COATED ORAL EVERY 12 HOURS SCHEDULED
Status: DISCONTINUED | OUTPATIENT
Start: 2020-10-07 | End: 2020-10-08 | Stop reason: HOSPADM

## 2020-10-07 RX ORDER — CLARITHROMYCIN 500 MG/1
500 TABLET, COATED ORAL EVERY 12 HOURS SCHEDULED
Status: DISCONTINUED | OUTPATIENT
Start: 2020-10-07 | End: 2020-10-08 | Stop reason: HOSPADM

## 2020-10-07 RX ORDER — POTASSIUM CHLORIDE 14.9 MG/ML
20 INJECTION INTRAVENOUS ONCE
Status: COMPLETED | OUTPATIENT
Start: 2020-10-07 | End: 2020-10-07

## 2020-10-07 RX ORDER — POTASSIUM CHLORIDE 14.9 MG/ML
20 INJECTION INTRAVENOUS ONCE
Status: COMPLETED | OUTPATIENT
Start: 2020-10-07 | End: 2020-10-08

## 2020-10-07 RX ORDER — POTASSIUM CHLORIDE 14.9 MG/ML
20 INJECTION INTRAVENOUS
Status: ACTIVE | OUTPATIENT
Start: 2020-10-07 | End: 2020-10-07

## 2020-10-07 RX ORDER — PANTOPRAZOLE SODIUM 40 MG/1
40 TABLET, DELAYED RELEASE ORAL
Status: DISCONTINUED | OUTPATIENT
Start: 2020-10-07 | End: 2020-10-08 | Stop reason: HOSPADM

## 2020-10-07 RX ORDER — LOPERAMIDE HYDROCHLORIDE 2 MG/1
2 CAPSULE ORAL 2 TIMES DAILY PRN
Status: DISCONTINUED | OUTPATIENT
Start: 2020-10-07 | End: 2020-10-08 | Stop reason: HOSPADM

## 2020-10-07 RX ADMIN — PANTOPRAZOLE SODIUM 40 MG: 40 TABLET, DELAYED RELEASE ORAL at 17:40

## 2020-10-07 RX ADMIN — HEPARIN SODIUM 5000 UNITS: 5000 INJECTION INTRAVENOUS; SUBCUTANEOUS at 05:49

## 2020-10-07 RX ADMIN — DEXTROSE AND SODIUM CHLORIDE 85 ML/HR: 5; .9 INJECTION, SOLUTION INTRAVENOUS at 08:00

## 2020-10-07 RX ADMIN — POTASSIUM CHLORIDE 84 ML/HR: 2 INJECTION, SOLUTION, CONCENTRATE INTRAVENOUS at 13:12

## 2020-10-07 RX ADMIN — HYDROMORPHONE HYDROCHLORIDE 0.2 MG: 1 INJECTION, SOLUTION INTRAMUSCULAR; INTRAVENOUS; SUBCUTANEOUS at 21:25

## 2020-10-07 RX ADMIN — CEFAZOLIN SODIUM 1000 MG: 1 SOLUTION INTRAVENOUS at 02:03

## 2020-10-07 RX ADMIN — METRONIDAZOLE 500 MG: 500 INJECTION, SOLUTION INTRAVENOUS at 02:02

## 2020-10-07 RX ADMIN — POTASSIUM CHLORIDE 20 MEQ: 14.9 INJECTION, SOLUTION INTRAVENOUS at 13:12

## 2020-10-07 RX ADMIN — PANTOPRAZOLE SODIUM 40 MG: 40 INJECTION, POWDER, FOR SOLUTION INTRAVENOUS at 08:38

## 2020-10-07 RX ADMIN — LOPERAMIDE HYDROCHLORIDE 2 MG: 2 CAPSULE ORAL at 18:01

## 2020-10-07 RX ADMIN — CLARITHROMYCIN 500 MG: 500 TABLET, FILM COATED ORAL at 21:27

## 2020-10-07 RX ADMIN — HEPARIN SODIUM 5000 UNITS: 5000 INJECTION INTRAVENOUS; SUBCUTANEOUS at 13:13

## 2020-10-07 RX ADMIN — CEFAZOLIN SODIUM 1000 MG: 1 SOLUTION INTRAVENOUS at 10:45

## 2020-10-07 RX ADMIN — METRONIDAZOLE 500 MG: 500 INJECTION, SOLUTION INTRAVENOUS at 11:30

## 2020-10-07 RX ADMIN — FLUCONAZOLE IN SODIUM CHLORIDE 400 MG: 2 INJECTION, SOLUTION INTRAVENOUS at 08:35

## 2020-10-07 RX ADMIN — POTASSIUM CHLORIDE 20 MEQ: 14.9 INJECTION, SOLUTION INTRAVENOUS at 17:00

## 2020-10-07 RX ADMIN — IOHEXOL 100 ML: 350 INJECTION, SOLUTION INTRAVENOUS at 09:20

## 2020-10-07 RX ADMIN — LOPERAMIDE HYDROCHLORIDE 2 MG: 2 CAPSULE ORAL at 21:39

## 2020-10-07 RX ADMIN — HYDROMORPHONE HYDROCHLORIDE 0.2 MG: 1 INJECTION, SOLUTION INTRAMUSCULAR; INTRAVENOUS; SUBCUTANEOUS at 13:13

## 2020-10-07 RX ADMIN — HEPARIN SODIUM 5000 UNITS: 5000 INJECTION INTRAVENOUS; SUBCUTANEOUS at 21:27

## 2020-10-08 VITALS
WEIGHT: 129.19 LBS | BODY MASS INDEX: 24.39 KG/M2 | HEIGHT: 61 IN | TEMPERATURE: 98.2 F | OXYGEN SATURATION: 100 % | SYSTOLIC BLOOD PRESSURE: 126 MMHG | DIASTOLIC BLOOD PRESSURE: 63 MMHG | HEART RATE: 86 BPM | RESPIRATION RATE: 18 BRPM

## 2020-10-08 LAB
ANION GAP SERPL CALCULATED.3IONS-SCNC: 12 MMOL/L (ref 4–13)
ANION GAP SERPL CALCULATED.3IONS-SCNC: 7 MMOL/L (ref 4–13)
BASOPHILS # BLD AUTO: 0.05 THOUSANDS/ΜL (ref 0–0.1)
BASOPHILS NFR BLD AUTO: 1 % (ref 0–1)
BUN SERPL-MCNC: 3 MG/DL (ref 5–25)
BUN SERPL-MCNC: 3 MG/DL (ref 5–25)
CALCIUM SERPL-MCNC: 6.9 MG/DL (ref 8.3–10.1)
CALCIUM SERPL-MCNC: 8.8 MG/DL (ref 8.3–10.1)
CHLORIDE SERPL-SCNC: 109 MMOL/L (ref 100–108)
CHLORIDE SERPL-SCNC: 112 MMOL/L (ref 100–108)
CO2 SERPL-SCNC: 20 MMOL/L (ref 21–32)
CO2 SERPL-SCNC: 23 MMOL/L (ref 21–32)
CREAT SERPL-MCNC: 0.89 MG/DL (ref 0.6–1.3)
CREAT SERPL-MCNC: 1.15 MG/DL (ref 0.6–1.3)
EOSINOPHIL # BLD AUTO: 0.16 THOUSAND/ΜL (ref 0–0.61)
EOSINOPHIL NFR BLD AUTO: 3 % (ref 0–6)
ERYTHROCYTE [DISTWIDTH] IN BLOOD BY AUTOMATED COUNT: 13.3 % (ref 11.6–15.1)
GFR SERPL CREATININE-BSD FRML MDRD: 45 ML/MIN/1.73SQ M
GFR SERPL CREATININE-BSD FRML MDRD: 62 ML/MIN/1.73SQ M
GLUCOSE SERPL-MCNC: 108 MG/DL (ref 65–140)
GLUCOSE SERPL-MCNC: 112 MG/DL (ref 65–140)
HCT VFR BLD AUTO: 29.5 % (ref 34.8–46.1)
HGB BLD-MCNC: 9.8 G/DL (ref 11.5–15.4)
IMM GRANULOCYTES # BLD AUTO: 0.01 THOUSAND/UL (ref 0–0.2)
IMM GRANULOCYTES NFR BLD AUTO: 0 % (ref 0–2)
LYMPHOCYTES # BLD AUTO: 1.27 THOUSANDS/ΜL (ref 0.6–4.47)
LYMPHOCYTES NFR BLD AUTO: 21 % (ref 14–44)
MAGNESIUM SERPL-MCNC: 1.8 MG/DL (ref 1.6–2.6)
MCH RBC QN AUTO: 30.4 PG (ref 26.8–34.3)
MCHC RBC AUTO-ENTMCNC: 33.2 G/DL (ref 31.4–37.4)
MCV RBC AUTO: 92 FL (ref 82–98)
MONOCYTES # BLD AUTO: 0.54 THOUSAND/ΜL (ref 0.17–1.22)
MONOCYTES NFR BLD AUTO: 9 % (ref 4–12)
NEUTROPHILS # BLD AUTO: 3.94 THOUSANDS/ΜL (ref 1.85–7.62)
NEUTS SEG NFR BLD AUTO: 66 % (ref 43–75)
NRBC BLD AUTO-RTO: 0 /100 WBCS
PLATELET # BLD AUTO: 231 THOUSANDS/UL (ref 149–390)
PMV BLD AUTO: 10.8 FL (ref 8.9–12.7)
POTASSIUM SERPL-SCNC: 2.9 MMOL/L (ref 3.5–5.3)
POTASSIUM SERPL-SCNC: 3.3 MMOL/L (ref 3.5–5.3)
RBC # BLD AUTO: 3.22 MILLION/UL (ref 3.81–5.12)
SODIUM SERPL-SCNC: 141 MMOL/L (ref 136–145)
SODIUM SERPL-SCNC: 142 MMOL/L (ref 136–145)
WBC # BLD AUTO: 5.97 THOUSAND/UL (ref 4.31–10.16)

## 2020-10-08 PROCEDURE — 83735 ASSAY OF MAGNESIUM: CPT | Performed by: STUDENT IN AN ORGANIZED HEALTH CARE EDUCATION/TRAINING PROGRAM

## 2020-10-08 PROCEDURE — NC001 PR NO CHARGE: Performed by: PHYSICIAN ASSISTANT

## 2020-10-08 PROCEDURE — 85025 COMPLETE CBC W/AUTO DIFF WBC: CPT | Performed by: STUDENT IN AN ORGANIZED HEALTH CARE EDUCATION/TRAINING PROGRAM

## 2020-10-08 PROCEDURE — 99232 SBSQ HOSP IP/OBS MODERATE 35: CPT | Performed by: SURGERY

## 2020-10-08 PROCEDURE — 80048 BASIC METABOLIC PNL TOTAL CA: CPT | Performed by: SURGERY

## 2020-10-08 PROCEDURE — 80048 BASIC METABOLIC PNL TOTAL CA: CPT | Performed by: STUDENT IN AN ORGANIZED HEALTH CARE EDUCATION/TRAINING PROGRAM

## 2020-10-08 RX ORDER — POTASSIUM CHLORIDE 20 MEQ/1
40 TABLET, EXTENDED RELEASE ORAL ONCE
Status: COMPLETED | OUTPATIENT
Start: 2020-10-08 | End: 2020-10-08

## 2020-10-08 RX ORDER — ONDANSETRON 4 MG/1
4 TABLET, ORALLY DISINTEGRATING ORAL EVERY 6 HOURS PRN
Qty: 20 TABLET | Refills: 0 | Status: SHIPPED | OUTPATIENT
Start: 2020-10-08 | End: 2022-03-15 | Stop reason: ALTCHOICE

## 2020-10-08 RX ORDER — POTASSIUM CHLORIDE 20 MEQ/1
20 TABLET, EXTENDED RELEASE ORAL 2 TIMES DAILY
Qty: 5 TABLET | Refills: 0 | Status: SHIPPED | OUTPATIENT
Start: 2020-10-08 | End: 2021-08-19

## 2020-10-08 RX ORDER — PANTOPRAZOLE SODIUM 40 MG/1
40 TABLET, DELAYED RELEASE ORAL
Qty: 30 TABLET | Refills: 1 | Status: SHIPPED | OUTPATIENT
Start: 2020-10-08 | End: 2020-12-21 | Stop reason: SDUPTHER

## 2020-10-08 RX ORDER — POTASSIUM CHLORIDE 14.9 MG/ML
20 INJECTION INTRAVENOUS ONCE
Status: DISCONTINUED | OUTPATIENT
Start: 2020-10-08 | End: 2020-10-08

## 2020-10-08 RX ORDER — POTASSIUM CHLORIDE 20 MEQ/1
20 TABLET, EXTENDED RELEASE ORAL ONCE
Status: COMPLETED | OUTPATIENT
Start: 2020-10-08 | End: 2020-10-08

## 2020-10-08 RX ORDER — AMOXICILLIN AND CLAVULANATE POTASSIUM 875; 125 MG/1; MG/1
1 TABLET, FILM COATED ORAL EVERY 12 HOURS SCHEDULED
Qty: 28 TABLET | Refills: 0 | Status: SHIPPED | OUTPATIENT
Start: 2020-10-08 | End: 2020-10-22

## 2020-10-08 RX ORDER — CLARITHROMYCIN 500 MG/1
500 TABLET, COATED ORAL EVERY 12 HOURS SCHEDULED
Qty: 28 TABLET | Refills: 0 | Status: SHIPPED | OUTPATIENT
Start: 2020-10-08 | End: 2020-10-22

## 2020-10-08 RX ORDER — CALCIUM GLUCONATE 20 MG/ML
2 INJECTION, SOLUTION INTRAVENOUS ONCE
Status: COMPLETED | OUTPATIENT
Start: 2020-10-08 | End: 2020-10-08

## 2020-10-08 RX ADMIN — POTASSIUM CHLORIDE 40 MEQ: 1500 TABLET, EXTENDED RELEASE ORAL at 16:28

## 2020-10-08 RX ADMIN — POTASSIUM CHLORIDE 40 MEQ: 1500 TABLET, EXTENDED RELEASE ORAL at 10:54

## 2020-10-08 RX ADMIN — CLARITHROMYCIN 500 MG: 500 TABLET, FILM COATED ORAL at 09:21

## 2020-10-08 RX ADMIN — AMOXICILLIN AND CLAVULANATE POTASSIUM 1 TABLET: 875; 125 TABLET, FILM COATED ORAL at 09:22

## 2020-10-08 RX ADMIN — MAGNESIUM OXIDE TAB 400 MG (240 MG ELEMENTAL MG) 800 MG: 400 (240 MG) TAB at 10:53

## 2020-10-08 RX ADMIN — CALCIUM GLUCONATE 2 G: 20 INJECTION, SOLUTION INTRAVENOUS at 10:58

## 2020-10-08 RX ADMIN — PANTOPRAZOLE SODIUM 40 MG: 40 TABLET, DELAYED RELEASE ORAL at 06:57

## 2020-10-08 RX ADMIN — HYDROMORPHONE HYDROCHLORIDE 0.2 MG: 1 INJECTION, SOLUTION INTRAMUSCULAR; INTRAVENOUS; SUBCUTANEOUS at 10:52

## 2020-10-08 RX ADMIN — ONDANSETRON 4 MG: 2 INJECTION INTRAMUSCULAR; INTRAVENOUS at 10:52

## 2020-10-08 RX ADMIN — POTASSIUM CHLORIDE 20 MEQ: 14.9 INJECTION, SOLUTION INTRAVENOUS at 01:02

## 2020-10-08 RX ADMIN — PANTOPRAZOLE SODIUM 40 MG: 40 TABLET, DELAYED RELEASE ORAL at 16:28

## 2020-10-08 RX ADMIN — POTASSIUM CHLORIDE 20 MEQ: 1500 TABLET, EXTENDED RELEASE ORAL at 10:54

## 2020-10-23 ENCOUNTER — TRANSCRIBE ORDERS (OUTPATIENT)
Dept: LAB | Facility: HOSPITAL | Age: 80
End: 2020-10-23

## 2020-10-23 ENCOUNTER — APPOINTMENT (OUTPATIENT)
Dept: LAB | Facility: HOSPITAL | Age: 80
End: 2020-10-23
Payer: COMMERCIAL

## 2020-10-23 ENCOUNTER — APPOINTMENT (OUTPATIENT)
Dept: LAB | Facility: HOSPITAL | Age: 80
End: 2020-10-23
Attending: INTERNAL MEDICINE
Payer: COMMERCIAL

## 2020-10-23 ENCOUNTER — TELEPHONE (OUTPATIENT)
Dept: NEPHROLOGY | Facility: CLINIC | Age: 80
End: 2020-10-23

## 2020-10-23 DIAGNOSIS — N18.31 STAGE 3A CHRONIC KIDNEY DISEASE (HCC): Primary | ICD-10-CM

## 2020-10-23 DIAGNOSIS — E83.52 HYPERCALCEMIA: ICD-10-CM

## 2020-10-23 DIAGNOSIS — E87.6 HYPOPOTASSEMIA: ICD-10-CM

## 2020-10-23 DIAGNOSIS — D63.1 ANEMIA OF CHRONIC RENAL FAILURE, STAGE 3 (MODERATE) (HCC): ICD-10-CM

## 2020-10-23 DIAGNOSIS — N18.30 CHRONIC KIDNEY DISEASE, STAGE III (MODERATE) (HCC): ICD-10-CM

## 2020-10-23 DIAGNOSIS — E61.1 IRON DEFICIENCY: ICD-10-CM

## 2020-10-23 DIAGNOSIS — E87.6 HYPOPOTASSEMIA: Primary | ICD-10-CM

## 2020-10-23 DIAGNOSIS — K26.5 DUODENAL ULCER WITH PERFORATION (HCC): ICD-10-CM

## 2020-10-23 DIAGNOSIS — I51.9 MYXEDEMA HEART DISEASE: ICD-10-CM

## 2020-10-23 DIAGNOSIS — N18.30 ANEMIA OF CHRONIC RENAL FAILURE, STAGE 3 (MODERATE) (HCC): ICD-10-CM

## 2020-10-23 DIAGNOSIS — N17.9 AKI (ACUTE KIDNEY INJURY) (HCC): ICD-10-CM

## 2020-10-23 DIAGNOSIS — I10 ESSENTIAL HYPERTENSION, BENIGN: ICD-10-CM

## 2020-10-23 DIAGNOSIS — R35.0 URINARY FREQUENCY: ICD-10-CM

## 2020-10-23 DIAGNOSIS — E78.5 HYPERLIPIDEMIA, UNSPECIFIED HYPERLIPIDEMIA TYPE: ICD-10-CM

## 2020-10-23 DIAGNOSIS — D64.9 ANEMIA, UNSPECIFIED TYPE: ICD-10-CM

## 2020-10-23 DIAGNOSIS — E08.69 DIABETES MELLITUS DUE TO UNDERLYING CONDITION WITH OTHER SPECIFIED COMPLICATION, UNSPECIFIED WHETHER LONG TERM INSULIN USE (HCC): ICD-10-CM

## 2020-10-23 DIAGNOSIS — E55.9 VITAMIN D DEFICIENCY: ICD-10-CM

## 2020-10-23 DIAGNOSIS — I10 ESSENTIAL HYPERTENSION, BENIGN: Primary | ICD-10-CM

## 2020-10-23 DIAGNOSIS — E03.9 MYXEDEMA HEART DISEASE: ICD-10-CM

## 2020-10-23 DIAGNOSIS — I12.9 HYPERTENSIVE CHRONIC KIDNEY DISEASE WITH STAGE 1 THROUGH STAGE 4 CHRONIC KIDNEY DISEASE, OR UNSPECIFIED CHRONIC KIDNEY DISEASE: ICD-10-CM

## 2020-10-23 DIAGNOSIS — E78.5 DYSLIPIDEMIA: ICD-10-CM

## 2020-10-23 DIAGNOSIS — Z79.899 ENCOUNTER FOR LONG-TERM (CURRENT) USE OF OTHER MEDICATIONS: ICD-10-CM

## 2020-10-23 DIAGNOSIS — M81.0 SENILE OSTEOPOROSIS: ICD-10-CM

## 2020-10-23 LAB
ALBUMIN SERPL BCP-MCNC: 4.1 G/DL (ref 3.5–5)
ALP SERPL-CCNC: 78 U/L (ref 46–116)
ALT SERPL W P-5'-P-CCNC: 28 U/L (ref 12–78)
ANION GAP SERPL CALCULATED.3IONS-SCNC: 3 MMOL/L (ref 4–13)
AST SERPL W P-5'-P-CCNC: 17 U/L (ref 5–45)
BASOPHILS # BLD AUTO: 0.07 THOUSANDS/ΜL (ref 0–0.1)
BASOPHILS NFR BLD AUTO: 1 % (ref 0–1)
BILIRUB SERPL-MCNC: 0.46 MG/DL (ref 0.2–1)
BUN SERPL-MCNC: 27 MG/DL (ref 5–25)
CALCIUM SERPL-MCNC: 10.3 MG/DL (ref 8.3–10.1)
CHLORIDE SERPL-SCNC: 107 MMOL/L (ref 100–108)
CO2 SERPL-SCNC: 27 MMOL/L (ref 21–32)
CREAT SERPL-MCNC: 2.33 MG/DL (ref 0.6–1.3)
CREAT UR-MCNC: 120 MG/DL
EOSINOPHIL # BLD AUTO: 0.11 THOUSAND/ΜL (ref 0–0.61)
EOSINOPHIL NFR BLD AUTO: 1 % (ref 0–6)
ERYTHROCYTE [DISTWIDTH] IN BLOOD BY AUTOMATED COUNT: 13 % (ref 11.6–15.1)
GFR SERPL CREATININE-BSD FRML MDRD: 19 ML/MIN/1.73SQ M
GLUCOSE SERPL-MCNC: 96 MG/DL (ref 65–140)
HCT VFR BLD AUTO: 37.8 % (ref 34.8–46.1)
HGB BLD-MCNC: 11.9 G/DL (ref 11.5–15.4)
IMM GRANULOCYTES # BLD AUTO: 0.05 THOUSAND/UL (ref 0–0.2)
IMM GRANULOCYTES NFR BLD AUTO: 1 % (ref 0–2)
LYMPHOCYTES # BLD AUTO: 1.35 THOUSANDS/ΜL (ref 0.6–4.47)
LYMPHOCYTES NFR BLD AUTO: 18 % (ref 14–44)
MCH RBC QN AUTO: 30.1 PG (ref 26.8–34.3)
MCHC RBC AUTO-ENTMCNC: 31.5 G/DL (ref 31.4–37.4)
MCV RBC AUTO: 96 FL (ref 82–98)
MONOCYTES # BLD AUTO: 0.64 THOUSAND/ΜL (ref 0.17–1.22)
MONOCYTES NFR BLD AUTO: 8 % (ref 4–12)
NEUTROPHILS # BLD AUTO: 5.48 THOUSANDS/ΜL (ref 1.85–7.62)
NEUTS SEG NFR BLD AUTO: 71 % (ref 43–75)
NRBC BLD AUTO-RTO: 0 /100 WBCS
PLATELET # BLD AUTO: 329 THOUSANDS/UL (ref 149–390)
PMV BLD AUTO: 9.9 FL (ref 8.9–12.7)
POTASSIUM SERPL-SCNC: 5.5 MMOL/L (ref 3.5–5.3)
PROT SERPL-MCNC: 8.2 G/DL (ref 6.4–8.2)
PROT UR-MCNC: 22 MG/DL
PROT/CREAT UR: 0.18 MG/G{CREAT} (ref 0–0.1)
RBC # BLD AUTO: 3.95 MILLION/UL (ref 3.81–5.12)
SODIUM SERPL-SCNC: 137 MMOL/L (ref 136–145)
WBC # BLD AUTO: 7.7 THOUSAND/UL (ref 4.31–10.16)

## 2020-10-23 PROCEDURE — 82570 ASSAY OF URINE CREATININE: CPT

## 2020-10-23 PROCEDURE — 36415 COLL VENOUS BLD VENIPUNCTURE: CPT

## 2020-10-23 PROCEDURE — 80053 COMPREHEN METABOLIC PANEL: CPT

## 2020-10-23 PROCEDURE — 85025 COMPLETE CBC W/AUTO DIFF WBC: CPT

## 2020-10-23 PROCEDURE — 84156 ASSAY OF PROTEIN URINE: CPT

## 2020-10-26 ENCOUNTER — TELEPHONE (OUTPATIENT)
Dept: NEPHROLOGY | Facility: CLINIC | Age: 80
End: 2020-10-26

## 2020-10-26 ENCOUNTER — LAB (OUTPATIENT)
Dept: LAB | Facility: HOSPITAL | Age: 80
End: 2020-10-26
Attending: INTERNAL MEDICINE
Payer: COMMERCIAL

## 2020-10-26 DIAGNOSIS — N17.9 AKI (ACUTE KIDNEY INJURY) (HCC): ICD-10-CM

## 2020-10-26 DIAGNOSIS — N18.30 STAGE 3 CHRONIC KIDNEY DISEASE, UNSPECIFIED WHETHER STAGE 3A OR 3B CKD (HCC): Primary | ICD-10-CM

## 2020-10-26 DIAGNOSIS — N18.31 STAGE 3A CHRONIC KIDNEY DISEASE (HCC): ICD-10-CM

## 2020-10-26 DIAGNOSIS — E83.52 HYPERCALCEMIA: ICD-10-CM

## 2020-10-26 LAB
ALBUMIN SERPL BCP-MCNC: 4 G/DL (ref 3.5–5)
ALP SERPL-CCNC: 82 U/L (ref 46–116)
ALT SERPL W P-5'-P-CCNC: 20 U/L (ref 12–78)
ANION GAP SERPL CALCULATED.3IONS-SCNC: 4 MMOL/L (ref 4–13)
AST SERPL W P-5'-P-CCNC: 15 U/L (ref 5–45)
BACTERIA UR QL AUTO: ABNORMAL /HPF
BASOPHILS # BLD AUTO: 0.08 THOUSANDS/ΜL (ref 0–0.1)
BASOPHILS NFR BLD AUTO: 1 % (ref 0–1)
BILIRUB SERPL-MCNC: 0.31 MG/DL (ref 0.2–1)
BILIRUB UR QL STRIP: NEGATIVE
BUN SERPL-MCNC: 23 MG/DL (ref 5–25)
CALCIUM SERPL-MCNC: 9.1 MG/DL (ref 8.3–10.1)
CHLORIDE SERPL-SCNC: 111 MMOL/L (ref 100–108)
CLARITY UR: ABNORMAL
CO2 SERPL-SCNC: 26 MMOL/L (ref 21–32)
COLOR UR: YELLOW
CREAT SERPL-MCNC: 1.83 MG/DL (ref 0.6–1.3)
CREAT UR-MCNC: 172 MG/DL
EOSINOPHIL # BLD AUTO: 0.15 THOUSAND/ΜL (ref 0–0.61)
EOSINOPHIL NFR BLD AUTO: 2 % (ref 0–6)
EOSINOPHIL NFR URNS MANUAL: 0 %
ERYTHROCYTE [DISTWIDTH] IN BLOOD BY AUTOMATED COUNT: 13.2 % (ref 11.6–15.1)
GFR SERPL CREATININE-BSD FRML MDRD: 26 ML/MIN/1.73SQ M
GLUCOSE P FAST SERPL-MCNC: 144 MG/DL (ref 65–99)
GLUCOSE UR STRIP-MCNC: NEGATIVE MG/DL
HCT VFR BLD AUTO: 37.2 % (ref 34.8–46.1)
HGB BLD-MCNC: 11.7 G/DL (ref 11.5–15.4)
HGB UR QL STRIP.AUTO: ABNORMAL
IMM GRANULOCYTES # BLD AUTO: 0.03 THOUSAND/UL (ref 0–0.2)
IMM GRANULOCYTES NFR BLD AUTO: 0 % (ref 0–2)
KETONES UR STRIP-MCNC: NEGATIVE MG/DL
LEUKOCYTE ESTERASE UR QL STRIP: ABNORMAL
LYMPHOCYTES # BLD AUTO: 1.56 THOUSANDS/ΜL (ref 0.6–4.47)
LYMPHOCYTES NFR BLD AUTO: 23 % (ref 14–44)
MAGNESIUM SERPL-MCNC: 2.4 MG/DL (ref 1.6–2.6)
MCH RBC QN AUTO: 30.2 PG (ref 26.8–34.3)
MCHC RBC AUTO-ENTMCNC: 31.5 G/DL (ref 31.4–37.4)
MCV RBC AUTO: 96 FL (ref 82–98)
MONOCYTES # BLD AUTO: 0.42 THOUSAND/ΜL (ref 0.17–1.22)
MONOCYTES NFR BLD AUTO: 6 % (ref 4–12)
NEUTROPHILS # BLD AUTO: 4.64 THOUSANDS/ΜL (ref 1.85–7.62)
NEUTS SEG NFR BLD AUTO: 68 % (ref 43–75)
NITRITE UR QL STRIP: NEGATIVE
NON-SQ EPI CELLS URNS QL MICRO: ABNORMAL /HPF
NRBC BLD AUTO-RTO: 0 /100 WBCS
OTHER STN SPEC: ABNORMAL
PH UR STRIP.AUTO: 6 [PH]
PLATELET # BLD AUTO: 312 THOUSANDS/UL (ref 149–390)
PMV BLD AUTO: 10 FL (ref 8.9–12.7)
POTASSIUM SERPL-SCNC: 4.5 MMOL/L (ref 3.5–5.3)
PROT SERPL-MCNC: 7.7 G/DL (ref 6.4–8.2)
PROT UR STRIP-MCNC: ABNORMAL MG/DL
PROT UR-MCNC: 30 MG/DL
PROT/CREAT UR: 0.17 MG/G{CREAT} (ref 0–0.1)
RBC # BLD AUTO: 3.87 MILLION/UL (ref 3.81–5.12)
RBC #/AREA URNS AUTO: ABNORMAL /HPF
SODIUM SERPL-SCNC: 141 MMOL/L (ref 136–145)
SP GR UR STRIP.AUTO: 1.02 (ref 1–1.03)
UROBILINOGEN UR QL STRIP.AUTO: 0.2 E.U./DL
WBC # BLD AUTO: 6.88 THOUSAND/UL (ref 4.31–10.16)
WBC #/AREA URNS AUTO: ABNORMAL /HPF

## 2020-10-26 PROCEDURE — 82570 ASSAY OF URINE CREATININE: CPT | Performed by: INTERNAL MEDICINE

## 2020-10-26 PROCEDURE — 80053 COMPREHEN METABOLIC PANEL: CPT

## 2020-10-26 PROCEDURE — 81001 URINALYSIS AUTO W/SCOPE: CPT

## 2020-10-26 PROCEDURE — 84156 ASSAY OF PROTEIN URINE: CPT | Performed by: INTERNAL MEDICINE

## 2020-10-26 PROCEDURE — 87205 SMEAR GRAM STAIN: CPT | Performed by: INTERNAL MEDICINE

## 2020-10-26 PROCEDURE — 83735 ASSAY OF MAGNESIUM: CPT

## 2020-10-26 PROCEDURE — 36415 COLL VENOUS BLD VENIPUNCTURE: CPT

## 2020-10-26 PROCEDURE — 85025 COMPLETE CBC W/AUTO DIFF WBC: CPT

## 2020-10-30 ENCOUNTER — LAB (OUTPATIENT)
Dept: LAB | Facility: HOSPITAL | Age: 80
End: 2020-10-30
Attending: INTERNAL MEDICINE
Payer: COMMERCIAL

## 2020-10-30 ENCOUNTER — TELEPHONE (OUTPATIENT)
Dept: NEPHROLOGY | Facility: CLINIC | Age: 80
End: 2020-10-30

## 2020-10-30 DIAGNOSIS — N17.9 AKI (ACUTE KIDNEY INJURY) (HCC): ICD-10-CM

## 2020-10-30 DIAGNOSIS — I12.9 HYPERTENSIVE CHRONIC KIDNEY DISEASE WITH STAGE 1 THROUGH STAGE 4 CHRONIC KIDNEY DISEASE, OR UNSPECIFIED CHRONIC KIDNEY DISEASE: Primary | ICD-10-CM

## 2020-10-30 DIAGNOSIS — N18.30 STAGE 3 CHRONIC KIDNEY DISEASE, UNSPECIFIED WHETHER STAGE 3A OR 3B CKD (HCC): ICD-10-CM

## 2020-10-30 DIAGNOSIS — E83.52 HYPERCALCEMIA: ICD-10-CM

## 2020-10-30 LAB
ANION GAP SERPL CALCULATED.3IONS-SCNC: 7 MMOL/L (ref 4–13)
BUN SERPL-MCNC: 15 MG/DL (ref 5–25)
CALCIUM SERPL-MCNC: 8.9 MG/DL (ref 8.3–10.1)
CHLORIDE SERPL-SCNC: 107 MMOL/L (ref 100–108)
CO2 SERPL-SCNC: 25 MMOL/L (ref 21–32)
CREAT SERPL-MCNC: 1.31 MG/DL (ref 0.6–1.3)
GFR SERPL CREATININE-BSD FRML MDRD: 39 ML/MIN/1.73SQ M
GLUCOSE P FAST SERPL-MCNC: 114 MG/DL (ref 65–99)
POTASSIUM SERPL-SCNC: 4 MMOL/L (ref 3.5–5.3)
SODIUM SERPL-SCNC: 139 MMOL/L (ref 136–145)

## 2020-10-30 PROCEDURE — 80048 BASIC METABOLIC PNL TOTAL CA: CPT

## 2020-10-30 PROCEDURE — 36415 COLL VENOUS BLD VENIPUNCTURE: CPT

## 2020-11-06 ENCOUNTER — TELEPHONE (OUTPATIENT)
Dept: NEPHROLOGY | Facility: CLINIC | Age: 80
End: 2020-11-06

## 2020-11-06 ENCOUNTER — LAB (OUTPATIENT)
Dept: LAB | Facility: HOSPITAL | Age: 80
End: 2020-11-06
Attending: INTERNAL MEDICINE
Payer: COMMERCIAL

## 2020-11-06 DIAGNOSIS — N18.30 STAGE 3 CHRONIC KIDNEY DISEASE, UNSPECIFIED WHETHER STAGE 3A OR 3B CKD (HCC): ICD-10-CM

## 2020-11-06 DIAGNOSIS — I12.9 HYPERTENSIVE CHRONIC KIDNEY DISEASE WITH STAGE 1 THROUGH STAGE 4 CHRONIC KIDNEY DISEASE, OR UNSPECIFIED CHRONIC KIDNEY DISEASE: ICD-10-CM

## 2020-11-06 LAB
ANION GAP SERPL CALCULATED.3IONS-SCNC: 5 MMOL/L (ref 4–13)
BUN SERPL-MCNC: 10 MG/DL (ref 5–25)
CALCIUM SERPL-MCNC: 8.8 MG/DL (ref 8.3–10.1)
CHLORIDE SERPL-SCNC: 113 MMOL/L (ref 100–108)
CO2 SERPL-SCNC: 25 MMOL/L (ref 21–32)
CREAT SERPL-MCNC: 1.15 MG/DL (ref 0.6–1.3)
GFR SERPL CREATININE-BSD FRML MDRD: 45 ML/MIN/1.73SQ M
GLUCOSE P FAST SERPL-MCNC: 108 MG/DL (ref 65–99)
POTASSIUM SERPL-SCNC: 3.9 MMOL/L (ref 3.5–5.3)
SODIUM SERPL-SCNC: 143 MMOL/L (ref 136–145)

## 2020-11-06 PROCEDURE — 36415 COLL VENOUS BLD VENIPUNCTURE: CPT

## 2020-11-06 PROCEDURE — 80048 BASIC METABOLIC PNL TOTAL CA: CPT

## 2020-11-23 RX ORDER — PHENAZOPYRIDINE HYDROCHLORIDE 100 MG/1
TABLET, FILM COATED ORAL
COMMUNITY
Start: 2020-11-03 | End: 2021-09-02 | Stop reason: ALTCHOICE

## 2020-11-23 RX ORDER — DOXYCYCLINE HYCLATE 100 MG/1
CAPSULE ORAL
COMMUNITY
Start: 2020-11-03 | End: 2021-08-19

## 2020-11-23 RX ORDER — FLUCONAZOLE 150 MG/1
TABLET ORAL
COMMUNITY
Start: 2020-11-03 | End: 2021-08-19

## 2020-11-23 RX ORDER — CLARITHROMYCIN 250 MG/5ML
FOR SUSPENSION ORAL
COMMUNITY
Start: 2020-10-12 | End: 2021-08-19

## 2020-11-23 RX ORDER — IBUPROFEN 800 MG/1
TABLET ORAL AS NEEDED
COMMUNITY
Start: 2020-10-14 | End: 2021-08-29 | Stop reason: HOSPADM

## 2020-11-23 RX ORDER — AMOXICILLIN AND CLAVULANATE POTASSIUM 250; 62.5 MG/5ML; MG/5ML
POWDER, FOR SUSPENSION ORAL
COMMUNITY
Start: 2020-10-12 | End: 2021-08-19

## 2020-11-23 RX ORDER — POTASSIUM CHLORIDE 1500 MG/1
TABLET, FILM COATED, EXTENDED RELEASE ORAL
COMMUNITY
Start: 2020-10-15 | End: 2021-08-19

## 2020-11-23 RX ORDER — CIPROFLOXACIN 500 MG/1
TABLET, FILM COATED ORAL
COMMUNITY
Start: 2020-09-28 | End: 2021-08-19

## 2020-11-24 ENCOUNTER — OFFICE VISIT (OUTPATIENT)
Dept: OBGYN CLINIC | Facility: HOSPITAL | Age: 80
End: 2020-11-24
Payer: COMMERCIAL

## 2020-11-24 VITALS
SYSTOLIC BLOOD PRESSURE: 138 MMHG | WEIGHT: 132.6 LBS | HEIGHT: 61 IN | BODY MASS INDEX: 25.04 KG/M2 | DIASTOLIC BLOOD PRESSURE: 77 MMHG | HEART RATE: 83 BPM

## 2020-11-24 DIAGNOSIS — M25.551 RIGHT HIP PAIN: ICD-10-CM

## 2020-11-24 DIAGNOSIS — M70.61 TROCHANTERIC BURSITIS OF RIGHT HIP: Primary | ICD-10-CM

## 2020-11-24 PROCEDURE — 20610 DRAIN/INJ JOINT/BURSA W/O US: CPT | Performed by: ORTHOPAEDIC SURGERY

## 2020-11-24 PROCEDURE — 99213 OFFICE O/P EST LOW 20 MIN: CPT | Performed by: ORTHOPAEDIC SURGERY

## 2020-11-24 RX ORDER — LIDOCAINE HYDROCHLORIDE 10 MG/ML
2 INJECTION, SOLUTION INFILTRATION; PERINEURAL
Status: COMPLETED | OUTPATIENT
Start: 2020-11-24 | End: 2020-11-24

## 2020-11-24 RX ORDER — BUPIVACAINE HYDROCHLORIDE 2.5 MG/ML
2 INJECTION, SOLUTION INFILTRATION; PERINEURAL
Status: COMPLETED | OUTPATIENT
Start: 2020-11-24 | End: 2020-11-24

## 2020-11-24 RX ORDER — BETAMETHASONE SODIUM PHOSPHATE AND BETAMETHASONE ACETATE 3; 3 MG/ML; MG/ML
12 INJECTION, SUSPENSION INTRA-ARTICULAR; INTRALESIONAL; INTRAMUSCULAR; SOFT TISSUE
Status: COMPLETED | OUTPATIENT
Start: 2020-11-24 | End: 2020-11-24

## 2020-11-24 RX ADMIN — BETAMETHASONE SODIUM PHOSPHATE AND BETAMETHASONE ACETATE 12 MG: 3; 3 INJECTION, SUSPENSION INTRA-ARTICULAR; INTRALESIONAL; INTRAMUSCULAR; SOFT TISSUE at 09:21

## 2020-11-24 RX ADMIN — BUPIVACAINE HYDROCHLORIDE 2 ML: 2.5 INJECTION, SOLUTION INFILTRATION; PERINEURAL at 09:21

## 2020-11-24 RX ADMIN — LIDOCAINE HYDROCHLORIDE 2 ML: 10 INJECTION, SOLUTION INFILTRATION; PERINEURAL at 09:21

## 2020-12-01 ENCOUNTER — APPOINTMENT (OUTPATIENT)
Dept: LAB | Facility: HOSPITAL | Age: 80
End: 2020-12-01
Attending: INTERNAL MEDICINE
Payer: COMMERCIAL

## 2020-12-01 ENCOUNTER — TRANSCRIBE ORDERS (OUTPATIENT)
Dept: LAB | Facility: HOSPITAL | Age: 80
End: 2020-12-01

## 2020-12-01 DIAGNOSIS — R35.0 URINARY FREQUENCY: ICD-10-CM

## 2020-12-01 DIAGNOSIS — E78.5 HYPERLIPIDEMIA, UNSPECIFIED HYPERLIPIDEMIA TYPE: ICD-10-CM

## 2020-12-01 DIAGNOSIS — D64.9 ANEMIA, UNSPECIFIED TYPE: ICD-10-CM

## 2020-12-01 DIAGNOSIS — D64.9 ANEMIA, UNSPECIFIED TYPE: Primary | ICD-10-CM

## 2020-12-01 LAB
ALBUMIN SERPL BCP-MCNC: 3.6 G/DL (ref 3.5–5)
ALP SERPL-CCNC: 72 U/L (ref 46–116)
ALT SERPL W P-5'-P-CCNC: 27 U/L (ref 12–78)
ANION GAP SERPL CALCULATED.3IONS-SCNC: 2 MMOL/L (ref 4–13)
AST SERPL W P-5'-P-CCNC: 17 U/L (ref 5–45)
BASOPHILS # BLD AUTO: 0.05 THOUSANDS/ΜL (ref 0–0.1)
BASOPHILS NFR BLD AUTO: 1 % (ref 0–1)
BILIRUB SERPL-MCNC: 0.42 MG/DL (ref 0.2–1)
BUN SERPL-MCNC: 15 MG/DL (ref 5–25)
CALCIUM SERPL-MCNC: 9.3 MG/DL (ref 8.3–10.1)
CHLORIDE SERPL-SCNC: 112 MMOL/L (ref 100–108)
CHOLEST SERPL-MCNC: 197 MG/DL (ref 50–200)
CO2 SERPL-SCNC: 28 MMOL/L (ref 21–32)
CREAT SERPL-MCNC: 1.15 MG/DL (ref 0.6–1.3)
EOSINOPHIL # BLD AUTO: 0.17 THOUSAND/ΜL (ref 0–0.61)
EOSINOPHIL NFR BLD AUTO: 2 % (ref 0–6)
ERYTHROCYTE [DISTWIDTH] IN BLOOD BY AUTOMATED COUNT: 13.4 % (ref 11.6–15.1)
EST. AVERAGE GLUCOSE BLD GHB EST-MCNC: 108 MG/DL
GFR SERPL CREATININE-BSD FRML MDRD: 45 ML/MIN/1.73SQ M
GLUCOSE P FAST SERPL-MCNC: 88 MG/DL (ref 65–99)
HBA1C MFR BLD: 5.4 %
HCT VFR BLD AUTO: 36.1 % (ref 34.8–46.1)
HDLC SERPL-MCNC: 93 MG/DL
HGB BLD-MCNC: 11.4 G/DL (ref 11.5–15.4)
IMM GRANULOCYTES # BLD AUTO: 0.04 THOUSAND/UL (ref 0–0.2)
IMM GRANULOCYTES NFR BLD AUTO: 1 % (ref 0–2)
LDLC SERPL CALC-MCNC: 88 MG/DL (ref 0–100)
LYMPHOCYTES # BLD AUTO: 1.25 THOUSANDS/ΜL (ref 0.6–4.47)
LYMPHOCYTES NFR BLD AUTO: 18 % (ref 14–44)
MCH RBC QN AUTO: 30.4 PG (ref 26.8–34.3)
MCHC RBC AUTO-ENTMCNC: 31.6 G/DL (ref 31.4–37.4)
MCV RBC AUTO: 96 FL (ref 82–98)
MONOCYTES # BLD AUTO: 0.5 THOUSAND/ΜL (ref 0.17–1.22)
MONOCYTES NFR BLD AUTO: 7 % (ref 4–12)
NEUTROPHILS # BLD AUTO: 4.99 THOUSANDS/ΜL (ref 1.85–7.62)
NEUTS SEG NFR BLD AUTO: 71 % (ref 43–75)
NONHDLC SERPL-MCNC: 104 MG/DL
NRBC BLD AUTO-RTO: 0 /100 WBCS
PLATELET # BLD AUTO: 283 THOUSANDS/UL (ref 149–390)
PMV BLD AUTO: 10.7 FL (ref 8.9–12.7)
POTASSIUM SERPL-SCNC: 3.7 MMOL/L (ref 3.5–5.3)
PROT SERPL-MCNC: 6.7 G/DL (ref 6.4–8.2)
RBC # BLD AUTO: 3.75 MILLION/UL (ref 3.81–5.12)
SODIUM SERPL-SCNC: 142 MMOL/L (ref 136–145)
TRIGL SERPL-MCNC: 82 MG/DL
WBC # BLD AUTO: 7 THOUSAND/UL (ref 4.31–10.16)

## 2020-12-01 PROCEDURE — 80061 LIPID PANEL: CPT

## 2020-12-01 PROCEDURE — 36415 COLL VENOUS BLD VENIPUNCTURE: CPT

## 2020-12-01 PROCEDURE — 85025 COMPLETE CBC W/AUTO DIFF WBC: CPT

## 2020-12-01 PROCEDURE — 83036 HEMOGLOBIN GLYCOSYLATED A1C: CPT

## 2020-12-01 PROCEDURE — 80053 COMPREHEN METABOLIC PANEL: CPT

## 2020-12-03 PROCEDURE — G0124 SCREEN C/V THIN LAYER BY MD: HCPCS | Performed by: PATHOLOGY

## 2020-12-03 PROCEDURE — 87624 HPV HI-RISK TYP POOLED RSLT: CPT | Performed by: OBSTETRICS & GYNECOLOGY

## 2020-12-03 PROCEDURE — G0145 SCR C/V CYTO,THINLAYER,RESCR: HCPCS | Performed by: PATHOLOGY

## 2020-12-04 ENCOUNTER — CONSULT (OUTPATIENT)
Dept: SURGERY | Facility: CLINIC | Age: 80
End: 2020-12-04
Payer: COMMERCIAL

## 2020-12-04 VITALS — HEIGHT: 61 IN | TEMPERATURE: 96.7 F | WEIGHT: 127 LBS | BODY MASS INDEX: 23.98 KG/M2

## 2020-12-04 DIAGNOSIS — K63.1 DUODENAL PERFORATION (HCC): Primary | Chronic | ICD-10-CM

## 2020-12-04 PROCEDURE — 99213 OFFICE O/P EST LOW 20 MIN: CPT | Performed by: SURGERY

## 2020-12-08 ENCOUNTER — LAB REQUISITION (OUTPATIENT)
Dept: LAB | Facility: HOSPITAL | Age: 80
End: 2020-12-08
Payer: COMMERCIAL

## 2020-12-08 DIAGNOSIS — Z01.419 ENCOUNTER FOR GYNECOLOGICAL EXAMINATION (GENERAL) (ROUTINE) WITHOUT ABNORMAL FINDINGS: ICD-10-CM

## 2020-12-09 ENCOUNTER — HOSPITAL ENCOUNTER (OUTPATIENT)
Dept: MAMMOGRAPHY | Facility: CLINIC | Age: 80
Discharge: HOME | End: 2020-12-09
Payer: COMMERCIAL

## 2020-12-09 VITALS — WEIGHT: 132 LBS | HEIGHT: 61 IN | BODY MASS INDEX: 24.92 KG/M2

## 2020-12-09 DIAGNOSIS — R92.8 ABNORMAL MAMMOGRAM: ICD-10-CM

## 2020-12-09 PROCEDURE — 77065 DX MAMMO INCL CAD UNI: CPT

## 2020-12-09 PROCEDURE — G0279 TOMOSYNTHESIS, MAMMO: HCPCS

## 2020-12-12 LAB
HPV HR 12 DNA CVX QL NAA+PROBE: NEGATIVE
HPV16 DNA CVX QL NAA+PROBE: NEGATIVE
HPV18 DNA CVX QL NAA+PROBE: NEGATIVE

## 2020-12-15 LAB
LAB AP GYN PRIMARY INTERPRETATION: ABNORMAL
Lab: ABNORMAL
PATH INTERP SPEC-IMP: ABNORMAL

## 2020-12-21 ENCOUNTER — OFFICE VISIT (OUTPATIENT)
Dept: GASTROENTEROLOGY | Facility: CLINIC | Age: 80
End: 2020-12-21
Payer: COMMERCIAL

## 2020-12-21 VITALS
TEMPERATURE: 97.6 F | DIASTOLIC BLOOD PRESSURE: 70 MMHG | HEART RATE: 79 BPM | SYSTOLIC BLOOD PRESSURE: 162 MMHG | WEIGHT: 133 LBS | BODY MASS INDEX: 25.13 KG/M2

## 2020-12-21 DIAGNOSIS — K26.5 DUODENAL ULCER WITH PERFORATION (HCC): ICD-10-CM

## 2020-12-21 DIAGNOSIS — K21.00 GASTROESOPHAGEAL REFLUX DISEASE WITH ESOPHAGITIS WITHOUT HEMORRHAGE: Primary | Chronic | ICD-10-CM

## 2020-12-21 DIAGNOSIS — K63.1 DUODENAL PERFORATION (HCC): Chronic | ICD-10-CM

## 2020-12-21 PROCEDURE — 99244 OFF/OP CNSLTJ NEW/EST MOD 40: CPT | Performed by: INTERNAL MEDICINE

## 2020-12-21 RX ORDER — PANTOPRAZOLE SODIUM 40 MG/1
40 TABLET, DELAYED RELEASE ORAL
Qty: 60 TABLET | Refills: 1 | Status: SHIPPED | OUTPATIENT
Start: 2020-12-21 | End: 2021-10-14 | Stop reason: SDUPTHER

## 2020-12-26 NOTE — H&P (VIEW-ONLY)
Outpatient Consultation  [address] [facility] [address]   Christina Lema M THOM  Ph : [phone]  Fax : [phone]  Mobile : [phone]  Email : [email or web address]  Also available on Shannanrajesh 5454 79 y o  female MRN: [id number]    PCP: Emeli Posada DO  Referring: Chelle Desai MD  [facility] [address],  [address] [place] [address] Signs was seen in consultation  My recommendations are included  Please do not hesitate to contact me with any questions you may have  ASSESSMENT AND PLAN:      No problem-specific Assessment & Plan notes found for this encounter  Diagnoses and all orders for this visit:    Gastroesophageal reflux disease with esophagitis without hemorrhage  -     EGD; Future    Duodenal perforation ([facility] [address] )  -     Ambulatory referral to Gastroenterology  -     EGD; Future    Duodenal ulcer with perforation ([facility])  -     pantoprazole (PROTONIX) 40 mg tablet; Take 1 tablet (40 mg total) by mouth 2 (two) times a day before meals    Other orders  -     Diet NPO; Sips with meds; Standing  -     Void on call to OR; Standing  -     Insert peripheral IV; Standing    80-year-old lady who presents for follow-up from recent hospitalization in October for a possible duodenal ulcer with perforation  This was seen on imaging  No endoscopic evaluation was done at that time  Ppi was recommended and she took it twice daily and is still on twice daily Protonix  At this time her symptoms have improved  I would recommend doing an endoscopic evaluation for follow-up on this ulcer  At that time we will also do biopsies in the stomach to rule out H pylori  She did however receive a course of antibiotics for presumed H pylori  Recommended against using any NSAIDs  After the endoscopy the ulcer is healed then we can change Protonix to once daily    She has had history of esophagitis and will likely require long-term PPI use  History of colon polyps  She follows up with Dr Carolynn Nickerson  Follow-up in the office in 3 months   ______________________________________________________________________    HPI:  60-year-old lady with history of osteoarthritis, hypertension, GERD and CKD who presents to us for follow-up of a recent hospital admission for peptic ulcer disease  She was admitted to the hospital with significant abdominal pain in the epigastric region along with bilious vomiting which prompted her to come to the emergency department  She had a CT scan done which showed extraluminal projection of the anterior wall of the 1st part of the duodenum containing air-fluid and surrounded by significant inflammatory change  This was consistent with the large inflamed ulcer versus contained perforation along the anterior wall of the 1st part of the duodenum with extensive local inflammatory change  The patient was admitted and had an upper GI study which showed unremarkable examination with no evidence of leak to suggest duodenal perforation  She was not taking any NSAIDs  NSAIDs are reported on her list but she rarely takes them  Does not recall history of H pylori  She had also lost weight  Her appetite was low  But now she is starting to gain weight back again  She is tolerating a diet  She has no pain  No nausea or vomiting  No diarrhea constipation  No rectal bleeding  She does not note a history of melena during that episode  When she was admitted her hemoglobin was 10 8 down from a baseline of 12 and went down to 9 8  Subsequently her hemoglobin in end of October was 11 9 and is now 11 4  She was started on Protonix twice daily  She was given an empiric course of H pylori  An H pylori stool antigen was requested but not collected  She did have an endoscopy done in August of 2019 which showed class a erosive esophagitis  A trial of omeprazole was recommended      PRIOR ENDOSCOPIC EVALUATION :     Endoscopy : yes    Colonoscopy : yes in August 2019 which showed tubular adenoma  REVIEW OF SYSTEMS:    CONSTITUTIONAL: Denies any fever, chills, rigors, and weight loss  HEENT: No earache or tinnitus  Denies hearing loss or visual disturbances  CARDIOVASCULAR: No chest pain or palpitations  RESPIRATORY: Denies any cough, hemoptysis, shortness of breath or dyspnea on exertion  GASTROINTESTINAL: As noted in the History of Present Illness  GENITOURINARY: No problems with urination  Denies any hematuria or dysuria  NEUROLOGIC: No dizziness or vertigo, denies headaches  MUSCULOSKELETAL: Denies any muscle or joint pain  SKIN: Denies skin rashes or itching  ENDOCRINE: Denies excessive thirst  Denies intolerance to heat or cold  PSYCHOSOCIAL: Denies depression or anxiety  Denies any recent memory loss         Historical Information   Past Medical History:   Diagnosis Date    Chronic kidney disease     GERD (gastroesophageal reflux disease) 4/4/2016    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    Hypertension     Osteoarthritis of hip 4/4/2016    SI (sacroiliac) joint inflammation (HCC) 4/4/2016    Substance addiction ([facility] [address] )      Past Surgical History:   Procedure Laterality Date    BREAST EXCISIONAL BIOPSY Left 2000    benign    JOINT REPLACEMENT      right hip 8/15    TONSILLECTOMY       Social History   Social History     Substance and Sexual Activity   Alcohol Use Never    Frequency: Never    Binge frequency: Never     Social History     Substance and Sexual Activity   Drug Use Not Currently    Types: Prescription    Comment: rocovering last used 1996     Social History     Tobacco Use   Smoking Status Never Smoker   Smokeless Tobacco Never Used     Family History   Problem Relation Age of Onset    No Known Problems Mother     No Known Problems Father     Breast cancer Daughter 48    Breast cancer Maternal Aunt 79    No Known Problems Sister     No Known Problems Maternal Grandmother     No Known Problems Maternal Grandfather     No Known Problems Paternal Grandmother     No Known Problems Paternal Grandfather     No Known Problems Son     No Known Problems Sister     No Known Problems Sister        Meds/Allergies       Current Outpatient Medications:     amLODIPine (NORVASC) 2 5 mg tablet    atorvastatin (LIPITOR) 10 mg tablet    ibuprofen (MOTRIN) 800 mg tablet    metoprolol succinate (TOPROL-XL) 25 mg 24 hr tablet    ondansetron (ZOFRAN-ODT) 4 mg disintegrating tablet    amoxicillin-clavulanate (AUGMENTIN) 250-62 5 mg/5 mL suspension    ciprofloxacin (CIPRO) 500 mg tablet    clarithromycin (BIAXIN) 250 mg/5 mL suspension    doxycycline hyclate (VIBRAMYCIN) 100 mg capsule    fluconazole (DIFLUCAN) 150 mg tablet    lidocaine (XYLOCAINE) 5 % ointment    pantoprazole (PROTONIX) 40 mg tablet    phenazopyridine (PYRIDIUM) 100 mg tablet    potassium chloride (K-DUR,KLOR-CON) 20 mEq tablet    Potassium Chloride ER 20 MEQ TBCR    PREMARIN vaginal cream    spironolactone (ALDACTONE) 25 mg tablet    Allergies   Allergen Reactions    Bactrim [Sulfamethoxazole-Trimethoprim]      VINNY           Objective     Blood pressure 162/70, pulse 79, temperature 97 6 °F (36 4 °C), temperature source Tympanic, weight 60 3 kg (133 lb), not currently breastfeeding  Body mass index is 25 13 kg/m²  PHYSICAL EXAM:      Physical Exam  Constitutional:       Appearance: Normal appearance  She is well-developed  HENT:      Head: Normocephalic and atraumatic  Eyes:      General: No scleral icterus  Conjunctiva/sclera: Conjunctivae normal       Pupils: Pupils are equal, round, and reactive to light  Neck:      Musculoskeletal: Normal range of motion  Cardiovascular:      Rate and Rhythm: Normal rate and regular rhythm  Heart sounds: Normal heart sounds  Pulmonary:      Effort: Pulmonary effort is normal  No respiratory distress        Breath sounds: Normal breath sounds  Abdominal:      General: Bowel sounds are normal  There is no distension  Palpations: Abdomen is soft  There is no mass  Tenderness: There is no abdominal tenderness  Hernia: No hernia is present  Musculoskeletal: Normal range of motion  Lymphadenopathy:      Cervical: No cervical adenopathy  Skin:     General: Skin is warm  Neurological:      Mental Status: She is alert and oriented to person, place, and time  Psychiatric:         Behavior: Behavior normal          Thought Content: Thought content normal              Lab Results:     Lab Results   Component Value Date    WBC 7 00 12/01/2020    HGB 11 4 (L) 12/01/2020    HCT 36 1 12/01/2020    MCV 96 12/01/2020     12/01/2020       Lab Results   Component Value Date     11/20/2015    K 3 7 12/01/2020     (H) 12/01/2020    CO2 28 12/01/2020    ANIONGAP 6 11/20/2015    BUN 15 12/01/2020    CREATININE 1 15 12/01/2020    GLUCOSE 100 11/20/2015    GLUF 88 12/01/2020    CALCIUM 9 3 12/01/2020    CORRECTEDCA 9 5 09/26/2020    AST 17 12/01/2020    ALT 27 12/01/2020    ALKPHOS 72 12/01/2020    PROT 7 2 11/20/2015    BILITOT 0 41 11/20/2015    EGFR 45 12/01/2020       Lab Results   Component Value Date    INR 1 07 04/07/2016    INR 1 03 08/18/2015    PROTIME 13 7 04/07/2016    PROTIME 13 3 08/18/2015         Radiology Results:   Mammo Diagnostic Left W 3d & Cad    Result Date: 12/9/2020  Narrative: DIAGNOSIS: Abnormal mammogram TECHNIQUE: Digital screening mammography was performed  Computer Aided Detection (CAD) analyzed all applicable images  COMPARISONS:  Multiple prior exams dating between 06/22/2004 and 06/08/2020  RELEVANT HISTORY: Family Breast Cancer History: History of breast cancer in Daughter, Maternal Aunt  Family Medical History: Family medical history includes breast cancer in 2 relatives (daughter, maternal aunt)   Personal History: Hormone history includes birth control and estrogen replacement therapy  Surgical history includes breast biopsy  No known relevant medical history  RISK ASSESSMENT: 5 Year Tyrer-Cuzick: 3 82 % 10 Year Tyrer-Cuzick: No Score Lifetime Tyrer-Cuzick: 4 61 % TISSUE DENSITY: The breasts are heterogeneously dense, which may obscure small masses  INDICATION: Juana Pierce is a 79 y o  female presenting for 6 month follow up  FINDINGS: LEFT 1) ASYMMETRY: There is an asymmetry seen in the inner region of the left breast in the posterior depth  Compared to the previous study, there are no significant changes  Impression:  Probable asymmetric breast tissue in the inner left breast has not significantly changed  Continued follow-up is recommended with bilateral diagnostic mammogram in 6 months when the patient is due for her annual exam  ASSESSMENT/BI-RADS CATEGORY: Left: 3 - Probably Benign Overall: 3 - Probably Benign RECOMMENDATION:      - Diagnostic mammogram in 6 months for both breasts   Workstation ID: [id number]

## 2020-12-29 ENCOUNTER — TELEPHONE (OUTPATIENT)
Dept: NEPHROLOGY | Facility: CLINIC | Age: 80
End: 2020-12-29

## 2020-12-29 ENCOUNTER — TELEPHONE (OUTPATIENT)
Dept: OTHER | Facility: HOSPITAL | Age: 80
End: 2020-12-29

## 2020-12-31 ENCOUNTER — TELEPHONE (OUTPATIENT)
Dept: NEPHROLOGY | Facility: CLINIC | Age: 80
End: 2020-12-31

## 2021-01-03 ENCOUNTER — IMMUNIZATIONS (OUTPATIENT)
Dept: FAMILY MEDICINE CLINIC | Facility: HOSPITAL | Age: 81
End: 2021-01-03

## 2021-01-03 DIAGNOSIS — Z23 ENCOUNTER FOR IMMUNIZATION: ICD-10-CM

## 2021-01-03 PROCEDURE — 0011A SARS-COV-2 / COVID-19 MRNA VACCINE (MODERNA) 100 MCG: CPT

## 2021-01-03 PROCEDURE — 91301 SARS-COV-2 / COVID-19 MRNA VACCINE (MODERNA) 100 MCG: CPT

## 2021-01-11 ENCOUNTER — ANESTHESIA EVENT (OUTPATIENT)
Dept: GASTROENTEROLOGY | Facility: HOSPITAL | Age: 81
End: 2021-01-11

## 2021-01-11 RX ORDER — LIDOCAINE HYDROCHLORIDE 10 MG/ML
0.5 INJECTION, SOLUTION EPIDURAL; INFILTRATION; INTRACAUDAL; PERINEURAL ONCE AS NEEDED
Status: CANCELLED | OUTPATIENT
Start: 2021-01-11

## 2021-01-11 RX ORDER — SODIUM CHLORIDE, SODIUM LACTATE, POTASSIUM CHLORIDE, CALCIUM CHLORIDE 600; 310; 30; 20 MG/100ML; MG/100ML; MG/100ML; MG/100ML
125 INJECTION, SOLUTION INTRAVENOUS CONTINUOUS
Status: CANCELLED | OUTPATIENT
Start: 2021-01-11

## 2021-01-12 ENCOUNTER — ANESTHESIA (OUTPATIENT)
Dept: GASTROENTEROLOGY | Facility: HOSPITAL | Age: 81
End: 2021-01-12

## 2021-01-12 ENCOUNTER — HOSPITAL ENCOUNTER (OUTPATIENT)
Dept: GASTROENTEROLOGY | Facility: HOSPITAL | Age: 81
Setting detail: OUTPATIENT SURGERY
Discharge: HOME | End: 2021-01-12
Attending: INTERNAL MEDICINE
Payer: COMMERCIAL

## 2021-01-12 VITALS
TEMPERATURE: 97.3 F | BODY MASS INDEX: 25.11 KG/M2 | HEART RATE: 73 BPM | HEIGHT: 61 IN | DIASTOLIC BLOOD PRESSURE: 81 MMHG | RESPIRATION RATE: 18 BRPM | WEIGHT: 133 LBS | SYSTOLIC BLOOD PRESSURE: 138 MMHG | OXYGEN SATURATION: 99 %

## 2021-01-12 VITALS — HEART RATE: 67 BPM

## 2021-01-12 DIAGNOSIS — K21.00 GASTROESOPHAGEAL REFLUX DISEASE WITH ESOPHAGITIS WITHOUT HEMORRHAGE: ICD-10-CM

## 2021-01-12 DIAGNOSIS — K63.1 DUODENAL PERFORATION (HCC): ICD-10-CM

## 2021-01-12 PROCEDURE — 88305 TISSUE EXAM BY PATHOLOGIST: CPT | Performed by: PATHOLOGY

## 2021-01-12 PROCEDURE — 43239 EGD BIOPSY SINGLE/MULTIPLE: CPT | Performed by: INTERNAL MEDICINE

## 2021-01-12 RX ORDER — SODIUM CHLORIDE 9 MG/ML
INJECTION, SOLUTION INTRAVENOUS CONTINUOUS PRN
Status: DISCONTINUED | OUTPATIENT
Start: 2021-01-12 | End: 2021-01-12

## 2021-01-12 RX ORDER — PROPOFOL 10 MG/ML
INJECTION, EMULSION INTRAVENOUS AS NEEDED
Status: DISCONTINUED | OUTPATIENT
Start: 2021-01-12 | End: 2021-01-12

## 2021-01-12 RX ADMIN — PHENYLEPHRINE HYDROCHLORIDE 100 MCG: 10 INJECTION INTRAVENOUS at 15:36

## 2021-01-12 RX ADMIN — PROPOFOL 20 MG: 10 INJECTION, EMULSION INTRAVENOUS at 15:44

## 2021-01-12 RX ADMIN — PROPOFOL 30 MG: 10 INJECTION, EMULSION INTRAVENOUS at 15:46

## 2021-01-12 RX ADMIN — PROPOFOL 20 MG: 10 INJECTION, EMULSION INTRAVENOUS at 15:42

## 2021-01-12 RX ADMIN — PROPOFOL 30 MG: 10 INJECTION, EMULSION INTRAVENOUS at 15:38

## 2021-01-12 RX ADMIN — SODIUM CHLORIDE: 9 INJECTION, SOLUTION INTRAVENOUS at 15:30

## 2021-01-12 RX ADMIN — Medication 40 MG: at 15:41

## 2021-01-12 RX ADMIN — PROPOFOL 80 MG: 10 INJECTION, EMULSION INTRAVENOUS at 15:35

## 2021-01-12 RX ADMIN — PROPOFOL 20 MG: 10 INJECTION, EMULSION INTRAVENOUS at 15:40

## 2021-01-12 NOTE — INTERVAL H&P NOTE
H&P reviewed  After examining the patient I find no changes in the patients condition since the H&P had been written      Vitals:    01/12/21 1424   BP: 138/63   Pulse: 70   Resp: 16   Temp: 98 2 °F (36 8 °C)   SpO2: 98%

## 2021-01-12 NOTE — ANESTHESIA PREPROCEDURE EVALUATION
Procedure:  EGD    Relevant Problems   CARDIO   (+) Accelerated hypertension      GI/HEPATIC   (+) GERD (gastroesophageal reflux disease)      /RENAL   (+) VINNY (acute kidney injury) (HCC)   (+) Anemia of chronic renal failure, stage 3 (moderate)   (+) Chronic kidney disease, stage III (moderate)   (+) Hypertensive chronic kidney disease with stage 1 through stage 4 chronic kidney disease, or unspecified chronic kidney disease      HEMATOLOGY   (+) Anemia   (+) Anemia of chronic renal failure, stage 3 (moderate)      MUSCULOSKELETAL   (+) Chronic bilateral low back pain without sciatica   (+) Osteoarthritis of hip        Physical Exam    Airway    Mallampati score: II  TM Distance: >3 FB  Neck ROM: full     Dental       Cardiovascular  Cardiovascular exam normal    Pulmonary  Pulmonary exam normal     Other Findings        Anesthesia Plan  ASA Score- 2     Anesthesia Type- IV sedation with anesthesia with ASA Monitors  Additional Monitors:   Airway Plan:     Comment: Discussed risks/benefits, including medication reactions, awareness, aspiration, and serious/life threatening complications  Plan to maintain native airway with IVGA, monitored with EtCO2  Plan Factors-Exercise tolerance (METS): >4 METS  Patient summary reviewed  Patient instructed to abstain from smoking on day of procedure  Patient did not smoke on day of surgery  Induction- intravenous  Postoperative Plan-     Informed Consent- Anesthetic plan and risks discussed with patient  I personally reviewed this patient with the CRNA  Discussed and agreed on the Anesthesia Plan with the CRNA  Fior Pretty

## 2021-01-12 NOTE — ANESTHESIA POSTPROCEDURE EVALUATION
Post-Op Assessment Note    CV Status:  Stable  Pain Score: 0    Pain management: adequate     Mental Status:  Sleepy   Hydration Status:  Stable   PONV Controlled:  None   Airway Patency:  Patent      Post Op Vitals Reviewed: Yes      Staff: CRNA         No complications documented      /56 (01/12/21 1552)    Temp (!) 97 3 °F (36 3 °C) (01/12/21 1552)    Pulse 65 (01/12/21 1552)   Resp 18 (01/12/21 1552)    SpO2 100 % (01/12/21 1552)

## 2021-01-15 ENCOUNTER — TELEPHONE (OUTPATIENT)
Dept: NEPHROLOGY | Facility: CLINIC | Age: 81
End: 2021-01-15

## 2021-01-18 ENCOUNTER — TELEPHONE (OUTPATIENT)
Dept: GASTROENTEROLOGY | Facility: MEDICAL CENTER | Age: 81
End: 2021-01-18

## 2021-01-18 NOTE — TELEPHONE ENCOUNTER
----- Message from Shweta Dudley MD sent at 1/15/2021  5:00 PM EST -----  Please call patient :  Biopsies were unremarkable

## 2021-01-29 ENCOUNTER — IMMUNIZATIONS (OUTPATIENT)
Dept: FAMILY MEDICINE CLINIC | Facility: HOSPITAL | Age: 81
End: 2021-01-29

## 2021-01-29 DIAGNOSIS — Z23 ENCOUNTER FOR IMMUNIZATION: Primary | ICD-10-CM

## 2021-01-29 PROCEDURE — 0012A SARS-COV-2 / COVID-19 MRNA VACCINE (MODERNA) 100 MCG: CPT

## 2021-01-29 PROCEDURE — 91301 SARS-COV-2 / COVID-19 MRNA VACCINE (MODERNA) 100 MCG: CPT

## 2021-02-01 NOTE — PROGRESS NOTES
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/arteriolar nephrosclerosis  · Baseline creatinine:  1 1 -1 79  · Current creatinine:  1 25 at baseline  · Urine protein creatinine ratio:  0 19 g at goal   Recommendations:  · Treat hypertension-please see below  · Treat dyslipidemia-please see below  · Maintain proteinuria less than 1 g or as low as possible  · Avoid nephrotoxic agents such as NSAIDs, patient counseled as such  2   Volume:  Euvolemic  3   Hypertension:  Secondary workup:  Was negative  · Home readings:  None at this time     · Goal blood pressure:  Less than 130/80 given CKD  Recommendations:  ·  Push nonmedical regimen especially weight loss/isotonic exercise and low-salt diet  · Medication changes today:   · No changes at this time pending 1 week of home readings and evaluation of her blood pressure monitor  4   Electrolytes:    · Mild metabolic acidosis:  Resolved  · Borderline hypokalemia:  resolved on spironolactone with a potassium of 3 8  5   Mineral bone disorder:  · Calcium/magnesium:  Both normal  · Current calcium normal at   10 0  ·  magnesium 2 1  ·  phosphorus borderline elevated 4 3 just monitor    · PTH intact: 15 2 which is actually low  · Vitamin-D:  Pending  6   Dyslipidemia:  · Goal LDL:  Less than 100  · Current lipid profile:  LDL  87/  /triglycerides 72  Recommendations:  Patient is at goal   7   Anemia:   · Hemoglobin:   Stable at 11 6  · Iron studies:  Good iron studies  · Stool for occult blood x3 not done at this time  · Multiple myeloma evaluation  was negative as outlined above  · GI evaluation:  EGD with just esophagitis; colonoscopy with benign polyp  · Heme consultation:  Felt anemia which was normocytic secondary to chronic kidney disease   No further recommendations at this time    8   Other problems:  · GERD  · Osteoarthritis of right hip  · Colonic polyp  · Status post duodenal perforation 10/04/2020:  Apparently upper GI endoscopy demonstrated no leak  Endoscopy demonstrated no leak  She was NPO with antibiotics who was given a 2 week course of antibiotics upon discharge along with antacid medication  To be followed up as an outpatient        PATIENT INSTRUCTIONS:    Patient Instructions   1  Medication changes today:   No medication changes today  2  Please take 1 week a blood pressure readings  AT THIS TIME     AS FOLLOWS  MORNING AND EVENING, SITTING AND STANDING as follows:  · TAKE THE MORNING READINGS BEFORE ANY MEDICATIONS AND WHEN YOU ARE RELAXED FOR SEVERAL MINUTES  · TAKE THE EVENING READINGS:  BETWEEN 7-10 P M ; PRIOR TO ANY MEDICATIONS; AT LEAST IN OUR  FROM DINNER; AND CERTAINLY AFTER RELAXING FOR A FEW MINUTES  · PLEASE INCLUDE HEART RATE WITH YOUR BLOOD PRESSURE READINGS  · When taking standing readings, keep your arm supported at heart level and not dangling  · Make sure you are sitting with your back supported and feet on the ground and do not cross your legs or feet  · Make sure you have not taken any coffee or caffeine products or exercised or smoke cigarettes at least 30 minutes before taking your blood pressure  THEN PLEASE BRING IN YOUR BLOOD PRESSURE MACHINE ALONG WITH YOUR BLOOD PRESSURE READINGS TO SEE 1 OF MY ADVANCED PRACTITIONER'S IN APPROXIMATELY THE NEXT FEW WEEKS  3  In 3 months:  · Please go for nonfasting lab work any time of the day to make sure your kidney function is stable  · Please send in 1 week of blood pressure readings morning evening as outlined above      4  Follow-up in 6 months   Please bring in 1 week a blood pressure readings morning evening, sitting and standing is outlined above   Please go for fasting lab work 1-2 weeks prior to your appointment      5  General instructions:   AVOID SALT BUT NOT ADDING AN READING LABELS TO MAKE SURE THERE IS LOW-SALT IN THE FOOD THAT YOU ARE EATING  o Goal is less than 2 g of sodium intake or less than 5 g of sodium chloride intake per day     Avoid nonsteroidal anti-inflammatory drugs such as Naprosyn, ibuprofen, Aleve, Advil, Celebrex, Meloxicam (Mobic) etc   You can use Tylenol as needed if you do not have any liver condition to be concerned about     Avoid medications such as Sudafed or decongestants and antihistamines that contained the D component which is the decongestant  You can take antihistamines without the decongestant or D component   Try to exercise at least 30 minutes 3 days a week to begin with with an ultimate goal of 5 days a week for at least 30 minutes     Please do not drink more than 2 glasses of alcohol/wine on a daily basis as this may contribute to your high blood pressure  Subjective:   Patient was hospitalized October with a duodenal perforation please see details the impression plan  The patient overall is feeling well  No fevers, chills, or cough or colds    Good appetite and good energy  No hematuria, dysuria, voiding symptoms or foamy urine  No gastrointestinal symptoms  No cardiovascular symptoms including swelling of the legs  No headaches, dizziness or lightheadedness  Blood pressure medications:  · Toprol XL 20 mg daily in the morning  · Amlodipine 2 5 mg daily in the morning    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    Hypertension     Osteoarthritis of hip 4/4/2016    SI (sacroiliac) joint inflammation ([facility] [address] ) 4/4/2016    [facility]      Past Surgical History:   Procedure Laterality Date    BREAST EXCISIONAL BIOPSY Left 2000    benign    COLONOSCOPY      JOINT REPLACEMENT      right hip 8/15    TONSILLECTOMY       Family History   Problem Relation Age of Onset    No Known Problems Mother     No Known Problems Father     Breast cancer Daughter 48    Breast cancer Maternal Aunt 79    No Known Problems Sister     No Known Problems Maternal Grandmother     No Known Problems Maternal Grandfather     No Known Problems Paternal Grandmother     No Known Problems Paternal Grandfather     No Known Problems Son     No Known Problems Sister     No Known Problems Sister       reports that she has never smoked  She has never used smokeless tobacco  She reports previous drug use  Drug: Prescription  She reports that she does not drink alcohol  I COMPLETELY REVIEWED THE PAST MEDICAL HISTORY/PAST SURGICAL HISTORY/SOCIAL HISTORY/FAMILY HISTORY/AND MEDICATIONS  AND UPDATED ALL    Objective:     Vitals:   Vitals:    02/09/21 1452   BP: 130/64   Blood pressure sitting left:  132/68 with a heart rate of 88 and regular  Blood pressure standing on:  138/78 with a heart rate 96 and regular    Weight (last 2 days)     Date/Time   Weight    02/09/21 1452   62 1 (137)            Wt Readings from Last 3 Encounters:   02/09/21 62 1 kg (137 lb)   01/12/21 60 3 kg (133 lb)   12/21/20 60 3 kg (133 lb)       Body mass index is 25 89 kg/m²      Physical Exam: General:  No acute distress  Skin:  No acute rash  Eyes:  No scleral icterus, noninjected, no discharge from eyes  ENT:  Moist mucous membranes  Neck:  Supple, no jugular venous distention, trachea is midline, no lymphadenopathy and no thyromegaly  Back   No CVAT  Chest:  Clear to auscultation and percussion, good respiratory effort  CVS:  Regular rate and rhythm without a rub, or gallops or murmurs  Abdomen:  Soft and nontender with normal bowel sounds  Extremities:  No cyanosis and no edema, no arthritic changes, normal range of motion  Neuro:  Grossly intact  Psych:  Alert, oriented x3 and appropriate      Medications:    Current Outpatient Medications:     amLODIPine (NORVASC) 2 5 mg tablet, Take 2 5 mg by mouth daily at bedtime, Disp: , Rfl:     atorvastatin (LIPITOR) 10 mg tablet, , Disp: , Rfl:     lidocaine (XYLOCAINE) 5 % ointment, , Disp: , Rfl:     metoprolol succinate (TOPROL-XL) 25 mg 24 hr tablet, Take 1 tablet (25 mg total) by mouth daily, Disp: 90 tablet, Rfl: 3    pantoprazole (PROTONIX) 40 mg tablet, Take 1 tablet (40 mg total) by mouth 2 (two) times a day before meals, Disp: 60 tablet, Rfl: 1    PREMARIN vaginal cream, , Disp: , Rfl:     amoxicillin-clavulanate (AUGMENTIN) 250-62 5 mg/5 mL suspension, , Disp: , Rfl:     ciprofloxacin (CIPRO) 500 mg tablet, , Disp: , Rfl:     clarithromycin (BIAXIN) 250 mg/5 mL suspension, , Disp: , Rfl:     doxycycline hyclate (VIBRAMYCIN) 100 mg capsule, , Disp: , Rfl:     fluconazole (DIFLUCAN) 150 mg tablet, , Disp: , Rfl:     ibuprofen (MOTRIN) 800 mg tablet, , Disp: , Rfl:     ondansetron (ZOFRAN-ODT) 4 mg disintegrating tablet, Take 1 tablet (4 mg total) by mouth every 6 (six) hours as needed for nausea or vomiting (Patient not taking: Reported on 2/9/2021), Disp: 20 tablet, Rfl: 0    phenazopyridine (PYRIDIUM) 100 mg tablet, , Disp: , Rfl:     potassium chloride (K-DUR,KLOR-CON) 20 mEq tablet, Take 1 tablet (20 mEq total) by mouth 2 (two) times a day (Patient not taking: Reported on 12/21/2020), Disp: 5 tablet, Rfl: 0    Potassium Chloride ER 20 MEQ TBCR, , Disp: , Rfl:     spironolactone (ALDACTONE) 25 mg tablet, Take 1 tablet (25 mg total) by mouth daily (Patient not taking: Reported on 12/21/2020), Disp: 30 tablet, Rfl: 5    Lab, Imaging and other studies: I have personally reviewed pertinent labs    Laboratory Results:  Results for orders placed or performed in visit on 02/09/21   Protein / creatinine ratio, urine   Result Value Ref Range    Creatinine, Ur 87 7 mg/dL    Protein Urine Random 17 mg/dL    Prot/Creat Ratio, Ur 0 19 (H) 0 00 - 0 10       Results from last 7 days   Lab Units 02/05/21  1016 WBC Thousand/uL 6 03   HEMOGLOBIN g/dL 11 6   HEMATOCRIT % 36 5   PLATELETS Thousands/uL 256   POTASSIUM mmol/L 3 8   CHLORIDE mmol/L 109*   CO2 mmol/L 24   BUN mg/dL 22   CREATININE mg/dL 1 25   CALCIUM mg/dL 10 0   MAGNESIUM mg/dL 2 1   PHOSPHORUS mg/dL 4 3*         Radiology review:   chest X-ray    Ultrasound      Portions of the record may have been created with voice recognition software  Occasional wrong word or "sound a like" substitutions may have occurred due to the inherent limitations of voice recognition software  Read the chart carefully and recognize, using context, where substitutions have occurred

## 2021-02-05 ENCOUNTER — LAB (OUTPATIENT)
Dept: LAB | Facility: HOSPITAL | Age: 81
End: 2021-02-05
Attending: INTERNAL MEDICINE
Payer: COMMERCIAL

## 2021-02-05 DIAGNOSIS — E55.9 VITAMIN D DEFICIENCY: ICD-10-CM

## 2021-02-05 DIAGNOSIS — E61.1 IRON DEFICIENCY: ICD-10-CM

## 2021-02-05 DIAGNOSIS — I12.9 HYPERTENSIVE CHRONIC KIDNEY DISEASE WITH STAGE 1 THROUGH STAGE 4 CHRONIC KIDNEY DISEASE, OR UNSPECIFIED CHRONIC KIDNEY DISEASE: ICD-10-CM

## 2021-02-05 DIAGNOSIS — D63.1 ANEMIA OF CHRONIC RENAL FAILURE, STAGE 3 (MODERATE) (HCC): ICD-10-CM

## 2021-02-05 DIAGNOSIS — E78.5 DYSLIPIDEMIA: ICD-10-CM

## 2021-02-05 DIAGNOSIS — N18.30 CHRONIC KIDNEY DISEASE, STAGE III (MODERATE) (HCC): ICD-10-CM

## 2021-02-05 DIAGNOSIS — N18.30 STAGE 3 CHRONIC KIDNEY DISEASE, UNSPECIFIED WHETHER STAGE 3A OR 3B CKD (HCC): ICD-10-CM

## 2021-02-05 DIAGNOSIS — N18.30 ANEMIA OF CHRONIC RENAL FAILURE, STAGE 3 (MODERATE) (HCC): ICD-10-CM

## 2021-02-05 DIAGNOSIS — E83.52 HYPERCALCEMIA: ICD-10-CM

## 2021-02-05 LAB
ALBUMIN SERPL BCP-MCNC: 3.7 G/DL (ref 3.5–5)
ALP SERPL-CCNC: 73 U/L (ref 46–116)
ALT SERPL W P-5'-P-CCNC: 33 U/L (ref 12–78)
ANION GAP SERPL CALCULATED.3IONS-SCNC: 5 MMOL/L (ref 4–13)
AST SERPL W P-5'-P-CCNC: 19 U/L (ref 5–45)
BILIRUB SERPL-MCNC: 0.45 MG/DL (ref 0.2–1)
BUN SERPL-MCNC: 22 MG/DL (ref 5–25)
CALCIUM SERPL-MCNC: 10 MG/DL (ref 8.3–10.1)
CHLORIDE SERPL-SCNC: 109 MMOL/L (ref 100–108)
CHOLEST SERPL-MCNC: 201 MG/DL (ref 50–200)
CO2 SERPL-SCNC: 24 MMOL/L (ref 21–32)
CREAT SERPL-MCNC: 1.25 MG/DL (ref 0.6–1.3)
CREAT UR-MCNC: 87.7 MG/DL
ERYTHROCYTE [DISTWIDTH] IN BLOOD BY AUTOMATED COUNT: 12.9 % (ref 11.6–15.1)
FERRITIN SERPL-MCNC: 234 NG/ML (ref 8–388)
GFR SERPL CREATININE-BSD FRML MDRD: 41 ML/MIN/1.73SQ M
GLUCOSE P FAST SERPL-MCNC: 82 MG/DL (ref 65–99)
HCT VFR BLD AUTO: 36.5 % (ref 34.8–46.1)
HDLC SERPL-MCNC: 100 MG/DL
HGB BLD-MCNC: 11.6 G/DL (ref 11.5–15.4)
IRON SATN MFR SERPL: 24 %
IRON SERPL-MCNC: 70 UG/DL (ref 50–170)
LDLC SERPL CALC-MCNC: 87 MG/DL (ref 0–100)
MAGNESIUM SERPL-MCNC: 2.1 MG/DL (ref 1.6–2.6)
MCH RBC QN AUTO: 29.8 PG (ref 26.8–34.3)
MCHC RBC AUTO-ENTMCNC: 31.8 G/DL (ref 31.4–37.4)
MCV RBC AUTO: 94 FL (ref 82–98)
PHOSPHATE SERPL-MCNC: 4.3 MG/DL (ref 2.3–4.1)
PLATELET # BLD AUTO: 256 THOUSANDS/UL (ref 149–390)
PMV BLD AUTO: 10.2 FL (ref 8.9–12.7)
POTASSIUM SERPL-SCNC: 3.8 MMOL/L (ref 3.5–5.3)
PROT SERPL-MCNC: 7.1 G/DL (ref 6.4–8.2)
PROT UR-MCNC: 17 MG/DL
PROT/CREAT UR: 0.19 MG/G{CREAT} (ref 0–0.1)
PTH-INTACT SERPL-MCNC: 15.2 PG/ML (ref 18.4–80.1)
RBC # BLD AUTO: 3.89 MILLION/UL (ref 3.81–5.12)
SODIUM SERPL-SCNC: 138 MMOL/L (ref 136–145)
TIBC SERPL-MCNC: 288 UG/DL (ref 250–450)
TRIGL SERPL-MCNC: 72 MG/DL
WBC # BLD AUTO: 6.03 THOUSAND/UL (ref 4.31–10.16)

## 2021-02-05 PROCEDURE — 36415 COLL VENOUS BLD VENIPUNCTURE: CPT

## 2021-02-05 PROCEDURE — 83970 ASSAY OF PARATHORMONE: CPT

## 2021-02-05 PROCEDURE — 83735 ASSAY OF MAGNESIUM: CPT

## 2021-02-05 PROCEDURE — 84156 ASSAY OF PROTEIN URINE: CPT | Performed by: INTERNAL MEDICINE

## 2021-02-05 PROCEDURE — 82306 VITAMIN D 25 HYDROXY: CPT

## 2021-02-05 PROCEDURE — 80061 LIPID PANEL: CPT

## 2021-02-05 PROCEDURE — 85027 COMPLETE CBC AUTOMATED: CPT

## 2021-02-05 PROCEDURE — 82728 ASSAY OF FERRITIN: CPT

## 2021-02-05 PROCEDURE — 84100 ASSAY OF PHOSPHORUS: CPT

## 2021-02-05 PROCEDURE — 82570 ASSAY OF URINE CREATININE: CPT | Performed by: INTERNAL MEDICINE

## 2021-02-05 PROCEDURE — 83540 ASSAY OF IRON: CPT

## 2021-02-05 PROCEDURE — 80053 COMPREHEN METABOLIC PANEL: CPT

## 2021-02-05 PROCEDURE — 83550 IRON BINDING TEST: CPT

## 2021-02-09 ENCOUNTER — OFFICE VISIT (OUTPATIENT)
Dept: NEPHROLOGY | Facility: CLINIC | Age: 81
End: 2021-02-09
Payer: COMMERCIAL

## 2021-02-09 VITALS
HEIGHT: 61 IN | DIASTOLIC BLOOD PRESSURE: 64 MMHG | SYSTOLIC BLOOD PRESSURE: 130 MMHG | BODY MASS INDEX: 25.86 KG/M2 | WEIGHT: 137 LBS

## 2021-02-09 DIAGNOSIS — E78.5 DYSLIPIDEMIA: ICD-10-CM

## 2021-02-09 DIAGNOSIS — E55.9 VITAMIN D DEFICIENCY: ICD-10-CM

## 2021-02-09 DIAGNOSIS — E83.52 HYPERCALCEMIA: ICD-10-CM

## 2021-02-09 DIAGNOSIS — I12.9 HYPERTENSIVE CHRONIC KIDNEY DISEASE WITH STAGE 1 THROUGH STAGE 4 CHRONIC KIDNEY DISEASE, OR UNSPECIFIED CHRONIC KIDNEY DISEASE: ICD-10-CM

## 2021-02-09 DIAGNOSIS — N18.30 STAGE 3 CHRONIC KIDNEY DISEASE, UNSPECIFIED WHETHER STAGE 3A OR 3B CKD (HCC): Primary | ICD-10-CM

## 2021-02-09 DIAGNOSIS — E61.1 IRON DEFICIENCY: ICD-10-CM

## 2021-02-09 PROCEDURE — 99214 OFFICE O/P EST MOD 30 MIN: CPT | Performed by: INTERNAL MEDICINE

## 2021-02-09 NOTE — PATIENT INSTRUCTIONS
1  Medication changes today:   No medication changes today  2  Please take 1 week a blood pressure readings  AT THIS TIME     AS FOLLOWS  MORNING AND EVENING, SITTING AND STANDING as follows:  · TAKE THE MORNING READINGS BEFORE ANY MEDICATIONS AND WHEN YOU ARE RELAXED FOR SEVERAL MINUTES  · TAKE THE EVENING READINGS:  BETWEEN 7-10 P M ; PRIOR TO ANY MEDICATIONS; AT LEAST IN OUR  FROM DINNER; AND CERTAINLY AFTER RELAXING FOR A FEW MINUTES  · PLEASE INCLUDE HEART RATE WITH YOUR BLOOD PRESSURE READINGS  · When taking standing readings, keep your arm supported at heart level and not dangling  · Make sure you are sitting with your back supported and feet on the ground and do not cross your legs or feet  · Make sure you have not taken any coffee or caffeine products or exercised or smoke cigarettes at least 30 minutes before taking your blood pressure  THEN PLEASE BRING IN YOUR BLOOD PRESSURE MACHINE ALONG WITH YOUR BLOOD PRESSURE READINGS TO SEE 1 OF MY ADVANCED PRACTITIONER'S IN APPROXIMATELY THE NEXT FEW WEEKS  3  In 3 months:  · Please go for nonfasting lab work any time of the day to make sure your kidney function is stable  · Please send in 1 week of blood pressure readings morning evening as outlined above      4  Follow-up in 6 months   Please bring in 1 week a blood pressure readings morning evening, sitting and standing is outlined above   Please go for fasting lab work 1-2 weeks prior to your appointment      5  General instructions:   AVOID SALT BUT NOT ADDING AN READING LABELS TO MAKE SURE THERE IS LOW-SALT IN THE FOOD THAT YOU ARE EATING  o Goal is less than 2 g of sodium intake or less than 5 g of sodium chloride intake per day     Avoid nonsteroidal anti-inflammatory drugs such as Naprosyn, ibuprofen, Aleve, Advil, Celebrex, Meloxicam (Mobic) etc   You can use Tylenol as needed if you do not have any liver condition to be concerned about     Avoid medications such as Sudafed or decongestants and antihistamines that contained the D component which is the decongestant  You can take antihistamines without the decongestant or D component   Try to exercise at least 30 minutes 3 days a week to begin with with an ultimate goal of 5 days a week for at least 30 minutes     Please do not drink more than 2 glasses of alcohol/wine on a daily basis as this may contribute to your high blood pressure

## 2021-02-09 NOTE — LETTER
February 9, 2021     Yuliana Tabares DO  [address] [facility]  [address]    Patient: Jordy Le   YOB: 1940   Date of Visit: 2/9/2021       Dear Dr Barrie Quinn: Thank you for referring Jordy Le to me for evaluation  Below are my notes for this consultation  If you have questions, please do not hesitate to call me  I look forward to following your patient along with you  Sincerely,        Kenan Henley MD        CC: MD Kenan Page MD  2/9/2021  3:23 PM  Sign when Signing Visit  RENAL FOLLOW UP NOTE: td    ASSESSMENT AND PLAN:  1  Kelsie Orantes :  · Etiology:  AK I from Bactrim/hypercalcemia along with poor oral intake   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/arteriolar nephrosclerosis  · Baseline creatinine:  1 1 -1 79  · Current creatinine:  1 25 at baseline  · Urine protein creatinine ratio:  0 19 g at goal   Recommendations:  · Treat hypertension-please see below  · Treat dyslipidemia-please see below  · Maintain proteinuria less than 1 g or as low as possible  · Avoid nephrotoxic agents such as NSAIDs, patient counseled as such  2   Volume:  Euvolemic  3   Hypertension:  Secondary workup:  Was negative  · Home readings:  None at this time     · Goal blood pressure:  Less than 130/80 given CKD  Recommendations:  ·  Push nonmedical regimen especially weight loss/isotonic exercise and low-salt diet  · Medication changes today:   · No changes at this time pending 1 week of home readings and evaluation of her blood pressure monitor    4   Electrolytes:    · Mild metabolic acidosis:  Resolved  · Borderline hypokalemia:  resolved on spironolactone with a potassium of 3 8  5   Mineral bone disorder:  · Calcium/magnesium:  Both normal  · Current calcium normal at   10 0  ·  magnesium 2 1  ·  phosphorus borderline elevated 4 3 just monitor    · PTH intact: 15 2 which is actually low  · Vitamin-D:  Pending  6   Dyslipidemia:  · Goal LDL:  Less than 100  · Current lipid profile:  LDL  87/  /triglycerides 72  Recommendations:  Patient is at goal   7   Anemia:   · Hemoglobin:   Stable at 11 6  · Iron studies:  Good iron studies  · Stool for occult blood x3 not done at this time  · Multiple myeloma evaluation  was negative as outlined above  · GI evaluation:  EGD with just esophagitis; colonoscopy with benign polyp  · Heme consultation:  Felt anemia which was normocytic secondary to chronic kidney disease   No further recommendations at this time  8   Other problems:  · GERD  · Osteoarthritis of right hip  · Colonic polyp  · Status post duodenal perforation 10/04/2020:  Apparently upper GI endoscopy demonstrated no leak  Endoscopy demonstrated no leak  She was NPO with antibiotics who was given a 2 week course of antibiotics upon discharge along with antacid medication  To be followed up as an outpatient        PATIENT INSTRUCTIONS:    Patient Instructions   1  Medication changes today:   No medication changes today        2  Please take 1 week a blood pressure readings  AT THIS TIME     AS FOLLOWS  MORNING AND EVENING, SITTING AND STANDING as follows:  · TAKE THE MORNING READINGS BEFORE ANY MEDICATIONS AND WHEN YOU ARE RELAXED FOR SEVERAL MINUTES  · TAKE THE EVENING READINGS:  BETWEEN 7-10 P M ; PRIOR TO ANY MEDICATIONS; AT LEAST IN OUR  FROM DINNER; AND CERTAINLY AFTER RELAXING FOR A FEW MINUTES  · PLEASE INCLUDE HEART RATE WITH YOUR BLOOD PRESSURE READINGS  · When taking standing readings, keep your arm supported at heart level and not dangling  · Make sure you are sitting with your back supported and feet on the ground and do not cross your legs or feet  · Make sure you have not taken any coffee or caffeine products or exercised or smoke cigarettes at least 30 minutes before taking your blood pressure  THEN PLEASE BRING IN YOUR BLOOD PRESSURE MACHINE ALONG WITH YOUR BLOOD PRESSURE READINGS TO SEE 1 OF MY ADVANCED PRACTITIONER'S IN APPROXIMATELY THE NEXT FEW WEEKS  3  In 3 months:  · Please go for nonfasting lab work any time of the day to make sure your kidney function is stable  · Please send in 1 week of blood pressure readings morning evening as outlined above      4  Follow-up in 6 months   Please bring in 1 week a blood pressure readings morning evening, sitting and standing is outlined above   Please go for fasting lab work 1-2 weeks prior to your appointment      5  General instructions:   AVOID SALT BUT NOT ADDING AN READING LABELS TO MAKE SURE THERE IS LOW-SALT IN THE FOOD THAT YOU ARE EATING  o Goal is less than 2 g of sodium intake or less than 5 g of sodium chloride intake per day     Avoid nonsteroidal anti-inflammatory drugs such as Naprosyn, ibuprofen, Aleve, Advil, Celebrex, Meloxicam (Mobic) etc   You can use Tylenol as needed if you do not have any liver condition to be concerned about     Avoid medications such as Sudafed or decongestants and antihistamines that contained the D component which is the decongestant  You can take antihistamines without the decongestant or D component   Try to exercise at least 30 minutes 3 days a week to begin with with an ultimate goal of 5 days a week for at least 30 minutes     Please do not drink more than 2 glasses of alcohol/wine on a daily basis as this may contribute to your high blood pressure  Subjective:   Patient was hospitalized October with a duodenal perforation please see details the impression plan  The patient overall is feeling well  No fevers, chills, or cough or colds    Good appetite and good energy  No hematuria, dysuria, voiding symptoms or foamy urine  No gastrointestinal symptoms  No cardiovascular symptoms including swelling of the legs  No headaches, dizziness or lightheadedness  Blood pressure medications:  · Toprol XL 20 mg daily in the morning  · Amlodipine 2 5 mg daily in the morning    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    Hypertension     Osteoarthritis of hip 4/4/2016    SI (sacroiliac) joint inflammation (HCC) 4/4/2016    Substance addiction ([address] )      Past Surgical History:   Procedure Laterality Date    BREAST EXCISIONAL BIOPSY Left 2000    benign    COLONOSCOPY      JOINT REPLACEMENT      right hip 8/15    TONSILLECTOMY       Family History   Problem Relation Age of Onset    No Known Problems Mother     No Known Problems Father     Breast cancer Daughter 48    Breast cancer Maternal Aunt 79    No Known Problems Sister     No Known Problems Maternal Grandmother     No Known Problems Maternal Grandfather     No Known Problems Paternal Grandmother     No Known Problems Paternal Grandfather     No Known Problems Son     No Known Problems Sister     No Known Problems Sister       reports that she has never smoked  She has never used smokeless tobacco  She reports previous drug use  Drug: Prescription  She reports that she does not drink alcohol  I COMPLETELY REVIEWED THE PAST MEDICAL HISTORY/PAST SURGICAL HISTORY/SOCIAL HISTORY/FAMILY HISTORY/AND MEDICATIONS  AND UPDATED ALL    Objective:     Vitals:   Vitals:    02/09/21 1452   BP: 130/64   Blood pressure sitting left:  132/68 with a heart rate of 88 and regular  Blood pressure standing on:  138/78 with a heart rate 96 and regular    Weight (last 2 days)     Date/Time   Weight    02/09/21 1452   62 1 (137)            Wt Readings from Last 3 Encounters:   02/09/21 62 1 kg (137 lb)   01/12/21 60 3 kg (133 lb)   12/21/20 60 3 kg (133 lb)       Body mass index is 25 89 kg/m²      Physical Exam: General:  No acute distress  Skin:  No acute rash  Eyes:  No scleral icterus, noninjected, no discharge from eyes  ENT:  Moist mucous membranes  Neck:  Supple, no jugular venous distention, trachea is midline, no lymphadenopathy and no thyromegaly  Back   No CVAT  Chest:  Clear to auscultation and percussion, good respiratory effort  CVS:  Regular rate and rhythm without a rub, or gallops or murmurs  Abdomen:  Soft and nontender with normal bowel sounds  Extremities:  No cyanosis and no edema, no arthritic changes, normal range of motion  Neuro:  Grossly intact  Psych:  Alert, oriented x3 and appropriate      Medications:    Current Outpatient Medications:     amLODIPine (NORVASC) 2 5 mg tablet, Take 2 5 mg by mouth daily at bedtime, Disp: , Rfl:     atorvastatin (LIPITOR) 10 mg tablet, , Disp: , Rfl:     lidocaine (XYLOCAINE) 5 % ointment, , Disp: , Rfl:     metoprolol succinate (TOPROL-XL) 25 mg 24 hr tablet, Take 1 tablet (25 mg total) by mouth daily, Disp: 90 tablet, Rfl: 3    pantoprazole (PROTONIX) 40 mg tablet, Take 1 tablet (40 mg total) by mouth 2 (two) times a day before meals, Disp: 60 tablet, Rfl: 1    PREMARIN vaginal cream, , Disp: , Rfl:     amoxicillin-clavulanate (AUGMENTIN) 250-62 5 mg/5 mL suspension, , Disp: , Rfl:     ciprofloxacin (CIPRO) 500 mg tablet, , Disp: , Rfl:     clarithromycin (BIAXIN) 250 mg/5 mL suspension, , Disp: , Rfl:     doxycycline hyclate (VIBRAMYCIN) 100 mg capsule, , Disp: , Rfl:     fluconazole (DIFLUCAN) 150 mg tablet, , Disp: , Rfl:     ibuprofen (MOTRIN) 800 mg tablet, , Disp: , Rfl:     ondansetron (ZOFRAN-ODT) 4 mg disintegrating tablet, Take 1 tablet (4 mg total) by mouth every 6 (six) hours as needed for nausea or vomiting (Patient not taking: Reported on 2/9/2021), Disp: 20 tablet, Rfl: 0    phenazopyridine (PYRIDIUM) 100 mg tablet, , Disp: , Rfl:     potassium chloride (K-DUR,KLOR-CON) 20 mEq tablet, Take 1 tablet (20 mEq total) by mouth 2 (two) times a day (Patient not taking: Reported on 12/21/2020), Disp: 5 tablet, Rfl: 0    Potassium Chloride ER 20 MEQ TBCR, , Disp: , Rfl:   spironolactone (ALDACTONE) 25 mg tablet, Take 1 tablet (25 mg total) by mouth daily (Patient not taking: Reported on 12/21/2020), Disp: 30 tablet, Rfl: 5    Lab, Imaging and other studies: I have personally reviewed pertinent labs  Laboratory Results:  Results for orders placed or performed in visit on 02/09/21   Protein / creatinine ratio, urine   Result Value Ref Range    Creatinine, Ur 87 7 mg/dL    Protein Urine Random 17 mg/dL    Prot/Creat Ratio, Ur 0 19 (H) 0 00 - 0 10       Results from last 7 days   Lab Units 02/05/21  1016   WBC Thousand/uL 6 03   HEMOGLOBIN g/dL 11 6   HEMATOCRIT % 36 5   PLATELETS Thousands/uL 256   POTASSIUM mmol/L 3 8   CHLORIDE mmol/L 109*   CO2 mmol/L 24   BUN mg/dL 22   CREATININE mg/dL 1 25   CALCIUM mg/dL 10 0   MAGNESIUM mg/dL 2 1   PHOSPHORUS mg/dL 4 3*         Radiology review:   chest X-ray    Ultrasound      Portions of the record may have been created with voice recognition software  Occasional wrong word or "sound a like" substitutions may have occurred due to the inherent limitations of voice recognition software  Read the chart carefully and recognize, using context, where substitutions have occurred

## 2021-02-10 LAB
25(OH)D2 SERPL-MCNC: <1 NG/ML
25(OH)D3 SERPL-MCNC: 42 NG/ML
25(OH)D3+25(OH)D2 SERPL-MCNC: 42 NG/ML

## 2021-02-23 ENCOUNTER — OFFICE VISIT (OUTPATIENT)
Dept: NEPHROLOGY | Facility: CLINIC | Age: 81
End: 2021-02-23
Payer: COMMERCIAL

## 2021-02-23 ENCOUNTER — OFFICE VISIT (OUTPATIENT)
Dept: OBGYN CLINIC | Facility: HOSPITAL | Age: 81
End: 2021-02-23
Payer: COMMERCIAL

## 2021-02-23 VITALS
DIASTOLIC BLOOD PRESSURE: 73 MMHG | WEIGHT: 139 LBS | HEART RATE: 93 BPM | SYSTOLIC BLOOD PRESSURE: 125 MMHG | HEIGHT: 61 IN | BODY MASS INDEX: 26.24 KG/M2

## 2021-02-23 VITALS
BODY MASS INDEX: 26.24 KG/M2 | SYSTOLIC BLOOD PRESSURE: 136 MMHG | HEART RATE: 84 BPM | HEIGHT: 61 IN | RESPIRATION RATE: 16 BRPM | TEMPERATURE: 97 F | DIASTOLIC BLOOD PRESSURE: 82 MMHG | WEIGHT: 139 LBS

## 2021-02-23 DIAGNOSIS — M70.62 TROCHANTERIC BURSITIS OF LEFT HIP: ICD-10-CM

## 2021-02-23 DIAGNOSIS — M70.61 TROCHANTERIC BURSITIS OF RIGHT HIP: Primary | ICD-10-CM

## 2021-02-23 DIAGNOSIS — R01.1 HEART MURMUR: Primary | ICD-10-CM

## 2021-02-23 DIAGNOSIS — I12.9 HYPERTENSIVE CHRONIC KIDNEY DISEASE WITH STAGE 1 THROUGH STAGE 4 CHRONIC KIDNEY DISEASE, OR UNSPECIFIED CHRONIC KIDNEY DISEASE: ICD-10-CM

## 2021-02-23 PROCEDURE — 99214 OFFICE O/P EST MOD 30 MIN: CPT | Performed by: INTERNAL MEDICINE

## 2021-02-23 PROCEDURE — 20610 DRAIN/INJ JOINT/BURSA W/O US: CPT | Performed by: ORTHOPAEDIC SURGERY

## 2021-02-23 PROCEDURE — 99213 OFFICE O/P EST LOW 20 MIN: CPT | Performed by: ORTHOPAEDIC SURGERY

## 2021-02-23 RX ORDER — BETAMETHASONE SODIUM PHOSPHATE AND BETAMETHASONE ACETATE 3; 3 MG/ML; MG/ML
12 INJECTION, SUSPENSION INTRA-ARTICULAR; INTRALESIONAL; INTRAMUSCULAR; SOFT TISSUE
Status: COMPLETED | OUTPATIENT
Start: 2021-02-23 | End: 2021-02-23

## 2021-02-23 RX ORDER — LIDOCAINE HYDROCHLORIDE 10 MG/ML
2 INJECTION, SOLUTION INFILTRATION; PERINEURAL
Status: COMPLETED | OUTPATIENT
Start: 2021-02-23 | End: 2021-02-23

## 2021-02-23 RX ORDER — BUPIVACAINE HYDROCHLORIDE 2.5 MG/ML
2 INJECTION, SOLUTION INFILTRATION; PERINEURAL
Status: COMPLETED | OUTPATIENT
Start: 2021-02-23 | End: 2021-02-23

## 2021-02-23 RX ORDER — METOPROLOL SUCCINATE 25 MG/1
25 TABLET, EXTENDED RELEASE ORAL 2 TIMES DAILY
Qty: 180 TABLET | Refills: 3 | Status: SHIPPED | OUTPATIENT
Start: 2021-02-23 | End: 2021-03-30 | Stop reason: SDUPTHER

## 2021-02-23 RX ORDER — AMLODIPINE BESYLATE 2.5 MG/1
2.5 TABLET ORAL 2 TIMES DAILY
Qty: 180 TABLET | Refills: 3 | Status: SHIPPED | OUTPATIENT
Start: 2021-02-23 | End: 2021-09-24 | Stop reason: ALTCHOICE

## 2021-02-23 RX ADMIN — BUPIVACAINE HYDROCHLORIDE 2 ML: 2.5 INJECTION, SOLUTION INFILTRATION; PERINEURAL at 09:27

## 2021-02-23 RX ADMIN — LIDOCAINE HYDROCHLORIDE 2 ML: 10 INJECTION, SOLUTION INFILTRATION; PERINEURAL at 09:27

## 2021-02-23 RX ADMIN — BETAMETHASONE SODIUM PHOSPHATE AND BETAMETHASONE ACETATE 12 MG: 3; 3 INJECTION, SUSPENSION INTRA-ARTICULAR; INTRALESIONAL; INTRAMUSCULAR; SOFT TISSUE at 09:27

## 2021-02-23 NOTE — PROGRESS NOTES
Assessment:  1  Trochanteric bursitis of right hip     2  Trochanteric bursitis of left hip         Plan:  The patient was provided with bilateral trochanteric bursa steroid injections  The patient tolerated the procedure well  The patient should follow up in 3 months  To do next visit:  Return in about 3 months (around 5/23/2021) for Recheck  The above stated was discussed in layman's terms and the patient expressed understanding  All questions were answered to the patient's satisfaction  Scribe Attestation    I,:  Garcia Sewell am acting as a scribe while in the presence of the attending physician :       I,:  Nicanor Wallace MD personally performed the services described in this documentation    as scribed in my presence :             Subjective:   Iliana Starks is a 79 y o  female who presents for follow up of right hip  She is s/p right trochanteric bursa steroid injection with lasting benefit, 8/25/2020  Today she complains of return of right lateral hip pain and also left lateral hip pain  Prolonged walking and lying on right side aggravates while sitting alleviates          Review of systems negative unless otherwise specified in HPI    Past Medical History:   Diagnosis Date    Chronic kidney disease     GERD (gastroesophageal reflux disease) 4/4/2016    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    Hypertension     Osteoarthritis of hip 4/4/2016    [facility]ro[facility]ac[facility]) 4/4/2016    Substance addiction ([address] )        Past Surgical History:   Procedure Laterality Date    BREAST EXCISIONAL BIOPSY Left 2000    benign    COLONOSCOPY      JOINT REPLACEMENT      right hip 8/15    TONSILLECTOMY         Family History   Problem Relation Age of Onset    No Known Problems Mother     No Known Problems Father     Breast cancer Daughter 48    Breast cancer Maternal Aunt 79    No Known Problems Sister     No Known Problems Maternal Grandmother     No Known Problems Maternal Grandfather     No Known Problems Paternal Grandmother     No Known Problems Paternal Grandfather     No Known Problems Son     No Known Problems Sister     No Known Problems Sister        Social History     Occupational History    Not on file   Tobacco Use    Smoking status: Never Smoker    Smokeless tobacco: Never Used   Substance and Sexual Activity    Alcohol use: Never     Frequency: Never     Binge frequency: Never    Drug use: Not Currently     Types: Prescription     Comment: rocovering last used 1996    Sexual activity: Yes     Partners: Male     Birth control/protection: Post-menopausal         Current Outpatient Medications:     amLODIPine (NORVASC) 2 5 mg tablet, Take 2 5 mg by mouth daily at bedtime, Disp: , Rfl:     amoxicillin-clavulanate (AUGMENTIN) 250-62 5 mg/5 mL suspension, , Disp: , Rfl:     atorvastatin (LIPITOR) 10 mg tablet, , Disp: , Rfl:     ciprofloxacin (CIPRO) 500 mg tablet, , Disp: , Rfl:     clarithromycin (BIAXIN) 250 mg/5 mL suspension, , Disp: , Rfl:     doxycycline hyclate (VIBRAMYCIN) 100 mg capsule, , Disp: , Rfl:     fluconazole (DIFLUCAN) 150 mg tablet, , Disp: , Rfl:     ibuprofen (MOTRIN) 800 mg tablet, , Disp: , Rfl:     lidocaine (XYLOCAINE) 5 % ointment, , Disp: , Rfl:     metoprolol succinate (TOPROL-XL) 25 mg 24 hr tablet, Take 1 tablet (25 mg total) by mouth daily, Disp: 90 tablet, Rfl: 3    ondansetron (ZOFRAN-ODT) 4 mg disintegrating tablet, Take 1 tablet (4 mg total) by mouth every 6 (six) hours as needed for nausea or vomiting (Patient not taking: Reported on 2/9/2021), Disp: 20 tablet, Rfl: 0    pantoprazole (PROTONIX) 40 mg tablet, Take 1 tablet (40 mg total) by mouth 2 (two) times a day before meals, Disp: 60 tablet, Rfl: 1    phenazopyridine (PYRIDIUM) 100 mg tablet, , Disp: , Rfl:     potassium chloride (K-DUR,KLOR-CON) 20 mEq tablet, Take 1 tablet (20 mEq total) by mouth 2 (two) times a day (Patient not taking: Reported on 12/21/2020), Disp: 5 tablet, Rfl: 0    Potassium Chloride ER 20 MEQ TBCR, , Disp: , Rfl:     PREMARIN vaginal cream, , Disp: , Rfl:     spironolactone (ALDACTONE) 25 mg tablet, Take 1 tablet (25 mg total) by mouth daily (Patient not taking: Reported on 12/21/2020), Disp: 30 tablet, Rfl: 5    Allergies   Allergen Reactions    Bactrim [Sulfamethoxazole-Trimethoprim]      VINNY            Vitals:    02/23/21 0913   BP: 125/73   Pulse: 93       Objective:  Physical exam  · General: Awake, Alert, Oriented  · Eyes: Pupils equal, round and reactive to light  · Heart: regular rate and rhythm  · Lungs: No audible wheezing  · Abdomen: soft                    Ortho Exam   Bilateral hips:  TTP over greater trochanter  Good arc of motion  Patient sits comfortably in chair with hip flexed at 90 degrees  Patient stands from seated position without assistance  Calf compartments soft and supple  Sensation intact  Toes are warm sensate and mobile      Diagnostics, reviewed and taken today if performed as documented:    None performed     Procedures, if performed today:    Large joint arthrocentesis: R greater trochanteric bursa  Universal Protocol:  Consent: Verbal consent obtained  Risks and benefits: risks, benefits and alternatives were discussed  Consent given by: patient  Time out: Immediately prior to procedure a "time out" was called to verify the correct patient, procedure, equipment, support staff and site/side marked as required    Timeout called at: 2/23/2021 9:26 AM   Patient understanding: patient states understanding of the procedure being performed  Site marked: the operative site was marked  Patient identity confirmed: verbally with patient    Supporting Documentation  Indications: pain   Procedure Details  Location: hip - R greater trochanteric bursa  Needle size: 22 G  Ultrasound guidance: no  Approach: lateral  Medications administered: 12 mg betamethasone acetate-betamethasone sodium phosphate 6 (3-3) mg/mL; 2 mL bupivacaine 0 25 %; 2 mL lidocaine 1 %    Patient tolerance: patient tolerated the procedure well with no immediate complications  Dressing:  Sterile dressing applied    Large joint arthrocentesis: L greater trochanteric bursa  Universal Protocol:  Consent: Verbal consent obtained  Risks and benefits: risks, benefits and alternatives were discussed  Consent given by: patient  Time out: Immediately prior to procedure a "time out" was called to verify the correct patient, procedure, equipment, support staff and site/side marked as required  Timeout called at: 2/23/2021 9:26 AM   Patient understanding: patient states understanding of the procedure being performed  Site marked: the operative site was marked  Patient identity confirmed: verbally with patient    Supporting Documentation  Indications: pain   Procedure Details  Location: hip - L greater trochanteric bursa  Needle size: 22 G  Ultrasound guidance: no  Approach: lateral  Medications administered: 12 mg betamethasone acetate-betamethasone sodium phosphate 6 (3-3) mg/mL; 2 mL bupivacaine 0 25 %; 2 mL lidocaine 1 %    Patient tolerance: patient tolerated the procedure well with no immediate complications  Dressing:  Sterile dressing applied             Portions of the record may have been created with voice recognition software  Occasional wrong word or "sound a like" substitutions may have occurred due to the inherent limitations of voice recognition software  Read the chart carefully and recognize, using context, where substitutions have occurred

## 2021-02-23 NOTE — LETTER
February 23, 2021     Luis M Blanton DO  [address] [facility]  [address]    Patient: Iliana Starks   YOB: 1940   Date of Visit: 2/23/2021       Dear Dr Hughes Me: Thank you for referring Iliana Starks to me for evaluation  Below are my notes for this consultation  If you have questions, please do not hesitate to call me  I look forward to following your patient along with you  Sincerely,        Jannet James MD        CC: No Recipients  Jannet James MD  2/23/2021  1:39 PM  Sign when Signing Visit  RENAL FOLLOW UP NOTE: td    ASSESSMENT AND PLAN:  Patient has a history of CKD stage 3/hypertension we are seeing today for blood pressure evaluation/blood pressure monitor correlation with the office  1  CKD stage 3:  Back to baseline at 1 25 recently    2  Hypertension:  Negative secondary workup as outlined  Home readings:  -a m :  147/82, no orthostatic changes  -p m   147/72, no orthostatic changes  -heart rate:  70-80 range    Goal:  Less than 130/80 given CKD    Recommendations:  -push nonmedical regimen especially isotonic exercise and avoidance of salt  -medication changes today:  · Increase amlodipine to 2 5 b i d   · Increase Toprol XL 25 b i d  Recheck blood pressures about 4 weeks after making the changes    3  Cardiac murmur: For completeness I would order an echocardiogram   Patient is asymptomatic  PATIENT INSTRUCTIONS:    Patient Instructions   1  Medication changes today:   Please increase amlodipine to 2 5 mg twice a day  Please watch for swelling and call if you developed any   Please increase Toprol XL/metoprolol to 25 mg twice a day  2  Please go for an echocardiogram of your heart  We will help to arrange for this        3  Please take 1 week a blood pressure readings 3-4 weeks after making the above medication changes     AS FOLLOWS  MORNING AND EVENING, SITTING AND STANDING as follows:  · TAKE THE MORNING READINGS BEFORE ANY MEDICATIONS AND WHEN YOU ARE RELAXED FOR SEVERAL MINUTES  · TAKE THE EVENING READINGS:  BETWEEN 7-10 P M ; PRIOR TO ANY MEDICATIONS; AT LEAST IN OUR  FROM DINNER; AND CERTAINLY AFTER RELAXING FOR A FEW MINUTES  · PLEASE INCLUDE HEART RATE WITH YOUR BLOOD PRESSURE READINGS  · When taking standing readings, keep your arm supported at heart level and not dangling  · Make sure you are sitting with your back supported and feet on the ground and do not cross your legs or feet  · Make sure you have not taken any coffee or caffeine products or exercised or smoke cigarettes at least 30 minutes before taking your blood pressure  Then please mail these readings into the office      4  General instructions:   AVOID SALT BUT NOT ADDING AN READING LABELS TO MAKE SURE THERE IS LOW-SALT IN THE FOOD THAT YOU ARE EATING  o Goal is less than 2 g of sodium intake or less than 5 g of sodium chloride intake per day     Avoid nonsteroidal anti-inflammatory drugs such as Naprosyn, ibuprofen, Aleve, Advil, Celebrex, Meloxicam (Mobic) etc   You can use Tylenol as needed if you do not have any liver condition to be concerned about     Avoid medications such as Sudafed or decongestants and antihistamines that contained the D component which is the decongestant  You can take antihistamines without the decongestant or D component   Try to avoid medications such as pantoprazole or  Protonix/Nexium or Esomeprazole)/Prilosec or omeprazole/Prevacid or lansoprazole/AcipHex or Rabeprazole  If you are able to, use Pepcid as this is safer for your kidneys   Try to exercise at least 30 minutes 3 days a week to begin with with an ultimate goal of 5 days a week for at least 30 minutes       Please do not drink more than 2 glasses of alcohol/wine on a daily basis as this may contribute to your high blood pressure  5  Follow-up per prior visit         Subjective:    There has been no hospitalizations or acute illnesses since last visit   The patient overall is feeling well  No fevers, chills, or cough or colds  Good appetite and good energy  No hematuria, dysuria, voiding symptoms or foamy urine  No gastrointestinal symptoms  No cardiovascular symptoms including swelling of the legs  No headaches, dizziness or lightheadedness  Blood pressure medications:  · Toprol XL 25 mg daily in the morning  · Amlodipine 2 5 mg daily in the morning    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    Hypertension     Osteoarthritis of hip 4/4/2016    SI (sacroiliac) joint inflammation ([place]) 4/4/2016    Substance addiction ([place])      Past Surgical History:   Procedure Laterality Date    BREAST EXCISIONAL BIOPSY Left 2000    benign    COLONOSCOPY      JOINT REPLACEMENT      right hip 8/15    TONSILLECTOMY       Family History   Problem Relation Age of Onset    No Known Problems Mother     No Known Problems Father     Breast cancer Daughter 48    Breast cancer Maternal Aunt 79    No Known Problems Sister     No Known Problems Maternal Grandmother     No Known Problems Maternal Grandfather     No Known Problems Paternal Grandmother     No Known Problems Paternal Grandfather     No Known Problems Son     No Known Problems Sister     No Known Problems Sister       reports that she has never smoked  She has never used smokeless tobacco  She reports previous drug use  Drug: Prescription  She reports that she does not drink alcohol      I COMPLETELY REVIEWED THE PAST MEDICAL HISTORY/PAST SURGICAL HISTORY/SOCIAL HISTORY/FAMILY HISTORY/AND MEDICATIONS  AND UPDATED ALL    Objective:     Vitals:   BP sitting on right:  130/68 with a heart rate of 80 and regular  BP sitting on left:  132/68 with a heart rate of 80 and regular  BP standing on left:  140/78 with a heart rate of 88 regular  Vitals:    02/23/21 1303   BP: 136/82   Pulse: 84   Resp: 16   Temp: (!) 97 °F (36 1 °C)       Weight (last 2 days)     Date/Time   Weight    02/23/21 1303   63 (139)            Wt Readings from Last 3 Encounters:   02/23/21 63 kg (139 lb)   02/23/21 63 kg (139 lb)   02/09/21 62 1 kg (137 lb)       Body mass index is 26 26 kg/m²      Physical Exam: General:  No acute distress  Skin:  No acute rash  Eyes:  No scleral icterus, noninjected, no discharge from eyes  ENT:  Moist mucous membranes  Neck:  Supple, no jugular venous distention, trachea is midline, no lymphadenopathy and no thyromegaly  Back   No CVAT  Chest:  Clear to auscultation and percussion, good respiratory effort  CVS:  Regular rate and rhythm without a rub, or gallops grade 2/6 systolic ejection type murmur heard best at the left upper sternal border  Abdomen:  Soft and nontender with normal bowel sounds  Extremities:  No cyanosis and no edema, moderate arthritic changes, normal range of motion  Neuro:  Grossly intact  Psych:  Alert, oriented x3 and appropriate      Medications:    Current Outpatient Medications:     amLODIPine (NORVASC) 2 5 mg tablet, Take 1 tablet (2 5 mg total) by mouth 2 (two) times a day, Disp: 180 tablet, Rfl: 3    atorvastatin (LIPITOR) 10 mg tablet, , Disp: , Rfl:     metoprolol succinate (TOPROL-XL) 25 mg 24 hr tablet, Take 1 tablet (25 mg total) by mouth 2 (two) times a day, Disp: 180 tablet, Rfl: 3    ondansetron (ZOFRAN-ODT) 4 mg disintegrating tablet, Take 1 tablet (4 mg total) by mouth every 6 (six) hours as needed for nausea or vomiting, Disp: 20 tablet, Rfl: 0    PREMARIN vaginal cream, , Disp: , Rfl:     amoxicillin-clavulanate (AUGMENTIN) 250-62 5 mg/5 mL suspension, , Disp: , Rfl:     ciprofloxacin (CIPRO) 500 mg tablet, , Disp: , Rfl:     clarithromycin (BIAXIN) 250 mg/5 mL suspension, , Disp: , Rfl:     doxycycline hyclate (VIBRAMYCIN) 100 mg capsule, , Disp: , Rfl:     fluconazole (DIFLUCAN) 150 mg tablet, , Disp: , Rfl:     ibuprofen (MOTRIN) 800 mg tablet, , Disp: , Rfl:     lidocaine (XYLOCAINE) 5 % ointment, , Disp: , Rfl:     pantoprazole (PROTONIX) 40 mg tablet, Take 1 tablet (40 mg total) by mouth 2 (two) times a day before meals, Disp: 60 tablet, Rfl: 1    phenazopyridine (PYRIDIUM) 100 mg tablet, , Disp: , Rfl:     potassium chloride (K-DUR,KLOR-CON) 20 mEq tablet, Take 1 tablet (20 mEq total) by mouth 2 (two) times a day (Patient not taking: Reported on 12/21/2020), Disp: 5 tablet, Rfl: 0    Potassium Chloride ER 20 MEQ TBCR, , Disp: , Rfl:     spironolactone (ALDACTONE) 25 mg tablet, Take 1 tablet (25 mg total) by mouth daily (Patient not taking: Reported on 12/21/2020), Disp: 30 tablet, Rfl: 5  No current facility-administered medications for this visit  Lab, Imaging and other studies: I have personally reviewed pertinent labs  Laboratory Results:  Results for orders placed or performed in visit on 02/09/21   Protein / creatinine ratio, urine   Result Value Ref Range    Creatinine, Ur 87 7 mg/dL    Protein Urine Random 17 mg/dL    Prot/Creat Ratio, Ur 0 19 (H) 0 00 - 0 10             Invalid input(s): ALBUMIN      Radiology review:   chest X-ray    Ultrasound      Portions of the record may have been created with voice recognition software  Occasional wrong word or "sound a like" substitutions may have occurred due to the inherent limitations of voice recognition software  Read the chart carefully and recognize, using context, where substitutions have occurred

## 2021-02-23 NOTE — PROGRESS NOTES
RENAL FOLLOW UP NOTE: td    ASSESSMENT AND PLAN:  Patient has a history of CKD stage 3/hypertension we are seeing today for blood pressure evaluation/blood pressure monitor correlation with the office  1  CKD stage 3:  Back to baseline at 1 25 recently    2  Hypertension:  Negative secondary workup as outlined  Home readings:  -a m :  147/82, no orthostatic changes  -p m   147/72, no orthostatic changes  -heart rate:  70-80 range    Goal:  Less than 130/80 given CKD    Recommendations:  -push nonmedical regimen especially isotonic exercise and avoidance of salt  -medication changes today:  · Increase amlodipine to 2 5 b i d   · Increase Toprol XL 25 b i d  Recheck blood pressures about 4 weeks after making the changes    3  Cardiac murmur: For completeness I would order an echocardiogram   Patient is asymptomatic  PATIENT INSTRUCTIONS:    Patient Instructions   1  Medication changes today:   Please increase amlodipine to 2 5 mg twice a day  Please watch for swelling and call if you developed any   Please increase Toprol XL/metoprolol to 25 mg twice a day  2  Please go for an echocardiogram of your heart  We will help to arrange for this        3  Please take 1 week a blood pressure readings 3-4 weeks after making the above medication changes     AS FOLLOWS  MORNING AND EVENING, SITTING AND STANDING as follows:  · TAKE THE MORNING READINGS BEFORE ANY MEDICATIONS AND WHEN YOU ARE RELAXED FOR SEVERAL MINUTES  · TAKE THE EVENING READINGS:  BETWEEN 7-10 P M ; PRIOR TO ANY MEDICATIONS; AT LEAST IN OUR  FROM DINNER; AND CERTAINLY AFTER RELAXING FOR A FEW MINUTES  · PLEASE INCLUDE HEART RATE WITH YOUR BLOOD PRESSURE READINGS  · When taking standing readings, keep your arm supported at heart level and not dangling  · Make sure you are sitting with your back supported and feet on the ground and do not cross your legs or feet  · Make sure you have not taken any coffee or caffeine products or exercised or smoke cigarettes at least 30 minutes before taking your blood pressure  Then please mail these readings into the office      4  General instructions:   AVOID SALT BUT NOT ADDING AN READING LABELS TO MAKE SURE THERE IS LOW-SALT IN THE FOOD THAT YOU ARE EATING  o Goal is less than 2 g of sodium intake or less than 5 g of sodium chloride intake per day     Avoid nonsteroidal anti-inflammatory drugs such as Naprosyn, ibuprofen, Aleve, Advil, Celebrex, Meloxicam (Mobic) etc   You can use Tylenol as needed if you do not have any liver condition to be concerned about     Avoid medications such as Sudafed or decongestants and antihistamines that contained the D component which is the decongestant  You can take antihistamines without the decongestant or D component   Try to avoid medications such as pantoprazole or  Protonix/Nexium or Esomeprazole)/Prilosec or omeprazole/Prevacid or lansoprazole/AcipHex or Rabeprazole  If you are able to, use Pepcid as this is safer for your kidneys   Try to exercise at least 30 minutes 3 days a week to begin with with an ultimate goal of 5 days a week for at least 30 minutes       Please do not drink more than 2 glasses of alcohol/wine on a daily basis as this may contribute to your high blood pressure  5  Follow-up per prior visit         Subjective: There has been no hospitalizations or acute illnesses since last visit  The patient overall is feeling well  No fevers, chills, or cough or colds    Good appetite and good energy  No hematuria, dysuria, voiding symptoms or foamy urine  No gastrointestinal symptoms  No cardiovascular symptoms including swelling of the legs  No headaches, dizziness or lightheadedness  Blood pressure medications:  · Toprol XL 25 mg daily in the morning  · Amlodipine 2 5 mg daily in the morning    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    Hypertension     Osteoarthritis of hip 4/4/2016    SI (sacroiliac) joint inflammation ([facility]) 4/4/2016    Substance addiction ([address] )      Past Surgical History:   Procedure Laterality Date    BREAST EXCISIONAL BIOPSY Left 2000    benign    COLONOSCOPY      JOINT REPLACEMENT      right hip 8/15    TONSILLECTOMY       Family History   Problem Relation Age of Onset    No Known Problems Mother     No Known Problems Father     Breast cancer Daughter 48    Breast cancer Maternal Aunt 79    No Known Problems Sister     No Known Problems Maternal Grandmother     No Known Problems Maternal Grandfather     No Known Problems Paternal Grandmother     No Known Problems Paternal Grandfather     No Known Problems Son     No Known Problems Sister     No Known Problems Sister       reports that she has never smoked  She has never used smokeless tobacco  She reports previous drug use  Drug: Prescription  She reports that she does not drink alcohol  I COMPLETELY REVIEWED THE PAST MEDICAL HISTORY/PAST SURGICAL HISTORY/SOCIAL HISTORY/FAMILY HISTORY/AND MEDICATIONS  AND UPDATED ALL    Objective:     Vitals:   BP sitting on right:  130/68 with a heart rate of 80 and regular  BP sitting on left:  132/68 with a heart rate of 80 and regular  BP standing on left:  140/78 with a heart rate of 88 regular  Vitals:    02/23/21 1303   BP: 136/82   Pulse: 84   Resp: 16   Temp: (!) 97 °F (36 1 °C)       Weight (last 2 days)     Date/Time   Weight    02/23/21 1303   63 (139)            Wt Readings from Last 3 Encounters:   02/23/21 63 kg (139 lb)   02/23/21 63 kg (139 lb)   02/09/21 62 1 kg (137 lb)       Body mass index is 26 26 kg/m²      Physical Exam: General:  No acute distress  Skin:  No acute rash  Eyes:  No scleral icterus, noninjected, no discharge from eyes  ENT:  Moist mucous membranes  Neck:  Supple, no jugular venous distention, trachea is midline, no lymphadenopathy and no thyromegaly  Back   No CVAT  Chest:  Clear to auscultation and percussion, good respiratory effort  CVS:  Regular rate and rhythm without a rub, or gallops grade 2/6 systolic ejection type murmur heard best at the left upper sternal border  Abdomen:  Soft and nontender with normal bowel sounds  Extremities:  No cyanosis and no edema, moderate arthritic changes, normal range of motion  Neuro:  Grossly intact  Psych:  Alert, oriented x3 and appropriate      Medications:    Current Outpatient Medications:     amLODIPine (NORVASC) 2 5 mg tablet, Take 1 tablet (2 5 mg total) by mouth 2 (two) times a day, Disp: 180 tablet, Rfl: 3    atorvastatin (LIPITOR) 10 mg tablet, , Disp: , Rfl:     metoprolol succinate (TOPROL-XL) 25 mg 24 hr tablet, Take 1 tablet (25 mg total) by mouth 2 (two) times a day, Disp: 180 tablet, Rfl: 3    ondansetron (ZOFRAN-ODT) 4 mg disintegrating tablet, Take 1 tablet (4 mg total) by mouth every 6 (six) hours as needed for nausea or vomiting, Disp: 20 tablet, Rfl: 0    PREMARIN vaginal cream, , Disp: , Rfl:     amoxicillin-clavulanate (AUGMENTIN) 250-62 5 mg/5 mL suspension, , Disp: , Rfl:     ciprofloxacin (CIPRO) 500 mg tablet, , Disp: , Rfl:     clarithromycin (BIAXIN) 250 mg/5 mL suspension, , Disp: , Rfl:     doxycycline hyclate (VIBRAMYCIN) 100 mg capsule, , Disp: , Rfl:     fluconazole (DIFLUCAN) 150 mg tablet, , Disp: , Rfl:     ibuprofen (MOTRIN) 800 mg tablet, , Disp: , Rfl:     lidocaine (XYLOCAINE) 5 % ointment, , Disp: , Rfl:     pantoprazole (PROTONIX) 40 mg tablet, Take 1 tablet (40 mg total) by mouth 2 (two) times a day before meals, Disp: 60 tablet, Rfl: 1    phenazopyridine (PYRIDIUM) 100 mg tablet, , Disp: , Rfl:     potassium chloride (K-DUR,KLOR-CON) 20 mEq tablet, Take 1 tablet (20 mEq total) by mouth 2 (two) times a day (Patient not taking: Reported on 12/21/2020), Disp: 5 tablet, Rfl: 0    Potassium Chloride ER 20 MEQ TBCR, , Disp: , Rfl:     spironolactone (ALDACTONE) 25 mg tablet, Take 1 tablet (25 mg total) by mouth daily (Patient not taking: Reported on 12/21/2020), Disp: 30 tablet, Rfl: 5  No current facility-administered medications for this visit  Lab, Imaging and other studies: I have personally reviewed pertinent labs  Laboratory Results:  Results for orders placed or performed in visit on 02/09/21   Protein / creatinine ratio, urine   Result Value Ref Range    Creatinine, Ur 87 7 mg/dL    Protein Urine Random 17 mg/dL    Prot/Creat Ratio, Ur 0 19 (H) 0 00 - 0 10             Invalid input(s): ALBUMIN      Radiology review:   chest X-ray    Ultrasound      Portions of the record may have been created with voice recognition software  Occasional wrong word or "sound a like" substitutions may have occurred due to the inherent limitations of voice recognition software  Read the chart carefully and recognize, using context, where substitutions have occurred

## 2021-02-23 NOTE — PATIENT INSTRUCTIONS
1  Medication changes today:   Please increase amlodipine to 2 5 mg twice a day  Please watch for swelling and call if you developed any   Please increase Toprol XL/metoprolol to 25 mg twice a day  2  Please go for an echocardiogram of your heart  We will help to arrange for this  3  Please take 1 week a blood pressure readings 3-4 weeks after making the above medication changes     AS FOLLOWS  MORNING AND EVENING, SITTING AND STANDING as follows:  · TAKE THE MORNING READINGS BEFORE ANY MEDICATIONS AND WHEN YOU ARE RELAXED FOR SEVERAL MINUTES  · TAKE THE EVENING READINGS:  BETWEEN 7-10 P M ; PRIOR TO ANY MEDICATIONS; AT LEAST IN OUR  FROM DINNER; AND CERTAINLY AFTER RELAXING FOR A FEW MINUTES  · PLEASE INCLUDE HEART RATE WITH YOUR BLOOD PRESSURE READINGS  · When taking standing readings, keep your arm supported at heart level and not dangling  · Make sure you are sitting with your back supported and feet on the ground and do not cross your legs or feet  · Make sure you have not taken any coffee or caffeine products or exercised or smoke cigarettes at least 30 minutes before taking your blood pressure  Then please mail these readings into the office      4  General instructions:   AVOID SALT BUT NOT ADDING AN READING LABELS TO MAKE SURE THERE IS LOW-SALT IN THE FOOD THAT YOU ARE EATING  o Goal is less than 2 g of sodium intake or less than 5 g of sodium chloride intake per day     Avoid nonsteroidal anti-inflammatory drugs such as Naprosyn, ibuprofen, Aleve, Advil, Celebrex, Meloxicam (Mobic) etc   You can use Tylenol as needed if you do not have any liver condition to be concerned about     Avoid medications such as Sudafed or decongestants and antihistamines that contained the D component which is the decongestant  You can take antihistamines without the decongestant or D component       Try to avoid medications such as pantoprazole or  Protonix/Nexium or Esomeprazole)/Prilosec or omeprazole/Prevacid or lansoprazole/AcipHex or Rabeprazole  If you are able to, use Pepcid as this is safer for your kidneys   Try to exercise at least 30 minutes 3 days a week to begin with with an ultimate goal of 5 days a week for at least 30 minutes       Please do not drink more than 2 glasses of alcohol/wine on a daily basis as this may contribute to your high blood pressure        5  Follow-up per prior visit

## 2021-02-25 ENCOUNTER — TELEPHONE (OUTPATIENT)
Dept: NEPHROLOGY | Facility: CLINIC | Age: 81
End: 2021-02-25

## 2021-02-25 NOTE — TELEPHONE ENCOUNTER
Patient called asking to speak to Dr Deb Yeung regarding her new medications NORVASC and TOPROL-XL   Patient can be reached at [phone]

## 2021-03-23 ENCOUNTER — HOSPITAL ENCOUNTER (OUTPATIENT)
Dept: NON INVASIVE DIAGNOSTICS | Facility: HOSPITAL | Age: 81
Discharge: HOME | End: 2021-03-23
Attending: INTERNAL MEDICINE
Payer: COMMERCIAL

## 2021-03-23 ENCOUNTER — TELEPHONE (OUTPATIENT)
Dept: NEPHROLOGY | Facility: CLINIC | Age: 81
End: 2021-03-23

## 2021-03-23 DIAGNOSIS — R01.1 HEART MURMUR: ICD-10-CM

## 2021-03-23 PROCEDURE — 93306 TTE W/DOPPLER COMPLETE: CPT

## 2021-03-23 PROCEDURE — 93306 TTE W/DOPPLER COMPLETE: CPT | Performed by: INTERNAL MEDICINE

## 2021-03-23 NOTE — TELEPHONE ENCOUNTER
----- Message from Aury Cronin MD sent at 3/23/2021 12:25 PM EDT -----  Please let the patient know her echocardiogram in general looks quite good

## 2021-03-30 DIAGNOSIS — I12.9 HYPERTENSIVE CHRONIC KIDNEY DISEASE WITH STAGE 1 THROUGH STAGE 4 CHRONIC KIDNEY DISEASE, OR UNSPECIFIED CHRONIC KIDNEY DISEASE: ICD-10-CM

## 2021-03-30 RX ORDER — METOPROLOL SUCCINATE 25 MG/1
25 TABLET, EXTENDED RELEASE ORAL 2 TIMES DAILY
Qty: 180 TABLET | Refills: 3 | Status: SHIPPED | OUTPATIENT
Start: 2021-03-30 | End: 2022-04-21 | Stop reason: SDUPTHER

## 2021-04-02 NOTE — TELEPHONE ENCOUNTER
[facility] stating [facility] not receive met[facility]rolol script yet  I spoke with Ladan Snider from [facility], states metoprolol script was on hold due to insurance not allowing refill, it is too soon, however they are not aware of frequency increase to BID, Ladan Snider states she will reach out to insurance and let them know and have script ready for patient today

## 2021-04-09 ENCOUNTER — APPOINTMENT (OUTPATIENT)
Dept: LAB | Facility: HOSPITAL | Age: 81
End: 2021-04-09
Attending: INTERNAL MEDICINE
Payer: COMMERCIAL

## 2021-04-09 ENCOUNTER — TRANSCRIBE ORDERS (OUTPATIENT)
Dept: LAB | Facility: HOSPITAL | Age: 81
End: 2021-04-09

## 2021-04-09 DIAGNOSIS — Z00.8 HEALTH EXAMINATION IN POPULATION SURVEYS: Primary | ICD-10-CM

## 2021-04-09 DIAGNOSIS — M81.0 SENILE OSTEOPOROSIS: ICD-10-CM

## 2021-04-09 DIAGNOSIS — I10 ESSENTIAL HYPERTENSION, MALIGNANT: ICD-10-CM

## 2021-04-09 DIAGNOSIS — N18.9 CHRONIC KIDNEY DISEASE, UNSPECIFIED CKD STAGE: ICD-10-CM

## 2021-04-09 DIAGNOSIS — Z79.899 ENCOUNTER FOR LONG-TERM (CURRENT) USE OF OTHER MEDICATIONS: ICD-10-CM

## 2021-04-09 DIAGNOSIS — D50.8 OTHER IRON DEFICIENCY ANEMIA: ICD-10-CM

## 2021-04-09 LAB
ALBUMIN SERPL BCP-MCNC: 3.7 G/DL (ref 3.5–5)
ALP SERPL-CCNC: 68 U/L (ref 46–116)
ALT SERPL W P-5'-P-CCNC: 20 U/L (ref 12–78)
ANION GAP SERPL CALCULATED.3IONS-SCNC: 8 MMOL/L (ref 4–13)
AST SERPL W P-5'-P-CCNC: 16 U/L (ref 5–45)
BASOPHILS # BLD AUTO: 0.06 THOUSANDS/ΜL (ref 0–0.1)
BASOPHILS NFR BLD AUTO: 1 % (ref 0–1)
BILIRUB SERPL-MCNC: 0.41 MG/DL (ref 0.2–1)
BUN SERPL-MCNC: 17 MG/DL (ref 5–25)
CALCIUM SERPL-MCNC: 9 MG/DL (ref 8.3–10.1)
CHLORIDE SERPL-SCNC: 111 MMOL/L (ref 100–108)
CO2 SERPL-SCNC: 23 MMOL/L (ref 21–32)
CREAT SERPL-MCNC: 1.18 MG/DL (ref 0.6–1.3)
EOSINOPHIL # BLD AUTO: 0.14 THOUSAND/ΜL (ref 0–0.61)
EOSINOPHIL NFR BLD AUTO: 2 % (ref 0–6)
ERYTHROCYTE [DISTWIDTH] IN BLOOD BY AUTOMATED COUNT: 13.6 % (ref 11.6–15.1)
GFR SERPL CREATININE-BSD FRML MDRD: 44 ML/MIN/1.73SQ M
GLUCOSE P FAST SERPL-MCNC: 89 MG/DL (ref 65–99)
HCT VFR BLD AUTO: 36 % (ref 34.8–46.1)
HGB BLD-MCNC: 11.6 G/DL (ref 11.5–15.4)
IMM GRANULOCYTES # BLD AUTO: 0.01 THOUSAND/UL (ref 0–0.2)
IMM GRANULOCYTES NFR BLD AUTO: 0 % (ref 0–2)
LYMPHOCYTES # BLD AUTO: 1.31 THOUSANDS/ΜL (ref 0.6–4.47)
LYMPHOCYTES NFR BLD AUTO: 22 % (ref 14–44)
MCH RBC QN AUTO: 29.7 PG (ref 26.8–34.3)
MCHC RBC AUTO-ENTMCNC: 32.2 G/DL (ref 31.4–37.4)
MCV RBC AUTO: 92 FL (ref 82–98)
MONOCYTES # BLD AUTO: 0.53 THOUSAND/ΜL (ref 0.17–1.22)
MONOCYTES NFR BLD AUTO: 9 % (ref 4–12)
NEUTROPHILS # BLD AUTO: 4.01 THOUSANDS/ΜL (ref 1.85–7.62)
NEUTS SEG NFR BLD AUTO: 66 % (ref 43–75)
NRBC BLD AUTO-RTO: 0 /100 WBCS
PLATELET # BLD AUTO: 236 THOUSANDS/UL (ref 149–390)
PMV BLD AUTO: 10.5 FL (ref 8.9–12.7)
POTASSIUM SERPL-SCNC: 4 MMOL/L (ref 3.5–5.3)
PROT SERPL-MCNC: 7.3 G/DL (ref 6.4–8.2)
RBC # BLD AUTO: 3.91 MILLION/UL (ref 3.81–5.12)
SODIUM SERPL-SCNC: 142 MMOL/L (ref 136–145)
WBC # BLD AUTO: 6.06 THOUSAND/UL (ref 4.31–10.16)

## 2021-04-09 PROCEDURE — 80053 COMPREHEN METABOLIC PANEL: CPT

## 2021-04-09 PROCEDURE — 85025 COMPLETE CBC W/AUTO DIFF WBC: CPT

## 2021-04-09 PROCEDURE — 36415 COLL VENOUS BLD VENIPUNCTURE: CPT

## 2021-04-14 ENCOUNTER — DOCUMENTATION (OUTPATIENT)
Dept: NEPHROLOGY | Facility: CLINIC | Age: 81
End: 2021-04-14

## 2021-04-14 NOTE — PROGRESS NOTES
Home blood pressure readings:  -a m :  24/67   -p m :  127/69  -heart rate: 60-80 range      Patient is at goal so no medication changes continue current treatment plan

## 2021-04-22 ENCOUNTER — APPOINTMENT (OUTPATIENT)
Dept: LAB | Facility: HOSPITAL | Age: 81
End: 2021-04-22
Payer: COMMERCIAL

## 2021-04-22 DIAGNOSIS — I10 ESSENTIAL HYPERTENSION, MALIGNANT: ICD-10-CM

## 2021-04-22 DIAGNOSIS — D50.8 OTHER IRON DEFICIENCY ANEMIA: ICD-10-CM

## 2021-04-22 DIAGNOSIS — Z79.899 ENCOUNTER FOR LONG-TERM (CURRENT) USE OF OTHER MEDICATIONS: ICD-10-CM

## 2021-04-22 DIAGNOSIS — N18.9 CHRONIC KIDNEY DISEASE, UNSPECIFIED CKD STAGE: ICD-10-CM

## 2021-04-22 DIAGNOSIS — Z00.8 HEALTH EXAMINATION IN POPULATION SURVEYS: ICD-10-CM

## 2021-04-22 DIAGNOSIS — M81.0 SENILE OSTEOPOROSIS: ICD-10-CM

## 2021-04-22 LAB
ANION GAP SERPL CALCULATED.3IONS-SCNC: 6 MMOL/L (ref 4–13)
BUN SERPL-MCNC: 22 MG/DL (ref 5–25)
CALCIUM SERPL-MCNC: 9.7 MG/DL (ref 8.3–10.1)
CHLORIDE SERPL-SCNC: 108 MMOL/L (ref 100–108)
CHOLEST SERPL-MCNC: 198 MG/DL (ref 50–200)
CO2 SERPL-SCNC: 26 MMOL/L (ref 21–32)
CREAT SERPL-MCNC: 1.31 MG/DL (ref 0.6–1.3)
EST. AVERAGE GLUCOSE BLD GHB EST-MCNC: 114 MG/DL
GFR SERPL CREATININE-BSD FRML MDRD: 39 ML/MIN/1.73SQ M
GLUCOSE SERPL-MCNC: 80 MG/DL (ref 65–140)
HBA1C MFR BLD: 5.6 %
HDLC SERPL-MCNC: 99 MG/DL
LDLC SERPL CALC-MCNC: 85 MG/DL (ref 0–100)
NONHDLC SERPL-MCNC: 99 MG/DL
POTASSIUM SERPL-SCNC: 4 MMOL/L (ref 3.5–5.3)
SODIUM SERPL-SCNC: 140 MMOL/L (ref 136–145)
TRIGL SERPL-MCNC: 69 MG/DL

## 2021-04-22 PROCEDURE — 83036 HEMOGLOBIN GLYCOSYLATED A1C: CPT

## 2021-04-22 PROCEDURE — 36415 COLL VENOUS BLD VENIPUNCTURE: CPT

## 2021-04-22 PROCEDURE — 80061 LIPID PANEL: CPT

## 2021-04-22 PROCEDURE — 80048 BASIC METABOLIC PNL TOTAL CA: CPT

## 2021-05-11 ENCOUNTER — APPOINTMENT (OUTPATIENT)
Dept: LAB | Facility: HOSPITAL | Age: 81
End: 2021-05-11
Attending: INTERNAL MEDICINE
Payer: COMMERCIAL

## 2021-05-11 DIAGNOSIS — N18.30 STAGE 3 CHRONIC KIDNEY DISEASE, UNSPECIFIED WHETHER STAGE 3A OR 3B CKD (HCC): ICD-10-CM

## 2021-05-11 DIAGNOSIS — E83.52 HYPERCALCEMIA: ICD-10-CM

## 2021-05-11 DIAGNOSIS — E55.9 VITAMIN D DEFICIENCY: ICD-10-CM

## 2021-05-11 DIAGNOSIS — E78.5 DYSLIPIDEMIA: ICD-10-CM

## 2021-05-11 DIAGNOSIS — E61.1 IRON DEFICIENCY: ICD-10-CM

## 2021-05-11 DIAGNOSIS — I12.9 HYPERTENSIVE CHRONIC KIDNEY DISEASE WITH STAGE 1 THROUGH STAGE 4 CHRONIC KIDNEY DISEASE, OR UNSPECIFIED CHRONIC KIDNEY DISEASE: ICD-10-CM

## 2021-05-11 LAB
ANION GAP SERPL CALCULATED.3IONS-SCNC: 6 MMOL/L (ref 4–13)
BUN SERPL-MCNC: 21 MG/DL (ref 5–25)
CALCIUM SERPL-MCNC: 9.6 MG/DL (ref 8.3–10.1)
CHLORIDE SERPL-SCNC: 111 MMOL/L (ref 100–108)
CO2 SERPL-SCNC: 24 MMOL/L (ref 21–32)
CREAT SERPL-MCNC: 1.2 MG/DL (ref 0.6–1.3)
GFR SERPL CREATININE-BSD FRML MDRD: 43 ML/MIN/1.73SQ M
GLUCOSE SERPL-MCNC: 80 MG/DL (ref 65–140)
POTASSIUM SERPL-SCNC: 4.2 MMOL/L (ref 3.5–5.3)
SODIUM SERPL-SCNC: 141 MMOL/L (ref 136–145)

## 2021-05-11 PROCEDURE — 36415 COLL VENOUS BLD VENIPUNCTURE: CPT

## 2021-05-11 PROCEDURE — 80048 BASIC METABOLIC PNL TOTAL CA: CPT

## 2021-05-20 ENCOUNTER — TRANSCRIBE ORDERS (OUTPATIENT)
Dept: ADMINISTRATIVE | Facility: HOSPITAL | Age: 81
End: 2021-05-20

## 2021-05-20 DIAGNOSIS — R92.8 ABNORMAL MAMMOGRAM: Primary | ICD-10-CM

## 2021-05-25 ENCOUNTER — OFFICE VISIT (OUTPATIENT)
Dept: OBGYN CLINIC | Facility: HOSPITAL | Age: 81
End: 2021-05-25
Payer: COMMERCIAL

## 2021-05-25 VITALS
HEIGHT: 61 IN | DIASTOLIC BLOOD PRESSURE: 64 MMHG | BODY MASS INDEX: 26.06 KG/M2 | SYSTOLIC BLOOD PRESSURE: 111 MMHG | WEIGHT: 138 LBS | HEART RATE: 80 BPM

## 2021-05-25 DIAGNOSIS — M70.61 TROCHANTERIC BURSITIS OF RIGHT HIP: Primary | ICD-10-CM

## 2021-05-25 DIAGNOSIS — M70.62 TROCHANTERIC BURSITIS OF LEFT HIP: ICD-10-CM

## 2021-05-25 PROCEDURE — 20610 DRAIN/INJ JOINT/BURSA W/O US: CPT | Performed by: ORTHOPAEDIC SURGERY

## 2021-05-25 PROCEDURE — 99213 OFFICE O/P EST LOW 20 MIN: CPT | Performed by: ORTHOPAEDIC SURGERY

## 2021-05-25 RX ORDER — BUPIVACAINE HYDROCHLORIDE 2.5 MG/ML
2 INJECTION, SOLUTION INFILTRATION; PERINEURAL
Status: COMPLETED | OUTPATIENT
Start: 2021-05-25 | End: 2021-05-25

## 2021-05-25 RX ORDER — BETAMETHASONE SODIUM PHOSPHATE AND BETAMETHASONE ACETATE 3; 3 MG/ML; MG/ML
12 INJECTION, SUSPENSION INTRA-ARTICULAR; INTRALESIONAL; INTRAMUSCULAR; SOFT TISSUE
Status: COMPLETED | OUTPATIENT
Start: 2021-05-25 | End: 2021-05-25

## 2021-05-25 RX ORDER — LIDOCAINE HYDROCHLORIDE 10 MG/ML
2 INJECTION, SOLUTION INFILTRATION; PERINEURAL
Status: COMPLETED | OUTPATIENT
Start: 2021-05-25 | End: 2021-05-25

## 2021-05-25 RX ADMIN — BUPIVACAINE HYDROCHLORIDE 2 ML: 2.5 INJECTION, SOLUTION INFILTRATION; PERINEURAL at 09:48

## 2021-05-25 RX ADMIN — LIDOCAINE HYDROCHLORIDE 2 ML: 10 INJECTION, SOLUTION INFILTRATION; PERINEURAL at 09:48

## 2021-05-25 RX ADMIN — BETAMETHASONE SODIUM PHOSPHATE AND BETAMETHASONE ACETATE 12 MG: 3; 3 INJECTION, SUSPENSION INTRA-ARTICULAR; INTRALESIONAL; INTRAMUSCULAR; SOFT TISSUE at 09:48

## 2021-05-25 NOTE — PROGRESS NOTES
Assessment:  1  Trochanteric bursitis of right hip     2  Trochanteric bursitis of left hip         Plan:  The patient was provided with bilateral trochanteric bursa steroid injections  The patient tolerated the procedure well  The patient should follow up in 3 months  To do next visit:  Return in about 3 months (around 8/25/2021)  The above stated was discussed in layman's terms and the patient expressed understanding  All questions were answered to the patient's satisfaction  Scribe Attestation    I,:  Marie Orosco am acting as a scribe while in the presence of the attending physician :       I,:  Adrian Hernandez MD personally performed the services described in this documentation    as scribed in my presence :             Subjective:   Laurie is a 79 y o  female who presents for follow up of right hip  She is s/p right trochanteric bursa steroid injection with lasting benefit, 8/25/2020  Today she complains of return of right lateral hip pain and also left lateral hip pain  Prolonged walking and lying on right side aggravates while sitting alleviates          Review of systems negative unless otherwise specified in HPI    Past Medical History:   Diagnosis Date    Chronic kidney disease     GERD (gastroesophageal reflux disease) 4/4/2016    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    Hypertension     Osteoarthritis of hip 4/4/2016    SI (sacroiliac) joint inflammation (HCC) 4/4/2016    Substance addiction ([address] )        Past Surgical History:   Procedure Laterality Date    BREAST EXCISIONAL BIOPSY Left 2000    benign    COLONOSCOPY      JOINT REPLACEMENT      right hip 8/15    TONSILLECTOMY         Family History   Problem Relation Age of Onset    No Known Problems Mother     No Known Problems Father     Breast cancer Daughter 48    Breast cancer Maternal Aunt 79    No Known Problems Sister     No Known Problems Maternal Grandmother     No Known Problems Maternal Grandfather     No Known Problems Paternal Grandmother     No Known Problems Paternal Grandfather     No Known Problems Son     No Known Problems Sister     No Known Problems Sister        Social History     Occupational History    Not on file   Tobacco Use    Smoking status: Never Smoker    Smokeless tobacco: Never Used   Substance and Sexual Activity    Alcohol use: Never     Frequency: Never     Binge frequency: Never    Drug use: Not Currently     Types: Prescription     Comment: rocovering last used 1996    Sexual activity: Yes     Partners: Male     Birth control/protection: Post-menopausal         Current Outpatient Medications:     amLODIPine (NORVASC) 2 5 mg tablet, Take 1 tablet (2 5 mg total) by mouth 2 (two) times a day, Disp: 180 tablet, Rfl: 3    amoxicillin-clavulanate (AUGMENTIN) 250-62 5 mg/5 mL suspension, , Disp: , Rfl:     atorvastatin (LIPITOR) 10 mg tablet, , Disp: , Rfl:     ciprofloxacin (CIPRO) 500 mg tablet, , Disp: , Rfl:     clarithromycin (BIAXIN) 250 mg/5 mL suspension, , Disp: , Rfl:     doxycycline hyclate (VIBRAMYCIN) 100 mg capsule, , Disp: , Rfl:     fluconazole (DIFLUCAN) 150 mg tablet, , Disp: , Rfl:     ibuprofen (MOTRIN) 800 mg tablet, , Disp: , Rfl:     lidocaine (XYLOCAINE) 5 % ointment, , Disp: , Rfl:     metoprolol succinate (TOPROL-XL) 25 mg 24 hr tablet, Take 1 tablet (25 mg total) by mouth 2 (two) times a day, Disp: 180 tablet, Rfl: 3    ondansetron (ZOFRAN-ODT) 4 mg disintegrating tablet, Take 1 tablet (4 mg total) by mouth every 6 (six) hours as needed for nausea or vomiting, Disp: 20 tablet, Rfl: 0    pantoprazole (PROTONIX) 40 mg tablet, Take 1 tablet (40 mg total) by mouth 2 (two) times a day before meals, Disp: 60 tablet, Rfl: 1    phenazopyridine (PYRIDIUM) 100 mg tablet, , Disp: , Rfl:     potassium chloride (K-DUR,KLOR-CON) 20 mEq tablet, Take 1 tablet (20 mEq total) by mouth 2 (two) times a day (Patient not taking: Reported on 12/21/2020), Disp: 5 tablet, Rfl: 0    Potassium Chloride ER 20 MEQ TBCR, , Disp: , Rfl:     PREMARIN vaginal cream, , Disp: , Rfl:     spironolactone (ALDACTONE) 25 mg tablet, Take 1 tablet (25 mg total) by mouth daily (Patient not taking: Reported on 12/21/2020), Disp: 30 tablet, Rfl: 5    Allergies   Allergen Reactions    Bactrim [Sulfamethoxazole-Trimethoprim]      VINNY            Vitals:    05/25/21 0921   BP: 111/64   Pulse: 80       Objective:  Physical exam  · General: Awake, Alert, Oriented  · Eyes: Pupils equal, round and reactive to light  · Heart: regular rate and rhythm  · Lungs: No audible wheezing  · Abdomen: soft                    Ortho Exam   Bilateral hips:  TTP over greater trochanter  Good arc of motion  Patient sits comfortably in chair with hip flexed at 90 degrees  Patient stands from seated position without assistance  Calf compartments soft and supple  Sensation intact  Toes are warm sensate and mobile      Diagnostics, reviewed and taken today if performed as documented:    None performed     Procedures, if performed today:    Large joint arthrocentesis: R greater trochanteric bursa  Universal Protocol:  Consent: Verbal consent obtained  Risks and benefits: risks, benefits and alternatives were discussed  Consent given by: patient  Time out: Immediately prior to procedure a "time out" was called to verify the correct patient, procedure, equipment, support staff and site/side marked as required    Timeout called at: 5/25/2021 9:47 AM   Patient understanding: patient states understanding of the procedure being performed  Site marked: the operative site was marked  Patient identity confirmed: verbally with patient    Supporting Documentation  Indications: pain   Procedure Details  Location: hip - R greater trochanteric bursa  Needle size: 22 G  Ultrasound guidance: no  Approach: lateral  Medications administered: 12 mg betamethasone acetate-betamethasone sodium phosphate 6 (3-3) mg/mL; 2 mL bupivacaine 0 25 %; 2 mL lidocaine 1 %    Patient tolerance: patient tolerated the procedure well with no immediate complications  Dressing:  Sterile dressing applied    Large joint arthrocentesis: L greater trochanteric bursa  Universal Protocol:  Consent: Verbal consent obtained  Risks and benefits: risks, benefits and alternatives were discussed  Consent given by: patient  Time out: Immediately prior to procedure a "time out" was called to verify the correct patient, procedure, equipment, support staff and site/side marked as required  Timeout called at: 5/25/2021 9:47 AM   Patient understanding: patient states understanding of the procedure being performed  Site marked: the operative site was marked  Patient identity confirmed: verbally with patient    Supporting Documentation  Indications: pain   Procedure Details  Location: hip - L greater trochanteric bursa  Needle size: 22 G  Ultrasound guidance: no  Approach: lateral  Medications administered: 12 mg betamethasone acetate-betamethasone sodium phosphate 6 (3-3) mg/mL; 2 mL bupivacaine 0 25 %; 2 mL lidocaine 1 %    Patient tolerance: patient tolerated the procedure well with no immediate complications  Dressing:  Sterile dressing applied             Portions of the record may have been created with voice recognition software  Occasional wrong word or "sound a like" substitutions may have occurred due to the inherent limitations of voice recognition software  Read the chart carefully and recognize, using context, where substitutions have occurred

## 2021-06-11 ENCOUNTER — HOSPITAL ENCOUNTER (OUTPATIENT)
Dept: MAMMOGRAPHY | Facility: CLINIC | Age: 81
Discharge: HOME | End: 2021-06-11
Payer: COMMERCIAL

## 2021-06-11 VITALS — HEIGHT: 61 IN | BODY MASS INDEX: 26.06 KG/M2 | WEIGHT: 138 LBS

## 2021-06-11 DIAGNOSIS — R92.8 ABNORMAL MAMMOGRAM: ICD-10-CM

## 2021-06-11 PROCEDURE — G0279 TOMOSYNTHESIS, MAMMO: HCPCS

## 2021-06-11 PROCEDURE — 77066 DX MAMMO INCL CAD BI: CPT

## 2021-06-25 ENCOUNTER — TELEPHONE (OUTPATIENT)
Dept: NEPHROLOGY | Facility: CLINIC | Age: 81
End: 2021-06-25

## 2021-06-25 NOTE — TELEPHONE ENCOUNTER
I called and left message for patient to call back and schedule August follow up appt w/ Dr Bismark Medina in our [facility] office

## 2021-08-11 NOTE — PROGRESS NOTES
RENAL FOLLOW UP NOTE: td     ASSESSMENT AND PLAN:  1   CKD stage 3 :  · Etiology:  AK I from Bactrim/hypercalcemia along with poor oral intake   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/arteriolar nephrosclerosis  · Baseline creatinine:  1 1 -1 79  · Current creatinine:  1 22 at baseline  · Urine protein creatinine ratio:  0 66 g at goal   Recommendations:  · Treat hypertension-please see below  · Treat dyslipidemia-please see below  · Maintain proteinuria less than 1 g or as low as possible  · Avoid nephrotoxic agents such as NSAIDs, patient counseled as such  2   Volume:  Euvolemic  3   Hypertension:  Secondary workup:  Was negative  · Home readings:   No readings at this time     · Goal blood pressure:  Less than 130/80 given CKD  Recommendations:  ·  Push nonmedical regimen especially weight loss/isotonic exercise and low-salt diet  · Medication changes today:   · No changes at this time:  Blood pressure seems at goal, she will send in 1 week of readings  4   Electrolytes:    · Mild metabolic acidosis:  Resolved  · Borderline hypokalemia:  Low at 3 3:  I would restart spironolactone 25 mg daily if she is not having any problems with it as her potassium is once again too low, also high potassium diet and recheck  5   Mineral bone disorder:  · Calcium/magnesium:  Both normal  · Current calcium normal at    9 3  ·  magnesium 2 1  ·  phosphorus now normal at 2 7     · PTH intact:  23 4 which is normal  · Vitamin-D:  Pending  6   Dyslipidemia:  · Goal LDL:  Less than 100  · Current lipid profile:  LDL 62//triglycerides 74  Recommendations:  Patient is at goal   7   Anemia:   · Hemoglobin:   Stable at 11 9  · Iron studies:  Good iron studies  · Stool for occult blood x3 not done at this time  · Multiple myeloma evaluation  was negative as outlined above  · GI evaluation:  EGD with just esophagitis; colonoscopy with benign polyp  · Heme consultation:  Felt anemia which was normocytic secondary to chronic kidney disease   No further recommendations at this time  8   Other problems:  · GERD  · Osteoarthritis of right hip  · Colonic polyp  · Status post duodenal perforation 10/04/2020:  Apparently upper GI endoscopy demonstrated no leak  Endoscopy demonstrated no leak  She was NPO with antibiotics who was given a 2 week course of antibiotics upon discharge along with antacid medication  To be followed up as an outpatient  GI health maintenance:  Last colonoscopy July 2019    PATIENT INSTRUCTIONS:    Patient Instructions   1  Medication changes today:   No medication changes today  Continue spironolactone 25 mg once a day I did refill it  2  Please go for non fasting  lab work any time on or after August 24th next week     3  Please take 1 week a blood pressure readings  3-4 weeks after starting the spironolactone     AS FOLLOWS  MORNING AND EVENING, SITTING  as follows:  · TAKE THE MORNING READINGS BEFORE ANY MEDICATIONS AND WHEN YOU ARE RELAXED FOR SEVERAL MINUTES  · TAKE THE EVENING READINGS:  BETWEEN 7-10 P M ; PRIOR TO ANY MEDICATIONS; AT LEAST IN OUR  FROM DINNER; AND CERTAINLY AFTER RELAXING FOR A FEW MINUTES  · PLEASE INCLUDE HEART RATE WITH YOUR BLOOD PRESSURE READINGS  · When taking standing readings, keep your arm supported at heart level and not dangling  · Make sure you are sitting with your back supported and feet on the ground and do not cross your legs or feet  · Make sure you have not taken any coffee or caffeine products or exercised or smoke cigarettes at least 30 minutes before taking your blood pressure  Then please mail these readings into the office    4  In 3 months:   Please go for nonfasting lab work but in the morning   Please take 1 week a blood pressure readings as outlined above and mail those into the office     5   Follow-up in 6  months   Please bring in 1 week a blood pressure readings morning evening, sitting and standing is outlined above   PLEASE BRING AN YOUR BLOOD PRESSURE MACHINE TO CORRELATE WITH THE OFFICE MACHINE AT THIS NEXT SCHEDULED VISIT   Please go for fasting lab work 1-2 weeks prior to your appointment      6  General instructions:   AVOID SALT BUT NOT ADDING AN READING LABELS TO MAKE SURE THERE IS LOW-SALT IN THE FOOD THAT YOU ARE EATING  o Goal is less than 2 g of sodium intake or less than 5 g of sodium chloride intake per day  · Please continue with a high potassium diet:  Please see the list below which foods have a lot of potassium in them     Avoid nonsteroidal anti-inflammatory drugs such as Naprosyn, ibuprofen, Aleve, Advil, Celebrex, Meloxicam (Mobic) etc   You can use Tylenol as needed if you do not have any liver condition to be concerned about     Avoid medications such as Sudafed or decongestants and antihistamines that contained the D component which is the decongestant  You can take antihistamines without the decongestant or D component   Try to avoid medications such as pantoprazole or  Protonix/Nexium or Esomeprazole)/Prilosec or omeprazole/Prevacid or lansoprazole/AcipHex or Rabeprazole  If you are able to, use Pepcid as this is safer for your kidneys   Try to exercise at least 30 minutes 3 days a week to begin with with an ultimate goal of 5 days a week for at least 30 minutes     Please do not drink more than 2 glasses of alcohol/wine on a daily basis as this may contribute to your high blood pressure  Potassium Content of Foods List   WHAT YOU NEED TO KNOW:   Potassium is a mineral that is found in most foods  Potassium helps to balance fluids and minerals in your body  It also helps your body maintain a normal blood pressure  Potassium helps your muscles contract and your nerves function normally  DISCHARGE INSTRUCTIONS:   Why you may need to change the amount of potassium you eat:   · You may need more potassium  if you have hypokalemia (low potassium levels) or high blood pressure  You may also need more potassium if you are taking diuretics  Diuretics and certain medicines cause your body to lose potassium  · You may need less potassium  in your diet if you have hyperkalemia (high potassium levels) or kidney disease  Potassium content of fruit:  The amount of potassium in milligrams (mg) contained in each fruit or serving of fruit is listed beside the item  · High-potassium foods (more than 200 mg per serving):      ? 1 medium banana (425)    ? ½ of a papaya (390)    ? ½ cup of prune juice (370)    ? ¼ cup of raisins (270)    ? 1 medium biju (325) or kiwi (240)    ? 1 small orange (240) or ½ cup of orange juice (235)    ? ½ cup of cubed cantaloupe (215) or diced honeydew melon (200)    ? 1 medium pear (200)    · Medium-potassium foods (50 to 200 mg per serving):      ? 1 medium peach (185)    ? 1 small apple or ½ cup of apple juice (150)    ? ½ cup of peaches canned in juice (120)    ? ½ cup of canned pineapple (100)    ? ½ cup of fresh, sliced strawberries (277)    ? ½ cup of watermelon (85)    · Low-potassium foods (less than 50 mg per serving):      ? ½ cup of cranberries (45) or cranberry juice cocktail (20)    ? ½ cup of nectar of papaya, biju, or pear (35)    Potassium content of vegetables:   · High-potassium foods (more than 200 mg per serving):      ? 1 medium baked potato, with skin (925)    ? 1 baked medium sweet potato, with skin (450)    ? ½ cup of tomato or vegetable juice (275), or 1 medium raw tomato (290)    ? ½ cup of mushrooms (280)    ? ½ cup of fresh brussels sprouts (250)    ? ½ cup of cooked zucchini (220) or winter squash (250)    ? ¼ of a medium avocado (245)    ? ½ cup of broccoli (230)    · Medium-potassium foods (50 to 200 mg per serving):      ? ½ cup of corn (195)    ? ½ cup of fresh or cooked carrots (180)    ? ½ cup of fresh cauliflower (150)    ? ½ cup of asparagus (155)    ? ½ cup of canned peas (90)     ? 1 cup of lettuce, all types (100)    ?  ½ cup of fresh green beans (90)    ? ½ cup of frozen green beans (85)    ? ½ cup of cucumber (80)    Potassium content of protein foods:   · High-potassium foods (more than 200 mg per serving):      ? ½ cup of cooked marie beans (400) or lentils (365)    ? 1 cup of soy milk (300)    ? 3 ounces of baked or broiled salmon (319)    ? 3 ounces of roasted turkey, dark meat (250)    ? ¼ cup of sunflower seeds (241)    ? 3 ounces of cooked lean beef (224)    ? 2 tablespoons of smooth peanut butter (210)    · Medium-potassium foods (50 to 200 mg per serving):      ? 1 ounce of salted peanuts, almonds, or cashews (200)    ? 1 large egg (60 mg)    Potassium content of dairy foods:   · High-potassium foods (more than 200 mg per serving):      ? 6 ounces of yogurt (260 to 435)    ? 1 cup of nonfat, low-fat, or whole milk (350 to 380)    · Medium-potassium foods (50 to 200 mg per serving):      ? ½ cup of ricotta cheese (154)    ? ½ cup of vanilla ice cream (131)    ? ½ cup of low-fat (2%) cottage cheese (110)    · Low-potassium foods (less than 50 mg per serving):      ? 1 ounce of cheese (20 to 30)      Potassium content of grains:   · 1 slice of white bread (30)    · ½ cup of white or brown rice (50)    · ½ cup of spaghetti or macaroni (30)    · 1 flour or corn tortilla (50)    · 1 four-inch waffle (50)    Potassium content of other foods:   · 1 tablespoon of molasses (295)    · 1½ ounces of chocolate (165)    · Some salt substitutes may contain a high amount of potassium  Check the food label to find the amount of potassium it contains  © Copyright [employer] 2021 Information is for End User's use only and may not be sold, redistributed or otherwise used for commercial purposes  All illustrations and images included in CareNotes® are the copyrighted property of [employer]  or Joelle Montanez  The above information is an  only  It is not intended as medical advice for individual conditions or treatments  Talk to your doctor, nurse or pharmacist before following any medical regimen to see if it is safe and effective for you  Subjective: There has been no hospitalizations or acute illnesses since last visit  The patient overall is feeling well  No fevers, chills, or cough or colds  Good appetite and good energy  No hematuria, dysuria, voiding symptoms or foamy urine  No gastrointestinal symptoms  No cardiovascular symptoms including swelling of the legs  No headaches, dizziness or lightheadedness  Blood pressure medications:   Toprol XL 25 mg twice a day   Spironolactone 25 mg daily      ROS:  See HPI, otherwise review of systems as completely reviewed with the patient are negative    Past Medical History:   Diagnosis Date    Anemia     Chronic kidney disease     Duodenal ulcer     GERD (gastroesophageal reflux disease) 04/04/2016    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    Hypercalcemia     Hypertension     Mixed hyperlipidemia     Osteoarthritis of hip 04/04/2016    Sacral insufficiency fracture     SI (sacroiliac) joint inflammation ([facility] [address] ) 04/04/2016    Substance addiction ([facility] )      Past Surgical History:   Procedure Laterality Date    BREAST EXCISIONAL BIOPSY Left 2000    benign    COLONOSCOPY  2019    JOINT REPLACEMENT      right hip 8/15    MAMMO (HISTORICAL)  2019    TONSILLECTOMY       Family History   Problem Relation Age of Onset    No Known Problems Mother     No Known Problems Father     Breast cancer Daughter 48    Breast cancer Maternal Aunt 79    No Known Problems Sister     No Known Problems Maternal Grandmother     No Known Problems Maternal Grandfather     No Known Problems Paternal Grandmother     No Known Problems Paternal Grandfather     No Known Problems Son     No Known Problems Sister     No Known Problems Sister       reports that she has never smoked  She has never used smokeless tobacco  She reports previous drug use   Drug: Prescription  She reports that she does not drink alcohol  I COMPLETELY REVIEWED THE PAST MEDICAL HISTORY/PAST SURGICAL HISTORY/SOCIAL HISTORY/FAMILY HISTORY/AND MEDICATIONS  AND UPDATED ALL    Objective:     Vitals:   BP sitting on left:  116/58 with a heart rate of 80 and regular  BP standing on left:  120/62 with a heart rate of 88 and regular    Weight (last 2 days)     Date/Time   Weight    08/19/21 0958   63 5 (140)            Wt Readings from Last 3 Encounters:   08/19/21 63 5 kg (140 lb)   06/11/21 62 6 kg (138 lb)   05/25/21 62 6 kg (138 lb)       Body mass index is 26 45 kg/m²      Physical Exam: General:  No acute distress  Skin:  No acute rash  Eyes:  No scleral icterus, noninjected, no discharge from eyes  ENT:  Moist mucous membranes  Neck:  Supple, no jugular venous distention, trachea is midline, no lymphadenopathy and no thyromegaly  Back   No CVAT  Chest:  Clear to auscultation and percussion, good respiratory effort  CVS:  Regular rate and rhythm without a rub, or gallops or murmurs  Abdomen:  Soft and nontender with normal bowel sounds  Extremities:  No cyanosis and no edema, moderate arthritic changes, normal range of motion  Neuro:  Grossly intact  Psych:  Alert, oriented x3 and appropriate      Medications:    Current Outpatient Medications:     amLODIPine (NORVASC) 2 5 mg tablet, Take 1 tablet (2 5 mg total) by mouth 2 (two) times a day, Disp: 180 tablet, Rfl: 3    atorvastatin (LIPITOR) 10 mg tablet, , Disp: , Rfl:     ibuprofen (MOTRIN) 800 mg tablet, as needed , Disp: , Rfl:     lidocaine (XYLOCAINE) 5 % ointment, , Disp: , Rfl:     metoprolol succinate (TOPROL-XL) 25 mg 24 hr tablet, Take 1 tablet (25 mg total) by mouth 2 (two) times a day, Disp: 180 tablet, Rfl: 3    ondansetron (ZOFRAN-ODT) 4 mg disintegrating tablet, Take 1 tablet (4 mg total) by mouth every 6 (six) hours as needed for nausea or vomiting, Disp: 20 tablet, Rfl: 0    pantoprazole (PROTONIX) 40 mg tablet, Take 1 tablet (40 mg total) by mouth 2 (two) times a day before meals, Disp: 60 tablet, Rfl: 1    phenazopyridine (PYRIDIUM) 100 mg tablet, , Disp: , Rfl:     spironolactone (ALDACTONE) 25 mg tablet, Take 1 tablet (25 mg total) by mouth daily, Disp: 30 tablet, Rfl: 5    PREMARIN vaginal cream, , Disp: , Rfl:     Lab, Imaging and other studies: I have personally reviewed pertinent labs    Laboratory Results:  Results for orders placed or performed in visit on 08/16/21   Comprehensive metabolic panel   Result Value Ref Range    Sodium 138 136 - 145 mmol/L    Potassium 3 3 (L) 3 5 - 5 3 mmol/L    Chloride 113 (H) 100 - 108 mmol/L    CO2 21 21 - 32 mmol/L    ANION GAP 4 4 - 13 mmol/L    BUN 15 5 - 25 mg/dL    Creatinine 1 22 0 60 - 1 30 mg/dL    Glucose, Fasting 86 65 - 99 mg/dL    Calcium 9 3 8 3 - 10 1 mg/dL    AST 23 5 - 45 U/L    ALT 26 12 - 78 U/L    Alkaline Phosphatase 55 46 - 116 U/L    Total Protein 7 4 6 4 - 8 2 g/dL    Albumin 3 6 3 5 - 5 0 g/dL    Total Bilirubin 0 35 0 20 - 1 00 mg/dL    eGFR 42 ml/min/1 73sq m   CK   Result Value Ref Range    Total CK 62 26 - 192 U/L   CBC   Result Value Ref Range    WBC 6 28 4 31 - 10 16 Thousand/uL    RBC 3 98 3 81 - 5 12 Million/uL    Hemoglobin 11 9 11 5 - 15 4 g/dL    Hematocrit 36 9 34 8 - 46 1 %    MCV 93 82 - 98 fL    MCH 29 9 26 8 - 34 3 pg    MCHC 32 2 31 4 - 37 4 g/dL    RDW 13 3 11 6 - 15 1 %    Platelets 056 137 - 334 Thousands/uL    MPV 11 1 8 9 - 12 7 fL   Lipid Panel with Direct LDL reflex   Result Value Ref Range    Cholesterol 180 50 - 200 mg/dL    Triglycerides 74 <=150 mg/dL    HDL, Direct 103 >=40 mg/dL    LDL Calculated 62 0 - 100 mg/dL   Magnesium   Result Value Ref Range    Magnesium 2 1 1 6 - 2 6 mg/dL   Phosphorus   Result Value Ref Range    Phosphorus 2 7 2 3 - 4 1 mg/dL   Protein / creatinine ratio, urine   Result Value Ref Range    Creatinine, Ur 148 0 mg/dL    Protein Urine Random 97 mg/dL    Prot/Creat Ratio, Ur 0 66 (H) 0 00 - 0 10   PTH, intact Result Value Ref Range    PTH 23 4 18 4 - 80 1 pg/mL       Results from last 7 days   Lab Units 08/16/21  0933   WBC Thousand/uL 6 28   HEMOGLOBIN g/dL 11 9   HEMATOCRIT % 36 9   PLATELETS Thousands/uL 205   POTASSIUM mmol/L 3 3*   CHLORIDE mmol/L 113*   CO2 mmol/L 21   BUN mg/dL 15   CREATININE mg/dL 1 22   CALCIUM mg/dL 9 3   MAGNESIUM mg/dL 2 1   PHOSPHORUS mg/dL 2 7         Radiology review:   chest X-ray    Ultrasound      Portions of the record may have been created with voice recognition software  Occasional wrong word or "sound a like" substitutions may have occurred due to the inherent limitations of voice recognition software  Read the chart carefully and recognize, using context, where substitutions have occurred

## 2021-08-16 ENCOUNTER — APPOINTMENT (OUTPATIENT)
Dept: LAB | Facility: HOSPITAL | Age: 81
End: 2021-08-16
Attending: INTERNAL MEDICINE
Payer: COMMERCIAL

## 2021-08-16 ENCOUNTER — TELEPHONE (OUTPATIENT)
Dept: NEPHROLOGY | Facility: CLINIC | Age: 81
End: 2021-08-16

## 2021-08-16 DIAGNOSIS — N18.30 STAGE 3 CHRONIC KIDNEY DISEASE, UNSPECIFIED WHETHER STAGE 3A OR 3B CKD (HCC): ICD-10-CM

## 2021-08-16 DIAGNOSIS — E83.52 HYPERCALCEMIA: ICD-10-CM

## 2021-08-16 DIAGNOSIS — E55.9 VITAMIN D DEFICIENCY: ICD-10-CM

## 2021-08-16 DIAGNOSIS — E61.1 IRON DEFICIENCY: ICD-10-CM

## 2021-08-16 DIAGNOSIS — I12.9 HYPERTENSIVE CHRONIC KIDNEY DISEASE WITH STAGE 1 THROUGH STAGE 4 CHRONIC KIDNEY DISEASE, OR UNSPECIFIED CHRONIC KIDNEY DISEASE: ICD-10-CM

## 2021-08-16 DIAGNOSIS — E78.5 DYSLIPIDEMIA: ICD-10-CM

## 2021-08-16 LAB
ALBUMIN SERPL BCP-MCNC: 3.6 G/DL (ref 3.5–5)
ALP SERPL-CCNC: 55 U/L (ref 46–116)
ALT SERPL W P-5'-P-CCNC: 26 U/L (ref 12–78)
ANION GAP SERPL CALCULATED.3IONS-SCNC: 4 MMOL/L (ref 4–13)
AST SERPL W P-5'-P-CCNC: 23 U/L (ref 5–45)
BILIRUB SERPL-MCNC: 0.35 MG/DL (ref 0.2–1)
BUN SERPL-MCNC: 15 MG/DL (ref 5–25)
CALCIUM SERPL-MCNC: 9.3 MG/DL (ref 8.3–10.1)
CHLORIDE SERPL-SCNC: 113 MMOL/L (ref 100–108)
CHOLEST SERPL-MCNC: 180 MG/DL (ref 50–200)
CK SERPL-CCNC: 62 U/L (ref 26–192)
CO2 SERPL-SCNC: 21 MMOL/L (ref 21–32)
CREAT SERPL-MCNC: 1.22 MG/DL (ref 0.6–1.3)
CREAT UR-MCNC: 148 MG/DL
ERYTHROCYTE [DISTWIDTH] IN BLOOD BY AUTOMATED COUNT: 13.3 % (ref 11.6–15.1)
GFR SERPL CREATININE-BSD FRML MDRD: 42 ML/MIN/1.73SQ M
GLUCOSE P FAST SERPL-MCNC: 86 MG/DL (ref 65–99)
HCT VFR BLD AUTO: 36.9 % (ref 34.8–46.1)
HDLC SERPL-MCNC: 103 MG/DL
HGB BLD-MCNC: 11.9 G/DL (ref 11.5–15.4)
LDLC SERPL CALC-MCNC: 62 MG/DL (ref 0–100)
MAGNESIUM SERPL-MCNC: 2.1 MG/DL (ref 1.6–2.6)
MCH RBC QN AUTO: 29.9 PG (ref 26.8–34.3)
MCHC RBC AUTO-ENTMCNC: 32.2 G/DL (ref 31.4–37.4)
MCV RBC AUTO: 93 FL (ref 82–98)
PHOSPHATE SERPL-MCNC: 2.7 MG/DL (ref 2.3–4.1)
PLATELET # BLD AUTO: 205 THOUSANDS/UL (ref 149–390)
PMV BLD AUTO: 11.1 FL (ref 8.9–12.7)
POTASSIUM SERPL-SCNC: 3.3 MMOL/L (ref 3.5–5.3)
PROT SERPL-MCNC: 7.4 G/DL (ref 6.4–8.2)
PROT UR-MCNC: 97 MG/DL
PROT/CREAT UR: 0.66 MG/G{CREAT} (ref 0–0.1)
PTH-INTACT SERPL-MCNC: 23.4 PG/ML (ref 18.4–80.1)
RBC # BLD AUTO: 3.98 MILLION/UL (ref 3.81–5.12)
SODIUM SERPL-SCNC: 138 MMOL/L (ref 136–145)
TRIGL SERPL-MCNC: 74 MG/DL
WBC # BLD AUTO: 6.28 THOUSAND/UL (ref 4.31–10.16)

## 2021-08-16 PROCEDURE — 82306 VITAMIN D 25 HYDROXY: CPT

## 2021-08-16 PROCEDURE — 82550 ASSAY OF CK (CPK): CPT

## 2021-08-16 PROCEDURE — 36415 COLL VENOUS BLD VENIPUNCTURE: CPT

## 2021-08-16 PROCEDURE — 83735 ASSAY OF MAGNESIUM: CPT

## 2021-08-16 PROCEDURE — 84156 ASSAY OF PROTEIN URINE: CPT

## 2021-08-16 PROCEDURE — 80053 COMPREHEN METABOLIC PANEL: CPT

## 2021-08-16 PROCEDURE — 85027 COMPLETE CBC AUTOMATED: CPT

## 2021-08-16 PROCEDURE — 84100 ASSAY OF PHOSPHORUS: CPT

## 2021-08-16 PROCEDURE — 80061 LIPID PANEL: CPT

## 2021-08-16 PROCEDURE — 83970 ASSAY OF PARATHORMONE: CPT

## 2021-08-16 PROCEDURE — 82570 ASSAY OF URINE CREATININE: CPT

## 2021-08-16 NOTE — TELEPHONE ENCOUNTER
Lm for the patient advising of instructions below  - lm to call office if she has any questions or any changes to be made

## 2021-08-16 NOTE — TELEPHONE ENCOUNTER
----- Message from Leon Rosen MD sent at 8/16/2021  2:05 PM EDT -----  The potassium is low again at 3 3  Recommend:  1  I would have her restart spironolactone 25 mg daily  2  High potassium diet  3   Recheck a basic metabolic profile 1 week later

## 2021-08-19 ENCOUNTER — OFFICE VISIT (OUTPATIENT)
Dept: NEPHROLOGY | Facility: CLINIC | Age: 81
End: 2021-08-19
Payer: COMMERCIAL

## 2021-08-19 VITALS — HEIGHT: 61 IN | WEIGHT: 140 LBS | BODY MASS INDEX: 26.43 KG/M2

## 2021-08-19 DIAGNOSIS — D63.1 ANEMIA OF CHRONIC RENAL FAILURE, STAGE 3A (HCC): ICD-10-CM

## 2021-08-19 DIAGNOSIS — E83.42 HYPOMAGNESEMIA: ICD-10-CM

## 2021-08-19 DIAGNOSIS — E87.6 HYPOKALEMIA: ICD-10-CM

## 2021-08-19 DIAGNOSIS — D63.1 ANEMIA OF CHRONIC RENAL FAILURE, STAGE 3 (MODERATE) (HCC): ICD-10-CM

## 2021-08-19 DIAGNOSIS — N18.31 STAGE 3A CHRONIC KIDNEY DISEASE (HCC): ICD-10-CM

## 2021-08-19 DIAGNOSIS — I12.9 HYPERTENSIVE CHRONIC KIDNEY DISEASE WITH STAGE 1 THROUGH STAGE 4 CHRONIC KIDNEY DISEASE, OR UNSPECIFIED CHRONIC KIDNEY DISEASE: Primary | ICD-10-CM

## 2021-08-19 DIAGNOSIS — E78.5 DYSLIPIDEMIA: ICD-10-CM

## 2021-08-19 DIAGNOSIS — N18.31 ANEMIA OF CHRONIC RENAL FAILURE, STAGE 3A (HCC): ICD-10-CM

## 2021-08-19 DIAGNOSIS — N18.30 ANEMIA OF CHRONIC RENAL FAILURE, STAGE 3 (MODERATE) (HCC): ICD-10-CM

## 2021-08-19 DIAGNOSIS — E61.1 IRON DEFICIENCY: ICD-10-CM

## 2021-08-19 DIAGNOSIS — E83.52 HYPERCALCEMIA: ICD-10-CM

## 2021-08-19 DIAGNOSIS — N18.30 CHRONIC KIDNEY DISEASE, STAGE III (MODERATE) (HCC): ICD-10-CM

## 2021-08-19 DIAGNOSIS — I10 ACCELERATED HYPERTENSION: ICD-10-CM

## 2021-08-19 DIAGNOSIS — E55.9 VITAMIN D DEFICIENCY: ICD-10-CM

## 2021-08-19 PROCEDURE — 99214 OFFICE O/P EST MOD 30 MIN: CPT | Performed by: INTERNAL MEDICINE

## 2021-08-19 RX ORDER — SPIRONOLACTONE 25 MG/1
25 TABLET ORAL DAILY
Qty: 30 TABLET | Refills: 5 | Status: SHIPPED | OUTPATIENT
Start: 2021-08-19 | End: 2021-10-14 | Stop reason: ALTCHOICE

## 2021-08-19 NOTE — PATIENT INSTRUCTIONS
1  Medication changes today:   No medication changes today  Continue spironolactone 25 mg once a day I did refill it  2  Please go for non fasting  lab work any time on or after August 24th next week     3  Please take 1 week a blood pressure readings  3-4 weeks after starting the spironolactone     AS FOLLOWS  MORNING AND EVENING, SITTING  as follows:  · TAKE THE MORNING READINGS BEFORE ANY MEDICATIONS AND WHEN YOU ARE RELAXED FOR SEVERAL MINUTES  · TAKE THE EVENING READINGS:  BETWEEN 7-10 P M ; PRIOR TO ANY MEDICATIONS; AT LEAST IN OUR  FROM DINNER; AND CERTAINLY AFTER RELAXING FOR A FEW MINUTES  · PLEASE INCLUDE HEART RATE WITH YOUR BLOOD PRESSURE READINGS  · When taking standing readings, keep your arm supported at heart level and not dangling  · Make sure you are sitting with your back supported and feet on the ground and do not cross your legs or feet  · Make sure you have not taken any coffee or caffeine products or exercised or smoke cigarettes at least 30 minutes before taking your blood pressure  Then please mail these readings into the office    4  In 3 months:   Please go for nonfasting lab work but in the morning   Please take 1 week a blood pressure readings as outlined above and mail those into the office     5  Follow-up in 6  months   Please bring in 1 week a blood pressure readings morning evening, sitting and standing is outlined above   PLEASE BRING AN YOUR BLOOD PRESSURE MACHINE TO CORRELATE WITH THE OFFICE MACHINE AT THIS NEXT SCHEDULED VISIT   Please go for fasting lab work 1-2 weeks prior to your appointment      6   General instructions:   AVOID SALT BUT NOT ADDING AN READING LABELS TO MAKE SURE THERE IS LOW-SALT IN THE FOOD THAT YOU ARE EATING  o Goal is less than 2 g of sodium intake or less than 5 g of sodium chloride intake per day  · Please continue with a high potassium diet:  Please see the list below which foods have a lot of potassium in them     Avoid nonsteroidal anti-inflammatory drugs such as Naprosyn, ibuprofen, Aleve, Advil, Celebrex, Meloxicam (Mobic) etc   You can use Tylenol as needed if you do not have any liver condition to be concerned about     Avoid medications such as Sudafed or decongestants and antihistamines that contained the D component which is the decongestant  You can take antihistamines without the decongestant or D component   Try to avoid medications such as pantoprazole or  Protonix/Nexium or Esomeprazole)/Prilosec or omeprazole/Prevacid or lansoprazole/AcipHex or Rabeprazole  If you are able to, use Pepcid as this is safer for your kidneys   Try to exercise at least 30 minutes 3 days a week to begin with with an ultimate goal of 5 days a week for at least 30 minutes     Please do not drink more than 2 glasses of alcohol/wine on a daily basis as this may contribute to your high blood pressure  Potassium Content of Foods List   WHAT YOU NEED TO KNOW:   Potassium is a mineral that is found in most foods  Potassium helps to balance fluids and minerals in your body  It also helps your body maintain a normal blood pressure  Potassium helps your muscles contract and your nerves function normally  DISCHARGE INSTRUCTIONS:   Why you may need to change the amount of potassium you eat:   · You may need more potassium  if you have hypokalemia (low potassium levels) or high blood pressure  You may also need more potassium if you are taking diuretics  Diuretics and certain medicines cause your body to lose potassium  · You may need less potassium  in your diet if you have hyperkalemia (high potassium levels) or kidney disease  Potassium content of fruit:  The amount of potassium in milligrams (mg) contained in each fruit or serving of fruit is listed beside the item  · High-potassium foods (more than 200 mg per serving):      ? 1 medium banana (425)    ? ½ of a papaya (390)    ? ½ cup of prune juice (370)    ?  ¼ cup of raisins (270)    ? 1 medium biju (325) or kiwi (240)    ? 1 small orange (240) or ½ cup of orange juice (235)    ? ½ cup of cubed cantaloupe (215) or diced honeydew melon (200)    ? 1 medium pear (200)    · Medium-potassium foods (50 to 200 mg per serving):      ? 1 medium peach (185)    ? 1 small apple or ½ cup of apple juice (150)    ? ½ cup of peaches canned in juice (120)    ? ½ cup of canned pineapple (100)    ? ½ cup of fresh, sliced strawberries (329)    ? ½ cup of watermelon (85)    · Low-potassium foods (less than 50 mg per serving):      ? ½ cup of cranberries (45) or cranberry juice cocktail (20)    ? ½ cup of nectar of papaya, biju, or pear (35)    Potassium content of vegetables:   · High-potassium foods (more than 200 mg per serving):      ? 1 medium baked potato, with skin (925)    ? 1 baked medium sweet potato, with skin (450)    ? ½ cup of tomato or vegetable juice (275), or 1 medium raw tomato (290)    ? ½ cup of mushrooms (280)    ? ½ cup of fresh brussels sprouts (250)    ? ½ cup of cooked zucchini (220) or winter squash (250)    ? ¼ of a medium avocado (245)    ? ½ cup of broccoli (230)    · Medium-potassium foods (50 to 200 mg per serving):      ? ½ cup of corn (195)    ? ½ cup of fresh or cooked carrots (180)    ? ½ cup of fresh cauliflower (150)    ? ½ cup of asparagus (155)    ? ½ cup of canned peas (90)     ? 1 cup of lettuce, all types (100)    ? ½ cup of fresh green beans (90)    ? ½ cup of frozen green beans (85)    ? ½ cup of cucumber (80)    Potassium content of protein foods:   · High-potassium foods (more than 200 mg per serving):      ? ½ cup of cooked marie beans (400) or lentils (365)    ? 1 cup of soy milk (300)    ? 3 ounces of baked or broiled salmon (319)    ? 3 ounces of roasted turkey, dark meat (250)    ? ¼ cup of sunflower seeds (241)    ? 3 ounces of cooked lean beef (224)    ?  2 tablespoons of smooth peanut butter (210)    · Medium-potassium foods (50 to 200 mg per serving):      ? 1 ounce of salted peanuts, almonds, or cashews (200)    ? 1 large egg (60 mg)    Potassium content of dairy foods:   · High-potassium foods (more than 200 mg per serving):      ? 6 ounces of yogurt (260 to 435)    ? 1 cup of nonfat, low-fat, or whole milk (350 to 380)    · Medium-potassium foods (50 to 200 mg per serving):      ? ½ cup of ricotta cheese (154)    ? ½ cup of vanilla ice cream (131)    ? ½ cup of low-fat (2%) cottage cheese (110)    · Low-potassium foods (less than 50 mg per serving):      ? 1 ounce of cheese (20 to 30)      Potassium content of grains:   · 1 slice of white bread (30)    · ½ cup of white or brown rice (50)    · ½ cup of spaghetti or macaroni (30)    · 1 flour or corn tortilla (50)    · 1 four-inch waffle (50)    Potassium content of other foods:   · 1 tablespoon of molasses (295)    · 1½ ounces of chocolate (165)    · Some salt substitutes may contain a high amount of potassium  Check the food label to find the amount of potassium it contains  © Copyright [employer] 2021 Information is for End User's use only and may not be sold, redistributed or otherwise used for commercial purposes  All illustrations and images included in CareNotes® are the copyrighted property of [employer]  or [facility] [employer]   The above information is an  only  It is not intended as medical advice for individual conditions or treatments  Talk to your doctor, nurse or pharmacist before following any medical regimen to see if it is safe and effective for you

## 2021-08-19 NOTE — LETTER
August 19, 2021     Vincent Vo, [address] [facility] [address]    Patient: Joleen Cabot   YOB: 1940   Date of Visit: 8/19/2021       Dear Dr Kathy Beach: Thank you for referring Joleen Cabot to me for evaluation  Below are my notes for this consultation  If you have questions, please do not hesitate to call me  I look forward to following your patient along with you           Sincerely,        Arie Monsivais MD        CC: MD Arie Gutiérrez MD  8/19/2021 10:20 AM  Sign when Signing Visit  RENAL FOLLOW UP NOTE: td     ASSESSMENT AND PLAN:  1  Demetria Coal :  · Etiology:  AK I from Bactrim/hypercalcemia along with poor oral intake   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/arteriolar nephrosclerosis  · Baseline creatinine:  1 1 -1 79  · Current creatinine:  1 22 at baseline  · Urine protein creatinine ratio:  0 66 g at goal   Recommendations:  · Treat hypertension-please see below  · Treat dyslipidemia-please see below  · Maintain proteinuria less than 1 g or as low as possible  · Avoid nephrotoxic agents such as NSAIDs, patient counseled as such  2   Volume:  Euvolemic  3   Hypertension:  Secondary workup:  Was negative  · Home readings:   No readings at this time     · Goal blood pressure:  Less than 130/80 given CKD  Recommendations:  ·  Push nonmedical regimen especially weight loss/isotonic exercise and low-salt diet  · Medication changes today:   · No changes at this time:  Blood pressure seems at goal, she will send in 1 week of readings  4   Electrolytes:    · Mild metabolic acidosis:  Resolved  · Borderline hypokalemia:  Low at 3 3:  I would restart spironolactone 25 mg daily if she is not having any problems with it as her potassium is once again too low, also high potassium diet and recheck  5   Mineral bone disorder:  · Calcium/magnesium:  Both normal  · Current calcium normal at    9 3  ·  magnesium 2 1  ·  phosphorus now normal at 2 7     · PTH intact:  23 4 which is normal  · Vitamin-D:  Pending  6   Dyslipidemia:  · Goal LDL:  Less than 100  · Current lipid profile:  LDL 62//triglycerides 74  Recommendations:  Patient is at goal   7   Anemia:   · Hemoglobin:   Stable at 11 9  · Iron studies:  Good iron studies  · Stool for occult blood x3 not done at this time  · Multiple myeloma evaluation  was negative as outlined above  · GI evaluation:  EGD with just esophagitis; colonoscopy with benign polyp  · Heme consultation:  Felt anemia which was normocytic secondary to chronic kidney disease   No further recommendations at this time  8   Other problems:  · GERD  · Osteoarthritis of right hip  · Colonic polyp  · Status post duodenal perforation 10/04/2020:  Apparently upper GI endoscopy demonstrated no leak  Endoscopy demonstrated no leak  She was NPO with antibiotics who was given a 2 week course of antibiotics upon discharge along with antacid medication  To be followed up as an outpatient  GI health maintenance:  Last colonoscopy July 2019    PATIENT INSTRUCTIONS:    Patient Instructions   1  Medication changes today:   No medication changes today  Continue spironolactone 25 mg once a day I did refill it  2  Please go for non fasting  lab work any time on or after August 24th next week     3   Please take 1 week a blood pressure readings  3-4 weeks after starting the spironolactone     AS FOLLOWS  MORNING AND EVENING, SITTING  as follows:  · TAKE THE MORNING READINGS BEFORE ANY MEDICATIONS AND WHEN YOU ARE RELAXED FOR SEVERAL MINUTES  · TAKE THE EVENING READINGS:  BETWEEN 7-10 P M ; PRIOR TO ANY MEDICATIONS; AT LEAST IN OUR  FROM DINNER; AND CERTAINLY AFTER RELAXING FOR A FEW MINUTES  · PLEASE INCLUDE HEART RATE WITH YOUR BLOOD PRESSURE READINGS  · When taking standing readings, keep your arm supported at heart level and not dangling  · Make sure you are sitting with your back supported and feet on the ground and do not cross your legs or feet  · Make sure you have not taken any coffee or caffeine products or exercised or smoke cigarettes at least 30 minutes before taking your blood pressure  Then please mail these readings into the office    4  In 3 months:   Please go for nonfasting lab work but in the morning   Please take 1 week a blood pressure readings as outlined above and mail those into the office     5  Follow-up in 6  months   Please bring in 1 week a blood pressure readings morning evening, sitting and standing is outlined above   PLEASE BRING AN YOUR BLOOD PRESSURE MACHINE TO CORRELATE WITH THE OFFICE MACHINE AT THIS NEXT SCHEDULED VISIT   Please go for fasting lab work 1-2 weeks prior to your appointment      6  General instructions:   AVOID SALT BUT NOT ADDING AN READING LABELS TO MAKE SURE THERE IS LOW-SALT IN THE FOOD THAT YOU ARE EATING  o Goal is less than 2 g of sodium intake or less than 5 g of sodium chloride intake per day     Avoid nonsteroidal anti-inflammatory drugs such as Naprosyn, ibuprofen, Aleve, Advil, Celebrex, Meloxicam (Mobic) etc   You can use Tylenol as needed if you do not have any liver condition to be concerned about     Avoid medications such as Sudafed or decongestants and antihistamines that contained the D component which is the decongestant  You can take antihistamines without the decongestant or D component   Try to avoid medications such as pantoprazole or  Protonix/Nexium or Esomeprazole)/Prilosec or omeprazole/Prevacid or lansoprazole/AcipHex or Rabeprazole  If you are able to, use Pepcid as this is safer for your kidneys   Try to exercise at least 30 minutes 3 days a week to begin with with an ultimate goal of 5 days a week for at least 30 minutes     Please do not drink more than 2 glasses of alcohol/wine on a daily basis as this may contribute to your high blood pressure  Subjective:    There has been no hospitalizations or acute illnesses since last visit  The patient overall is feeling well  No fevers, chills, or cough or colds  Good appetite and good energy  No hematuria, dysuria, voiding symptoms or foamy urine  No gastrointestinal symptoms  No cardiovascular symptoms including swelling of the legs  No headaches, dizziness or lightheadedness  Blood pressure medications:   Toprol XL 25 mg twice a day   Spironolactone 25 mg daily      ROS:  See HPI, otherwise review of systems as completely reviewed with the patient are negative    Past Medical History:   Diagnosis Date    Anemia     Chronic kidney disease     Duodenal ulcer     GERD (gastroesophageal reflux disease) 04/04/2016    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    Hypercalcemia     Hypertension     Mixed hyperlipidemia     Osteoarthritis of hip 04/04/2016    Sacral insufficiency fracture     SI (sacroiliac) joint inflammation ([facility] [address] ) 04/04/2016    Substance addiction ([facility] )      Past Surgical History:   Procedure Laterality Date    BREAST EXCISIONAL BIOPSY Left 2000    benign    COLONOSCOPY  2019    JOINT REPLACEMENT      right hip 8/15    MAMMO (HISTORICAL)  2019    TONSILLECTOMY       Family History   Problem Relation Age of Onset    No Known Problems Mother     No Known Problems Father     Breast cancer Daughter 48    Breast cancer Maternal Aunt 79    No Known Problems Sister     No Known Problems Maternal Grandmother     No Known Problems Maternal Grandfather     No Known Problems Paternal Grandmother     No Known Problems Paternal Grandfather     No Known Problems Son     No Known Problems Sister     No Known Problems Sister       reports that she has never smoked  She has never used smokeless tobacco  She reports previous drug use  Drug: Prescription  She reports that she does not drink alcohol      I COMPLETELY REVIEWED THE PAST MEDICAL HISTORY/PAST SURGICAL HISTORY/SOCIAL HISTORY/FAMILY HISTORY/AND MEDICATIONS  AND UPDATED ALL    Objective:     Vitals:   BP sitting on left:  116/58 with a heart rate of 80 and regular  BP standing on left:  120/62 with a heart rate of 88 and regular    Weight (last 2 days)     Date/Time   Weight    08/19/21 0958   63 5 (140)            Wt Readings from Last 3 Encounters:   08/19/21 63 5 kg (140 lb)   06/11/21 62 6 kg (138 lb)   05/25/21 62 6 kg (138 lb)       Body mass index is 26 45 kg/m²      Physical Exam: General:  No acute distress  Skin:  No acute rash  Eyes:  No scleral icterus, noninjected, no discharge from eyes  ENT:  Moist mucous membranes  Neck:  Supple, no jugular venous distention, trachea is midline, no lymphadenopathy and no thyromegaly  Back   No CVAT  Chest:  Clear to auscultation and percussion, good respiratory effort  CVS:  Regular rate and rhythm without a rub, or gallops or murmurs  Abdomen:  Soft and nontender with normal bowel sounds  Extremities:  No cyanosis and no edema, moderate arthritic changes, normal range of motion  Neuro:  Grossly intact  Psych:  Alert, oriented x3 and appropriate      Medications:    Current Outpatient Medications:     amLODIPine (NORVASC) 2 5 mg tablet, Take 1 tablet (2 5 mg total) by mouth 2 (two) times a day, Disp: 180 tablet, Rfl: 3    atorvastatin (LIPITOR) 10 mg tablet, , Disp: , Rfl:     ibuprofen (MOTRIN) 800 mg tablet, as needed , Disp: , Rfl:     lidocaine (XYLOCAINE) 5 % ointment, , Disp: , Rfl:     metoprolol succinate (TOPROL-XL) 25 mg 24 hr tablet, Take 1 tablet (25 mg total) by mouth 2 (two) times a day, Disp: 180 tablet, Rfl: 3    ondansetron (ZOFRAN-ODT) 4 mg disintegrating tablet, Take 1 tablet (4 mg total) by mouth every 6 (six) hours as needed for nausea or vomiting, Disp: 20 tablet, Rfl: 0    pantoprazole (PROTONIX) 40 mg tablet, Take 1 tablet (40 mg total) by mouth 2 (two) times a day before meals, Disp: 60 tablet, Rfl: 1    phenazopyridine (PYRIDIUM) 100 mg tablet, , Disp: , Rfl:     spironolactone (ALDACTONE) 25 mg tablet, Take 1 tablet (25 mg total) by mouth daily, Disp: 30 tablet, Rfl: 5    PREMARIN vaginal cream, , Disp: , Rfl:     Lab, Imaging and other studies: I have personally reviewed pertinent labs    Laboratory Results:  Results for orders placed or performed in visit on 08/16/21   Comprehensive metabolic panel   Result Value Ref Range    Sodium 138 136 - 145 mmol/L    Potassium 3 3 (L) 3 5 - 5 3 mmol/L    Chloride 113 (H) 100 - 108 mmol/L    CO2 21 21 - 32 mmol/L    ANION GAP 4 4 - 13 mmol/L    BUN 15 5 - 25 mg/dL    Creatinine 1 22 0 60 - 1 30 mg/dL    Glucose, Fasting 86 65 - 99 mg/dL    Calcium 9 3 8 3 - 10 1 mg/dL    AST 23 5 - 45 U/L    ALT 26 12 - 78 U/L    Alkaline Phosphatase 55 46 - 116 U/L    Total Protein 7 4 6 4 - 8 2 g/dL    Albumin 3 6 3 5 - 5 0 g/dL    Total Bilirubin 0 35 0 20 - 1 00 mg/dL    eGFR 42 ml/min/1 73sq m   CK   Result Value Ref Range    Total CK 62 26 - 192 U/L   CBC   Result Value Ref Range    WBC 6 28 4 31 - 10 16 Thousand/uL    RBC 3 98 3 81 - 5 12 Million/uL    Hemoglobin 11 9 11 5 - 15 4 g/dL    Hematocrit 36 9 34 8 - 46 1 %    MCV 93 82 - 98 fL    MCH 29 9 26 8 - 34 3 pg    MCHC 32 2 31 4 - 37 4 g/dL    RDW 13 3 11 6 - 15 1 %    Platelets 413 491 - 778 Thousands/uL    MPV 11 1 8 9 - 12 7 fL   Lipid Panel with Direct LDL reflex   Result Value Ref Range    Cholesterol 180 50 - 200 mg/dL    Triglycerides 74 <=150 mg/dL    HDL, Direct 103 >=40 mg/dL    LDL Calculated 62 0 - 100 mg/dL   Magnesium   Result Value Ref Range    Magnesium 2 1 1 6 - 2 6 mg/dL   Phosphorus   Result Value Ref Range    Phosphorus 2 7 2 3 - 4 1 mg/dL   Protein / creatinine ratio, urine   Result Value Ref Range    Creatinine, Ur 148 0 mg/dL    Protein Urine Random 97 mg/dL    Prot/Creat Ratio, Ur 0 66 (H) 0 00 - 0 10   PTH, intact   Result Value Ref Range    PTH 23 4 18 4 - 80 1 pg/mL       Results from last 7 days   Lab Units 08/16/21  0933   WBC Thousand/uL 6 28   HEMOGLOBIN g/dL 11 9   HEMATOCRIT % 36 9 PLATELETS Thousands/uL 205   POTASSIUM mmol/L 3 3*   CHLORIDE mmol/L 113*   CO2 mmol/L 21   BUN mg/dL 15   CREATININE mg/dL 1 22   CALCIUM mg/dL 9 3   MAGNESIUM mg/dL 2 1   PHOSPHORUS mg/dL 2 7         Radiology review:   chest X-ray    Ultrasound      Portions of the record may have been created with voice recognition software  Occasional wrong word or "sound a like" substitutions may have occurred due to the inherent limitations of voice recognition software  Read the chart carefully and recognize, using context, where substitutions have occurred

## 2021-08-20 LAB
25(OH)D2 SERPL-MCNC: <1 NG/ML
25(OH)D3 SERPL-MCNC: 40 NG/ML
25(OH)D3+25(OH)D2 SERPL-MCNC: 40 NG/ML

## 2021-08-24 ENCOUNTER — OFFICE VISIT (OUTPATIENT)
Dept: OBGYN CLINIC | Facility: HOSPITAL | Age: 81
End: 2021-08-24
Payer: COMMERCIAL

## 2021-08-24 VITALS
BODY MASS INDEX: 25.11 KG/M2 | WEIGHT: 133 LBS | HEIGHT: 61 IN | DIASTOLIC BLOOD PRESSURE: 62 MMHG | HEART RATE: 81 BPM | SYSTOLIC BLOOD PRESSURE: 108 MMHG

## 2021-08-24 DIAGNOSIS — M70.62 TROCHANTERIC BURSITIS OF LEFT HIP: ICD-10-CM

## 2021-08-24 DIAGNOSIS — M70.61 TROCHANTERIC BURSITIS OF RIGHT HIP: Primary | ICD-10-CM

## 2021-08-24 PROCEDURE — 20610 DRAIN/INJ JOINT/BURSA W/O US: CPT | Performed by: ORTHOPAEDIC SURGERY

## 2021-08-24 PROCEDURE — 99213 OFFICE O/P EST LOW 20 MIN: CPT | Performed by: ORTHOPAEDIC SURGERY

## 2021-08-24 RX ORDER — BUPIVACAINE HYDROCHLORIDE 2.5 MG/ML
2 INJECTION, SOLUTION INFILTRATION; PERINEURAL
Status: COMPLETED | OUTPATIENT
Start: 2021-08-24 | End: 2021-08-24

## 2021-08-24 RX ORDER — BETAMETHASONE SODIUM PHOSPHATE AND BETAMETHASONE ACETATE 3; 3 MG/ML; MG/ML
6 INJECTION, SUSPENSION INTRA-ARTICULAR; INTRALESIONAL; INTRAMUSCULAR; SOFT TISSUE
Status: COMPLETED | OUTPATIENT
Start: 2021-08-24 | End: 2021-08-24

## 2021-08-24 RX ORDER — LIDOCAINE HYDROCHLORIDE 10 MG/ML
2 INJECTION, SOLUTION INFILTRATION; PERINEURAL
Status: COMPLETED | OUTPATIENT
Start: 2021-08-24 | End: 2021-08-24

## 2021-08-24 RX ADMIN — LIDOCAINE HYDROCHLORIDE 2 ML: 10 INJECTION, SOLUTION INFILTRATION; PERINEURAL at 10:18

## 2021-08-24 RX ADMIN — BETAMETHASONE SODIUM PHOSPHATE AND BETAMETHASONE ACETATE 6 MG: 3; 3 INJECTION, SUSPENSION INTRA-ARTICULAR; INTRALESIONAL; INTRAMUSCULAR; SOFT TISSUE at 10:18

## 2021-08-24 RX ADMIN — BUPIVACAINE HYDROCHLORIDE 2 ML: 2.5 INJECTION, SOLUTION INFILTRATION; PERINEURAL at 10:18

## 2021-08-24 NOTE — PROGRESS NOTES
Assessment  Problem List Items Addressed This Visit     None      Visit Diagnoses     Trochanteric bursitis of right hip    -  Primary    Relevant Orders    Large joint arthrocentesis    Trochanteric bursitis of left hip        Relevant Orders    Large joint arthrocentesis          Discussion and Plan:    Presents for follow up on bilateral hip trochanteric bursitis  Patient was offered, accepted and bilateral corticosteroid injections were performed  She tolerated well  She can follow up in 3 months for recheck  Subjective:   Patient ID: Jessica Arshad is a [de-identified] y o  female      Presents for follow-up on bilateral hip pain secondary to known trochanteric bursitis  She was last seen 05/25/2021 in clinic and underwent bilateral corticosteroid injections to her greater trochanter bursa  She has substantial relief from her symptoms which began back up again about a week and a half ago  She describes pinpoint tenderness to the lateral aspects of her hips bilaterally  No other complaints today  Wishes receive another round of steroid injections to her bilateral hips  The following portions of the patient's history were reviewed and updated as appropriate: allergies, current medications, past family history, past medical history, past social history, past surgical history and problem list     Review of Systems   Constitutional: Negative for fever  HENT: Negative for congestion  Eyes: Negative for photophobia  Respiratory: Negative for shortness of breath  Cardiovascular: Negative for chest pain  Gastrointestinal: Negative for nausea and vomiting  Musculoskeletal: Positive for arthralgias  Skin: Negative for rash  Neurological: Negative for headaches  Psychiatric/Behavioral: Negative for behavioral problems         Objective:  /62   Pulse 81   Ht 5' 1" (1 549 m)   Wt 60 3 kg (133 lb)   BMI 25 13 kg/m²       Right Hip Exam     Tenderness   The patient is experiencing tenderness in the lateral and greater trochanter  Range of Motion   The patient has normal right hip ROM  Muscle Strength   The patient has normal right hip strength  Tests   ROSE MARY: negative    Other   Erythema: absent  Scars: absent  Sensation: normal  Pulse: present      Left Hip Exam     Tenderness   The patient is experiencing tenderness in the lateral and greater trochanter  Range of Motion   The patient has normal left hip ROM  Muscle Strength   The patient has normal left hip strength  Tests   ROSE MARY: negative    Other   Erythema: absent  Scars: absent  Sensation: normal  Pulse: present              Physical Exam  Vitals reviewed  HENT:      Head: Normocephalic  Right Ear: External ear normal       Left Ear: External ear normal       Nose: Nose normal       Mouth/Throat:      Pharynx: Oropharynx is clear  Eyes:      Pupils: Pupils are equal, round, and reactive to light  Cardiovascular:      Rate and Rhythm: Normal rate  Pulses: Normal pulses  Pulmonary:      Effort: Pulmonary effort is normal    Abdominal:      Palpations: Abdomen is soft  Musculoskeletal:      Cervical back: Normal range of motion  Comments: Please see ortho exam    Skin:     Capillary Refill: Capillary refill takes less than 2 seconds  Neurological:      Mental Status: She is alert  Mental status is at baseline  Psychiatric:         Mood and Affect: Mood normal          Large joint arthrocentesis: bilateral greater trochanteric bursa  Universal Protocol:  Consent: Verbal consent obtained    Risks and benefits: risks, benefits and alternatives were discussed  Consent given by: patient  Patient identity confirmed: verbally with patient    Supporting Documentation  Indications: pain   Procedure Details  Location: hip - bilateral greater trochanteric bursa  Preparation: Patient was prepped and draped in the usual sterile fashion  Needle size: 22 G  Ultrasound guidance: no  Approach: anterolateral    Medications (Right): 2 mL lidocaine 1 %; 6 mg betamethasone acetate-betamethasone sodium phosphate 6 (3-3) mg/mL; 2 mL bupivacaine 0 25 %Medications (Left): 2 mL lidocaine 1 %; 6 mg betamethasone acetate-betamethasone sodium phosphate 6 (3-3) mg/mL; 2 mL bupivacaine 0 25 %   Patient tolerance: patient tolerated the procedure well with no immediate complications  Dressing:  Sterile dressing applied

## 2021-08-26 DIAGNOSIS — E78.5 DYSLIPIDEMIA: Primary | ICD-10-CM

## 2021-08-27 ENCOUNTER — HOSPITAL ENCOUNTER (INPATIENT)
Facility: HOSPITAL | Age: 81
LOS: 1 days | Discharge: HOME | DRG: 563 | End: 2021-08-29
Attending: EMERGENCY MEDICINE | Admitting: FAMILY MEDICINE
Payer: COMMERCIAL

## 2021-08-27 ENCOUNTER — OFFICE VISIT (OUTPATIENT)
Dept: URGENT CARE | Age: 81
End: 2021-08-27
Payer: COMMERCIAL

## 2021-08-27 VITALS
OXYGEN SATURATION: 99 % | TEMPERATURE: 97.7 F | RESPIRATION RATE: 16 BRPM | HEART RATE: 64 BPM | SYSTOLIC BLOOD PRESSURE: 120 MMHG | DIASTOLIC BLOOD PRESSURE: 76 MMHG

## 2021-08-27 DIAGNOSIS — I77.6 VASCULITIS (HCC): ICD-10-CM

## 2021-08-27 DIAGNOSIS — N17.9 AKI (ACUTE KIDNEY INJURY) (HCC): ICD-10-CM

## 2021-08-27 DIAGNOSIS — M79.89 LEG SWELLING: Primary | ICD-10-CM

## 2021-08-27 DIAGNOSIS — R60.0 LEG EDEMA, LEFT: ICD-10-CM

## 2021-08-27 DIAGNOSIS — L03.90 CELLULITIS: ICD-10-CM

## 2021-08-27 DIAGNOSIS — R60.0 EDEMA OF LEFT LOWER EXTREMITY: Primary | ICD-10-CM

## 2021-08-27 PROCEDURE — G0382 LEV 3 HOSP TYPE B ED VISIT: HCPCS | Performed by: PHYSICIAN ASSISTANT

## 2021-08-27 PROCEDURE — 99284 EMERGENCY DEPT VISIT MOD MDM: CPT

## 2021-08-27 PROCEDURE — 99285 EMERGENCY DEPT VISIT HI MDM: CPT | Performed by: EMERGENCY MEDICINE

## 2021-08-27 RX ORDER — ATORVASTATIN CALCIUM 10 MG/1
10 TABLET, FILM COATED ORAL DAILY
Qty: 90 TABLET | Refills: 1 | Status: SHIPPED | OUTPATIENT
Start: 2021-08-27 | End: 2022-02-04 | Stop reason: SDUPTHER

## 2021-08-27 NOTE — LETTER
[address] [facility] MED SURG 7  [address] [facility] [address]  Dept: [phone]    August 29, 2021     Patient: Jessica Arshad   YOB: 1940   Date of Visit: 8/27/2021       To Whom it May Concern:    Jessica Arshad is under my professional care  She was seen in the hospital from 8/27/2021   to 08/29/21  She may return to work on 9/6/21 with the following limitations avoid standing for more than 4 hours straight/limit work schedule to 4 hours a day       If you have any questions or concerns, please don't hesitate to call           Sincerely,          Daniel Yañez,

## 2021-08-27 NOTE — PROGRESS NOTES
[address] [facility]        NAME: Magaly Brewster is a [de-identified] y o  female  : 1940    MRN: [id number]  DATE: 2021  TIME: 6:54 PM    Assessment and Plan   Edema of left lower extremity [R60 0]  1  Edema of left lower extremity  Transfer to other facility   2  Vasculitis ([address] )  Transfer to other facility         Patient Instructions       Follow up with PCP in 3-5 days  Proceed to  ER if symptoms worsen  Chief Complaint     Chief Complaint   Patient presents with    Skin Problem     red, sore area around left lower leg; denies injury          History of Present Illness        Patient for evaluation of redness and swelling of the left lower extremity  Patient states she noticed it yesterday this small red area and today it is worse with swelling  Review of Systems   Review of Systems   Constitutional: Negative  Respiratory: Negative  Cardiovascular: Positive for leg swelling  Negative for chest pain and palpitations  Musculoskeletal: Negative  Skin: Positive for color change  Negative for wound  Neurological: Negative  Hematological: Negative            Current Medications       Current Outpatient Medications:     amLODIPine (NORVASC) 2 5 mg tablet, Take 1 tablet (2 5 mg total) by mouth 2 (two) times a day, Disp: 180 tablet, Rfl: 3    atorvastatin (LIPITOR) 10 mg tablet, Take 1 tablet (10 mg total) by mouth daily, Disp: 90 tablet, Rfl: 1    ibuprofen (MOTRIN) 800 mg tablet, as needed , Disp: , Rfl:     lidocaine (XYLOCAINE) 5 % ointment, , Disp: , Rfl:     metoprolol succinate (TOPROL-XL) 25 mg 24 hr tablet, Take 1 tablet (25 mg total) by mouth 2 (two) times a day, Disp: 180 tablet, Rfl: 3    ondansetron (ZOFRAN-ODT) 4 mg disintegrating tablet, Take 1 tablet (4 mg total) by mouth every 6 (six) hours as needed for nausea or vomiting, Disp: 20 tablet, Rfl: 0    pantoprazole (PROTONIX) 40 mg tablet, Take 1 tablet (40 mg total) by mouth 2 (two) times a day before meals, Disp: 60 tablet, Rfl: 1    phenazopyridine (PYRIDIUM) 100 mg tablet, , Disp: , Rfl:     PREMARIN vaginal cream, , Disp: , Rfl:     spironolactone (ALDACTONE) 25 mg tablet, Take 1 tablet (25 mg total) by mouth daily, Disp: 30 tablet, Rfl: 5    Current Allergies     Allergies as of 08/27/2021 - Reviewed 08/27/2021   Allergen Reaction Noted    Bactrim [sulfamethoxazole-trimethoprim]  11/15/2018            The following portions of the patient's history were reviewed and updated as appropriate: allergies, current medications, past family history, past medical history, past social history, past surgical history and problem list      Past Medical History:   Diagnosis Date    Anemia     Chronic kidney disease     Duodenal ulcer     GERD (gastroesophageal reflux disease) 04/04/2016    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    Hypercalcemia     Hypertension     Mixed hyperlipidemia     Osteoarthritis of hip 04/04/2016    Sacral insufficiency fracture     SI ([address]roiliac) joint inflammation ([facility] [address] ) 04/04/2016    Substance addiction ([facility] )        Past Surgical History:   Procedure Laterality Date    BREAST EXCISIONAL BIOPSY Left 2000    benign    COLONOSCOPY  2019    JOINT REPLACEMENT      right hip 8/15    MAMMO (HISTORICAL)  2019    TONSILLECTOMY         Family History   Problem Relation Age of Onset    No Known Problems Mother     No Known Problems Father     Breast cancer Daughter 48    Breast cancer Maternal Aunt 79    No Known Problems Sister     No Known Problems Maternal Grandmother     No Known Problems Maternal Grandfather     No Known Problems Paternal Grandmother     No Known Problems Paternal Grandfather     No Known Problems Son     No Known Problems Sister     No Known Problems Sister          Medications have been verified  Objective   /76   Pulse 64   Temp 97 7 °F (36 5 °C)   Resp 16   SpO2 99%   No LMP recorded   Patient is postmenopausal        Physical Exam Physical Exam  Vitals and nursing note reviewed  Constitutional:       General: She is not in acute distress  Appearance: Normal appearance  She is well-developed  She is not ill-appearing, toxic-appearing or diaphoretic  HENT:      Head: Normocephalic and atraumatic  Eyes:      Extraocular Movements: Extraocular movements intact  Conjunctiva/sclera: Conjunctivae normal       Pupils: Pupils are equal, round, and reactive to light  Musculoskeletal:      Left lower leg: Edema (Pitting   Extending from the knee to the lower extremity) present  Skin:     General: Skin is warm and dry  Comments: Non blanching lesion of the  Left lower extremity from mid tibia to the ankle and extending around the lower leg  No open wound  No purulent drainage  Very slight warmth compared to the surrounding skin  Neurological:      General: No focal deficit present  Mental Status: She is alert and oriented to person, place, and time  Psychiatric:         Mood and Affect: Mood normal          Behavior: Behavior normal          Thought Content:  Thought content normal          Judgment: Judgment normal

## 2021-08-28 ENCOUNTER — APPOINTMENT (OUTPATIENT)
Dept: NON INVASIVE DIAGNOSTICS | Facility: HOSPITAL | Age: 81
DRG: 563 | End: 2021-08-28
Payer: COMMERCIAL

## 2021-08-28 PROBLEM — R21 RASH: Status: ACTIVE | Noted: 2021-08-28

## 2021-08-28 PROBLEM — R60.0 LEG EDEMA, LEFT: Status: ACTIVE | Noted: 2021-08-28

## 2021-08-28 LAB
ALBUMIN SERPL BCP-MCNC: 3.2 G/DL (ref 3.5–5)
ALP SERPL-CCNC: 71 U/L (ref 46–116)
ALT SERPL W P-5'-P-CCNC: 57 U/L (ref 12–78)
ANION GAP SERPL CALCULATED.3IONS-SCNC: 7 MMOL/L (ref 4–13)
ANION GAP SERPL CALCULATED.3IONS-SCNC: 8 MMOL/L (ref 4–13)
ANISOCYTOSIS BLD QL SMEAR: PRESENT
APTT PPP: 33 SECONDS (ref 23–37)
APTT PPP: 34 SECONDS (ref 23–37)
ARTIFACT: PRESENT
AST SERPL W P-5'-P-CCNC: 38 U/L (ref 5–45)
BASOPHILS # BLD MANUAL: 0 THOUSAND/UL (ref 0–0.1)
BASOPHILS NFR MAR MANUAL: 0 % (ref 0–1)
BILIRUB SERPL-MCNC: 0.3 MG/DL (ref 0.2–1)
BUN SERPL-MCNC: 36 MG/DL (ref 5–25)
BUN SERPL-MCNC: 43 MG/DL (ref 5–25)
CALCIUM ALBUM COR SERPL-MCNC: 9.2 MG/DL (ref 8.3–10.1)
CALCIUM SERPL-MCNC: 8.4 MG/DL (ref 8.3–10.1)
CALCIUM SERPL-MCNC: 8.6 MG/DL (ref 8.3–10.1)
CHLORIDE SERPL-SCNC: 111 MMOL/L (ref 100–108)
CHLORIDE SERPL-SCNC: 112 MMOL/L (ref 100–108)
CO2 SERPL-SCNC: 18 MMOL/L (ref 21–32)
CO2 SERPL-SCNC: 18 MMOL/L (ref 21–32)
CREAT SERPL-MCNC: 1.65 MG/DL (ref 0.6–1.3)
CREAT SERPL-MCNC: 1.92 MG/DL (ref 0.6–1.3)
D DIMER PPP FEU-MCNC: 1.2 UG/ML FEU
EOSINOPHIL # BLD MANUAL: 0 THOUSAND/UL (ref 0–0.4)
EOSINOPHIL NFR BLD MANUAL: 0 % (ref 0–6)
ERYTHROCYTE [DISTWIDTH] IN BLOOD BY AUTOMATED COUNT: 13.6 % (ref 11.6–15.1)
ERYTHROCYTE [DISTWIDTH] IN BLOOD BY AUTOMATED COUNT: 13.8 % (ref 11.6–15.1)
GFR SERPL CREATININE-BSD FRML MDRD: 24 ML/MIN/1.73SQ M
GFR SERPL CREATININE-BSD FRML MDRD: 29 ML/MIN/1.73SQ M
GLUCOSE P FAST SERPL-MCNC: 149 MG/DL (ref 65–99)
GLUCOSE SERPL-MCNC: 107 MG/DL (ref 65–140)
GLUCOSE SERPL-MCNC: 149 MG/DL (ref 65–140)
HCT VFR BLD AUTO: 32.7 % (ref 34.8–46.1)
HCT VFR BLD AUTO: 36.7 % (ref 34.8–46.1)
HGB BLD-MCNC: 11.1 G/DL (ref 11.5–15.4)
HGB BLD-MCNC: 11.8 G/DL (ref 11.5–15.4)
INR PPP: 1.02 (ref 0.84–1.19)
LYMPHOCYTES # BLD AUTO: 0.57 THOUSAND/UL (ref 0.6–4.47)
LYMPHOCYTES # BLD AUTO: 3 % (ref 14–44)
MCH RBC QN AUTO: 29.6 PG (ref 26.8–34.3)
MCH RBC QN AUTO: 30.4 PG (ref 26.8–34.3)
MCHC RBC AUTO-ENTMCNC: 32.2 G/DL (ref 31.4–37.4)
MCHC RBC AUTO-ENTMCNC: 33.9 G/DL (ref 31.4–37.4)
MCV RBC AUTO: 90 FL (ref 82–98)
MCV RBC AUTO: 92 FL (ref 82–98)
MONOCYTES # BLD AUTO: 0.38 THOUSAND/UL (ref 0–1.22)
MONOCYTES NFR BLD: 2 % (ref 4–12)
NEUTROPHILS # BLD MANUAL: 18.17 THOUSAND/UL (ref 1.85–7.62)
NEUTS BAND NFR BLD MANUAL: 1 % (ref 0–8)
NEUTS SEG NFR BLD AUTO: 94 % (ref 43–75)
PLATELET # BLD AUTO: 199 THOUSANDS/UL (ref 149–390)
PLATELET # BLD AUTO: 220 THOUSANDS/UL (ref 149–390)
PLATELET BLD QL SMEAR: ADEQUATE
PMV BLD AUTO: 10.1 FL (ref 8.9–12.7)
PMV BLD AUTO: 10.5 FL (ref 8.9–12.7)
POIKILOCYTOSIS BLD QL SMEAR: PRESENT
POTASSIUM SERPL-SCNC: 3.5 MMOL/L (ref 3.5–5.3)
POTASSIUM SERPL-SCNC: 3.8 MMOL/L (ref 3.5–5.3)
PROT SERPL-MCNC: 7.4 G/DL (ref 6.4–8.2)
PROTHROMBIN TIME: 13.4 SECONDS (ref 11.6–14.5)
RBC # BLD AUTO: 3.65 MILLION/UL (ref 3.81–5.12)
RBC # BLD AUTO: 3.99 MILLION/UL (ref 3.81–5.12)
RBC MORPH BLD: PRESENT
SODIUM SERPL-SCNC: 137 MMOL/L (ref 136–145)
SODIUM SERPL-SCNC: 137 MMOL/L (ref 136–145)
WBC # BLD AUTO: 18.95 THOUSAND/UL (ref 4.31–10.16)
WBC # BLD AUTO: 19.13 THOUSAND/UL (ref 4.31–10.16)

## 2021-08-28 PROCEDURE — 80053 COMPREHEN METABOLIC PANEL: CPT | Performed by: EMERGENCY MEDICINE

## 2021-08-28 PROCEDURE — 85730 THROMBOPLASTIN TIME PARTIAL: CPT | Performed by: PHYSICIAN ASSISTANT

## 2021-08-28 PROCEDURE — 93971 EXTREMITY STUDY: CPT

## 2021-08-28 PROCEDURE — 85027 COMPLETE CBC AUTOMATED: CPT | Performed by: PHYSICIAN ASSISTANT

## 2021-08-28 PROCEDURE — 85610 PROTHROMBIN TIME: CPT | Performed by: PHYSICIAN ASSISTANT

## 2021-08-28 PROCEDURE — 36415 COLL VENOUS BLD VENIPUNCTURE: CPT | Performed by: EMERGENCY MEDICINE

## 2021-08-28 PROCEDURE — 85027 COMPLETE CBC AUTOMATED: CPT | Performed by: EMERGENCY MEDICINE

## 2021-08-28 PROCEDURE — 99220 PR INITIAL OBSERVATION CARE/DAY 70 MINUTES: CPT | Performed by: FAMILY MEDICINE

## 2021-08-28 PROCEDURE — 85007 BL SMEAR W/DIFF WBC COUNT: CPT | Performed by: EMERGENCY MEDICINE

## 2021-08-28 PROCEDURE — 80048 BASIC METABOLIC PNL TOTAL CA: CPT | Performed by: PHYSICIAN ASSISTANT

## 2021-08-28 PROCEDURE — 85379 FIBRIN DEGRADATION QUANT: CPT | Performed by: EMERGENCY MEDICINE

## 2021-08-28 RX ORDER — HEPARIN SODIUM 10000 [USP'U]/100ML
3-30 INJECTION, SOLUTION INTRAVENOUS
Status: DISCONTINUED | OUTPATIENT
Start: 2021-08-28 | End: 2021-08-28

## 2021-08-28 RX ORDER — AMLODIPINE BESYLATE 2.5 MG/1
2.5 TABLET ORAL 2 TIMES DAILY
Status: DISCONTINUED | OUTPATIENT
Start: 2021-08-28 | End: 2021-08-29 | Stop reason: HOSPADM

## 2021-08-28 RX ORDER — SODIUM CHLORIDE 9 MG/ML
75 INJECTION, SOLUTION INTRAVENOUS CONTINUOUS
Status: DISCONTINUED | OUTPATIENT
Start: 2021-08-28 | End: 2021-08-29

## 2021-08-28 RX ORDER — PANTOPRAZOLE SODIUM 40 MG/1
40 TABLET, DELAYED RELEASE ORAL
Status: DISCONTINUED | OUTPATIENT
Start: 2021-08-28 | End: 2021-08-29 | Stop reason: HOSPADM

## 2021-08-28 RX ORDER — HEPARIN SODIUM 1000 [USP'U]/ML
4800 INJECTION, SOLUTION INTRAVENOUS; SUBCUTANEOUS ONCE
Status: COMPLETED | OUTPATIENT
Start: 2021-08-28 | End: 2021-08-28

## 2021-08-28 RX ORDER — SODIUM CHLORIDE, SODIUM GLUCONATE, SODIUM ACETATE, POTASSIUM CHLORIDE, MAGNESIUM CHLORIDE, SODIUM PHOSPHATE, DIBASIC, AND POTASSIUM PHOSPHATE .53; .5; .37; .037; .03; .012; .00082 G/100ML; G/100ML; G/100ML; G/100ML; G/100ML; G/100ML; G/100ML
1000 INJECTION, SOLUTION INTRAVENOUS ONCE
Status: COMPLETED | OUTPATIENT
Start: 2021-08-28 | End: 2021-08-28

## 2021-08-28 RX ORDER — HEPARIN SODIUM 1000 [USP'U]/ML
4800 INJECTION, SOLUTION INTRAVENOUS; SUBCUTANEOUS
Status: DISCONTINUED | OUTPATIENT
Start: 2021-08-28 | End: 2021-08-28

## 2021-08-28 RX ORDER — METOPROLOL SUCCINATE 25 MG/1
25 TABLET, EXTENDED RELEASE ORAL 2 TIMES DAILY
Status: DISCONTINUED | OUTPATIENT
Start: 2021-08-28 | End: 2021-08-29 | Stop reason: HOSPADM

## 2021-08-28 RX ORDER — HEPARIN SODIUM 1000 [USP'U]/ML
2400 INJECTION, SOLUTION INTRAVENOUS; SUBCUTANEOUS
Status: DISCONTINUED | OUTPATIENT
Start: 2021-08-28 | End: 2021-08-28

## 2021-08-28 RX ORDER — DOCUSATE SODIUM 100 MG/1
100 CAPSULE, LIQUID FILLED ORAL 2 TIMES DAILY
Status: DISCONTINUED | OUTPATIENT
Start: 2021-08-28 | End: 2021-08-29 | Stop reason: HOSPADM

## 2021-08-28 RX ORDER — ONDANSETRON 2 MG/ML
4 INJECTION INTRAMUSCULAR; INTRAVENOUS EVERY 6 HOURS PRN
Status: DISCONTINUED | OUTPATIENT
Start: 2021-08-28 | End: 2021-08-29 | Stop reason: HOSPADM

## 2021-08-28 RX ORDER — CEFAZOLIN SODIUM 1 G/50ML
1000 SOLUTION INTRAVENOUS EVERY 8 HOURS
Status: DISCONTINUED | OUTPATIENT
Start: 2021-08-28 | End: 2021-08-29

## 2021-08-28 RX ADMIN — AMLODIPINE BESYLATE 2.5 MG: 2.5 TABLET ORAL at 21:42

## 2021-08-28 RX ADMIN — METOPROLOL SUCCINATE 25 MG: 25 TABLET, FILM COATED, EXTENDED RELEASE ORAL at 21:42

## 2021-08-28 RX ADMIN — PANTOPRAZOLE SODIUM 40 MG: 40 TABLET, DELAYED RELEASE ORAL at 07:32

## 2021-08-28 RX ADMIN — HEPARIN SODIUM 18 UNITS/KG/HR: 10000 INJECTION, SOLUTION INTRAVENOUS at 04:40

## 2021-08-28 RX ADMIN — SODIUM CHLORIDE, SODIUM GLUCONATE, SODIUM ACETATE, POTASSIUM CHLORIDE, MAGNESIUM CHLORIDE, SODIUM PHOSPHATE, DIBASIC, AND POTASSIUM PHOSPHATE 1000 ML: .53; .5; .37; .037; .03; .012; .00082 INJECTION, SOLUTION INTRAVENOUS at 02:33

## 2021-08-28 RX ADMIN — HEPARIN SODIUM 4800 UNITS: 1000 INJECTION INTRAVENOUS; SUBCUTANEOUS at 04:40

## 2021-08-28 RX ADMIN — METOPROLOL SUCCINATE 25 MG: 25 TABLET, FILM COATED, EXTENDED RELEASE ORAL at 08:51

## 2021-08-28 RX ADMIN — SODIUM CHLORIDE 75 ML/HR: 0.9 INJECTION, SOLUTION INTRAVENOUS at 07:33

## 2021-08-28 RX ADMIN — AMLODIPINE BESYLATE 2.5 MG: 2.5 TABLET ORAL at 08:50

## 2021-08-28 RX ADMIN — CEFAZOLIN SODIUM 1000 MG: 1 SOLUTION INTRAVENOUS at 21:42

## 2021-08-28 NOTE — ASSESSMENT & PLAN NOTE
Leukocytosis of 19 13 on presentation   patient without infectious signs at this time   suspect reactive, rule out DVT

## 2021-08-28 NOTE — H&P
[address] [facility]  1940, [de-identified] y o  female MRN: [id number]  Unit/Bed#: ED 10 Encounter: [id number]  Primary Care Provider: Harlo Mcardle, DO   Date and time admitted to hospital: 8/27/2021 10:53 PM    * Leg edema, left  Assessment & Plan  Patient with unilateral left lower extremity skin rash/swelling that appeared on 08/27/2021  Low suspicion for infectious process/cellulitis   D-dimer elevated  Obtain vas duplex to rule out DVT in the a [facility] Given high suspicion for true DVT, will start heparin drip at this time, renal function precluding therapeutic Lovenox    Chronic kidney disease, stage III (moderate) ([facility])  Assessment & Plan  Lab Results   Component Value Date    EGFR 24 08/28/2021    EGFR 42 08/16/2021    EGFR 43 05/11/2021    CREATININE 1 92 (H) 08/28/2021    CREATININE 1 22 08/16/2021    CREATININE 1 20 05/11/2021    patient with known CKD, follows with Dr Guzman Abt   will hold nephrotoxins including Aldactone at this time    Leukocytosis  Assessment & Plan   Leukocytosis of 19 13 on presentation   patient without infectious signs at this time   suspect reactive, rule out DVT    VINNY (acute kidney injury) [facility])  Assessment & Plan   Patient presents with VINNY on CKD as evidence by creatinine 1 9 whereas baseline is closer to 1 2    suspect prerenal as patient has limited hydration while at work   give gentle IVF   check bladder scan    VTE Pharmacologic Prophylaxis:   High Risk (Score >/= 5) - Pharmacological DVT Prophylaxis Ordered: heparin drip  Sequential Compression Devices Ordered  Code Status: Prior full code  Discussion with family: Patient declined call to   Anticipated Length of Stay: Patient will be admitted on an observation basis with an anticipated length of stay of less than 2 midnights secondary to Rule out DVT      Total Time for Visit, including Counseling / Coordination of Care: 60 minutes Greater than 50% of this total time spent on direct patient counseling and coordination of care  Chief Complaint:  Lower extremity skin rash    History of Present Illness:  Laurie is a [de-identified] y o  female with a PMH of  CKD, HTN who presents with lower extremity skin color change  The patient reports a job that requires multiple hours standing  She states she has been in her normal state of health when approximately 24-48 hours ago, she noticed lower extremity color change associated with swelling and dull pain  She denies fevers or chills  Denied shortness of breath or chest pain  She came to ED for evaluation  D-dimer elevated  There is slight leukocytosis on lab work and evidence of VINNY  Ultrasound technician unavailable to perform vast duplex therefore will admit in observation  Will start heparin drip  Will give IVF and do preliminary workup for VINNY  Review of Systems:  Review of Systems   Constitutional: Negative for chills and fever  HENT: Negative for ear pain and sore throat  Eyes: Negative for pain and visual disturbance  Respiratory: Negative for cough and shortness of breath  Cardiovascular: Positive for leg swelling  Negative for chest pain and palpitations  Gastrointestinal: Negative for abdominal pain and vomiting  Genitourinary: Negative for dysuria and hematuria  Musculoskeletal: Positive for myalgias  Negative for arthralgias and back pain  Skin: Positive for color change  Negative for rash  Neurological: Negative for seizures and syncope  All other systems reviewed and are negative        Past Medical and Surgical History:   Past Medical History:   Diagnosis Date    Anemia     Chronic kidney disease     Duodenal ulcer     GERD (gastroesophageal reflux disease) 04/04/2016    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    Hypercalcemia     Hypertension     Mixed hyperlipidemia     Osteoarthritis of hip 04/04/2016    Sacral insufficiency fracture     SI (sacroiliac) joint inflammation ([facility] ) 04/04/2016    Substance addiction ([facility] )        Past Surgical History:   Procedure Laterality Date    BREAST EXCISIONAL BIOPSY Left 2000    benign    COLONOSCOPY  2019    JOINT REPLACEMENT      right hip 8/15    MAMMO (HISTORICAL)  2019    TONSILLECTOMY         Meds/Allergies:  Prior to Admission medications    Medication Sig Start Date End Date Taking? Authorizing Provider   amLODIPine (NORVASC) 2 5 mg tablet Take 1 tablet (2 5 mg total) by mouth 2 (two) times a day 2/23/21   Ellen Dubin, MD   atorvastatin (LIPITOR) 10 mg tablet Take 1 tablet (10 mg total) by mouth daily 8/27/21   CYDNEY Falcon   ibuprofen (MOTRIN) 800 mg tablet as needed  10/14/20   Historical Provider, MD   lidocaine (XYLOCAINE) 5 % ointment  3/14/19   Historical Provider, MD   metoprolol succinate (TOPROL-XL) 25 mg 24 hr tablet Take 1 tablet (25 mg total) by mouth 2 (two) times a day 3/30/21   Ellen Dubin, MD   ondansetron (ZOFRAN-ODT) 4 mg disintegrating tablet Take 1 tablet (4 mg total) by mouth every 6 (six) hours as needed for nausea or vomiting 10/8/20   Jerod Bansal PA-C   pantoprazole (PROTONIX) 40 mg tablet Take 1 tablet (40 mg total) by mouth 2 (two) times a day before meals 12/21/20 8/19/21  Patricia Connolly MD   phenazopyridine (PYRIDIUM) 100 mg tablet  11/3/20   Historical Provider, MD   PREMARIN vaginal cream  11/29/17   Historical Provider, MD   spironolactone (ALDACTONE) 25 mg tablet Take 1 tablet (25 mg total) by mouth daily 8/19/21   Ellen Dubin, MD     I have reviewed home medications with patient personally  Allergies:    Allergies   Allergen Reactions    Bactrim [Sulfamethoxazole-Trimethoprim]      VINNY       Social History:  Marital Status: /Civil Union     Substance Use History:   Social History     Substance and Sexual Activity   Alcohol Use Never     Social History     Tobacco Use   Smoking Status Never Smoker   Smokeless Tobacco Never Used     Social History     Substance and Sexual Activity   Drug Use Not Currently    Types: Prescription    Comment: araseliing last used 1996       Family History:  Family History   Problem Relation Age of Onset    No Known Problems Mother     No Known Problems Father     Breast cancer Daughter 48    Breast cancer Maternal Aunt 79    No Known Problems Sister     No Known Problems Maternal Grandmother     No Known Problems Maternal Grandfather     No Known Problems Paternal Grandmother     No Known Problems Paternal Grandfather     No Known Problems Son     No Known Problems Sister     No Known Problems Sister        Physical Exam:     Vitals:   Blood Pressure: 139/80 (08/27/21 1956)  Pulse: 90 (08/27/21 1956)  Temperature: 97 9 °F (36 6 °C) (08/27/21 1956)  Temp Source: Tympanic (08/27/21 1956)  Respirations: 16 (08/27/21 1956)  Weight - Scale: 60 3 kg (133 lb) (08/27/21 1956)  SpO2: 99 % (08/27/21 1956)    Physical Exam  Vitals and nursing note reviewed  Exam conducted with a chaperone present  Constitutional:       General: She is not in acute distress  Appearance: She is well-developed  Comments: The patient is pleasant and fully conversational   She is in no acute distress  HENT:      Head: Normocephalic and atraumatic  Eyes:      Conjunctiva/sclera: Conjunctivae normal    Cardiovascular:      Rate and Rhythm: Normal rate and regular rhythm  Heart sounds: No murmur heard  Pulmonary:      Effort: Pulmonary effort is normal  No respiratory distress  Breath sounds: Normal breath sounds  Abdominal:      Palpations: Abdomen is soft  Tenderness: There is no abdominal tenderness  Musculoskeletal:         General: Swelling ( left lower extremity) and tenderness ( left lower extremity) present  Cervical back: Neck supple  Skin:     General: Skin is warm and dry  Neurological:      Mental Status: She is alert            Additional Data:     Lab Results:  Results from last 7 days   Lab Units 08/28/21  0041 WBC Thousand/uL 19 13*   HEMOGLOBIN g/dL 11 8   HEMATOCRIT % 36 7   PLATELETS Thousands/uL 220   BANDS PCT % 1   LYMPHO PCT % 3*   MONO PCT % 2*   EOS PCT % 0     Results from last 7 days   Lab Units 08/28/21  0041   SODIUM mmol/L 137   POTASSIUM mmol/L 3 8   CHLORIDE mmol/L 111*   CO2 mmol/L 18*   BUN mg/dL 43*   CREATININE mg/dL 1 92*   ANION GAP mmol/L 8   CALCIUM mg/dL 8 6   ALBUMIN g/dL 3 2*   TOTAL BILIRUBIN mg/dL 0 30   ALK PHOS U/L 71   ALT U/L 57   AST U/L 38   GLUCOSE RANDOM mg/dL 107                       Imaging: No pertinent imaging reviewed  VAS lower limb venous duplex study, unilateral/limited    (Results Pending)         ** Please Note: This note has been constructed using a voice recognition system   **

## 2021-08-28 NOTE — ED ATTENDING ATTESTATION
8/27/2021  I, Soila Amor MD, saw and evaluated the patient  I have discussed the patient with the resident/non-physician practitioner and agree with the resident's/non-physician practitioner's findings, Plan of Care, and MDM as documented in the resident's/non-physician practitioner's note, except where noted  All available labs and Radiology studies were reviewed  I was present for key portions of any procedure(s) performed by the resident/non-physician practitioner and I was immediately available to provide assistance  At this point I agree with the current assessment done in the Emergency Department  I have conducted an independent evaluation of this patient a history and physical is as follows:    OA: [de-identified] y/o f with h/o CKD, HTN and osteoarthritis/bursitis who p/w LLE swelling x 2 days  First noticed the swelling yesterday with associated area of erythema and mild discomfort  Spends long hours on her feet as 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  Does not wear compression socks  Denies any abd pain  No n/v  No cp/sob  No swelling of her RLE  No falls/trauma  No fevers/chills  PE, NAD, VSS, NC/AT, MMM, clear sclera/conjunctiva,  Neck supple/FROM, RR, lungs CTAB, abd soft, +BS, -r/g, - mass, + LE edema of LLE from ankle to knee, + area of erythema/skin changes over mid tib-fib, circumferential, mildly ttp, intact pulses, intact capillary refill < 2 sec, AAO  LLE swelling with area of skin change, eval with basic labs, d-dimer as duplex unavailable at this time  Will treat accordingly  ED Course     Pt with work up pending at end of shift       Critical Care Time  Procedures

## 2021-08-28 NOTE — ASSESSMENT & PLAN NOTE
Patient presents with VINNY on CKD as evidence by creatinine 1 9 whereas baseline is closer to 1 2    suspect prerenal as patient has limited hydration while at work   give gentle IVF   check bladder scan

## 2021-08-28 NOTE — ED PROVIDER NOTES
History  Chief Complaint   Patient presents with    Leg Swelling     pt c/o left leg swelling and redness since yesterday  c/o pain  45-year-old female history of hypertension, CKD, presenting due to left lower extremity swelling and rash  Patient states over the past 2 days she has noticed swelling in her left lower leg as well as the red circumferential rash  Says she has some discomfort in the leg and tenderness to palpation  Denies taking anything for her symptoms her placing any topical medication on her leg  Denies any similar presentations in the past   Denies fever, chills, chest pain or dyspnea, nausea vomiting, abdominal pain, urinary or bowel symptoms, weakness numbness or tingling to her extremities, injury or trauma  Prior to Admission Medications   Prescriptions Last Dose Informant Patient Reported? Taking?    PREMARIN vaginal cream  Self Yes No   Patient not taking: Reported on 8/19/2021   amLODIPine (NORVASC) 2 5 mg tablet   No No   Sig: Take 1 tablet (2 5 mg total) by mouth 2 (two) times a day   atorvastatin (LIPITOR) 10 mg tablet   No No   Sig: Take 1 tablet (10 mg total) by mouth daily   ibuprofen (MOTRIN) 800 mg tablet   Yes No   Sig: as needed    lidocaine (XYLOCAINE) 5 % ointment  Self Yes No   metoprolol succinate (TOPROL-XL) 25 mg 24 hr tablet   No No   Sig: Take 1 tablet (25 mg total) by mouth 2 (two) times a day   ondansetron (ZOFRAN-ODT) 4 mg disintegrating tablet   No No   Sig: Take 1 tablet (4 mg total) by mouth every 6 (six) hours as needed for nausea or vomiting   pantoprazole (PROTONIX) 40 mg tablet   No No   Sig: Take 1 tablet (40 mg total) by mouth 2 (two) times a day before meals   phenazopyridine (PYRIDIUM) 100 mg tablet   Yes No   spironolactone (ALDACTONE) 25 mg tablet   No No   Sig: Take 1 tablet (25 mg total) by mouth daily      Facility-Administered Medications: None       Past Medical History:   Diagnosis Date    Anemia     Chronic kidney disease     Duodenal ulcer     GERD (gastroesophageal reflux disease) 04/04/2016    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    Hypercalcemia     Hypertension     Mixed hyperlipidemia     Osteoarthritis of hip 04/04/2016    Sacral insufficiency fracture     SI (sacroiliac) joint inflammation ([facility] [address] ) 04/04/2016    Substance addiction ([facility] )        Past Surgical History:   Procedure Laterality Date    BREAST EXCISIONAL BIOPSY Left 2000    benign    COLONOSCOPY  2019    JOINT REPLACEMENT      right hip 8/15    MAMMO (HISTORICAL)  2019    TONSILLECTOMY         Family History   Problem Relation Age of Onset    No Known Problems Mother     No Known Problems Father     Breast cancer Daughter 48    Breast cancer Maternal Aunt 79    No Known Problems Sister     No Known Problems Maternal Grandmother     No Known Problems Maternal Grandfather     No Known Problems Paternal Grandmother     No Known Problems Paternal Grandfather     No Known Problems Son     No Known Problems Sister     No Known Problems Sister      I have reviewed and agree with the history as documented  E-Cigarette/Vaping    E-Cigarette Use Never User      E-Cigarette/Vaping Substances     Social History     Tobacco Use    Smoking status: Never Smoker    Smokeless tobacco: Never Used   Vaping Use    Vaping Use: Never used   Substance Use Topics    Alcohol use: Never    Drug use: Not Currently     Types: Prescription     Comment: rocovering last used 1996        Review of Systems   Constitutional: Negative for chills and fever  HENT: Negative for ear pain and sore throat  Eyes: Negative for visual disturbance  Respiratory: Negative for cough and shortness of breath  Cardiovascular: Positive for leg swelling  Negative for chest pain and palpitations  Gastrointestinal: Negative for abdominal pain and vomiting  Genitourinary: Negative for dysuria  Musculoskeletal: Negative for arthralgias and back pain     Skin: Positive for rash  Negative for color change  Neurological: Negative for syncope  All other systems reviewed and are negative  Physical Exam  ED Triage Vitals [08/27/21 1956]   Temperature Pulse Respirations Blood Pressure SpO2   97 9 °F (36 6 °C) 90 16 139/80 99 %      Temp Source Heart Rate Source Patient Position - Orthostatic VS BP Location FiO2 (%)   Tympanic Monitor Sitting Left arm --      Pain Score       4             Orthostatic Vital Signs  Vitals:    08/27/21 1956   BP: 139/80   Pulse: 90   Patient Position - Orthostatic VS: Sitting       Physical Exam  Vitals and nursing note reviewed  Constitutional:       General: She is not in acute distress  Appearance: She is well-developed  HENT:      Head: Normocephalic and atraumatic  Eyes:      Conjunctiva/sclera: Conjunctivae normal    Cardiovascular:      Rate and Rhythm: Normal rate and regular rhythm  Pulmonary:      Effort: Pulmonary effort is normal  No respiratory distress  Breath sounds: Normal breath sounds  Abdominal:      General: There is no distension  Musculoskeletal:         General: Swelling present  Cervical back: Neck supple  Skin:     General: Skin is warm and dry  Findings: Rash present  Neurological:      Mental Status: She is alert and oriented to person, place, and time     Psychiatric:         Mood and Affect: Mood normal          Behavior: Behavior normal              ED Medications  Medications   pantoprazole (PROTONIX) EC tablet 40 mg (has no administration in time range)   amLODIPine (NORVASC) tablet 2 5 mg (has no administration in time range)   metoprolol succinate (TOPROL-XL) 24 hr tablet 25 mg (has no administration in time range)   docusate sodium (COLACE) capsule 100 mg (has no administration in time range)   ondansetron (ZOFRAN) injection 4 mg (has no administration in time range)   sodium chloride 0 9 % infusion (has no administration in time range)   heparin (porcine) injection 4,800 Units (has no administration in time range)   heparin (porcine) 25,000 units in 0 45% NaCl 250 mL infusion (premix) (has no administration in time range)   heparin (porcine) injection 4,800 Units (has no administration in time range)   heparin (porcine) injection 2,400 Units (has no administration in time range)   multi-electrolyte (ISOLYTE-S PH 7 4) bolus 1,000 mL (1,000 mL Intravenous New Bag 8/28/21 0233)       Diagnostic Studies  Results Reviewed     Procedure Component Value Units Date/Time    Basic metabolic panel [[id number]]     Lab Status: No result Specimen: Blood     CBC (With Platelets) [[id number]]     Lab Status: No result Specimen: Blood     APTT [[id number]]     Lab Status: No result Specimen: Blood     APTT [[id number]]     Lab Status: No result Specimen: Blood     Protime-INR [[id number]]     Lab Status: No result Specimen: Blood     CBC and differential [[id number]]  (Abnormal) Collected: 08/28/21 0041    Lab Status: Final result Specimen: Blood from Arm, Right Updated: 08/28/21 0119     WBC 19 13 Thousand/uL      RBC 3 99 Million/uL      Hemoglobin 11 8 g/dL      Hematocrit 36 7 %      MCV 92 fL      MCH 29 6 pg      MCHC 32 2 g/dL      RDW 13 6 %      MPV 10 5 fL      Platelets 010 Thousands/uL     Narrative: This is an appended report  These results have been appended to a previously verified report      Manual Differential(PHLEBS Do Not Order) [[id number]]  (Abnormal) Collected: 08/28/21 0041    Lab Status: Final result Specimen: Blood from Arm, Right Updated: 08/28/21 0119     Segmented % 94 %      Bands % 1 %      Lymphocytes % 3 %      Monocytes % 2 %      Eosinophils, % 0 %      Basophils % 0 %      Absolute Neutrophils 18 17 Thousand/uL      Lymphocytes Absolute 0 57 Thousand/uL      Monocytes Absolute 0 38 Thousand/uL      Eosinophils Absolute 0 00 Thousand/uL      Basophils Absolute 0 00 Thousand/uL      Total Counted --     RBC Morphology Present     Anisocytosis Present Poikilocytes Present     Platelet Estimate Adequate     Artifact Present    D-dimer, quantitative [[id number]]  (Abnormal) Collected: 08/28/21 0041    Lab Status: Final result Specimen: Blood from Arm, Right Updated: 08/28/21 0115     D-Dimer, Quant 1 20 ug/ml [facility]     Comprehensive metabolic panel [[id number]]  (Abnormal) Collected: 08/28/21 0041    Lab Status: Final result Specimen: Blood from Arm, Right Updated: 08/28/21 0112     Sodium 137 mmol/L      Potassium 3 8 mmol/L      Chloride 111 mmol/L      CO2 18 mmol/L      ANION GAP 8 mmol/L      BUN 43 mg/dL      Creatinine 1 92 mg/dL      Glucose 107 mg/dL      Calcium 8 6 mg/dL      Corrected Calcium 9 2 mg/dL      AST 38 U/L      ALT 57 U/L      Alkaline Phosphatase 71 U/L      Total Protein 7 4 g/dL      Albumin 3 2 g/dL      Total Bilirubin 0 30 mg/dL      eGFR 24 ml/min/1 73sq m     Narrative:      [place] guidelines for Chronic Kidney Disease (CKD):     Stage 1 with normal or high GFR (GFR > 90 mL/min/1 73 square meters)    Stage 2 Mild CKD (GFR = 60-89 mL/min/1 73 square meters)    Stage 3A Moderate CKD (GFR = 45-59 mL/min/1 73 square meters)    Stage 3B Moderate CKD (GFR = 30-44 mL/min/1 73 square meters)    Stage 4 Severe CKD (GFR = 15-29 mL/min/1 73 square meters)    Stage 5 End Stage CKD (GFR <15 mL/min/1 73 square meters)  Note: GFR calculation is accurate only with a steady state creatinine                 VAS lower limb venous duplex study, unilateral/limited    (Results Pending)         Procedures  Procedures      ED Course               Identification of Seniors at Risk      Most Recent Value   (ISAR) Identification of Seniors at Risk   Before the illness or injury that brought you to the Emergency, did you need someone to help you on a regular basis?   0 Filed at: 08/27/2021 1958   In the last 24 hours, have you needed more help than usual?  0 Filed at: 08/27/2021 1958   Have you been hospitalized for one or more nights during the past 6 months? 0 Filed at: 08/27/2021 1958   In general, do you see well?  0 Filed at: 08/27/2021 1958   In general, do you have serious problems with your memory? 0 Filed at: 08/27/2021 1958   Do you take more than three different medications every day? 1 Filed at: 08/27/2021 1958   ISAR Score  1 Filed at: 08/27/2021 1958                          Wells' Criteria for DVT      Most Recent Value   Wells' Criteria for DVT   Active cancer Treatment or palliation within 6 months  0 Filed at: 08/27/2021 2307   Bedridden recently >3 days or major surgery within 12 weeks  0 Filed at: 08/27/2021 2307   Calf swelling >3 cm compared to the other leg  1 Filed at: 08/27/2021 2307   Entire leg swollen  0 Filed at: 08/27/2021 2307   Collateral (nonvaricose) superficial veins present  0 Filed at: 08/27/2021 2307   Localized tenderness along the deep venous system  0 Filed at: 08/27/2021 2307   Pitting edema, confined to symptomatic leg  0 Filed at: 08/27/2021 2307   Paralysis, paresis, or recent plaster immobilization of the lower extremity  0 Filed at: 08/27/2021 2307   Previously documented DVT  0 Filed at: 08/27/2021 2307   Alternative diagnosis to DVT as likely or more likely  2 Filed at: 08/27/2021 2307   Kj Kemp DVT Critera Score  -1 Filed at: 08/27/2021 2307          [facility]  Number of Diagnoses or Management Options  VINNY (acute kidney injury) ([facility] )  Leg swelling  Diagnosis management comments: Dimer was elevated so cannot rule out DVT at this time  Also noted to have VINNY  Patient will be admitted to Medicine and receive Doppler in the morning to rule out DVT  Other etiology includes venous stasis versus cellulitis         Disposition  Final diagnoses:   Leg swelling   VINNY (acute kidney injury) ([facility] )     Time reflects when diagnosis was documented in both MDM as applicable and the Disposition within this note     Time User Action Codes Description Comment    8/28/2021  1:51 AM Stevphen Crimes Add [M79 89] Leg swelling     8/28/2021  1:52 AM Anoop Coburn Add [N17 9] VINNY (acute kidney injury) [facility])       ED Disposition     ED Disposition Condition Date/Time Comment    Admit Stable Sat Aug 28, 2021  1:51 AM Case was discussed with MIRYAM and the patient's admission status was agreed to be Admission Status: observation status to the service of Dr Rianna Eason   Follow-up Information    None         Patient's Medications   Discharge Prescriptions    No medications on file     No discharge procedures on file  PDMP Review     None           ED Provider  Attending physically available and evaluated [facility] managed the patient along with the ED Attending      Electronically Signed by         Henry Ghotra DO  08/28/21 4227

## 2021-08-28 NOTE — ASSESSMENT & PLAN NOTE
Lab Results   Component Value Date    EGFR 24 08/28/2021    EGFR 42 08/16/2021    EGFR 43 05/11/2021    CREATININE 1 92 (H) 08/28/2021    CREATININE 1 22 08/16/2021    CREATININE 1 20 05/11/2021    patient with known CKD, follows with Dr Elizabeth Truong   will hold nephrotoxins including Aldactone at this time

## 2021-08-28 NOTE — ASSESSMENT & PLAN NOTE
Patient with unilateral left lower extremity skin rash/swelling that appeared on 08/27/2021  Low suspicion for infectious process/cellulitis   D-dimer elevated  Obtain vas duplex to rule out DVT in the a m  Jeremy Chio   Given high suspicion for true DVT, will start heparin drip at this time, renal function precluding therapeutic Lovenox

## 2021-08-28 NOTE — ED NOTES
Incontinent of urine, lines changed  Pt OOB to chair eating breakfast without difficulty, no c/o pain  2+ pedal pulses bilaterally       Juan Rios RN  08/28/21 1055

## 2021-08-29 VITALS
BODY MASS INDEX: 25.11 KG/M2 | RESPIRATION RATE: 18 BRPM | HEIGHT: 61 IN | HEART RATE: 72 BPM | OXYGEN SATURATION: 99 % | TEMPERATURE: 98.2 F | DIASTOLIC BLOOD PRESSURE: 64 MMHG | WEIGHT: 133 LBS | SYSTOLIC BLOOD PRESSURE: 129 MMHG

## 2021-08-29 PROBLEM — M66.0 BAKER'S CYST, RUPTURED: Status: ACTIVE | Noted: 2021-08-29

## 2021-08-29 PROBLEM — N17.9 AKI (ACUTE KIDNEY INJURY) (HCC): Status: RESOLVED | Noted: 2018-10-26 | Resolved: 2021-08-29

## 2021-08-29 LAB
ANION GAP SERPL CALCULATED.3IONS-SCNC: 4 MMOL/L (ref 4–13)
BUN SERPL-MCNC: 17 MG/DL (ref 5–25)
CALCIUM SERPL-MCNC: 8.1 MG/DL (ref 8.3–10.1)
CHLORIDE SERPL-SCNC: 114 MMOL/L (ref 100–108)
CO2 SERPL-SCNC: 20 MMOL/L (ref 21–32)
CREAT SERPL-MCNC: 1.01 MG/DL (ref 0.6–1.3)
ERYTHROCYTE [DISTWIDTH] IN BLOOD BY AUTOMATED COUNT: 13.8 % (ref 11.6–15.1)
GFR SERPL CREATININE-BSD FRML MDRD: 53 ML/MIN/1.73SQ M
GLUCOSE SERPL-MCNC: 109 MG/DL (ref 65–140)
HCT VFR BLD AUTO: 34.8 % (ref 34.8–46.1)
HGB BLD-MCNC: 11.2 G/DL (ref 11.5–15.4)
MCH RBC QN AUTO: 30.1 PG (ref 26.8–34.3)
MCHC RBC AUTO-ENTMCNC: 32.2 G/DL (ref 31.4–37.4)
MCV RBC AUTO: 94 FL (ref 82–98)
PLATELET # BLD AUTO: 238 THOUSANDS/UL (ref 149–390)
PMV BLD AUTO: 10.4 FL (ref 8.9–12.7)
POTASSIUM SERPL-SCNC: 4 MMOL/L (ref 3.5–5.3)
RBC # BLD AUTO: 3.72 MILLION/UL (ref 3.81–5.12)
SODIUM SERPL-SCNC: 138 MMOL/L (ref 136–145)
WBC # BLD AUTO: 15.91 THOUSAND/UL (ref 4.31–10.16)

## 2021-08-29 PROCEDURE — 99238 HOSP IP/OBS DSCHRG MGMT 30/<: CPT | Performed by: INTERNAL MEDICINE

## 2021-08-29 PROCEDURE — 85027 COMPLETE CBC AUTOMATED: CPT | Performed by: FAMILY MEDICINE

## 2021-08-29 PROCEDURE — 80048 BASIC METABOLIC PNL TOTAL CA: CPT | Performed by: FAMILY MEDICINE

## 2021-08-29 PROCEDURE — 93971 EXTREMITY STUDY: CPT | Performed by: SURGERY

## 2021-08-29 RX ORDER — ACETAMINOPHEN 325 MG/1
650 TABLET ORAL EVERY 6 HOURS PRN
Status: DISCONTINUED | OUTPATIENT
Start: 2021-08-29 | End: 2021-08-29 | Stop reason: HOSPADM

## 2021-08-29 RX ORDER — CEPHALEXIN 500 MG/1
500 CAPSULE ORAL EVERY 8 HOURS SCHEDULED
Qty: 9 CAPSULE | Refills: 0 | Status: SHIPPED | OUTPATIENT
Start: 2021-08-29 | End: 2021-09-02 | Stop reason: ALTCHOICE

## 2021-08-29 RX ORDER — CEPHALEXIN 500 MG/1
500 CAPSULE ORAL ONCE
Status: COMPLETED | OUTPATIENT
Start: 2021-08-29 | End: 2021-08-29

## 2021-08-29 RX ADMIN — AMLODIPINE BESYLATE 2.5 MG: 2.5 TABLET ORAL at 09:04

## 2021-08-29 RX ADMIN — PANTOPRAZOLE SODIUM 40 MG: 40 TABLET, DELAYED RELEASE ORAL at 05:53

## 2021-08-29 RX ADMIN — ACETAMINOPHEN 650 MG: 325 TABLET, FILM COATED ORAL at 05:53

## 2021-08-29 RX ADMIN — METOPROLOL SUCCINATE 25 MG: 25 TABLET, FILM COATED, EXTENDED RELEASE ORAL at 09:04

## 2021-08-29 RX ADMIN — CEFAZOLIN SODIUM 1000 MG: 1 SOLUTION INTRAVENOUS at 05:52

## 2021-08-29 RX ADMIN — CEPHALEXIN 500 MG: 500 CAPSULE ORAL at 13:58

## 2021-08-29 NOTE — PLAN OF CARE
Problem: PAIN - ADULT  Goal: Verbalizes/displays adequate comfort level or baseline comfort level  Description: Interventions:  - Encourage patient to monitor pain and request assistance  - Assess pain using appropriate pain scale  - Administer analgesics based on type and severity of pain and evaluate response  - Implement non-pharmacological measures as appropriate and evaluate response  - Consider cultural and social influences on pain and pain management  - Notify physician/advanced practitioner if interventions unsuccessful or patient reports new pain  Outcome: Progressing     Problem: INFECTION - ADULT  Goal: Absence or prevention of progression during hospitalization  Description: INTERVENTIONS:  - Assess and monitor for signs and symptoms of infection  - Monitor lab/diagnostic results  - Monitor all insertion sites, i e  indwelling lines, tubes, and drains  - Monitor endotracheal if appropriate and nasal secretions for changes in amount and color  - [place] appropriate cooling/warming therapies per order  - Administer medications as ordered  - Instruct and encourage patient and family to use good hand hygiene technique  - Identify and instruct in appropriate isolation precautions for identified infection/condition  Outcome: Progressing  Goal: Absence of fever/infection during neutropenic period  Description: INTERVENTIONS:  - Monitor WBC    Outcome: Progressing     Problem: SAFETY ADULT  Goal: Patient will remain free of falls  Description: INTERVENTIONS:  - Educate patient/family on patient safety including physical limitations  - Instruct patient to call for assistance with activity   - Consult OT/PT to assist with strengthening/mobility   - Keep Call bell within reach  - Keep bed low and locked with side rails adjusted as appropriate  - Keep care items and personal belongings within reach  - Initiate and maintain comfort rounds  - Make Fall Risk Sign visible to staff  - Offer Toileting every 2 Hours, in advance of need  - Initiate/Maintain bed/chair alarm  - Obtain necessary fall risk management equipment: non-slip socks, bed alarm  - Apply yellow socks and bracelet for high fall risk patients  - Consider moving patient to room near nurses station  Outcome: Progressing  Goal: Maintain or return to baseline ADL function  Description: INTERVENTIONS:  -  Assess patient's ability to carry out ADLs; assess patient's baseline for ADL function and identify physical deficits which impact ability to perform ADLs (bathing, care of mouth/teeth, toileting, grooming, dressing, etc )  - Assess/evaluate cause of self-care deficits   - Assess range of motion  - Assess patient's mobility; develop plan if impaired  - Assess patient's need for assistive devices and provide as appropriate  - Encourage maximum independence but intervene and supervise when necessary  - Involve family in performance of ADLs  - Assess for home care needs following discharge   - Consider OT consult to assist with ADL evaluation and planning for discharge  - Provide patient education as appropriate  Outcome: Progressing  Goal: Maintains/Returns to pre admission functional level  Description: INTERVENTIONS:  - Perform BMAT or MOVE assessment daily    - Set and communicate daily mobility goal to care team and patient/family/caregiver  - Collaborate with rehabilitation services on mobility goals if consulted  - Perform Range of Motion 3 times a day    - Ambulate patient 3 times a day  - Out of bed to chair 3 times a day   - Out of bed for meals 3 times a day  - Out of bed for toileting  - Record patient progress and toleration of activity level   Outcome: Progressing     Problem: DISCHARGE PLANNING  Goal: Discharge to home or other facility with appropriate resources  Description: INTERVENTIONS:  - Identify barriers to discharge w/patient and caregiver  - Arrange for needed discharge resources and transportation as appropriate  - Identify discharge learning needs (meds, wound care, etc )  - Arrange for interpretive services to assist at discharge as needed  - Refer to Case Management Department for coordinating discharge planning if the patient needs post-hospital services based on physician/advanced practitioner order or complex needs related to functional status, cognitive ability, or social support system  Outcome: Progressing     Problem: Knowledge Deficit  Goal: Patient/family/caregiver demonstrates understanding of disease process, treatment plan, medications, and discharge instructions  Description: Complete learning assessment and assess knowledge base    Interventions:  - Provide teaching at level of understanding  - Provide teaching via preferred learning methods  Outcome: Progressing

## 2021-08-29 NOTE — DISCHARGE INSTRUCTIONS
[place] Cyst   WHAT YOU NEED TO KNOW:   A Baker cyst is a bulging lump of fluid behind your knee  A Baker cyst can develop if you have a knee injury, or a condition such as osteoarthritis or a connective tissue disorder  A Baker cyst may also be called a popliteal cyst   DISCHARGE INSTRUCTIONS:   Seek care immediately if:   · You have severe pain  · You have bruising on the ankle below the cyst     · Your calf turns blue below the cyst     · Your calf or knee is swollen or bleeding  Call your doctor if:   · You have a fever  · Your pain does not improve with medicine  · You have questions or concerns about your condition or care  Medicines:   · NSAIDs , such as ibuprofen, help decrease swelling, pain, and fever  NSAIDs can cause stomach bleeding or kidney problems in certain people  If you take blood thinner medicine, always ask your healthcare provider if NSAIDs are safe for you  Always read the medicine label and follow directions  · Take your medicine as directed  Contact your healthcare provider if you think your medicine is not helping or if you have side effects  Tell him or her if you are allergic to any medicine  Keep a list of the medicines, vitamins, and herbs you take  Include the amounts, and when and why you take them  Bring the list or the pill bottles to follow-up visits  Carry your medicine list with you in case of an emergency  Care for your knee:   · Rest as needed  Limit movement as your knee heals  This will help decrease the risk of more damage to your knee  You may need crutches to take weight off your injured knee  Use crutches as directed  · Ice your knee  Ice helps decrease swelling and pain  Use an ice pack, or put ice in a plastic bag  Cover it with a towel before you place it on your skin  Ice your knee for 15 to 20 minutes, 3 to 4 times each day  Do this for 2 to 3 days  · Support your knee  Wrap your knee with an elastic bandage   Ask your healthcare provider if you need a brace for more support  This will help decrease swelling and movement so your knee can heal     · Elevate your knee  Use pillows to raise your knee above the level of your heart as often as you can  This will help decrease swelling  Do not put the pillow directly under your knee  Put it under your calf instead  · Go to physical therapy as directed  A physical therapist teaches you exercises to help improve movement and strength, and to decrease pain  Follow up with your doctor as directed:  Write down your questions so you remember to ask them during your visits  © Copyright [employer] 2021 Information is for End User's use only and may not be sold, redistributed or otherwise used for commercial purposes  All illustrations and images included in CareNotes® are the copyrighted property of [employer]  or Joelle Montanez  The above information is an  only  It is not intended as medical advice for individual conditions or treatments  Talk to your doctor, nurse or pharmacist before following any medical regimen to see if it is safe and effective for you

## 2021-08-29 NOTE — ASSESSMENT & PLAN NOTE
Lab Results   Component Value Date    EGFR 53 08/29/2021    EGFR 29 08/28/2021    EGFR 24 08/28/2021    CREATININE 1 01 08/29/2021    CREATININE 1 65 (H) 08/28/2021    CREATININE 1 92 (H) 08/28/2021    patient with known CKD, follows with Dr Ramon Whitfield   will hold nephrotoxins including Aldactone at this time    8/29/21:  Creatinine has returned back to baseline with fluid hydration

## 2021-08-29 NOTE — ASSESSMENT & PLAN NOTE
Leukocytosis of 19 13 on presentation  - likely reactive versus infectious / cellulitis  Will treat with 3 day course of Keflex as an outpatient,  She has received 2 days of cefazolin inpatient

## 2021-08-29 NOTE — DISCHARGE SUMMARY
[address] [facility]  Discharge- Rafael 1940, [de-identified] y o  female MRN: [id number]  Unit/Bed#: -01 Encounter: [id number]  Primary Care Provider: Carlo Cordero DO   Date and time admitted to hospital: 8/27/2021 10:53 PM    Leg edema, left  Assessment & Plan  Patient with unilateral left lower extremity skin rash/swelling that appeared on 08/27/2021  Low suspicion for infectious process/cellulitis   D-dimer elevated  Obtain vas duplex to rule out DVT in the a m       8/29/21:  Reviewed VS duplex with Radiology, no DVT noted, suspicion is for ruptured Baker cyst  - instructed patient to avoid NSAIDs and use Tylenol for any pain   Discharged with p o   Keflex for 3 more days      Chronic kidney disease, stage III (moderate) (HCC)  Assessment & Plan  Lab Results   Component Value Date    EGFR 53 08/29/2021    EGFR 29 08/28/2021    EGFR 24 08/28/2021    CREATININE 1 01 08/29/2021    CREATININE 1 65 (H) 08/28/2021    CREATININE 1 92 (H) 08/28/2021    patient with known CKD, follows with Dr Eugene Zhen   will hold nephrotoxins including Aldactone at this time    8/29/21:  Creatinine has returned back to baseline with fluid hydration    Leukocytosis  Assessment & Plan   Leukocytosis of 19 13 on presentation  - likely reactive versus infectious / cellulitis  Will treat with 3 day course of Keflex as an outpatient,  She has received 2 days of cefazolin inpatient    * Baker's cyst, ruptured  Assessment & Plan  - plan as above under leg edema        Medical Problems     Resolved Problems  Date Reviewed: 8/29/2021        Resolved    VINNY (acute kidney injury) ([facility] [address] ) 8/29/2021     Resolved by  Marimar Marquis DO              Discharging Physician / Practitioner: Marimar Marquis DO  PCP: Carlo Cordero DO  Admission Date:   Admission Orders (From admission, onward)     Ordered        08/28/21 1037  Inpatient Admission  Once         08/28/21 0152  Place in Observation  Once                   Discharge Date: 08/29/21    Consultations During Hospital Stay:  · none    Procedures Performed:   · Vas duplex    Significant Findings / Test Results:   VAS lower limb venous duplex study, unilateral/limited   Final Result by Annalee Pond MD (08/29 9736)      Impression:  RIGHT LOWER LIMB LIMITED:  Evaluation shows no evidence of thrombus in the common femoral vein  Doppler evaluation shows a normal response to augmentation maneuvers  LEFT LOWER LIMB:  No evidence of acute or chronic deep vein thrombosis  No evidence of superficial thrombophlebitis noted  Doppler evaluation shows a normal response to augmentation maneuvers  Popliteal, posterior tibial and anterior tibial arterial Doppler waveforms are  triphasic  There is a well defined hypoechoic non-vascularized cystic-type structure noted  in the popliteal fossa measuring 2 35cm  There is a fluid collection that  extends from the knee into the mid-calf measuring 13 5 x 0 5 cm, and suggestive  · of a ruptured Baker's cyst     Incidental Findings:   none  Test Results Pending at Discharge (will require follow up):   · none     Outpatient Tests Requested:  · none    Complications:  none    Reason for Admission:     Hospital Course:   Michelle Pope is a [de-identified] y o  female patient who originally presented to the hospital on 8/27/2021 due to left lower extremity rash  HPI per HERMELINDO Bradshaw  "Michelle Pope is a [de-identified] y o  female with a PMH of  CKD, HTN who presents with lower extremity skin color change  The patient reports a job that requires multiple hours standing  She states she has been in her normal state of health when approximately 24-48 hours ago, she noticed lower extremity color change associated with swelling and dull pain  She denies fevers or chills  Denied shortness of breath or chest pain  She came to ED for evaluation  D-dimer elevated  There is slight leukocytosis on lab work and evidence of VINNY    Ultrasound technician unavailable to perform vast duplex therefore will admit in observation  Will start heparin drip  Will give IVF and do preliminary workup for VINNY"  patient had a duplex ultrasound performed, which I reviewed personally with the radiologist and confirmed that there was no DVT  Main suspicion is for a ruptured Baker cyst   Patient does have erythema and some warmth on the anterior aspect of her left lower extremity which improved with cefazolin  Patient was discharged with 3 days of Keflex for a total course of 5 day treatment for uncomplicated cellulitis  Supportive care for ruptured Baker cyst   Patient  instructedto follow-up outpatient with her PCP    Please see above list of diagnoses and related plan for additional information  Condition at Discharge: good    Discharge Day Visit / Exam:   Subjective:  Patient denies any complaints  Vitals: Blood Pressure: 129/64 (08/29/21 0805)  Pulse: 72 (08/29/21 0805)  Temperature: 98 2 °F (36 8 °C) (08/28/21 1900)  Temp Source: Oral (08/28/21 1900)  Respirations: 18 (08/28/21 1900)  Height: 5' 1" (154 9 cm) (08/28/21 1900)  Weight - Scale: 60 3 kg (133 lb) (08/28/21 1900)  SpO2: 99 % (08/29/21 0805)  Exam:   Physical Exam  Vitals and nursing note reviewed  Constitutional:       General: She is not in acute distress  Appearance: Normal appearance  She is not ill-appearing  HENT:      Head: Normocephalic and atraumatic  Eyes:      General: No scleral icterus  Cardiovascular:      Rate and Rhythm: Normal rate and regular rhythm  Heart sounds: No murmur heard  No friction rub  Pulmonary:      Effort: No respiratory distress  Breath sounds: No stridor  No wheezing, rhonchi or rales  Chest:      Chest wall: No tenderness  Abdominal:      General: Abdomen is flat  There is no distension  Palpations: Abdomen is soft  There is no mass  Tenderness: There is no abdominal tenderness  Hernia: No hernia is present     Musculoskeletal: General: Swelling and tenderness present  No deformity or signs of injury  Right lower leg: No edema  Left lower leg: Edema present  Comments: lle rash over anterior aspect, left leg slightly swollen with crescent sign noted  Skin:     Coloration: Skin is not jaundiced or pale  Findings: Erythema and rash present  No bruising  Neurological:      Mental Status: She is alert  Discussion with Family: Updated  () at bedside  Discharge instructions/Information to patient and family:   See after visit summary for information provided to patient and family  Provisions for Follow-Up Care:  See after visit summary for information related to follow-up care and any pertinent home health orders  Disposition:   Home    Planned Readmission: no     Discharge Statement:  I spent 20 minutes discharging the patient  This time was spent on the day of discharge  I had direct contact with the patient on the day of discharge  Greater than 50% of the total time was spent examining patient, answering all patient questions, arranging and discussing plan of care with patient as well as directly providing post-discharge instructions  Additional time then spent on discharge activities  Discharge Medications:  See after visit summary for reconciled discharge medications provided to patient and/or family        **Please Note: This note may have been constructed using a voice recognition system**

## 2021-08-29 NOTE — ASSESSMENT & PLAN NOTE
Patient with unilateral left lower extremity skin rash/swelling that appeared on 08/27/2021  Low suspicion for infectious process/cellulitis   D-dimer elevated  Obtain vas duplex to rule out DVT in the a m       8/29/21:  Reviewed VS duplex with Radiology, no DVT noted, suspicion is for ruptured Baker cyst  - instructed patient to avoid NSAIDs and use Tylenol for any pain   Discharged with p o   Keflex for 3 more days

## 2021-08-29 NOTE — DISCHARGE INSTR - AVS FIRST PAGE
1) Avoid all nsaids(ibuprofen/advil/motrin/paroxen)  Use tylenol as needed  2) Have a safe trip to [place]! 3) follow up with your primary care doctor after you come back

## 2021-08-30 ENCOUNTER — APPOINTMENT (OUTPATIENT)
Dept: LAB | Facility: HOSPITAL | Age: 81
End: 2021-08-30
Attending: INTERNAL MEDICINE
Payer: COMMERCIAL

## 2021-08-30 DIAGNOSIS — E87.6 HYPOKALEMIA: ICD-10-CM

## 2021-08-30 DIAGNOSIS — E83.52 HYPERCALCEMIA: ICD-10-CM

## 2021-08-30 DIAGNOSIS — I10 ESSENTIAL HYPERTENSION, MALIGNANT: ICD-10-CM

## 2021-08-30 DIAGNOSIS — I12.9 HYPERTENSIVE CHRONIC KIDNEY DISEASE WITH STAGE 1 THROUGH STAGE 4 CHRONIC KIDNEY DISEASE, OR UNSPECIFIED CHRONIC KIDNEY DISEASE: ICD-10-CM

## 2021-08-30 DIAGNOSIS — E55.9 VITAMIN D DEFICIENCY: ICD-10-CM

## 2021-08-30 DIAGNOSIS — E78.5 DYSLIPIDEMIA: ICD-10-CM

## 2021-08-30 DIAGNOSIS — E83.42 HYPOMAGNESEMIA: ICD-10-CM

## 2021-08-30 DIAGNOSIS — E61.1 IRON DEFICIENCY: ICD-10-CM

## 2021-08-30 DIAGNOSIS — D64.9 ANEMIA, UNSPECIFIED TYPE: ICD-10-CM

## 2021-08-30 DIAGNOSIS — N18.31 STAGE 3A CHRONIC KIDNEY DISEASE (HCC): ICD-10-CM

## 2021-08-30 DIAGNOSIS — N18.31 ANEMIA OF CHRONIC RENAL FAILURE, STAGE 3A (HCC): ICD-10-CM

## 2021-08-30 DIAGNOSIS — E78.5 HYPERLIPIDEMIA, UNSPECIFIED HYPERLIPIDEMIA TYPE: ICD-10-CM

## 2021-08-30 DIAGNOSIS — D63.1 ANEMIA OF CHRONIC RENAL FAILURE, STAGE 3A (HCC): ICD-10-CM

## 2021-08-30 LAB
ALBUMIN SERPL BCP-MCNC: 2.8 G/DL (ref 3.5–5)
ALP SERPL-CCNC: 77 U/L (ref 46–116)
ALT SERPL W P-5'-P-CCNC: 30 U/L (ref 12–78)
ANION GAP SERPL CALCULATED.3IONS-SCNC: 3 MMOL/L (ref 4–13)
AST SERPL W P-5'-P-CCNC: 15 U/L (ref 5–45)
BASOPHILS # BLD AUTO: 0.03 THOUSANDS/ΜL (ref 0–0.1)
BASOPHILS NFR BLD AUTO: 0 % (ref 0–1)
BILIRUB SERPL-MCNC: 0.41 MG/DL (ref 0.2–1)
BUN SERPL-MCNC: 13 MG/DL (ref 5–25)
CALCIUM ALBUM COR SERPL-MCNC: 9.6 MG/DL (ref 8.3–10.1)
CALCIUM SERPL-MCNC: 8.6 MG/DL (ref 8.3–10.1)
CHLORIDE SERPL-SCNC: 116 MMOL/L (ref 100–108)
CHOLEST SERPL-MCNC: 168 MG/DL (ref 50–200)
CO2 SERPL-SCNC: 20 MMOL/L (ref 21–32)
CREAT SERPL-MCNC: 0.97 MG/DL (ref 0.6–1.3)
EOSINOPHIL # BLD AUTO: 0.23 THOUSAND/ΜL (ref 0–0.61)
EOSINOPHIL NFR BLD AUTO: 2 % (ref 0–6)
ERYTHROCYTE [DISTWIDTH] IN BLOOD BY AUTOMATED COUNT: 13.8 % (ref 11.6–15.1)
GFR SERPL CREATININE-BSD FRML MDRD: 55 ML/MIN/1.73SQ M
GLUCOSE P FAST SERPL-MCNC: 100 MG/DL (ref 65–99)
HCT VFR BLD AUTO: 35 % (ref 34.8–46.1)
HDLC SERPL-MCNC: 79 MG/DL
HGB BLD-MCNC: 11 G/DL (ref 11.5–15.4)
IMM GRANULOCYTES # BLD AUTO: 0.08 THOUSAND/UL (ref 0–0.2)
IMM GRANULOCYTES NFR BLD AUTO: 1 % (ref 0–2)
LDLC SERPL CALC-MCNC: 75 MG/DL (ref 0–100)
LYMPHOCYTES # BLD AUTO: 1.63 THOUSANDS/ΜL (ref 0.6–4.47)
LYMPHOCYTES NFR BLD AUTO: 14 % (ref 14–44)
MCH RBC QN AUTO: 29.4 PG (ref 26.8–34.3)
MCHC RBC AUTO-ENTMCNC: 31.4 G/DL (ref 31.4–37.4)
MCV RBC AUTO: 94 FL (ref 82–98)
MONOCYTES # BLD AUTO: 0.84 THOUSAND/ΜL (ref 0.17–1.22)
MONOCYTES NFR BLD AUTO: 7 % (ref 4–12)
NEUTROPHILS # BLD AUTO: 9.24 THOUSANDS/ΜL (ref 1.85–7.62)
NEUTS SEG NFR BLD AUTO: 76 % (ref 43–75)
NONHDLC SERPL-MCNC: 89 MG/DL
NRBC BLD AUTO-RTO: 0 /100 WBCS
PLATELET # BLD AUTO: 282 THOUSANDS/UL (ref 149–390)
PMV BLD AUTO: 10.4 FL (ref 8.9–12.7)
POTASSIUM SERPL-SCNC: 3.8 MMOL/L (ref 3.5–5.3)
PROT SERPL-MCNC: 6.9 G/DL (ref 6.4–8.2)
RBC # BLD AUTO: 3.74 MILLION/UL (ref 3.81–5.12)
SODIUM SERPL-SCNC: 139 MMOL/L (ref 136–145)
TRIGL SERPL-MCNC: 68 MG/DL
WBC # BLD AUTO: 12.05 THOUSAND/UL (ref 4.31–10.16)

## 2021-08-30 PROCEDURE — 80053 COMPREHEN METABOLIC PANEL: CPT

## 2021-08-30 PROCEDURE — 85025 COMPLETE CBC W/AUTO DIFF WBC: CPT

## 2021-08-30 PROCEDURE — 80061 LIPID PANEL: CPT

## 2021-08-30 PROCEDURE — 36415 COLL VENOUS BLD VENIPUNCTURE: CPT

## 2021-08-30 NOTE — UTILIZATION REVIEW
Notification of Discharge   This is a Notification of Discharge from our facility [address]  Please be advised that this patient has been discharge from our facility  Below you will find the admission and discharge date and time including the patients disposition  UTILIZATION REVIEW CONTACT:  Margarito Browne  Utilization   Network Utilization Review Department  Phone: [phone] x carefully listen to the prompts  All voicemails are confidential   Email: [email or web address]     PHYSICIAN ADVISORY SERVICES:  FOR [id number] REVIEW - MEDICAL NECESSITY DENIAL  Phone: [phone]  Fax: [phone]  Email: [email or web address]     PRESENTATION DATE: 8/27/2021 10:53 PM  OBERVATION ADMISSION DATE:   INPATIENT ADMISSION DATE: 8/28/21 10:37 AM   DISCHARGE DATE: 8/29/2021  2:44 PM  DISPOSITION: Home/Self Care Home/Self Care      IMPORTANT INFORMATION:  Send all requests for admission clinical reviews, approved or denied determinations and any other requests to dedicated fax number below belonging to the campus where the patient is receiving treatment   List of dedicated fax numbers:  [address] DENIALS (Administrative/Medical Necessity) [phone]   [address] (Maternity/NICU/Pediatrics) [phone]   Bert Anders [phone]   [facility] [phone]   [facility] [phone]   [facility] [address] [place] [address] [phone]   [facility]  [phone] [address] [facility]  [address] [facility] [address] [facility] [phone]

## 2021-08-30 NOTE — UTILIZATION REVIEW
Inpatient Admission Authorization Request   NOTIFICATION OF INPATIENT ADMISSION/INPATIENT AUTHORIZATION REQUEST   SERVICING FACILITY:   [facility]  Address: [address] [facility] [address]  Tax ID: [id number]  NPI: [id number]  Place of Service: Inpatient [address] [facility] Code: 24     ATTENDING PROVIDER:  Attending Name and NPI#: Chance Hightower [[id number]]  Address: [address] [facility] [address]  Phone: [phone]     UTILIZATION REVIEW CONTACT:  Vera Dean Utilization   Network Utilization Review Department  Phone: [phone]  Fax: [phone]  Email: [email or web address]     PHYSICIAN ADVISORY SERVICES:  FOR JRQT-YL-NTKS REVIEW - MEDICAL NECESSITY DENIAL  Phone: [phone]  Fax: [phone]  Email: [email or web address]     TYPE OF REQUEST:  Inpatient Status     ADMISSION INFORMATION:  ADMISSION DATE/TIME: 8/28/21 10:37 AM  PATIENT DIAGNOSIS CODE/DESCRIPTION:  Leg swelling [M79 89]  VINNY (acute kidney injury) ([facility] [address] ) [N17 9]  Leg edema, left [R60 0]  Edema of left lower extremity [R60 0]  DISCHARGE DATE/TIME: 8/29/2021  2:44 PM  DISCHARGE DISPOSITION (IF DISCHARGED): Home/Self Care     IMPORTANT INFORMATION:  Please contact the Vera Dean directly with any questions or concerns regarding this request  Department voicemails are confidential     Send requests for admission clinical reviews, concurrent reviews, approvals, and administrative denials due to lack of clinical to fax [phone]

## 2021-08-30 NOTE — UTILIZATION REVIEW
Initial Clinical Review    PLEASE NOTE: Pt Upgraded to Inpatient 8/28 @ 1037  From OBS 8/28 @  due to VINNY and persistent erythema of the lower extremity- will start on IV antibiotics and monitor    Admission: Date/Time/Statement:   08/28/21 1038  Inpatient Admission Once     Transfer Service: Hospitalist       Question Answer Comment   Level of Care Med Surg    Estimated length of stay More than 2 Midnights    Certification I certify that inpatient services are medically necessary for this patient for a duration of greater than two midnights  See H&P and MD Progress Notes for additional information about the patient's course of treatment  08/28/21 1037       ED Arrival Information     Expected Arrival Acuity    8/27/2021 8/27/2021 19:14 Urgent         Means of arrival Escorted by Service Admission type    Walk-In Family Member Hospitalist Urgent         Arrival complaint    Edema of left lower extremity        Chief Complaint   Patient presents with    Leg Swelling     pt c/o left leg swelling and redness since yesterday  c/o pain  Initial Presentation:  [de-identified] yo female with hx of CKD, HTN presents to ED from home with Left LE color change associated with swelling,  rash and dull pain x 24-48 hrs  Spends long hours on her feet  ED Findings: + LLE edema  from ankle to knee, + area of erythema/skin changes over mid tib-fib, circumferential, mildly ttp, intact pulses  Wbc's 19 13, Cr 1 92 (pt baseline 1 2), elevated D-dimer 1 2  Rec'd IVFs and started on heparin drip in ED  Admitted to Observation with LLE edema and elevated D-Dimer and VINNY  Plan is for IVF's, IV heparin, LE Duplex, hold nephrotoxins  Addendum:  No DVT  Discussed with Ortho-concerning for ruptured Baker cyst   At this time treatment is supportive   Patient also noted to have bruising around the ankles which is consistent with the ruptured Baker's cyst    Date: 8/29   Day 2: no DVT noted, suspicion is for ruptured Baker cyst Patient does have erythema and some warmth on the anterior aspect of her left lower extremity which improved with cefazolin  Discharged with p o  Keflex for 3 more days; instructed patient to avoid NSAIDs and use Tylenol for any pain   Creatinine has returned back to baseline with fluid hydration      ED Triage Vitals [08/27/21 1956]   Temperature Pulse Respirations Blood Pressure SpO2   97 9 °F (36 6 °C) 90 16 139/80 99 %      Temp Source Heart Rate Source Patient Position - Orthostatic VS BP Location FiO2 (%)   Tympanic Monitor Sitting Left arm --      Pain Score       4          Wt Readings from Last 1 Encounters:   08/28/21 60 3 kg (133 lb)     Additional Vital Signs:   08/29/21 08:05:51  --  72  --  129/64  86  99 %  --  --   08/28/21 21:43:12  --  96  --  134/73  93  99 %  --  --   08/28/21 2142  --  98  --  134/73  --  --  --  --   08/28/21 2000  --  --  --  --  --  --  None (Room air)  --   08/28/21 1900  98 2 °F (36 8 °C)  86  18  121/61  81  98 %  None (Room air)  Lying   08/28/21 1111  --  89  17  117/58  --  99 %  None (Room air)  Lying   08/28/21 0851  --  91  --  --  --  --  --  --   08/28/21 0850  --  --  --  110/51  --  --  --  --       Pertinent Labs/Diagnostic Test Results:    Results from last 7 days   Lab Units 08/30/21  0809 08/29/21  0320 08/28/21  0436 08/28/21  0041   WBC Thousand/uL 12 05* 15 91* 18 95* 19 13*   HEMOGLOBIN g/dL 11 0* 11 2* 11 1* 11 8   HEMATOCRIT % 35 0 34 8 32 7* 36 7   PLATELETS Thousands/uL 282 238 199 220   NEUTROS ABS Thousands/µL 9 24*  --   --   --    BANDS PCT %  --   --   --  1     Results from last 7 days   Lab Units 08/30/21  0809 08/29/21  0319 08/28/21  0436 08/28/21  0041   SODIUM mmol/L 139 138 137 137   POTASSIUM mmol/L 3 8 4 0 3 5 3 8   CHLORIDE mmol/L 116* 114* 112* 111*   CO2 mmol/L 20* 20* 18* 18*   ANION GAP mmol/L 3* 4 7 8   BUN mg/dL 13 17 36* 43*   CREATININE mg/dL 0 97 1 01 1 65* 1 92*   EGFR ml/min/1 73sq m 55 53 29 24   CALCIUM mg/dL 8 6 8 1* 8 4 8 6     Results from last 7 days   Lab Units 08/30/21  0809 08/28/21  0041   AST U/L 15 38   ALT U/L 30 57   ALK PHOS U/L 77 71   TOTAL PROTEIN g/dL 6 9 7 4   ALBUMIN g/dL 2 8* 3 2*   TOTAL BILIRUBIN mg/dL 0 41 0 30     Results from last 7 days   Lab Units 08/29/21  0319 08/28/21  0436 08/28/21  0041   GLUCOSE RANDOM mg/dL 109 149* 107     Results from last 7 days   Lab Units 08/28/21  0041   D-DIMER QUANTITATIVE ug/ml FEU 1 20*     Results from last 7 days   Lab Units 08/28/21  0436 08/28/21  0041   PROTIME seconds  --  13 4   INR   --  1 02   PTT seconds 33 34     8/28 LE Duplex   RIGHT LOWER LIMB LIMITED:   Evaluation shows no evidence of thrombus in the common femoral vein  Doppler evaluation shows a normal response to augmentation maneuvers        LEFT LOWER LIMB:   No evidence of acute or chronic deep vein thrombosis   No evidence of superficial thrombophlebitis noted  Doppler evaluation shows a normal response to augmentation maneuvers  Popliteal, posterior tibial and anterior tibial arterial Doppler waveforms are   triphasic        There is a well defined hypoechoic non-vascularized cystic-type structure noted in the popliteal fossa measuring 2 35cm   There is a fluid collection that extends from the knee into the mid-calf measuring 13 5 x 0 5 cm, and suggestive of a ruptured Baker's cyst      ED Treatment:   Medication Administration from 08/27/2021 1900 to 08/28/2021 1935       Date/Time Order Dose Route Action     08/28/2021 0233 multi-electrolyte (ISOLYTE-S PH 7 4) bolus 1,000 mL 1,000 mL Intravenous New Bag     08/28/2021 0732 pantoprazole (PROTONIX) EC tablet 40 mg 40 mg Oral Given     08/28/2021 0850 amLODIPine (NORVASC) tablet 2 5 mg 2 5 mg Oral Given     08/28/2021 0851 metoprolol succinate (TOPROL-XL) 24 hr tablet 25 mg 25 mg Oral Given     08/28/2021 0733 sodium chloride 0 9 % infusion 75 mL/hr Intravenous New Bag     08/28/2021 0611 heparin (VTE/PE) high 0 mL Intravenous Hold 08/28/2021 0440 heparin (porcine) injection 4,800 Units 4,800 Units Intravenous Given     08/28/2021 0440 heparin (porcine) 25,000 units in 0 45% NaCl 250 mL infusion (premix) 18 Units/kg/hr Intravenous New Bag        Past Medical History:   Diagnosis Date    Anemia     Chronic kidney disease     Duodenal ulcer     GERD (gastroesophageal reflux disease) 04/04/2016    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    Hypercalcemia     Hypertension     Mixed hyperlipidemia     Osteoarthritis of hip 04/04/2016    Sacral insufficiency fracture     [facility] ) 04/04/2016    Substance addiction ([place] [address] )      Present on Admission:   (Resolved) VINNY (acute kidney injury) ([facility] )   Chronic [facility], stage III (moderate) ([facility])   Leg edema, left   Leukocytosis   Baker's cyst, ruptured      Admitting Diagnosis: Leg swelling [M79 89]  VINNY (acute kidney injury) ([place] [address] ) [N17 9]  Leg edema, left [R60 0]  Edema of left lower extremity [R60 0]  Age/Sex: [de-identified] y o  female     Admission Orders:  Scheduled Medications:  Medications 08/27 08/28 08/29   amLODIPine (NORVASC) tablet 2 5 mg   Dose: 2 5 mg  Freq: 2 times daily Route: PO  Start: 08/28/21 0900 End: 08/29/21 1644     0850     2142      0904     1644-D/C'd      ceFAZolin (ANCEF) IVPB (premix in dextrose) 1,000 mg 50 mL   Dose: 1,000 mg  Freq: Every 8 hours Route: IV  Last Dose: 1,000 mg (08/29/21 0552)  Start: 08/28/21 2100 End: 08/29/21 7615     2142      0552     0842-D/C'd      cephalexin (KEFLEX) capsule 500 mg   Dose: 500 mg  Freq:  Once Route: PO  Start: 08/29/21 1345 End: 08/29/21 1358      1358        docusate sodium (COLACE) capsule 100 mg   Dose: 100 mg  Freq: 2 times daily Route: PO  Indications of Use: CONSTIPATION  Start: 08/28/21 0900 End: 08/29/21 1644     (0852)     (2141)      (0131)     1644-D/C'd      enoxaparin (LOVENOX) subcutaneous injection 60 mg   Dose: 1 mg/kg  Weight Dosing Info: 60 3 kg  Freq: Every 24 hours scheduled Route: SC  Start: 08/28/21 0900 End: 08/28/21 0310     0310-D/C'd       heparin (porcine) injection 4,800 Units   Dose: 4,800 Units  Freq: Once Route: IV  Start: 08/28/21 0330 End: 08/28/21 0440    Admin Instructions:   Heparin Orders VTE/PE High    Initial bolus dose: 4,800 units (80 units/kg)  High alert medication  Check for allergies to pork or pork derivatives/dietary restrictions before administration  LOOK ALIKE SOUND ALIKE MED     0440         metoprolol succinate (TOPROL-XL) 24 hr tablet 25 mg   Dose: 25 mg  Freq: 2 times daily Route: PO  Start: 08/28/21 0900 End: 08/29/21 1644     0851     2142      0904     1644-D/C'd      multi-electrolyte (ISOLYTE-S PH 7 4) bolus 1,000 mL   Dose: 1,000 mL  Freq:  Once Route: IV  Last Dose: Stopped (08/28/21 5075)  Start: 08/28/21 0145 End: 08/28/21 3946     0233     4323         pantoprazole (PROTONIX) EC tablet 40 mg   Dose: 40 mg  Freq: Daily (early morning) Route: PO  Start: 08/28/21 0600 End: 08/29/21 1644     0732      0553     1644-D/C'd      Medications 08/27 08/28 08/29       Continuous Meds Sorted by Name  f      Medications 08/27 08/28 08/29   heparin (porcine) 25,000 units in 0 45% NaCl 250 mL infusion (premix)   Rate: 1 8-18 mL/hr Dose: 3-30 Units/kg/hr  Weight Dosing Info: 60 kg (Order-Specific)  Freq: Titrated Route: IV  Last Dose: Stopped (08/28/21 0842)  Start: 08/28/21 0330 End: 08/28/21 0831   Initial Dose? 18 units/kg/hr     0440 0831-D/C'd  0842         sodium chloride 0 9 % infusion   Rate: 75 mL/hr Dose: 75 mL/hr  Freq: Continuous Route: IV  Indications of Use: IV Hydration  Last Dose: 75 mL/hr (08/28/21 0733)  Start: 08/28/21 0315 End: 08/29/21 0805 0733      0805-D/C'd          PRN Meds Sorted by Name  f      Medications 08/27 08/28 08/29   acetaminophen (TYLENOL) tablet 650 mg   Dose: 650 mg  Freq: Every 6 hours PRN Route: PO  PRN Reason: mild pain  Start: 08/29/21 0502 End: 08/29/21 1644      0553     1644-D/C'd      heparin (porcine) injection 2,400 Units   Dose: 2,400 Units  Freq: Every 1 hour PRN Route: IV  PRN Comment: Therapeutic Heparin: PTT 43 - 59 seconds  Start: 08/28/21 0319 End: 08/28/21 0831 0831-D/C'd       heparin (porcine) injection 4,800 Units   Dose: 4,800 Units  Freq: Every 1 hour PRN Route: IV  PRN Comment: Therapeutic Heparin: PTT less than or equal to 42 seconds  Start: 08/28/21 0319 End: 08/28/21 0831    Admin Instructions:   Heparin Orders VTE/PE High    Rebolus dose: 4,800 units (80 units/kg) for aPTT 42 seconds or less  High alert medication  Check for allergies to pork or pork derivatives/dietary restrictions before administration  LOOK ALIKE SOUND ALIKE MED     0831-D/C'd       ondansetron (ZOFRAN) injection 4 mg   Dose: 4 mg  Freq: Every 6 hours PRN Route: IV  PRN Reasons: nausea,vomiting  Start: 08/28/21 0301 End: 08/29/21 1644      1644-D/C'd        SCDs  Consult Ortho    Network Utilization Review Department  ATTENTION: Please call with any questions or concerns to [phone] and carefully listen to the prompts so that you are directed to the right person  All voicemails are confidential   Jassi Atwood all requests for admission clinical reviews, approved or denied determinations and any other requests to dedicated fax number below belonging to the campus where the patient is receiving treatment   List of dedicated fax numbers for the Facilities:  [address] DENIALS (Administrative/Medical Necessity) [phone]   [address] (Maternity/NICU/Pediatrics) 261 [facility] [address] Delfino Townsend 5[address] [place] [address] [address] [facility] Re[address] Pl[address] [phone]

## 2021-08-31 PROBLEM — E55.9 VITAMIN D DEFICIENCY: Status: RESOLVED | Noted: 2019-03-11 | Resolved: 2021-08-31

## 2021-08-31 PROBLEM — M70.61 GREATER TROCHANTERIC BURSITIS OF RIGHT HIP: Status: RESOLVED | Noted: 2018-09-18 | Resolved: 2021-08-31

## 2021-08-31 PROBLEM — E83.39 HYPOPHOSPHATEMIA: Status: RESOLVED | Noted: 2018-11-15 | Resolved: 2021-08-31

## 2021-08-31 PROBLEM — E87.6 HYPOKALEMIA: Status: RESOLVED | Noted: 2018-10-27 | Resolved: 2021-08-31

## 2021-08-31 PROBLEM — I12.9 HYPERTENSIVE CHRONIC KIDNEY DISEASE WITH STAGE 1 THROUGH STAGE 4 CHRONIC KIDNEY DISEASE, OR UNSPECIFIED CHRONIC KIDNEY DISEASE: Status: RESOLVED | Noted: 2018-11-15 | Resolved: 2021-08-31

## 2021-08-31 PROBLEM — E83.42 HYPOMAGNESEMIA: Status: RESOLVED | Noted: 2018-11-15 | Resolved: 2021-08-31

## 2021-08-31 NOTE — ASSESSMENT & PLAN NOTE
Lab Results   Component Value Date    EGFR 55 08/30/2021    EGFR 53 08/29/2021    EGFR 29 08/28/2021    CREATININE 0 97 08/30/2021    CREATININE 1 01 08/29/2021    CREATININE 1 65 (H) 08/28/2021     F/b Dr Loan Montoya  Avoid nephrotoxic meds  Ensure BP is adequately controlled  Bmp improved

## 2021-08-31 NOTE — ASSESSMENT & PLAN NOTE
Cont tylenol as needed for discomfort  S/p keflex  Improving but still having discomfort with standing long periods of time  Would recommend she remain out of work for 2 weeks then can return 9/20 to regular 3 days/wk

## 2021-09-01 ENCOUNTER — TELEPHONE (OUTPATIENT)
Dept: NEPHROLOGY | Facility: CLINIC | Age: 81
End: 2021-09-01

## 2021-09-01 DIAGNOSIS — N18.31 ANEMIA OF CHRONIC RENAL FAILURE, STAGE 3A (HCC): ICD-10-CM

## 2021-09-01 DIAGNOSIS — N18.31 STAGE 3A CHRONIC KIDNEY DISEASE (HCC): ICD-10-CM

## 2021-09-01 DIAGNOSIS — I12.9 HYPERTENSIVE CHRONIC KIDNEY DISEASE WITH STAGE 1 THROUGH STAGE 4 CHRONIC KIDNEY DISEASE, OR UNSPECIFIED CHRONIC KIDNEY DISEASE: ICD-10-CM

## 2021-09-01 DIAGNOSIS — I10 ESSENTIAL HYPERTENSION: Primary | ICD-10-CM

## 2021-09-01 DIAGNOSIS — D63.1 ANEMIA OF CHRONIC RENAL FAILURE, STAGE 3A (HCC): ICD-10-CM

## 2021-09-01 DIAGNOSIS — E83.42 HYPOMAGNESEMIA: ICD-10-CM

## 2021-09-01 DIAGNOSIS — E87.6 HYPOKALEMIA: ICD-10-CM

## 2021-09-01 RX ORDER — IBUPROFEN 800 MG/1
800 TABLET ORAL DAILY PRN
COMMUNITY
Start: 2021-08-30

## 2021-09-01 NOTE — TELEPHONE ENCOUNTER
9/1/21 Patient called and wanted to inform you that she was in the hospital over the weekend and they told her that her creatinine levels were up  She was released on Sunday

## 2021-09-01 NOTE — TELEPHONE ENCOUNTER
Patient will go for labs the week of the 13 th as she will be away next week  - Labs are in epic   - follow up scheduled for October 25th with Melania Tierney in [facility] as nothing sooner available  - Medication list up to date

## 2021-09-01 NOTE — TELEPHONE ENCOUNTER
1  Please order a basic metabolic profile magnesium one week after discharge  2  Also order a UA with microscopic and a urine protein creatinine ratio  3  Please review the medications with her    Potentially may have to place or with 1 of the advanced practitioner's the next 2-3 weeks!

## 2021-09-02 ENCOUNTER — OFFICE VISIT (OUTPATIENT)
Dept: INTERNAL MEDICINE CLINIC | Facility: CLINIC | Age: 81
End: 2021-09-02
Payer: COMMERCIAL

## 2021-09-02 VITALS
WEIGHT: 133.2 LBS | BODY MASS INDEX: 24.51 KG/M2 | TEMPERATURE: 96.8 F | HEIGHT: 62 IN | SYSTOLIC BLOOD PRESSURE: 114 MMHG | OXYGEN SATURATION: 96 % | HEART RATE: 87 BPM | DIASTOLIC BLOOD PRESSURE: 84 MMHG

## 2021-09-02 DIAGNOSIS — R73.01 IMPAIRED FASTING BLOOD SUGAR: ICD-10-CM

## 2021-09-02 DIAGNOSIS — I10 ESSENTIAL HYPERTENSION: Primary | ICD-10-CM

## 2021-09-02 DIAGNOSIS — N18.31 STAGE 3A CHRONIC KIDNEY DISEASE (HCC): ICD-10-CM

## 2021-09-02 PROBLEM — M54.50 CHRONIC BILATERAL LOW BACK PAIN WITHOUT SCIATICA: Status: RESOLVED | Noted: 2019-11-26 | Resolved: 2021-09-02

## 2021-09-02 PROBLEM — G89.29 CHRONIC BILATERAL LOW BACK PAIN WITHOUT SCIATICA: Status: RESOLVED | Noted: 2019-11-26 | Resolved: 2021-09-02

## 2021-09-02 PROCEDURE — 99214 OFFICE O/P EST MOD 30 MIN: CPT | Performed by: NURSE PRACTITIONER

## 2021-09-02 NOTE — PATIENT INSTRUCTIONS
Pt can return to work 9/20/21 to regular duty of 3 days per week  Any questions please don't hesitate to call

## 2021-09-02 NOTE — ASSESSMENT & PLAN NOTE
LDL stable  continue low fat low cholesterol diet  continue regular CV exercise of at least 30 mins/day     Make attempts at normal BMI; repeat labs with next visit

## 2021-09-02 NOTE — PROGRESS NOTES
Assessment/Plan:     Ruptured Bakers cyst  Cont tylenol as needed for discomfort  S/p keflex  Improving but still having discomfort with standing long periods of time  Would recommend she remain out of work for 2 weeks then can return 9/20 to regular 3 days/wk    GERD (gastroesophageal reflux disease)  Stable  Cont PPI bid      Duodenal perforation ([address] )  In 10/2020  No further bleeding  Cont protonix bid    Osteoarthritis of hip  Bilateral  Continue tylenol as needed  F/u with orthopedics as scheduled    Essential hypertension  BP adequately controlled on current regimen  would recommend continued weight mgmt, avoiding high sodium foods  Continue to check BP periodically in the outpatient setting  Call if SBP remains >150      Anemia  W/u by hematology negative  Rand to be secondary to renal disease    Chronic kidney disease, stage III (moderate) (HCC)  Lab Results   Component Value Date    EGFR 55 08/30/2021    EGFR 53 08/29/2021    EGFR 29 08/28/2021    CREATININE 0 97 08/30/2021    CREATININE 1 01 08/29/2021    CREATININE 1 65 (H) 08/28/2021     F/b Dr Clive Jaquez  Avoid nephrotoxic meds  Ensure BP is adequately controlled  Bmp improved    Dyslipidemia  LDL stable  continue low fat low cholesterol diet  continue regular CV exercise of at least 30 mins/day  Make attempts at normal BMI; repeat labs with next visit      Iron deficiency  Stable  Does not take iron supplementation         Problem List Items Addressed This Visit        Cardiovascular and Mediastinum    Essential hypertension - Primary     BP adequately controlled on current regimen  would recommend continued weight mgmt, avoiding high sodium foods  Continue to check BP periodically in the outpatient setting     Call if SBP remains >150           Relevant Orders    LDL cholesterol, direct    Comprehensive metabolic panel       Genitourinary    Chronic kidney disease, stage III (moderate) (HCC)     Lab Results   Component Value Date    EGFR 55 08/30/2021    EGFR 53 08/29/2021    EGFR 29 08/28/2021    CREATININE 0 97 08/30/2021    CREATININE 1 01 08/29/2021    CREATININE 1 65 (H) 08/28/2021     F/b Dr John Montilla  Avoid nephrotoxic meds  Ensure BP is adequately controlled  Bmp improved           Other Visit Diagnoses     Impaired fasting blood sugar        Relevant Orders    HEMOGLOBIN A1C W/ EAG ESTIMATION            Subjective:      Patient ID: Alyssa Lindo is a [de-identified] y o  female  Pt is here today for follow up  She was recently in the hospital for 2 days for LLE pain and swelling  She was found to have a ruptured BAker's cyst  She had negative dopplers  SHe has some minor continued swelling and some erythema but overall improved  She is having some discomfort standing for long periods of time, at this point she should not return to work for 2 weeks  Her bmp on admission she was in acute VINNY, her last check had significantly improved  She has f/u with Dr John Montilla in October  She is up to date with labs, mammogram, she no longer wishes to have colonoscopy      The following portions of the patient's history were reviewed and updated as appropriate: allergies, current medications, past family history, past medical history, past social history, past surgical history and problem list     Review of Systems   Constitutional: Negative for chills and fever  HENT: Negative for ear pain and sore throat  Eyes: Negative for pain and visual disturbance  Respiratory: Negative for cough and shortness of breath  Cardiovascular: Negative for chest pain and palpitations  Gastrointestinal: Negative for abdominal pain and vomiting  Genitourinary: Negative for dysuria and hematuria  Musculoskeletal: Positive for arthralgias and joint swelling  Negative for back pain  Skin: Negative for color change and rash  Neurological: Negative for seizures and syncope  All other systems reviewed and are negative          Objective:      /84 (BP Location: Left arm, Patient Position: Sitting, Cuff Size: Standard)   Pulse 87   Temp (!) 96 8 °F (36 °C) (Tympanic)   Ht 5' 2 21" (1 58 m)   Wt 60 4 kg (133 lb 3 2 oz)   SpO2 96% Comment: room air  BMI 24 20 kg/m²          Physical Exam  Constitutional:       General: She is not in acute distress  Appearance: Normal appearance  She is well-developed  HENT:      Head: Normocephalic and atraumatic  Eyes:      General: No scleral icterus  Conjunctiva/sclera: Conjunctivae normal       Pupils: Pupils are equal, round, and reactive to light  Neck:      Trachea: No tracheal deviation  Cardiovascular:      Rate and Rhythm: Normal rate and regular rhythm  Heart sounds: Normal heart sounds  No murmur heard  No friction rub  Pulmonary:      Effort: Pulmonary effort is normal  No respiratory distress  Breath sounds: Normal breath sounds  No stridor  No wheezing or rales  Abdominal:      General: Bowel sounds are normal       Palpations: Abdomen is soft  Tenderness: There is no abdominal tenderness  There is no guarding or rebound  Musculoskeletal:         General: Swelling present  Normal range of motion  Cervical back: Neck supple  Left lower leg: Edema present  Lymphadenopathy:      Cervical: No cervical adenopathy  Skin:     General: Skin is warm and dry  Findings: No rash  Neurological:      Mental Status: She is alert and oriented to person, place, and time  Cranial Nerves: No cranial nerve deficit

## 2021-09-24 ENCOUNTER — DOCUMENTATION (OUTPATIENT)
Dept: NEPHROLOGY | Facility: CLINIC | Age: 81
End: 2021-09-24

## 2021-09-24 NOTE — PROGRESS NOTES
Home BP readings:   -a m :  108/61   -p m :  115/61  Heart rate:  70-80 range        I would stop the amlodipine because of low bp  Recheck bp in 4 weeks later

## 2021-09-24 NOTE — PROGRESS NOTES
Lm for the patient to stop amlodipine due to low BP  Advised to call back to confirm she understood

## 2021-09-27 ENCOUNTER — LAB REQUISITION (OUTPATIENT)
Dept: LAB | Facility: HOSPITAL | Age: 81
End: 2021-09-27
Payer: COMMERCIAL

## 2021-09-27 DIAGNOSIS — N30.10 INTERSTITIAL CYSTITIS (CHRONIC) WITHOUT HEMATURIA: ICD-10-CM

## 2021-09-27 DIAGNOSIS — K26.5 DUODENAL ULCER WITH PERFORATION (HCC): ICD-10-CM

## 2021-09-27 PROCEDURE — 87077 CULTURE AEROBIC IDENTIFY: CPT | Performed by: OBSTETRICS & GYNECOLOGY

## 2021-09-27 PROCEDURE — 87186 SC STD MICRODIL/AGAR DIL: CPT | Performed by: OBSTETRICS & GYNECOLOGY

## 2021-09-27 PROCEDURE — 87086 URINE CULTURE/COLONY COUNT: CPT | Performed by: OBSTETRICS & GYNECOLOGY

## 2021-09-27 NOTE — TELEPHONE ENCOUNTER
Patient requested refill of Pantoprazole  Patient needs to call  Dr Kvng Angeles at [address] [facility] for refill

## 2021-10-01 LAB — BACTERIA UR CULT: ABNORMAL

## 2021-10-04 ENCOUNTER — OFFICE VISIT (OUTPATIENT)
Dept: INTERNAL MEDICINE CLINIC | Facility: CLINIC | Age: 81
End: 2021-10-04
Payer: COMMERCIAL

## 2021-10-04 VITALS
TEMPERATURE: 97.1 F | BODY MASS INDEX: 24.03 KG/M2 | DIASTOLIC BLOOD PRESSURE: 64 MMHG | HEART RATE: 74 BPM | OXYGEN SATURATION: 98 % | HEIGHT: 62 IN | SYSTOLIC BLOOD PRESSURE: 130 MMHG | WEIGHT: 130.6 LBS

## 2021-10-04 DIAGNOSIS — R21 RASH OF HANDS: Primary | ICD-10-CM

## 2021-10-04 PROCEDURE — 99213 OFFICE O/P EST LOW 20 MIN: CPT | Performed by: STUDENT IN AN ORGANIZED HEALTH CARE EDUCATION/TRAINING PROGRAM

## 2021-10-04 RX ORDER — CONJUGATED ESTROGENS 0.62 MG/G
CREAM VAGINAL
COMMUNITY
Start: 2021-09-02

## 2021-10-14 ENCOUNTER — OFFICE VISIT (OUTPATIENT)
Dept: INTERNAL MEDICINE CLINIC | Facility: CLINIC | Age: 81
End: 2021-10-14
Payer: COMMERCIAL

## 2021-10-14 ENCOUNTER — TELEPHONE (OUTPATIENT)
Dept: NEPHROLOGY | Facility: CLINIC | Age: 81
End: 2021-10-14

## 2021-10-14 VITALS
SYSTOLIC BLOOD PRESSURE: 116 MMHG | HEIGHT: 62 IN | WEIGHT: 134.6 LBS | HEART RATE: 82 BPM | OXYGEN SATURATION: 98 % | TEMPERATURE: 99 F | DIASTOLIC BLOOD PRESSURE: 66 MMHG | BODY MASS INDEX: 24.77 KG/M2

## 2021-10-14 DIAGNOSIS — B00.9 HERPES SIMPLEX: Primary | ICD-10-CM

## 2021-10-14 DIAGNOSIS — I10 ESSENTIAL HYPERTENSION: ICD-10-CM

## 2021-10-14 DIAGNOSIS — K21.00 GASTROESOPHAGEAL REFLUX DISEASE WITH ESOPHAGITIS WITHOUT HEMORRHAGE: Chronic | ICD-10-CM

## 2021-10-14 DIAGNOSIS — K26.5 DUODENAL ULCER WITH PERFORATION (HCC): ICD-10-CM

## 2021-10-14 PROCEDURE — 99213 OFFICE O/P EST LOW 20 MIN: CPT | Performed by: INTERNAL MEDICINE

## 2021-10-14 RX ORDER — PANTOPRAZOLE SODIUM 40 MG/1
40 TABLET, DELAYED RELEASE ORAL 2 TIMES DAILY PRN
Qty: 60 TABLET | Refills: 3 | Status: SHIPPED | OUTPATIENT
Start: 2021-10-14 | End: 2022-05-12

## 2021-10-20 ENCOUNTER — TELEPHONE (OUTPATIENT)
Dept: NEPHROLOGY | Facility: CLINIC | Age: 81
End: 2021-10-20

## 2021-10-20 ENCOUNTER — APPOINTMENT (OUTPATIENT)
Dept: LAB | Facility: HOSPITAL | Age: 81
End: 2021-10-20
Attending: INTERNAL MEDICINE
Payer: COMMERCIAL

## 2021-10-20 DIAGNOSIS — N18.31 ANEMIA OF CHRONIC RENAL FAILURE, STAGE 3A (HCC): ICD-10-CM

## 2021-10-20 DIAGNOSIS — E83.52 HYPERCALCEMIA: ICD-10-CM

## 2021-10-20 DIAGNOSIS — E55.9 VITAMIN D DEFICIENCY: ICD-10-CM

## 2021-10-20 DIAGNOSIS — N18.31 STAGE 3A CHRONIC KIDNEY DISEASE (HCC): ICD-10-CM

## 2021-10-20 DIAGNOSIS — I12.9 HYPERTENSIVE CHRONIC KIDNEY DISEASE WITH STAGE 1 THROUGH STAGE 4 CHRONIC KIDNEY DISEASE, OR UNSPECIFIED CHRONIC KIDNEY DISEASE: ICD-10-CM

## 2021-10-20 DIAGNOSIS — I10 ESSENTIAL HYPERTENSION: ICD-10-CM

## 2021-10-20 DIAGNOSIS — D63.1 ANEMIA OF CHRONIC RENAL FAILURE, STAGE 3A (HCC): ICD-10-CM

## 2021-10-20 DIAGNOSIS — I10 ESSENTIAL HYPERTENSION: Primary | ICD-10-CM

## 2021-10-20 DIAGNOSIS — E87.6 HYPOKALEMIA: ICD-10-CM

## 2021-10-20 DIAGNOSIS — E83.42 HYPOMAGNESEMIA: ICD-10-CM

## 2021-10-20 DIAGNOSIS — E61.1 IRON DEFICIENCY: ICD-10-CM

## 2021-10-20 DIAGNOSIS — R73.01 IMPAIRED FASTING BLOOD SUGAR: ICD-10-CM

## 2021-10-20 DIAGNOSIS — E78.5 DYSLIPIDEMIA: ICD-10-CM

## 2021-10-20 LAB
ALBUMIN SERPL BCP-MCNC: 3.3 G/DL (ref 3.5–5)
ALP SERPL-CCNC: 64 U/L (ref 46–116)
ALT SERPL W P-5'-P-CCNC: 16 U/L (ref 12–78)
ANION GAP SERPL CALCULATED.3IONS-SCNC: 4 MMOL/L (ref 4–13)
AST SERPL W P-5'-P-CCNC: 14 U/L (ref 5–45)
BACTERIA UR QL AUTO: ABNORMAL /HPF
BILIRUB SERPL-MCNC: 0.45 MG/DL (ref 0.2–1)
BILIRUB UR QL STRIP: NEGATIVE
BUN SERPL-MCNC: 11 MG/DL (ref 5–25)
CALCIUM ALBUM COR SERPL-MCNC: 10.2 MG/DL (ref 8.3–10.1)
CALCIUM SERPL-MCNC: 9.6 MG/DL (ref 8.3–10.1)
CHLORIDE SERPL-SCNC: 110 MMOL/L (ref 100–108)
CLARITY UR: ABNORMAL
CO2 SERPL-SCNC: 26 MMOL/L (ref 21–32)
COLOR UR: YELLOW
CREAT SERPL-MCNC: 1.12 MG/DL (ref 0.6–1.3)
CREAT UR-MCNC: 118 MG/DL
EST. AVERAGE GLUCOSE BLD GHB EST-MCNC: 120 MG/DL
GFR SERPL CREATININE-BSD FRML MDRD: 47 ML/MIN/1.73SQ M
GLUCOSE P FAST SERPL-MCNC: 91 MG/DL (ref 65–99)
GLUCOSE UR STRIP-MCNC: NEGATIVE MG/DL
HBA1C MFR BLD: 5.8 %
HGB UR QL STRIP.AUTO: NEGATIVE
HYALINE CASTS #/AREA URNS LPF: ABNORMAL /LPF
KETONES UR STRIP-MCNC: NEGATIVE MG/DL
LDLC SERPL DIRECT ASSAY-MCNC: 71 MG/DL (ref 0–100)
LEUKOCYTE ESTERASE UR QL STRIP: ABNORMAL
MAGNESIUM SERPL-MCNC: 2.4 MG/DL (ref 1.6–2.6)
NITRITE UR QL STRIP: NEGATIVE
NON-SQ EPI CELLS URNS QL MICRO: ABNORMAL /HPF
PH UR STRIP.AUTO: 7 [PH]
POTASSIUM SERPL-SCNC: 3.5 MMOL/L (ref 3.5–5.3)
PROT SERPL-MCNC: 6.7 G/DL (ref 6.4–8.2)
PROT UR STRIP-MCNC: ABNORMAL MG/DL
PROT UR-MCNC: 45 MG/DL
PROT/CREAT UR: 0.38 MG/G{CREAT} (ref 0–0.1)
RBC #/AREA URNS AUTO: ABNORMAL /HPF
SODIUM SERPL-SCNC: 140 MMOL/L (ref 136–145)
SP GR UR STRIP.AUTO: 1.01 (ref 1–1.03)
UROBILINOGEN UR QL STRIP.AUTO: 0.2 E.U./DL
WBC #/AREA URNS AUTO: ABNORMAL /HPF

## 2021-10-20 PROCEDURE — 83721 ASSAY OF BLOOD LIPOPROTEIN: CPT

## 2021-10-20 PROCEDURE — 84156 ASSAY OF PROTEIN URINE: CPT

## 2021-10-20 PROCEDURE — 82570 ASSAY OF URINE CREATININE: CPT

## 2021-10-20 PROCEDURE — 80053 COMPREHEN METABOLIC PANEL: CPT

## 2021-10-20 PROCEDURE — 36415 COLL VENOUS BLD VENIPUNCTURE: CPT

## 2021-10-20 PROCEDURE — 83036 HEMOGLOBIN GLYCOSYLATED A1C: CPT

## 2021-10-20 PROCEDURE — 81001 URINALYSIS AUTO W/SCOPE: CPT | Performed by: INTERNAL MEDICINE

## 2021-10-20 PROCEDURE — 83735 ASSAY OF MAGNESIUM: CPT

## 2021-10-22 ENCOUNTER — TELEPHONE (OUTPATIENT)
Dept: NEPHROLOGY | Facility: CLINIC | Age: 81
End: 2021-10-22

## 2021-10-25 ENCOUNTER — OFFICE VISIT (OUTPATIENT)
Dept: NEPHROLOGY | Facility: CLINIC | Age: 81
End: 2021-10-25
Payer: COMMERCIAL

## 2021-10-25 VITALS
BODY MASS INDEX: 24.66 KG/M2 | DIASTOLIC BLOOD PRESSURE: 53 MMHG | SYSTOLIC BLOOD PRESSURE: 125 MMHG | WEIGHT: 134 LBS | HEART RATE: 100 BPM | HEIGHT: 62 IN

## 2021-10-25 DIAGNOSIS — N18.31 STAGE 3A CHRONIC KIDNEY DISEASE (HCC): Primary | ICD-10-CM

## 2021-10-25 DIAGNOSIS — I10 ESSENTIAL HYPERTENSION: ICD-10-CM

## 2021-10-25 DIAGNOSIS — D50.8 OTHER IRON DEFICIENCY ANEMIA: ICD-10-CM

## 2021-10-25 DIAGNOSIS — E87.6 HYPOKALEMIA: ICD-10-CM

## 2021-10-25 PROCEDURE — 99213 OFFICE O/P EST LOW 20 MIN: CPT | Performed by: NURSE PRACTITIONER

## 2021-10-30 ENCOUNTER — APPOINTMENT (OUTPATIENT)
Dept: LAB | Facility: HOSPITAL | Age: 81
End: 2021-10-30
Attending: INTERNAL MEDICINE
Payer: COMMERCIAL

## 2021-10-30 DIAGNOSIS — N18.31 STAGE 3A CHRONIC KIDNEY DISEASE (HCC): ICD-10-CM

## 2021-10-30 DIAGNOSIS — Z79.899 ENCOUNTER FOR LONG-TERM (CURRENT) USE OF OTHER MEDICATIONS: ICD-10-CM

## 2021-10-30 DIAGNOSIS — M81.0 SENILE OSTEOPOROSIS: ICD-10-CM

## 2021-10-30 DIAGNOSIS — I12.9 HYPERTENSIVE CHRONIC KIDNEY DISEASE WITH STAGE 1 THROUGH STAGE 4 CHRONIC KIDNEY DISEASE, OR UNSPECIFIED CHRONIC KIDNEY DISEASE: ICD-10-CM

## 2021-10-30 DIAGNOSIS — E55.9 VITAMIN D DEFICIENCY DISEASE: ICD-10-CM

## 2021-10-30 DIAGNOSIS — N18.31 ANEMIA OF CHRONIC RENAL FAILURE, STAGE 3A (HCC): ICD-10-CM

## 2021-10-30 DIAGNOSIS — D63.1 ANEMIA OF CHRONIC RENAL FAILURE, STAGE 3A (HCC): ICD-10-CM

## 2021-10-30 DIAGNOSIS — I10 ESSENTIAL HYPERTENSION: ICD-10-CM

## 2021-10-30 LAB
25(OH)D3 SERPL-MCNC: 41.6 NG/ML (ref 30–100)
ANION GAP SERPL CALCULATED.3IONS-SCNC: 2 MMOL/L (ref 4–13)
BUN SERPL-MCNC: 23 MG/DL (ref 5–25)
CALCIUM SERPL-MCNC: 10.3 MG/DL (ref 8.3–10.1)
CHLORIDE SERPL-SCNC: 104 MMOL/L (ref 100–108)
CO2 SERPL-SCNC: 30 MMOL/L (ref 21–32)
CREAT SERPL-MCNC: 1.74 MG/DL (ref 0.6–1.3)
GFR SERPL CREATININE-BSD FRML MDRD: 27 ML/MIN/1.73SQ M
GLUCOSE P FAST SERPL-MCNC: 79 MG/DL (ref 65–99)
POTASSIUM SERPL-SCNC: 4.7 MMOL/L (ref 3.5–5.3)
SODIUM SERPL-SCNC: 136 MMOL/L (ref 136–145)

## 2021-10-30 PROCEDURE — 82306 VITAMIN D 25 HYDROXY: CPT

## 2021-10-30 PROCEDURE — 80048 BASIC METABOLIC PNL TOTAL CA: CPT

## 2021-10-30 PROCEDURE — 36415 COLL VENOUS BLD VENIPUNCTURE: CPT

## 2021-11-01 ENCOUNTER — TELEPHONE (OUTPATIENT)
Dept: NEPHROLOGY | Facility: CLINIC | Age: 81
End: 2021-11-01

## 2021-11-01 DIAGNOSIS — N18.31 STAGE 3A CHRONIC KIDNEY DISEASE (HCC): Primary | ICD-10-CM

## 2021-11-01 DIAGNOSIS — I10 ESSENTIAL HYPERTENSION: ICD-10-CM

## 2021-11-05 ENCOUNTER — DOCUMENTATION (OUTPATIENT)
Dept: NEPHROLOGY | Facility: CLINIC | Age: 81
End: 2021-11-05

## 2021-11-05 ENCOUNTER — TELEPHONE (OUTPATIENT)
Dept: NEPHROLOGY | Facility: CLINIC | Age: 81
End: 2021-11-05

## 2021-11-13 ENCOUNTER — APPOINTMENT (OUTPATIENT)
Dept: LAB | Facility: HOSPITAL | Age: 81
End: 2021-11-13
Attending: INTERNAL MEDICINE
Payer: COMMERCIAL

## 2021-11-13 DIAGNOSIS — I10 ESSENTIAL HYPERTENSION: ICD-10-CM

## 2021-11-13 DIAGNOSIS — N18.31 STAGE 3A CHRONIC KIDNEY DISEASE (HCC): ICD-10-CM

## 2021-11-13 LAB
ANION GAP SERPL CALCULATED.3IONS-SCNC: 10 MMOL/L (ref 4–13)
BUN SERPL-MCNC: 19 MG/DL (ref 5–25)
CALCIUM SERPL-MCNC: 8.4 MG/DL (ref 8.3–10.1)
CHLORIDE SERPL-SCNC: 113 MMOL/L (ref 100–108)
CO2 SERPL-SCNC: 18 MMOL/L (ref 21–32)
CREAT SERPL-MCNC: 1.24 MG/DL (ref 0.6–1.3)
GFR SERPL CREATININE-BSD FRML MDRD: 41 ML/MIN/1.73SQ M
GLUCOSE P FAST SERPL-MCNC: 79 MG/DL (ref 65–99)
POTASSIUM SERPL-SCNC: 3.6 MMOL/L (ref 3.5–5.3)
SODIUM SERPL-SCNC: 141 MMOL/L (ref 136–145)

## 2021-11-13 PROCEDURE — 80048 BASIC METABOLIC PNL TOTAL CA: CPT

## 2021-11-13 PROCEDURE — 36415 COLL VENOUS BLD VENIPUNCTURE: CPT

## 2021-11-15 DIAGNOSIS — E87.2 METABOLIC ACIDOSIS: ICD-10-CM

## 2021-11-15 DIAGNOSIS — N18.31 STAGE 3A CHRONIC KIDNEY DISEASE (HCC): Primary | ICD-10-CM

## 2021-11-15 RX ORDER — SODIUM BICARBONATE 650 MG/1
1300 TABLET ORAL 2 TIMES DAILY
Qty: 120 TABLET | Refills: 5 | Status: SHIPPED | OUTPATIENT
Start: 2021-11-15 | End: 2022-04-21

## 2021-11-18 RX ORDER — DOXYCYCLINE HYCLATE 100 MG/1
CAPSULE ORAL
COMMUNITY
Start: 2021-11-08 | End: 2022-03-15 | Stop reason: ALTCHOICE

## 2021-11-23 ENCOUNTER — OFFICE VISIT (OUTPATIENT)
Dept: OBGYN CLINIC | Facility: HOSPITAL | Age: 81
End: 2021-11-23
Payer: COMMERCIAL

## 2021-11-23 VITALS
SYSTOLIC BLOOD PRESSURE: 134 MMHG | DIASTOLIC BLOOD PRESSURE: 67 MMHG | HEART RATE: 82 BPM | WEIGHT: 135 LBS | BODY MASS INDEX: 24.84 KG/M2 | HEIGHT: 62 IN

## 2021-11-23 DIAGNOSIS — M70.61 TROCHANTERIC BURSITIS OF RIGHT HIP: Primary | ICD-10-CM

## 2021-11-23 DIAGNOSIS — M70.62 TROCHANTERIC BURSITIS OF LEFT HIP: ICD-10-CM

## 2021-11-23 PROCEDURE — 20610 DRAIN/INJ JOINT/BURSA W/O US: CPT | Performed by: ORTHOPAEDIC SURGERY

## 2021-11-23 PROCEDURE — 99213 OFFICE O/P EST LOW 20 MIN: CPT | Performed by: ORTHOPAEDIC SURGERY

## 2021-11-23 RX ORDER — BUPIVACAINE HYDROCHLORIDE 2.5 MG/ML
2 INJECTION, SOLUTION INFILTRATION; PERINEURAL
Status: COMPLETED | OUTPATIENT
Start: 2021-11-23 | End: 2021-11-23

## 2021-11-23 RX ORDER — BETAMETHASONE SODIUM PHOSPHATE AND BETAMETHASONE ACETATE 3; 3 MG/ML; MG/ML
12 INJECTION, SUSPENSION INTRA-ARTICULAR; INTRALESIONAL; INTRAMUSCULAR; SOFT TISSUE
Status: COMPLETED | OUTPATIENT
Start: 2021-11-23 | End: 2021-11-23

## 2021-11-23 RX ORDER — LIDOCAINE HYDROCHLORIDE 10 MG/ML
2 INJECTION, SOLUTION INFILTRATION; PERINEURAL
Status: COMPLETED | OUTPATIENT
Start: 2021-11-23 | End: 2021-11-23

## 2021-11-23 RX ADMIN — LIDOCAINE HYDROCHLORIDE 2 ML: 10 INJECTION, SOLUTION INFILTRATION; PERINEURAL at 09:08

## 2021-11-23 RX ADMIN — BETAMETHASONE SODIUM PHOSPHATE AND BETAMETHASONE ACETATE 12 MG: 3; 3 INJECTION, SUSPENSION INTRA-ARTICULAR; INTRALESIONAL; INTRAMUSCULAR; SOFT TISSUE at 09:08

## 2021-11-23 RX ADMIN — BUPIVACAINE HYDROCHLORIDE 2 ML: 2.5 INJECTION, SOLUTION INFILTRATION; PERINEURAL at 09:02

## 2021-11-28 PROBLEM — I73.00 RAYNAUD'S DISEASE WITHOUT GANGRENE: Status: ACTIVE | Noted: 2021-11-28

## 2021-11-28 PROBLEM — M47.816 LUMBAR SPONDYLOSIS: Status: ACTIVE | Noted: 2021-11-28

## 2021-11-28 PROBLEM — M81.0 SENILE OSTEOPOROSIS: Status: ACTIVE | Noted: 2021-11-28

## 2021-12-07 ENCOUNTER — IMMUNIZATIONS (OUTPATIENT)
Dept: FAMILY MEDICINE CLINIC | Facility: HOSPITAL | Age: 81
End: 2021-12-07

## 2021-12-07 DIAGNOSIS — Z23 ENCOUNTER FOR IMMUNIZATION: Primary | ICD-10-CM

## 2021-12-07 PROCEDURE — 91306 COVID-19 MODERNA VACC 0.25 ML BOOSTER: CPT

## 2021-12-07 PROCEDURE — 0064A COVID-19 MODERNA VACC 0.25 ML BOOSTER: CPT

## 2022-02-04 DIAGNOSIS — E78.5 DYSLIPIDEMIA: ICD-10-CM

## 2022-02-04 RX ORDER — ATORVASTATIN CALCIUM 10 MG/1
10 TABLET, FILM COATED ORAL DAILY
Qty: 90 TABLET | Refills: 1 | Status: SHIPPED | OUTPATIENT
Start: 2022-02-04

## 2022-02-15 ENCOUNTER — TELEPHONE (OUTPATIENT)
Dept: NEPHROLOGY | Facility: CLINIC | Age: 82
End: 2022-02-15

## 2022-02-15 NOTE — TELEPHONE ENCOUNTER
LM for patient to schedule March f/u with Dr Mario Alberto Aguilar or CECILLE in [facility]

## 2022-02-22 ENCOUNTER — OFFICE VISIT (OUTPATIENT)
Dept: OBGYN CLINIC | Facility: HOSPITAL | Age: 82
End: 2022-02-22
Payer: COMMERCIAL

## 2022-02-22 VITALS
SYSTOLIC BLOOD PRESSURE: 121 MMHG | BODY MASS INDEX: 24.69 KG/M2 | HEART RATE: 78 BPM | HEIGHT: 62 IN | DIASTOLIC BLOOD PRESSURE: 71 MMHG

## 2022-02-22 DIAGNOSIS — M70.62 TROCHANTERIC BURSITIS OF BOTH HIPS: Primary | ICD-10-CM

## 2022-02-22 DIAGNOSIS — M70.61 TROCHANTERIC BURSITIS OF BOTH HIPS: Primary | ICD-10-CM

## 2022-02-22 PROCEDURE — 99213 OFFICE O/P EST LOW 20 MIN: CPT | Performed by: ORTHOPAEDIC SURGERY

## 2022-02-22 PROCEDURE — 20610 DRAIN/INJ JOINT/BURSA W/O US: CPT | Performed by: ORTHOPAEDIC SURGERY

## 2022-02-22 RX ORDER — LIDOCAINE HYDROCHLORIDE 10 MG/ML
2 INJECTION, SOLUTION INFILTRATION; PERINEURAL
Status: COMPLETED | OUTPATIENT
Start: 2022-02-22 | End: 2022-02-22

## 2022-02-22 RX ORDER — BUPIVACAINE HYDROCHLORIDE 2.5 MG/ML
2 INJECTION, SOLUTION INFILTRATION; PERINEURAL
Status: COMPLETED | OUTPATIENT
Start: 2022-02-22 | End: 2022-02-22

## 2022-02-22 RX ORDER — BETAMETHASONE SODIUM PHOSPHATE AND BETAMETHASONE ACETATE 3; 3 MG/ML; MG/ML
12 INJECTION, SUSPENSION INTRA-ARTICULAR; INTRALESIONAL; INTRAMUSCULAR; SOFT TISSUE
Status: COMPLETED | OUTPATIENT
Start: 2022-02-22 | End: 2022-02-22

## 2022-02-22 RX ADMIN — BETAMETHASONE SODIUM PHOSPHATE AND BETAMETHASONE ACETATE 12 MG: 3; 3 INJECTION, SUSPENSION INTRA-ARTICULAR; INTRALESIONAL; INTRAMUSCULAR; SOFT TISSUE at 10:29

## 2022-02-22 RX ADMIN — BUPIVACAINE HYDROCHLORIDE 2 ML: 2.5 INJECTION, SOLUTION INFILTRATION; PERINEURAL at 10:29

## 2022-02-22 RX ADMIN — LIDOCAINE HYDROCHLORIDE 2 ML: 10 INJECTION, SOLUTION INFILTRATION; PERINEURAL at 10:29

## 2022-02-22 NOTE — PATIENT INSTRUCTIONS
Diagnosis ICD-10-CM Associated Orders   1  Trochanteric bursitis of both hips  M70 61 Large joint arthrocentesis: bilateral greater trochanteric bursa    M70 62          Return in about 3 months (around 5/22/2022) for re-check

## 2022-02-22 NOTE — PROGRESS NOTES
Assessment:   Diagnosis ICD-10-CM Associated Orders   1  Trochanteric bursitis of both hips  M70 61 Large joint arthrocentesis: bilateral greater trochanteric bursa    M70 62        Plan:  Return of bilateral hip trochanteric bursitis  Andriy Roman was offered, accepted, performed injections of cortisone to both hip trochanteric bursa areas today for symptomatic relief  She tolerated both injections well  Ice and post injection protocol advised  Weightbearing activities as tolerated  To do next visit:  Return in about 3 months (around 5/22/2022) for re-check  The above stated was discussed in layman's terms and the patient expressed understanding  All questions were answered to the patient's satisfaction  Scribe Attestation    I,:  Juan A Billings am acting as a scribe while in the presence of the attending physician :       I,:  Shonna Abraham MD personally performed the services described in this documentation    as scribed in my presence :             Subjective:   Laurie is a [de-identified] y o  female who presents today for repeat evaluation of her bilateral hips due to return of pain laterally  She finds relief for a period of time with trochanteric bursa injections of cortisone her last being 3 months ago  She does have pain across her lower back and occasionally into her legs and previously saw Dr Linda Nails         Review of systems negative unless otherwise specified in HPI  Review of Systems    Past Medical History:   Diagnosis Date    Anemia     Chronic bilateral low back pain without sciatica 11/26/2019    Chronic kidney disease     Duodenal ulcer     Enterococcus UTI 4/7/2016    GERD (gastroesophageal reflux disease) 04/04/2016    Greater trochanteric bursitis of right hip 9/18/2018    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    Hypercalcemia     Hypertension     Left lumbar radiculopathy 4/7/2016    Mixed hyperlipidemia     Osteoarthritis of hip 04/04/2016    Osteoporosis     Sacral insufficiency fracture     SI ([address]roiliac) joint inflammation ([facility] ) 04/04/2016    Substance addiction ([facility] )     Vitamin D deficiency 3/11/2019       Past Surgical History:   Procedure Laterality Date    BREAST EXCISIONAL BIOPSY Left 2000    benign    COLONOSCOPY  2019    JOINT REPLACEMENT      right hip 8/15    MAMMO (HISTORICAL)  2019    TONSILLECTOMY         Family History   Problem Relation Age of Onset    No Known Problems Mother     No Known Problems Father     Breast cancer Daughter 48    Breast cancer Maternal Aunt 79    No Known Problems Sister     No Known Problems Maternal Grandmother     No Known Problems Maternal Grandfather     No Known Problems Paternal Grandmother     No Known Problems Paternal Grandfather     No Known Problems Son     No Known Problems Sister     No Known Problems Sister        Social History     Occupational History    Not on file   Tobacco Use    Smoking status: Never Smoker    Smokeless tobacco: Never Used   Vaping Use    Vaping Use: Never used   Substance and Sexual Activity    Alcohol use: Never    Drug use: Not Currently     Types: Prescription     Comment: rocovering last used 1996    Sexual activity: Yes     Partners: Male     Birth control/protection: Post-menopausal         Current Outpatient Medications:     atorvastatin (LIPITOR) 10 mg tablet, Take 1 tablet (10 mg total) by mouth daily, Disp: 90 tablet, Rfl: 1    doxycycline hyclate (VIBRAMYCIN) 100 mg capsule, , Disp: , Rfl:     ibuprofen (MOTRIN) 800 mg tablet, Take 800 mg by mouth daily as needed , Disp: , Rfl:     metoprolol succinate (TOPROL-XL) 25 mg 24 hr tablet, Take 1 tablet (25 mg total) by mouth 2 (two) times a day, Disp: 180 tablet, Rfl: 3    ondansetron (ZOFRAN-ODT) 4 mg disintegrating tablet, Take 1 tablet (4 mg total) by mouth every 6 (six) hours as needed for nausea or vomiting, Disp: 20 tablet, Rfl: 0    pantoprazole (PROTONIX) 40 mg tablet, Take 1 tablet (40 mg total) by mouth 2 (two) times a day as needed (Dyspepsia), Disp: 60 tablet, Rfl: 3    penciclovir (DENAVIR) 1 % cream, Apply topically every 2 (two) hours, Disp: 1 5 g, Rfl: 0    Premarin vaginal cream, , Disp: , Rfl:     sodium bicarbonate 650 mg tablet, Take 2 tablets (1,300 mg total) by mouth 2 (two) times a day, Disp: 120 tablet, Rfl: 5    Current Facility-Administered Medications:     denosumab (PROLIA) subcutaneous injection 60 mg, 60 mg, Subcutaneous, Q6 Months, Luana Garcia MD, 60 mg at 11/02/21 1315    Allergies   Allergen Reactions    Bactrim [Sulfamethoxazole-Trimethoprim] Other (See Comments)     Kidney failure            Vitals:    02/22/22 0944   BP: 121/71   Pulse: 78       Objective:                    Right Hip Exam     Tenderness   The patient is experiencing tenderness in the greater trochanter  Range of Motion   Flexion: 100   External rotation: 40   Internal rotation: 20     Muscle Strength   The patient has normal right hip strength (Pain laterally with resistance to abduction)  Other   Erythema: absent  Sensation: normal      Left Hip Exam     Tenderness   The patient is experiencing tenderness in the greater trochanter  Range of Motion   Flexion: 100   External rotation: 40   Internal rotation: 20     Muscle Strength   The patient has normal left hip strength (Pain laterally with resisted abduction)  Other   Erythema: absent  Sensation: normal            Diagnostics, reviewed and taken today if performed as documented:    None performed          Procedures, if performed today:    Large joint arthrocentesis: bilateral greater trochanteric bursa  Universal Protocol:  Consent: Verbal consent obtained    Risks and benefits: risks, benefits and alternatives were discussed  Consent given by: patient  Time out: Immediately prior to procedure a "time out" was called to verify the correct patient, procedure, equipment, support staff and site/side marked as required  Timeout called at: 2/22/2022 10:28 AM   Patient understanding: patient states understanding of the procedure being performed  Site marked: the operative site was marked  Patient identity confirmed: verbally with patient    Supporting Documentation  Indications: pain and diagnostic evaluation   Procedure Details  Location: hip - bilateral greater trochanteric bursa  Preparation: Patient was prepped and draped in the usual sterile fashion  Needle size: 22 G  Ultrasound guidance: no  Approach: lateral    Medications (Right): 2 mL bupivacaine 0 25 %; 2 mL lidocaine 1 %; 12 mg betamethasone acetate-betamethasone sodium phosphate 6 (3-3) mg/mLMedications (Left): 2 mL bupivacaine 0 25 %; 2 mL lidocaine 1 %; 12 mg betamethasone acetate-betamethasone sodium phosphate 6 (3-3) mg/mL   Patient tolerance: patient tolerated the procedure well with no immediate complications  Dressing:  Sterile dressing applied            Portions of the record may have been created with voice recognition software  Occasional wrong word or "sound a like" substitutions may have occurred due to the inherent limitations of voice recognition software  Read the chart carefully and recognize, using context, where substitutions have occurred

## 2022-03-15 ENCOUNTER — OFFICE VISIT (OUTPATIENT)
Dept: INTERNAL MEDICINE CLINIC | Facility: CLINIC | Age: 82
End: 2022-03-15
Payer: COMMERCIAL

## 2022-03-15 VITALS
HEART RATE: 86 BPM | SYSTOLIC BLOOD PRESSURE: 116 MMHG | OXYGEN SATURATION: 94 % | TEMPERATURE: 97 F | WEIGHT: 137 LBS | BODY MASS INDEX: 25.21 KG/M2 | HEIGHT: 62 IN | DIASTOLIC BLOOD PRESSURE: 74 MMHG

## 2022-03-15 DIAGNOSIS — D72.829 LEUKOCYTOSIS, UNSPECIFIED TYPE: ICD-10-CM

## 2022-03-15 DIAGNOSIS — Z23 NEED FOR PNEUMOCOCCAL VACCINE: ICD-10-CM

## 2022-03-15 DIAGNOSIS — N18.31 STAGE 3A CHRONIC KIDNEY DISEASE (HCC): ICD-10-CM

## 2022-03-15 DIAGNOSIS — M16.9 OSTEOARTHRITIS OF HIP, UNSPECIFIED LATERALITY, UNSPECIFIED OSTEOARTHRITIS TYPE: ICD-10-CM

## 2022-03-15 DIAGNOSIS — M81.0 SENILE OSTEOPOROSIS: Primary | ICD-10-CM

## 2022-03-15 DIAGNOSIS — I10 ESSENTIAL HYPERTENSION: ICD-10-CM

## 2022-03-15 PROCEDURE — 90732 PPSV23 VACC 2 YRS+ SUBQ/IM: CPT

## 2022-03-15 PROCEDURE — 99214 OFFICE O/P EST MOD 30 MIN: CPT | Performed by: INTERNAL MEDICINE

## 2022-03-15 PROCEDURE — G0009 ADMIN PNEUMOCOCCAL VACCINE: HCPCS

## 2022-03-15 NOTE — PROGRESS NOTES
Assessment/Plan:    Chronic kidney disease, stage III (moderate) ([facility])  Lab Results   Component Value Date    EGFR 41 11/13/2021    EGFR 27 10/30/2021    EGFR 47 10/20/2021    CREATININE 1 24 11/13/2021    CREATININE 1 74 (H) 10/30/2021    CREATININE 1 12 10/20/2021   Most recent labs appear to be back at her baseline  She follows with nephrology and is due for follow-up visit soon  She has not been taking her sodium bicarb tablets  Essential hypertension  Blood pressure appears to be well controlled at this time  No changes are necessary  Osteoarthritis of hip  Receives periodic intra-articular steroid injections with reasonable improvement in pain  Senile osteoporosis  Receives denosumab every 6 months  Due for follow-up DEXA scan       Diagnoses and all orders for this visit:    Senile osteoporosis    Stage 3a chronic kidney disease ([address] )    Essential hypertension  -     CBC and differential; Future    Osteoarthritis of hip, unspecified laterality, unspecified osteoarthritis type    Leukocytosis, unspecified type  -     CBC and differential; Future          Subjective:      Patient ID: Lucien Vazquez is a [de-identified] y o  female  Presents for follow-up visit  Patient offers no major complaints at this time  She does have some arthritis issues, she sees Orthopedics for her degenerative hip disease, she follows with Nephrology for CKD 3A  On medication review she states that she has not been taking the sodium bicarb tablets  In general she feels well  Appetite is good  She reports no issues with bladder function her bowel function  Weight has been stable  She has not had any recent labs since November which did disclose her stage 3 kidney disease and noted some mild improvement over previous    She has no GI complaints at this time      Family History   Problem Relation Age of Onset    No Known Problems Mother     No Known Problems Father     Breast cancer Daughter 48    Breast cancer Maternal Aunt 79    No Known Problems Sister     No Known Problems Maternal Grandmother     No Known Problems Maternal Grandfather     No Known Problems Paternal Grandmother     No Known Problems Paternal Grandfather     No Known Problems Son     No Known Problems Sister     No Known Problems Sister      Social History     Socioeconomic History    Marital status: /Civil Union     Spouse name: Not on file    Number of children: Not on file    Years of education: Not on file    Highest education level: Not on file   Occupational History    Not on file   Tobacco Use    Smoking status: Never Smoker    Smokeless tobacco: Never Used   Vaping Use    Vaping Use: Never used   Substance and Sexual Activity    Alcohol use: Never    Drug use: Not Currently     Types: Prescription     Comment: rocovering last used 1996    Sexual activity: Yes     Partners: Male     Birth control/protection: Post-menopausal   Other Topics Concern    Not on file   Social History Narrative    Not on file     Social Determinants of Health     Financial Resource Strain: Not on file   Food Insecurity: Not on file   Transportation Needs: Not on file   Physical Activity: Not on file   Stress: Not on file   Social Connections: Not on file   Intimate Partner Violence: Not on file   Housing Stability: Not on file     Past Medical History:   Diagnosis Date    Anemia     Chronic bilateral low back pain without sciatica 11/26/2019    Chronic kidney disease     Duodenal ulcer     Enterococcus UTI 4/7/2016    GERD (gastroesophageal reflux disease) 04/04/2016    Greater trochanteric bursitis of right hip 9/18/2018    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    Hypercalcemia     Hypertension     Left lumbar radiculopathy 4/7/2016    Mixed hyperlipidemia     Osteoarthritis of hip 04/04/2016    Osteoporosis     Sacral insufficiency fracture     SI (sacroiliac) joint inflammation ([address] ) 04/04/2016    Substance addiction ([facility] )     Vitamin D deficiency 3/11/2019       Current Outpatient Medications:     atorvastatin (LIPITOR) 10 mg tablet, Take 1 tablet (10 mg total) by mouth daily, Disp: 90 tablet, Rfl: 1    ibuprofen (MOTRIN) 800 mg tablet, Take 800 mg by mouth daily as needed , Disp: , Rfl:     metoprolol succinate (TOPROL-XL) 25 mg 24 hr tablet, Take 1 tablet (25 mg total) by mouth 2 (two) times a day, Disp: 180 tablet, Rfl: 3    pantoprazole (PROTONIX) 40 mg tablet, Take 1 tablet (40 mg total) by mouth 2 (two) times a day as needed (Dyspepsia), Disp: 60 tablet, Rfl: 3    Premarin vaginal cream, , Disp: , Rfl:     sodium bicarbonate 650 mg tablet, Take 2 tablets (1,300 mg total) by mouth 2 (two) times a day (Patient not taking: Reported on 3/15/2022 ), Disp: 120 tablet, Rfl: 5    Current Facility-Administered Medications:     denosumab (PROLIA) subcutaneous injection 60 mg, 60 mg, Subcutaneous, Q6 Months, Robel Quiñonez MD, 60 mg at 11/02/21 1315  Allergies   Allergen Reactions    Bactrim [Sulfamethoxazole-Trimethoprim] Other (See Comments)     Kidney failure     Past Surgical History:   Procedure Laterality Date    BREAST EXCISIONAL BIOPSY Left 2000    benign    COLONOSCOPY  2019    JOINT REPLACEMENT      right hip 8/15    MAMMO (HISTORICAL)  2019    TONSILLECTOMY           Review of Systems   Constitutional: Negative  Negative for activity change, appetite change, chills, diaphoresis, fatigue, fever and unexpected weight change  HENT: Negative  Eyes: Negative  Respiratory: Negative  Cardiovascular: Negative  Gastrointestinal: Negative  Endocrine: Negative  Genitourinary: Negative  Musculoskeletal: Positive for arthralgias (Bilateral hips, SI joints)  Skin: Negative  Neurological: Negative  Hematological: Negative  Psychiatric/Behavioral: The patient is not nervous/anxious            Objective:      /74 (BP Location: Left arm, Patient Position: Sitting, Cuff Size: Standard) Pulse 86   Temp (!) 97 °F (36 1 °C) (Tympanic)   Ht 5' 2 4" (1 585 m)   Wt 62 1 kg (137 lb)   SpO2 94%   BMI 24 74 kg/m²          Physical Exam  Vitals reviewed  Constitutional:       General: She is not in acute distress  Appearance: Normal appearance  She is normal weight  She is not ill-appearing, toxic-appearing or diaphoretic  HENT:      Head: Normocephalic and atraumatic  Right Ear: External ear normal       Left Ear: External ear normal       Nose: Nose normal    Eyes:      Conjunctiva/sclera: Conjunctivae normal       Pupils: Pupils are equal, round, and reactive to light  Cardiovascular:      Rate and Rhythm: Normal rate and regular rhythm  Pulses: Normal pulses  Heart sounds: Normal heart sounds  No murmur heard  Pulmonary:      Effort: Pulmonary effort is normal  No respiratory distress  Breath sounds: Normal breath sounds  No wheezing or rales  Abdominal:      General: Abdomen is flat  There is no distension  Musculoskeletal:         General: No swelling  Cervical back: Neck supple  No rigidity  Right lower leg: No edema  Left lower leg: No edema  Skin:     General: Skin is warm  Capillary Refill: Capillary refill takes less than 2 seconds  Coloration: Skin is not jaundiced  Findings: No bruising, erythema or rash  Neurological:      General: No focal deficit present  Mental Status: She is alert and oriented to person, place, and time  Mental status is at baseline     Psychiatric:         Mood and Affect: Mood normal          Behavior: Behavior normal

## 2022-03-15 NOTE — ASSESSMENT & PLAN NOTE
Lab Results   Component Value Date    EGFR 41 11/13/2021    EGFR 27 10/30/2021    EGFR 47 10/20/2021    CREATININE 1 24 11/13/2021    CREATININE 1 74 (H) 10/30/2021    CREATININE 1 12 10/20/2021   Most recent labs appear to be back at her baseline  She follows with nephrology and is due for follow-up visit soon  She has not been taking her sodium bicarb tablets

## 2022-04-11 ENCOUNTER — APPOINTMENT (OUTPATIENT)
Dept: LAB | Facility: HOSPITAL | Age: 82
End: 2022-04-11
Attending: INTERNAL MEDICINE
Payer: COMMERCIAL

## 2022-04-11 DIAGNOSIS — I10 ESSENTIAL HYPERTENSION: ICD-10-CM

## 2022-04-11 DIAGNOSIS — D72.829 LEUKOCYTOSIS, UNSPECIFIED TYPE: ICD-10-CM

## 2022-04-11 DIAGNOSIS — D63.1 ANEMIA OF CHRONIC RENAL FAILURE, STAGE 3A (HCC): ICD-10-CM

## 2022-04-11 DIAGNOSIS — E83.42 HYPOMAGNESEMIA: ICD-10-CM

## 2022-04-11 DIAGNOSIS — N18.31 ANEMIA OF CHRONIC RENAL FAILURE, STAGE 3A (HCC): ICD-10-CM

## 2022-04-11 DIAGNOSIS — E55.9 VITAMIN D DEFICIENCY: ICD-10-CM

## 2022-04-11 DIAGNOSIS — E61.1 IRON DEFICIENCY: ICD-10-CM

## 2022-04-11 DIAGNOSIS — I10 ACCELERATED HYPERTENSION: ICD-10-CM

## 2022-04-11 DIAGNOSIS — N18.31 STAGE 3A CHRONIC KIDNEY DISEASE (HCC): ICD-10-CM

## 2022-04-11 DIAGNOSIS — E83.52 HYPERCALCEMIA: ICD-10-CM

## 2022-04-11 DIAGNOSIS — I12.9 HYPERTENSIVE CHRONIC KIDNEY DISEASE WITH STAGE 1 THROUGH STAGE 4 CHRONIC KIDNEY DISEASE, OR UNSPECIFIED CHRONIC KIDNEY DISEASE: ICD-10-CM

## 2022-04-11 DIAGNOSIS — N18.30 CHRONIC KIDNEY DISEASE, STAGE III (MODERATE) (HCC): ICD-10-CM

## 2022-04-11 DIAGNOSIS — E78.5 DYSLIPIDEMIA: ICD-10-CM

## 2022-04-11 DIAGNOSIS — D63.1 ANEMIA OF CHRONIC RENAL FAILURE, STAGE 3 (MODERATE) (HCC): ICD-10-CM

## 2022-04-11 DIAGNOSIS — N18.30 ANEMIA OF CHRONIC RENAL FAILURE, STAGE 3 (MODERATE) (HCC): ICD-10-CM

## 2022-04-11 DIAGNOSIS — E87.6 HYPOKALEMIA: ICD-10-CM

## 2022-04-11 LAB
ALBUMIN SERPL BCP-MCNC: 3.6 G/DL (ref 3.5–5)
ALP SERPL-CCNC: 70 U/L (ref 46–116)
ALT SERPL W P-5'-P-CCNC: 18 U/L (ref 12–78)
ANION GAP SERPL CALCULATED.3IONS-SCNC: 2 MMOL/L (ref 4–13)
AST SERPL W P-5'-P-CCNC: 15 U/L (ref 5–45)
BASOPHILS # BLD AUTO: 0.08 THOUSANDS/ΜL (ref 0–0.1)
BASOPHILS NFR BLD AUTO: 2 % (ref 0–1)
BILIRUB SERPL-MCNC: 0.55 MG/DL (ref 0.2–1)
BUN SERPL-MCNC: 20 MG/DL (ref 5–25)
CALCIUM SERPL-MCNC: 9.2 MG/DL (ref 8.3–10.1)
CHLORIDE SERPL-SCNC: 110 MMOL/L (ref 100–108)
CHOLEST SERPL-MCNC: 179 MG/DL
CK SERPL-CCNC: 108 U/L (ref 26–192)
CO2 SERPL-SCNC: 27 MMOL/L (ref 21–32)
CREAT SERPL-MCNC: 1.21 MG/DL (ref 0.6–1.3)
CREAT UR-MCNC: 76.4 MG/DL
EOSINOPHIL # BLD AUTO: 0.14 THOUSAND/ΜL (ref 0–0.61)
EOSINOPHIL NFR BLD AUTO: 3 % (ref 0–6)
ERYTHROCYTE [DISTWIDTH] IN BLOOD BY AUTOMATED COUNT: 13.3 % (ref 11.6–15.1)
GFR SERPL CREATININE-BSD FRML MDRD: 42 ML/MIN/1.73SQ M
GLUCOSE P FAST SERPL-MCNC: 75 MG/DL (ref 65–99)
HCT VFR BLD AUTO: 36.4 % (ref 34.8–46.1)
HDLC SERPL-MCNC: 96 MG/DL
HGB BLD-MCNC: 11.6 G/DL (ref 11.5–15.4)
IMM GRANULOCYTES # BLD AUTO: 0.02 THOUSAND/UL (ref 0–0.2)
IMM GRANULOCYTES NFR BLD AUTO: 0 % (ref 0–2)
LDLC SERPL CALC-MCNC: 75 MG/DL (ref 0–100)
LYMPHOCYTES # BLD AUTO: 1.44 THOUSANDS/ΜL (ref 0.6–4.47)
LYMPHOCYTES NFR BLD AUTO: 26 % (ref 14–44)
MAGNESIUM SERPL-MCNC: 2.2 MG/DL (ref 1.6–2.6)
MCH RBC QN AUTO: 29.9 PG (ref 26.8–34.3)
MCHC RBC AUTO-ENTMCNC: 31.9 G/DL (ref 31.4–37.4)
MCV RBC AUTO: 94 FL (ref 82–98)
MONOCYTES # BLD AUTO: 0.54 THOUSAND/ΜL (ref 0.17–1.22)
MONOCYTES NFR BLD AUTO: 10 % (ref 4–12)
NEUTROPHILS # BLD AUTO: 3.27 THOUSANDS/ΜL (ref 1.85–7.62)
NEUTS SEG NFR BLD AUTO: 59 % (ref 43–75)
NRBC BLD AUTO-RTO: 0 /100 WBCS
PHOSPHATE SERPL-MCNC: 3.8 MG/DL (ref 2.3–4.1)
PLATELET # BLD AUTO: 225 THOUSANDS/UL (ref 149–390)
PMV BLD AUTO: 11 FL (ref 8.9–12.7)
POTASSIUM SERPL-SCNC: 4 MMOL/L (ref 3.5–5.3)
PROT SERPL-MCNC: 6.8 G/DL (ref 6.4–8.2)
PROT UR-MCNC: 20 MG/DL
PROT/CREAT UR: 0.26 MG/G{CREAT} (ref 0–0.1)
PTH-INTACT SERPL-MCNC: 28.3 PG/ML (ref 18.4–80.1)
RBC # BLD AUTO: 3.88 MILLION/UL (ref 3.81–5.12)
SODIUM SERPL-SCNC: 139 MMOL/L (ref 136–145)
TRIGL SERPL-MCNC: 41 MG/DL
WBC # BLD AUTO: 5.49 THOUSAND/UL (ref 4.31–10.16)

## 2022-04-11 PROCEDURE — 84100 ASSAY OF PHOSPHORUS: CPT

## 2022-04-11 PROCEDURE — 82570 ASSAY OF URINE CREATININE: CPT

## 2022-04-11 PROCEDURE — 83970 ASSAY OF PARATHORMONE: CPT

## 2022-04-11 PROCEDURE — 85025 COMPLETE CBC W/AUTO DIFF WBC: CPT

## 2022-04-11 PROCEDURE — 84156 ASSAY OF PROTEIN URINE: CPT

## 2022-04-11 PROCEDURE — 82550 ASSAY OF CK (CPK): CPT

## 2022-04-11 PROCEDURE — 83735 ASSAY OF MAGNESIUM: CPT

## 2022-04-11 PROCEDURE — 82306 VITAMIN D 25 HYDROXY: CPT

## 2022-04-11 PROCEDURE — 80061 LIPID PANEL: CPT

## 2022-04-11 PROCEDURE — 36415 COLL VENOUS BLD VENIPUNCTURE: CPT

## 2022-04-11 PROCEDURE — 80053 COMPREHEN METABOLIC PANEL: CPT

## 2022-04-12 NOTE — PROGRESS NOTES
RENAL FOLLOW UP NOTE: td     ASSESSMENT AND PLAN:  1   CKD stage 3 :  · Etiology:  AK I from Bactrim/hypercalcemia along with poor oral intake   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/arteriolar nephrosclerosis  · Baseline creatinine:  1 1 -1 79  · Current creatinine:  1 21 at baseline  · Urine protein creatinine ratio:  0 26 g at goal   Recommendations:  · Treat hypertension-please see below  · Treat dyslipidemia-please see below  · Maintain proteinuria less than 1 g or as low as possible  · Avoid nephrotoxic agents such as NSAIDs, patient counseled as such  2   Volume:  Euvolemic  3   Hypertension:  Secondary workup:  Was negative  · Home readings:   None at this time     · Goal blood pressure:  Less than 130/80 given CKD  Recommendations:  ·  Push nonmedical regimen especially weight loss/isotonic exercise and low-salt diet  · Medication changes today:   · No changes pending home readings  Patient has mild orthostasis but no orthostatic symptoms so no adjustment at this time  4   Electrolytes:    · Mild metabolic acidosis:  Resolved  · Borderline hypokalemia:  Now normal at 4 0  5   Mineral bone disorder:  · Calcium/magnesium/phosphorus all normal  · PTH intact:   28 3 which is normal  · Vitamin-D:  41 6 from 10/30/2021 at goal  6   Dyslipidemia:  · Goal LDL:  Less than 100  · Current lipid profile:  LDL 75/HDL 96/triglycerides 41  Recommendations:  Patient is at goal   7   Anemia:   · Hemoglobin:   Stable at 11 6 at baseline  · Iron studies:  Were acceptable from prior  · Multiple myeloma evaluation  was negative as outlined above  · GI evaluation:  EGD with just esophagitis; colonoscopy with benign polyp  · Heme consultation:  Felt anemia which was normocytic secondary to chronic kidney disease   No further recommendations at this time    8   Other problems:  · GERD  · Osteoarthritis of right hip  · Colonic polyp  · Status post duodenal perforation 10/04/2020:  Apparently upper GI endoscopy demonstrated no leak   Endoscopy demonstrated no leak   She was NPO with antibiotics who was given a 2 week course of antibiotics upon discharge along with antacid medication  To be followed up as an outpatient  · WITH RECENT LOW ANION GAP JUST TO BE SURE I WOULD REORDER SPEP UPEP AND LIGHT CHAIN RATIO SINCE IT HAS BEEN SINCE 2019     GI health maintenance:  Last colonoscopy July 2019    PATIENT INSTRUCTIONS:    Patient Instructions   1  Medication changes today:   No medication changes today  2  Please go for non fasting  lab work at this time    3  Please take 1 week a blood pressure readings at this time    AS FOLLOWS  MORNING AND EVENING, SITTING AND STANDING as follows:  · TAKE THE MORNING READINGS BEFORE ANY MEDICATIONS AND WHEN YOU ARE RELAXED FOR SEVERAL MINUTES  · TAKE THE EVENING READINGS:  BETWEEN 7-10 P M ; PRIOR TO ANY MEDICATIONS; AT LEAST IN OUR  FROM DINNER; AND CERTAINLY AFTER RELAXING FOR A FEW MINUTES  · PLEASE INCLUDE HEART RATE WITH YOUR BLOOD PRESSURE READINGS  · When taking standing readings, keep your arm supported at heart level and not dangling  · Make sure you are sitting with your back supported and feet on the ground and do not cross your legs or feet  · Make sure you have not taken any coffee or caffeine products or exercised or smoke cigarettes at least 30 minutes before taking your blood pressure  Then please mail these readings into the office    4  In 3 months:   Please go for nonfasting lab work but in the morning   Please take 1 week a blood pressure readings as outlined above and mail those into the office       5  Follow-up in 6  months   Please bring in 1 week a blood pressure readings morning evening, sitting and standing is outlined above   PLEASE BRING AN YOUR BLOOD PRESSURE MACHINE TO CORRELATE WITH THE OFFICE MACHINE AT THIS NEXT SCHEDULED VISIT   Please go for fasting lab work 1-2 weeks prior to your appointment      6   General instructions:   AVOID SALT BUT NOT ADDING AN READING LABELS TO MAKE SURE THERE IS LOW-SALT IN THE FOOD THAT YOU ARE EATING  o Goal is less than 2 g of sodium intake or less than 5 g of sodium chloride intake per day     Avoid nonsteroidal anti-inflammatory drugs such as Naprosyn, ibuprofen, Aleve, Advil, Celebrex, Meloxicam (Mobic) etc   You can use Tylenol as needed if you do not have any liver condition to be concerned about     Avoid medications such as Sudafed or decongestants and antihistamines that contained the D component which is the decongestant  You can take antihistamines without the decongestant or D component   Try to avoid medications such as pantoprazole or  Protonix/Nexium or Esomeprazole)/Prilosec or omeprazole/Prevacid or lansoprazole/AcipHex or Rabeprazole  If you are able to, use Pepcid as this is safer for your kidneys   Try to exercise at least 30 minutes 3 days a week to begin with with an ultimate goal of 5 days a week for at least 30 minutes     Please do not drink more than 2 glasses of alcohol/wine on a daily basis as this may contribute to your high blood pressure  Subjective: There has been no hospitalizations or acute illnesses since last visit  The patient overall is feeling well  No fevers, chills, or cough or colds    Good appetite and good energy  No hematuria, dysuria, voiding symptoms or foamy urine  No gastrointestinal symptoms  No cardiovascular symptoms including swelling of the legs  No headaches, dizziness or lightheadedness  Blood pressure medications:   Toprol xl 25 mg BID      ROS:  See HPI, otherwise review of systems as completely reviewed with the patient are negative    Past Medical History:   Diagnosis Date    Anemia     Chronic bilateral low back pain without sciatica 11/26/2019    Chronic kidney disease     Duodenal ulcer     Enterococcus UTI 4/7/2016    GERD (gastroesophageal reflux disease) 04/04/2016    Greater trochanteric bursitis of right hip 9/18/2018    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    Hypercalcemia     Hypertension     Left lumbar radiculopathy 4/7/2016    Mixed hyperlipidemia     Osteoarthritis of hip 04/04/2016    Osteoporosis     Sacral insufficiency fracture     SI (sacroiliac) joint inflammation ([facility] [address] ) 04/04/2016    [facility]stance [facility] ([facility] )     Vitamin D deficiency 3/11/2019     Past Surgical History:   Procedure Laterality Date    BREAST EXCISIONAL BIOPSY Left 2000    benign    COLONOSCOPY  2019    JOINT REPLACEMENT      right hip 8/15    MAMMO (HISTORICAL)  2019    TONSILLECTOMY       Family History   Problem Relation Age of Onset    No Known Problems Mother     No Known Problems Father     Breast cancer Daughter 48    Breast cancer Maternal Aunt 79    No Known Problems Sister     No Known Problems Maternal Grandmother     No Known Problems Maternal Grandfather     No Known Problems Paternal Grandmother     No Known Problems Paternal Grandfather     No Known Problems Son     No Known Problems Sister     No Known Problems Sister       reports that she has never smoked  She has never used smokeless tobacco  She reports previous drug use  Drug: Prescription  She reports that she does not drink alcohol  I COMPLETELY REVIEWED THE PAST MEDICAL HISTORY/PAST SURGICAL HISTORY/SOCIAL HISTORY/FAMILY HISTORY/AND MEDICATIONS  AND UPDATED ALL    Objective:     Vitals:   BP sitting on right:  132/74 with a heart rate of 84 regular  BP standing on right:  112/72 with a heart rate of 84 regular  Vitals:    04/21/22 0905   SpO2: 98%       Weight (last 2 days)     Date/Time Weight    04/21/22 0905 61 2 (135)        Wt Readings from Last 3 Encounters:   04/21/22 61 2 kg (135 lb)   03/15/22 62 1 kg (137 lb)   11/23/21 61 2 kg (135 lb)       Body mass index is 24 69 kg/m²      Physical Exam: General:  No acute distress  Skin:  No acute rash  Eyes:  No scleral icterus, noninjected, no discharge from eyes  ENT:  Moist mucous membranes  Neck:  Supple, no jugular venous distention, trachea is midline, no lymphadenopathy and no thyromegaly  Back   No CVAT  Chest:  Clear to auscultation and percussion, good respiratory effort  CVS:  Regular rate and rhythm without a rub, or gallops or murmurs  Abdomen:  Soft and nontender with normal bowel sounds  Extremities:  No cyanosis and no edema, moderate arthritic changes, normal range of motion  Neuro:  Grossly intact  Psych:  Alert, oriented x3 and appropriate      Medications:    Current Outpatient Medications:     atorvastatin (LIPITOR) 10 mg tablet, Take 1 tablet (10 mg total) by mouth daily, Disp: 90 tablet, Rfl: 1    ibuprofen (MOTRIN) 800 mg tablet, Take 800 mg by mouth daily as needed , Disp: , Rfl:     metoprolol succinate (TOPROL-XL) 25 mg 24 hr tablet, Take 1 tablet (25 mg total) by mouth 2 (two) times a day, Disp: 180 tablet, Rfl: 3    Premarin vaginal cream, , Disp: , Rfl:     pantoprazole (PROTONIX) 40 mg tablet, Take 1 tablet (40 mg total) by mouth 2 (two) times a day as needed (Dyspepsia), Disp: 60 tablet, Rfl: 3    Current Facility-Administered Medications:     denosumab (PROLIA) subcutaneous injection 60 mg, 60 mg, Subcutaneous, Q6 Months, Bri Rios MD, 60 mg at 11/02/21 1315    Lab, Imaging and other studies: I have personally reviewed pertinent labs    Laboratory Results:  Results for orders placed or performed in visit on 04/11/22   Comprehensive metabolic panel   Result Value Ref Range    Sodium 139 136 - 145 mmol/L    Potassium 4 0 3 5 - 5 3 mmol/L    Chloride 110 (H) 100 - 108 mmol/L    CO2 27 21 - 32 mmol/L    ANION GAP 2 (L) 4 - 13 mmol/L    BUN 20 5 - 25 mg/dL    Creatinine 1 21 0 60 - 1 30 mg/dL    Glucose, Fasting 75 65 - 99 mg/dL    Calcium 9 2 8 3 - 10 1 mg/dL    AST 15 5 - 45 U/L    ALT 18 12 - 78 U/L    Alkaline Phosphatase 70 46 - 116 U/L    Total Protein 6 8 6 4 - 8 2 g/dL    Albumin 3 6 3 5 - 5 0 g/dL    Total Bilirubin 0 55 0 20 - 1 00 mg/dL    eGFR 42 ml/min/1 73sq m   CK   Result Value Ref Range    Total  26 - 192 U/L   Lipid Panel with Direct LDL reflex   Result Value Ref Range    Cholesterol 179 See Comment mg/dL    Triglycerides 41 See Comment mg/dL    HDL, Direct 96 >=50 mg/dL    LDL Calculated 75 0 - 100 mg/dL   Magnesium   Result Value Ref Range    Magnesium 2 2 1 6 - 2 6 mg/dL   Phosphorus   Result Value Ref Range    Phosphorus 3 8 2 3 - 4 1 mg/dL   Protein / creatinine ratio, urine   Result Value Ref Range    Creatinine, Ur 76 4 mg/dL    Protein Urine Random 20 mg/dL    Prot/Creat Ratio, Ur 0 26 (H) 0 00 - 0 10   PTH, intact   Result Value Ref Range    PTH 28 3 18 4 - 80 1 pg/mL   CBC and differential   Result Value Ref Range    WBC 5 49 4 31 - 10 16 Thousand/uL    RBC 3 88 3 81 - 5 12 Million/uL    Hemoglobin 11 6 11 5 - 15 4 g/dL    Hematocrit 36 4 34 8 - 46 1 %    MCV 94 82 - 98 fL    MCH 29 9 26 8 - 34 3 pg    MCHC 31 9 31 4 - 37 4 g/dL    RDW 13 3 11 6 - 15 1 %    MPV 11 0 8 9 - 12 7 fL    Platelets 565 921 - 001 Thousands/uL    nRBC 0 /100 WBCs    Neutrophils Relative 59 43 - 75 %    Immat GRANS % 0 0 - 2 %    Lymphocytes Relative 26 14 - 44 %    Monocytes Relative 10 4 - 12 %    Eosinophils Relative 3 0 - 6 %    Basophils Relative 2 (H) 0 - 1 %    Neutrophils Absolute 3 27 1 85 - 7 62 Thousands/µL    Immature Grans Absolute 0 02 0 00 - 0 20 Thousand/uL    Lymphocytes Absolute 1 44 0 60 - 4 47 Thousands/µL    Monocytes Absolute 0 54 0 17 - 1 22 Thousand/µL    Eosinophils Absolute 0 14 0 00 - 0 61 Thousand/µL    Basophils Absolute 0 08 0 00 - 0 10 Thousands/µL             Invalid input(s): ALBUMIN      Radiology review:   chest X-ray    Ultrasound      Portions of the record may have been created with voice recognition software  Occasional wrong word or "sound a like" substitutions may have occurred due to the inherent limitations of voice recognition software    Read the chart carefully and recognize, using context, where substitutions have occurred

## 2022-04-13 PROCEDURE — 87624 HPV HI-RISK TYP POOLED RSLT: CPT | Performed by: OBSTETRICS & GYNECOLOGY

## 2022-04-13 PROCEDURE — G0145 SCR C/V CYTO,THINLAYER,RESCR: HCPCS | Performed by: PATHOLOGY

## 2022-04-13 PROCEDURE — G0124 SCREEN C/V THIN LAYER BY MD: HCPCS | Performed by: PATHOLOGY

## 2022-04-14 ENCOUNTER — LAB REQUISITION (OUTPATIENT)
Dept: LAB | Facility: HOSPITAL | Age: 82
End: 2022-04-14
Payer: COMMERCIAL

## 2022-04-14 DIAGNOSIS — Z01.419 ENCOUNTER FOR GYNECOLOGICAL EXAMINATION (GENERAL) (ROUTINE) WITHOUT ABNORMAL FINDINGS: ICD-10-CM

## 2022-04-19 ENCOUNTER — TELEPHONE (OUTPATIENT)
Dept: NEPHROLOGY | Facility: CLINIC | Age: 82
End: 2022-04-19

## 2022-04-19 NOTE — TELEPHONE ENCOUNTER
Appointment Confirmation   Person confirmed appointment with  If not patient, name of the person Patient    Date and time of appointment 4/21   9:00    Patient acknowledged and will be at appointment? yes    Did you advise the patient that they will need a urine sample if they are a new patient?  N/A    Did you advise the patient to bring their current medications for verification? (including any OTC) Yes    Additional Information

## 2022-04-21 ENCOUNTER — TELEPHONE (OUTPATIENT)
Dept: NEPHROLOGY | Facility: CLINIC | Age: 82
End: 2022-04-21

## 2022-04-21 ENCOUNTER — OFFICE VISIT (OUTPATIENT)
Dept: NEPHROLOGY | Facility: CLINIC | Age: 82
End: 2022-04-21
Payer: COMMERCIAL

## 2022-04-21 VITALS — BODY MASS INDEX: 24.84 KG/M2 | WEIGHT: 135 LBS | OXYGEN SATURATION: 98 % | HEIGHT: 62 IN

## 2022-04-21 DIAGNOSIS — E55.9 VITAMIN D DEFICIENCY: ICD-10-CM

## 2022-04-21 DIAGNOSIS — I12.9 PARENCHYMAL RENAL HYPERTENSION, STAGE 1 THROUGH STAGE 4 OR UNSPECIFIED CHRONIC KIDNEY DISEASE: Primary | ICD-10-CM

## 2022-04-21 DIAGNOSIS — E87.6 HYPOKALEMIA: ICD-10-CM

## 2022-04-21 DIAGNOSIS — N18.31 STAGE 3A CHRONIC KIDNEY DISEASE (HCC): ICD-10-CM

## 2022-04-21 DIAGNOSIS — E61.1 IRON DEFICIENCY: ICD-10-CM

## 2022-04-21 DIAGNOSIS — E78.5 DYSLIPIDEMIA: ICD-10-CM

## 2022-04-21 LAB
LAB AP GYN PRIMARY INTERPRETATION: ABNORMAL
Lab: ABNORMAL
PATH INTERP SPEC-IMP: ABNORMAL

## 2022-04-21 PROCEDURE — 99214 OFFICE O/P EST MOD 30 MIN: CPT | Performed by: INTERNAL MEDICINE

## 2022-04-21 NOTE — LETTER
April 21, 2022     Rashawn Aguayo, [address] [address]    Patient: Patrick Hernandez   YOB: 1940   Date of Visit: 4/21/2022       Dear Dr Leopoldo Bodo: Thank you for referring Patrick Hernandez to me for evaluation  Below are my notes for this consultation  If you have questions, please do not hesitate to call me  I look forward to following your patient along with you  Sincerely,        Lily Holloway MD        CC: MD Lily Eckert MD  4/21/2022  9:26 AM  Sign when Signing Visit  RENAL FOLLOW UP NOTE: td     ASSESSMENT AND PLAN:  1  Kushal Mendezer :  · Etiology:  AK I from Bactrim/hypercalcemia along with poor oral intake   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/arteriolar nephrosclerosis  · Baseline creatinine:  1 1 -1 79  · Current creatinine:  1 21 at baseline  · Urine protein creatinine ratio:  0 26 g at goal   Recommendations:  · Treat hypertension-please see below  · Treat dyslipidemia-please see below  · Maintain proteinuria less than 1 g or as low as possible  · Avoid nephrotoxic agents such as NSAIDs, patient counseled as such  2   Volume:  Euvolemic  3   Hypertension:  Secondary workup:  Was negative  · Home readings:   None at this time     · Goal blood pressure:  Less than 130/80 given CKD  Recommendations:  ·  Push nonmedical regimen especially weight loss/isotonic exercise and low-salt diet  · Medication changes today:   · No changes pending home readings    Patient has mild orthostasis but no orthostatic symptoms so no adjustment at this time  4   Electrolytes:    · Mild metabolic acidosis:  Resolved  · Borderline hypokalemia:  Now normal at 4 0  5   Mineral bone disorder:  · Calcium/magnesium/phosphorus all normal  · PTH intact:   28 3 which is normal  · Vitamin-D:  41 6 from 10/30/2021 at goal  6   Dyslipidemia:  · Goal LDL:  Less than 100  · Current lipid profile:  LDL 75/HDL 96/triglycerides 39  Recommendations:  Patient is at goal   7   Anemia:   · Hemoglobin:   Stable at 11 6 at baseline  · Iron studies:  Were acceptable from prior  · Multiple myeloma evaluation  was negative as outlined above  · GI evaluation:  EGD with just esophagitis; colonoscopy with benign polyp  · Heme consultation:  Felt anemia which was normocytic secondary to chronic kidney disease   No further recommendations at this time  8   Other problems:  · GERD  · Osteoarthritis of right hip  · Colonic polyp  · Status post duodenal perforation 10/04/2020:  Apparently upper GI endoscopy demonstrated no leak   Endoscopy demonstrated no leak   She was NPO with antibiotics who was given a 2 week course of antibiotics upon discharge along with antacid medication  To be followed up as an outpatient  · WITH RECENT LOW ANION GAP JUST TO BE SURE I WOULD REORDER SPEP UPEP AND LIGHT CHAIN RATIO SINCE IT HAS BEEN SINCE 2019     GI health maintenance:  Last colonoscopy July 2019    PATIENT INSTRUCTIONS:    Patient Instructions   1  Medication changes today:   No medication changes today  2  Please go for non fasting  lab work at this time    3   Please take 1 week a blood pressure readings at this time    AS FOLLOWS  MORNING AND EVENING, SITTING AND STANDING as follows:  · TAKE THE MORNING READINGS BEFORE ANY MEDICATIONS AND WHEN YOU ARE RELAXED FOR SEVERAL MINUTES  · TAKE THE EVENING READINGS:  BETWEEN 7-10 P M ; PRIOR TO ANY MEDICATIONS; AT LEAST IN OUR  FROM DINNER; AND CERTAINLY AFTER RELAXING FOR A FEW MINUTES  · PLEASE INCLUDE HEART RATE WITH YOUR BLOOD PRESSURE READINGS  · When taking standing readings, keep your arm supported at heart level and not dangling  · Make sure you are sitting with your back supported and feet on the ground and do not cross your legs or feet  · Make sure you have not taken any coffee or caffeine products or exercised or smoke cigarettes at least 30 minutes before taking your blood pressure  Then please mail these readings into the office    4  In 3 months:   Please go for nonfasting lab work but in the morning   Please take 1 week a blood pressure readings as outlined above and mail those into the office       5  Follow-up in 6  months   Please bring in 1 week a blood pressure readings morning evening, sitting and standing is outlined above   PLEASE BRING AN YOUR BLOOD PRESSURE MACHINE TO CORRELATE WITH THE OFFICE MACHINE AT THIS NEXT SCHEDULED VISIT   Please go for fasting lab work 1-2 weeks prior to your appointment      6  General instructions:   AVOID SALT BUT NOT ADDING AN READING LABELS TO MAKE SURE THERE IS LOW-SALT IN THE FOOD THAT YOU ARE EATING  o Goal is less than 2 g of sodium intake or less than 5 g of sodium chloride intake per day     Avoid nonsteroidal anti-inflammatory drugs such as Naprosyn, ibuprofen, Aleve, Advil, Celebrex, Meloxicam (Mobic) etc   You can use Tylenol as needed if you do not have any liver condition to be concerned about     Avoid medications such as Sudafed or decongestants and antihistamines that contained the D component which is the decongestant  You can take antihistamines without the decongestant or D component   Try to avoid medications such as pantoprazole or  Protonix/Nexium or Esomeprazole)/Prilosec or omeprazole/Prevacid or lansoprazole/AcipHex or Rabeprazole  If you are able to, use Pepcid as this is safer for your kidneys   Try to exercise at least 30 minutes 3 days a week to begin with with an ultimate goal of 5 days a week for at least 30 minutes     Please do not drink more than 2 glasses of alcohol/wine on a daily basis as this may contribute to your high blood pressure  Subjective: There has been no hospitalizations or acute illnesses since last visit  The patient overall is feeling well  No fevers, chills, or cough or colds    Good appetite and good energy  No hematuria, dysuria, voiding symptoms or foamy urine  No gastrointestinal symptoms  No cardiovascular symptoms including swelling of the legs  No headaches, dizziness or lightheadedness  Blood pressure medications:   Toprol xl 25 mg BID      ROS:  See HPI, otherwise review of systems as completely reviewed with the patient are negative    Past Medical History:   Diagnosis Date    Anemia     Chronic bilateral low back pain without sciatica 11/26/2019    Chronic kidney disease     Duodenal ulcer     Enterococcus UTI 4/7/2016    GERD (gastroesophageal reflux disease) 04/04/2016    Greater trochanteric bursitis of right hip 9/18/2018    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    Hypercalcemia     Hypertension     Left lumbar radiculopathy 4/7/2016    Mixed hyperlipidemia     Osteoarthritis of hip 04/04/2016    Osteoporosis     Sacral insufficiency fracture     SI (sacroiliac) joint inflammation ([facility] [address] ) 04/04/2016    Substance addiction ([facility] )     Vitamin D deficiency 3/11/2019     Past Surgical History:   Procedure Laterality Date    BREAST EXCISIONAL BIOPSY Left 2000    benign    COLONOSCOPY  2019    JOINT REPLACEMENT      right hip 8/15    MAMMO (HISTORICAL)  2019    TONSILLECTOMY       Family History   Problem Relation Age of Onset    No Known Problems Mother     No Known Problems Father     Breast cancer Daughter 48    Breast cancer Maternal Aunt 79    No Known Problems Sister     No Known Problems Maternal Grandmother     No Known Problems Maternal Grandfather     No Known Problems Paternal Grandmother     No Known Problems Paternal Grandfather     No Known Problems Son     No Known Problems Sister     No Known Problems Sister       reports that she has never smoked  She has never used smokeless tobacco  She reports previous drug use  Drug: Prescription  She reports that she does not drink alcohol      I COMPLETELY REVIEWED THE PAST MEDICAL HISTORY/PAST SURGICAL HISTORY/SOCIAL HISTORY/FAMILY HISTORY/AND MEDICATIONS  AND UPDATED ALL    Objective:     Vitals:   BP sitting on right:  132/74 with a heart rate of 84 regular  BP standing on right:  112/72 with a heart rate of 84 regular  Vitals:    04/21/22 0905   SpO2: 98%       Weight (last 2 days)     Date/Time Weight    04/21/22 0905 61 2 (135)        Wt Readings from Last 3 Encounters:   04/21/22 61 2 kg (135 lb)   03/15/22 62 1 kg (137 lb)   11/23/21 61 2 kg (135 lb)       Body mass index is 24 69 kg/m²  Physical Exam: General:  No acute distress  Skin:  No acute rash  Eyes:  No scleral icterus, noninjected, no discharge from eyes  ENT:  Moist mucous membranes  Neck:  Supple, no jugular venous distention, trachea is midline, no lymphadenopathy and no thyromegaly  Back   No CVAT  Chest:  Clear to auscultation and percussion, good respiratory effort  CVS:  Regular rate and rhythm without a rub, or gallops or murmurs  Abdomen:  Soft and nontender with normal bowel sounds  Extremities:  No cyanosis and no edema, moderate arthritic changes, normal range of motion  Neuro:  Grossly intact  Psych:  Alert, oriented x3 and appropriate      Medications:    Current Outpatient Medications:     atorvastatin (LIPITOR) 10 mg tablet, Take 1 tablet (10 mg total) by mouth daily, Disp: 90 tablet, Rfl: 1    ibuprofen (MOTRIN) 800 mg tablet, Take 800 mg by mouth daily as needed , Disp: , Rfl:     metoprolol succinate (TOPROL-XL) 25 mg 24 hr tablet, Take 1 tablet (25 mg total) by mouth 2 (two) times a day, Disp: 180 tablet, Rfl: 3    Premarin vaginal cream, , Disp: , Rfl:     pantoprazole (PROTONIX) 40 mg tablet, Take 1 tablet (40 mg total) by mouth 2 (two) times a day as needed (Dyspepsia), Disp: 60 tablet, Rfl: 3    Current Facility-Administered Medications:     denosumab (PROLIA) subcutaneous injection 60 mg, 60 mg, Subcutaneous, Q6 Months, Gregory Bae MD, 60 mg at 11/02/21 1315    Lab, Imaging and other studies: I have personally reviewed pertinent labs    Laboratory Results:  Results for orders placed or performed in visit on 04/11/22   Comprehensive metabolic panel   Result Value Ref Range    Sodium 139 136 - 145 mmol/L    Potassium 4 0 3 5 - 5 3 mmol/L    Chloride 110 (H) 100 - 108 mmol/L    CO2 27 21 - 32 mmol/L    ANION GAP 2 (L) 4 - 13 mmol/L    BUN 20 5 - 25 mg/dL    Creatinine 1 21 0 60 - 1 30 mg/dL    Glucose, Fasting 75 65 - 99 mg/dL    Calcium 9 2 8 3 - 10 1 mg/dL    AST 15 5 - 45 U/L    ALT 18 12 - 78 U/L    Alkaline Phosphatase 70 46 - 116 U/L    Total Protein 6 8 6 4 - 8 2 g/dL    Albumin 3 6 3 5 - 5 0 g/dL    Total Bilirubin 0 55 0 20 - 1 00 mg/dL    eGFR 42 ml/min/1 73sq m   CK   Result Value Ref Range    Total  26 - 192 U/L   Lipid Panel with Direct LDL reflex   Result Value Ref Range    Cholesterol 179 See Comment mg/dL    Triglycerides 41 See Comment mg/dL    HDL, Direct 96 >=50 mg/dL    LDL Calculated 75 0 - 100 mg/dL   Magnesium   Result Value Ref Range    Magnesium 2 2 1 6 - 2 6 mg/dL   Phosphorus   Result Value Ref Range    Phosphorus 3 8 2 3 - 4 1 mg/dL   Protein / creatinine ratio, urine   Result Value Ref Range    Creatinine, Ur 76 4 mg/dL    Protein Urine Random 20 mg/dL    Prot/Creat Ratio, Ur 0 26 (H) 0 00 - 0 10   PTH, intact   Result Value Ref Range    PTH 28 3 18 4 - 80 1 pg/mL   CBC and differential   Result Value Ref Range    WBC 5 49 4 31 - 10 16 Thousand/uL    RBC 3 88 3 81 - 5 12 Million/uL    Hemoglobin 11 6 11 5 - 15 4 g/dL    Hematocrit 36 4 34 8 - 46 1 %    MCV 94 82 - 98 fL    MCH 29 9 26 8 - 34 3 pg    MCHC 31 9 31 4 - 37 4 g/dL    RDW 13 3 11 6 - 15 1 %    MPV 11 0 8 9 - 12 7 fL    Platelets 776 234 - 683 Thousands/uL    nRBC 0 /100 WBCs    Neutrophils Relative 59 43 - 75 %    Immat GRANS % 0 0 - 2 %    Lymphocytes Relative 26 14 - 44 %    Monocytes Relative 10 4 - 12 %    Eosinophils Relative 3 0 - 6 %    Basophils Relative 2 (H) 0 - 1 %    Neutrophils Absolute 3 27 1 85 - 7 62 Thousands/µL    Immature Grans Absolute 0 02 0 00 - 0 20 Thousand/uL Lymphocytes Absolute 1 44 0 60 - 4 47 Thousands/µL    Monocytes Absolute 0 54 0 17 - 1 22 Thousand/µL    Eosinophils Absolute 0 14 0 00 - 0 61 Thousand/µL    Basophils Absolute 0 08 0 00 - 0 10 Thousands/µL             Invalid input(s): ALBUMIN      Radiology review:   chest X-ray    Ultrasound      Portions of the record may have been created with voice recognition software  Occasional wrong word or "sound a like" substitutions may have occurred due to the inherent limitations of voice recognition software  Read the chart carefully and recognize, using context, where substitutions have occurred

## 2022-04-21 NOTE — PATIENT INSTRUCTIONS
1  Medication changes today:   No medication changes today  2  Please go for non fasting  lab work at this time    3  Please take 1 week a blood pressure readings at this time    AS FOLLOWS  MORNING AND EVENING, SITTING AND STANDING as follows:  · TAKE THE MORNING READINGS BEFORE ANY MEDICATIONS AND WHEN YOU ARE RELAXED FOR SEVERAL MINUTES  · TAKE THE EVENING READINGS:  BETWEEN 7-10 P M ; PRIOR TO ANY MEDICATIONS; AT LEAST IN OUR  FROM DINNER; AND CERTAINLY AFTER RELAXING FOR A FEW MINUTES  · PLEASE INCLUDE HEART RATE WITH YOUR BLOOD PRESSURE READINGS  · When taking standing readings, keep your arm supported at heart level and not dangling  · Make sure you are sitting with your back supported and feet on the ground and do not cross your legs or feet  · Make sure you have not taken any coffee or caffeine products or exercised or smoke cigarettes at least 30 minutes before taking your blood pressure  Then please mail these readings into the office    4  In 3 months:   Please go for nonfasting lab work but in the morning   Please take 1 week a blood pressure readings as outlined above and mail those into the office       5  Follow-up in 6  months   Please bring in 1 week a blood pressure readings morning evening, sitting and standing is outlined above   PLEASE BRING AN YOUR BLOOD PRESSURE MACHINE TO CORRELATE WITH THE OFFICE MACHINE AT THIS NEXT SCHEDULED VISIT   Please go for fasting lab work 1-2 weeks prior to your appointment      6   General instructions:   AVOID SALT BUT NOT ADDING AN READING LABELS TO MAKE SURE THERE IS LOW-SALT IN THE FOOD THAT YOU ARE EATING  o Goal is less than 2 g of sodium intake or less than 5 g of sodium chloride intake per day     Avoid nonsteroidal anti-inflammatory drugs such as Naprosyn, ibuprofen, Aleve, Advil, Celebrex, Meloxicam (Mobic) etc   You can use Tylenol as needed if you do not have any liver condition to be concerned about     Avoid medications such as Sudafed or decongestants and antihistamines that contained the D component which is the decongestant  You can take antihistamines without the decongestant or D component   Try to avoid medications such as pantoprazole or  Protonix/Nexium or Esomeprazole)/Prilosec or omeprazole/Prevacid or lansoprazole/AcipHex or Rabeprazole  If you are able to, use Pepcid as this is safer for your kidneys   Try to exercise at least 30 minutes 3 days a week to begin with with an ultimate goal of 5 days a week for at least 30 minutes     Please do not drink more than 2 glasses of alcohol/wine on a daily basis as this may contribute to your high blood pressure

## 2022-04-22 LAB
25(OH)D2 SERPL-MCNC: <1 NG/ML
25(OH)D3 SERPL-MCNC: 23 NG/ML
25(OH)D3+25(OH)D2 SERPL-MCNC: 24 NG/ML
HPV HR 12 DNA CVX QL NAA+PROBE: NEGATIVE
HPV16 DNA CVX QL NAA+PROBE: NEGATIVE
HPV18 DNA CVX QL NAA+PROBE: NEGATIVE

## 2022-04-25 ENCOUNTER — TELEPHONE (OUTPATIENT)
Dept: NEPHROLOGY | Facility: CLINIC | Age: 82
End: 2022-04-25

## 2022-05-05 ENCOUNTER — DOCUMENTATION (OUTPATIENT)
Dept: NEPHROLOGY | Facility: CLINIC | Age: 82
End: 2022-05-05

## 2022-05-05 NOTE — PROGRESS NOTES
Home blood pressure readings:  -a m :  134/78  -p m :  133/69  Heart rate:        Recommend:    Patient typically has orthostatic changes  Given the fact she is close to goal with this history of orthostatic changes I would not make any changes at this time and her medications    Would recommend when she comes in to see me next time to do both sitting and standing blood pressure reading    Thank you

## 2022-05-12 ENCOUNTER — OFFICE VISIT (OUTPATIENT)
Dept: INTERNAL MEDICINE CLINIC | Facility: CLINIC | Age: 82
End: 2022-05-12
Payer: COMMERCIAL

## 2022-05-12 VITALS
OXYGEN SATURATION: 98 % | TEMPERATURE: 99 F | HEART RATE: 66 BPM | HEIGHT: 62 IN | DIASTOLIC BLOOD PRESSURE: 72 MMHG | SYSTOLIC BLOOD PRESSURE: 132 MMHG | WEIGHT: 138 LBS | BODY MASS INDEX: 25.4 KG/M2

## 2022-05-12 DIAGNOSIS — J04.0 ACUTE LARYNGITIS: Primary | ICD-10-CM

## 2022-05-12 PROCEDURE — 99213 OFFICE O/P EST LOW 20 MIN: CPT | Performed by: INTERNAL MEDICINE

## 2022-05-12 RX ORDER — CHLORPHENIRAMINE MALEATE 4 MG/1
4 TABLET ORAL 3 TIMES DAILY
Qty: 30 TABLET | Refills: 0 | Status: SHIPPED | OUTPATIENT
Start: 2022-05-12 | End: 2022-05-12 | Stop reason: SDUPTHER

## 2022-05-12 RX ORDER — CHLORPHENIRAMINE MALEATE 4 MG/1
4 TABLET ORAL 3 TIMES DAILY
Qty: 30 TABLET | Refills: 0 | Status: SHIPPED | OUTPATIENT
Start: 2022-05-12 | End: 2022-05-12

## 2022-05-12 NOTE — ASSESSMENT & PLAN NOTE
Most likely viral in etiology  Patient was advised rest for her voice  Symptomatic treatment she should contact the office if there is any change in her condition

## 2022-05-12 NOTE — PROGRESS NOTES
Assessment/Plan:    Acute laryngitis  Most likely viral in etiology  Patient was advised rest for her voice  Symptomatic treatment she should contact the office if there is any change in her condition  Subjective:      Patient ID: Iliana Starks is a [de-identified] y o  female  Patient presents to the office because of sudden onset of hoarseness and loss of her voice  She denies any fevers or chills or night sweats  She has an mild cough but no other significant complaints  She denies a sore throat, she has no earache, she denies sinus congestion  Review of Systems   Constitutional: Negative  HENT: Positive for voice change  Negative for congestion, ear pain, postnasal drip, sinus pressure, sore throat and trouble swallowing  Eyes: Negative  Respiratory: Negative  Cardiovascular: Negative  All other systems reviewed and are negative  Objective:      /72 (BP Location: Left arm, Patient Position: Sitting, Cuff Size: Adult)   Pulse 66   Temp 99 °F (37 2 °C) (Temporal)   Ht 5' 2" (1 575 m)   Wt 62 6 kg (138 lb)   SpO2 98%   BMI 25 24 kg/m²          Physical Exam  Constitutional:       General: She is not in acute distress  Appearance: Normal appearance  She is normal weight  She is not ill-appearing, toxic-appearing or diaphoretic  HENT:      Head: Normocephalic and atraumatic  Right Ear: Tympanic membrane, ear canal and external ear normal       Left Ear: Tympanic membrane, ear canal and external ear normal       Nose: Nose normal  No congestion  Mouth/Throat:      Mouth: Mucous membranes are moist       Pharynx: Oropharynx is clear  No oropharyngeal exudate or posterior oropharyngeal erythema  Eyes:      General: No scleral icterus  Conjunctiva/sclera: Conjunctivae normal       Pupils: Pupils are equal, round, and reactive to light  Cardiovascular:      Rate and Rhythm: Normal rate     Pulmonary:      Effort: Pulmonary effort is normal  No respiratory distress  Breath sounds: Normal breath sounds  Abdominal:      General: Abdomen is flat  There is no distension  Musculoskeletal:      Cervical back: Neck supple  No rigidity or tenderness  Right lower leg: No edema  Left lower leg: No edema  Lymphadenopathy:      Cervical: No cervical adenopathy  Skin:     General: Skin is warm  Coloration: Skin is not jaundiced  Findings: No bruising, erythema or rash  Neurological:      General: No focal deficit present  Mental Status: She is alert and oriented to person, place, and time  Mental status is at baseline     Psychiatric:         Mood and Affect: Mood normal          Behavior: Behavior normal

## 2022-05-24 ENCOUNTER — OFFICE VISIT (OUTPATIENT)
Dept: OBGYN CLINIC | Facility: HOSPITAL | Age: 82
End: 2022-05-24
Payer: COMMERCIAL

## 2022-05-24 VITALS
SYSTOLIC BLOOD PRESSURE: 137 MMHG | HEIGHT: 62 IN | HEART RATE: 82 BPM | BODY MASS INDEX: 25.4 KG/M2 | DIASTOLIC BLOOD PRESSURE: 71 MMHG | WEIGHT: 138 LBS

## 2022-05-24 DIAGNOSIS — M70.61 TROCHANTERIC BURSITIS OF RIGHT HIP: Primary | ICD-10-CM

## 2022-05-24 DIAGNOSIS — M70.62 TROCHANTERIC BURSITIS OF LEFT HIP: ICD-10-CM

## 2022-05-24 PROCEDURE — 99213 OFFICE O/P EST LOW 20 MIN: CPT | Performed by: ORTHOPAEDIC SURGERY

## 2022-05-24 PROCEDURE — 20610 DRAIN/INJ JOINT/BURSA W/O US: CPT | Performed by: ORTHOPAEDIC SURGERY

## 2022-05-24 RX ORDER — LIDOCAINE HYDROCHLORIDE 10 MG/ML
2 INJECTION, SOLUTION INFILTRATION; PERINEURAL
Status: COMPLETED | OUTPATIENT
Start: 2022-05-24 | End: 2022-05-24

## 2022-05-24 RX ORDER — BETAMETHASONE SODIUM PHOSPHATE AND BETAMETHASONE ACETATE 3; 3 MG/ML; MG/ML
12 INJECTION, SUSPENSION INTRA-ARTICULAR; INTRALESIONAL; INTRAMUSCULAR; SOFT TISSUE
Status: COMPLETED | OUTPATIENT
Start: 2022-05-24 | End: 2022-05-24

## 2022-05-24 RX ORDER — BUPIVACAINE HYDROCHLORIDE 2.5 MG/ML
2 INJECTION, SOLUTION INFILTRATION; PERINEURAL
Status: COMPLETED | OUTPATIENT
Start: 2022-05-24 | End: 2022-05-24

## 2022-05-24 RX ADMIN — LIDOCAINE HYDROCHLORIDE 2 ML: 10 INJECTION, SOLUTION INFILTRATION; PERINEURAL at 10:31

## 2022-05-24 RX ADMIN — BUPIVACAINE HYDROCHLORIDE 2 ML: 2.5 INJECTION, SOLUTION INFILTRATION; PERINEURAL at 10:31

## 2022-05-24 RX ADMIN — BETAMETHASONE SODIUM PHOSPHATE AND BETAMETHASONE ACETATE 12 MG: 3; 3 INJECTION, SUSPENSION INTRA-ARTICULAR; INTRALESIONAL; INTRAMUSCULAR; SOFT TISSUE at 10:31

## 2022-05-24 NOTE — PROGRESS NOTES
Assessment:  1  Trochanteric bursitis of right hip     2  Trochanteric bursitis of left hip         Plan:  The patient was provided with bilateral trochanteric bursa steroid injections  The patient tolerated the procedure well  The patient should follow up in 3 months  To do next visit:  Return in about 3 months (around 8/24/2022)  The above stated was discussed in layman's terms and the patient expressed understanding  All questions were answered to the patient's satisfaction  Scribe Attestation    I,:  Bib Lockhart am acting as a scribe while in the presence of the attending physician :       I,:  Isaac Palm MD personally performed the services described in this documentation    as scribed in my presence :             Subjective:   Laurie is a [de-identified] y o  female who presents for follow up of right hip  She is s/p bilateral trochanteric bursa steroid injection with lasting benefit, 2/22/22  Today she complains of return of bilateral lateral hip pain  Prolonged walking and lying on side aggravates while sitting alleviates          Review of systems negative unless otherwise specified in HPI    Past Medical History:   Diagnosis Date    Anemia     Chronic bilateral low back pain without sciatica 11/26/2019    Chronic kidney disease     Duodenal ulcer     Enterococcus UTI 4/7/2016    GERD (gastroesophageal reflux disease) 04/04/2016    Greater trochanteric bursitis of right hip 9/18/2018    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    Hypercalcemia     Hypertension     Left lumbar radiculopathy 4/7/2016    Mixed hyperlipidemia     Osteoarthritis of hip 04/04/2016    Osteoporosis     Sacral insufficiency fracture     SI (sacroiliac) joint inflammation ([address] ) 04/04/2016    Substance addiction ([facility] [address]     Vitamin D deficiency 3/11/2019       Past Surgical History:   Procedure Laterality Date    BREAST EXCISIONAL BIOPSY Left 2000    benign    COLONOSCOPY  2019  JOINT REPLACEMENT      right hip 8/15    MAMMO (HISTORICAL)  2019    TONSILLECTOMY         Family History   Problem Relation Age of Onset    No Known Problems Mother     No Known Problems Father     Breast cancer Daughter 48    Breast cancer Maternal Aunt 79    No Known Problems Sister     No Known Problems Maternal Grandmother     No Known Problems Maternal Grandfather     No Known Problems Paternal Grandmother     No Known Problems Paternal Grandfather     No Known Problems Son     No Known Problems Sister     No Known Problems Sister        Social History     Occupational History    Not on file   Tobacco Use    Smoking status: Never Smoker    Smokeless tobacco: Never Used   Vaping Use    Vaping Use: Never used   Substance and Sexual Activity    Alcohol use: Never    Drug use: Not Currently     Types: Prescription     Comment: rocovering last used 1996    Sexual activity: Yes     Partners: Male     Birth control/protection: Post-menopausal         Current Outpatient Medications:     atorvastatin (LIPITOR) 10 mg tablet, Take 1 tablet (10 mg total) by mouth daily, Disp: 90 tablet, Rfl: 1    ibuprofen (MOTRIN) 800 mg tablet, Take 800 mg by mouth daily as needed , Disp: , Rfl:     metoprolol succinate (TOPROL-XL) 25 mg 24 hr tablet, Take 1 tablet (25 mg total) by mouth 2 (two) times a day, Disp: 180 tablet, Rfl: 3    pantoprazole (PROTONIX) 40 mg tablet, Take 1 tablet (40 mg total) by mouth 2 (two) times a day as needed (Dyspepsia), Disp: 60 tablet, Rfl: 3    Premarin vaginal cream, , Disp: , Rfl:     Allergies   Allergen Reactions    Bactrim [Sulfamethoxazole-Trimethoprim] Other (See Comments)     Kidney failure            Vitals:    05/24/22 0950   BP: 137/71   Pulse: 82       Objective:  Physical exam  · General: Awake, Alert, Oriented  · Eyes: Pupils equal, round and reactive to light  · Heart: regular rate and rhythm  · Lungs: No audible wheezing  · Abdomen: soft Ortho Exam   Bilateral hips: Well healed incisional scar over right lateral hip  TTP over greater trochanter  Good arc of motion  Patient sits comfortably in chair with hip flexed at 90 degrees  Patient stands from seated position without assistance  Calf compartments soft and supple  Sensation intact  Toes are warm sensate and mobile      Diagnostics, reviewed and taken today if performed as documented:    None performed     Procedures, if performed today:    Large joint arthrocentesis: R greater trochanteric bursa  Universal Protocol:  Consent: Verbal consent obtained  Risks and benefits: risks, benefits and alternatives were discussed  Consent given by: patient  Time out: Immediately prior to procedure a "time out" was called to verify the correct patient, procedure, equipment, support staff and site/side marked as required  Timeout called at: 5/24/2022 10:30 AM   Patient understanding: patient states understanding of the procedure being performed  Site marked: the operative site was marked  Patient identity confirmed: verbally with patient    Supporting Documentation  Indications: pain   Procedure Details  Location: hip - R greater trochanteric bursa  Needle size: 22 G  Ultrasound guidance: no  Approach: lateral  Medications administered: 12 mg betamethasone acetate-betamethasone sodium phosphate 6 (3-3) mg/mL; 2 mL bupivacaine 0 25 %; 2 mL lidocaine 1 %    Patient tolerance: patient tolerated the procedure well with no immediate complications  Dressing:  Sterile dressing applied    Large joint arthrocentesis: L greater trochanteric bursa  Universal Protocol:  Consent: Verbal consent obtained  Risks and benefits: risks, benefits and alternatives were discussed  Consent given by: patient  Time out: Immediately prior to procedure a "time out" was called to verify the correct patient, procedure, equipment, support staff and site/side marked as required    Timeout called at: 5/24/2022 10:30 AM   Patient understanding: patient states understanding of the procedure being performed  Site marked: the operative site was marked  Patient identity confirmed: verbally with patient    Supporting Documentation  Indications: pain   Procedure Details  Location: hip - L greater trochanteric bursa  Needle size: 22 G  Ultrasound guidance: no  Approach: lateral  Medications administered: 12 mg betamethasone acetate-betamethasone sodium phosphate 6 (3-3) mg/mL; 2 mL bupivacaine 0 25 %; 2 mL lidocaine 1 %    Patient tolerance: patient tolerated the procedure well with no immediate complications  Dressing:  Sterile dressing applied             Portions of the record may have been created with voice recognition software  Occasional wrong word or "sound a like" substitutions may have occurred due to the inherent limitations of voice recognition software  Read the chart carefully and recognize, using context, where substitutions have occurred

## 2022-06-13 ENCOUNTER — HOSPITAL ENCOUNTER (OUTPATIENT)
Dept: MAMMOGRAPHY | Facility: CLINIC | Age: 82
Discharge: HOME | End: 2022-06-13
Payer: COMMERCIAL

## 2022-06-13 VITALS — BODY MASS INDEX: 25.4 KG/M2 | WEIGHT: 138 LBS | HEIGHT: 62 IN

## 2022-06-13 DIAGNOSIS — R92.8 ABNORMAL MAMMOGRAM: ICD-10-CM

## 2022-06-13 PROCEDURE — G0279 TOMOSYNTHESIS, MAMMO: HCPCS

## 2022-06-13 PROCEDURE — 77066 DX MAMMO INCL CAD BI: CPT

## 2022-07-18 ENCOUNTER — APPOINTMENT (OUTPATIENT)
Dept: LAB | Facility: HOSPITAL | Age: 82
End: 2022-07-18
Attending: INTERNAL MEDICINE
Payer: COMMERCIAL

## 2022-07-18 DIAGNOSIS — I12.9 PARENCHYMAL RENAL HYPERTENSION, STAGE 1 THROUGH STAGE 4 OR UNSPECIFIED CHRONIC KIDNEY DISEASE: ICD-10-CM

## 2022-07-18 DIAGNOSIS — N18.31 STAGE 3A CHRONIC KIDNEY DISEASE (HCC): ICD-10-CM

## 2022-07-18 DIAGNOSIS — E78.5 DYSLIPIDEMIA: ICD-10-CM

## 2022-07-18 DIAGNOSIS — E87.6 HYPOKALEMIA: ICD-10-CM

## 2022-07-18 DIAGNOSIS — E55.9 VITAMIN D DEFICIENCY: ICD-10-CM

## 2022-07-18 DIAGNOSIS — E61.1 IRON DEFICIENCY: ICD-10-CM

## 2022-07-18 LAB
ANION GAP SERPL CALCULATED.3IONS-SCNC: 4 MMOL/L (ref 4–13)
BUN SERPL-MCNC: 20 MG/DL (ref 5–25)
CALCIUM SERPL-MCNC: 9.4 MG/DL (ref 8.3–10.1)
CHLORIDE SERPL-SCNC: 113 MMOL/L (ref 96–108)
CO2 SERPL-SCNC: 23 MMOL/L (ref 21–32)
CREAT SERPL-MCNC: 1.17 MG/DL (ref 0.6–1.3)
GFR SERPL CREATININE-BSD FRML MDRD: 44 ML/MIN/1.73SQ M
GLUCOSE SERPL-MCNC: 102 MG/DL (ref 65–140)
POTASSIUM SERPL-SCNC: 4.4 MMOL/L (ref 3.5–5.3)
SODIUM SERPL-SCNC: 140 MMOL/L (ref 135–147)

## 2022-07-18 PROCEDURE — 80048 BASIC METABOLIC PNL TOTAL CA: CPT

## 2022-07-18 PROCEDURE — 36415 COLL VENOUS BLD VENIPUNCTURE: CPT

## 2022-07-19 ENCOUNTER — TELEPHONE (OUTPATIENT)
Dept: NEPHROLOGY | Facility: CLINIC | Age: 82
End: 2022-07-19

## 2022-07-19 NOTE — TELEPHONE ENCOUNTER
Spoke with patient about the following, she expressed understanding and thanked us for the call:    ----- Message from Alexys Foote MD sent at 7/18/2022  3:26 PM EDT -----  Let the patient know labs look great

## 2022-08-15 DIAGNOSIS — E78.5 DYSLIPIDEMIA: ICD-10-CM

## 2022-08-15 RX ORDER — ATORVASTATIN CALCIUM 10 MG/1
10 TABLET, FILM COATED ORAL DAILY
Qty: 90 TABLET | Refills: 1 | Status: SHIPPED | OUTPATIENT
Start: 2022-08-15

## 2022-08-22 ENCOUNTER — OFFICE VISIT (OUTPATIENT)
Dept: PODIATRY | Facility: CLINIC | Age: 82
End: 2022-08-22
Payer: COMMERCIAL

## 2022-08-22 VITALS
DIASTOLIC BLOOD PRESSURE: 74 MMHG | HEART RATE: 80 BPM | BODY MASS INDEX: 25.14 KG/M2 | WEIGHT: 136.6 LBS | SYSTOLIC BLOOD PRESSURE: 132 MMHG | HEIGHT: 62 IN

## 2022-08-22 DIAGNOSIS — M79.671 RIGHT FOOT PAIN: ICD-10-CM

## 2022-08-22 DIAGNOSIS — M19.071 PRIMARY OSTEOARTHRITIS OF RIGHT FOOT: Primary | ICD-10-CM

## 2022-08-22 DIAGNOSIS — B35.1 ONYCHOMYCOSIS: ICD-10-CM

## 2022-08-22 PROCEDURE — 99203 OFFICE O/P NEW LOW 30 MIN: CPT | Performed by: PODIATRIST

## 2022-08-22 PROCEDURE — 20550 NJX 1 TENDON SHEATH/LIGAMENT: CPT | Performed by: PODIATRIST

## 2022-08-22 RX ORDER — LIDOCAINE HYDROCHLORIDE 10 MG/ML
1 INJECTION, SOLUTION EPIDURAL; INFILTRATION; INTRACAUDAL; PERINEURAL ONCE
Status: COMPLETED | OUTPATIENT
Start: 2022-08-22 | End: 2022-08-22

## 2022-08-22 RX ORDER — TRIAMCINOLONE ACETONIDE 40 MG/ML
20 INJECTION, SUSPENSION INTRA-ARTICULAR; INTRAMUSCULAR ONCE
Status: COMPLETED | OUTPATIENT
Start: 2022-08-22 | End: 2022-08-22

## 2022-08-22 RX ADMIN — LIDOCAINE HYDROCHLORIDE 1 ML: 10 INJECTION, SOLUTION EPIDURAL; INFILTRATION; INTRACAUDAL; PERINEURAL at 11:50

## 2022-08-22 RX ADMIN — TRIAMCINOLONE ACETONIDE 20 MG: 40 INJECTION, SUSPENSION INTRA-ARTICULAR; INTRAMUSCULAR at 11:51

## 2022-08-22 NOTE — PROGRESS NOTES
Assessment/Plan:    I personally reviewed x-rays of the right foot  They reveal a hallux valgus deformity and degenerative changes at the right instep  Evidence of osteoporosis noted  Explained the patient that she is dealing with arthritis at the right instep  Discussed treatment options contrasting Voltaren gel and injection of cortisone  Patient prefers the latter  Injected 0 5 cc Kenalog 40 along with 1 cc 1% xylocaine to the dorsum of the right foot  Trimmed multiple dystrophic toenails  Patient will be reassessed in 4 weeks  No problem-specific Assessment & Plan notes found for this encounter  There are no diagnoses linked to this encounter  Subjective:      Patient ID: Yang Sanchez is a 81 y o  female  HPI     Patient, an 78-year-old female presents with chronic pain in her right foot as well as difficulty with her toenails  The toenails are thick and cosmetically displeasing  Patient relates pain on the top of the right foot with no prior trauma  This pain is intermittent  On questioning, patient has difficulty with arthritis throughout her body  Patient has bunions both feet but they are asymptomatic  The following portions of the patient's history were reviewed and updated as appropriate: allergies, current medications, past family history, past medical history, past social history, past surgical history and problem list     Review of Systems   Gastrointestinal:        GERD   Genitourinary:        Stage III renal disease   Musculoskeletal: Positive for arthralgias  Right hip pain         Objective:      /74   Pulse 80   Ht 5' 2" (1 575 m)   Wt 62 kg (136 lb 9 6 oz)   BMI 24 98 kg/m²          Physical Exam  Constitutional:       Appearance: Normal appearance  Cardiovascular:      Pulses: Normal pulses  Comments: Marked varicosities  Musculoskeletal:         General: Tenderness and deformity present        Comments: Pain with palpation dorsal aspect right foot at 2nd metatarsal cuneiform joint  Hallux valgus deformity bilateral    Skin:     General: Skin is warm  Comments: Second and 3rd toenails bilateral are thickened dystrophic  Neurological:      General: No focal deficit present  Mental Status: She is oriented to person, place, and time  Foot injection     Date/Time 8/22/2022 12:12 PM     Performed by  Neil Wells DPM     Authorized by Neil Wells DPM      Universal Protocol   Consent: Verbal consent obtained  Risks and benefits: risks, benefits and alternatives were discussed  Consent given by: patient  Patient understanding: patient states understanding of the procedure being performed  Patient identity confirmed: verbally with patient        Local anesthesia used: yes     Anesthesia   Local anesthesia used: yes  Local Anesthetic: lidocaine 1% without epinephrine     Procedure Details   Procedure Notes: Injected right foot with 0 5 cc Kenalog 40 along with 1 cc 1% xylocaine

## 2022-08-30 ENCOUNTER — OFFICE VISIT (OUTPATIENT)
Dept: OBGYN CLINIC | Facility: HOSPITAL | Age: 82
End: 2022-08-30
Payer: COMMERCIAL

## 2022-08-30 VITALS
HEIGHT: 62 IN | BODY MASS INDEX: 25.21 KG/M2 | HEART RATE: 80 BPM | SYSTOLIC BLOOD PRESSURE: 123 MMHG | DIASTOLIC BLOOD PRESSURE: 74 MMHG | WEIGHT: 137 LBS

## 2022-08-30 DIAGNOSIS — M70.61 GREATER TROCHANTERIC BURSITIS OF BOTH HIPS: Primary | ICD-10-CM

## 2022-08-30 DIAGNOSIS — M70.62 GREATER TROCHANTERIC BURSITIS OF BOTH HIPS: Primary | ICD-10-CM

## 2022-08-30 PROCEDURE — 99213 OFFICE O/P EST LOW 20 MIN: CPT | Performed by: PHYSICIAN ASSISTANT

## 2022-08-30 PROCEDURE — 20610 DRAIN/INJ JOINT/BURSA W/O US: CPT | Performed by: PHYSICIAN ASSISTANT

## 2022-08-30 RX ORDER — LIDOCAINE HYDROCHLORIDE 10 MG/ML
2 INJECTION, SOLUTION INFILTRATION; PERINEURAL
Status: COMPLETED | OUTPATIENT
Start: 2022-08-30 | End: 2022-08-30

## 2022-08-30 RX ORDER — BETAMETHASONE SODIUM PHOSPHATE AND BETAMETHASONE ACETATE 3; 3 MG/ML; MG/ML
6 INJECTION, SUSPENSION INTRA-ARTICULAR; INTRALESIONAL; INTRAMUSCULAR; SOFT TISSUE
Status: COMPLETED | OUTPATIENT
Start: 2022-08-30 | End: 2022-08-30

## 2022-08-30 RX ADMIN — LIDOCAINE HYDROCHLORIDE 2 ML: 10 INJECTION, SOLUTION INFILTRATION; PERINEURAL at 10:16

## 2022-08-30 RX ADMIN — BETAMETHASONE SODIUM PHOSPHATE AND BETAMETHASONE ACETATE 6 MG: 3; 3 INJECTION, SUSPENSION INTRA-ARTICULAR; INTRALESIONAL; INTRAMUSCULAR; SOFT TISSUE at 10:16

## 2022-08-30 NOTE — PROGRESS NOTES
Subjective;  43-year-old female well known to the practice  She achieves symptomatic improvement by periodic injections to her greater troch bursa  Her hip discomfort has returned and she presents to the office today intent on receiving subsequent injections      Past Medical History:   Diagnosis Date    Anemia     Chronic bilateral low back pain without sciatica 11/26/2019    Chronic kidney disease     Duodenal ulcer     Enterococcus UTI 4/7/2016    GERD (gastroesophageal reflux disease) 04/04/2016    Greater trochanteric bursitis of right hip 9/18/2018    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    Hypercalcemia     Hypertension     Left lumbar radiculopathy 4/7/2016    Mixed hyperlipidemia     Osteoarthritis of hip 04/04/2016    Osteoporosis     Sacral insufficiency fracture     SI (sacroiliac) joint inflammation ([facility] [address] ) 04/04/2016    Substance addiction ([facility] )     Vitamin D deficiency 3/11/2019       Past Surgical History:   Procedure Laterality Date    BREAST EXCISIONAL BIOPSY Left 2000    benign    COLONOSCOPY  2019    JOINT REPLACEMENT      right hip 8/15    MAMMO (HISTORICAL)  2019    TONSILLECTOMY         Family History   Problem Relation Age of Onset    No Known Problems Mother     No Known Problems Father     Breast cancer Daughter 48    Breast cancer Maternal Aunt 79    No Known Problems Sister     No Known Problems Maternal Grandmother     No Known Problems Maternal Grandfather     No Known Problems Paternal Grandmother     No Known Problems Paternal Grandfather     No Known Problems Son     No Known Problems Sister     No Known Problems Sister        Social History     Tobacco Use    Smoking status: Never Smoker    Smokeless tobacco: Never Used   Vaping Use    Vaping Use: Never used   Substance Use Topics    Alcohol use: Never    Drug use: Not Currently     Types: Prescription     Comment: rocovering last used 1996     Exam;    Well-developed well-nourished 71-year-old female in no acute distress  She has palpable reproducible pain lateral aspect of both hips directly over the proximal femur  She also has discomfort when she attempts to lay left or right lateral hip  He does not have significant discomfort of the intra-articular hip right or left  Large joint arthrocentesis: R greater trochanteric bursa  Universal Protocol:  Consent given by: patient    Procedure Details  Location: hip - R greater trochanteric bursa  Needle size: 22 G  Approach: lateral  Medications administered: 2 mL lidocaine 1 %; 6 mg betamethasone acetate-betamethasone sodium phosphate 6 (3-3) mg/mL    Patient tolerance: patient tolerated the procedure well with no immediate complications  Dressing:  Sterile dressing applied    Large joint arthrocentesis: L greater trochanteric bursa  Universal Protocol:  Consent given by: patient    Procedure Details  Location: hip - L greater trochanteric bursa  Needle size: 22 G  Approach: lateral  Medications administered: 2 mL lidocaine 1 %; 6 mg betamethasone acetate-betamethasone sodium phosphate 6 (3-3) mg/mL    Patient tolerance: patient tolerated the procedure well with no immediate complications  Dressing:  Sterile dressing applied        Impression;    Bilateral hip pain  Greater trochanteric hip bursitis bilateral    Plan;    She received well placed injections at today's appointment  We will see her in 3 additional months  It is acceptable for her to return to work today  Her experience was supervised by and plan formulated by the attending surgeon it was my privilege to assist him in the delivery of her care

## 2022-09-22 ENCOUNTER — OFFICE VISIT (OUTPATIENT)
Dept: PODIATRY | Facility: CLINIC | Age: 82
End: 2022-09-22
Payer: COMMERCIAL

## 2022-09-22 VITALS
BODY MASS INDEX: 25.21 KG/M2 | HEIGHT: 62 IN | DIASTOLIC BLOOD PRESSURE: 64 MMHG | HEART RATE: 93 BPM | SYSTOLIC BLOOD PRESSURE: 104 MMHG | WEIGHT: 137 LBS

## 2022-09-22 DIAGNOSIS — M19.071 PRIMARY OSTEOARTHRITIS OF RIGHT FOOT: Primary | ICD-10-CM

## 2022-09-22 PROCEDURE — 20550 NJX 1 TENDON SHEATH/LIGAMENT: CPT | Performed by: PODIATRIST

## 2022-09-22 RX ORDER — LIDOCAINE HYDROCHLORIDE 10 MG/ML
1 INJECTION, SOLUTION EPIDURAL; INFILTRATION; INTRACAUDAL; PERINEURAL ONCE
Status: COMPLETED | OUTPATIENT
Start: 2022-09-22 | End: 2022-09-22

## 2022-09-22 RX ORDER — TRIAMCINOLONE ACETONIDE 40 MG/ML
20 INJECTION, SUSPENSION INTRA-ARTICULAR; INTRAMUSCULAR ONCE
Status: COMPLETED | OUTPATIENT
Start: 2022-09-22 | End: 2022-09-22

## 2022-09-22 RX ADMIN — TRIAMCINOLONE ACETONIDE 20 MG: 40 INJECTION, SUSPENSION INTRA-ARTICULAR; INTRAMUSCULAR at 10:15

## 2022-09-22 RX ADMIN — LIDOCAINE HYDROCHLORIDE 1 ML: 10 INJECTION, SOLUTION EPIDURAL; INFILTRATION; INTRACAUDAL; PERINEURAL at 10:15

## 2022-09-22 NOTE — PROGRESS NOTES
Patient presents for assessment of right foot  Patient had an injection of Kenalog 40 at her last visit for pain dorsum of right foot secondary to osteoarthritis  70% improvement related but patient is desirous of another injection  On exam, pain with palpation dorsum right foot in the region of the cuneiforms  Treatment:  Injected right foot with 0 5 cc Kenalog 40 along with 1 cc 1% xylocaine  Reappoint p r n  Foot injection     Date/Time 9/22/2022 10:15 AM     Performed by  Ursula Villeda DPM     Authorized by Ursula Villeda DPM      Universal Protocol   Consent: Verbal consent obtained  Risks and benefits: risks, benefits and alternatives were discussed  Consent given by: patient  Patient understanding: patient states understanding of the procedure being performed  Patient identity confirmed: verbally with patient        Local anesthesia used: yes     Anesthesia   Local anesthesia used: yes  Local Anesthetic: lidocaine 1% without epinephrine     Procedure Details   Procedure Notes: Injected right foot with 0 5 cc Kenalog 40 along with 1 cc 1% xylocaine

## 2022-09-23 ENCOUNTER — OFFICE VISIT (OUTPATIENT)
Dept: INTERNAL MEDICINE CLINIC | Facility: CLINIC | Age: 82
End: 2022-09-23
Payer: COMMERCIAL

## 2022-09-23 VITALS
SYSTOLIC BLOOD PRESSURE: 126 MMHG | WEIGHT: 137.8 LBS | BODY MASS INDEX: 25.36 KG/M2 | HEIGHT: 62 IN | TEMPERATURE: 96 F | HEART RATE: 79 BPM | OXYGEN SATURATION: 99 % | DIASTOLIC BLOOD PRESSURE: 68 MMHG

## 2022-09-23 DIAGNOSIS — N18.32 STAGE 3B CHRONIC KIDNEY DISEASE (HCC): Primary | ICD-10-CM

## 2022-09-23 DIAGNOSIS — K21.9 GASTROESOPHAGEAL REFLUX DISEASE WITHOUT ESOPHAGITIS: Chronic | ICD-10-CM

## 2022-09-23 DIAGNOSIS — M15.0 PRIMARY GENERALIZED (OSTEO)ARTHRITIS: ICD-10-CM

## 2022-09-23 DIAGNOSIS — E78.5 DYSLIPIDEMIA: ICD-10-CM

## 2022-09-23 DIAGNOSIS — I10 ESSENTIAL HYPERTENSION: ICD-10-CM

## 2022-09-23 PROBLEM — J04.0 ACUTE LARYNGITIS: Status: RESOLVED | Noted: 2022-05-12 | Resolved: 2022-09-23

## 2022-09-23 PROBLEM — K63.1 DUODENAL PERFORATION (HCC): Chronic | Status: RESOLVED | Noted: 2020-10-07 | Resolved: 2022-09-23

## 2022-09-23 PROCEDURE — 99213 OFFICE O/P EST LOW 20 MIN: CPT | Performed by: INTERNAL MEDICINE

## 2022-09-23 NOTE — ASSESSMENT & PLAN NOTE
Receives periodic steroid injections through Orthopedics  Recommended against the use of ibuprofen because of her renal issues    Recommended the patient use Tylenol as needed for arthritic discomfort and pain along with local heat and or ice depending upon the situation

## 2022-09-23 NOTE — ASSESSMENT & PLAN NOTE
Lab Results   Component Value Date    EGFR 44 07/18/2022    EGFR 42 04/11/2022    EGFR 41 11/13/2021    CREATININE 1 17 07/18/2022    CREATININE 1 21 04/11/2022    CREATININE 1 24 11/13/2021   Renal function appears relatively stable  Again caution the patient concerning the use of ibuprofen and the negative effects on the kidneys

## 2022-09-23 NOTE — ASSESSMENT & PLAN NOTE
Blood pressure is nicely controlled at 126/68    Her only medication is metoprolol succinate 25 mg twice daily

## 2022-09-23 NOTE — ASSESSMENT & PLAN NOTE
Continues on atorvastatin 10 mg  No complaints of muscle aches or myalgias    Follow-up labs to be done next month

## 2022-09-23 NOTE — PROGRESS NOTES
Name: Judi Kim      : 1940      MRN: [id number]  Encounter Provider: Reji Ruiz MD  Encounter Date: 2022   Encounter department: [address] [place] [address]     1  Stage 3b chronic kidney disease [facility])  Assessment & Plan:  Lab Results   Component Value Date    EGFR 44 2022    EGFR 42 2022    EGFR 41 2021    CREATININE 1 17 2022    CREATININE 1 21 2022    CREATININE 1 24 2021   Renal function appears relatively stable  Again caution the patient concerning the use of ibuprofen and the negative effects on the kidneys  2  Gastroesophageal reflux disease without esophagitis  Assessment & Plan:  Clinically stable  She is only using pantoprazole on an as-needed basis      3  Primary generalized (osteo)arthritis  Assessment & Plan:  Receives periodic steroid injections through Orthopedics  Recommended against the use of ibuprofen because of her renal issues  Recommended the patient use Tylenol as needed for arthritic discomfort and pain along with local heat and or ice depending upon the situation      4  Dyslipidemia  Assessment & Plan:  Continues on atorvastatin 10 mg  No complaints of muscle aches or myalgias  Follow-up labs to be done next month      5  Essential hypertension  Assessment & Plan:  Blood pressure is nicely controlled at 126/68  Her only medication is metoprolol succinate 25 mg twice daily           Subjective      Patient presents to the office for follow-up visit  In general she is doing well other than some chronic issues with arthritis  She sees Orthopedics on a fairly regular basis  She just recently received bilateral hip injections for trochanteric bursitis and she is feeling much better  She relates to having an episode of cold sores during the summer while visiting the beach which resolved with the use of Denavir  She has not had any labs drawn recently    She has a battery of tests scheduled to be drawn in mid October  She has not been using ibuprofen for her arthritis and was right again recommended that she not use this medication because of the nature of her underlying kidney disease  She denies any issues with muscle aches or myalgias with the use of atorvastatin  Appetite is good  Weight has been stable  No bowel or bladder irregularities  She denies any chest pain, indigestion, dyspnea, orthopnea, PND, peripheral edema, palpitations, lightheadedness etc   Other than her arthritic issues she feels well  She is still working and feels that as long as she is able to she would like to continue to do so  She is currently being treated with Prolia for osteoporosis she is due for follow-up DEXA scan next month as well    Review of Systems   Constitutional: Negative  Negative for activity change, appetite change, chills, diaphoresis, fatigue, fever and unexpected weight change  HENT: Negative  Eyes: Negative  Respiratory: Negative  Cardiovascular: Negative  Gastrointestinal: Negative  Endocrine: Negative  Genitourinary: Negative  Musculoskeletal: Positive for arthralgias (Various joints)  Skin: Negative  Neurological: Negative  Hematological: Negative  Psychiatric/Behavioral: The patient is not nervous/anxious          Current Outpatient Medications on File Prior to Visit   Medication Sig    atorvastatin (LIPITOR) 10 mg tablet Take 1 tablet (10 mg total) by mouth daily    metoprolol succinate (TOPROL-XL) 25 mg 24 hr tablet Take 1 tablet (25 mg total) by mouth 2 (two) times a day    pantoprazole (PROTONIX) 40 mg tablet Take 1 tablet (40 mg total) by mouth 2 (two) times a day as needed (Dyspepsia) (Patient taking differently: Take 40 mg by mouth if needed (Dyspepsia))    Premarin vaginal cream 3 (three) times a week    [DISCONTINUED] ibuprofen (MOTRIN) 800 mg tablet Take 800 mg by mouth daily as needed        Objective     /68 (BP Location: Left arm, Patient Position: Sitting, Cuff Size: Standard)   Pulse 79   Temp (!) 96 °F (35 6 °C) (Tympanic)   Ht 5' 2" (1 575 m)   Wt 62 5 kg (137 lb 12 8 oz)   SpO2 99%   BMI 25 20 kg/m²     Physical Exam  Vitals reviewed  Constitutional:       General: She is not in acute distress  Appearance: Normal appearance  She is normal weight  She is not ill-appearing, toxic-appearing or diaphoretic  HENT:      Head: Normocephalic and atraumatic  Right Ear: External ear normal       Left Ear: External ear normal       Nose: Nose normal    Eyes:      Conjunctiva/sclera: Conjunctivae normal       Pupils: Pupils are equal, round, and reactive to light  Cardiovascular:      Rate and Rhythm: Normal rate and regular rhythm  Pulses: Normal pulses  Heart sounds: Normal heart sounds  No murmur heard  Pulmonary:      Effort: Pulmonary effort is normal  No respiratory distress  Breath sounds: Normal breath sounds  No wheezing or rales  Abdominal:      General: Abdomen is flat  There is no distension  Musculoskeletal:         General: No swelling  Cervical back: Neck supple  No rigidity  Right lower leg: No edema  Left lower leg: No edema  Skin:     General: Skin is warm  Capillary Refill: Capillary refill takes less than 2 seconds  Coloration: Skin is not jaundiced  Findings: No bruising, erythema or rash  Neurological:      General: No focal deficit present  Mental Status: She is alert and oriented to person, place, and time  Mental status is at baseline     Psychiatric:         Mood and Affect: Mood normal          Behavior: Behavior normal        Geneva Fox MD

## 2022-10-27 ENCOUNTER — OFFICE VISIT (OUTPATIENT)
Dept: PODIATRY | Facility: CLINIC | Age: 82
End: 2022-10-27
Payer: COMMERCIAL

## 2022-10-27 VITALS
HEART RATE: 85 BPM | SYSTOLIC BLOOD PRESSURE: 118 MMHG | HEIGHT: 62 IN | BODY MASS INDEX: 25.2 KG/M2 | DIASTOLIC BLOOD PRESSURE: 71 MMHG

## 2022-10-27 DIAGNOSIS — L84 CORNS: Primary | ICD-10-CM

## 2022-10-27 DIAGNOSIS — I73.9 PERIPHERAL VASCULAR DISEASE, UNSPECIFIED (HCC): ICD-10-CM

## 2022-10-27 PROCEDURE — 11055 PARING/CUTG B9 HYPRKER LES 1: CPT | Performed by: PODIATRIST

## 2022-10-27 PROCEDURE — 11719 TRIM NAIL(S) ANY NUMBER: CPT | Performed by: PODIATRIST

## 2022-10-27 RX ORDER — IBUPROFEN 800 MG/1
TABLET ORAL
COMMUNITY
Start: 2022-10-12

## 2022-10-27 NOTE — PROGRESS NOTES
Established patient with class findings presents for nail and lesion care  Patient has painful soft corn medial aspect left 2nd toe 2 to hallux valgus deformity  Vascular exam:  DP  0/4 bilateral; PT  0 4 bilateral   Dermatological exam:  Each toenail is thick and  dystrophic  Soft corn left 2nd toe  Diagnosis:  PVD; hyperkeratotic lesion left 2nd toe  Treatment: Trimmed multiple dystrophic toenails  Trimmed hyperkeratotic lesion 2nd toe and dispensed Silipos pads  Due to poor circulation, patient is not a good surgical candidate for hallux valgus correction      Nail removal    Date/Time: 10/27/2022 12:24 PM  Performed by: Wade Dunne DPM  Authorized by: Wade Dunne DPM     Nails trimmed:     Number of nails trimmed:  10  Lesion Destruction    Date/Time: 10/27/2022 12:24 PM  Performed by: Wade Dunne DPM  Authorized by: Wade Dunne DPM     Procedure Details - Lesion Destruction:     Number of Lesions:  1  Lesion 1:     Body area:  Lower extremity    Lower extremity location:  L second toe    Malignancy: benign hyperkeratotic lesion      Destruction method: scissors used for extraction

## 2022-10-31 NOTE — PROGRESS NOTES
RENAL FOLLOW UP NOTE: td    • ASSESSMENT AND PLAN:  1   CKD stage 3 :  · Etiology:  AK I from Bactrim/hypercalcemia along with poor oral intake   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/arteriolar nephrosclerosis  · Baseline creatinine:  1 1 -1 79  · Current creatinine:  1 13 at baseline  · Urine protein creatinine ratio:  0 24 g at goal   Recommendations:  · Treat hypertension-please see below  · Treat dyslipidemia-please see below  · Maintain proteinuria less than 1 g or as low as possible  · Avoid nephrotoxic agents such as NSAIDs, patient counseled as such  2   Volume:  Euvolemic  3   Hypertension:  Secondary workup:  Was negative  · Home readings:   NO FORMAL READINGS     · Goal blood pressure:  Less than 130/80 given CKD  Recommendations:  ·  Push nonmedical regimen especially weight loss/isotonic exercise and low-salt diet  · Medication changes today:   · Blood pressure excellent in the office however we need a formal week of readings  · Can always potentially add amlodipine as needed  4   Electrolytes:    · Mild metabolic acidosis:  Resolved  · Borderline hypokalemia:  Now normal at 3 7  5   Mineral bone disorder:  · Calcium/magnesium/phosphorus all normal  · PTH intact:    47 0 which is normal  · Vitamin-D:  41 6 from 10/30/2021 at goal  6   Dyslipidemia:  · Goal LDL:  Less than 100  · Current lipid profile:  LDL 77/HDL 99/triglycerides 71  Recommendations:  Patient is at goal   7   Anemia:   · Hemoglobin:   Stable at 11 3 at baseline  · Iron studies:  Were acceptable from prior  · Multiple myeloma evaluation  was negative as outlined above  · GI evaluation:  EGD with just esophagitis; colonoscopy with benign polyp  · Heme consultation:  Felt anemia which was normocytic secondary to chronic kidney disease   No further recommendations at this time    8   Other problems:  · GERD  · Osteoarthritis of right hip  · Colonic polyp  · Status post duodenal perforation 10/04/2020:  Apparently upper GI endoscopy demonstrated no leak   Endoscopy demonstrated no leak   She was NPO with antibiotics who was given a 2 week course of antibiotics upon discharge along with antacid medication  To be followed up as an outpatient  · Cardiac murmur which is chronic echocardiogram performed March 2021 showed good EF 65% and mild TR and mild aortic regurgitation     GI health maintenance:  Last colonoscopy July 2019    PATIENT INSTRUCTIONS:    Patient Instructions   1  Medication changes today:  • No medication changes today pending your home blood pressure readings as discussed        2  Please take 1 week a blood pressure readings  at this time     AS FOLLOWS  MORNING AND EVENING, SITTING  as follows:  · TAKE THE MORNING READINGS BEFORE ANY MEDICATIONS AND WHEN YOU ARE RELAXED FOR SEVERAL MINUTES  · TAKE THE EVENING READINGS:  BETWEEN 7-10 P M ; PRIOR TO ANY MEDICATIONS; AT LEAST IN OUR  FROM DINNER; AND CERTAINLY AFTER RELAXING FOR A FEW MINUTES  · PLEASE INCLUDE HEART RATE WITH YOUR BLOOD PRESSURE READINGS  · When taking standing readings, keep your arm supported at heart level and not dangling  · Make sure you are sitting with your back supported and feet on the ground and do not cross your legs or feet  · Make sure you have not taken any coffee or caffeine products or exercised or smoke cigarettes at least 30 minutes before taking your blood pressure  Then please mail these readings into the office    3  Follow-up in 6  months  • Please bring in 1 week a blood pressure readings morning evening, sitting and standing is outlined above    • Please go for fasting lab work 1-2 weeks prior to your appointment      4   General instructions:  • AVOID SALT BUT NOT ADDING AN READING LABELS TO MAKE SURE THERE IS LOW-SALT IN THE FOOD THAT YOU ARE EATING  o Goal is less than 2 g of sodium intake or less than 5 g of sodium chloride intake per day    • Avoid nonsteroidal anti-inflammatory drugs such as Naprosyn, ibuprofen, Aleve, Advil, Celebrex, Meloxicam (Mobic) etc   You can use Tylenol as needed if you do not have any liver condition to be concerned about    • Avoid medications such as Sudafed or decongestants and antihistamines that contained the D component which is the decongestant  You can take antihistamines without the decongestant or D component  • Try to avoid medications such as pantoprazole or  Protonix/Nexium or Esomeprazole)/Prilosec or omeprazole/Prevacid or lansoprazole/AcipHex or Rabeprazole  If you are able to, use Pepcid as this is safer for your kidneys  • Try to exercise at least 30 minutes 3 days a week to begin with with an ultimate goal of 5 days a week for at least 30 minutes      • Please do not drink more than 2 glasses of alcohol/wine on a daily basis as this may contribute to your high blood pressure  Subjective: There has been no hospitalizations or acute illnesses since last visit  The patient overall is feeling well  No fevers, chills, or cough or colds    Good appetite and good energy  No hematuria, dysuria, voiding symptoms or foamy urine  No gastrointestinal symptoms  No cardiovascular symptoms including swelling of the legs  No headaches, dizziness or lightheadedness  Blood pressure medications:  • Toprol XL 25 mg twice a day      ROS:  See HPI, otherwise review of systems as completely reviewed with the patient are negative    Past Medical History:   Diagnosis Date   • Acute laryngitis 5/12/2022   • Anemia    • Chronic bilateral low back pain without sciatica 11/26/2019   • Chronic kidney disease    • Duodenal perforation ([facility] [address] ) 10/7/2020   • Duodenal ulcer    • Enterococcus UTI 4/7/2016   • GERD (gastroesophageal reflux disease) 04/04/2016   • Greater trochanteric bursitis of right hip 9/18/2018   • 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   • Hypercalcemia    • Hypertension    • Left lumbar radiculopathy 4/7/2016   • Mixed hyperlipidemia    • Osteoarthritis of hip 04/04/2016   • Osteoporosis    • Sacral insufficiency fracture    • SI (sacroiliac) joint inflammation ([facility] [address] ) 04/04/2016   • Substance addiction ([facility] )    • Vitamin D deficiency 3/11/2019     Past Surgical History:   Procedure Laterality Date   • BREAST EXCISIONAL BIOPSY Left 2000    benign   • COLONOSCOPY  2019   • JOINT REPLACEMENT      right hip 8/15   • MAMMO (HISTORICAL)  2019   • TONSILLECTOMY       Family History   Problem Relation Age of Onset   • No Known Problems Mother    • No Known Problems Father    • Breast cancer Daughter 48   • Breast cancer Maternal Aunt 79   • No Known Problems Sister    • No Known Problems Maternal Grandmother    • No Known Problems Maternal Grandfather    • No Known Problems Paternal Grandmother    • No Known Problems Paternal Grandfather    • No Known Problems Son    • No Known Problems Sister    • No Known Problems Sister       reports that she has never smoked  She has never used smokeless tobacco  She reports previous drug use  Drug: Prescription  She reports that she does not drink alcohol  I COMPLETELY REVIEWED THE PAST MEDICAL HISTORY/PAST SURGICAL HISTORY/SOCIAL HISTORY/FAMILY HISTORY/AND MEDICATIONS  AND UPDATED ALL    Objective:     Vitals:   BP sitting on left:  130/70 with a heart rate of 88 and regular, same on right  BP standing on right:  128/80 with a heart rate of 96 and regular    Weight (last 2 days)     Date/Time Weight    11/10/22 0955 62 6 (138)        Wt Readings from Last 3 Encounters:   11/10/22 62 6 kg (138 lb)   09/23/22 62 5 kg (137 lb 12 8 oz)   09/22/22 62 1 kg (137 lb)       Body mass index is 25 24 kg/m²      Physical Exam: General:  No acute distress  Skin:  No acute rash  Eyes:  No scleral icterus, noninjected, no discharge from eyes  ENT:  Moist mucous membranes  Neck:  Supple, no jugular venous distention, trachea is midline, no lymphadenopathy and no thyromegaly  Back   No CVAT  Chest:  Clear to auscultation and percussion, good respiratory effort  CVS:  Regular rate and rhythm without a rub, or gallops; grade 1/6 systolic ejection type murmur heard best at the right upper sternal border  Abdomen:  Soft and nontender with normal bowel sounds  Extremities:  No cyanosis and no edema, moderate arthritic changes, normal range of motion  Neuro:  Grossly intact  Psych:  Alert, oriented x3 and appropriate      Medications:    Current Outpatient Medications:   •  atorvastatin (LIPITOR) 10 mg tablet, Take 1 tablet (10 mg total) by mouth daily, Disp: 90 tablet, Rfl: 1  •  Cholecalciferol (Vitamin D-3) 25 MCG (1000 UT) CAPS, Take by mouth in the morning, Disp: , Rfl:   •  ibuprofen (MOTRIN) 800 mg tablet, as needed, Disp: , Rfl:   •  metoprolol succinate (TOPROL-XL) 25 mg 24 hr tablet, Take 1 tablet (25 mg total) by mouth 2 (two) times a day, Disp: 180 tablet, Rfl: 3  •  pantoprazole (PROTONIX) 40 mg tablet, Take 1 tablet (40 mg total) by mouth 2 (two) times a day as needed (Dyspepsia) (Patient taking differently: Take 40 mg by mouth if needed (Dyspepsia)), Disp: 60 tablet, Rfl: 3  •  Premarin vaginal cream, 3 (three) times a week, Disp: , Rfl:     Lab, Imaging and other studies: I have personally reviewed pertinent labs    Laboratory Results:  Results for orders placed or performed in visit on 11/07/22   Immunoglobulin free LT chains blood   Result Value Ref Range    Ig Kappa Free Light Chain 27 1 (H) 3 3 - 19 4 mg/L    Ig Lambda Free Light Chain 15 0 5 7 - 26 3 mg/L    Kappa/Lambda FluidC Ratio 1 81 (H) 0 26 - 1 65   Comprehensive metabolic panel   Result Value Ref Range    Sodium 140 135 - 147 mmol/L    Potassium 3 7 3 5 - 5 3 mmol/L    Chloride 111 (H) 96 - 108 mmol/L    CO2 25 21 - 32 mmol/L    ANION GAP 4 4 - 13 mmol/L    BUN 14 5 - 25 mg/dL    Creatinine 1 13 0 60 - 1 30 mg/dL    Glucose, Fasting 99 65 - 99 mg/dL    Calcium 9 1 8 3 - 10 1 mg/dL    Corrected Calcium 9 6 8 3 - 10 1 mg/dL    AST 16 5 - 45 U/L    ALT 16 12 - 78 U/L Alkaline Phosphatase 57 46 - 116 U/L    Total Protein 7 1 6 4 - 8 4 g/dL    Albumin 3 4 (L) 3 5 - 5 0 g/dL    Total Bilirubin 0 54 0 20 - 1 00 mg/dL    eGFR 45 ml/min/1 73sq m   CBC   Result Value Ref Range    WBC 6 28 4 31 - 10 16 Thousand/uL    RBC 3 70 (L) 3 81 - 5 12 Million/uL    Hemoglobin 11 3 (L) 11 5 - 15 4 g/dL    Hematocrit 35 2 34 8 - 46 1 %    MCV 95 82 - 98 fL    MCH 30 5 26 8 - 34 3 pg    MCHC 32 1 31 4 - 37 4 g/dL    RDW 13 4 11 6 - 15 1 %    Platelets 363 348 - 870 Thousands/uL    MPV 10 3 8 9 - 12 7 fL   CK   Result Value Ref Range    Total CK 85 26 - 192 U/L   Lipid Panel with Direct LDL reflex   Result Value Ref Range    Cholesterol 190 See Comment mg/dL    Triglycerides 71 See Comment mg/dL    HDL, Direct 99 >=50 mg/dL    LDL Calculated 77 0 - 100 mg/dL   Magnesium   Result Value Ref Range    Magnesium 2 3 1 6 - 2 6 mg/dL   Protein / creatinine ratio, urine   Result Value Ref Range    Creatinine, Ur 113 0 mg/dL    Protein Urine Random 27 mg/dL    Prot/Creat Ratio, Ur 0 24 (H) 0 00 - 0 10   Phosphorus   Result Value Ref Range    Phosphorus 2 9 2 3 - 4 1 mg/dL   PTH, intact   Result Value Ref Range    PTH 47 0 18 4 - 80 1 pg/mL       Results from last 7 days   Lab Units 11/07/22  0846   WBC Thousand/uL 6 28   HEMOGLOBIN g/dL 11 3*   HEMATOCRIT % 35 2   PLATELETS Thousands/uL 241   POTASSIUM mmol/L 3 7   CHLORIDE mmol/L 111*   CO2 mmol/L 25   BUN mg/dL 14   CREATININE mg/dL 1 13   CALCIUM mg/dL 9 1   MAGNESIUM mg/dL 2 3   PHOSPHORUS mg/dL 2 9         Radiology review:   chest X-ray    Ultrasound      Portions of the record may have been created with voice recognition software  Occasional wrong word or "sound a like" substitutions may have occurred due to the inherent limitations of voice recognition software  Read the chart carefully and recognize, using context, where substitutions have occurred

## 2022-11-04 ENCOUNTER — TELEPHONE (OUTPATIENT)
Dept: NEPHROLOGY | Facility: CLINIC | Age: 82
End: 2022-11-04

## 2022-11-04 NOTE — TELEPHONE ENCOUNTER
Spoke with patient's , Joe Lezamadeven, reminding them to go for lab work and to bring her BP readings and machine to her appt on 11/10  He expressed understanding and will relay the message

## 2022-11-07 ENCOUNTER — APPOINTMENT (OUTPATIENT)
Dept: LAB | Facility: CLINIC | Age: 82
End: 2022-11-07

## 2022-11-07 DIAGNOSIS — I12.9 PARENCHYMAL RENAL HYPERTENSION, STAGE 1 THROUGH STAGE 4 OR UNSPECIFIED CHRONIC KIDNEY DISEASE: ICD-10-CM

## 2022-11-07 DIAGNOSIS — E55.9 VITAMIN D DEFICIENCY: ICD-10-CM

## 2022-11-07 DIAGNOSIS — N18.31 STAGE 3A CHRONIC KIDNEY DISEASE (HCC): ICD-10-CM

## 2022-11-07 DIAGNOSIS — E78.5 DYSLIPIDEMIA: ICD-10-CM

## 2022-11-07 DIAGNOSIS — E61.1 IRON DEFICIENCY: ICD-10-CM

## 2022-11-07 DIAGNOSIS — E87.6 HYPOKALEMIA: ICD-10-CM

## 2022-11-07 LAB
ALBUMIN SERPL BCP-MCNC: 3.4 G/DL (ref 3.5–5)
ALP SERPL-CCNC: 57 U/L (ref 46–116)
ALT SERPL W P-5'-P-CCNC: 16 U/L (ref 12–78)
ANION GAP SERPL CALCULATED.3IONS-SCNC: 4 MMOL/L (ref 4–13)
AST SERPL W P-5'-P-CCNC: 16 U/L (ref 5–45)
BILIRUB SERPL-MCNC: 0.54 MG/DL (ref 0.2–1)
BUN SERPL-MCNC: 14 MG/DL (ref 5–25)
CALCIUM ALBUM COR SERPL-MCNC: 9.6 MG/DL (ref 8.3–10.1)
CALCIUM SERPL-MCNC: 9.1 MG/DL (ref 8.3–10.1)
CHLORIDE SERPL-SCNC: 111 MMOL/L (ref 96–108)
CHOLEST SERPL-MCNC: 190 MG/DL
CK SERPL-CCNC: 85 U/L (ref 26–192)
CO2 SERPL-SCNC: 25 MMOL/L (ref 21–32)
CREAT SERPL-MCNC: 1.13 MG/DL (ref 0.6–1.3)
CREAT UR-MCNC: 113 MG/DL
ERYTHROCYTE [DISTWIDTH] IN BLOOD BY AUTOMATED COUNT: 13.4 % (ref 11.6–15.1)
GFR SERPL CREATININE-BSD FRML MDRD: 45 ML/MIN/1.73SQ M
GLUCOSE P FAST SERPL-MCNC: 99 MG/DL (ref 65–99)
HCT VFR BLD AUTO: 35.2 % (ref 34.8–46.1)
HDLC SERPL-MCNC: 99 MG/DL
HGB BLD-MCNC: 11.3 G/DL (ref 11.5–15.4)
LDLC SERPL CALC-MCNC: 77 MG/DL (ref 0–100)
MAGNESIUM SERPL-MCNC: 2.3 MG/DL (ref 1.6–2.6)
MCH RBC QN AUTO: 30.5 PG (ref 26.8–34.3)
MCHC RBC AUTO-ENTMCNC: 32.1 G/DL (ref 31.4–37.4)
MCV RBC AUTO: 95 FL (ref 82–98)
PHOSPHATE SERPL-MCNC: 2.9 MG/DL (ref 2.3–4.1)
PLATELET # BLD AUTO: 241 THOUSANDS/UL (ref 149–390)
PMV BLD AUTO: 10.3 FL (ref 8.9–12.7)
POTASSIUM SERPL-SCNC: 3.7 MMOL/L (ref 3.5–5.3)
PROT SERPL-MCNC: 7.1 G/DL (ref 6.4–8.4)
PROT UR-MCNC: 27 MG/DL
PROT/CREAT UR: 0.24 MG/G{CREAT} (ref 0–0.1)
PTH-INTACT SERPL-MCNC: 47 PG/ML (ref 18.4–80.1)
RBC # BLD AUTO: 3.7 MILLION/UL (ref 3.81–5.12)
SODIUM SERPL-SCNC: 140 MMOL/L (ref 135–147)
TRIGL SERPL-MCNC: 71 MG/DL
WBC # BLD AUTO: 6.28 THOUSAND/UL (ref 4.31–10.16)

## 2022-11-08 LAB
KAPPA LC FREE SER-MCNC: 27.1 MG/L (ref 3.3–19.4)
KAPPA LC FREE/LAMBDA FREE SER: 1.81 {RATIO} (ref 0.26–1.65)
LAMBDA LC FREE SERPL-MCNC: 15 MG/L (ref 5.7–26.3)

## 2022-11-09 ENCOUNTER — TELEPHONE (OUTPATIENT)
Dept: NEPHROLOGY | Facility: CLINIC | Age: 82
End: 2022-11-09

## 2022-11-09 LAB
ALBUMIN UR ELPH-MCNC: 100 %
ALPHA1 GLOB MFR UR ELPH: 0 %
ALPHA2 GLOB MFR UR ELPH: 0 %
B-GLOBULIN MFR UR ELPH: 0 %
GAMMA GLOB MFR UR ELPH: 0 %
PROT PATTERN UR ELPH-IMP: ABNORMAL
PROT UR-MCNC: 27 MG/DL

## 2022-11-09 NOTE — TELEPHONE ENCOUNTER
Appointment Confirmation   Person confirmed appointment with  If not patient, name of the person Patient    Date and time of appointment 11/10  10a   Patient acknowledged and will be at appointment? yes    Did you advise the patient that they will need a urine sample if they are a new patient?  N/A    Did you advise the patient to bring their current medications for verification? (including any OTC) Yes    Additional Information

## 2022-11-10 ENCOUNTER — OFFICE VISIT (OUTPATIENT)
Dept: NEPHROLOGY | Facility: CLINIC | Age: 82
End: 2022-11-10

## 2022-11-10 VITALS — WEIGHT: 138 LBS | BODY MASS INDEX: 25.4 KG/M2 | HEIGHT: 62 IN

## 2022-11-10 DIAGNOSIS — E78.5 DYSLIPIDEMIA: ICD-10-CM

## 2022-11-10 DIAGNOSIS — E87.6 HYPOKALEMIA: ICD-10-CM

## 2022-11-10 DIAGNOSIS — E61.1 IRON DEFICIENCY: ICD-10-CM

## 2022-11-10 DIAGNOSIS — I12.9 PARENCHYMAL RENAL HYPERTENSION, STAGE 1 THROUGH STAGE 4 OR UNSPECIFIED CHRONIC KIDNEY DISEASE: Primary | ICD-10-CM

## 2022-11-10 DIAGNOSIS — N18.31 STAGE 3A CHRONIC KIDNEY DISEASE (HCC): ICD-10-CM

## 2022-11-10 DIAGNOSIS — E55.9 VITAMIN D DEFICIENCY: ICD-10-CM

## 2022-11-10 LAB
ALBUMIN SERPL ELPH-MCNC: 4.35 G/DL (ref 3.5–5)
ALBUMIN SERPL ELPH-MCNC: 64.9 % (ref 52–65)
ALPHA1 GLOB SERPL ELPH-MCNC: 0.29 G/DL (ref 0.1–0.4)
ALPHA1 GLOB SERPL ELPH-MCNC: 4.4 % (ref 2.5–5)
ALPHA2 GLOB SERPL ELPH-MCNC: 0.76 G/DL (ref 0.4–1.2)
ALPHA2 GLOB SERPL ELPH-MCNC: 11.4 % (ref 7–13)
BETA GLOB ABNORMAL SERPL ELPH-MCNC: 0.34 G/DL (ref 0.4–0.8)
BETA1 GLOB SERPL ELPH-MCNC: 5 % (ref 5–13)
BETA2 GLOB SERPL ELPH-MCNC: 3.9 % (ref 2–8)
BETA2+GAMMA GLOB SERPL ELPH-MCNC: 0.26 G/DL (ref 0.2–0.5)
GAMMA GLOB ABNORMAL SERPL ELPH-MCNC: 0.7 G/DL (ref 0.5–1.6)
GAMMA GLOB SERPL ELPH-MCNC: 10.4 % (ref 12–22)
IGG/ALB SER: 1.85 {RATIO} (ref 1.1–1.8)
PROT PATTERN SERPL ELPH-IMP: ABNORMAL
PROT SERPL-MCNC: 6.7 G/DL (ref 6.4–8.2)

## 2022-11-10 RX ORDER — CHOLECALCIFEROL (VITAMIN D3) 25 MCG
CAPSULE ORAL DAILY
COMMUNITY

## 2022-11-10 NOTE — PATIENT INSTRUCTIONS
1  Medication changes today:  No medication changes today pending your home blood pressure readings as discussed        2  Please take 1 week a blood pressure readings  at this time     AS FOLLOWS  MORNING AND EVENING, SITTING  as follows:  TAKE THE MORNING READINGS BEFORE ANY MEDICATIONS AND WHEN YOU ARE RELAXED FOR SEVERAL MINUTES  TAKE THE EVENING READINGS:  BETWEEN 7-10 P M ; PRIOR TO ANY MEDICATIONS; AT LEAST IN OUR  FROM DINNER; AND CERTAINLY AFTER RELAXING FOR A FEW MINUTES  PLEASE INCLUDE HEART RATE WITH YOUR BLOOD PRESSURE READINGS  When taking standing readings, keep your arm supported at heart level and not dangling  Make sure you are sitting with your back supported and feet on the ground and do not cross your legs or feet  Make sure you have not taken any coffee or caffeine products or exercised or smoke cigarettes at least 30 minutes before taking your blood pressure  Then please mail these readings into the office    3  Follow-up in 6  months  Please bring in 1 week a blood pressure readings morning evening, sitting and standing is outlined above    Please go for fasting lab work 1-2 weeks prior to your appointment      4  General instructions:  AVOID SALT BUT NOT ADDING AN READING LABELS TO MAKE SURE THERE IS LOW-SALT IN THE FOOD THAT YOU ARE EATING  Goal is less than 2 g of sodium intake or less than 5 g of sodium chloride intake per day    Avoid nonsteroidal anti-inflammatory drugs such as Naprosyn, ibuprofen, Aleve, Advil, Celebrex, Meloxicam (Mobic) etc   You can use Tylenol as needed if you do not have any liver condition to be concerned about    Avoid medications such as Sudafed or decongestants and antihistamines that contained the D component which is the decongestant  You can take antihistamines without the decongestant or D component      Try to avoid medications such as pantoprazole or  Protonix/Nexium or Esomeprazole)/Prilosec or omeprazole/Prevacid or lansoprazole/AcipHex or Rabeprazole  If you are able to, use Pepcid as this is safer for your kidneys      Try to exercise at least 30 minutes 3 days a week to begin with with an ultimate goal of 5 days a week for at least 30 minutes

## 2022-11-10 NOTE — LETTER
November 10, 2022     Rashawn Aguayo, [address] [address]    Patient: Patrick Hernandez   YOB: 1940   Date of Visit: 11/10/2022       Dear Dr Leopoldo Bodo: Thank you for referring Patrick Hernandez to me for evaluation  Below are my notes for this consultation  If you have questions, please do not hesitate to call me  I look forward to following your patient along with you  Sincerely,        Lily Holloway MD        CC: No Recipients  Lily Holloway MD  11/10/2022 10:34 AM  Sign when Signing Visit  RENAL FOLLOW UP NOTE: td    • ASSESSMENT AND PLAN:  1   CKD stage 3 :  · Etiology:  AK I from Bactrim/hypercalcemia along with poor oral intake   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/arteriolar nephrosclerosis  · Baseline creatinine:  1 1 -1 79  · Current creatinine:  1 13 at baseline  · Urine protein creatinine ratio:  0 24 g at goal   Recommendations:  · Treat hypertension-please see below  · Treat dyslipidemia-please see below  · Maintain proteinuria less than 1 g or as low as possible  · Avoid nephrotoxic agents such as NSAIDs, patient counseled as such  2   Volume:  Euvolemic  3   Hypertension:  Secondary workup:  Was negative  · Home readings:   NO FORMAL READINGS     · Goal blood pressure:  Less than 130/80 given CKD  Recommendations:  ·  Push nonmedical regimen especially weight loss/isotonic exercise and low-salt diet  · Medication changes today:   · Blood pressure excellent in the office however we need a formal week of readings    · Can always potentially add amlodipine as needed  4   Electrolytes:    · Mild metabolic acidosis:  Resolved  · Borderline hypokalemia:  Now normal at 3 7  5   Mineral bone disorder:  · Calcium/magnesium/phosphorus all normal  · PTH intact:    47 0 which is normal  · Vitamin-D:  41 6 from 10/30/2021 at goal  6   Dyslipidemia:  · Goal LDL:  Less than 100  · Current lipid profile:  LDL 77/HDL 99/triglycerides 71  Recommendations:  Patient is at goal   7   Anemia:   · Hemoglobin:   Stable at 11 3 at baseline  · Iron studies:  Were acceptable from prior  · Multiple myeloma evaluation  was negative as outlined above  · GI evaluation:  EGD with just esophagitis; colonoscopy with benign polyp  · Heme consultation:  Felt anemia which was normocytic secondary to chronic kidney disease   No further recommendations at this time  8   Other problems:  · GERD  · Osteoarthritis of right hip  · Colonic polyp  · Status post duodenal perforation 10/04/2020:  Apparently upper GI endoscopy demonstrated no leak   Endoscopy demonstrated no leak   She was NPO with antibiotics who was given a 2 week course of antibiotics upon discharge along with antacid medication  To be followed up as an outpatient  · Cardiac murmur which is chronic echocardiogram performed March 2021 showed good EF 65% and mild TR and mild aortic regurgitation     GI health maintenance:  Last colonoscopy July 2019    PATIENT INSTRUCTIONS:    Patient Instructions   1  Medication changes today:  • No medication changes today pending your home blood pressure readings as discussed        2   Please take 1 week a blood pressure readings  at this time     AS FOLLOWS  MORNING AND EVENING, SITTING  as follows:  · TAKE THE MORNING READINGS BEFORE ANY MEDICATIONS AND WHEN YOU ARE RELAXED FOR SEVERAL MINUTES  · TAKE THE EVENING READINGS:  BETWEEN 7-10 P M ; PRIOR TO ANY MEDICATIONS; AT LEAST IN OUR  FROM DINNER; AND CERTAINLY AFTER RELAXING FOR A FEW MINUTES  · PLEASE INCLUDE HEART RATE WITH YOUR BLOOD PRESSURE READINGS  · When taking standing readings, keep your arm supported at heart level and not dangling  · Make sure you are sitting with your back supported and feet on the ground and do not cross your legs or feet  · Make sure you have not taken any coffee or caffeine products or exercised or smoke cigarettes at least 30 minutes before taking your blood pressure  Then please mail these readings into the office    3  Follow-up in 6  months  • Please bring in 1 week a blood pressure readings morning evening, sitting and standing is outlined above    • Please go for fasting lab work 1-2 weeks prior to your appointment      4  General instructions:  • AVOID SALT BUT NOT ADDING AN READING LABELS TO MAKE SURE THERE IS LOW-SALT IN THE FOOD THAT YOU ARE EATING  o Goal is less than 2 g of sodium intake or less than 5 g of sodium chloride intake per day    • Avoid nonsteroidal anti-inflammatory drugs such as Naprosyn, ibuprofen, Aleve, Advil, Celebrex, Meloxicam (Mobic) etc   You can use Tylenol as needed if you do not have any liver condition to be concerned about    • Avoid medications such as Sudafed or decongestants and antihistamines that contained the D component which is the decongestant  You can take antihistamines without the decongestant or D component  • Try to avoid medications such as pantoprazole or  Protonix/Nexium or Esomeprazole)/Prilosec or omeprazole/Prevacid or lansoprazole/AcipHex or Rabeprazole  If you are able to, use Pepcid as this is safer for your kidneys  • Try to exercise at least 30 minutes 3 days a week to begin with with an ultimate goal of 5 days a week for at least 30 minutes      • Please do not drink more than 2 glasses of alcohol/wine on a daily basis as this may contribute to your high blood pressure  Subjective: There has been no hospitalizations or acute illnesses since last visit  The patient overall is feeling well  No fevers, chills, or cough or colds    Good appetite and good energy  No hematuria, dysuria, voiding symptoms or foamy urine  No gastrointestinal symptoms  No cardiovascular symptoms including swelling of the legs  No headaches, dizziness or lightheadedness  Blood pressure medications:  • Toprol XL 25 mg twice a day      ROS:  See HPI, otherwise review of systems as completely reviewed with the patient are negative    Past Medical History:   Diagnosis Date   • Acute laryngitis 5/12/2022   • Anemia    • Chronic bilateral low back pain without sciatica 11/26/2019   • Chronic kidney disease    • Duodenal perforation ([facility] [address] ) 10/7/2020   • Duodenal ulcer    • Enterococcus UTI 4/7/2016   • GERD (gastroesophageal reflux disease) 04/04/2016   • Greater trochanteric bursitis of right hip 9/18/2018   • 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   • Hypercalcemia    • Hypertension    • Left lumbar radiculopathy 4/7/2016   • Mixed hyperlipidemia    • Osteoarthritis of hip 04/04/2016   • Osteoporosis    • Sacral insufficiency fracture    • SI (sacroiliac) joint inflammation ([facility] [address] ) 04/04/2016   • Substance addiction ([facility] )    • Vitamin D deficiency 3/11/2019     Past Surgical History:   Procedure Laterality Date   • BREAST EXCISIONAL BIOPSY Left 2000    benign   • COLONOSCOPY  2019   • JOINT REPLACEMENT      right hip 8/15   • MAMMO (HISTORICAL)  2019   • TONSILLECTOMY       Family History   Problem Relation Age of Onset   • No Known Problems Mother    • No Known Problems Father    • Breast cancer Daughter 48   • Breast cancer Maternal Aunt 79   • No Known Problems Sister    • No Known Problems Maternal Grandmother    • No Known Problems Maternal Grandfather    • No Known Problems Paternal Grandmother    • No Known Problems Paternal Grandfather    • No Known Problems Son    • No Known Problems Sister    • No Known Problems Sister       reports that she has never smoked  She has never used smokeless tobacco  She reports previous drug use  Drug: Prescription  She reports that she does not drink alcohol      I COMPLETELY REVIEWED THE PAST MEDICAL HISTORY/PAST SURGICAL HISTORY/SOCIAL HISTORY/FAMILY HISTORY/AND MEDICATIONS  AND UPDATED ALL    Objective:     Vitals:   BP sitting on left:  130/70 with a heart rate of 88 and regular, same on right  BP standing on right:  128/80 with a heart rate of 96 and regular    Weight (last 2 days)     Date/Time Weight    11/10/22 0955 62 6 (138)        Wt Readings from Last 3 Encounters:   11/10/22 62 6 kg (138 lb)   09/23/22 62 5 kg (137 lb 12 8 oz)   09/22/22 62 1 kg (137 lb)       Body mass index is 25 24 kg/m²  Physical Exam: General:  No acute distress  Skin:  No acute rash  Eyes:  No scleral icterus, noninjected, no discharge from eyes  ENT:  Moist mucous membranes  Neck:  Supple, no jugular venous distention, trachea is midline, no lymphadenopathy and no thyromegaly  Back   No CVAT  Chest:  Clear to auscultation and percussion, good respiratory effort  CVS:  Regular rate and rhythm without a rub, or gallops; grade 1/6 systolic ejection type murmur heard best at the right upper sternal border  Abdomen:  Soft and nontender with normal bowel sounds  Extremities:  No cyanosis and no edema, moderate arthritic changes, normal range of motion  Neuro:  Grossly intact  Psych:  Alert, oriented x3 and appropriate      Medications:    Current Outpatient Medications:   •  atorvastatin (LIPITOR) 10 mg tablet, Take 1 tablet (10 mg total) by mouth daily, Disp: 90 tablet, Rfl: 1  •  Cholecalciferol (Vitamin D-3) 25 MCG (1000 UT) CAPS, Take by mouth in the morning, Disp: , Rfl:   •  ibuprofen (MOTRIN) 800 mg tablet, as needed, Disp: , Rfl:   •  metoprolol succinate (TOPROL-XL) 25 mg 24 hr tablet, Take 1 tablet (25 mg total) by mouth 2 (two) times a day, Disp: 180 tablet, Rfl: 3  •  pantoprazole (PROTONIX) 40 mg tablet, Take 1 tablet (40 mg total) by mouth 2 (two) times a day as needed (Dyspepsia) (Patient taking differently: Take 40 mg by mouth if needed (Dyspepsia)), Disp: 60 tablet, Rfl: 3  •  Premarin vaginal cream, 3 (three) times a week, Disp: , Rfl:     Lab, Imaging and other studies: I have personally reviewed pertinent labs    Laboratory Results:  Results for orders placed or performed in visit on 11/07/22   Immunoglobulin free LT chains blood   Result Value Ref Range    Ig Kappa Free Light Chain 27 1 (H) 3 3 - 19 4 mg/L    Ig Lambda Free Light Chain 15 0 5 7 - 26 3 mg/L    Kappa/Lambda FluidC Ratio 1 81 (H) 0 26 - 1 65   Comprehensive metabolic panel   Result Value Ref Range    Sodium 140 135 - 147 mmol/L    Potassium 3 7 3 5 - 5 3 mmol/L    Chloride 111 (H) 96 - 108 mmol/L    CO2 25 21 - 32 mmol/L    ANION GAP 4 4 - 13 mmol/L    BUN 14 5 - 25 mg/dL    Creatinine 1 13 0 60 - 1 30 mg/dL    Glucose, Fasting 99 65 - 99 mg/dL    Calcium 9 1 8 3 - 10 1 mg/dL    Corrected Calcium 9 6 8 3 - 10 1 mg/dL    AST 16 5 - 45 U/L    ALT 16 12 - 78 U/L    Alkaline Phosphatase 57 46 - 116 U/L    Total Protein 7 1 6 4 - 8 4 g/dL    Albumin 3 4 (L) 3 5 - 5 0 g/dL    Total Bilirubin 0 54 0 20 - 1 00 mg/dL    eGFR 45 ml/min/1 73sq m   CBC   Result Value Ref Range    WBC 6 28 4 31 - 10 16 Thousand/uL    RBC 3 70 (L) 3 81 - 5 12 Million/uL    Hemoglobin 11 3 (L) 11 5 - 15 4 g/dL    Hematocrit 35 2 34 8 - 46 1 %    MCV 95 82 - 98 fL    MCH 30 5 26 8 - 34 3 pg    MCHC 32 1 31 4 - 37 4 g/dL    RDW 13 4 11 6 - 15 1 %    Platelets 603 172 - 940 Thousands/uL    MPV 10 3 8 9 - 12 7 fL   CK   Result Value Ref Range    Total CK 85 26 - 192 U/L   Lipid Panel with Direct LDL reflex   Result Value Ref Range    Cholesterol 190 See Comment mg/dL    Triglycerides 71 See Comment mg/dL    HDL, Direct 99 >=50 mg/dL    LDL Calculated 77 0 - 100 mg/dL   Magnesium   Result Value Ref Range    Magnesium 2 3 1 6 - 2 6 mg/dL   Protein / creatinine ratio, urine   Result Value Ref Range    Creatinine, Ur 113 0 mg/dL    Protein Urine Random 27 mg/dL    Prot/Creat Ratio, Ur 0 24 (H) 0 00 - 0 10   Phosphorus   Result Value Ref Range    Phosphorus 2 9 2 3 - 4 1 mg/dL   PTH, intact   Result Value Ref Range    PTH 47 0 18 4 - 80 1 pg/mL       Results from last 7 days   Lab Units 11/07/22  0846   WBC Thousand/uL 6 28   HEMOGLOBIN g/dL 11 3*   HEMATOCRIT % 35 2   PLATELETS Thousands/uL 241   POTASSIUM mmol/L 3 7   CHLORIDE mmol/L 111*   CO2 mmol/L 25   BUN mg/dL 14   CREATININE mg/dL 1 13   CALCIUM mg/dL 9 1   MAGNESIUM mg/dL 2 3   PHOSPHORUS mg/dL 2 9         Radiology review:   chest X-ray    Ultrasound      Portions of the record may have been created with voice recognition software  Occasional wrong word or "sound a like" substitutions may have occurred due to the inherent limitations of voice recognition software  Read the chart carefully and recognize, using context, where substitutions have occurred

## 2022-11-12 LAB
25(OH)D2 SERPL-MCNC: <1 NG/ML
25(OH)D3 SERPL-MCNC: 47 NG/ML
25(OH)D3+25(OH)D2 SERPL-MCNC: 47 NG/ML

## 2022-11-29 DIAGNOSIS — K26.5 DUODENAL ULCER WITH PERFORATION (HCC): ICD-10-CM

## 2022-11-29 RX ORDER — PANTOPRAZOLE SODIUM 40 MG/1
40 TABLET, DELAYED RELEASE ORAL 2 TIMES DAILY PRN
Qty: 60 TABLET | Refills: 3 | Status: SHIPPED | OUTPATIENT
Start: 2022-11-29 | End: 2023-03-29

## 2022-12-06 ENCOUNTER — OFFICE VISIT (OUTPATIENT)
Dept: OBGYN CLINIC | Facility: HOSPITAL | Age: 82
End: 2022-12-06

## 2022-12-06 VITALS
HEART RATE: 98 BPM | DIASTOLIC BLOOD PRESSURE: 69 MMHG | BODY MASS INDEX: 25.4 KG/M2 | WEIGHT: 138 LBS | HEIGHT: 62 IN | SYSTOLIC BLOOD PRESSURE: 109 MMHG

## 2022-12-06 DIAGNOSIS — M70.62 GREATER TROCHANTERIC BURSITIS OF BOTH HIPS: Primary | ICD-10-CM

## 2022-12-06 DIAGNOSIS — M70.61 GREATER TROCHANTERIC BURSITIS OF BOTH HIPS: Primary | ICD-10-CM

## 2022-12-06 RX ORDER — BETAMETHASONE SODIUM PHOSPHATE AND BETAMETHASONE ACETATE 3; 3 MG/ML; MG/ML
6 INJECTION, SUSPENSION INTRA-ARTICULAR; INTRALESIONAL; INTRAMUSCULAR; SOFT TISSUE
Status: COMPLETED | OUTPATIENT
Start: 2022-12-06 | End: 2022-12-06

## 2022-12-06 RX ORDER — BUPIVACAINE HYDROCHLORIDE 2.5 MG/ML
2 INJECTION, SOLUTION INFILTRATION; PERINEURAL
Status: COMPLETED | OUTPATIENT
Start: 2022-12-06 | End: 2022-12-06

## 2022-12-06 RX ADMIN — BUPIVACAINE HYDROCHLORIDE 2 ML: 2.5 INJECTION, SOLUTION INFILTRATION; PERINEURAL at 09:51

## 2022-12-06 RX ADMIN — BETAMETHASONE SODIUM PHOSPHATE AND BETAMETHASONE ACETATE 6 MG: 3; 3 INJECTION, SUSPENSION INTRA-ARTICULAR; INTRALESIONAL; INTRAMUSCULAR; SOFT TISSUE at 09:51

## 2022-12-06 NOTE — PROGRESS NOTES
Assessment:   Diagnosis ICD-10-CM Associated Orders   1  Greater trochanteric bursitis of both hips  M70 61 Large joint arthrocentesis: bilateral greater trochanteric bursa    M70 62           Plan:    Patient has bilateral greater troch bursitis and hx of right total hip arthroplasty     · Patient received bilateral greater troch bursa steroid injections in the office today  · Activities as tolerated  · Continue taking IBU as needed for pain relief  To do next visit:  Return in about 3 months (around 3/6/2023) for Recheck Bilateral greater troch   The above stated was discussed in layman's terms and the patient expressed understanding  All questions were answered to the patient's satisfaction  Scribe Attestation    I,:  Shey Aguilar am acting as a scribe while in the presence of the attending physician :       I,:  Dajuan Alvarez MD personally performed the services described in this documentation    as scribed in my presence :             Subjective:   Laurie is a 81 y o  female who presents today follow for bilateral hip pain due to greater troch bursitis  She has hx of right total hip arthroplasty  She had bilateral greater troch bursa injection on 8/30/22 and had relief for 3 months  She is having pain over lateral aspect bilaterally , worse on the left  Pain is worse with weight bearing and laying on the side of the hips  She is taking IBU as needed for pain relief  Review of systems negative unless otherwise specified in HPI  Review of Systems   Constitutional: Negative for chills and fever  HENT: Negative for ear pain and sore throat  Eyes: Negative for pain and visual disturbance  Respiratory: Negative for cough and shortness of breath  Cardiovascular: Negative for chest pain and palpitations  Gastrointestinal: Negative for abdominal pain and vomiting  Genitourinary: Negative for dysuria and hematuria  Musculoskeletal: Positive for arthralgias  Skin: Negative for color change and rash  Neurological: Negative for seizures and syncope  All other systems reviewed and are negative        Past Medical History:   Diagnosis Date   • Acute laryngitis 5/12/2022   • Anemia    • Chronic bilateral low back pain without sciatica 11/26/2019   • Chronic kidney disease    • Duodenal perforation ([facility] [address] ) 10/7/2020   • Duodenal ulcer    • Enterococcus UTI 4/7/2016   • GERD (gastroesophageal reflux disease) 04/04/2016   • Greater trochanteric bursitis of right hip 9/18/2018   • 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   • Hypercalcemia    • Hypertension    • Left lumbar radiculopathy 4/7/2016   • Mixed hyperlipidemia    • Osteoarthritis of hip 04/04/2016   • Osteoporosis    • Sacral insufficiency fracture    • SI (sacroiliac) joint inflammation ([facility] [address] ) 04/04/2016   • Substance addiction ([facility] [address] )    • Vitamin D deficiency 3/11/2019       Past Surgical History:   Procedure Laterality Date   • BREAST EXCISIONAL BIOPSY Left 2000    benign   • COLONOSCOPY  2019   • JOINT REPLACEMENT      right hip 8/15   • MAMMO (HISTORICAL)  2019   • TONSILLECTOMY         Family History   Problem Relation Age of Onset   • No Known Problems Mother    • No Known Problems Father    • Breast cancer Daughter 48   • Breast cancer Maternal Aunt 79   • No Known Problems Sister    • No Known Problems Maternal Grandmother    • No Known Problems Maternal Grandfather    • No Known Problems Paternal Grandmother    • No Known Problems Paternal Grandfather    • No Known Problems Son    • No Known Problems Sister    • No Known Problems Sister        Social History     Occupational History   • Not on file   Tobacco Use   • Smoking status: Never   • Smokeless tobacco: Never   Vaping Use   • Vaping Use: Never used   Substance and Sexual Activity   • Alcohol use: Never   • Drug use: Not Currently     Types: Prescription     Comment: rocovering last used 1996   • Sexual activity: Yes     Partners: Male Birth control/protection: Post-menopausal         Current Outpatient Medications:   •  atorvastatin (LIPITOR) 10 mg tablet, Take 1 tablet (10 mg total) by mouth daily, Disp: 90 tablet, Rfl: 1  •  Cholecalciferol (Vitamin D-3) 25 MCG (1000 UT) CAPS, Take by mouth in the morning, Disp: , Rfl:   •  ibuprofen (MOTRIN) 800 mg tablet, as needed, Disp: , Rfl:   •  metoprolol succinate (TOPROL-XL) 25 mg 24 hr tablet, Take 1 tablet (25 mg total) by mouth 2 (two) times a day, Disp: 180 tablet, Rfl: 3  •  pantoprazole (PROTONIX) 40 mg tablet, Take 1 tablet (40 mg total) by mouth 2 (two) times a day as needed (Dyspepsia), Disp: 60 tablet, Rfl: 3  •  Premarin vaginal cream, 3 (three) times a week, Disp: , Rfl:     Current Facility-Administered Medications:   •  denosumab (PROLIA) subcutaneous injection 60 mg, 60 mg, Subcutaneous, Q6 Months, Brook Mir MD, 60 mg at 11/23/22 1054    Allergies   Allergen Reactions   • Bactrim [Sulfamethoxazole-Trimethoprim] Other (See Comments)     Kidney failure            Vitals:    12/06/22 0929   BP: 109/69   Pulse: 98       Objective:                    Right Hip Exam     Tenderness   The patient is experiencing tenderness in the greater trochanter  Other   Erythema: absent  Scars: present (posterolateral )      Left Hip Exam     Tenderness   The patient is experiencing tenderness in the greater trochanter  Other   Erythema: absent  Scars: absent            Diagnostics, reviewed and taken today if performed as documented:    None performed      The attending physician has personally reviewed the pertinent films in PACS and interpretation is as follows:      Procedures, if performed today:    Large joint arthrocentesis: bilateral greater trochanteric bursa  Universal Protocol:  Consent: Verbal consent obtained    Risks and benefits: risks, benefits and alternatives were discussed  Consent given by: patient  Time out: Immediately prior to procedure a "time out" was called to verify the correct patient, procedure, equipment, support staff and site/side marked as required  Timeout called at: 12/6/2022 9:51 AM   Patient understanding: patient states understanding of the procedure being performed  Site marked: the operative site was marked  Supporting Documentation  Indications: pain   Procedure Details  Location: hip - bilateral greater trochanteric bursa  Preparation: Patient was prepped and draped in the usual sterile fashion  Needle size: 22 G  Approach: lateral    Medications (Right): 2 mL bupivacaine 0 25 %; 6 mg betamethasone acetate-betamethasone sodium phosphate 6 (3-3) mg/mLMedications (Left): 2 mL bupivacaine 0 25 %; 6 mg betamethasone acetate-betamethasone sodium phosphate 6 (3-3) mg/mL   Patient tolerance: patient tolerated the procedure well with no immediate complications  Dressing:  Sterile dressing applied             Portions of the record may have been created with voice recognition software  Occasional wrong word or "sound a like" substitutions may have occurred due to the inherent limitations of voice recognition software  Read the chart carefully and recognize, using context, where substitutions have occurred

## 2022-12-20 ENCOUNTER — HOSPITAL ENCOUNTER (OUTPATIENT)
Dept: RADIOLOGY | Age: 82
Discharge: HOME | End: 2022-12-20

## 2022-12-20 VITALS — HEIGHT: 61 IN | WEIGHT: 134 LBS | BODY MASS INDEX: 25.3 KG/M2

## 2022-12-20 DIAGNOSIS — M81.0 SENILE OSTEOPOROSIS: ICD-10-CM

## 2022-12-29 ENCOUNTER — OFFICE VISIT (OUTPATIENT)
Dept: PODIATRY | Facility: CLINIC | Age: 82
End: 2022-12-29

## 2022-12-29 VITALS
BODY MASS INDEX: 25.6 KG/M2 | DIASTOLIC BLOOD PRESSURE: 78 MMHG | WEIGHT: 135.6 LBS | HEART RATE: 81 BPM | HEIGHT: 61 IN | SYSTOLIC BLOOD PRESSURE: 144 MMHG

## 2022-12-29 DIAGNOSIS — L84 CORNS: Primary | ICD-10-CM

## 2022-12-29 DIAGNOSIS — I73.9 PERIPHERAL VASCULAR DISEASE, UNSPECIFIED (HCC): ICD-10-CM

## 2022-12-29 NOTE — PROGRESS NOTES
Patient presents for palliative foot care  Patient has soft corns medial aspect each second toe  They have responded well though to the Silipos pads an additional 2 were dispensed  Treatment consisted of nail and lesion trimming  Patient is rescheduled in 10 weeks

## 2023-02-03 DIAGNOSIS — E78.5 DYSLIPIDEMIA: ICD-10-CM

## 2023-02-03 RX ORDER — ATORVASTATIN CALCIUM 10 MG/1
10 TABLET, FILM COATED ORAL DAILY
Qty: 90 TABLET | Refills: 1 | Status: SHIPPED | OUTPATIENT
Start: 2023-02-03

## 2023-03-07 ENCOUNTER — OFFICE VISIT (OUTPATIENT)
Dept: OBGYN CLINIC | Facility: HOSPITAL | Age: 83
End: 2023-03-07

## 2023-03-07 VITALS
SYSTOLIC BLOOD PRESSURE: 129 MMHG | HEIGHT: 61 IN | DIASTOLIC BLOOD PRESSURE: 76 MMHG | WEIGHT: 138 LBS | HEART RATE: 98 BPM | BODY MASS INDEX: 26.06 KG/M2

## 2023-03-07 DIAGNOSIS — M70.62 GREATER TROCHANTERIC BURSITIS OF BOTH HIPS: Primary | ICD-10-CM

## 2023-03-07 DIAGNOSIS — M17.12 PRIMARY OSTEOARTHRITIS OF LEFT KNEE: ICD-10-CM

## 2023-03-07 DIAGNOSIS — M70.61 GREATER TROCHANTERIC BURSITIS OF BOTH HIPS: Primary | ICD-10-CM

## 2023-03-07 RX ORDER — BETAMETHASONE SODIUM PHOSPHATE AND BETAMETHASONE ACETATE 3; 3 MG/ML; MG/ML
12 INJECTION, SUSPENSION INTRA-ARTICULAR; INTRALESIONAL; INTRAMUSCULAR; SOFT TISSUE
Status: COMPLETED | OUTPATIENT
Start: 2023-03-07 | End: 2023-03-07

## 2023-03-07 RX ORDER — BUPIVACAINE HYDROCHLORIDE 2.5 MG/ML
2 INJECTION, SOLUTION INFILTRATION; PERINEURAL
Status: COMPLETED | OUTPATIENT
Start: 2023-03-07 | End: 2023-03-07

## 2023-03-07 RX ORDER — LIDOCAINE HYDROCHLORIDE 10 MG/ML
2 INJECTION, SOLUTION INFILTRATION; PERINEURAL
Status: COMPLETED | OUTPATIENT
Start: 2023-03-07 | End: 2023-03-07

## 2023-03-07 RX ADMIN — BETAMETHASONE SODIUM PHOSPHATE AND BETAMETHASONE ACETATE 12 MG: 3; 3 INJECTION, SUSPENSION INTRA-ARTICULAR; INTRALESIONAL; INTRAMUSCULAR; SOFT TISSUE at 09:44

## 2023-03-07 RX ADMIN — LIDOCAINE HYDROCHLORIDE 2 ML: 10 INJECTION, SOLUTION INFILTRATION; PERINEURAL at 09:44

## 2023-03-07 RX ADMIN — BUPIVACAINE HYDROCHLORIDE 2 ML: 2.5 INJECTION, SOLUTION INFILTRATION; PERINEURAL at 09:44

## 2023-03-07 NOTE — PROGRESS NOTES
Assessment:  1  Greater trochanteric bursitis of both hips        2  Primary osteoarthritis of left knee            Plan:  Bilateral trochanteric bursitis and left knee osteoarthritis  The patient was provided with bilateral trochanteric bursa and left knee steroid injections  The patient tolerated the procedure well  The patient should follow up in 3 months  To do next visit:  Return in about 3 months (around 6/7/2023)  The above stated was discussed in layman's terms and the patient expressed understanding  All questions were answered to the patient's satisfaction  Scribe Attestation    I,:  Lorna Lopes am acting as a scribe while in the presence of the attending physician :       I,:  Lillian Danielle MD personally performed the services described in this documentation    as scribed in my presence :             Subjective:   Laurie is a 81 y o  female who presents for follow up of bilateral hips and left knee  She is s/p bilateral trochanteric  [place] steroid injections with benefit, 12/6/2022  Today she complains of return of bilateral lateral hips and left generalized knee pain  Prolonged weight bearing and repetitive bending aggravates while rest alleviates          Review of systems negative unless otherwise specified in HPI    Past Medical History:   Diagnosis Date   • Acute laryngitis 5/12/2022   • Anemia    • Chronic bilateral low back pain without sciatica 11/26/2019   • Chronic kidney disease    • Duodenal perforation ([address] ) 10/7/2020   • Duodenal ulcer    • Enterococcus UTI 4/7/2016   • GERD (gastroesophageal reflux disease) 04/04/2016   • Greater trochanteric bursitis of right hip 9/18/2018   • 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   • Hypercalcemia    • Hypertension    • Left lumbar radiculopathy 4/7/2016   • Mixed hyperlipidemia    • Osteoarthritis of hip 04/04/2016   • Osteoporosis    • Sacral insufficiency fracture    • SI (sacroiliac) joint inflammation ([facility] ) 04/04/2016   • Substance addiction ([facility] )    • Vitamin D deficiency 3/11/2019       Past Surgical History:   Procedure Laterality Date   • BREAST EXCISIONAL BIOPSY Left 2000    benign   • COLONOSCOPY  2019   • JOINT REPLACEMENT      right hip 8/15   • MAMMO (HISTORICAL)  2019   • TONSILLECTOMY         Family History   Problem Relation Age of Onset   • No Known Problems Mother    • No Known Problems Father    • Breast cancer Daughter 48   • Breast cancer Maternal Aunt 79   • No Known Problems Sister    • No Known Problems Maternal Grandmother    • No Known Problems Maternal Grandfather    • No Known Problems Paternal Grandmother    • No Known Problems Paternal Grandfather    • No Known Problems Son    • No Known Problems Sister    • No Known Problems Sister        Social History     Occupational History   • Not on file   Tobacco Use   • Smoking status: Never   • Smokeless tobacco: Never   Vaping Use   • Vaping Use: Never used   Substance and Sexual Activity   • Alcohol use: Never   • Drug use: Not Currently     Types: Prescription     Comment: rocovering last used 1996   • Sexual activity: Yes     Partners: Male     Birth control/protection: Post-menopausal         Current Outpatient Medications:   •  atorvastatin (LIPITOR) 10 mg tablet, Take 1 tablet (10 mg total) by mouth daily, Disp: 90 tablet, Rfl: 1  •  Cholecalciferol (Vitamin D-3) 25 MCG (1000 UT) CAPS, Take by mouth in the morning, Disp: , Rfl:   •  ibuprofen (MOTRIN) 800 mg tablet, as needed, Disp: , Rfl:   •  metoprolol succinate (TOPROL-XL) 25 mg 24 hr tablet, Take 1 tablet (25 mg total) by mouth 2 (two) times a day, Disp: 180 tablet, Rfl: 3  •  pantoprazole (PROTONIX) 40 mg tablet, Take 1 tablet (40 mg total) by mouth 2 (two) times a day as needed (Dyspepsia), Disp: 60 tablet, Rfl: 3  •  Premarin vaginal cream, 3 (three) times a week, Disp: , Rfl:     Current Facility-Administered Medications:   •  denosumab (PROLIA) subcutaneous injection 60 mg, 60 mg, Subcutaneous, Q6 Months, Aj Goodman MD, 60 mg at 11/23/22 1054    Allergies   Allergen Reactions   • Bactrim [Sulfamethoxazole-Trimethoprim] Other (See Comments)     Kidney failure            Vitals:    03/07/23 0912   BP: 129/76   Pulse: 98       Objective:  Physical exam  · General: Awake, Alert, Oriented  · Eyes: Pupils equal, round and reactive to light  · Heart: regular rate and rhythm  · Lungs: No audible wheezing  · Abdomen: soft                    Ortho Exam  Left knee:  Valgus alignment  No erythema or ecchymosis  No effusion or swelling  Normal strength  Good ROM with crepitus   Calf compartments soft and supple  Sensation intact  Toes are warm sensate and mobile    Bilateral hips:  TTP over greater trochanter    Diagnostics, reviewed and taken today if performed as documented:    None performed     Procedures, if performed today:    Large joint arthrocentesis: L knee  Universal Protocol:  Consent: Verbal consent obtained  Risks and benefits: risks, benefits and alternatives were discussed  Consent given by: patient  Time out: Immediately prior to procedure a "time out" was called to verify the correct patient, procedure, equipment, support staff and site/side marked as required    Timeout called at: 3/7/2023 9:42 AM   Patient understanding: patient states understanding of the procedure being performed  Site marked: the operative site was marked  Patient identity confirmed: verbally with patient    Supporting Documentation  Indications: pain   Procedure Details  Location: knee - L knee  Preparation: Patient was prepped and draped in the usual sterile fashion  Needle size: 22 G  Ultrasound guidance: no  Approach: anterolateral  Medications administered: 12 mg betamethasone acetate-betamethasone sodium phosphate 6 (3-3) mg/mL; 2 mL bupivacaine 0 25 %; 2 mL lidocaine 1 %    Patient tolerance: patient tolerated the procedure well with no immediate complications  Dressing:  Sterile dressing applied    Large joint arthrocentesis: R greater trochanteric bursa  Universal Protocol:  Consent: Verbal consent obtained  Risks and benefits: risks, benefits and alternatives were discussed  Consent given by: patient  Time out: Immediately prior to procedure a "time out" was called to verify the correct patient, procedure, equipment, support staff and site/side marked as required  Timeout called at: 3/7/2023 9:42 AM   Patient understanding: patient states understanding of the procedure being performed  Site marked: the operative site was marked  Patient identity confirmed: verbally with patient    Supporting Documentation  Indications: pain   Procedure Details  Location: hip - R greater trochanteric bursa  Needle size: 22 G  Ultrasound guidance: no  Approach: lateral  Medications administered: 12 mg betamethasone acetate-betamethasone sodium phosphate 6 (3-3) mg/mL; 2 mL bupivacaine 0 25 %; 2 mL lidocaine 1 %    Patient tolerance: patient tolerated the procedure well with no immediate complications  Dressing:  Sterile dressing applied    Large joint arthrocentesis: L greater trochanteric bursa  Universal Protocol:  Consent: Verbal consent obtained  Risks and benefits: risks, benefits and alternatives were discussed  Consent given by: patient  Time out: Immediately prior to procedure a "time out" was called to verify the correct patient, procedure, equipment, support staff and site/side marked as required    Timeout called at: 3/7/2023 9:42 AM   Patient understanding: patient states understanding of the procedure being performed  Site marked: the operative site was marked  Patient identity confirmed: verbally with patient    Supporting Documentation  Indications: pain   Procedure Details  Location: hip - L greater trochanteric bursa  Needle size: 22 G  Ultrasound guidance: no  Approach: lateral  Medications administered: 12 mg betamethasone acetate-betamethasone sodium phosphate 6 (3-3) mg/mL; 2 mL bupivacaine 0 25 %; 2 mL lidocaine 1 %    Patient tolerance: patient tolerated the procedure well with no immediate complications  Dressing:  Sterile dressing applied             Portions of the record may have been created with voice recognition software  Occasional wrong word or "sound a like" substitutions may have occurred due to the inherent limitations of voice recognition software  Read the chart carefully and recognize, using context, where substitutions have occurred

## 2023-03-13 ENCOUNTER — OFFICE VISIT (OUTPATIENT)
Dept: PODIATRY | Facility: CLINIC | Age: 83
End: 2023-03-13

## 2023-03-13 VITALS
SYSTOLIC BLOOD PRESSURE: 139 MMHG | HEIGHT: 61 IN | DIASTOLIC BLOOD PRESSURE: 84 MMHG | HEART RATE: 92 BPM | BODY MASS INDEX: 26.21 KG/M2 | WEIGHT: 138.8 LBS

## 2023-03-13 DIAGNOSIS — B35.1 ONYCHOMYCOSIS: ICD-10-CM

## 2023-03-13 DIAGNOSIS — L84 CORNS: ICD-10-CM

## 2023-03-13 DIAGNOSIS — M19.071 PRIMARY OSTEOARTHRITIS OF RIGHT FOOT: Primary | ICD-10-CM

## 2023-03-13 RX ORDER — TRIAMCINOLONE ACETONIDE 40 MG/ML
20 INJECTION, SUSPENSION INTRA-ARTICULAR; INTRAMUSCULAR ONCE
Status: COMPLETED | OUTPATIENT
Start: 2023-03-13 | End: 2023-03-13

## 2023-03-13 RX ORDER — LIDOCAINE HYDROCHLORIDE 10 MG/ML
1 INJECTION, SOLUTION EPIDURAL; INFILTRATION; INTRACAUDAL; PERINEURAL ONCE
Status: COMPLETED | OUTPATIENT
Start: 2023-03-13 | End: 2023-03-13

## 2023-03-13 RX ADMIN — TRIAMCINOLONE ACETONIDE 20 MG: 40 INJECTION, SUSPENSION INTRA-ARTICULAR; INTRAMUSCULAR at 10:09

## 2023-03-13 RX ADMIN — LIDOCAINE HYDROCHLORIDE 1 ML: 10 INJECTION, SOLUTION EPIDURAL; INFILTRATION; INTRACAUDAL; PERINEURAL at 10:09

## 2023-03-13 NOTE — PROGRESS NOTES
Patient presents for palliative foot care  Treatment consisted of trimming of dystrophic toenails along with soft corn medial aspect left second toe and lateral aspect left hallux  Dispensed Silipos pads at her request     Patient also has pain in the dorsum of the right foot at the second metatarsal cuneiform joint  She has osteoarthritis  Treatment consisted of injecting the site with 0 5 cc Kenalog 40 along with 1 cc 1% Xylocaine  Patient to contact me should she still have discomfort in 4 weeks  She is rescheduled in 10 weeks  Foot injection     Date/Time 3/13/2023 10:07 AM     Performed by  Torsten Lal DPM     Authorized by Torsten Lal DPM      Universal Protocol   Consent: Verbal consent obtained  Risks and benefits: risks, benefits and alternatives were discussed  Consent given by: patient  Patient understanding: patient states understanding of the procedure being performed  Patient identity confirmed: verbally with patient        Local anesthesia used: yes     Anesthesia   Local anesthesia used: yes  Local Anesthetic: lidocaine 1% without epinephrine     Procedure Details   Procedure Notes: Injected right foot with 0 5 cc Kenalog 40 along with 1 cc 1% Xylocaine

## 2023-03-22 ENCOUNTER — APPOINTMENT (OUTPATIENT)
Dept: LAB | Facility: CLINIC | Age: 83
End: 2023-03-22

## 2023-03-22 DIAGNOSIS — Z00.8 HEALTH EXAMINATION IN POPULATION SURVEYS: ICD-10-CM

## 2023-03-22 LAB
CHOLEST SERPL-MCNC: 216 MG/DL
EST. AVERAGE GLUCOSE BLD GHB EST-MCNC: 114 MG/DL
HBA1C MFR BLD: 5.6 %
HDLC SERPL-MCNC: 104 MG/DL
LDLC SERPL CALC-MCNC: 92 MG/DL (ref 0–100)
NONHDLC SERPL-MCNC: 112 MG/DL
TRIGL SERPL-MCNC: 102 MG/DL

## 2023-03-24 ENCOUNTER — OFFICE VISIT (OUTPATIENT)
Dept: INTERNAL MEDICINE CLINIC | Facility: CLINIC | Age: 83
End: 2023-03-24

## 2023-03-24 VITALS
WEIGHT: 140 LBS | HEIGHT: 62 IN | DIASTOLIC BLOOD PRESSURE: 50 MMHG | OXYGEN SATURATION: 94 % | BODY MASS INDEX: 25.76 KG/M2 | TEMPERATURE: 97.2 F | SYSTOLIC BLOOD PRESSURE: 106 MMHG | HEART RATE: 91 BPM

## 2023-03-24 DIAGNOSIS — N18.31 STAGE 3A CHRONIC KIDNEY DISEASE (HCC): ICD-10-CM

## 2023-03-24 DIAGNOSIS — I10 ESSENTIAL HYPERTENSION: Primary | ICD-10-CM

## 2023-03-24 DIAGNOSIS — E78.5 DYSLIPIDEMIA: ICD-10-CM

## 2023-03-24 NOTE — ASSESSMENT & PLAN NOTE
Lipids are near goal levels  Total cholesterol 216, HDL cholesterol is 104, LDL cholesterol 92, triglycerides 102  No change in treatment

## 2023-03-24 NOTE — ASSESSMENT & PLAN NOTE
Lab Results   Component Value Date    EGFR 45 11/07/2022    EGFR 44 07/18/2022    EGFR 42 04/11/2022    CREATININE 1 13 11/07/2022    CREATININE 1 17 07/18/2022    CREATININE 1 21 04/11/2022   Patient reports no major changes  She has follow-ups with nephrology and labs scheduled

## 2023-03-24 NOTE — PROGRESS NOTES
Name: Francisco Grimaldo      : 1940      MRN: [id number]  Encounter Provider: Lorna Larsen MD  Encounter Date: 3/24/2023   Encounter department: [address] [place] [address]  Essential hypertension    2  Stage 3a chronic kidney disease ([facility] )           Subjective      Patient presents to the office for follow-up visit  She has no complaints other than the bunion and is being followed by podiatry in that regard  She feels well  She had her labs drawn for caring starts with you  Her hemoglobin A1c is 5 6  Her lipid profile is near goal levels    Review of Systems   Constitutional: Negative  Negative for activity change, appetite change, chills, diaphoresis, fatigue, fever and unexpected weight change  HENT: Negative  Eyes: Negative  Respiratory: Negative  Cardiovascular: Negative  Gastrointestinal: Negative  Endocrine: Negative  Genitourinary: Negative  Musculoskeletal: Positive for arthralgias (Bunion on the left foot)  Skin: Negative  Neurological: Negative  Hematological: Negative  Psychiatric/Behavioral: The patient is not nervous/anxious          Current Outpatient Medications on File Prior to Visit   Medication Sig   • atorvastatin (LIPITOR) 10 mg tablet Take 1 tablet (10 mg total) by mouth daily   • Cholecalciferol (Vitamin D-3) 25 MCG (1000 UT) CAPS Take by mouth in the morning   • ibuprofen (MOTRIN) 800 mg tablet as needed   • metoprolol succinate (TOPROL-XL) 25 mg 24 hr tablet Take 1 tablet (25 mg total) by mouth 2 (two) times a day   • pantoprazole (PROTONIX) 40 mg tablet Take 1 tablet (40 mg total) by mouth 2 (two) times a day as needed (Dyspepsia)   • Premarin vaginal cream 3 (three) times a week       Objective     /50 (BP Location: Left arm, Patient Position: Sitting, Cuff Size: Standard)   Pulse 91   Temp (!) 97 2 °F (36 2 °C) (Tympanic)   Ht 5' 2 4" (1 585 m)   Wt 63 5 kg (140 lb)   SpO2 94% BMI 25 28 kg/m²     Physical Exam  Vitals reviewed  Constitutional:       General: She is not in acute distress  Appearance: Normal appearance  She is normal weight  She is not ill-appearing, toxic-appearing or diaphoretic  HENT:      Head: Normocephalic and atraumatic  Right Ear: External ear normal       Left Ear: External ear normal       Nose: Nose normal    Eyes:      Conjunctiva/sclera: Conjunctivae normal       Pupils: Pupils are equal, round, and reactive to light  Cardiovascular:      Rate and Rhythm: Normal rate and regular rhythm  Pulses: Normal pulses  Heart sounds: Normal heart sounds  No murmur heard  Pulmonary:      Effort: Pulmonary effort is normal  No respiratory distress  Breath sounds: Normal breath sounds  No wheezing or rales  Abdominal:      General: Abdomen is flat  There is no distension  Musculoskeletal:         General: No swelling  Cervical back: Neck supple  No rigidity  Right lower leg: No edema  Left lower leg: No edema  Skin:     General: Skin is warm  Capillary Refill: Capillary refill takes less than 2 seconds  Coloration: Skin is not jaundiced  Findings: No bruising, erythema or rash  Neurological:      General: No focal deficit present  Mental Status: She is alert and oriented to person, place, and time  Mental status is at baseline     Psychiatric:         Mood and Affect: Mood normal          Behavior: Behavior normal        Fantasma Jones MD

## 2023-03-27 ENCOUNTER — OFFICE VISIT (OUTPATIENT)
Dept: INTERNAL MEDICINE CLINIC | Facility: CLINIC | Age: 83
End: 2023-03-27

## 2023-03-27 VITALS
TEMPERATURE: 95.2 F | HEIGHT: 62 IN | DIASTOLIC BLOOD PRESSURE: 60 MMHG | SYSTOLIC BLOOD PRESSURE: 102 MMHG | HEART RATE: 76 BPM | BODY MASS INDEX: 25.4 KG/M2 | OXYGEN SATURATION: 98 % | WEIGHT: 138 LBS

## 2023-03-27 DIAGNOSIS — J04.0 LARYNGITIS: Primary | ICD-10-CM

## 2023-03-27 RX ORDER — AZITHROMYCIN 250 MG/1
TABLET, FILM COATED ORAL
Qty: 6 TABLET | Refills: 0 | Status: SHIPPED | OUTPATIENT
Start: 2023-03-27 | End: 2023-04-01

## 2023-03-27 NOTE — PROGRESS NOTES
Assessment/Plan:    Diagnoses and all orders for this visit:    Laryngitis  -     azithromycin (Zithromax) 250 mg tablet; Take 2 tablets (500 mg total) by mouth daily for 1 day, THEN 1 tablet (250 mg total) daily for 4 days  Discussed supportive management  Follow-up with voice has not improved in the next 1 week  Depression Screening and Follow-up Plan: Patient was screened for depression during today's encounter  They screened negative with a PHQ-2 score of 0  There are no Patient Instructions on file for this visit  Subjective:      Patient ID: Hecotr Baez is a 81 y o  female    Patient complains of voice change the last few days, with mild cough  He denies any fever chills chest pain shortness of breath or sinus congestion  Current Outpatient Medications:   •  atorvastatin (LIPITOR) 10 mg tablet, Take 1 tablet (10 mg total) by mouth daily, Disp: 90 tablet, Rfl: 1  •  azithromycin (Zithromax) 250 mg tablet, Take 2 tablets (500 mg total) by mouth daily for 1 day, THEN 1 tablet (250 mg total) daily for 4 days  , Disp: 6 tablet, Rfl: 0  •  Cholecalciferol (Vitamin D-3) 25 MCG (1000 UT) CAPS, Take by mouth in the morning, Disp: , Rfl:   •  ibuprofen (MOTRIN) 800 mg tablet, as needed, Disp: , Rfl:   •  metoprolol succinate (TOPROL-XL) 25 mg 24 hr tablet, Take 1 tablet (25 mg total) by mouth 2 (two) times a day, Disp: 180 tablet, Rfl: 3  •  pantoprazole (PROTONIX) 40 mg tablet, Take 1 tablet (40 mg total) by mouth 2 (two) times a day as needed (Dyspepsia), Disp: 60 tablet, Rfl: 3  •  Premarin vaginal cream, 3 (three) times a week, Disp: , Rfl:     Current Facility-Administered Medications:   •  denosumab (PROLIA) subcutaneous injection 60 mg, 60 mg, Subcutaneous, Q6 Months, Ilene Almonte MD, 60 mg at 11/23/22 1054     Past Medical History:   Diagnosis Date   • Acute laryngitis 5/12/2022   • Anemia    • Chronic bilateral low back pain without sciatica 11/26/2019   • Chronic kidney disease • Duodenal perforation ([facility] ) 10/7/2020   • Duodenal ulcer    • Enterococcus UTI 4/7/2016   • GERD (gastroesophageal reflux disease) 04/04/2016   • Greater trochanteric bursitis of right hip 9/18/2018   • 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   • Hypercalcemia    • Hypertension    • Left lumbar radiculopathy 4/7/2016   • Mixed hyperlipidemia    • Osteoarthritis of hip 04/04/2016   • Osteoporosis    • Sacral insufficiency fracture    • SI (sacroiliac) joint inflammation ([facility] ) 04/04/2016   • Substance addiction ([place] [address] )    • Vitamin D deficiency 3/11/2019         Past Surgical History:   Procedure Laterality Date   • BREAST EXCISIONAL BIOPSY Left 2000    benign   • COLONOSCOPY  2019   • JOINT REPLACEMENT      right hip 8/15   • MAMMO (HISTORICAL)  2019   • TONSILLECTOMY           Allergies   Allergen Reactions   • Bactrim [Sulfamethoxazole-Trimethoprim] Other (See Comments)     Kidney failure       Recent Results (from the past 1008 hour(s))   Hemoglobin A1C    Collection Time: 03/22/23 12:17 PM   Result Value Ref Range    Hemoglobin A1C 5 6 Normal 3 8-5 6%; PreDiabetic 5 7-6 4%; Diabetic >=6 5%; Glycemic control for adults with diabetes <7 0% %     mg/dl   Lipid panel    Collection Time: 03/22/23 12:17 PM   Result Value Ref Range    Cholesterol 216 (H) See Comment mg/dL    Triglycerides 102 See Comment mg/dL    HDL, Direct 104 >=50 mg/dL    LDL Calculated 92 0 - 100 mg/dL    Non-HDL-Chol (CHOL-HDL) 112 mg/dl       The following portions of the patient's history were reviewed and updated as appropriate: allergies, current medications, past family history, past medical history, past social history, past surgical history and problem list      Review of Systems   Constitutional: Negative for activity change, appetite change, chills, diaphoresis, fatigue, fever and unexpected weight change  HENT: Positive for voice change   Negative for congestion, drooling, ear discharge, ear pain, facial swelling, hearing loss, postnasal drip, rhinorrhea, sinus pressure, sinus pain, sneezing, sore throat, tinnitus and trouble swallowing  Eyes: Negative for discharge  Respiratory: Positive for cough  Negative for apnea, choking, chest tightness, shortness of breath, wheezing and stridor  Cardiovascular: Negative for chest pain, palpitations and leg swelling  Gastrointestinal: Negative for abdominal distention, abdominal pain, anal bleeding, blood in stool, constipation, diarrhea, nausea and vomiting  Genitourinary: Negative for decreased urine volume, difficulty urinating, frequency and urgency  Musculoskeletal: Negative for arthralgias, back pain and myalgias  Skin: Negative for color change  Neurological: Negative for dizziness, light-headedness, numbness and headaches  Objective:      Vitals:    03/27/23 1413   BP: 102/60   Pulse: 76   Temp: (!) 95 2 °F (35 1 °C)   SpO2: 98%          Physical Exam  Vitals reviewed  Constitutional:       General: She is not in acute distress  Appearance: Normal appearance  She is not ill-appearing, toxic-appearing or diaphoretic  HENT:      Mouth/Throat:      Mouth: Mucous membranes are moist       Pharynx: No oropharyngeal exudate or posterior oropharyngeal erythema  Cardiovascular:      Rate and Rhythm: Normal rate and regular rhythm  Pulses: Normal pulses  Heart sounds: Normal heart sounds  No murmur heard  No friction rub  No gallop  Pulmonary:      Effort: Pulmonary effort is normal  No respiratory distress  Breath sounds: Normal breath sounds  No stridor  No wheezing, rhonchi or rales  Chest:      Chest wall: No tenderness  Musculoskeletal:      Right lower leg: No edema  Left lower leg: No edema  Lymphadenopathy:      Cervical: No cervical adenopathy  Skin:     General: Skin is warm and dry  Findings: No lesion or rash  Neurological:      Mental Status: She is alert

## 2023-05-18 ENCOUNTER — TELEPHONE (OUTPATIENT)
Dept: NEPHROLOGY | Facility: CLINIC | Age: 83
End: 2023-05-18

## 2023-05-18 NOTE — TELEPHONE ENCOUNTER
ERON for patient reminding her to go for lab work and to bring BP readings to her appt on 5/25  Asked her to call back if she has any questions

## 2023-05-22 ENCOUNTER — APPOINTMENT (OUTPATIENT)
Dept: LAB | Facility: CLINIC | Age: 83
End: 2023-05-22

## 2023-05-22 ENCOUNTER — OFFICE VISIT (OUTPATIENT)
Dept: PODIATRY | Facility: CLINIC | Age: 83
End: 2023-05-22

## 2023-05-22 VITALS
SYSTOLIC BLOOD PRESSURE: 137 MMHG | WEIGHT: 138 LBS | DIASTOLIC BLOOD PRESSURE: 80 MMHG | BODY MASS INDEX: 25.4 KG/M2 | HEART RATE: 77 BPM | HEIGHT: 62 IN

## 2023-05-22 DIAGNOSIS — E61.1 IRON DEFICIENCY: ICD-10-CM

## 2023-05-22 DIAGNOSIS — B35.1 ONYCHOMYCOSIS: Primary | ICD-10-CM

## 2023-05-22 DIAGNOSIS — I12.9 PARENCHYMAL RENAL HYPERTENSION, STAGE 1 THROUGH STAGE 4 OR UNSPECIFIED CHRONIC KIDNEY DISEASE: ICD-10-CM

## 2023-05-22 DIAGNOSIS — M81.0 SENILE OSTEOPOROSIS: ICD-10-CM

## 2023-05-22 DIAGNOSIS — E78.5 DYSLIPIDEMIA: ICD-10-CM

## 2023-05-22 DIAGNOSIS — I73.9 PERIPHERAL VASCULAR DISEASE, UNSPECIFIED (HCC): ICD-10-CM

## 2023-05-22 DIAGNOSIS — N18.31 STAGE 3A CHRONIC KIDNEY DISEASE (HCC): ICD-10-CM

## 2023-05-22 DIAGNOSIS — E55.9 VITAMIN D DEFICIENCY: ICD-10-CM

## 2023-05-22 DIAGNOSIS — E87.6 HYPOKALEMIA: ICD-10-CM

## 2023-05-22 LAB
ALBUMIN SERPL BCP-MCNC: 3.6 G/DL (ref 3.5–5)
ALP SERPL-CCNC: 69 U/L (ref 46–116)
ALT SERPL W P-5'-P-CCNC: 17 U/L (ref 12–78)
ANION GAP SERPL CALCULATED.3IONS-SCNC: 1 MMOL/L (ref 4–13)
AST SERPL W P-5'-P-CCNC: 14 U/L (ref 5–45)
BILIRUB SERPL-MCNC: 0.35 MG/DL (ref 0.2–1)
BUN SERPL-MCNC: 14 MG/DL (ref 5–25)
CALCIUM SERPL-MCNC: 9.6 MG/DL (ref 8.3–10.1)
CHLORIDE SERPL-SCNC: 112 MMOL/L (ref 96–108)
CHOLEST SERPL-MCNC: 192 MG/DL
CK SERPL-CCNC: 63 U/L (ref 26–192)
CO2 SERPL-SCNC: 25 MMOL/L (ref 21–32)
CREAT SERPL-MCNC: 1.13 MG/DL (ref 0.6–1.3)
CREAT UR-MCNC: 99.3 MG/DL
ERYTHROCYTE [DISTWIDTH] IN BLOOD BY AUTOMATED COUNT: 13.7 % (ref 11.6–15.1)
GFR SERPL CREATININE-BSD FRML MDRD: 45 ML/MIN/1.73SQ M
GLUCOSE P FAST SERPL-MCNC: 91 MG/DL (ref 65–99)
HCT VFR BLD AUTO: 35.3 % (ref 34.8–46.1)
HDLC SERPL-MCNC: 100 MG/DL
HGB BLD-MCNC: 11.5 G/DL (ref 11.5–15.4)
LDLC SERPL CALC-MCNC: 82 MG/DL (ref 0–100)
MAGNESIUM SERPL-MCNC: 2.4 MG/DL (ref 1.6–2.6)
MCH RBC QN AUTO: 29.9 PG (ref 26.8–34.3)
MCHC RBC AUTO-ENTMCNC: 32.6 G/DL (ref 31.4–37.4)
MCV RBC AUTO: 92 FL (ref 82–98)
PHOSPHATE SERPL-MCNC: 3.2 MG/DL (ref 2.3–4.1)
PLATELET # BLD AUTO: 311 THOUSANDS/UL (ref 149–390)
PMV BLD AUTO: 10.5 FL (ref 8.9–12.7)
POTASSIUM SERPL-SCNC: 3.9 MMOL/L (ref 3.5–5.3)
PROT SERPL-MCNC: 7.2 G/DL (ref 6.4–8.4)
PROT UR-MCNC: 27 MG/DL
PROT/CREAT UR: 0.27 MG/G{CREAT} (ref 0–0.1)
PTH-INTACT SERPL-MCNC: 17.3 PG/ML (ref 12–88)
RBC # BLD AUTO: 3.85 MILLION/UL (ref 3.81–5.12)
SODIUM SERPL-SCNC: 138 MMOL/L (ref 135–147)
TRIGL SERPL-MCNC: 50 MG/DL
WBC # BLD AUTO: 6.41 THOUSAND/UL (ref 4.31–10.16)

## 2023-05-22 NOTE — PROGRESS NOTES
Patient presents for palliative foot care  Injection provided right foot was very helpful  Patient inquires as to whether she could have another but was told to hold off as she could develop skin atrophy  Treatment consisted of trimming of multiple dystrophic toenails  Patient has a left second toe hammertoe that is successfully treated with Silipos pads  She is rescheduled in 10 weeks

## 2023-05-22 NOTE — PROGRESS NOTES
RENAL FOLLOW UP NOTE: td    ASSESSMENT AND PLAN:  1   CKD stage 3 :  Etiology:  AK I from Bactrim/hypercalcemia along with poor oral intake   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/arteriolar nephrosclerosis  Baseline creatinine:  1 1 -1 79  Current creatinine:  1 13 at baseline  Urine protein creatinine ratio:  0 27 g at goal   Recommendations:  Treat hypertension-please see below  Treat dyslipidemia-please see below  Maintain proteinuria less than 1 g or as low as possible  Avoid nephrotoxic agents such as NSAIDs, patient counseled as such  2   Volume:  Euvolemic  3   Hypertension:  Secondary workup:  Was negative  Home readings:   None today     Goal blood pressure:  Less than 130/80 given CKD  Recommendations:   Push nonmedical regimen especially weight loss/isotonic exercise and low-salt diet  Medication changes today:   Patient's Omron today was excellent at 125/65  She does not have a working device at home I will encourage her to get a device and send in a week readings  4   Electrolytes:    Mild metabolic acidosis:  Resolved  Borderline hypokalemia:  Now normal at 3 9  Borderline low anion gap spurious related to the equipment patient with recent UPEP SPEP and light chain ratio all normal 11/7/2022  5   Mineral bone disorder:  Calcium/magnesium/phosphorus all normal  PTH intact: 17 3 which is normal  Vitamin-D:  41 6 from 10/30/2021 at goal  6   Dyslipidemia:  Goal LDL:  Less than 100  Current lipid profile:  LDL 82//triglycerides 50 from 5/22/2023  Recommendations:  Patient is at goal   7   Anemia:   Hemoglobin:   Stable at 11 5 at baseline  Iron studies:  Were acceptable from prior  Multiple myeloma evaluation  was negative as outlined above  GI evaluation:  EGD with just esophagitis; colonoscopy with benign polyp  Heme consultation:  Felt anemia which was normocytic secondary to chronic kidney disease   No further recommendations at this time    8   Other problems:  GERD  Osteoarthritis of right hip  Colonic polyp  Status post duodenal perforation 10/04/2020:  Apparently upper GI endoscopy demonstrated no leak   Endoscopy demonstrated no leak   She was NPO with antibiotics who was given a 2 week course of antibiotics upon discharge along with antacid medication  To be followed up as an outpatient  Cardiac murmur which is chronic echocardiogram performed March 2021 showed good EF 65% and mild TR and mild aortic regurgitation  The patient has left lower extremity edema she is not sure when this began there is no symptoms or signs of a potential clot however given the unilateral edema I would want to make sure this could be certainly incompetent veins  I will order a stat venous duplex  GI health maintenance:  Last colonoscopy July 2024 due to sessile polyp removal in 2019       PATIENT INSTRUCTIONS:    Patient Instructions   1  Medication changes today:  No medication changes today  2   Please go for an ultrasound of your legs to make sure there are no blood clots as soon as possible we will help to facilitate    3    Please purchase a new Omron blood pressure machine then please take 1 week a blood pressure readings at your convenience at this time    AS FOLLOWS  MORNING AND EVENING, SITTING  as follows:  TAKE THE MORNING READINGS BEFORE ANY MEDICATIONS AND WHEN YOU ARE RELAXED FOR SEVERAL MINUTES  TAKE THE EVENING READINGS:  BETWEEN 7-10 P M ; PRIOR TO ANY MEDICATIONS; AT LEAST IN OUR  FROM DINNER; AND CERTAINLY AFTER RELAXING FOR A FEW MINUTES  PLEASE INCLUDE HEART RATE WITH YOUR BLOOD PRESSURE READINGS  When taking standing readings, keep your arm supported at heart level and not dangling  Make sure you are sitting with your back supported and feet on the ground and do not cross your legs or feet  Make sure you have not taken any coffee or caffeine products or exercised or smoke cigarettes at least 30 minutes before taking your blood pressure  Then please mail these readings into the office    4  Follow-up in 6 months  Please bring in 1 week a blood pressure readings morning evening, sitting and standing is outlined above  PLEASE BRING AN YOUR BLOOD PRESSURE MACHINE TO CORRELATE WITH THE OFFICE MACHINE AT THIS NEXT SCHEDULED VISIT  Please go for fasting lab work 1-2 weeks prior to your appointment      5  General instructions:  AVOID SALT BUT NOT ADDING AN READING LABELS TO MAKE SURE THERE IS LOW-SALT IN THE FOOD THAT YOU ARE EATING  Goal is less than 2 g of sodium intake or less than 5 g of sodium chloride intake per day    Avoid nonsteroidal anti-inflammatory drugs such as Naprosyn, ibuprofen, Aleve, Advil, Celebrex, Meloxicam (Mobic) etc   You can use Tylenol as needed if you do not have any liver condition to be concerned about    Avoid medications such as Sudafed or decongestants and antihistamines that contained the D component which is the decongestant  You can take antihistamines without the decongestant or D component  Try to avoid medications such as pantoprazole or  Protonix/Nexium or Esomeprazole)/Prilosec or omeprazole/Prevacid or lansoprazole/AcipHex or Rabeprazole  If you are able to, use Pepcid as this is safer for your kidneys  Try to exercise at least 30 minutes 3 days a week to begin with with an ultimate goal of 5 days a week for at least 30 minutes    Try to lose 5-10 lb by your next visit    Please do not drink more than 2 glasses of alcohol/wine on a daily basis as this may contribute to your high blood pressure  Subjective: There has been no hospitalizations or acute illnesses since last visit  The patient overall is feeling well  No fevers, chills, or cough or colds    Good appetite and good energy  No hematuria, dysuria, voiding symptoms or foamy urine  No gastrointestinal symptoms  No cardiovascular symptoms including swelling of the legs  No headaches, dizziness or lightheadedness  Blood pressure medications:  Toprol-XL 25 mg twice a day      ROS:  See HPI, otherwise review of systems as completely reviewed with the patient are negative    Past Medical History:   Diagnosis Date   • Acute laryngitis 5/12/2022   • Anemia    • Chronic bilateral low back pain without sciatica 11/26/2019   • Chronic kidney disease    • Duodenal perforation ([facility] [address] ) 10/7/2020   • Duodenal ulcer    • Enterococcus UTI 4/7/2016   • GERD (gastroesophageal reflux disease) 04/04/2016   • Greater trochanteric bursitis of right hip 9/18/2018   • 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   • Hypercalcemia    • Hypertension    • Left lumbar radiculopathy 4/7/2016   • Mixed hyperlipidemia    • Osteoarthritis of hip 04/04/2016   • Osteoporosis    • Sacral insufficiency fracture    • SI (sacroiliac) joint inflammation ([facility] [address] ) 04/04/2016   • Substance addiction ([facility] )    • Vitamin D deficiency 3/11/2019     Past Surgical History:   Procedure Laterality Date   • BREAST EXCISIONAL BIOPSY Left 2000    benign   • COLONOSCOPY  2019   • JOINT REPLACEMENT      right hip 8/15   • MAMMO (HISTORICAL)  2019   • TONSILLECTOMY       Family History   Problem Relation Age of Onset   • No Known Problems Mother    • No Known Problems Father    • Breast cancer Daughter 48   • Breast cancer Maternal Aunt 79   • No Known Problems Sister    • No Known Problems Maternal Grandmother    • No Known Problems Maternal Grandfather    • No Known Problems Paternal Grandmother    • No Known Problems Paternal Grandfather    • No Known Problems Son    • No Known Problems Sister    • No Known Problems Sister       reports that she has never smoked  She has never used smokeless tobacco  She reports that she does not currently use drugs after having used the following drugs: Prescription  She reports that she does not drink alcohol      I COMPLETELY REVIEWED THE PAST MEDICAL HISTORY/PAST SURGICAL HISTORY/SOCIAL HISTORY/FAMILY HISTORY/AND MEDICATIONS  AND UPDATED ALL    Objective:     Vitals:   BP sitting on left: 144/66 with a heart rate of 80 and regular  BP standing on left: 144/78 with a heart rate of 92 and regular  Vitals:    05/25/23 1355   BP: 125/55   Pulse: 78       Weight (last 2 days)     Date/Time Weight    05/25/23 1355 62 5 (137 8)        Wt Readings from Last 3 Encounters:   05/25/23 62 5 kg (137 lb 12 8 oz)   05/22/23 62 6 kg (138 lb)   03/27/23 62 6 kg (138 lb)       Body mass index is 25 2 kg/m²      Physical Exam: General:  No acute distress  Skin:  No acute rash  Eyes:  No scleral icterus, noninjected, no discharge from eyes  ENT:  Moist mucous membranes  Neck:  Supple, no jugular venous distention, trachea is midline, no lymphadenopathy and no thyromegaly  Back   No CVAT  Chest:  Clear to auscultation and percussion, good respiratory effort  CVS:  Regular rate and rhythm without a rub, or gallops or murmurs  Abdomen:  Soft and nontender with normal bowel sounds  Extremities:  No cyanosis and 1- 2+ lower extremity edema on the left lower extremity from the ankle to two thirds of up the pretibial region not present on the right not tender no calf tenderness no erythema, moderate arthritic changes, normal range of motion  Neuro:  Grossly intact  Psych:  Alert, oriented x3 and appropriate      Medications:    Current Outpatient Medications:   •  atorvastatin (LIPITOR) 10 mg tablet, Take 1 tablet (10 mg total) by mouth daily, Disp: 90 tablet, Rfl: 1  •  Cholecalciferol (Vitamin D-3) 25 MCG (1000 UT) CAPS, Take by mouth in the morning, Disp: , Rfl:   •  ibuprofen (MOTRIN) 800 mg tablet, as needed, Disp: , Rfl:   •  metoprolol succinate (TOPROL-XL) 25 mg 24 hr tablet, Take 1 tablet (25 mg total) by mouth 2 (two) times a day, Disp: 180 tablet, Rfl: 3  •  pantoprazole (PROTONIX) 40 mg tablet, Take 1 tablet (40 mg total) by mouth 2 (two) times a day as needed (Dyspepsia), Disp: 60 tablet, Rfl: 3  •  Premarin vaginal cream, 3 (three) times a week, Disp: , Rfl: Current Facility-Administered Medications:   •  denosumab (PROLIA) subcutaneous injection 60 mg, 60 mg, Subcutaneous, Q6 Months, Sara Campuzano MD, 60 mg at 11/23/22 1054    Lab, Imaging and other studies: I have personally reviewed pertinent labs    Laboratory Results:  Results for orders placed or performed in visit on 05/22/23   Comprehensive metabolic panel   Result Value Ref Range    Sodium 138 135 - 147 mmol/L    Potassium 3 9 3 5 - 5 3 mmol/L    Chloride 112 (H) 96 - 108 mmol/L    CO2 25 21 - 32 mmol/L    ANION GAP 1 (L) 4 - 13 mmol/L    BUN 14 5 - 25 mg/dL    Creatinine 1 13 0 60 - 1 30 mg/dL    Glucose, Fasting 91 65 - 99 mg/dL    Calcium 9 6 8 3 - 10 1 mg/dL    AST 14 5 - 45 U/L    ALT 17 12 - 78 U/L    Alkaline Phosphatase 69 46 - 116 U/L    Total Protein 7 2 6 4 - 8 4 g/dL    Albumin 3 6 3 5 - 5 0 g/dL    Total Bilirubin 0 35 0 20 - 1 00 mg/dL    eGFR 45 ml/min/1 73sq m   CBC   Result Value Ref Range    WBC 6 41 4 31 - 10 16 Thousand/uL    RBC 3 85 3 81 - 5 12 Million/uL    Hemoglobin 11 5 11 5 - 15 4 g/dL    Hematocrit 35 3 34 8 - 46 1 %    MCV 92 82 - 98 fL    MCH 29 9 26 8 - 34 3 pg    MCHC 32 6 31 4 - 37 4 g/dL    RDW 13 7 11 6 - 15 1 %    Platelets 794 122 - 599 Thousands/uL    MPV 10 5 8 9 - 12 7 fL   CK   Result Value Ref Range    Total CK 63 26 - 192 U/L   Magnesium   Result Value Ref Range    Magnesium 2 4 1 6 - 2 6 mg/dL   Phosphorus   Result Value Ref Range    Phosphorus 3 2 2 3 - 4 1 mg/dL   PTH, intact   Result Value Ref Range    PTH 17 3 12 0 - 88 0 pg/mL   Protein / creatinine ratio, urine   Result Value Ref Range    Creatinine, Ur 99 3 mg/dL    Protein Urine Random 27 mg/dL    Prot/Creat Ratio, Ur 0 27 (H) 0 00 - 0 10   Lipid Panel with Direct LDL reflex   Result Value Ref Range    Cholesterol 192 See Comment mg/dL    Triglycerides 50 See Comment mg/dL    HDL, Direct 100 >=50 mg/dL    LDL Calculated 82 0 - 100 mg/dL       Results from last 7 days   Lab Units 05/22/23  0814   BUN mg/dL 14 "  CALCIUM mg/dL 9 6   CHLORIDE mmol/L 112*   CO2 mmol/L 25   CREATININE mg/dL 1 13   HEMATOCRIT % 35 3   HEMOGLOBIN g/dL 11 5   POTASSIUM mmol/L 3 9   MAGNESIUM mg/dL 2 4   PHOSPHORUS mg/dL 3 2   PLATELETS Thousands/uL 311   WBC Thousand/uL 6 41           Radiology review:   chest X-ray    Ultrasound      Portions of the record may have been created with voice recognition software  Occasional wrong word or \"sound a like\" substitutions may have occurred due to the inherent limitations of voice recognition software  Read the chart carefully and recognize, using context, where substitutions have occurred                      "

## 2023-05-25 ENCOUNTER — OFFICE VISIT (OUTPATIENT)
Dept: NEPHROLOGY | Facility: CLINIC | Age: 83
End: 2023-05-25

## 2023-05-25 VITALS
DIASTOLIC BLOOD PRESSURE: 55 MMHG | HEART RATE: 78 BPM | HEIGHT: 62 IN | SYSTOLIC BLOOD PRESSURE: 125 MMHG | BODY MASS INDEX: 25.36 KG/M2 | WEIGHT: 137.8 LBS

## 2023-05-25 DIAGNOSIS — E87.6 HYPOKALEMIA: ICD-10-CM

## 2023-05-25 DIAGNOSIS — N18.31 STAGE 3A CHRONIC KIDNEY DISEASE (HCC): ICD-10-CM

## 2023-05-25 DIAGNOSIS — I12.9 PARENCHYMAL RENAL HYPERTENSION, STAGE 1 THROUGH STAGE 4 OR UNSPECIFIED CHRONIC KIDNEY DISEASE: Primary | ICD-10-CM

## 2023-05-25 DIAGNOSIS — E78.5 DYSLIPIDEMIA: ICD-10-CM

## 2023-05-25 DIAGNOSIS — E55.9 VITAMIN D DEFICIENCY: ICD-10-CM

## 2023-05-25 DIAGNOSIS — R60.0 EDEMA OF LEFT LOWER EXTREMITY: ICD-10-CM

## 2023-05-25 NOTE — LETTER
May 25, 2023     Hank Jaime, [address] [facility] [address]    Patient: Shy Huggins   YOB: 1940   Date of Visit: 5/25/2023       Dear Dr Nicol Taylor: Thank you for referring Shy Huggins to me for evaluation  Below are my notes for this consultation  If you have questions, please do not hesitate to call me  I look forward to following your patient along with you  Sincerely,        Huma Hewitt MD        CC: No Recipients    Huma Hewitt MD  5/25/2023  2:34 PM  Sign when Signing Visit  RENAL FOLLOW UP NOTE: td    ASSESSMENT AND PLAN:  1   CKD stage 3 :  Etiology:  AK I from Bactrim/hypercalcemia along with poor oral intake  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/arteriolar nephrosclerosis  Baseline creatinine:  1 1 -1 79  Current creatinine:  1 13 at baseline  Urine protein creatinine ratio:  0 27 g at goal   Recommendations:  Treat hypertension-please see below  Treat dyslipidemia-please see below  Maintain proteinuria less than 1 g or as low as possible  Avoid nephrotoxic agents such as NSAIDs, patient counseled as such  2   Volume:  Euvolemic  3  Hypertension:  Secondary workup:  Was negative  Home readings:   None today     Goal blood pressure:  Less than 130/80 given CKD  Recommendations:   Push nonmedical regimen especially weight loss/isotonic exercise and low-salt diet  Medication changes today:   Patient's Omron today was excellent at 125/65  She does not have a working device at home I will encourage her to get a device and send in a week readings  4  Electrolytes:    Mild metabolic acidosis:  Resolved  Borderline hypokalemia:  Now normal at 3 9  Borderline low anion gap spurious related to the equipment patient with recent UPEP SPEP and light chain ratio all normal 11/7/2022  5    Mineral bone disorder:  Calcium/magnesium/phosphorus all normal  PTH intact: 17 3 which is normal  Vitamin-D:  41 6 from 10/30/2021 at goal  6  Dyslipidemia:  Goal LDL:  Less than 100  Current lipid profile:  LDL 82//triglycerides 50 from 5/22/2023  Recommendations:  Patient is at goal   7  Anemia:   Hemoglobin:   Stable at 11 5 at baseline  Iron studies:  Were acceptable from prior  Multiple myeloma evaluation  was negative as outlined above  GI evaluation:  EGD with just esophagitis; colonoscopy with benign polyp  Heme consultation:  Felt anemia which was normocytic secondary to chronic kidney disease  No further recommendations at this time  8   Other problems:  GERD  Osteoarthritis of right hip  Colonic polyp  Status post duodenal perforation 10/04/2020:  Apparently upper GI endoscopy demonstrated no leak  Endoscopy demonstrated no leak  She was NPO with antibiotics who was given a 2 week course of antibiotics upon discharge along with antacid medication  To be followed up as an outpatient  Cardiac murmur which is chronic echocardiogram performed March 2021 showed good EF 65% and mild TR and mild aortic regurgitation  The patient has left lower extremity edema she is not sure when this began there is no symptoms or signs of a potential clot however given the unilateral edema I would want to make sure this could be certainly incompetent veins  I will order a stat venous duplex  GI health maintenance:  Last colonoscopy July 2024 due to sessile polyp removal in 2019       PATIENT INSTRUCTIONS:    Patient Instructions   1  Medication changes today:  No medication changes today  2   Please go for an ultrasound of your legs to make sure there are no blood clots as soon as possible we will help to facilitate    3    Please purchase a new Omron blood pressure machine then please take 1 week a blood pressure readings at your convenience at this time    AS FOLLOWS  MORNING AND EVENING, SITTING  as follows:  TAKE THE MORNING READINGS BEFORE ANY MEDICATIONS AND WHEN YOU ARE RELAXED FOR SEVERAL MINUTES  TAKE THE EVENING READINGS: BETWEEN 7-10 P M ; PRIOR TO ANY MEDICATIONS; AT LEAST IN OUR  FROM DINNER; AND CERTAINLY AFTER RELAXING FOR A FEW MINUTES  PLEASE INCLUDE HEART RATE WITH YOUR BLOOD PRESSURE READINGS  When taking standing readings, keep your arm supported at heart level and not dangling  Make sure you are sitting with your back supported and feet on the ground and do not cross your legs or feet  Make sure you have not taken any coffee or caffeine products or exercised or smoke cigarettes at least 30 minutes before taking your blood pressure  Then please mail these readings into the office    4  Follow-up in 6 months  Please bring in 1 week a blood pressure readings morning evening, sitting and standing is outlined above  PLEASE BRING AN YOUR BLOOD PRESSURE MACHINE TO CORRELATE WITH THE OFFICE MACHINE AT THIS NEXT SCHEDULED VISIT  Please go for fasting lab work 1-2 weeks prior to your appointment      5  General instructions:  AVOID SALT BUT NOT ADDING AN READING LABELS TO MAKE SURE THERE IS LOW-SALT IN THE FOOD THAT YOU ARE EATING  Goal is less than 2 g of sodium intake or less than 5 g of sodium chloride intake per day    Avoid nonsteroidal anti-inflammatory drugs such as Naprosyn, ibuprofen, Aleve, Advil, Celebrex, Meloxicam (Mobic) etc   You can use Tylenol as needed if you do not have any liver condition to be concerned about    Avoid medications such as Sudafed or decongestants and antihistamines that contained the D component which is the decongestant  You can take antihistamines without the decongestant or D component  Try to avoid medications such as pantoprazole or  Protonix/Nexium or Esomeprazole)/Prilosec or omeprazole/Prevacid or lansoprazole/AcipHex or Rabeprazole  If you are able to, use Pepcid as this is safer for your kidneys      Try to exercise at least 30 minutes 3 days a week to begin with with an ultimate goal of 5 days a week for at least 30 minutes    Try to lose 5-10 lb by your next visit    Please do not drink more than 2 glasses of alcohol/wine on a daily basis as this may contribute to your high blood pressure  Subjective: There has been no hospitalizations or acute illnesses since last visit  The patient overall is feeling well  No fevers, chills, or cough or colds    Good appetite and good energy  No hematuria, dysuria, voiding symptoms or foamy urine  No gastrointestinal symptoms  No cardiovascular symptoms including swelling of the legs  No headaches, dizziness or lightheadedness  Blood pressure medications:  Toprol-XL 25 mg twice a day      ROS:  See HPI, otherwise review of systems as completely reviewed with the patient are negative    Past Medical History:   Diagnosis Date   • Acute laryngitis 5/12/2022   • Anemia    • Chronic bilateral low back pain without sciatica 11/26/2019   • Chronic kidney disease    • Duodenal perforation ([facility] [address] ) 10/7/2020   • Duodenal ulcer    • Enterococcus UTI 4/7/2016   • GERD (gastroesophageal reflux disease) 04/04/2016   • Greater trochanteric bursitis of right hip 9/18/2018   • 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   • Hypercalcemia    • Hypertension    • Left lumbar radiculopathy 4/7/2016   • Mixed hyperlipidemia    • Osteoarthritis of hip 04/04/2016   • Osteoporosis    • Sacral insufficiency fracture    • SI (sacroiliac) joint inflammation ([facility] [address] ) 04/04/2016   • Substance addiction ([facility] )    • Vitamin D deficiency 3/11/2019     Past Surgical History:   Procedure Laterality Date   • BREAST EXCISIONAL BIOPSY Left 2000    benign   • COLONOSCOPY  2019   • JOINT REPLACEMENT      right hip 8/15   • MAMMO (HISTORICAL)  2019   • TONSILLECTOMY       Family History   Problem Relation Age of Onset   • No Known Problems Mother    • No Known Problems Father    • Breast cancer Daughter 48   • Breast cancer Maternal Aunt 79   • No Known Problems Sister    • No Known Problems Maternal Grandmother    • No Known Problems Maternal Grandfather • No Known Problems Paternal Grandmother    • No Known Problems Paternal Grandfather    • No Known Problems Son    • No Known Problems Sister    • No Known Problems Sister       reports that she has never smoked  She has never used smokeless tobacco  She reports that she does not currently use drugs after having used the following drugs: Prescription  She reports that she does not drink alcohol  I COMPLETELY REVIEWED THE PAST MEDICAL HISTORY/PAST SURGICAL HISTORY/SOCIAL HISTORY/FAMILY HISTORY/AND MEDICATIONS  AND UPDATED ALL    Objective:     Vitals:   BP sitting on left: 144/66 with a heart rate of 80 and regular  BP standing on left: 144/78 with a heart rate of 92 and regular  Vitals:    05/25/23 1355   BP: 125/55   Pulse: 78       Weight (last 2 days)       Date/Time Weight    05/25/23 1355 62 5 (137 8)          Wt Readings from Last 3 Encounters:   05/25/23 62 5 kg (137 lb 12 8 oz)   05/22/23 62 6 kg (138 lb)   03/27/23 62 6 kg (138 lb)       Body mass index is 25 2 kg/m²      Physical Exam: General:  No acute distress  Skin:  No acute rash  Eyes:  No scleral icterus, noninjected, no discharge from eyes  ENT:  Moist mucous membranes  Neck:  Supple, no jugular venous distention, trachea is midline, no lymphadenopathy and no thyromegaly  Back   No CVAT  Chest:  Clear to auscultation and percussion, good respiratory effort  CVS:  Regular rate and rhythm without a rub, or gallops or murmurs  Abdomen:  Soft and nontender with normal bowel sounds  Extremities:  No cyanosis and 1- 2+ lower extremity edema on the left lower extremity from the ankle to two thirds of up the pretibial region not present on the right not tender no calf tenderness no erythema, no arthritic changes, normal range of motion  Neuro:  Grossly intact  Psych:  Alert, oriented x3 and appropriate      Medications:    Current Outpatient Medications:   •  atorvastatin (LIPITOR) 10 mg tablet, Take 1 tablet (10 mg total) by mouth daily, Disp: 90 tablet, Rfl: 1  •  Cholecalciferol (Vitamin D-3) 25 MCG (1000 UT) CAPS, Take by mouth in the morning, Disp: , Rfl:   •  ibuprofen (MOTRIN) 800 mg tablet, as needed, Disp: , Rfl:   •  metoprolol succinate (TOPROL-XL) 25 mg 24 hr tablet, Take 1 tablet (25 mg total) by mouth 2 (two) times a day, Disp: 180 tablet, Rfl: 3  •  pantoprazole (PROTONIX) 40 mg tablet, Take 1 tablet (40 mg total) by mouth 2 (two) times a day as needed (Dyspepsia), Disp: 60 tablet, Rfl: 3  •  Premarin vaginal cream, 3 (three) times a week, Disp: , Rfl:     Current Facility-Administered Medications:   •  denosumab (PROLIA) subcutaneous injection 60 mg, 60 mg, Subcutaneous, Q6 Months, Bernadette Tafoya MD, 60 mg at 11/23/22 1054    Lab, Imaging and other studies: I have personally reviewed pertinent labs    Laboratory Results:  Results for orders placed or performed in visit on 05/22/23   Comprehensive metabolic panel   Result Value Ref Range    Sodium 138 135 - 147 mmol/L    Potassium 3 9 3 5 - 5 3 mmol/L    Chloride 112 (H) 96 - 108 mmol/L    CO2 25 21 - 32 mmol/L    ANION GAP 1 (L) 4 - 13 mmol/L    BUN 14 5 - 25 mg/dL    Creatinine 1 13 0 60 - 1 30 mg/dL    Glucose, Fasting 91 65 - 99 mg/dL    Calcium 9 6 8 3 - 10 1 mg/dL    AST 14 5 - 45 U/L    ALT 17 12 - 78 U/L    Alkaline Phosphatase 69 46 - 116 U/L    Total Protein 7 2 6 4 - 8 4 g/dL    Albumin 3 6 3 5 - 5 0 g/dL    Total Bilirubin 0 35 0 20 - 1 00 mg/dL    eGFR 45 ml/min/1 73sq m   CBC   Result Value Ref Range    WBC 6 41 4 31 - 10 16 Thousand/uL    RBC 3 85 3 81 - 5 12 Million/uL    Hemoglobin 11 5 11 5 - 15 4 g/dL    Hematocrit 35 3 34 8 - 46 1 %    MCV 92 82 - 98 fL    MCH 29 9 26 8 - 34 3 pg    MCHC 32 6 31 4 - 37 4 g/dL    RDW 13 7 11 6 - 15 1 %    Platelets 239 298 - 913 Thousands/uL    MPV 10 5 8 9 - 12 7 fL   CK   Result Value Ref Range    Total CK 63 26 - 192 U/L   Magnesium   Result Value Ref Range    Magnesium 2 4 1 6 - 2 6 mg/dL   Phosphorus   Result Value Ref Range    Phosphorus "3 2 2 3 - 4 1 mg/dL   PTH, intact   Result Value Ref Range    PTH 17 3 12 0 - 88 0 pg/mL   Protein / creatinine ratio, urine   Result Value Ref Range    Creatinine, Ur 99 3 mg/dL    Protein Urine Random 27 mg/dL    Prot/Creat Ratio, Ur 0 27 (H) 0 00 - 0 10   Lipid Panel with Direct LDL reflex   Result Value Ref Range    Cholesterol 192 See Comment mg/dL    Triglycerides 50 See Comment mg/dL    HDL, Direct 100 >=50 mg/dL    LDL Calculated 82 0 - 100 mg/dL       Results from last 7 days   Lab Units 05/22/23  0814   BUN mg/dL 14   CALCIUM mg/dL 9 6   CHLORIDE mmol/L 112*   CO2 mmol/L 25   CREATININE mg/dL 1 13   HEMATOCRIT % 35 3   HEMOGLOBIN g/dL 11 5   POTASSIUM mmol/L 3 9   MAGNESIUM mg/dL 2 4   PHOSPHORUS mg/dL 3 2   PLATELETS Thousands/uL 311   WBC Thousand/uL 6 41         Radiology review:   chest X-ray    Ultrasound      Portions of the record may have been created with voice recognition software  Occasional wrong word or \"sound a like\" substitutions may have occurred due to the inherent limitations of voice recognition software  Read the chart carefully and recognize, using context, where substitutions have occurred                      "

## 2023-05-25 NOTE — PATIENT INSTRUCTIONS
1  Medication changes today:  No medication changes today  2   Please go for an ultrasound of your legs to make sure there are no blood clots as soon as possible we will help to facilitate    3  Please purchase a new Omron blood pressure machine then please take 1 week a blood pressure readings at your convenience at this time    AS FOLLOWS  MORNING AND EVENING, SITTING  as follows:  TAKE THE MORNING READINGS BEFORE ANY MEDICATIONS AND WHEN YOU ARE RELAXED FOR SEVERAL MINUTES  TAKE THE EVENING READINGS:  BETWEEN 7-10 P M ; PRIOR TO ANY MEDICATIONS; AT LEAST IN OUR  FROM DINNER; AND CERTAINLY AFTER RELAXING FOR A FEW MINUTES  PLEASE INCLUDE HEART RATE WITH YOUR BLOOD PRESSURE READINGS  When taking standing readings, keep your arm supported at heart level and not dangling  Make sure you are sitting with your back supported and feet on the ground and do not cross your legs or feet  Make sure you have not taken any coffee or caffeine products or exercised or smoke cigarettes at least 30 minutes before taking your blood pressure  Then please mail these readings into the office    4  Follow-up in 6 months  Please bring in 1 week a blood pressure readings morning evening, sitting and standing is outlined above  PLEASE BRING AN YOUR BLOOD PRESSURE MACHINE TO CORRELATE WITH THE OFFICE MACHINE AT THIS NEXT SCHEDULED VISIT  Please go for fasting lab work 1-2 weeks prior to your appointment      5    General instructions:  AVOID SALT BUT NOT ADDING AN READING LABELS TO MAKE SURE THERE IS LOW-SALT IN THE FOOD THAT YOU ARE EATING  Goal is less than 2 g of sodium intake or less than 5 g of sodium chloride intake per day    Avoid nonsteroidal anti-inflammatory drugs such as Naprosyn, ibuprofen, Aleve, Advil, Celebrex, Meloxicam (Mobic) etc   You can use Tylenol as needed if you do not have any liver condition to be concerned about    Avoid medications such as Sudafed or decongestants and antihistamines that contained the D component which is the decongestant  You can take antihistamines without the decongestant or D component  Try to avoid medications such as pantoprazole or  Protonix/Nexium or Esomeprazole)/Prilosec or omeprazole/Prevacid or lansoprazole/AcipHex or Rabeprazole  If you are able to, use Pepcid as this is safer for your kidneys  Try to exercise at least 30 minutes 3 days a week to begin with with an ultimate goal of 5 days a week for at least 30 minutes    Try to lose 5-10 lb by your next visit    Please do not drink more than 2 glasses of alcohol/wine on a daily basis as this may contribute to your high blood pressure

## 2023-05-26 ENCOUNTER — HOSPITAL ENCOUNTER (OUTPATIENT)
Dept: NON INVASIVE DIAGNOSTICS | Facility: HOSPITAL | Age: 83
Discharge: HOME | End: 2023-05-26
Attending: INTERNAL MEDICINE

## 2023-05-26 ENCOUNTER — TELEPHONE (OUTPATIENT)
Dept: NEPHROLOGY | Facility: CLINIC | Age: 83
End: 2023-05-26

## 2023-05-26 DIAGNOSIS — R60.0 EDEMA OF LEFT LOWER EXTREMITY: ICD-10-CM

## 2023-05-26 NOTE — TELEPHONE ENCOUNTER
LM for patient about the following, asked her to call back if she has any questions:    ----- Message from Dominic Blackwell MD sent at 5/26/2023  3:46 PM EDT -----  Please let the patient know that her venous duplex is negative no blood clots!  ----- Message -----  From: Interface, Radiology Results In  Sent: 5/26/2023   3:20 PM EDT  To: Dominic Blackwell MD

## 2023-05-27 LAB
25(OH)D2 SERPL-MCNC: <1 NG/ML
25(OH)D3 SERPL-MCNC: 45 NG/ML
25(OH)D3+25(OH)D2 SERPL-MCNC: 45 NG/ML

## 2023-06-05 RX ORDER — AMOXICILLIN 500 MG/1
CAPSULE ORAL
COMMUNITY
Start: 2023-05-11

## 2023-06-06 ENCOUNTER — OFFICE VISIT (OUTPATIENT)
Dept: OBGYN CLINIC | Facility: HOSPITAL | Age: 83
End: 2023-06-06
Payer: COMMERCIAL

## 2023-06-06 VITALS
HEIGHT: 62 IN | DIASTOLIC BLOOD PRESSURE: 78 MMHG | HEART RATE: 73 BPM | SYSTOLIC BLOOD PRESSURE: 155 MMHG | WEIGHT: 138 LBS | BODY MASS INDEX: 25.4 KG/M2

## 2023-06-06 DIAGNOSIS — M70.61 GREATER TROCHANTERIC BURSITIS OF BOTH HIPS: Primary | ICD-10-CM

## 2023-06-06 DIAGNOSIS — M70.62 GREATER TROCHANTERIC BURSITIS OF BOTH HIPS: Primary | ICD-10-CM

## 2023-06-06 DIAGNOSIS — M17.12 PRIMARY OSTEOARTHRITIS OF LEFT KNEE: ICD-10-CM

## 2023-06-06 PROCEDURE — 20610 DRAIN/INJ JOINT/BURSA W/O US: CPT | Performed by: ORTHOPAEDIC SURGERY

## 2023-06-06 PROCEDURE — 99213 OFFICE O/P EST LOW 20 MIN: CPT | Performed by: ORTHOPAEDIC SURGERY

## 2023-06-06 RX ORDER — BETAMETHASONE SODIUM PHOSPHATE AND BETAMETHASONE ACETATE 3; 3 MG/ML; MG/ML
12 INJECTION, SUSPENSION INTRA-ARTICULAR; INTRALESIONAL; INTRAMUSCULAR; SOFT TISSUE
Status: COMPLETED | OUTPATIENT
Start: 2023-06-06 | End: 2023-06-06

## 2023-06-06 RX ORDER — LIDOCAINE HYDROCHLORIDE 10 MG/ML
2 INJECTION, SOLUTION INFILTRATION; PERINEURAL
Status: COMPLETED | OUTPATIENT
Start: 2023-06-06 | End: 2023-06-06

## 2023-06-06 RX ORDER — BUPIVACAINE HYDROCHLORIDE 2.5 MG/ML
2 INJECTION, SOLUTION INFILTRATION; PERINEURAL
Status: COMPLETED | OUTPATIENT
Start: 2023-06-06 | End: 2023-06-06

## 2023-06-06 RX ADMIN — BETAMETHASONE SODIUM PHOSPHATE AND BETAMETHASONE ACETATE 12 MG: 3; 3 INJECTION, SUSPENSION INTRA-ARTICULAR; INTRALESIONAL; INTRAMUSCULAR; SOFT TISSUE at 09:15

## 2023-06-06 RX ADMIN — LIDOCAINE HYDROCHLORIDE 2 ML: 10 INJECTION, SOLUTION INFILTRATION; PERINEURAL at 09:15

## 2023-06-06 RX ADMIN — BUPIVACAINE HYDROCHLORIDE 2 ML: 2.5 INJECTION, SOLUTION INFILTRATION; PERINEURAL at 09:15

## 2023-06-06 NOTE — PROGRESS NOTES
Assessment:  1  Greater trochanteric bursitis of both hips        2  Primary osteoarthritis of left knee            Plan:  Bilateral trochanteric bursitis and left knee osteoarthritis  The patient was provided with bilateral trochanteric bursa and left knee steroid injections  The patient tolerated the procedure well  The patient should follow up in 3 months  To do next visit:  Return in about 3 months (around 9/6/2023)  The above stated was discussed in layman's terms and the patient expressed understanding  All questions were answered to the patient's satisfaction  Scribe Attestation    I,:  Chantal Meng am acting as a scribe while in the presence of the attending physician :       I,:  Fadia Olsen MD personally performed the services described in this documentation    as scribed in my presence :             Subjective:   Laurie is a 81 y o  female who presents for follow up of bilateral hips and left knee  She is s/p bilateral trochanteric Bursa steroid injections with benefit, 3/7/2022  Today she complains of return of bilateral lateral hips and left generalized knee pain  Prolonged weight bearing and repetitive bending aggravates while rest alleviates  She has history of right total hip arthroplasty            Review of systems negative unless otherwise specified in HPI    Past Medical History:   Diagnosis Date   • Acute laryngitis 5/12/2022   • Anemia    • Chronic bilateral low back pain without sciatica 11/26/2019   • Chronic kidney disease    • Duodenal perforation ([address] ) 10/7/2020   • Duodenal ulcer    • Enterococcus UTI 4/7/2016   • GERD (gastroesophageal reflux disease) 04/04/2016   • Greater trochanteric bursitis of right hip 9/18/2018   • 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   • Hypercalcemia    • Hypertension    • Left lumbar radiculopathy 4/7/2016   • Mixed hyperlipidemia    • Osteoarthritis of hip 04/04/2016   • Osteoporosis    • Sacral insufficiency fracture    • SI (sacroiliac) joint inflammation ([facility] [address] ) 04/04/2016   • Substance addiction ([address] )    • Vitamin D deficiency 3/11/2019       Past Surgical History:   Procedure Laterality Date   • BREAST EXCISIONAL BIOPSY Left 2000    benign   • COLONOSCOPY  2019   • JOINT REPLACEMENT      right hip 8/15   • MAMMO (HISTORICAL)  2019   • TONSILLECTOMY         Family History   Problem Relation Age of Onset   • No Known Problems Mother    • No Known Problems Father    • Breast cancer Daughter 48   • Breast cancer Maternal Aunt 79   • No Known Problems Sister    • No Known Problems Maternal Grandmother    • No Known Problems Maternal Grandfather    • No Known Problems Paternal Grandmother    • No Known Problems Paternal Grandfather    • No Known Problems Son    • No Known Problems Sister    • No Known Problems Sister        Social History     Occupational History   • Not on file   Tobacco Use   • Smoking status: Never   • Smokeless tobacco: Never   Vaping Use   • Vaping Use: Never used   Substance and Sexual Activity   • Alcohol use: Never   • Drug use: Not Currently     Types: Prescription     Comment: rocovering last used 1996   • Sexual activity: Yes     Partners: Male     Birth control/protection: Post-menopausal         Current Outpatient Medications:   •  amoxicillin (AMOXIL) 500 mg capsule, , Disp: , Rfl:   •  atorvastatin (LIPITOR) 10 mg tablet, Take 1 tablet (10 mg total) by mouth daily, Disp: 90 tablet, Rfl: 1  •  Cholecalciferol (Vitamin D-3) 25 MCG (1000 UT) CAPS, Take by mouth in the morning, Disp: , Rfl:   •  ibuprofen (MOTRIN) 800 mg tablet, as needed, Disp: , Rfl:   •  metoprolol succinate (TOPROL-XL) 25 mg 24 hr tablet, Take 1 tablet (25 mg total) by mouth 2 (two) times a day, Disp: 180 tablet, Rfl: 3  •  pantoprazole (PROTONIX) 40 mg tablet, Take 1 tablet (40 mg total) by mouth 2 (two) times a day as needed (Dyspepsia), Disp: 60 tablet, Rfl: 3  •  Premarin vaginal cream, 3 (three) times a "week, Disp: , Rfl:     Current Facility-Administered Medications:   •  denosumab (PROLIA) subcutaneous injection 60 mg, 60 mg, Subcutaneous, Once, [employer]    Allergies   Allergen Reactions   • Bactrim [Sulfamethoxazole-Trimethoprim] Other (See Comments)     Kidney failure            Vitals:    06/06/23 0903   BP: 155/78   Pulse: 73       Objective:  Physical exam  · General: Awake, Alert, Oriented  · Eyes: Pupils equal, round and reactive to light  · Heart: regular rate and rhythm  · Lungs: No audible wheezing  · Abdomen: soft                    Ortho Exam  Left knee:  Valgus alignment  No erythema or ecchymosis  No effusion or swelling  Normal strength  Good ROM with crepitus   Calf compartments soft and supple  Sensation intact  Toes are warm sensate and mobile    Bilateral hips:  TTP over greater trochanter  Right hip has well healed lateral incisional scar     Diagnostics, reviewed and taken today if performed as documented:    None performed     Procedures, if performed today:    Large joint arthrocentesis: L knee  Universal Protocol:  Consent: Verbal consent obtained  Risks and benefits: risks, benefits and alternatives were discussed  Consent given by: patient  Time out: Immediately prior to procedure a \"time out\" was called to verify the correct patient, procedure, equipment, support staff and site/side marked as required    Timeout called at: 6/6/2023 9:26 AM   Patient understanding: patient states understanding of the procedure being performed  Site marked: the operative site was marked  Patient identity confirmed: verbally with patient    Supporting Documentation  Indications: pain   Procedure Details  Location: knee - L knee  Preparation: Patient was prepped and draped in the usual sterile fashion  Needle size: 22 G  Ultrasound guidance: no  Approach: anterolateral  Medications administered: 12 mg betamethasone acetate-betamethasone sodium phosphate 6 (3-3) mg/mL; 2 mL bupivacaine 0 25 %; 2 " "mL lidocaine 1 %    Patient tolerance: patient tolerated the procedure well with no immediate complications  Dressing:  Sterile dressing applied    Large joint arthrocentesis: R greater trochanteric bursa  Universal Protocol:  Consent: Verbal consent obtained  Risks and benefits: risks, benefits and alternatives were discussed  Consent given by: patient  Time out: Immediately prior to procedure a \"time out\" was called to verify the correct patient, procedure, equipment, support staff and site/side marked as required  Timeout called at: 6/6/2023 9:26 AM   Patient understanding: patient states understanding of the procedure being performed  Site marked: the operative site was marked  Patient identity confirmed: verbally with patient    Supporting Documentation  Indications: pain   Procedure Details  Location: hip - R greater trochanteric bursa  Needle size: 22 G  Ultrasound guidance: no  Approach: lateral  Medications administered: 12 mg betamethasone acetate-betamethasone sodium phosphate 6 (3-3) mg/mL; 2 mL bupivacaine 0 25 %; 2 mL lidocaine 1 %    Patient tolerance: patient tolerated the procedure well with no immediate complications  Dressing:  Sterile dressing applied    Large joint arthrocentesis: L greater trochanteric bursa  Universal Protocol:  Consent: Verbal consent obtained  Risks and benefits: risks, benefits and alternatives were discussed  Consent given by: patient  Time out: Immediately prior to procedure a \"time out\" was called to verify the correct patient, procedure, equipment, support staff and site/side marked as required    Timeout called at: 6/6/2023 9:26 AM   Patient understanding: patient states understanding of the procedure being performed  Site marked: the operative site was marked  Patient identity confirmed: verbally with patient    Supporting Documentation  Indications: pain   Procedure Details  Location: hip - L greater trochanteric bursa  Needle size: 22 G  Ultrasound guidance: " "no  Approach: lateral  Medications administered: 12 mg betamethasone acetate-betamethasone sodium phosphate 6 (3-3) mg/mL; 2 mL bupivacaine 0 25 %; 2 mL lidocaine 1 %    Patient tolerance: patient tolerated the procedure well with no immediate complications  Dressing:  Sterile dressing applied             Portions of the record may have been created with voice recognition software  Occasional wrong word or \"sound a like\" substitutions may have occurred due to the inherent limitations of voice recognition software  Read the chart carefully and recognize, using context, where substitutions have occurred      "

## 2023-06-22 ENCOUNTER — OFFICE VISIT (OUTPATIENT)
Dept: OBGYN CLINIC | Facility: HOSPITAL | Age: 83
End: 2023-06-22
Payer: COMMERCIAL

## 2023-06-22 ENCOUNTER — HOSPITAL ENCOUNTER (OUTPATIENT)
Dept: RADIOLOGY | Facility: HOSPITAL | Age: 83
Discharge: HOME | End: 2023-06-22
Attending: ORTHOPAEDIC SURGERY
Payer: COMMERCIAL

## 2023-06-22 VITALS
WEIGHT: 134 LBS | DIASTOLIC BLOOD PRESSURE: 74 MMHG | HEIGHT: 62 IN | HEART RATE: 96 BPM | SYSTOLIC BLOOD PRESSURE: 115 MMHG | BODY MASS INDEX: 24.66 KG/M2

## 2023-06-22 DIAGNOSIS — M54.16 RADICULOPATHY, LUMBAR REGION: ICD-10-CM

## 2023-06-22 DIAGNOSIS — M25.551 PAIN IN RIGHT HIP: Primary | ICD-10-CM

## 2023-06-22 DIAGNOSIS — M25.551 PAIN IN RIGHT HIP: ICD-10-CM

## 2023-06-22 PROCEDURE — 73502 X-RAY EXAM HIP UNI 2-3 VIEWS: CPT

## 2023-06-22 PROCEDURE — 99214 OFFICE O/P EST MOD 30 MIN: CPT | Performed by: ORTHOPAEDIC SURGERY

## 2023-06-22 RX ORDER — METHYLPREDNISOLONE 4 MG/1
TABLET ORAL
Qty: 1 EACH | Refills: 0 | Status: SHIPPED | OUTPATIENT
Start: 2023-06-22

## 2023-06-22 NOTE — PATIENT INSTRUCTIONS
Diagnosis ICD-10-CM Associated Orders   1  Pain in right hip  M25 551 XR hip/pelv 2-3 vws right if performed      2  Radiculopathy, lumbar region  M54 16 methylPREDNISolone 4 MG tablet therapy pack     Ambulatory Referral to Pain Management        Lumbar spinal stenosis, referral to Spine and Pain  Medrol dose pack to reduce inflammation    Return if symptoms worsen or fail to improve

## 2023-06-22 NOTE — PROGRESS NOTES
82 y o female presents 2 weeks following injection with the right greater trochanter bursa  She describes resolution of a fair amount of her lateral right hip pain, she admits to return of low back and right thigh pain      Review of Systems  Review of systems negative unless otherwise specified in HPI    Past Medical History  Past Medical History:   Diagnosis Date   • Acute laryngitis 5/12/2022   • Anemia    • Chronic bilateral low back pain without sciatica 11/26/2019   • Chronic kidney disease    • Duodenal perforation ([facility] [address] ) 10/7/2020   • Duodenal ulcer    • Enterococcus UTI 4/7/2016   • GERD (gastroesophageal reflux disease) 04/04/2016   • Greater trochanteric bursitis of right hip 9/18/2018   • 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   • Hypercalcemia    • Hypertension    • Left lumbar radiculopathy 4/7/2016   • Mixed hyperlipidemia    • Osteoarthritis of hip 04/04/2016   • Osteoporosis    • Sacral insufficiency fracture    • SI (sacroiliac) joint inflammation ([facility] [address] ) 04/04/2016   • Substance addiction ([facility] )    • Vitamin D deficiency 3/11/2019       Past Surgical History  Past Surgical History:   Procedure Laterality Date   • BREAST EXCISIONAL BIOPSY Left 2000    benign   • COLONOSCOPY  2019   • JOINT REPLACEMENT      right hip 8/15   • MAMMO (HISTORICAL)  2019   • TONSILLECTOMY         Current Medications  Current Outpatient Medications on File Prior to Visit   Medication Sig Dispense Refill   • amoxicillin (AMOXIL) 500 mg capsule      • atorvastatin (LIPITOR) 10 mg tablet Take 1 tablet (10 mg total) by mouth daily 90 tablet 1   • Cholecalciferol (Vitamin D-3) 25 MCG (1000 UT) CAPS Take by mouth in the morning     • ibuprofen (MOTRIN) 800 mg tablet as needed     • metoprolol succinate (TOPROL-XL) 25 mg 24 hr tablet Take 1 tablet (25 mg total) by mouth 2 (two) times a day 180 tablet 3   • pantoprazole (PROTONIX) 40 mg tablet Take 1 tablet (40 mg total) by mouth 2 (two) times a day as needed (Dyspepsia) 60 tablet 3   • Premarin vaginal cream 3 (three) times a week       Current Facility-Administered Medications on File Prior to Visit   Medication Dose Route Frequency Provider Last Rate Last Admin   • denosumab (PROLIA) subcutaneous injection 60 mg  60 mg Subcutaneous Once [employer], [employer]-C           Recent Labs (HCT,HGB,PT,INR,ESR,CRP,GLU,HgA1C)  0   Lab Value Date/Time    HCT 35 3 05/22/2023 0814    HCT 37 9 11/20/2015 0737    HGB 11 5 05/22/2023 0814    HGB 12 3 11/20/2015 0737    WBC 6 41 05/22/2023 0814    WBC 6 17 11/20/2015 0737    INR 1 02 08/28/2021 0041    INR 1 03 08/18/2015 0912    ESR 14 10/14/2019 1113    GLUCOSE 100 11/20/2015 0737    HGBA1C 5 6 03/22/2023 1217    HGBA1C 5 8 (H) 06/16/2015 0941         Physical exam  · General: Awake, Alert, Oriented  · Eyes: Pupils equal, round and reactive to light  · Heart: regular rate and rhythm  · Lungs: No audible wheezing  · Abdomen: soft  Examination finds a healed posterior lateral right hip scar  There is a small amount of ecchymotic staining at the previous hip injection site  There is excellent right hip motion without groin or thigh pain    Imaging  I have personally reviewed x-rays of the right hip and my interpretation as follows:  A total hip replacement is seen in identical position to the most recent film from 5 years prior    Procedure  None indicated performed today    Assessment/Plan:   81 y  o female presents for evaluation of pain in the right hip and right thigh  Radiographically and clinically she has a well-functioning right hip replacement  She has a lumbar radiculopathy affecting the right lower extremity  All diagnoses were discussed with the patient  Treatment option including risk, benefits, return discussed in detail  Bendena Bridges is given to the patient, and spine and pain evaluation is urged and put in the computer electronically    Office follow-up as previously scheduled is my final recommendation

## 2023-06-23 ENCOUNTER — TELEPHONE (OUTPATIENT)
Dept: NEPHROLOGY | Facility: CLINIC | Age: 83
End: 2023-06-23

## 2023-06-23 NOTE — TELEPHONE ENCOUNTER
Patient called because she was prescribed methylprednisolone for her hip and she is wondering if it is safe her to take  Please advise

## 2023-06-28 ENCOUNTER — TELEPHONE (OUTPATIENT)
Dept: NEPHROLOGY | Facility: CLINIC | Age: 83
End: 2023-06-28

## 2023-06-28 NOTE — TELEPHONE ENCOUNTER
Called to schedule a follow up appointment with Dr Maximus Ackerman for November in the [facility]

## 2023-07-05 ENCOUNTER — LAB REQUISITION (OUTPATIENT)
Dept: LAB | Facility: HOSPITAL | Age: 83
End: 2023-07-05
Payer: COMMERCIAL

## 2023-07-05 DIAGNOSIS — N30.10 INTERSTITIAL CYSTITIS (CHRONIC) WITHOUT HEMATURIA: ICD-10-CM

## 2023-07-05 PROCEDURE — 87086 URINE CULTURE/COLONY COUNT: CPT | Performed by: OBSTETRICS & GYNECOLOGY

## 2023-07-05 PROCEDURE — 87186 SC STD MICRODIL/AGAR DIL: CPT | Performed by: OBSTETRICS & GYNECOLOGY

## 2023-07-05 PROCEDURE — 87077 CULTURE AEROBIC IDENTIFY: CPT | Performed by: OBSTETRICS & GYNECOLOGY

## 2023-07-06 ENCOUNTER — CONSULT (OUTPATIENT)
Dept: PAIN MEDICINE | Facility: CLINIC | Age: 83
End: 2023-07-06
Payer: COMMERCIAL

## 2023-07-06 VITALS
HEIGHT: 62 IN | HEART RATE: 50 BPM | WEIGHT: 133 LBS | BODY MASS INDEX: 24.48 KG/M2 | DIASTOLIC BLOOD PRESSURE: 63 MMHG | SYSTOLIC BLOOD PRESSURE: 107 MMHG

## 2023-07-06 DIAGNOSIS — M43.16 SPONDYLOLISTHESIS, LUMBAR REGION: ICD-10-CM

## 2023-07-06 DIAGNOSIS — M47.816 LUMBAR SPONDYLOSIS: ICD-10-CM

## 2023-07-06 DIAGNOSIS — M54.16 LUMBAR RADICULITIS: Primary | ICD-10-CM

## 2023-07-06 DIAGNOSIS — M54.40 LOW BACK PAIN WITH SCIATICA, SCIATICA LATERALITY UNSPECIFIED, UNSPECIFIED BACK PAIN LATERALITY, UNSPECIFIED CHRONICITY: ICD-10-CM

## 2023-07-06 PROCEDURE — 99244 OFF/OP CNSLTJ NEW/EST MOD 40: CPT | Performed by: ANESTHESIOLOGY

## 2023-07-06 NOTE — PROGRESS NOTES
Assessment  1. Lumbar radiculitis    2. Lumbar spondylosis    3. Spondylolisthesis, lumbar region    4. Low back pain with sciatica, sciatica laterality unspecified, unspecified back pain laterality, unspecified chronicity        Plan  81year-old female last seen by our practice in 2017 with a history of lumbar spondylosis, spondylolisthesis, and sacroiliitis, referred by Dr. Jenna Olszewski and presenting for interval consultation regarding lumbosacral back pain that radiates into the right groin and anterolateral aspect of the right leg. She denies any trauma or inciting event. X-ray of the right hip shows stable right hip arthroplasty. Last MRI of the lumbar spine from 2016 demonstrates anterolisthesis of L4 on L5 and L5 on S1. Spondylosis most pronounced from L4-5 to L5-S1. Mild central with mild to moderate foraminal stenosis at L4-5. Mild central with moderately severe bilateral foraminal stenosis at L5-S1. He has not done any recent formal physical therapy or chiropractic treatment for her lumbar complaints. She did complete rehab for her right hip after surgery. She does take ibuprofen as needed with some relief. She was prescribed a course of oral steroids by orthopedics which did provide moderate relief. The patient's low back pain seems to be myofascial and facet mediated in nature. Right leg symptoms may be muscular versus radicular in nature. 1.  I will order physical therapy as I feel she may benefit from Tomi-based exercises  2. I will order flexion-extension x-rays of the lumbar spine to evaluate for dynamic instability  3. The patient will continue with ibuprofen as needed  4. If the patient fails 6 weeks of conservative treatment we will consider an MRI of the lumbar spine without contrast  5. I will follow-up with patient in 6 weeks      My impressions and treatment recommendations were discussed in detail with the patient who verbalized understanding and had no further questions. Discharge instructions were provided. I personally saw and examined the patient and I agree with the above discussed plan of care. No orders of the defined types were placed in this encounter. No orders of the defined types were placed in this encounter. History of Present Illness    Sahara Wolfe is a 81 y.o. female last seen by our practice in 2017 with a history of lumbar spondylosis, spondylolisthesis, and sacroiliitis, referred by Dr. Tita Gutierrez and presenting for interval consultation regarding lumbosacral back pain that radiates into the right groin and anterolateral aspect of the right leg. She denies any trauma or inciting event. She denies any weakness of the lower extremities, bowel incontinence, or saddle anesthesia. She does have baseline bladder incontinence which is unchanged. X-ray of the right hip shows stable right hip arthroplasty. Last MRI of the lumbar spine from 2016 demonstrates anterolisthesis of L4 on L5 and L5 on S1. Spondylosis most pronounced from L4-5 to L5-S1. Mild central with mild to moderate foraminal stenosis at L4-5. Mild central with moderately severe bilateral foraminal stenosis at L5-S1. He has not done any recent formal physical therapy or chiropractic treatment for her lumbar complaints. She did complete rehab for her right hip after surgery. She does take ibuprofen as needed with some relief. She was prescribed a course of oral steroids by orthopedics which did provide moderate relief. The patient rates her pain moderate and intermittent. The pain does not follow any particular pattern throughout the day. The pain is described as dull, aching, and cramping. The pain is decreased with sitting and increased with exercise. She does find some relief with heat and ice applications and oral steroids.     Other than as stated above, the patient denies any interval changes in medications, medical condition, mental condition, symptoms, or allergies since the last office visit. I have personally reviewed and/or updated the patient's past medical history, past surgical history, family history, social history, current medications, allergies, and vital signs today. Review of Systems   Constitutional: Negative for fever and unexpected weight change. HENT: Negative for trouble swallowing. Eyes: Negative for visual disturbance. Respiratory: Negative for shortness of breath and wheezing. Cardiovascular: Negative for chest pain and palpitations. Gastrointestinal: Negative for constipation, diarrhea, nausea and vomiting. Endocrine: Negative for cold intolerance, heat intolerance and polydipsia. Genitourinary: Negative for difficulty urinating and frequency. Musculoskeletal: Negative for arthralgias, gait problem, joint swelling and myalgias. Skin: Negative for rash. Neurological: Negative for dizziness, seizures, syncope, weakness and headaches. Hematological: Does not bruise/bleed easily. Psychiatric/Behavioral: Negative for dysphoric mood. All other systems reviewed and are negative.       Patient Active Problem List   Diagnosis   • Primary generalized (osteo)arthritis   • GERD (gastroesophageal reflux disease)   • Essential hypertension   • Anemia   • Hypokalemia   • Parenchymal renal hypertension   • Right hip pain   • Chronic kidney disease, stage III (moderate) ([facility])   • Dyslipidemia   • Iron deficiency   • Vitamin D deficiency   • Screening for colon cancer   • Ruptured Bakers cyst   • Herpes simplex   • Senile osteoporosis   • Raynaud's disease without gangrene   • Lumbar spondylosis       Past Medical History:   Diagnosis Date   • Acute laryngitis 5/12/2022   • Anemia    • Chronic bilateral low back pain without sciatica 11/26/2019   • Chronic kidney disease    • Duodenal perforation ([address]) 10/7/2020   • Duodenal ulcer    • Enterococcus UTI 4/7/2016   • GERD (gastroesophageal reflux disease) 04/04/2016   • Greater trochanteric bursitis of right hip 9/18/2018   • 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   • Hypercalcemia    • Hypertension    • Left lumbar radiculopathy 4/7/2016   • Mixed hyperlipidemia    • Osteoarthritis of hip 04/04/2016   • Osteoporosis    • Sacral insufficiency fracture    • SI (sacroiliac) joint inflammation ([address]) 04/04/2016   • Substance addiction ([address])    • Vitamin D deficiency 3/11/2019       Past Surgical History:   Procedure Laterality Date   • BREAST EXCISIONAL BIOPSY Left 2000    benign   • COLONOSCOPY  2019   • JOINT REPLACEMENT      right hip 8/15   • MAMMO (HISTORICAL)  2019   • TONSILLECTOMY         Family History   Problem Relation Age of Onset   • No Known Problems Mother    • No Known Problems Father    • Breast cancer Daughter 48   • Breast cancer Maternal Aunt 79   • No Known Problems Sister    • No Known Problems Maternal Grandmother    • No Known Problems Maternal Grandfather    • No Known Problems Paternal Grandmother    • No Known Problems Paternal Grandfather    • No Known Problems Son    • No Known Problems Sister    • No Known Problems Sister        Social History     Occupational History   • Not on file   Tobacco Use   • Smoking status: Never   • Smokeless tobacco: Never   Vaping Use   • Vaping Use: Never used   Substance and Sexual Activity   • Alcohol use: Never   • Drug use: Not Currently     Types: Prescription     Comment: rocovering last used 1996   • Sexual activity: Yes     Partners: Male     Birth control/protection: Post-menopausal       Current Outpatient Medications on File Prior to Visit   Medication Sig   • amoxicillin (AMOXIL) 500 mg capsule    • atorvastatin (LIPITOR) 10 mg tablet Take 1 tablet (10 mg total) by mouth daily   • Cholecalciferol (Vitamin D-3) 25 MCG (1000 UT) CAPS Take by mouth in the morning   • ibuprofen (MOTRIN) 800 mg tablet as needed   • methylPREDNISolone 4 MG tablet therapy pack Use as directed on package   • metoprolol succinate (TOPROL-XL) 25 mg 24 hr tablet Take 1 tablet (25 mg total) by mouth 2 (two) times a day   • pantoprazole (PROTONIX) 40 mg tablet Take 1 tablet (40 mg total) by mouth 2 (two) times a day as needed (Dyspepsia)   • Premarin vaginal cream 3 (three) times a week     Current Facility-Administered Medications on File Prior to Visit   Medication   • denosumab (PROLIA) subcutaneous injection 60 mg       Allergies   Allergen Reactions   • Bactrim [Sulfamethoxazole-Trimethoprim] Other (See Comments)     Kidney failure       Physical Exam    Ht 5' 2" (1.575 m)   BMI 24.51 kg/m²     Constitutional: normal, well developed, well nourished, alert, in no distress and non-toxic and no overt pain behavior. Eyes: anicteric  HEENT: grossly intact  Neck: supple, symmetric, trachea midline and no masses   Pulmonary:even and unlabored  Cardiovascular:No edema or pitting edema present  Skin:Normal without rashes or lesions and well hydrated  Psychiatric:Mood and affect appropriate  Neurologic:Cranial Nerves II-XII grossly intact  Musculoskeletal:normal gait. Bilateral lumbar paraspinals mildly tender to palpation from L4-S1. Bilateral SI joints nontender to palpation. Bilateral lower extremity strength 5 out of 5 in all muscle groups. Sensation intact to light touch in L3-S1 dermatomes bilaterally. Bilateral patellar and Achilles reflexes were 2 out of 4 and symmetrical.  No clonus noted bilaterally. Negative straight leg raise bilaterally.     Imaging    PACS Images     Show images for XR hip/pelv 2-3 vws right if performed  Study Result    Narrative & Impression   RIGHT HIP     INDICATION:   M25.551: Pain in right hip.     COMPARISON: Right hip x-ray 11/5/2018     VIEWS:  XR HIP/PELV 2-3 VWS RIGHT W PELVIS IF PERFORMED  Images: 3     FINDINGS:     There is no acute fracture or dislocation.     Right total hip arthroplasty is identified in satisfactory position without evidence of hardware complication.     No lytic or blastic osseous lesion.     Soft tissues are unremarkable.     Degenerative changes visualized lower lumbar spine.     IMPRESSION:     Unremarkable appearance of right total hip arthroplasty.           Workstation performed: [id number]     Study Result    Narrative & Impression   DXA SCAN     CLINICAL HISTORY:  36-year-old postmenopausal female. OTHER RISK FACTORS:  Takes PPIs.     PHARMACOLOGIC THERAPY FOR OSTEOPOROSIS:  Prolia.     TECHNIQUE: Bone densitometry was performed using a Hologic Horizon A  bone densitometer. Regions of interest appear properly placed.      COMPARISON: 12/26/2019.     RESULTS:      LUMBAR SPINE  Level: L1-L3  (L4 vertebra excluded from analysis due to local structural abnormalities or artifact) :   BMD:  0.853  gm/cm2   T-score: -1.5         LEFT  TOTAL HIP:   BMD:  0.802  gm/cm2   T-score:  -1.1     LEFT  FEMORAL NECK:   BMD:  0.623  gm/cm2   T score: -2.0         IMPRESSION:     1. Low bone mass (osteopenia).     2. Since a DXA study from 12/26/2019, there has been:  A  STATISTICALLY SIGNIFICANT INCREASE in bone mineral density of  0.033 g/cm2 (4.3)% in the left total hip. A  STATISTICALLY SIGNIFICANT INCREASE in bone mineral density of  0.056 g/cm2 (7.0)% in the lumbar spine.        3. The 10 year risk of hip fracture is 5.0% with the 10 year risk of major osteoporotic fracture being 16% as calculated by the [facility] fracture risk assessment tool (FRAX, which is based on data generated by the [facility]   for Metabolic Bone Diseases).     4. The current NOF guidelines recommend treating patients with a T-score of -2.5 or less in the lumbar spine or hips, or in post-menopausal women and men over the age of 48 with low bone mass (osteopenia) and a FRAX 10 year risk score of >3% for hip   fracture and/or >20% for major osteoporotic fracture.     5. The NOF recommends follow-up DXA in 1-2 years after initiating therapy for osteoporosis and every 2 years thereafter.  More frequent evaluation is appropriate for patients with conditions associated with rapid bone loss, such as glucocorticoid   therapy. The interval between DXA screenings may be longer for individuals without major risk factors and initial T-score in the normal or upper low bone mass range.     The FRAX algorithm has certain limitations:  -FRAX has not been validated in patients currently or previously treated with pharmacotherapy for osteoporosis. In such patients, clinical judgment must be exercised in interpreting FRAX scores. -Prior hip, vertebral and humeral fragility fractures appear to confer greater risk of subsequent fracture than fractures at other sites (this is especially true for individuals with severe vertebral fractures), but quantification of this incremental   risk is not possible with FRAX. -FRAX underestimates fracture risk in patients with history of multiple fragility fractures. -FRAX may underestimate fracture risk in patients with history of frequent falls.  -It is not appropriate to use FRAX to monitor treatment response.        WHO CLASSIFICATION:  Normal (a T-score of -1.0 or higher)  Low bone mineral density (a T-score of less than -1.0 but higher than -2.5)  Osteoporosis (a T-score of -2.5 or less)  Severe osteoporosis (a T-score of -2.5 or less with a fragility fracture)     LEAST SIGNIFICANT CHANGE (AT 95% C. I):  Lumbar spine 0.036 g/cm2; 2.2%  Total hip: 0.022 g/cm2; 2.6%  Forearm: 0.017 g/cm2; 3.0%.                Workstation performed: [id number]   PACS Images     Show images for XR spine lumbar 2 or 3 views injury  Study Result    Narrative & Impression   LUMBAR SPINE     INDICATION:   M81.0: Age-related osteoporosis without current pathological fracture. Low back pain and height loss.     COMPARISON:  March 31, 2016     VIEWS:  XR SPINE LUMBAR 2 OR 3 VIEWS INJURY        FINDINGS:     There is no evidence of acute fracture or destructive osseous lesion.     Grade I spondylolithesis L4 on L5.   Grade 2 spondylolisthesis at L5-S1.     Age-appropriate lumbar degenerative changes are seen.     The pedicles appear intact.     Soft tissues are unremarkable.     IMPRESSION:     Spondylolisthesis at L4-5 and L5-S1        Workstation performed: [id number]     PACS Images     Show images for XR spine thoracic 3 vw  Study Result    Narrative & Impression   THORACIC SPINE     INDICATION:   M81.0: Age-related osteoporosis without current pathological fracture. Low back pain and height loss.     COMPARISON:  None     VIEWS:  XR SPINE THORACIC 3 VW lateral views only.        FINDINGS:     There is no fracture or pathologic bone lesion.       Thoracic vertebral alignment is within normal limits with mild kyphosis.   Osteoporosis.     No significant degenerative changes.      There is no displacement of the paraspinal line.      The pedicles appear intact.     IMPRESSION:     No acute osseous abnormality.        Workstation performed: [id number]

## 2023-07-07 ENCOUNTER — HOSPITAL ENCOUNTER (OUTPATIENT)
Dept: RADIOLOGY | Facility: HOSPITAL | Age: 83
Discharge: HOME | End: 2023-07-07
Attending: ANESTHESIOLOGY
Payer: COMMERCIAL

## 2023-07-07 DIAGNOSIS — M54.16 LUMBAR RADICULITIS: ICD-10-CM

## 2023-07-07 DIAGNOSIS — M43.16 SPONDYLOLISTHESIS, LUMBAR REGION: ICD-10-CM

## 2023-07-07 LAB — BACTERIA UR CULT: ABNORMAL

## 2023-07-07 PROCEDURE — 72114 X-RAY EXAM L-S SPINE BENDING: CPT

## 2023-07-11 ENCOUNTER — TELEPHONE (OUTPATIENT)
Dept: PAIN MEDICINE | Facility: MEDICAL CENTER | Age: 83
End: 2023-07-11

## 2023-07-11 NOTE — TELEPHONE ENCOUNTER
Caller: patient    Doctor: Nando Hooks    Reason for call: would like to know if she should wait for her XR before PT    Call back#:

## 2023-07-12 ENCOUNTER — HOSPITAL ENCOUNTER (OUTPATIENT)
Dept: RADIOLOGY | Age: 83
Discharge: HOME | End: 2023-07-12
Payer: COMMERCIAL

## 2023-07-12 VITALS — WEIGHT: 133 LBS | BODY MASS INDEX: 24.48 KG/M2 | HEIGHT: 62 IN

## 2023-07-12 DIAGNOSIS — Z12.31 ENCOUNTER FOR SCREENING MAMMOGRAM FOR MALIGNANT NEOPLASM OF BREAST: ICD-10-CM

## 2023-07-12 PROCEDURE — 77063 BREAST TOMOSYNTHESIS BI: CPT

## 2023-07-12 PROCEDURE — 77067 SCR MAMMO BI INCL CAD: CPT

## 2023-07-18 ENCOUNTER — EVALUATION (OUTPATIENT)
Dept: PHYSICAL THERAPY | Facility: REHABILITATION | Age: 83
End: 2023-07-18
Payer: COMMERCIAL

## 2023-07-18 DIAGNOSIS — M47.816 LUMBAR SPONDYLOSIS: ICD-10-CM

## 2023-07-18 DIAGNOSIS — M54.16 LUMBAR RADICULITIS: ICD-10-CM

## 2023-07-18 DIAGNOSIS — M54.40 LOW BACK PAIN WITH SCIATICA, SCIATICA LATERALITY UNSPECIFIED, UNSPECIFIED BACK PAIN LATERALITY, UNSPECIFIED CHRONICITY: Primary | ICD-10-CM

## 2023-07-18 DIAGNOSIS — M43.16 SPONDYLOLISTHESIS, LUMBAR REGION: ICD-10-CM

## 2023-07-18 PROCEDURE — 97162 PT EVAL MOD COMPLEX 30 MIN: CPT

## 2023-07-18 PROCEDURE — 97110 THERAPEUTIC EXERCISES: CPT

## 2023-07-18 PROCEDURE — 97112 NEUROMUSCULAR REEDUCATION: CPT

## 2023-07-18 NOTE — PROGRESS NOTES
PT Evaluation     Today's date: 2023  Patient name: Ivett Grier  : 1940  MRN: [id number]  Referring provider: Wili Jacobson DO  Dx:   Encounter Diagnosis     ICD-10-CM    1. Lumbar radiculitis  M54.16 Ambulatory referral to Physical Therapy      2. Lumbar spondylosis  M47.816 Ambulatory referral to Physical Therapy      3. Spondylolisthesis, lumbar region  M43.16 Ambulatory referral to Physical Therapy      4. Low back pain with sciatica, sciatica laterality unspecified, unspecified back pain laterality, unspecified chronicity  M54.40 Ambulatory referral to Physical Therapy          Start Time: 1020  Stop Time: 1115  Total time in clinic (min): 55 minutes    Assessment  Assessment details: Ivett Grier is a 81 y.o. female referred to physical therapy for lumbar radiculitis, lumbar spondylosis, spondylolisthesis, and LBP with sciatica. Primary impairments include increased pain with functional activities, decreased functional strength, lumbar AROM dysfunction, decreased gait quality, and impaired static/dynamic balance which is limiting her ability to perform ADLs and recreational activities without pain or functional restrictions. She demonstrated positive slump and SLR which suggests possible radiculopathy related impairments that may be contributing to her ongoing pain. Ongoing symptoms may also be related to decreased LE functional strength as seen by 5xSTS and TUG values which suggests increased risk for falls. Pt was provided with a basic HEP which will be reviewed in the upcoming session. Pt was educated on anatomy and physiology of diagnosis and demonstrated verbal understanding. Pt would benefit from skilled PT interventions to increase functional lower extremity strength, increase pain free ROM, improve gait quality, increase static/dynamic balance, and facilitate return to recreational activities and ADL management/independence with less limitations and pain.  1:1 with Christy Robb DPT entirety of tx. Impairments: abnormal gait, abnormal or restricted ROM, abnormal movement, activity intolerance, impaired balance, impaired physical strength, lacks appropriate home exercise program, pain with function, poor posture  and poor body mechanics    Symptom irritability: moderateBarriers to therapy: None  Understanding of Dx/Px/POC: excellent   Prognosis: good    Goals  Short term goals:   STGs to be met in 3-4 weeks:  1.  Increase hip, glutes, and knee strength by half grade or more to increase functional LE strength. 2.  Increase TA, Multifidy, and erector spinae strength by half grade or better to improve trunk stability for standing and walking.    3. Demonstrate decreased pain to </= 4/10 after a work day to demonstrate improved QoL. 4.  Independent with basic HEP. Long term goals:  LTG's to be met by DC:  1.  Ambulate community distances with little to no pain or difficulty. 2.  Perform all transfers without pain and difficulty. 3.  Perform recreational and work activities / ADLs with little pain or difficulty. 4.  Increase strength to 4/5 or better in LEs. 5.  Tolerate standing and/or sitting for work for >/= 1 hour with little to no pain. 6.  Increase FOTO to predicted value by DC.     Plan  Patient would benefit from: skilled physical therapy  Referral necessary: No  Planned therapy interventions: abdominal trunk stabilization, balance/weight bearing training, body mechanics training, functional ROM exercises, home exercise program, gait training, joint mobilization, manual therapy, massage, neuromuscular re-education, patient education, postural training, strengthening, therapeutic activities, therapeutic exercise, graded activity, graded exercise and stretching  Frequency: 1x week  Duration in weeks: 12  Plan of Care beginning date: 7/18/2023  Plan of Care expiration date: 7/30/2023  Treatment plan discussed with: patient        Subjective  HPI: Pt referred to physical therapy for lumbar radiculitis, lumbar spondylosis, Spondylolisthesis, and LBP with sciatica. X-ray performed on 7/7/3 show no acute osseous abnormalities. Pt notes that her back has been bothering her for a while on and off. She mentioned that she is still working part-time at [employer] at [employer] and is required to stand and walk for long periods of time. She noted that has pain in the lower back which believes started after having her R hip replaced, she receives injections for her hips and L knee. She notes that her back hurts the most at the end of the day after working. Pain Location: Low back  Pain Intensity: Current: 2-3/10, Worst: 4-5/10, Best: 0/10  DAHLIA: Insidious  DOI: Chronic  Aggravating Factors: bending to pick something up, standing/walking for prolonged periods of time  Alleviating Factors: Rest   Living Situation: 2SH, full flight of steps to 2nd floor 1 railing on left  Goals: To have some exercises for home and to stay active. PLOF: Working part time    Objective  Vitals: BP- 120/68     Postural Findings:     Head Position  Protracted X  Neutral   Retracted   Scapular Position  Protracted  X Neutral   Retracted   Thoracic Spine   Inc Kyphosis X Neutral       Lumbar Spine   Inc Lordosis X  Neutral  Dec Lordosis   Pelvis   Anterior Tilt X Neutral   Posterior Tilt   Iliac Crest   L elevated X Neutral   R elevated   Feet   Pronated X Neutral   Supinated   Lateral Shift   Right   Left X None      Strength and ROM evaluated B from a regional biomechanical perspective and values relevant to this episode recorded in tables below. ROM:   Joint / Motion  Right: 7/18/2023  Left: 7/18/2023    Lumbar Flexion  25% limited     Lumbar Extension  10     Lumbar Sidebending  15 15   Hip ER  10 15   Hip IR  10 15         Strength: MMT revealed the following findings.   Joint Motion Right: 7/18/2023 Left: 7/18/2023   Hip Flexion 3+/5 4-/5   Hip Abduction 3+/5 4-/5   Hip Adduction 4/5 4/5   Hip Extension 3+/5 3+/5   Knee Extension 3+/5 4-/5   Knee Flexion 3+/5 4-/5   Ankle Plantarflexion 3+/5 3+/5   Ankle Dorsiflexion 4/5 4/5         Repeated Movements:     Standing Baseline:                                                   DURING MVMT. RESPONSE AFTER  Standing Flexion Increased pain No Change   Standing Extension Increased pain No Change      Lying Baseline:                                                   DURING MVMT. RESPONSE AFTER  Lying Flexion NT NT   Lying Extension NT NT         Light Touch Sensation:  RLE - 5/5  LLE - 5/5    Deep Tendon Reflexes:  Patellar Reflex - diminished BL  Ankle Reflex - diminished BL      Additional Assessments:  Pain with palpation noted: Slight pain with palpation along paraspinals at the lower back  Passive intervertebral motion: NT in prone  Supine to Sit: [facility]  Gait Assessment: Decreased step width and length, decreased dynamic balance  Functional Strength:   5xSTS: 2023 = 25.03  TU2023 = 16.5s     Special Tests:  Lumbar Specific and Neural Tension                                                                            Test / Measure  2023   Straight Leg raise P (BL)   Crossed straight leg raise N   Slump test P RLE   Prone instability test N   Sural n (invert), tibial (callum) N        SI Joint Screen:   Test / Measure  2023   Sacral Compression (SL) N   Sacral Distraction N   Thigh Thrust NT   Gaenslens NT   Sacral Thrust NT           Precautions: PMH includes anemia, chronic BL LBP without sciatica, L lumbar radiculopathy, CKD, greater trochanteric bursitis of R hip, HTN, osteoporosis, sacral insufficiency fracture, SIJ inflammation. PSH includes right hip replacement on 8/15/2019.      Pertinent Findings:                                    Test / Measure  2023     FOTO NP (error)     BLE Strength 3+ to 4/5     SLR/Slump P       Manuals             BL Hip PROM             Paraspinal STM Neuro Re-Ed             TA Recruitment + Bridge 1x10, hold 5 sec            Supine Clam Shells 1x10 BL            Sitting Lumbar Ext 10x            SLS             Tandem Balance             Tball Press                                       Ther Ex             HEP Review + Pt Edu 10 min            STS             SLR 10x            Palloff Press             Standing Hip Flex             Standing Hip Abd/ext             Side Steps w/ TB             Fwd Step Ups             Modified KB DL             Colleen Fwd/Bwd Ambulation             Suitcase Carry             NuStep                          Ther Activity                                       Gait Training                                       Modalities

## 2023-07-27 ENCOUNTER — OFFICE VISIT (OUTPATIENT)
Dept: PHYSICAL THERAPY | Facility: REHABILITATION | Age: 83
End: 2023-07-27
Payer: COMMERCIAL

## 2023-07-27 DIAGNOSIS — M54.16 LUMBAR RADICULITIS: Primary | ICD-10-CM

## 2023-07-27 DIAGNOSIS — M47.816 LUMBAR SPONDYLOSIS: ICD-10-CM

## 2023-07-27 DIAGNOSIS — M43.16 SPONDYLOLISTHESIS, LUMBAR REGION: ICD-10-CM

## 2023-07-27 DIAGNOSIS — M54.40 LOW BACK PAIN WITH SCIATICA, SCIATICA LATERALITY UNSPECIFIED, UNSPECIFIED BACK PAIN LATERALITY, UNSPECIFIED CHRONICITY: ICD-10-CM

## 2023-07-27 PROCEDURE — 97110 THERAPEUTIC EXERCISES: CPT

## 2023-07-27 PROCEDURE — 97112 NEUROMUSCULAR REEDUCATION: CPT

## 2023-07-27 NOTE — PROGRESS NOTES
Daily Note     Today's date: 2023  Patient name: Eugenie Salas  : 1940  MRN: [id number]  Referring provider: Charissa Smith DO  Dx:   Encounter Diagnosis     ICD-10-CM    1. Lumbar radiculitis  M54.16       2. Lumbar spondylosis  M47.816       3. Spondylolisthesis, lumbar region  M43.16       4. Low back pain with sciatica, sciatica laterality unspecified, unspecified back pain laterality, unspecified chronicity  M54.40           Start Time: 1010  Stop Time: 1048  Total time in clinic (min): 38 minutes    Subjective: Pt notes that her back has been feeling okay recently. She has been doing her exercises at home and they have been going well. Objective: See treatment diary below      Assessment: Tolerated treatment well. Patient would benefit from continued PT. Pt was introduced to further functional LE strength and core stability exercises and responded well without increase in pain. She was challenged with increased resistance during SLR as she continues to demonstrate decreased strength at the hips. She responds well to VCs for knee positioning and lumbopelvic control during STS and displays improved technique with cueing. Supervised by Jing Do DPT from [id number], 1:1 with Jose Roldan DPT entirety rest of tx. Plan: Continue per plan of care. Precautions: PMH includes anemia, chronic BL LBP without sciatica, L lumbar radiculopathy, CKD, greater trochanteric bursitis of R hip, HTN, osteoporosis, sacral insufficiency fracture, SIJ inflammation. PSH includes right hip replacement on 8/15/2019.      Pertinent Findings:                                    Test / Measure  2023     FOTO NP (error)     BLE Strength 3+ to 4/5     SLR/Slump P       Manuals            BL Hip PROM  [facility]           Paraspinal STM                                       Neuro Re-Ed             TA Recruitment + Bridge 1x10, hold 5 sec 1x10, hold 5 sec           Supine Clam Shells 1x10 BL 1x10 BL Sitting Lumbar Ext 10x 10x           SLS             Tandem Balance             Tball Press  10x hold 5s                                     Ther Ex             HEP Review + Pt Edu 10 min            STS  2x8           SLR 10x 2x10           Palloff Press  2x10           Standing Hip Flex  2x10           Standing Hip Abd/ext             Side Steps w/ TB  BTB, 3 laps           Fwd Step Ups             Modified CLAUDIA Macias Fwd/Bwd Ambulation             Suitcase Carry             NuStep  6' lvl 6                        Ther Activity                                       Gait Training                                       Modalities

## 2023-07-31 ENCOUNTER — OFFICE VISIT (OUTPATIENT)
Dept: PODIATRY | Facility: CLINIC | Age: 83
End: 2023-07-31
Payer: COMMERCIAL

## 2023-07-31 VITALS
WEIGHT: 135 LBS | RESPIRATION RATE: 18 BRPM | DIASTOLIC BLOOD PRESSURE: 68 MMHG | HEIGHT: 62 IN | HEART RATE: 74 BPM | SYSTOLIC BLOOD PRESSURE: 124 MMHG | BODY MASS INDEX: 24.84 KG/M2

## 2023-07-31 DIAGNOSIS — M19.071 PRIMARY OSTEOARTHRITIS OF RIGHT FOOT: Primary | ICD-10-CM

## 2023-07-31 PROCEDURE — 20550 NJX 1 TENDON SHEATH/LIGAMENT: CPT | Performed by: PODIATRIST

## 2023-07-31 RX ORDER — LIDOCAINE HYDROCHLORIDE 10 MG/ML
1 INJECTION, SOLUTION EPIDURAL; INFILTRATION; INTRACAUDAL; PERINEURAL ONCE
Status: COMPLETED | OUTPATIENT
Start: 2023-07-31 | End: 2023-07-31

## 2023-07-31 RX ORDER — TRIAMCINOLONE ACETONIDE 40 MG/ML
20 INJECTION, SUSPENSION INTRA-ARTICULAR; INTRAMUSCULAR ONCE
Status: COMPLETED | OUTPATIENT
Start: 2023-07-31 | End: 2023-07-31

## 2023-07-31 RX ADMIN — TRIAMCINOLONE ACETONIDE 20 MG: 40 INJECTION, SUSPENSION INTRA-ARTICULAR; INTRAMUSCULAR at 09:40

## 2023-07-31 RX ADMIN — LIDOCAINE HYDROCHLORIDE 1 ML: 10 INJECTION, SOLUTION EPIDURAL; INFILTRATION; INTRACAUDAL; PERINEURAL at 09:41

## 2023-07-31 NOTE — PROGRESS NOTES
Patient presents with recurrence of pain dorsum right foot due to osteoarthritis. Another cortisone injection is desired. Pain is present in the area of the second metatarsal cuneiform joint. Treatment consisted of injecting 0.5 cc Kenalog 40 along with 1 cc 1% Xylocaine to the right foot. Patient is rescheduled in 8 weeks. Foot injection     Date/Time 7/31/2023 9:00 AM     Performed by  Makeda Avilez DPM   Authorized by Makeda Avilez DPM     Universal Protocol   Consent: Verbal consent obtained. Risks and benefits: risks, benefits and alternatives were discussed  Consent given by: patient  Patient understanding: patient states understanding of the procedure being performed  Patient identity confirmed: verbally with patient        Local anesthesia used: yes     Anesthesia   Local anesthesia used: yes  Local Anesthetic: lidocaine 1% without epinephrine     Procedure Details   Procedure Notes: Injected right foot with 0.5 cc Kenalog 40 along with 1 cc 1% Xylocaine.

## 2023-08-03 ENCOUNTER — OFFICE VISIT (OUTPATIENT)
Dept: PHYSICAL THERAPY | Facility: REHABILITATION | Age: 83
End: 2023-08-03
Payer: COMMERCIAL

## 2023-08-03 DIAGNOSIS — M54.40 LOW BACK PAIN WITH SCIATICA, SCIATICA LATERALITY UNSPECIFIED, UNSPECIFIED BACK PAIN LATERALITY, UNSPECIFIED CHRONICITY: ICD-10-CM

## 2023-08-03 DIAGNOSIS — M43.16 SPONDYLOLISTHESIS, LUMBAR REGION: ICD-10-CM

## 2023-08-03 DIAGNOSIS — M54.16 LUMBAR RADICULITIS: Primary | ICD-10-CM

## 2023-08-03 DIAGNOSIS — M47.816 LUMBAR SPONDYLOSIS: ICD-10-CM

## 2023-08-03 PROCEDURE — 97112 NEUROMUSCULAR REEDUCATION: CPT

## 2023-08-03 PROCEDURE — 97110 THERAPEUTIC EXERCISES: CPT

## 2023-08-03 NOTE — PROGRESS NOTES
Daily Note     Today's date: 8/3/2023  Patient name: Millie Mojica  : 1940  MRN: [id number]  Referring provider: Jag Riley DO  Dx:   Encounter Diagnosis     ICD-10-CM    1. Lumbar radiculitis  M54.16       2. Lumbar spondylosis  M47.816       3. Spondylolisthesis, lumbar region  M43.16       4. Low back pain with sciatica, sciatica laterality unspecified, unspecified back pain laterality, unspecified chronicity  M54.40           Start Time: 09  Stop Time: 0  Total time in clinic (min): 45 minutes    Subjective: Pt notes that her back has been feeling okay, she was off these past 5 days from work so her back was not aggravated. Objective: See treatment diary below      Assessment: Tolerated treatment well. Patient would benefit from continued PT. Pt responded well to increased resistance with supine hip abduction and SLR which which displays improving functional LE strength. She continues to be challenged with STS as she noted increased pain at the knees and shows decreased movement quality with knee tracking. She responds well to VCs for glute activation and posterior chain recruitment during dynamic LE movements and shows improved technique with cueing. 1:1 with Katya Beltrán DPT entirety of tx. Plan: Continue per plan of care. Precautions: PMH includes anemia, chronic BL LBP without sciatica, L lumbar radiculopathy, CKD, greater trochanteric bursitis of R hip, HTN, osteoporosis, sacral insufficiency fracture, SIJ inflammation. PSH includes right hip replacement on 8/15/2019.      Pertinent Findings:                                    Test / Measure  2023     FOTO NP (error)     BLE Strength 3+ to 4/5     SLR/Slump P       Manuals  8/3          BL Hip PROM  [facility]          Paraspinal STM                                       Neuro Re-Ed             TA Recruitment + Bridge 1x10, hold 5 sec 1x10, hold 5 sec 1x10, hold 5 sec          Supine Clam Shells 1x10 BL 1x10 BL 15x MTB          Sitting Lumbar Ext 10x 10x 10x          SLS             Tandem Balance             Tball Press  10x hold 5s 10x hold 5s                                    Ther Ex             HEP Review + Pt Edu 10 min            STS  2x8 2x8          SLR 10x 2x10 2x8, 3# AW BL          Palloff Press  2x10           Standing Hip Flex  2x10 2x10, RTB          Standing Hip Abd/ext             Side Steps w/ TB  BTB, 3 laps GTB, 3 laps          Fwd Step Ups             Modified KB DL   9" step, 15# DB          [place] Fwd/Bwd Ambulation             Suitcase Carry             NuStep  6' lvl 6 6' lvl 6                       Ther Activity                                       Gait Training                                       Modalities

## 2023-08-07 DIAGNOSIS — E78.5 DYSLIPIDEMIA: ICD-10-CM

## 2023-08-07 DIAGNOSIS — K26.5 DUODENAL ULCER WITH PERFORATION (HCC): ICD-10-CM

## 2023-08-07 RX ORDER — PANTOPRAZOLE SODIUM 40 MG/1
40 TABLET, DELAYED RELEASE ORAL 2 TIMES DAILY
Qty: 180 TABLET | Refills: 1 | Status: SHIPPED | OUTPATIENT
Start: 2023-08-07

## 2023-08-07 RX ORDER — ATORVASTATIN CALCIUM 10 MG/1
10 TABLET, FILM COATED ORAL DAILY
Qty: 90 TABLET | Refills: 1 | Status: SHIPPED | OUTPATIENT
Start: 2023-08-07

## 2023-08-10 ENCOUNTER — OFFICE VISIT (OUTPATIENT)
Dept: PHYSICAL THERAPY | Facility: REHABILITATION | Age: 83
End: 2023-08-10
Payer: COMMERCIAL

## 2023-08-10 DIAGNOSIS — M47.816 LUMBAR SPONDYLOSIS: ICD-10-CM

## 2023-08-10 DIAGNOSIS — M54.40 LOW BACK PAIN WITH SCIATICA, SCIATICA LATERALITY UNSPECIFIED, UNSPECIFIED BACK PAIN LATERALITY, UNSPECIFIED CHRONICITY: ICD-10-CM

## 2023-08-10 DIAGNOSIS — M54.16 LUMBAR RADICULITIS: Primary | ICD-10-CM

## 2023-08-10 DIAGNOSIS — M43.16 SPONDYLOLISTHESIS, LUMBAR REGION: ICD-10-CM

## 2023-08-10 PROCEDURE — 97112 NEUROMUSCULAR REEDUCATION: CPT

## 2023-08-10 PROCEDURE — 97110 THERAPEUTIC EXERCISES: CPT

## 2023-08-10 NOTE — PROGRESS NOTES
Daily Note     Today's date: 8/10/2023  Patient name: Marito Bravo  : 1940  MRN: [id number]  Referring provider: Nunu Morin DO  Dx:   Encounter Diagnosis     ICD-10-CM    1. Lumbar radiculitis  M54.16       2. Lumbar spondylosis  M47.816       3. Spondylolisthesis, lumbar region  M43.16       4. Low back pain with sciatica, sciatica laterality unspecified, unspecified back pain laterality, unspecified chronicity  M54.40           Start Time: 930  Stop Time:   Total time in clinic (min): 45 minutes    Subjective: Pt notes that her lower legs have been bothering her slightly but she has not been walking as much. Objective: See treatment diary below      Assessment: Tolerated treatment well. Patient would benefit from continued PT. Pt was introduced to increased resistance with SLR to improve functional LE strength and responded well but noted fatigue. She was able to tolerate increased volume with modified DL which shows improving functional LE strength. She continues to respond well to VCs for lumbopelvic control and knee positioning during STS and modified DL and shows improved technique with cueing. 1:1 with Larissa Todd DPT entirety of tx. Plan: Continue per plan of care. Precautions: PMH includes anemia, chronic BL LBP without sciatica, L lumbar radiculopathy, CKD, greater trochanteric bursitis of R hip, HTN, osteoporosis, sacral insufficiency fracture, SIJ inflammation. PSH includes right hip replacement on 8/15/2019.      Pertinent Findings:                                    Test / Measure  2023     FOTO NP (error)     BLE Strength 3+ to 4/5     SLR/Slump P       Manuals  8/3 8/10         BL Hip PROM  [facility]         Paraspinal STM                                       Neuro Re-Ed             TA Recruitment + Bridge 1x10, hold 5 sec 1x10, hold 5 sec 1x10, hold 5 sec 1x10, hold 5 sec         Supine Clam Shells 1x10 BL 1x10 BL 15x MTB 15x MTB         Sitting Lumbar Ext 10x 10x 10x 10x         SLS             Tandem Balance             Tball Press  10x hold 5s 10x hold 5s 10x hold 5s         Tball Press Hip Flex    10x ea                      Ther Ex             HEP Review + Pt Edu 10 min            STS  2x8 2x8 2x8         SLR 10x 2x10 2x8, 3# AW BL 2x8, 4# AW BL         Palloff Press  2x10           Standing Hip Flex  2x10 2x10, RTB 2x10, RTB         Standing Hip Abd/ext             Side Steps w/ TB  BTB, 3 laps GTB, 3 laps GTB, 3 laps         Fwd Step Ups             Modified KB DL   9" step, 15# DB 9" step, 15# DB, 3x7         Colleen Fwd/Bwd Ambulation             Suitcase Carry             NuStep  6' lvl 6 6' lvl 6 6' lvl 6                      Ther Activity                                       Gait Training                                       Modalities

## 2023-08-16 ENCOUNTER — OFFICE VISIT (OUTPATIENT)
Dept: PHYSICAL THERAPY | Facility: REHABILITATION | Age: 83
End: 2023-08-16
Payer: COMMERCIAL

## 2023-08-16 DIAGNOSIS — M47.816 LUMBAR SPONDYLOSIS: ICD-10-CM

## 2023-08-16 DIAGNOSIS — M54.16 LUMBAR RADICULITIS: Primary | ICD-10-CM

## 2023-08-16 DIAGNOSIS — M54.40 LOW BACK PAIN WITH SCIATICA, SCIATICA LATERALITY UNSPECIFIED, UNSPECIFIED BACK PAIN LATERALITY, UNSPECIFIED CHRONICITY: ICD-10-CM

## 2023-08-16 DIAGNOSIS — M43.16 SPONDYLOLISTHESIS, LUMBAR REGION: ICD-10-CM

## 2023-08-16 PROCEDURE — 97110 THERAPEUTIC EXERCISES: CPT

## 2023-08-16 PROCEDURE — 97112 NEUROMUSCULAR REEDUCATION: CPT

## 2023-08-16 NOTE — PROGRESS NOTES
Assessment:  1. Chronic bilateral low back pain, unspecified whether sciatica present    2. Lumbar radiculitis    3. Low back pain with sciatica, sciatica laterality unspecified, unspecified back pain laterality, unspecified chronicity    4. Lumbar spondylosis    5. Spondylolisthesis, lumbar region        Plan:  1. I will order an MRI of the lumbar spine without contrast  2. Continue with physical therapy and home exercise program  3. Patient may continue ibuprofen as needed  4. Follow-up after imaging or sooner if needed    History of Present Illness: The patient is a 81 y.o. female with a history of right total hip arthroplasty last seen on 07/06/2023 who presents for a follow up office visit in regards to chronic low back pain that is nonradiating into the lower extremities. She denies any radicular symptoms into the lower extremities, bowel or bladder incontinence or saddle anesthesia. .  She has been participating in physical therapy with some mild improvement of her low back pain. X-ray of the right hip shows stable right hip arthroplasty. X-ray of the lumbar spine reveals disc base narrowing at L4-5 and L5-S1 with lumbar facet arthrosis grade 1 anterolisthesis. Last MRI of the lumbar spine from 2016 demonstrates anterolisthesis of L4 on L5 and L5 on S1. Spondylosis most pronounced from L4-5 to L5-S1. Mild central with mild to moderate foraminal stenosis at L4-5. Mild central with moderately severe bilateral foraminal stenosis at L5-S1. The patient rates her pain a 4 out of 10 on the numeric pain rating scale. She intermittently has pain in the evening which is described as dull aching. She is managing her pain with ibuprofen as needed    I have personally reviewed and/or updated the patient's past medical history, past surgical history, family history, social history, current medications, allergies, and vital signs today.        Review of Systems:    Review of Systems   Respiratory: Negative for shortness of breath. Cardiovascular: Negative for chest pain. Gastrointestinal: Negative for constipation, diarrhea, nausea and vomiting. Musculoskeletal: Negative for arthralgias, gait problem, joint swelling and myalgias. Skin: Negative for rash. Neurological: Negative for dizziness, seizures and weakness. All other systems reviewed and are negative.         Past Medical History:   Diagnosis Date   • Acute laryngitis 5/12/2022   • Anemia    • Chronic bilateral low back pain without sciatica 11/26/2019   • Chronic kidney disease    • Duodenal perforation ([address]) 10/7/2020   • Duodenal ulcer    • Enterococcus UTI 4/7/2016   • GERD (gastroesophageal reflux disease) 04/04/2016   • Greater trochanteric bursitis of right hip 9/18/2018   • 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   • Hypercalcemia    • Hypertension    • Left lumbar radiculopathy 4/7/2016   • Mixed hyperlipidemia    • Osteoarthritis of hip 04/04/2016   • Osteoporosis    • Sacral insufficiency fracture    • SI (sacroiliac) joint inflammation ([address]) 04/04/2016   • Substance addiction ([address])    • Vitamin D deficiency 3/11/2019       Past Surgical History:   Procedure Laterality Date   • BREAST EXCISIONAL BIOPSY Left 2000    benign   • COLONOSCOPY  2019   • JOINT REPLACEMENT      right hip 8/15   • MAMMO (HISTORICAL)  2019   • TONSILLECTOMY         Family History   Problem Relation Age of Onset   • No Known Problems Mother    • No Known Problems Father    • Breast cancer Daughter 48   • Breast cancer Maternal Aunt 79   • No Known Problems Sister    • No Known Problems Maternal Grandmother    • No Known Problems Maternal Grandfather    • No Known Problems Paternal Grandmother    • No Known Problems Paternal Grandfather    • No Known Problems Son    • No Known Problems Sister    • No Known Problems Sister        Social History     Occupational History   • Not on file   Tobacco Use   • Smoking status: Never   • Smokeless tobacco: Never Vaping Use   • Vaping Use: Never used   Substance and Sexual Activity   • Alcohol use: Never   • Drug use: Not Currently     Types: Prescription     Comment: neil last used 1996   • Sexual activity: Yes     Partners: Male     Birth control/protection: Post-menopausal         Current Outpatient Medications:   •  atorvastatin (LIPITOR) 10 mg tablet, Take 1 tablet (10 mg total) by mouth daily, Disp: 90 tablet, Rfl: 1  •  Cholecalciferol (Vitamin D-3) 25 MCG (1000 UT) CAPS, Take by mouth in the morning, Disp: , Rfl:   •  ibuprofen (MOTRIN) 800 mg tablet, as needed, Disp: , Rfl:   •  metoprolol succinate (TOPROL-XL) 25 mg 24 hr tablet, Take 1 tablet (25 mg total) by mouth 2 (two) times a day, Disp: 180 tablet, Rfl: 3  •  pantoprazole (PROTONIX) 40 mg tablet, Take 1 tablet (40 mg total) by mouth 2 (two) times a day, Disp: 180 tablet, Rfl: 1  •  amoxicillin (AMOXIL) 500 mg capsule, , Disp: , Rfl:   •  methylPREDNISolone 4 MG tablet therapy pack, Use as directed on package (Patient not taking: Reported on 7/6/2023), Disp: 1 each, Rfl: 0  •  Premarin vaginal cream, 3 (three) times a week, Disp: , Rfl:     Current Facility-Administered Medications:   •  denosumab (PROLIA) subcutaneous injection 60 mg, 60 mg, Subcutaneous, Once, [employer], YONY    Allergies   Allergen Reactions   • Bactrim [Sulfamethoxazole-Trimethoprim] Other (See Comments)     Kidney failure       Physical Exam:    /72   Pulse 84   Ht 5' 2" (1.575 m)   Wt 59.4 kg (131 lb)   BMI 23.96 kg/m²     Constitutional:normal, well developed, well nourished, alert, in no distress and non-toxic and no overt pain behavior.   Eyes:anicteric  HEENT:grossly intact  Neck:supple, symmetric, trachea midline and no masses   Pulmonary:even and unlabored  Cardiovascular:No edema or pitting edema present  Skin:Normal without rashes or lesions and well hydrated  Psychiatric:Mood and affect appropriate  Neurologic:Cranial Nerves II-XII grossly intact  Musculoskeletal:normal gait      Imaging  MRI lumbar spine without contrast    (Results Pending)     LUMBAR SPINE   INDICATION: M54.16: Radiculopathy, lumbar region   M43.16: Spondylolisthesis, lumbar region. COMPARISON: 11/5/2018   VIEWS: XR SPINE LUMBAR COMPLETE W BENDING MINIMUM 6 VIEWS   FINDINGS:   There are 5 non rib bearing lumbar vertebral bodies. There is no evidence of acute fracture or destructive osseous lesion. Grade 1 anterolisthesis of L4-L5 and L5-S1. No significant dynamic translation on flexion and extension views. Disc space narrowing at L4-L5 and L5-S1 with endplate sclerosis. There is lower lumbar facet arthrosis. The pedicles appear intact. Status post right hip arthroplasty. Soft tissues are unremarkable. IMPRESSION:   No acute osseous abnormality. Degenerative changes as described.    Workstation performed: [id number]      Orders Placed This Encounter   Procedures   • MRI lumbar spine without contrast

## 2023-08-16 NOTE — PROGRESS NOTES
Daily Note     Today's date: 2023  Patient name: Alf Vazquez  : 1940  MRN: [id number]  Referring provider: Fitz Mandel DO  Dx:   Encounter Diagnosis     ICD-10-CM    1. Lumbar radiculitis  M54.16       2. Lumbar spondylosis  M47.816       3. Spondylolisthesis, lumbar region  M43.16       4. Low back pain with sciatica, sciatica laterality unspecified, unspecified back pain laterality, unspecified chronicity  M54.40           Start Time: 945  Stop Time:   Total time in clinic (min): 40 minutes    Subjective: Pt notes that her back has been feeling slightly better and reports no significant changes since last visit. She mentioned that she returns to pain medicine tomorrow. Objective: See treatment diary below      Assessment: Tolerated treatment well. Patient would benefit from continued PT. Pt was introduced to suitcase carry to improve paraspinal strength and core stability and responded well without excessive increase in pain or soreness. She continues to show decreased lumbopelvic control during STS and modified DL and responds well to VCs for posterior chain recruitment and quad activation during dynamic LE movements. 1:1 with Lety Hernandez DPT entirety of tx. Plan: Continue per plan of care. Precautions: PMH includes anemia, chronic BL LBP without sciatica, L lumbar radiculopathy, CKD, greater trochanteric bursitis of R hip, HTN, osteoporosis, sacral insufficiency fracture, SIJ inflammation. PSH includes right hip replacement on 8/15/2019.      Pertinent Findings:                                    Test / Measure  2023     FOTO NP (error)     BLE Strength 3+ to 4/5     SLR/Slump P       Manuals  8/3 8/10 8/16        BL Hip PROM  [facility]         Paraspinal STM                                       Neuro Re-Ed             TA Recruitment + Bridge 1x10, hold 5 sec 1x10, hold 5 sec 1x10, hold 5 sec 1x10, hold 5 sec 1x10, hold 5 sec        Supine Clam Shells 1x10 BL 1x10 BL 15x MTB 15x MTB 15x MTB        Sitting Lumbar Ext 10x 10x 10x 10x 10x        SLS             Tandem Balance             Tball Press  10x hold 5s 10x hold 5s 10x hold 5s 10x hold 5s        Tball Press Hip Flex    10x ea 10x ea                     Ther Ex             HEP Review + Pt Edu 10 min            STS  2x8 2x8 2x8 2x8        SLR 10x 2x10 2x8, 3# AW BL 2x8, 4# AW BL 2x8, 4# AW BL        Palloff Press  2x10           Standing Hip Flex  2x10 2x10, RTB 2x10, RTB 2x10, RTB        Standing Hip Abd/ext             Side Steps w/ TB  BTB, 3 laps GTB, 3 laps GTB, 3 laps GTB, 3 laps        Fwd Step Ups             Modified KB DL   9" step, 15# DB 9" step, 15# DB, 3x7 9" step, 15# DB, 3x7        [place] Fwd/Bwd Ambulation             Suitcase Carry     15#, 2 laps        NuStep  6' lvl 6 6' lvl 6 6' lvl 6 6' lvl 6                     Ther Activity                                       Gait Training                                       Modalities

## 2023-08-17 ENCOUNTER — OFFICE VISIT (OUTPATIENT)
Dept: PAIN MEDICINE | Facility: CLINIC | Age: 83
End: 2023-08-17
Payer: COMMERCIAL

## 2023-08-17 VITALS
WEIGHT: 131 LBS | HEART RATE: 84 BPM | DIASTOLIC BLOOD PRESSURE: 72 MMHG | BODY MASS INDEX: 24.11 KG/M2 | HEIGHT: 62 IN | SYSTOLIC BLOOD PRESSURE: 113 MMHG

## 2023-08-17 DIAGNOSIS — M47.816 LUMBAR SPONDYLOSIS: ICD-10-CM

## 2023-08-17 DIAGNOSIS — M43.16 SPONDYLOLISTHESIS, LUMBAR REGION: ICD-10-CM

## 2023-08-17 DIAGNOSIS — M54.40 LOW BACK PAIN WITH SCIATICA, SCIATICA LATERALITY UNSPECIFIED, UNSPECIFIED BACK PAIN LATERALITY, UNSPECIFIED CHRONICITY: ICD-10-CM

## 2023-08-17 DIAGNOSIS — M54.50 CHRONIC BILATERAL LOW BACK PAIN, UNSPECIFIED WHETHER SCIATICA PRESENT: Primary | ICD-10-CM

## 2023-08-17 DIAGNOSIS — G89.29 CHRONIC BILATERAL LOW BACK PAIN, UNSPECIFIED WHETHER SCIATICA PRESENT: Primary | ICD-10-CM

## 2023-08-17 DIAGNOSIS — M54.16 LUMBAR RADICULITIS: ICD-10-CM

## 2023-08-17 PROCEDURE — 99214 OFFICE O/P EST MOD 30 MIN: CPT | Performed by: NURSE PRACTITIONER

## 2023-08-23 ENCOUNTER — OFFICE VISIT (OUTPATIENT)
Dept: PHYSICAL THERAPY | Facility: REHABILITATION | Age: 83
End: 2023-08-23
Payer: COMMERCIAL

## 2023-08-23 DIAGNOSIS — M54.40 LOW BACK PAIN WITH SCIATICA, SCIATICA LATERALITY UNSPECIFIED, UNSPECIFIED BACK PAIN LATERALITY, UNSPECIFIED CHRONICITY: ICD-10-CM

## 2023-08-23 DIAGNOSIS — M43.16 SPONDYLOLISTHESIS, LUMBAR REGION: ICD-10-CM

## 2023-08-23 DIAGNOSIS — M47.816 LUMBAR SPONDYLOSIS: ICD-10-CM

## 2023-08-23 DIAGNOSIS — M54.16 LUMBAR RADICULITIS: Primary | ICD-10-CM

## 2023-08-23 PROCEDURE — 97110 THERAPEUTIC EXERCISES: CPT

## 2023-08-23 PROCEDURE — 97112 NEUROMUSCULAR REEDUCATION: CPT

## 2023-08-23 NOTE — PROGRESS NOTES
Daily Note     Today's date: 2023  Patient name: Yady Manley  : 1940  MRN: [id number]  Referring provider: Tish Castillo DO  Dx:   Encounter Diagnosis     ICD-10-CM    1. Lumbar radiculitis  M54.16       2. Spondylolisthesis, lumbar region  M43.16       3. Lumbar spondylosis  M47.816       4. Low back pain with sciatica, sciatica laterality unspecified, unspecified back pain laterality, unspecified chronicity  M54.40           Start Time: 940  Stop Time: 1018  Total time in clinic (min): 38 minutes    Subjective: Pt notes no significant changes since last visit and she has been doing her exercises at home. She mentioned she will not be in PT next week as she will be on vacation. Objective: See treatment diary below      Assessment: Tolerated treatment well. Patient would benefit from continued PT. Pt continues to be challenged by STS technique as she was unable to perform without use of UEs due to continued decreased functional LE strength and paraspinal strength. She continues to be benefit from VCs for core stability and lumbopelvic control during dynamic LE movements and shows improved technique with cueing. She was introduced to walking over hurdles to focus on paraspinal stability and dynamic balance and responded well without increase in pain. Continue to progress as tolerated, 1:1 with Israel Cheatham DPT entirety of tx. Plan: Continue per plan of care. Precautions: PMH includes anemia, chronic BL LBP without sciatica, L lumbar radiculopathy, CKD, greater trochanteric bursitis of R hip, HTN, osteoporosis, sacral insufficiency fracture, SIJ inflammation. PSH includes right hip replacement on 8/15/2019.      Pertinent Findings:                                    Test / Measure  2023     FOTO NP (error)     BLE Strength 3+ to 4/5     SLR/Slump P       Manuals 7/18 7/27 8/3 8/10 8/16 8/23       BL Hip PROM  [facility]         Paraspinal STM Neuro Re-Ed             TA Recruitment + Bridge 1x10, hold 5 sec 1x10, hold 5 sec 1x10, hold 5 sec 1x10, hold 5 sec 1x10, hold 5 sec 1x10, hold 5 sec       Supine Clam Shells 1x10 BL 1x10 BL 15x MTB 15x MTB 15x MTB        Sitting Lumbar Ext 10x 10x 10x 10x 10x 10x       SLS             Tandem Balance             Tball Press  10x hold 5s 10x hold 5s 10x hold 5s 10x hold 5s 10x hold 5s       Tball Press Hip Flex    10x ea 10x ea 10x ea                    Ther Ex             HEP Review + Pt Edu 10 min            STS  2x8 2x8 2x8 2x8 2x8       SLR 10x 2x10 2x8, 3# AW BL 2x8, 4# AW BL 2x8, 4# AW BL 2x8, 4# AW BL       Palloff Press  2x10           Standing Hip Flex  2x10 2x10, RTB 2x10, RTB 2x10, RTB 2x10, RTB       Standing Hip Abd/ext      10x       Side Steps w/ TB  BTB, 3 laps GTB, 3 laps GTB, 3 laps GTB, 3 laps GTB, 3 laps       Fwd Step Ups             Modified KB DL   9" step, 15# DB 9" step, 15# DB, 3x7 9" step, 15# DB, 3x7 9" step, 15# DB, 3x7       [place]leen Fwd/Bwd Ambulation             Suitcase Carry     15#, 2 laps 15#, 2 laps       Foam Fwd/Side Steps      3 laps ea w/ hurdles       NuStep  6' lvl 6 6' lvl 6 6' lvl 6 6' lvl 6 6' lvl 6                    Ther Activity                                       Gait Training                                       Modalities

## 2023-09-05 ENCOUNTER — OFFICE VISIT (OUTPATIENT)
Dept: PHYSICAL THERAPY | Facility: REHABILITATION | Age: 83
End: 2023-09-05
Payer: COMMERCIAL

## 2023-09-05 DIAGNOSIS — M43.16 SPONDYLOLISTHESIS, LUMBAR REGION: ICD-10-CM

## 2023-09-05 DIAGNOSIS — M54.16 LUMBAR RADICULITIS: Primary | ICD-10-CM

## 2023-09-05 DIAGNOSIS — M47.816 LUMBAR SPONDYLOSIS: ICD-10-CM

## 2023-09-05 DIAGNOSIS — M54.40 LOW BACK PAIN WITH SCIATICA, SCIATICA LATERALITY UNSPECIFIED, UNSPECIFIED BACK PAIN LATERALITY, UNSPECIFIED CHRONICITY: ICD-10-CM

## 2023-09-05 PROCEDURE — 97110 THERAPEUTIC EXERCISES: CPT

## 2023-09-05 PROCEDURE — 97112 NEUROMUSCULAR REEDUCATION: CPT

## 2023-09-05 NOTE — PROGRESS NOTES
Daily Note     Today's date: 2023  Patient name: Lory Mcdowell  : 1940  MRN: [id number]  Referring provider: Trixie Signs, DO  Dx:   Encounter Diagnosis     ICD-10-CM    1. Lumbar radiculitis  M54.16       2. Spondylolisthesis, lumbar region  M43.16       3. Lumbar spondylosis  M47.816       4. Low back pain with sciatica, sciatica laterality unspecified, unspecified back pain laterality, unspecified chronicity  M54.40                      Subjective: Pt notes no significant changes since last visit, she was on vacation so she was unable to attend PT sessions last week. She mentioned that she gets an MRI tomorrow on her spine. Objective: See treatment diary below      Assessment: Tolerated treatment well. Patient would benefit from continued PT. Pt was able to tolerate increased resistance with standing abd/ext and hip flex marching which show improving functional LE strength. She continues to be challenged with endurance and strength during STS as she has a difficult time performing STS without UE assist at chair. She responds well to VCs for lumbopelvic control and knee positioning during modified DLs and shows improved technique with cueing. 1:1 with Raymond Pierce, DPT entirety of tx. Plan: Continue per plan of care. Precautions: PMH includes anemia, chronic BL LBP without sciatica, L lumbar radiculopathy, CKD, greater trochanteric bursitis of R hip, HTN, osteoporosis, sacral insufficiency fracture, SIJ inflammation. PSH includes right hip replacement on 8/15/2019.      Pertinent Findings:                                    Test / Measure  2023     FOTO NP (error)     BLE Strength 3+ to 4/5     SLR/Slump P       Manuals  8/3 8/10 8/16 8/23 9/5      BL Hip PROM  [facility]      Paraspinal STM                                       Neuro Re-Ed             TA Recruitment + Bridge 1x10, hold 5 sec 1x10, hold 5 sec 1x10, hold 5 sec 1x10, hold 5 sec 1x10, hold 5 sec 1x10, hold 5 sec 2x10, hold 5 sec      Supine Clam Shells 1x10 BL 1x10 BL 15x MTB 15x MTB 15x MTB        Sitting Lumbar Ext 10x 10x 10x 10x 10x 10x 10x      SLS             Tandem Balance             Tball Press  10x hold 5s 10x hold 5s 10x hold 5s 10x hold 5s 10x hold 5s 10x hold 5s      Tball Press Hip Flex    10x ea 10x ea 10x ea 10x ea                   Ther Ex             HEP Review + Pt Edu 10 min            STS  2x8 2x8 2x8 2x8 2x8 2x8      SLR 10x 2x10 2x8, 3# AW BL 2x8, 4# AW BL 2x8, 4# AW BL 2x8, 4# AW BL 2x8, 4# AW BL      Palloff Press  2x10           Standing Hip Flex  2x10 2x10, RTB 2x10, RTB 2x10, RTB 2x10, RTB 2x10, 2.5# AW      Standing Hip Abd/ext      10x 10x, 2.5# ea      Side Steps w/ TB  BTB, 3 laps GTB, 3 laps GTB, 3 laps GTB, 3 laps GTB, 3 laps GTB, 3 laps      Fwd Step Ups             Modified KB DL   9" step, 15# DB 9" step, 15# DB, 3x7 9" step, 15# DB, 3x7 9" step, 15# DB, 3x7 9" step, 15# DB, 3x7      Colleen Fwd/Bwd Ambulation             Suitcase Carry     15#, 2 laps 15#, 2 laps 15#, 2 laps      Foam Fwd/Side Steps      3 laps ea w/ hurdles [employer], 2x6 to 6" step      NuStep  6' lvl 6 6' lvl 6 6' lvl 6 6' lvl 6 6' lvl 6 6' lvl 6                   Ther Activity                                       Gait Training                                       Modalities

## 2023-09-06 ENCOUNTER — HOSPITAL ENCOUNTER (OUTPATIENT)
Dept: RADIOLOGY | Facility: HOSPITAL | Age: 83
Discharge: HOME | End: 2023-09-06
Payer: COMMERCIAL

## 2023-09-06 ENCOUNTER — TELEPHONE (OUTPATIENT)
Dept: RADIOLOGY | Facility: CLINIC | Age: 83
End: 2023-09-06

## 2023-09-06 DIAGNOSIS — G89.29 CHRONIC BILATERAL LOW BACK PAIN, UNSPECIFIED WHETHER SCIATICA PRESENT: ICD-10-CM

## 2023-09-06 DIAGNOSIS — M54.50 CHRONIC BILATERAL LOW BACK PAIN, UNSPECIFIED WHETHER SCIATICA PRESENT: ICD-10-CM

## 2023-09-06 PROCEDURE — G1004 CDSM NDSC: HCPCS

## 2023-09-06 PROCEDURE — 72148 MRI LUMBAR SPINE W/O DYE: CPT

## 2023-09-06 NOTE — TELEPHONE ENCOUNTER
----- Message from Katiana Rouse, [address] [place] [address] sent at 9/6/2023  3:04 PM EDT -----  MRI of the lumbar spine reveals a broad-based disc protrusion at L4-5 with mild central stenosis and moderate right foraminal narrowing with disc material abutting the right L4 nerve root. This has worsened since her last study in 2016. She also ha  s moderate arthritis and grade 1 spondylolisthesis (pain of the vertebrae) this level as well as L5-S1. There is also some mild canal stenosis and bilateral foraminal narrowing at L5-S1.

## 2023-09-07 NOTE — TELEPHONE ENCOUNTER
S/W pt and advised of the same,nurse explained MBB process to RFA. Pt to discuss with Dr. Makenna Haq and her  and call out office back with a decision. Pain is localized to lumbar. Pt verbalized understanding and appreciative of call.

## 2023-09-07 NOTE — TELEPHONE ENCOUNTER
S/w pt and advised of the same, pt has pain across low back, not radiating down legs. Pt inquired about tx options. Nurse advised pt nurse to discuss with [facility] and CB with recommendations. Pt verbalized understanding and appreciative of call. Please advise.

## 2023-09-12 ENCOUNTER — OFFICE VISIT (OUTPATIENT)
Dept: OBGYN CLINIC | Facility: HOSPITAL | Age: 83
End: 2023-09-12
Payer: COMMERCIAL

## 2023-09-12 VITALS
BODY MASS INDEX: 24.29 KG/M2 | HEART RATE: 75 BPM | DIASTOLIC BLOOD PRESSURE: 79 MMHG | SYSTOLIC BLOOD PRESSURE: 159 MMHG | HEIGHT: 62 IN | WEIGHT: 132 LBS

## 2023-09-12 DIAGNOSIS — M70.61 GREATER TROCHANTERIC BURSITIS OF BOTH HIPS: Primary | ICD-10-CM

## 2023-09-12 DIAGNOSIS — M17.12 PRIMARY OSTEOARTHRITIS OF LEFT KNEE: ICD-10-CM

## 2023-09-12 DIAGNOSIS — M70.62 GREATER TROCHANTERIC BURSITIS OF BOTH HIPS: Primary | ICD-10-CM

## 2023-09-12 PROCEDURE — 99213 OFFICE O/P EST LOW 20 MIN: CPT | Performed by: ORTHOPAEDIC SURGERY

## 2023-09-12 PROCEDURE — 20610 DRAIN/INJ JOINT/BURSA W/O US: CPT | Performed by: ORTHOPAEDIC SURGERY

## 2023-09-12 RX ORDER — BETAMETHASONE SODIUM PHOSPHATE AND BETAMETHASONE ACETATE 3; 3 MG/ML; MG/ML
12 INJECTION, SUSPENSION INTRA-ARTICULAR; INTRALESIONAL; INTRAMUSCULAR; SOFT TISSUE
Status: COMPLETED | OUTPATIENT
Start: 2023-09-12 | End: 2023-09-12

## 2023-09-12 RX ORDER — DOXYCYCLINE HYCLATE 100 MG/1
CAPSULE ORAL
COMMUNITY
Start: 2023-09-09 | End: 2023-09-18 | Stop reason: ALTCHOICE

## 2023-09-12 RX ORDER — LIDOCAINE HYDROCHLORIDE 10 MG/ML
2 INJECTION, SOLUTION INFILTRATION; PERINEURAL
Status: COMPLETED | OUTPATIENT
Start: 2023-09-12 | End: 2023-09-12

## 2023-09-12 RX ORDER — BUPIVACAINE HYDROCHLORIDE 2.5 MG/ML
2 INJECTION, SOLUTION INFILTRATION; PERINEURAL
Status: COMPLETED | OUTPATIENT
Start: 2023-09-12 | End: 2023-09-12

## 2023-09-12 RX ADMIN — BETAMETHASONE SODIUM PHOSPHATE AND BETAMETHASONE ACETATE 12 MG: 3; 3 INJECTION, SUSPENSION INTRA-ARTICULAR; INTRALESIONAL; INTRAMUSCULAR; SOFT TISSUE at 09:15

## 2023-09-12 RX ADMIN — LIDOCAINE HYDROCHLORIDE 2 ML: 10 INJECTION, SOLUTION INFILTRATION; PERINEURAL at 09:15

## 2023-09-12 RX ADMIN — BUPIVACAINE HYDROCHLORIDE 2 ML: 2.5 INJECTION, SOLUTION INFILTRATION; PERINEURAL at 09:15

## 2023-09-12 NOTE — PROGRESS NOTES
Assessment:  1. Greater trochanteric bursitis of both hips        2. Primary osteoarthritis of left knee            Plan:  Bilateral trochanteric bursitis and left knee osteoarthritis. · The patient was provided with bilateral trochanteric bursa and left knee steroid injections. The patient tolerated the procedure well. · The patient is encouraged to continue to work with  and consider potential MBB/RFA  · The patient should follow up in 3 months. To do next visit:  Return in about 3 months (around 12/12/2023). The above stated was discussed in layman's terms and the patient expressed understanding. All questions were answered to the patient's satisfaction. Scribe Attestation    I,:  Gabi Bettencourt am acting as a scribe while in the presence of the attending physician.:       I,:  Gumaro So MD personally performed the services described in this documentation    as scribed in my presence.:             Subjective:   Preston is a 81 y.o. female who presents for follow up of bilateral hips and left knee. She has had recent lumbar MRI and has worked with  for this. Today she complains of low back pain, bilateral lateral hip and left knee pain. Prolonged walking aggravates while rest alleviates.         Review of systems negative unless otherwise specified in HPI    Past Medical History:   Diagnosis Date   • Acute laryngitis 5/12/2022   • Anemia    • Chronic bilateral low back pain without sciatica 11/26/2019   • Chronic kidney disease    • Duodenal perforation ([address]) 10/7/2020   • Duodenal ulcer    • Enterococcus UTI 4/7/2016   • GERD (gastroesophageal reflux disease) 04/04/2016   • Greater trochanteric bursitis of right hip 9/18/2018   • 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   • Hypercalcemia    • Hypertension    • Left lumbar radiculopathy 4/7/2016   • Mixed hyperlipidemia    • Osteoarthritis of hip 04/04/2016   • Osteoporosis    • Sacral insufficiency fracture    • SI (sacroiliac) joint inflammation ([address]) 04/04/2016   • Substance addiction ([address])    • Vitamin D deficiency 3/11/2019       Past Surgical History:   Procedure Laterality Date   • BREAST EXCISIONAL BIOPSY Left 2000    benign   • COLONOSCOPY  2019   • JOINT REPLACEMENT      right hip 8/15   • MAMMO (HISTORICAL)  2019   • TONSILLECTOMY         Family History   Problem Relation Age of Onset   • No Known Problems Mother    • No Known Problems Father    • Breast cancer Daughter 48   • Breast cancer Maternal Aunt 79   • No Known Problems Sister    • No Known Problems Maternal Grandmother    • No Known Problems Maternal Grandfather    • No Known Problems Paternal Grandmother    • No Known Problems Paternal Grandfather    • No Known Problems Son    • No Known Problems Sister    • No Known Problems Sister        Social History     Occupational History   • Not on file   Tobacco Use   • Smoking status: Never   • Smokeless tobacco: Never   Vaping Use   • Vaping Use: Never used   Substance and Sexual Activity   • Alcohol use: Never   • Drug use: Not Currently     Types: Prescription     Comment: rocovering last used 1996   • Sexual activity: Yes     Partners: Male     Birth control/protection: Post-menopausal         Current Outpatient Medications:   •  amoxicillin (AMOXIL) 500 mg capsule, , Disp: , Rfl:   •  atorvastatin (LIPITOR) 10 mg tablet, Take 1 tablet (10 mg total) by mouth daily, Disp: 90 tablet, Rfl: 1  •  Cholecalciferol (Vitamin D-3) 25 MCG (1000 UT) CAPS, Take by mouth in the morning, Disp: , Rfl:   •  doxycycline hyclate (VIBRAMYCIN) 100 mg capsule, , Disp: , Rfl:   •  ibuprofen (MOTRIN) 800 mg tablet, as needed, Disp: , Rfl:   •  methylPREDNISolone 4 MG tablet therapy pack, Use as directed on package (Patient not taking: Reported on 7/6/2023), Disp: 1 each, Rfl: 0  •  metoprolol succinate (TOPROL-XL) 25 mg 24 hr tablet, Take 1 tablet (25 mg total) by mouth 2 (two) times a day, Disp: 180 tablet, Rfl: 3  •  pantoprazole (PROTONIX) 40 mg tablet, Take 1 tablet (40 mg total) by mouth 2 (two) times a day, Disp: 180 tablet, Rfl: 1  •  Premarin vaginal cream, 3 (three) times a week, Disp: , Rfl:     Current Facility-Administered Medications:   •  denosumab (PROLIA) subcutaneous injection 60 mg, 60 mg, Subcutaneous, Once, [employer], PA-ZAHIDA    Allergies   Allergen Reactions   • Bactrim [Sulfamethoxazole-Trimethoprim] Other (See Comments)     Kidney failure            Vitals:    09/12/23 0916   BP: 159/79   Pulse: 75       Objective:  Physical exam  · General: Awake, Alert, Oriented  · Eyes: Pupils equal, round and reactive to light  · Heart: regular rate and rhythm  · Lungs: No audible wheezing  · Abdomen: soft                    Ortho Exam  Bilateral hips:  TTP over greater trochanter    Left knee:  TTP over joint line  No erythema or ecchymosis  No effusion or swelling  Normal strength  Good ROM with crepitus   Calf compartments soft and supple  Sensation intact  Toes are warm sensate and mobile      Diagnostics, reviewed and taken today if performed as documented:    None performed    Procedures, if performed today:    Large joint arthrocentesis: L knee  Universal Protocol:  Consent: Verbal consent obtained. Risks and benefits: risks, benefits and alternatives were discussed  Consent given by: patient  Time out: Immediately prior to procedure a "time out" was called to verify the correct patient, procedure, equipment, support staff and site/side marked as required.   Timeout called at: 9/12/2023 10:05 AM.  Patient understanding: patient states understanding of the procedure being performed  Site marked: the operative site was marked  Patient identity confirmed: verbally with patient    Supporting Documentation  Indications: pain   Procedure Details  Location: knee - L knee  Preparation: Patient was prepped and draped in the usual sterile fashion  Needle size: 22 G  Ultrasound guidance: no  Approach: anterolateral  Medications administered: 12 mg betamethasone acetate-betamethasone sodium phosphate 6 (3-3) mg/mL; 2 mL bupivacaine 0.25 %; 2 mL lidocaine 1 %    Patient tolerance: patient tolerated the procedure well with no immediate complications  Dressing:  Sterile dressing applied    Large joint arthrocentesis: R greater trochanteric bursa  Universal Protocol:  Consent: Verbal consent obtained. Risks and benefits: risks, benefits and alternatives were discussed  Consent given by: patient  Time out: Immediately prior to procedure a "time out" was called to verify the correct patient, procedure, equipment, support staff and site/side marked as required. Timeout called at: 9/12/2023 10:06 AM.  Patient understanding: patient states understanding of the procedure being performed  Site marked: the operative site was marked  Patient identity confirmed: verbally with patient    Supporting Documentation  Indications: pain   Procedure Details  Location: hip - R greater trochanteric bursa  Needle size: 22 G  Ultrasound guidance: no  Approach: lateral  Medications administered: 12 mg betamethasone acetate-betamethasone sodium phosphate 6 (3-3) mg/mL; 2 mL bupivacaine 0.25 %; 2 mL lidocaine 1 %    Patient tolerance: patient tolerated the procedure well with no immediate complications  Dressing:  Sterile dressing applied    Large joint arthrocentesis: L greater trochanteric bursa  Universal Protocol:  Consent: Verbal consent obtained. Risks and benefits: risks, benefits and alternatives were discussed  Consent given by: patient  Time out: Immediately prior to procedure a "time out" was called to verify the correct patient, procedure, equipment, support staff and site/side marked as required.   Timeout called at: 9/12/2023 10:06 AM.  Patient understanding: patient states understanding of the procedure being performed  Site marked: the operative site was marked  Patient identity confirmed: verbally with patient    Supporting Documentation  Indications: pain   Procedure Details  Location: hip - L greater trochanteric bursa  Needle size: 22 G  Ultrasound guidance: no  Approach: lateral  Medications administered: 12 mg betamethasone acetate-betamethasone sodium phosphate 6 (3-3) mg/mL; 2 mL bupivacaine 0.25 %; 2 mL lidocaine 1 %    Patient tolerance: patient tolerated the procedure well with no immediate complications  Dressing:  Sterile dressing applied              Portions of the record may have been created with voice recognition software. Occasional wrong word or "sound a like" substitutions may have occurred due to the inherent limitations of voice recognition software. Read the chart carefully and recognize, using context, where substitutions have occurred.

## 2023-09-14 ENCOUNTER — EVALUATION (OUTPATIENT)
Dept: PHYSICAL THERAPY | Facility: REHABILITATION | Age: 83
End: 2023-09-14
Payer: COMMERCIAL

## 2023-09-14 DIAGNOSIS — M43.16 SPONDYLOLISTHESIS, LUMBAR REGION: ICD-10-CM

## 2023-09-14 DIAGNOSIS — M47.816 LUMBAR SPONDYLOSIS: ICD-10-CM

## 2023-09-14 DIAGNOSIS — M54.16 LUMBAR RADICULITIS: Primary | ICD-10-CM

## 2023-09-14 DIAGNOSIS — M54.40 LOW BACK PAIN WITH SCIATICA, SCIATICA LATERALITY UNSPECIFIED, UNSPECIFIED BACK PAIN LATERALITY, UNSPECIFIED CHRONICITY: ICD-10-CM

## 2023-09-14 PROCEDURE — 97112 NEUROMUSCULAR REEDUCATION: CPT

## 2023-09-14 PROCEDURE — 97110 THERAPEUTIC EXERCISES: CPT

## 2023-09-14 PROCEDURE — 97164 PT RE-EVAL EST PLAN CARE: CPT

## 2023-09-14 NOTE — PROGRESS NOTES
PT Discharge Summary     Today's date: 2023  Patient name: Tianna Paul  : 1940  MRN: [id number]  Referring provider: Diane Wasserman DO  Dx:   Encounter Diagnosis     ICD-10-CM    1. Lumbar radiculitis  M54.16       2. Spondylolisthesis, lumbar region  M43.16       3. Lumbar spondylosis  M47.816       4. Low back pain with sciatica, sciatica laterality unspecified, unspecified back pain laterality, unspecified chronicity  M54.40           Start Time: 102  Stop Time: 940  Total time in clinic (min): 65 minutes  Assessment  Assessment details: Tianna Paul is a 81 y.o. female attending physical therapy for lumbar radiculitis, lumbar spondylosis, spondylolisthesis, and LBP with sciatica. Pt has been responding well to physical therapy as she displays improving functional LE strength, lumbar AROM, and decreasing pain with functional activities with has allowed her to return to ADLs and recreational activities with less limitations and pain. This is supported by decreased values with 5xSTS and TUG. She continues to demonstrate decreased functional LE strength, gait quality, and static/dynamic balance which may be contributing to her ongoing pain and functional limitations. She believes she has met her personal and functional goals and would like to be discharged this visit as she feels she can continue with her updated HEP at home and will continue working. She will reach out with any concerns if needed, discharge from physical therapy as of 2023. 1:1 with Pam Evans DPT entirety of tx. Goals  Short term goals:   STGs to be met in 3-4 weeks:  1.  Increase hip, glutes, and knee strength by half grade or more to increase functional LE strength. (MET)  2.  Increase TA, Multifidy, and erector spinae strength by half grade or better to improve trunk stability for standing and walking. (MET)  3. Demonstrate decreased pain to </= 4/10 after a work day to demonstrate improved QoL.(MET)  4.  Independent with basic HEP. (MET)    Long term goals:  LTG's to be met by DC:  1.  Ambulate community distances with little to no pain or difficulty. (Partially MET)  2.  Perform all transfers without pain and difficulty. (Partially MET)  3.  Perform recreational and work activities / ADLs with little pain or difficulty. (Partially MET)  4.  Increase strength to 4/5 or better in LEs. (Partially MET)  5.  Tolerate standing and/or sitting for work for >/= 1 hour with little to no pain. (MET)  6. Increase FOTO to predicted value by DC. (MET)    Plan  Discharge from physical therapy as of 9/14/2023. Subjective  Pt believes physical therapy has been helping with her pain as she believes she is 70% improved and "as good as she will get". Pt mentioned that she feels she has improved strength which has been helping with her functional mobility and strength. She would like to be discharged this visit as she feels she has met her personal and functional goals and would like to continue with an updated HEP at home. In terms of pain, her current pain is a 4-5/10 and the worst it has been in the last week was a 6/10. Objective     Postural Findings:     Head Position  Protracted X  Neutral   Retracted   Scapular Position  Protracted  X Neutral   Retracted   Thoracic Spine   Inc Kyphosis X Neutral       Lumbar Spine   Inc Lordosis X  Neutral  Dec Lordosis   Pelvis   Anterior Tilt X Neutral   Posterior Tilt   Iliac Crest   L elevated X Neutral   R elevated   Feet   Pronated X Neutral   Supinated   Lateral Shift   Right   Left X None      Strength and ROM evaluated B from a regional biomechanical perspective and values relevant to this episode recorded in tables below.      ROM:   Joint / Motion  Right: 7/18/2023  Left: 7/18/2023  Right: 9/14/2023 Left: 9/14/2023   Lumbar Flexion  25% limited   No pain    Lumbar Extension  10   15    Lumbar Sidebending  15 15 15 15   Hip ER  10 15 25 25   Hip IR  10 15 15 15 Strength: MMT revealed the following findings. Joint Motion Right: 2023 Left: 2023 Right: 2023 Left: 2023   Hip Flexion 3+/5 4-/5 4-/5 4-/5   Hip Abduction 3+/5 4-/5 4-/5 4-/5   Hip Adduction 4/5 4/5 4/5 4/5   Hip Extension 3+/5 3+/5 4-/5 4-/5   Knee Extension 3+/5 4-/5 4-/5 4-/5   Knee Flexion 3+/5 4-/5 4-/5 4-/5   Ankle Plantarflexion 3+/5 3+/5 4-/5 4-/5   Ankle Dorsiflexion 4/5 4/5 4/5 4/5        Additional Assessments:  Pain with palpation noted: Slight pain with palpation along paraspinals at the lower back  Gait Assessment: Decreased step width and length, decreased dynamic balance  Functional Strength:   5xSTS: 2023 = 25.03 / 2023 = 22.3s  TU2023 = 16.5s / 2023 = 14.8s     Special Tests:  Lumbar Specific and Neural Tension                                                                            Test / Measure  2023   Straight Leg raise P (BL) P RLE   Crossed straight leg raise N N   Slump test P RLE N   Prone instability test N N   Sural n (invert), tibial (callum) N N        SI Joint Screen:   Test / Measure  2023   Sacral Compression (SL) N N   Sacral Distraction N N   Thigh Thrust NT NT   Gaenslens NT NT   Sacral Thrust NT NT     Access Code: [email or web address]  URL: [email or web address]  Date: 2023  Prepared by: Glen Terry  - Supine Bridge with Resistance Band  - 1 x daily - 3-4 x weekly - 2-3 sets - 10 reps - 3 seconds hold  - Supine Active Straight Leg Raise  - 1 x daily - 3-4 x weekly - 2-3 sets - 10 reps - 2 seconds hold  - Hooklying Clamshell with Resistance  - 1 x daily - 3-4 x weekly - 2-3 sets - 10 reps  - Seated Knee Extension with Resistance  - 1 x daily - 3-4 x weekly - 3 sets - 8 reps  - Side Stepping with Resistance at Ankles and Counter Support  - 1 x daily - 3-4 x weekly - 3 sets - 6 reps  - Standing Hip Abduction with Resistance at Ankles and Counter Support  - 1 x daily - 3-4 x weekly - 2 sets - 8 reps - 2 seconds hold  - Standing Hip Extension with Resistance at Ankles and Counter Support  - 1 x daily - 3-4 x weekly - 2 sets - 8 reps - 2 seconds hold  - Sit to Stand with Counter Support  - 1 x daily - 3-4 x weekly - 3 sets - 5 reps       Precautions: PMH includes anemia, chronic BL LBP without sciatica, L lumbar radiculopathy, CKD, greater trochanteric bursitis of R hip, HTN, osteoporosis, sacral insufficiency fracture, SIJ inflammation. PSH includes right hip replacement on 8/15/2019.      Pertinent Findings:                                    Test / Measure  7/18/2023 09/14/23   FOTO NP (error)  DC   BLE Strength 3+ to 4/5  4- to 4/5   SLR/Slump P  N     Manuals 7/18 7/27 8/3 8/10 8/16 8/23 9/5 9/14     BL Hip PROM  MC MC MC    MC     Paraspinal STM                                       Neuro Re-Ed             TA Recruitment + Bridge 1x10, hold 5 sec 1x10, hold 5 sec 1x10, hold 5 sec 1x10, hold 5 sec 1x10, hold 5 sec 1x10, hold 5 sec 2x10, hold 5 sec 2x10, hold 5 sec     Supine Clam Shells 1x10 BL 1x10 BL 15x MTB 15x MTB 15x MTB   15x MTB     Sitting Lumbar Ext 10x 10x 10x 10x 10x 10x 10x 10x     SLS             Tandem Balance             Tball Press  10x hold 5s 10x hold 5s 10x hold 5s 10x hold 5s 10x hold 5s 10x hold 5s      Tball Press Hip Flex    10x ea 10x ea 10x ea 10x ea                   Ther Ex             HEP Review + Pt Edu 10 min       Re-Eval 10 min     STS  2x8 2x8 2x8 2x8 2x8 2x8 3x5     SLR 10x 2x10 2x8, 3# AW BL 2x8, 4# AW BL 2x8, 4# AW BL 2x8, 4# AW BL 2x8, 4# AW BL 2x8, 4# AW BL     Palloff Press  2x10           Standing Hip Flex  2x10 2x10, RTB 2x10, RTB 2x10, RTB 2x10, RTB 2x10, 2.5# AW 2x10 GTB     Standing Hip Abd/ext      10x 10x, 2.5# ea 2x10 GTB     Side Steps w/ TB  BTB, 3 laps GTB, 3 laps GTB, 3 laps GTB, 3 laps GTB, 3 laps GTB, 3 laps GTB, 3 laps     Fwd Step Ups             Modified KB DL   9" step, 15# DB 9" step, 15# DB, 3x7 9" step, 15# DB, 3x7 9" step, 15# DB, 3x7 9" step, 15# DB, 3x7      Colleen Fwd/Bwd Ambulation             Suitcase Carry     15#, 2 laps 15#, 2 laps 15#, 2 laps      Foam Fwd/Side Steps      3 laps ea w/ hurdles [employer], 2x6 to 6" step      NuStep  6' lvl 6 6' lvl 6 6' lvl 6 6' lvl 6 6' lvl 6 6' lvl 6 6' lvl 6                  Ther Activity                                       Gait Training                                       Modalities

## 2023-09-18 ENCOUNTER — OFFICE VISIT (OUTPATIENT)
Age: 83
End: 2023-09-18
Payer: COMMERCIAL

## 2023-09-18 VITALS
HEIGHT: 63 IN | WEIGHT: 132 LBS | SYSTOLIC BLOOD PRESSURE: 132 MMHG | BODY MASS INDEX: 23.39 KG/M2 | HEART RATE: 76 BPM | TEMPERATURE: 97 F | DIASTOLIC BLOOD PRESSURE: 70 MMHG | OXYGEN SATURATION: 96 %

## 2023-09-18 DIAGNOSIS — K21.9 GASTROESOPHAGEAL REFLUX DISEASE WITHOUT ESOPHAGITIS: Chronic | ICD-10-CM

## 2023-09-18 DIAGNOSIS — M47.816 LUMBAR SPONDYLOSIS: ICD-10-CM

## 2023-09-18 DIAGNOSIS — I10 ESSENTIAL HYPERTENSION: Primary | ICD-10-CM

## 2023-09-18 DIAGNOSIS — M81.0 SENILE OSTEOPOROSIS: ICD-10-CM

## 2023-09-18 DIAGNOSIS — E78.5 DYSLIPIDEMIA: ICD-10-CM

## 2023-09-18 PROCEDURE — 99214 OFFICE O/P EST MOD 30 MIN: CPT | Performed by: INTERNAL MEDICINE

## 2023-09-18 NOTE — PROGRESS NOTES
Name: Sai Alcantar      : 1940      MRN: [id number]  Encounter Provider: Kristy Contreras MD  Encounter Date: 2023   Encounter department: [facility]     1. Essential hypertension  Assessment & Plan:  Blood pressure is nicely controlled on current medications. Orders:  -     CBC and Platelet; Future; Expected date: 2024  -     Comprehensive metabolic panel; Future; Expected date: 2024  -     Lipid panel; Future; Expected date: 2024    2. Dyslipidemia  Assessment & Plan: We will continue atorvastatin. Follow-up lipid profile in 6 months. Orders:  -     CBC and Platelet; Future; Expected date: 2024  -     Comprehensive metabolic panel; Future; Expected date: 2024  -     Lipid panel; Future; Expected date: 2024    3. Gastroesophageal reflux disease without esophagitis  Assessment & Plan:  Continues on pantoprazole. She does take ibuprofen 800 mg on an as-needed basis for back pain      4. Lumbar spondylosis  Assessment & Plan:  Back pain has improved with physical therapy. She does not appear to be favoring any epidural steroid injections. 5. Senile osteoporosis  Assessment & Plan:  Osteoporosis has improved with denosumab injections. She is currently classified as having osteopenia. Follow-up DEXA in 1 year             Subjective      Patient presents to the office for follow-up visit. Overall she is feeling fairly well. She had an MRI done recently which disclosed some worsening of her spondylolisthesis and worsening disc bulge at L4-L5. She is not experiencing a significant degree of back pain at this time. She has been attending physical therapy with some significant improvement in her back pain. She does not wish to undergo epidural steroid injections at this time. She had some recent labs done in May which were reviewed here.   The majority of her labs are essentially normal. CBC and metabolic panel were unremarkable. Chlorides are 112 with the only abnormality. CK, magnesium, phosphorus and PTH levels were all within normal limits. Vitamin D level is 45. Review of her DEXA scan shows that she is no longer in the osteoporotic category and she is now classified as osteopenic with a follow-up bone density due sometime in early spring 2024. She continues on Prolia injections which she is tolerating well. Review of Systems   Constitutional: Negative. HENT: Negative. Eyes: Negative. Respiratory: Negative. Cardiovascular: Negative. Gastrointestinal: Negative. Genitourinary: Negative. Musculoskeletal: Positive for back pain (Not severe). Negative for joint swelling, myalgias, neck pain and neck stiffness. Skin: Negative. Neurological: Negative. Hematological: Negative. Psychiatric/Behavioral: Negative. Current Outpatient Medications on File Prior to Visit   Medication Sig   • atorvastatin (LIPITOR) 10 mg tablet Take 1 tablet (10 mg total) by mouth daily   • Cholecalciferol (Vitamin D-3) 25 MCG (1000 UT) CAPS Take by mouth in the morning   • ibuprofen (MOTRIN) 800 mg tablet as needed   • metoprolol succinate (TOPROL-XL) 25 mg 24 hr tablet Take 1 tablet (25 mg total) by mouth 2 (two) times a day   • pantoprazole (PROTONIX) 40 mg tablet Take 1 tablet (40 mg total) by mouth 2 (two) times a day   • Premarin vaginal cream 3 (three) times a week   • [DISCONTINUED] amoxicillin (AMOXIL) 500 mg capsule    • [DISCONTINUED] doxycycline hyclate (VIBRAMYCIN) 100 mg capsule    • [DISCONTINUED] methylPREDNISolone 4 MG tablet therapy pack Use as directed on package       Objective     /70 (BP Location: Left arm, Patient Position: Sitting, Cuff Size: Standard)   Pulse 76   Temp (!) 97 °F (36.1 °C) (Tympanic)   Ht 5' 2.6" (1.59 m)   Wt 59.9 kg (132 lb)   SpO2 96%   BMI 23.68 kg/m²     Physical Exam  Vitals reviewed.    Constitutional:       General: She is not in acute distress. Appearance: Normal appearance. She is normal weight. She is not ill-appearing, toxic-appearing or diaphoretic. HENT:      Head: Normocephalic and atraumatic. Right Ear: External ear normal.      Left Ear: External ear normal.      Nose: Nose normal.   Eyes:      Conjunctiva/sclera: Conjunctivae normal.      Pupils: Pupils are equal, round, and reactive to light. Cardiovascular:      Rate and Rhythm: Normal rate and regular rhythm. Heart sounds: Normal heart sounds. No murmur heard. Pulmonary:      Effort: Pulmonary effort is normal. No respiratory distress. Breath sounds: Normal breath sounds. No wheezing or rales. Abdominal:      General: Abdomen is flat. There is no distension. Musculoskeletal:         General: No swelling. Cervical back: Neck supple. No rigidity. Right lower leg: No edema. Left lower leg: No edema. Skin:     General: Skin is warm. Coloration: Skin is not jaundiced. Findings: No bruising, erythema or rash. Neurological:      General: No focal deficit present. Mental Status: She is alert and oriented to person, place, and time. Mental status is at baseline.    Psychiatric:         Mood and Affect: Mood normal.         Behavior: Behavior normal.       Susan Membreno MD

## 2023-09-18 NOTE — ASSESSMENT & PLAN NOTE
Osteoporosis has improved with denosumab injections. She is currently classified as having osteopenia.   Follow-up DEXA in 1 year

## 2023-09-18 NOTE — ASSESSMENT & PLAN NOTE
Back pain has improved with physical therapy. She does not appear to be favoring any epidural steroid injections.

## 2023-09-28 ENCOUNTER — OFFICE VISIT (OUTPATIENT)
Dept: PODIATRY | Facility: CLINIC | Age: 83
End: 2023-09-28
Payer: COMMERCIAL

## 2023-09-28 VITALS
SYSTOLIC BLOOD PRESSURE: 131 MMHG | RESPIRATION RATE: 18 BRPM | BODY MASS INDEX: 24.29 KG/M2 | DIASTOLIC BLOOD PRESSURE: 83 MMHG | HEIGHT: 62 IN | WEIGHT: 132 LBS | HEART RATE: 121 BPM

## 2023-09-28 DIAGNOSIS — I73.9 PERIPHERAL VASCULAR DISEASE, UNSPECIFIED (HCC): Primary | ICD-10-CM

## 2023-09-28 DIAGNOSIS — M19.071 PRIMARY OSTEOARTHRITIS OF RIGHT FOOT: ICD-10-CM

## 2023-09-28 PROCEDURE — 99212 OFFICE O/P EST SF 10 MIN: CPT | Performed by: PODIATRIST

## 2023-09-28 NOTE — PROGRESS NOTES
Patient presents for assessment of right foot. Patient had cortisone injection dorsum right foot at second metatarsal cuneiform joint at last visit 2 months ago. Patient states that it provides relief but it is fleeting. Pain has recurred. Explained to patient that it is too soon for another injection. We will reassess in 10 weeks. Patient has no palpable pedal pulses. All toenails are dystrophic and were trimmed this date. Reappoint 10 weeks.

## 2023-10-04 PROCEDURE — 87086 URINE CULTURE/COLONY COUNT: CPT | Performed by: OBSTETRICS & GYNECOLOGY

## 2023-10-04 PROCEDURE — 87186 SC STD MICRODIL/AGAR DIL: CPT | Performed by: OBSTETRICS & GYNECOLOGY

## 2023-10-04 PROCEDURE — 87077 CULTURE AEROBIC IDENTIFY: CPT | Performed by: OBSTETRICS & GYNECOLOGY

## 2023-10-05 ENCOUNTER — LAB REQUISITION (OUTPATIENT)
Dept: LAB | Facility: HOSPITAL | Age: 83
End: 2023-10-05
Payer: COMMERCIAL

## 2023-10-05 DIAGNOSIS — N30.20 OTHER CHRONIC CYSTITIS WITHOUT HEMATURIA: ICD-10-CM

## 2023-10-07 LAB — BACTERIA UR CULT: ABNORMAL

## 2023-10-19 ENCOUNTER — OFFICE VISIT (OUTPATIENT)
Age: 83
End: 2023-10-19

## 2023-10-19 VITALS
WEIGHT: 134.6 LBS | OXYGEN SATURATION: 99 % | TEMPERATURE: 98.2 F | HEIGHT: 62 IN | BODY MASS INDEX: 24.77 KG/M2 | SYSTOLIC BLOOD PRESSURE: 136 MMHG | HEART RATE: 97 BPM | DIASTOLIC BLOOD PRESSURE: 72 MMHG

## 2023-10-19 DIAGNOSIS — S46.212A STRAIN OF LEFT BICEPS, INITIAL ENCOUNTER: ICD-10-CM

## 2023-10-19 DIAGNOSIS — S46.219A BICEPS STRAIN, INITIAL ENCOUNTER: Primary | ICD-10-CM

## 2023-10-19 RX ORDER — KETOROLAC TROMETHAMINE 10 MG/1
10 TABLET, FILM COATED ORAL EVERY 6 HOURS PRN
Qty: 20 TABLET | Refills: 0 | Status: SHIPPED | OUTPATIENT
Start: 2023-10-19

## 2023-10-19 RX ORDER — AMOXICILLIN 875 MG/1
875 TABLET, COATED ORAL 2 TIMES DAILY
COMMUNITY
Start: 2023-10-12 | End: 2023-10-19

## 2023-10-19 NOTE — PROGRESS NOTES
Name: Micheal Adams      : 1940      MRN: [id number]  Encounter Provider: Edie Molina MD  Encounter Date: 10/19/2023   [facility]     1. Biceps strain, initial encounter  -     ketorolac (TORADOL) 10 mg tablet; Take 1 tablet (10 mg total) by mouth every 6 (six) hours as needed for moderate pain    2. Strain of left biceps, initial encounter  Assessment & Plan:  Recommended cold compresses and 5-day course of ketorolac 10 mg every 6 hours. The patient was specifically instructed not to take any ibuprofen          Depression Screening and Follow-up Plan: Patient was screened for depression during today's encounter. They screened negative with a PHQ-2 score of 0. Subjective      Presents to the office with complaints of left upper arm pain confined to the middle area of her left upper arm. She states the onset of the pain was following an incident where she had to hold up some heavy trays of food or in danger of falling. She had done so he noticed a discomfort in her left upper arm. She did not feel anything acute at the time but woke up the next morning with soreness in the middle of her left upper arm. There is no swelling there is no evidence of bleeding or bruising. She has no difficulty moving her arm, flexing or extending, adducting or abducting. Arm Pain   The incident occurred 12 to 24 hours ago. The incident occurred at work. There was no injury mechanism. The pain is present in the upper left arm. The quality of the pain is described as aching. The pain does not radiate. The pain is at a severity of 6/10. The pain is moderate. The pain has been Constant since the incident. The symptoms are aggravated by movement and lifting. She has tried rest for the symptoms. The treatment provided mild relief. Review of Systems   Constitutional:  Positive for activity change. HENT: Negative.      Eyes: There is no distension. Tenderness: There is no abdominal tenderness. Musculoskeletal:         General: Tenderness (Midportion of the left upper extremity. No swelling. No evidence of tendon rupture) present. Cervical back: Neck supple. Right lower leg: No edema. Left lower leg: No edema. Lymphadenopathy:      Cervical: No cervical adenopathy. Skin:     General: Skin is warm. Coloration: Skin is not jaundiced. Findings: No bruising, erythema or rash. Neurological:      General: No focal deficit present. Mental Status: She is alert and oriented to person, place, and time. Mental status is at baseline.    Psychiatric:         Mood and Affect: Mood normal.         Behavior: Behavior normal.       Forrest Pike MD

## 2023-10-19 NOTE — ASSESSMENT & PLAN NOTE
Recommended cold compresses and 5-day course of ketorolac 10 mg every 6 hours.  The patient was specifically instructed not to take any ibuprofen

## 2023-10-23 ENCOUNTER — TELEPHONE (OUTPATIENT)
Age: 83
End: 2023-10-23

## 2023-10-23 NOTE — TELEPHONE ENCOUNTER
Patient called regarding the Ketorolac that was prescribe on 10/19. Would like to know if this medication would affect her kidneys in any way also has had a stomach ulcer in the past. Please advise.

## 2023-10-26 ENCOUNTER — HOSPITAL ENCOUNTER (OUTPATIENT)
Dept: RADIOLOGY | Facility: HOSPITAL | Age: 83
Discharge: HOME | End: 2023-10-26
Attending: INTERNAL MEDICINE

## 2023-10-26 ENCOUNTER — APPOINTMENT (OUTPATIENT)
Age: 83
End: 2023-10-26
Payer: COMMERCIAL

## 2023-10-26 DIAGNOSIS — M25.512 ACUTE PAIN OF LEFT SHOULDER: ICD-10-CM

## 2023-10-26 DIAGNOSIS — S46.212A STRAIN OF LEFT BICEPS, INITIAL ENCOUNTER: ICD-10-CM

## 2023-10-26 DIAGNOSIS — S46.212A STRAIN OF LEFT BICEPS, INITIAL ENCOUNTER: Primary | ICD-10-CM

## 2023-10-26 PROCEDURE — 73030 X-RAY EXAM OF SHOULDER: CPT

## 2023-11-01 ENCOUNTER — TELEPHONE (OUTPATIENT)
Age: 83
End: 2023-11-01

## 2023-11-01 NOTE — TELEPHONE ENCOUNTER
Patient called regarding her xray results of the shoulder. Still having pain in her middle of her arm. Patient taking tylenol. please advise

## 2023-11-01 NOTE — TELEPHONE ENCOUNTER
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 She could take an Aleve tablet daily for 2-3 weeks and see if the shoulder feels better. If not,physical therapy could be helpful.     Dr Noé Bettencourt

## 2023-11-10 ENCOUNTER — TELEPHONE (OUTPATIENT)
Age: 83
End: 2023-11-10

## 2023-11-15 DIAGNOSIS — S46.212A STRAIN OF LEFT BICEPS, INITIAL ENCOUNTER: Primary | ICD-10-CM

## 2023-11-15 DIAGNOSIS — S46.219A BICEPS STRAIN, INITIAL ENCOUNTER: ICD-10-CM

## 2023-11-15 DIAGNOSIS — M25.512 ACUTE PAIN OF LEFT SHOULDER: ICD-10-CM

## 2023-11-21 ENCOUNTER — EVALUATION (OUTPATIENT)
Dept: PHYSICAL THERAPY | Facility: REHABILITATION | Age: 83
End: 2023-11-21
Payer: COMMERCIAL

## 2023-11-21 DIAGNOSIS — S46.212A STRAIN OF LEFT BICEPS, INITIAL ENCOUNTER: ICD-10-CM

## 2023-11-21 DIAGNOSIS — S46.219A BICEPS STRAIN, INITIAL ENCOUNTER: ICD-10-CM

## 2023-11-21 DIAGNOSIS — M25.512 ACUTE PAIN OF LEFT SHOULDER: Primary | ICD-10-CM

## 2023-11-21 PROCEDURE — 97110 THERAPEUTIC EXERCISES: CPT

## 2023-11-21 PROCEDURE — 97112 NEUROMUSCULAR REEDUCATION: CPT

## 2023-11-21 PROCEDURE — 97161 PT EVAL LOW COMPLEX 20 MIN: CPT

## 2023-11-21 NOTE — PROGRESS NOTES
PT Evaluation     Today's date: 2023  Patient name: Matilde Duarte  : 1940  MRN: [id number]  Referring provider: Nishant Vela*  Dx:   Encounter Diagnosis     ICD-10-CM    1. Acute pain of left shoulder  M25.512 Ambulatory Referral to Physical Therapy      2. Strain of left biceps, initial encounter  O24.425M Ambulatory Referral to Physical Therapy      3. Biceps strain, initial encounter  S46.219A Ambulatory Referral to Physical Therapy          Start Time: 910  Stop Time: 1005  Total time in clinic (min): 55 minutes    Assessment  Assessment details: Matilde Duarte is a 81 y.o. female referred to physical therapy for strain of L biceps, biceps strain, and acute pain of L shoulder. Primary impairments include increased pain with functional activities, decreased LUE strength, L shoulder ER AROM dysfunction, and scapular motor control dysfunction which is limiting her ability to perform ADLs and recreational activities without pain or functional restrictions. Pt noted no increase in symptoms with eccentric testing of the biceps and with MMT tests but was positive for infraspinatus weakness and showed decreased neuromuscular control with ER ROM exercises which may be contributing to ongoing pain. Pt was provided with a basic HEP which will be reviewed in the upcoming session. Pt was educated on anatomy and physiology of diagnosis and demonstrated verbal understanding. Pt would benefit from skilled PT interventions to increase functional upper extremity strength, increase pain free ROM, and facilitate return to recreational activities and ADL management/independence with less limitations and pain. 1:1 with Richrd Overcast, DPT entirety of tx.   Impairments: abnormal or restricted ROM, abnormal movement, activity intolerance, impaired physical strength, lacks appropriate home exercise program, pain with function, poor posture  and poor body mechanics    Symptom irritability: lowBarriers to therapy: None  Understanding of Dx/Px/POC: excellent   Prognosis: good    Goals  Short Term Goals, to be met in 3-4 weeks:   1. Increase BL shoulder, elbow, and scapular musculature by half grade or more to improve functional UE strength. 2.  Demonstrate improved posture and understanding of body mechanics with sitting during ADLs for 75% of day with less s/s with such activities. 3.  Demonstrate decreased pain by 25% or more with lifting >/=10 pounds to improve QoL. 4.  Independent with basic HEP. Long Term Goals, to be met by DC:   1. Have little to no pain with ADL's.  2.  Demonstrate improved posture with dynamic lifting activities to improve overall functional mobility. 3.  Return to recreational activities and work with little to no difficulty and good mechanics/posture. 4.  Independent in detailed HEP. 5.  Increase FOTO to predicted value by DC. Plan  Patient would benefit from: skilled physical therapy and PT eval  Referral necessary: No  Planned therapy interventions: joint mobilization, manual therapy, body mechanics training, functional ROM exercises, home exercise program, neuromuscular re-education, patient education, postural training, therapeutic activities, therapeutic exercise, strengthening, IASTM, graded exercise, graded activity, graded motor and stretching  Frequency: 1x week  Duration in weeks: 10  Plan of Care beginning date: 11/21/2023  Plan of Care expiration date: 1/30/2024  Treatment plan discussed with: patient        Subjective  HPI: Pt referred to physical therapy for strain of L biceps, biceps strain, and acute pain of L shoulder. Pt mentioned that her pain bothers her most at the outside of the L shoulder. She mentioned that this began randomly and hurts most with active overhead motions and when cutting vegetables at work. She notes no radiating symptoms down the LUE.   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    Objective    Standing         Head Position X Protracted  Neutral  Retracted   Scapular Position X Protracted  Neutral  Retracted   [address] Kyphosis X Neutral     Lumbar Spine  Inc Lordosis X Neutral  Dec Lordosis   Pelvis  Anterior Tilt X Neutral  Posterior Tilt   Iliac Crest  L elevated X Neutral  R elevated   Scoliotic Curvature  "C" Curve  "S" Curve     Lateral Shift  Right  Left X None     Strength and ROM evaluated B from a regional biomechanical perspective and values relevant to this episode recorded in table below    ROM: Goniometric measurement revealed the following findings. AROM Right: 11/21/2023 Left: 11/21/2023   Wrist Flexion [facility] WFL   Wrist Extension [facility] WFL   Wrist Radial Deviation WFL WFL   Wrist Ulnar Deviation WFL WFL   Forearm Supination WFL WFL   Forearm Pronation WFL WFL   Elbow Flexion 140 140   Elbow Extension 0 0     Strength: MMT revealed the following findings.   Joint Motion Right: 11/21/2023 Left: 11/21/2023   Elbow Flexion 4/5 4-/5   Elbow Extension 4/5 4-/5   Wrist Flexion 4/5 4/5   Wrist Extension 4/5 4/5       Special Tests and Measures:                                                                                                                                   Test / Measure  Right 11/21/2023 Left 11/21/2023   Varus stress test N N   Valgus stress test N N      Flowsheet Rows      Flowsheet Row Most Recent Value   PT/OT G-Codes    Current Score 56   Projected Score 66          Shoulder Evaluation:   Strength and ROM evaluated B from a regional biomechanical perspective and values relevant to this episode recorded in table below    ROM: Goniometric measurement revealed the following findings. Shoulder ROM Right: 11/21/2023 Left: 11/21/2023   Flexion 180 180   Abduction 180 180   ER PROM @ 90: 70 PROM @ 90: 70   IR T12 T12     Strength: MMT revealed the following findings. Joint Motion Right: 11/21/2023 Left: 11/21/2023   Sh. Flexion 4/5 4-/5   Sh. Abduction 4/5 4-/5   Sh. ER 4/5 3+/5   Sh. IR 4/5 3+/5   Shoulder extension 4/5 3+/5   Middle Trapezius 3+/5 3+/5   Lower Trapezius 3+/5 3+/5     Additional Assessments:  Palpation: Pain with palpation at [facility] jt of L shoulder and at lateral aspect of L shoulder  Joint mobility: Decreased active ER due to decreased neuromuscular control  Cervical Spine Functional ROM: WFL  ULTT: Increased sensitivity at LUE                                                                                                                                                Special Test / Measure  Right: 11/21/2023 Left: 11/21/2023   Jazzmine N  N   Painful Arc N  N    Infraspinatus Strength Test N  POS   Drop Arm N  N    Supraspinatus (empty can) Test Next Visit Test Next Visit   Neers N  N    ER Lag N POS   Belly Press/Lift Off Test N  N      Access Code: [email or web address]  URL: [email or web address]  Date: 11/21/2023  Prepared by: Madhuri Haq    Exercises  - Seated Scapular Retraction  - 1 x daily - 7 x weekly - 2 sets - 10 reps - 5 seconds hold  - Shoulder Flexion Wall Slide with Towel  - 1 x daily - 7 x weekly - 2 sets - 10 reps  - Standing Isometric Shoulder External Rotation with Doorway  - 1 x daily - 7 x weekly - 2 sets - 10 reps - 5 seconds hold  - Shoulder External Rotation and Scapular Retraction with Resistance  - 1 x daily - 7 x weekly - 2 sets - 10 reps - 3 seconds hold     Precautions: PMH includes anemia, chronic BL LBP without sciatica, L lumbar radiculopathy, CKD, greater trochanteric bursitis of R hip, HTN, osteoporosis, sacral insufficiency fracture, SIJ inflammation. PSH includes right hip replacement on 8/15/2019.    POC expires Unit limit Auth Expiration date PT/OT + Visit Limit?    1/30/24 BOMN 12/31/2023 BOMN         Visit/Unit Tracking  AUTH Status:  Date 1        TBD Used 11/21         Remaining             Pertinent Findings:      POC End Date: 1/30/24                                                                                          Test / Measure  11/21/2023   FOTO (Predicted 66) 56   L Shoulder Strength 3+ to 4/5   L Shoulder ROM Decreased active ER         Visit Number:  1            Manuals 11/21            Shoulder PROM             Manually Resisted Elbow ROM                                       Neuro Re-Ed             Shoulder Retraction 10x            Cane Shoulder AAROM             Shoulder Isometrics ER, 5x, 5s hold            No Money RTB, 10x            Dowel Elbow Flex/Ext             Elbow Flex/Ext Isometric Hold             Wrist Flex/Ext             Therabar Pronation/Supination             Hammer Pro/Sup                                       Ther Ex             HEP Review + Pt Education 10 min            UBE             Wall Slides 10x            BL Rows             TB Shoulder Ext             DB Shrugs             Shoulder Flex + Tband             Elbow Flex/Ext Stretch             Eccentric DB Curls             Tband Curls in Sitting             Hammer Curls                                                    Ther Activity                                       Gait Training                                       Modalities

## 2023-11-28 NOTE — PROGRESS NOTES
RENAL FOLLOW UP NOTE:.td    ASSESSMENT AND PLAN:  1.  CKD stage 3A.:  Etiology:  AK I from Bactrim/hypercalcemia along with poor oral intake. 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/arteriolar nephrosclerosis  Baseline creatinine:  1.1 -1.79  Current creatinine:  1.15 at baseline  Urine protein creatinine ratio:  0.26 g at goal   Recommendations:  Treat hypertension-please see below  Treat dyslipidemia-please see below  Maintain proteinuria less than 1 g or as low as possible  Avoid nephrotoxic agents such as NSAIDs, patient counseled as such  2.  Volume:  Euvolemic of note: 5/26/2023 venous duplex negative for DVT  3. Hypertension:  Secondary workup:  Was negative  Home readings:   None today     Goal blood pressure:  Less than 130/80 given CKD  Recommendations:   Push nonmedical regimen especially weight loss/isotonic exercise and low-salt diet  Medication changes today:   No changes pending home blood pressure readings AOBP seems acceptable at 134/67        4. Electrolytes:    Mild metabolic acidosis:  Resolved  Borderline hypokalemia:  Now normal at 3.8    5. Mineral bone disorder:  Calcium/magnesium/phosphorus all normal including calcium of 9.2 and magnesium of 2.1  PTH intact: 15.5 which is normal  Vitamin-D: 53 from 12/5/2023 at goal  6. Dyslipidemia:  Goal LDL:  Less than 100  Current lipid profile:  LDL 82//triglycerides 50 from 5/22/2023  Recommendations:  Patient is at goal   7. Anemia:   Hemoglobin:   Stable at 11.3 at baseline  Iron studies: Acceptable from 12/1/2023  Multiple myeloma evaluation  was negative as outlined above  GI evaluation:  EGD with just esophagitis; colonoscopy with benign polyp  Heme consultation:  Felt anemia which was normocytic secondary to chronic kidney disease. No further recommendations at this time.   8.  Other problems:  GERD  Osteoarthritis of right hip  Colonic polyp  Status post duodenal perforation 10/04/2020:  Apparently upper GI endoscopy demonstrated no leak. Endoscopy demonstrated no leak. She was NPO with antibiotics who was given a 2 week course of antibiotics upon discharge along with antacid medication  To be followed up as an outpatient. Cardiac murmur which is chronic echocardiogram performed March 2021 showed good EF 65% and mild TR and mild aortic regurgitation  The patient has left lower extremity edema she is not sure when this began there is no symptoms or signs of a potential clot however given the unilateral edema I would want to make sure this could be certainly incompetent veins. I will order a stat venous duplex. GI health maintenance:  Last colonoscopy July 2024 due to sessile polyp removal in 2019    PATIENT INSTRUCTIONS:    Patient Instructions   Visit summary:  - All doing great including your kidney function  - Please send in a week of blood pressure readings to make sure those are doing well        1. Medication changes today:  No medication changes today pending your home blood pressure readings    2. General instructions:  Continue to avoid salt and keep as active as possible        3.   Please take 1 week a blood pressure readings at this time, you can just do sitting blood pressure readings    AS FOLLOWS  MORNING AND EVENING, SITTING AND STANDING as follows:  TAKE THE MORNING READINGS BEFORE ANY MEDICATIONS AND WHEN YOU ARE RELAXED FOR SEVERAL MINUTES  TAKE THE EVENING READINGS:  BETWEEN 7-10 P.M.; PRIOR TO ANY MEDICATIONS; AT LEAST IN OUR  FROM DINNER; AND CERTAINLY AFTER RELAXING FOR A FEW MINUTES  PLEASE INCLUDE HEART RATE WITH YOUR BLOOD PRESSURE READINGS  When taking standing readings, keep your arm supported at heart level and not dangling  Make sure you are sitting with your back supported and feet on the ground and do not cross your legs or feet  Make sure you have not taken any coffee or caffeine products or exercised or smoke cigarettes at least 30 minutes before taking your blood pressure  Then please mail these readings into the office    4. In 4 months:  Please go for nonfasting lab work but in the morning  Please take 1 week a blood pressure readings as outlined above and mail those into the office      5. Follow-up in 8 months  Please bring in 1 week a blood pressure readings morning evening, sitting and standing is outlined above  PLEASE BRING AN YOUR BLOOD PRESSURE MACHINE TO CORRELATE WITH THE OFFICE MACHINE AT THIS NEXT SCHEDULED VISIT  Please go for fasting lab work 1-2 weeks prior to your appointment      6. General non medical recommendations:  AVOID SALT BUT NOT ADDING AN READING LABELS TO MAKE SURE THERE IS LOW-SALT IN THE FOOD THAT YOU ARE EATING  Goal is less than 2 g of sodium intake or less than 5 g of sodium chloride intake per day    Avoid nonsteroidal anti-inflammatory drugs such as Naprosyn, ibuprofen, Aleve, Advil, Celebrex, Meloxicam (Mobic) etc.  You can use Tylenol as needed if you do not have any liver condition to be concerned about    Avoid medications such as Sudafed or decongestants and antihistamines that contained the D component which is the decongestant. You can take antihistamines without the decongestant or D component. Try to avoid medications such as pantoprazole or  Protonix/Nexium or Esomeprazole)/Prilosec or omeprazole/Prevacid or lansoprazole/AcipHex or Rabeprazole. If you are able to, use Pepcid as this is safer for your kidneys. Try to exercise at least 30 minutes 3 days a week to begin with with an ultimate goal of 5 days a week for at least 30 minutes      Please do not drink more than 2 glasses of alcohol/wine on a daily basis as this may contribute to your high blood pressure. Subjective: There has been no hospitalizations or acute illnesses since last visit. The patient overall is feeling well. No fevers, chills, or cough or colds.   Good appetite and good energy  No hematuria, dysuria, voiding symptoms or foamy urine  No gastrointestinal symptoms  No cardiovascular symptoms including swelling of the legs  No headaches, dizziness or lightheadedness  Blood pressure medications:  Toprol-XL 25 mg twice a day    Renal pertinent medications:  Protonix 40 mg once a day but not every day  Rare Motrin  Vitamin D 1000 units daily  Atorvastatin 10 mg daily    ROS:  See HPI, otherwise review of systems as completely reviewed with the patient are negative    Past Medical History:   Diagnosis Date    Acute laryngitis 5/12/2022    Anemia     Chronic bilateral low back pain without sciatica 11/26/2019    Chronic kidney disease     Duodenal perforation ([address]) 10/7/2020    Duodenal ulcer     Enterococcus UTI 4/7/2016    GERD (gastroesophageal reflux disease) 04/04/2016    Greater trochanteric bursitis of right hip 9/18/2018    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    Hypercalcemia     Hypertension     Left lumbar radiculopathy 4/7/2016    Mixed hyperlipidemia     Osteoarthritis of hip 04/04/2016    Osteoporosis     Sacral insufficiency fracture     SI (sacroiliac) joint inflammation ([address]) 04/04/2016    Substance addiction ([address])     Vitamin D deficiency 3/11/2019     Past Surgical History:   Procedure Laterality Date    BREAST EXCISIONAL BIOPSY Left 2000    benign    COLONOSCOPY  2019    JOINT REPLACEMENT      right hip 8/15    MAMMO (HISTORICAL)  2019    TONSILLECTOMY       Family History   Problem Relation Age of Onset    No Known Problems Mother     No Known Problems Father     Breast cancer Daughter 48    Breast cancer Maternal Aunt 79    No Known Problems Sister     No Known Problems Maternal Grandmother     No Known Problems Maternal Grandfather     No Known Problems Paternal Grandmother     No Known Problems Paternal Grandfather     No Known Problems Son     No Known Problems Sister     No Known Problems Sister       reports that she has never smoked.  She has never used smokeless tobacco. She reports that she does not currently use drugs after having used the following drugs: Prescription. She reports that she does not drink alcohol. I COMPLETELY REVIEWED THE PAST MEDICAL HISTORY/PAST SURGICAL HISTORY/SOCIAL HISTORY/FAMILY HISTORY/AND MEDICATIONS  AND UPDATED ALL    Objective:     Vitals:   BP sitting on right: 142/70 with a heart rate of 84 and regular  BP standing on right: 150/70 with a heart rate of 80 and regular  Vitals:    12/08/23 0918   BP: 134/67   Pulse: 76       Weight (last 2 days)       Date/Time Weight    12/08/23 0918 61.7 (136)          Wt Readings from Last 3 Encounters:   12/08/23 61.7 kg (136 lb)   12/07/23 62.1 kg (137 lb)   12/06/23 62.1 kg (137 lb)       Body mass index is 25.7 kg/m².     Physical Exam: General:  No acute distress  Skin:  No acute rash  Eyes:  No scleral icterus, noninjected, no discharge from eyes  ENT:  Moist mucous membranes  Neck:  Supple, no jugular venous distention, trachea is midline, no lymphadenopathy and no thyromegaly  Back   No CVAT  Chest:  Clear to auscultation and percussion, good respiratory effort  CVS:  Regular rate and rhythm without a rub, or gallops grade 2 or 6 systolic ejection type murmur heard throughout the precordium  Abdomen:  Soft and nontender with normal bowel sounds  Extremities:  No cyanosis and no edema, moderate arthritic changes, normal range of motion  Neuro:  Grossly intact  Psych:  Alert, oriented x3 and appropriate      Medications:    Current Outpatient Medications:     atorvastatin (LIPITOR) 10 mg tablet, Take 1 tablet (10 mg total) by mouth daily, Disp: 90 tablet, Rfl: 1    Cholecalciferol (Vitamin D-3) 25 MCG (1000 UT) CAPS, Take by mouth in the morning, Disp: , Rfl:     ibuprofen (MOTRIN) 800 mg tablet, , Disp: , Rfl:     metoprolol succinate (TOPROL-XL) 25 mg 24 hr tablet, Take 1 tablet (25 mg total) by mouth 2 (two) times a day, Disp: 180 tablet, Rfl: 3    pantoprazole (PROTONIX) 40 mg tablet, Take 1 tablet (40 mg total) by mouth 2 (two) times a day, Disp: 180 tablet, Rfl: 1    ketorolac (TORADOL) 10 mg tablet, Take 1 tablet (10 mg total) by mouth every 6 (six) hours as needed for moderate pain (Patient not taking: Reported on 12/6/2023), Disp: 20 tablet, Rfl: 0    Premarin vaginal cream, 3 (three) times a week (Patient not taking: Reported on 12/6/2023), Disp: , Rfl:     Current Facility-Administered Medications:     denosumab (PROLIA) subcutaneous injection 60 mg, 60 mg, Subcutaneous, Once, [employer]    lidocaine (XYLOCAINE) 1 % injection 1 mL, 1 mL, Injection, , Remington Blowers, DPM, 1 mL at 12/07/23 0900    triamcinolone acetonide (KENALOG-40) 40 mg/mL injection 20 mg, 20 mg, Infiltration, , Remington Blowers, DPM, 20 mg at 12/07/23 0900    Lab, Imaging and other studies: I have personally reviewed pertinent labs.   Laboratory Results:  Results for orders placed or performed in visit on 12/01/23   CBC and differential   Result Value Ref Range    WBC 5.57 4.31 - 10.16 Thousand/uL    RBC 3.68 (L) 3.81 - 5.12 Million/uL    Hemoglobin 11.3 (L) 11.5 - 15.4 g/dL    Hematocrit 35.4 34.8 - 46.1 %    MCV 96 82 - 98 fL    MCH 30.7 26.8 - 34.3 pg    MCHC 31.9 31.4 - 37.4 g/dL    RDW 13.2 11.6 - 15.1 %    MPV 11.0 8.9 - 12.7 fL    Platelets 283 310 - 326 Thousands/uL    nRBC 0 /100 WBCs    Neutrophils Relative 64 43 - 75 %    Immat GRANS % 0 0 - 2 %    Lymphocytes Relative 24 14 - 44 %    Monocytes Relative 8 4 - 12 %    Eosinophils Relative 3 0 - 6 %    Basophils Relative 1 0 - 1 %    Neutrophils Absolute 3.54 1.85 - 7.62 Thousands/µL    Immature Grans Absolute 0.01 0.00 - 0.20 Thousand/uL    Lymphocytes Absolute 1.31 0.60 - 4.47 Thousands/µL    Monocytes Absolute 0.45 0.17 - 1.22 Thousand/µL    Eosinophils Absolute 0.19 0.00 - 0.61 Thousand/µL    Basophils Absolute 0.07 0.00 - 0.10 Thousands/µL   Comprehensive metabolic panel   Result Value Ref Range    Sodium 142 135 - 147 mmol/L    Potassium 3.8 3.5 - 5.3 mmol/L    Chloride 108 96 - 108 mmol/L CO2 26 21 - 32 mmol/L    ANION GAP 8 mmol/L    BUN 18 5 - 25 mg/dL    Creatinine 1.15 0.60 - 1.30 mg/dL    Glucose, Fasting 94 65 - 99 mg/dL    Calcium 9.2 8.4 - 10.2 mg/dL    AST 17 13 - 39 U/L    ALT 10 7 - 52 U/L    Alkaline Phosphatase 49 34 - 104 U/L    Total Protein 6.5 6.4 - 8.4 g/dL    Albumin 4.2 3.5 - 5.0 g/dL    Total Bilirubin 0.47 0.20 - 1.00 mg/dL    eGFR 44 ml/min/1.73sq m   Ferritin   Result Value Ref Range    Ferritin 122 11 - 307 ng/mL   Iron Saturation %   Result Value Ref Range    Iron Saturation 29 15 - 50 %    TIBC 293 250 - 450 ug/dL    Iron 86 50 - 212 ug/dL    UIBC 207 155 - 355 ug/dL   Magnesium   Result Value Ref Range    Magnesium 2.1 1.9 - 2.7 mg/dL   Protein / creatinine ratio, urine   Result Value Ref Range    Creatinine, Ur 131.0 Reference range not established. mg/dL    Protein Urine Random 34 Reference range not established. mg/dL    Prot/Creat Ratio, Ur 0.26 (H) 0.00 - 0.10   Vitamin D Panel   Result Value Ref Range    25-HYDROXY VIT D 53 ng/mL    25-Hydroxy D2 <1.0 ng/mL    25-HYDROXY VIT D3 53 ng/mL   PTH, intact   Result Value Ref Range    PTH 15.5 12.0 - 88.0 pg/mL       Results from last 7 days   Lab Units 12/01/23  1023   WBC Thousand/uL 5.57   HEMOGLOBIN g/dL 11.3*   HEMATOCRIT % 35.4   PLATELETS Thousands/uL 273   POTASSIUM mmol/L 3.8   CHLORIDE mmol/L 108   CO2 mmol/L 26   BUN mg/dL 18   CREATININE mg/dL 1.15   CALCIUM mg/dL 9.2   MAGNESIUM mg/dL 2.1         Radiology review:   chest X-ray    Ultrasound      Portions of the record may have been created with voice recognition software. Occasional wrong word or "sound a like" substitutions may have occurred due to the inherent limitations of voice recognition software. Read the chart carefully and recognize, using context, where substitutions have occurred.

## 2023-11-29 ENCOUNTER — TELEPHONE (OUTPATIENT)
Dept: NEPHROLOGY | Facility: CLINIC | Age: 83
End: 2023-11-29

## 2023-11-29 NOTE — TELEPHONE ENCOUNTER
Pt called to ask if she will need labs completed prior to appt on 12/8 with Dr Tegan Garcia. I let her know there are labs in her chart and to complete them prior. She understands and has no further questions.

## 2023-11-30 ENCOUNTER — OFFICE VISIT (OUTPATIENT)
Dept: PHYSICAL THERAPY | Facility: REHABILITATION | Age: 83
End: 2023-11-30
Payer: COMMERCIAL

## 2023-11-30 DIAGNOSIS — S46.219A BICEPS STRAIN, INITIAL ENCOUNTER: ICD-10-CM

## 2023-11-30 DIAGNOSIS — M25.512 ACUTE PAIN OF LEFT SHOULDER: Primary | ICD-10-CM

## 2023-11-30 DIAGNOSIS — S46.212A STRAIN OF LEFT BICEPS, INITIAL ENCOUNTER: ICD-10-CM

## 2023-11-30 PROCEDURE — 97112 NEUROMUSCULAR REEDUCATION: CPT

## 2023-11-30 PROCEDURE — 97110 THERAPEUTIC EXERCISES: CPT

## 2023-11-30 NOTE — PROGRESS NOTES
Daily Note     Today's date: 2023  Patient name: Babak Chow  : 1940  MRN: [id number]  Referring provider: Hernán Angulo*  Dx:   Encounter Diagnosis     ICD-10-CM    1. Acute pain of left shoulder  M25.512       2. Biceps strain, initial encounter  S46.219A       3. Strain of left biceps, initial encounter  S46.212A           Start Time: 08  Stop Time: 09  Total time in clinic (min): 40 minutes    Subjective: Pt notes that her shoulder has been feeling slightly better as she has been doing her exercises at home. She continues to have pain at the left lateral shoulder. Objective: See treatment diary below      Assessment: Tolerated treatment well. Patient would benefit from continued PT. Pt was unable to perform supine press up with Tband due to pain and weakness at the L shoulder. She was also challenged with sitting shoulder ER which further suggests RTC involvement with ongoing L arm pain. She responded well to BL rows and shoulder ext to improve scapular strength and stability. She responded well to Vcs for GHJ positioning and scapular stability and shows improved technique with cueing. 1:1 with Marsha Velasco DPT entirety of tx. Plan: Continue per plan of care. Precautions: PMH includes anemia, chronic BL LBP without sciatica, L lumbar radiculopathy, CKD, greater trochanteric bursitis of R hip, HTN, osteoporosis, sacral insufficiency fracture, SIJ inflammation. PSH includes right hip replacement on 8/15/2019. POC expires Unit limit Auth Expiration date PT/OT + Visit Limit?    24 BOMN 2023 BOMN         Visit/Unit Tracking  AUTH Status:  Date 1 2       Approved Used         Remaining  1 2          Pertinent Findings:      POC End Date: 24                                                                                          Test / Measure  2023    FOTO (Predicted 66) 56    L Shoulder Strength 3+ to 4/5    L Shoulder ROM Decreased active ER          Visit Number:  1 2           Manuals 11/21 11/30           Shoulder PROM  MC           Manually Resisted Elbow ROM                                       Neuro Re-Ed             Shoulder Retraction 10x 10x           Cane Shoulder AAROM  10x, Cane Press up 2x10           Shoulder Isometrics ER, 5x, 5s hold ER, 5x, 5s hold           No Money RTB, 10x RTB, 10x           Dowel Elbow Flex/Ext             Elbow Flex/Ext Isometric Hold             Wrist Flex/Ext             Therabar Pronation/Supination             Hammer Pro/Sup                                       Ther Ex             HEP Review + Pt Education 10 min            UBE  3'/3'           Wall Slides 10x 10x           BL Rows  3x8, GTB           TB Shoulder Ext  3x8, GTB           DB Shrugs  5# ea, 2x8           Shoulder Flex + Tband             Elbow Flex/Ext Stretch             Eccentric DB Curls  3x8, 2# ea           Tband Curls in Sitting             Hammer Curls                                                    Ther Activity                                       Gait Training                                       Modalities

## 2023-12-01 ENCOUNTER — APPOINTMENT (OUTPATIENT)
Dept: LAB | Facility: CLINIC | Age: 83
End: 2023-12-01
Payer: COMMERCIAL

## 2023-12-01 DIAGNOSIS — E78.5 DYSLIPIDEMIA: ICD-10-CM

## 2023-12-01 DIAGNOSIS — I12.9 PARENCHYMAL RENAL HYPERTENSION, STAGE 1 THROUGH STAGE 4 OR UNSPECIFIED CHRONIC KIDNEY DISEASE: ICD-10-CM

## 2023-12-01 DIAGNOSIS — E87.6 HYPOKALEMIA: ICD-10-CM

## 2023-12-01 DIAGNOSIS — N18.31 STAGE 3A CHRONIC KIDNEY DISEASE (HCC): ICD-10-CM

## 2023-12-01 DIAGNOSIS — R60.0 EDEMA OF LEFT LOWER EXTREMITY: ICD-10-CM

## 2023-12-01 DIAGNOSIS — E55.9 VITAMIN D DEFICIENCY: ICD-10-CM

## 2023-12-01 DIAGNOSIS — M81.0 SENILE OSTEOPOROSIS: ICD-10-CM

## 2023-12-01 DIAGNOSIS — I10 ESSENTIAL HYPERTENSION: ICD-10-CM

## 2023-12-01 LAB
ALBUMIN SERPL BCP-MCNC: 4.2 G/DL (ref 3.5–5)
ALP SERPL-CCNC: 49 U/L (ref 34–104)
ALT SERPL W P-5'-P-CCNC: 10 U/L (ref 7–52)
ANION GAP SERPL CALCULATED.3IONS-SCNC: 8 MMOL/L
AST SERPL W P-5'-P-CCNC: 17 U/L (ref 13–39)
BASOPHILS # BLD AUTO: 0.07 THOUSANDS/ÂΜL (ref 0–0.1)
BASOPHILS NFR BLD AUTO: 1 % (ref 0–1)
BILIRUB SERPL-MCNC: 0.47 MG/DL (ref 0.2–1)
BUN SERPL-MCNC: 18 MG/DL (ref 5–25)
CALCIUM SERPL-MCNC: 9.2 MG/DL (ref 8.4–10.2)
CHLORIDE SERPL-SCNC: 108 MMOL/L (ref 96–108)
CO2 SERPL-SCNC: 26 MMOL/L (ref 21–32)
CREAT SERPL-MCNC: 1.15 MG/DL (ref 0.6–1.3)
CREAT UR-MCNC: 131 MG/DL
EOSINOPHIL # BLD AUTO: 0.19 THOUSAND/ÂΜL (ref 0–0.61)
EOSINOPHIL NFR BLD AUTO: 3 % (ref 0–6)
ERYTHROCYTE [DISTWIDTH] IN BLOOD BY AUTOMATED COUNT: 13.2 % (ref 11.6–15.1)
FERRITIN SERPL-MCNC: 122 NG/ML (ref 11–307)
GFR SERPL CREATININE-BSD FRML MDRD: 44 ML/MIN/1.73SQ M
GLUCOSE P FAST SERPL-MCNC: 94 MG/DL (ref 65–99)
HCT VFR BLD AUTO: 35.4 % (ref 34.8–46.1)
HGB BLD-MCNC: 11.3 G/DL (ref 11.5–15.4)
IMM GRANULOCYTES # BLD AUTO: 0.01 THOUSAND/UL (ref 0–0.2)
IMM GRANULOCYTES NFR BLD AUTO: 0 % (ref 0–2)
IRON SATN MFR SERPL: 29 % (ref 15–50)
IRON SERPL-MCNC: 86 UG/DL (ref 50–212)
LYMPHOCYTES # BLD AUTO: 1.31 THOUSANDS/ÂΜL (ref 0.6–4.47)
LYMPHOCYTES NFR BLD AUTO: 24 % (ref 14–44)
MAGNESIUM SERPL-MCNC: 2.1 MG/DL (ref 1.9–2.7)
MCH RBC QN AUTO: 30.7 PG (ref 26.8–34.3)
MCHC RBC AUTO-ENTMCNC: 31.9 G/DL (ref 31.4–37.4)
MCV RBC AUTO: 96 FL (ref 82–98)
MONOCYTES # BLD AUTO: 0.45 THOUSAND/ÂΜL (ref 0.17–1.22)
MONOCYTES NFR BLD AUTO: 8 % (ref 4–12)
NEUTROPHILS # BLD AUTO: 3.54 THOUSANDS/ÂΜL (ref 1.85–7.62)
NEUTS SEG NFR BLD AUTO: 64 % (ref 43–75)
NRBC BLD AUTO-RTO: 0 /100 WBCS
PLATELET # BLD AUTO: 273 THOUSANDS/UL (ref 149–390)
PMV BLD AUTO: 11 FL (ref 8.9–12.7)
POTASSIUM SERPL-SCNC: 3.8 MMOL/L (ref 3.5–5.3)
PROT SERPL-MCNC: 6.5 G/DL (ref 6.4–8.4)
PROT UR-MCNC: 34 MG/DL
PROT/CREAT UR: 0.26 MG/G{CREAT} (ref 0–0.1)
PTH-INTACT SERPL-MCNC: 15.5 PG/ML (ref 12–88)
RBC # BLD AUTO: 3.68 MILLION/UL (ref 3.81–5.12)
SODIUM SERPL-SCNC: 142 MMOL/L (ref 135–147)
TIBC SERPL-MCNC: 293 UG/DL (ref 250–450)
UIBC SERPL-MCNC: 207 UG/DL (ref 155–355)
WBC # BLD AUTO: 5.57 THOUSAND/UL (ref 4.31–10.16)

## 2023-12-01 PROCEDURE — 85025 COMPLETE CBC W/AUTO DIFF WBC: CPT

## 2023-12-01 PROCEDURE — 82728 ASSAY OF FERRITIN: CPT

## 2023-12-01 PROCEDURE — 83540 ASSAY OF IRON: CPT

## 2023-12-01 PROCEDURE — 82570 ASSAY OF URINE CREATININE: CPT

## 2023-12-01 PROCEDURE — 82306 VITAMIN D 25 HYDROXY: CPT

## 2023-12-01 PROCEDURE — 84156 ASSAY OF PROTEIN URINE: CPT

## 2023-12-01 PROCEDURE — 83970 ASSAY OF PARATHORMONE: CPT

## 2023-12-01 PROCEDURE — 83550 IRON BINDING TEST: CPT

## 2023-12-01 PROCEDURE — 83735 ASSAY OF MAGNESIUM: CPT

## 2023-12-01 PROCEDURE — 36415 COLL VENOUS BLD VENIPUNCTURE: CPT

## 2023-12-01 PROCEDURE — 80053 COMPREHEN METABOLIC PANEL: CPT

## 2023-12-07 ENCOUNTER — OFFICE VISIT (OUTPATIENT)
Dept: PODIATRY | Facility: CLINIC | Age: 83
End: 2023-12-07
Payer: COMMERCIAL

## 2023-12-07 VITALS
RESPIRATION RATE: 18 BRPM | BODY MASS INDEX: 25.86 KG/M2 | HEIGHT: 61 IN | HEART RATE: 80 BPM | SYSTOLIC BLOOD PRESSURE: 120 MMHG | WEIGHT: 137 LBS | DIASTOLIC BLOOD PRESSURE: 75 MMHG

## 2023-12-07 DIAGNOSIS — M19.071 PRIMARY OSTEOARTHRITIS OF RIGHT FOOT: Primary | ICD-10-CM

## 2023-12-07 LAB
25(OH)D2 SERPL-MCNC: <1 NG/ML
25(OH)D3 SERPL-MCNC: 53 NG/ML
25(OH)D3+25(OH)D2 SERPL-MCNC: 53 NG/ML

## 2023-12-07 PROCEDURE — 20600 DRAIN/INJ JOINT/BURSA W/O US: CPT | Performed by: PODIATRIST

## 2023-12-07 RX ORDER — IBUPROFEN 800 MG/1
TABLET ORAL
COMMUNITY
Start: 2023-11-20

## 2023-12-07 RX ORDER — TRIAMCINOLONE ACETONIDE 40 MG/ML
20 INJECTION, SUSPENSION INTRA-ARTICULAR; INTRAMUSCULAR
Status: SHIPPED | OUTPATIENT
Start: 2023-12-07

## 2023-12-07 RX ORDER — LIDOCAINE HYDROCHLORIDE 10 MG/ML
1 INJECTION, SOLUTION INFILTRATION; PERINEURAL
Status: SHIPPED | OUTPATIENT
Start: 2023-12-07

## 2023-12-07 RX ADMIN — LIDOCAINE HYDROCHLORIDE 1 ML: 10 INJECTION, SOLUTION INFILTRATION; PERINEURAL at 09:00

## 2023-12-07 RX ADMIN — TRIAMCINOLONE ACETONIDE 20 MG: 40 INJECTION, SUSPENSION INTRA-ARTICULAR; INTRAMUSCULAR at 09:00

## 2023-12-07 NOTE — PROGRESS NOTES
Patient presents for assessment of right foot. Patient continues to relate pain in the instep area of the right foot. In July 2023, patient had cortisone injection for pain in the level of the second metatarsal cuneiform joint. At the present time, her pain is shifted laterally along the third metatarsal cuneiform joint and cuboid. She has no pain with palpation at the second metatarsal cuneiform joint. Patient desires another injection. Treatment: Injected right foot with 0.5 cc Kenalog 40 along with 1 cc 1% Xylocaine. Patient is aware that she cannot have too many injections as it can cause skin atrophy. She is rescheduled in 10 weeks. Small joint arthrocentesis: R intertarsal  Universal Protocol:  Consent: Verbal consent obtained.   Risks and benefits: risks, benefits and alternatives were discussed  Consent given by: patient  Patient identity confirmed: verbally with patient  Supporting Documentation  Indications: pain   Procedure Details  Location: foot - R intertarsal  Needle size: 25 G  Approach: dorsal  Medications administered: 1 mL lidocaine 1 %; 20 mg triamcinolone acetonide 40 mg/mL

## 2023-12-08 ENCOUNTER — OFFICE VISIT (OUTPATIENT)
Dept: NEPHROLOGY | Facility: CLINIC | Age: 83
End: 2023-12-08
Payer: COMMERCIAL

## 2023-12-08 ENCOUNTER — OFFICE VISIT (OUTPATIENT)
Dept: PHYSICAL THERAPY | Facility: REHABILITATION | Age: 83
End: 2023-12-08
Payer: COMMERCIAL

## 2023-12-08 VITALS
SYSTOLIC BLOOD PRESSURE: 134 MMHG | BODY MASS INDEX: 25.68 KG/M2 | DIASTOLIC BLOOD PRESSURE: 67 MMHG | HEART RATE: 76 BPM | WEIGHT: 136 LBS | HEIGHT: 61 IN

## 2023-12-08 DIAGNOSIS — N18.31 STAGE 3A CHRONIC KIDNEY DISEASE (HCC): ICD-10-CM

## 2023-12-08 DIAGNOSIS — M25.512 ACUTE PAIN OF LEFT SHOULDER: Primary | ICD-10-CM

## 2023-12-08 DIAGNOSIS — E55.9 VITAMIN D DEFICIENCY: ICD-10-CM

## 2023-12-08 DIAGNOSIS — S46.219A BICEPS STRAIN, INITIAL ENCOUNTER: ICD-10-CM

## 2023-12-08 DIAGNOSIS — E87.6 HYPOKALEMIA: ICD-10-CM

## 2023-12-08 DIAGNOSIS — I12.9 PARENCHYMAL RENAL HYPERTENSION, STAGE 1 THROUGH STAGE 4 OR UNSPECIFIED CHRONIC KIDNEY DISEASE: Primary | ICD-10-CM

## 2023-12-08 DIAGNOSIS — S46.212A STRAIN OF LEFT BICEPS, INITIAL ENCOUNTER: ICD-10-CM

## 2023-12-08 DIAGNOSIS — E78.5 DYSLIPIDEMIA: ICD-10-CM

## 2023-12-08 PROCEDURE — 97110 THERAPEUTIC EXERCISES: CPT

## 2023-12-08 PROCEDURE — 99214 OFFICE O/P EST MOD 30 MIN: CPT | Performed by: INTERNAL MEDICINE

## 2023-12-08 PROCEDURE — 97112 NEUROMUSCULAR REEDUCATION: CPT

## 2023-12-08 NOTE — PROGRESS NOTES
Daily Note     Today's date: 2023  Patient name: Charisse Velasquez  : 1940  MRN: [id number]  Referring provider: Ponce Shell DO  Dx:   Encounter Diagnosis     ICD-10-CM    1. Acute pain of left shoulder  M25.512       2. Biceps strain, initial encounter  S46.219A       3. Strain of left biceps, initial encounter  S46.212A           Start Time: 1300  Stop Time: 1340  Total time in clinic (min): 40 minutes    Subjective: Pt notes that her shoulder and arm has been feeling okay since last tx session, no significant changes since last visit. She notes her shoulder was slightly sore after last visit but this went away with rest.       Objective: See treatment diary below      Assessment: Tolerated treatment well. Patient would benefit from continued PT. Pt continues to show limitations with end range shoulder flexion during PROM as she notes increased pain at the lateral shoulder. She was introduced to further functional UE strength exercises and responded well without excessive increase in pain or soreness. She was challenged by overhead press and shoulder flex/abd as shoulder and LUE strength continues to be limited. Continue to progress as tolerated, 1:1 with Vanessa Ayers DPT entirety of tx. Plan: Continue per plan of care. Precautions: PMH includes anemia, chronic BL LBP without sciatica, L lumbar radiculopathy, CKD, greater trochanteric bursitis of R hip, HTN, osteoporosis, sacral insufficiency fracture, SIJ inflammation. PSH includes right hip replacement on 8/15/2019. POC expires Unit limit Auth Expiration date PT/OT + Visit Limit?    24 BOMN 2023 BOMN         Visit/Unit Tracking  AUTH Status:  Date       Approved Used 1 2 3       Remaining             Pertinent Findings:      POC End Date: 24                                                                                          Test / Measure  2023    FOTO (Predicted 66) 56    L Shoulder Strength 3+ to 4/5    L Shoulder ROM Decreased active ER          Visit Number:  1 2 3          Manuals 11/21 11/30 12/8          Shoulder PROM  MC MC          Manually Resisted Elbow ROM                                       Neuro Re-Ed             Shoulder Retraction 10x 10x           Cane Shoulder AAROM  10x, Cane Press up 2x10 10x, Cane Press up 2x10          Shoulder Isometrics ER, 5x, 5s hold ER, 5x, 5s hold           No Money RTB, 10x RTB, 10x RTB, 15x          Shoulder Flex/Abd   2#, 2x10          Dowel Elbow Flex/Ext             Elbow Flex/Ext Isometric Hold             Wrist Flex/Ext             Therabar Pronation/Supination             Hammer Pro/Sup                                       Ther Ex             HEP Review + Pt Education 10 min            UBE  3'/3' 3'/3'          Wall Slides 10x 10x 10x          BL Rows  3x8, GTB 3x10, BTB          TB Shoulder Ext  3x8, GTB 3x8, GTB          DB Shrugs  5# ea, 2x8 5# ea, 2x8          Shoulder Press   3x5, 2#          Shoulder Flex + Tband             Elbow Flex/Ext Stretch             Eccentric DB Curls  3x8, 2# ea 3x8, 2# ea          Tband Curls in Sitting             Hammer Curls                                                    Ther Activity                                       Gait Training                                       Modalities

## 2023-12-08 NOTE — PATIENT INSTRUCTIONS
Visit summary:  - All doing great including your kidney function  - Please send in a week of blood pressure readings to make sure those are doing well        1. Medication changes today:  No medication changes today pending your home blood pressure readings    2. General instructions:  Continue to avoid salt and keep as active as possible        3. Please take 1 week a blood pressure readings at this time, you can just do sitting blood pressure readings    AS FOLLOWS  MORNING AND EVENING, SITTING AND STANDING as follows:  TAKE THE MORNING READINGS BEFORE ANY MEDICATIONS AND WHEN YOU ARE RELAXED FOR SEVERAL MINUTES  TAKE THE EVENING READINGS:  BETWEEN 7-10 P.M.; PRIOR TO ANY MEDICATIONS; AT LEAST IN OUR  FROM DINNER; AND CERTAINLY AFTER RELAXING FOR A FEW MINUTES  PLEASE INCLUDE HEART RATE WITH YOUR BLOOD PRESSURE READINGS  When taking standing readings, keep your arm supported at heart level and not dangling  Make sure you are sitting with your back supported and feet on the ground and do not cross your legs or feet  Make sure you have not taken any coffee or caffeine products or exercised or smoke cigarettes at least 30 minutes before taking your blood pressure  Then please mail these readings into the office    4. In 4 months:  Please go for nonfasting lab work but in the morning  Please take 1 week a blood pressure readings as outlined above and mail those into the office      5. Follow-up in 8 months  Please bring in 1 week a blood pressure readings morning evening, sitting and standing is outlined above  PLEASE BRING AN YOUR BLOOD PRESSURE MACHINE TO CORRELATE WITH THE OFFICE MACHINE AT THIS NEXT SCHEDULED VISIT  Please go for fasting lab work 1-2 weeks prior to your appointment      6.   General non medical recommendations:  AVOID SALT BUT NOT ADDING AN READING LABELS TO MAKE SURE THERE IS LOW-SALT IN THE FOOD THAT YOU ARE EATING  Goal is less than 2 g of sodium intake or less than 5 g of sodium chloride intake per day    Avoid nonsteroidal anti-inflammatory drugs such as Naprosyn, ibuprofen, Aleve, Advil, Celebrex, Meloxicam (Mobic) etc.  You can use Tylenol as needed if you do not have any liver condition to be concerned about    Avoid medications such as Sudafed or decongestants and antihistamines that contained the D component which is the decongestant. You can take antihistamines without the decongestant or D component. Try to avoid medications such as pantoprazole or  Protonix/Nexium or Esomeprazole)/Prilosec or omeprazole/Prevacid or lansoprazole/AcipHex or Rabeprazole. If you are able to, use Pepcid as this is safer for your kidneys. Try to exercise at least 30 minutes 3 days a week to begin with with an ultimate goal of 5 days a week for at least 30 minutes      Please do not drink more than 2 glasses of alcohol/wine on a daily basis as this may contribute to your high blood pressure.

## 2023-12-08 NOTE — LETTER
December 8, 2023     Rozina Porras, [address] [facility] [address]    Patient: Ahmet Bee   YOB: 1940   Date of Visit: 12/8/2023       Dear Dr. Liseth Grant: Thank you for referring Ahmet Bee to me for evaluation. Below are my notes for this consultation. If you have questions, please do not hesitate to call me. I look forward to following your patient along with you. Sincerely,        Quinton Manuel MD        CC: No Recipients    Quinton Manuel MD  12/8/2023  9:45 AM  Sign when Signing Visit  RENAL FOLLOW UP NOTE:.td    ASSESSMENT AND PLAN:  1.  CKD stage 3A.:  Etiology:  AK I from Bactrim/hypercalcemia along with poor oral intake. 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/arteriolar nephrosclerosis  Baseline creatinine:  1.1 -1.79  Current creatinine:  1.15 at baseline  Urine protein creatinine ratio:  0.26 g at goal   Recommendations:  Treat hypertension-please see below  Treat dyslipidemia-please see below  Maintain proteinuria less than 1 g or as low as possible  Avoid nephrotoxic agents such as NSAIDs, patient counseled as such  2.  Volume:  Euvolemic of note: 5/26/2023 venous duplex negative for DVT  3. Hypertension:  Secondary workup:  Was negative  Home readings:   None today     Goal blood pressure:  Less than 130/80 given CKD  Recommendations:   Push nonmedical regimen especially weight loss/isotonic exercise and low-salt diet  Medication changes today:   No changes pending home blood pressure readings AOBP seems acceptable at 134/67        4. Electrolytes:    Mild metabolic acidosis:  Resolved  Borderline hypokalemia:  Now normal at 3.8    5. Mineral bone disorder:  Calcium/magnesium/phosphorus all normal including calcium of 9.2 and magnesium of 2.1  PTH intact: 15.5 which is normal  Vitamin-D: 53 from 12/5/2023 at goal  6.   Dyslipidemia:  Goal LDL:  Less than 100  Current lipid profile:  LDL 82/HDL 100/triglycerides 50 from 5/22/2023  Recommendations:  Patient is at goal   7. Anemia:   Hemoglobin:   Stable at 11.3 at baseline  Iron studies: Acceptable from 12/1/2023  Multiple myeloma evaluation  was negative as outlined above  GI evaluation:  EGD with just esophagitis; colonoscopy with benign polyp  Heme consultation:  Felt anemia which was normocytic secondary to chronic kidney disease. No further recommendations at this time. 8.  Other problems:  GERD  Osteoarthritis of right hip  Colonic polyp  Status post duodenal perforation 10/04/2020:  Apparently upper GI endoscopy demonstrated no leak. Endoscopy demonstrated no leak. She was NPO with antibiotics who was given a 2 week course of antibiotics upon discharge along with antacid medication  To be followed up as an outpatient. Cardiac murmur which is chronic echocardiogram performed March 2021 showed good EF 65% and mild TR and mild aortic regurgitation  The patient has left lower extremity edema she is not sure when this began there is no symptoms or signs of a potential clot however given the unilateral edema I would want to make sure this could be certainly incompetent veins. I will order a stat venous duplex. GI health maintenance:  Last colonoscopy July 2024 due to sessile polyp removal in 2019    PATIENT INSTRUCTIONS:    Patient Instructions   Visit summary:  - All doing great including your kidney function  - Please send in a week of blood pressure readings to make sure those are doing well        1. Medication changes today:  No medication changes today pending your home blood pressure readings    2. General instructions:  Continue to avoid salt and keep as active as possible        3.   Please take 1 week a blood pressure readings at this time, you can just do sitting blood pressure readings    AS FOLLOWS  MORNING AND EVENING, SITTING AND STANDING as follows:  TAKE THE MORNING READINGS BEFORE ANY MEDICATIONS AND WHEN YOU ARE RELAXED FOR SEVERAL MINUTES  TAKE THE EVENING READINGS:  BETWEEN 7-10 P.M.; PRIOR TO ANY MEDICATIONS; AT LEAST IN OUR  FROM DINNER; AND CERTAINLY AFTER RELAXING FOR A FEW MINUTES  PLEASE INCLUDE HEART RATE WITH YOUR BLOOD PRESSURE READINGS  When taking standing readings, keep your arm supported at heart level and not dangling  Make sure you are sitting with your back supported and feet on the ground and do not cross your legs or feet  Make sure you have not taken any coffee or caffeine products or exercised or smoke cigarettes at least 30 minutes before taking your blood pressure  Then please mail these readings into the office    4. In 4 months:  Please go for nonfasting lab work but in the morning  Please take 1 week a blood pressure readings as outlined above and mail those into the office      5. Follow-up in 8 months  Please bring in 1 week a blood pressure readings morning evening, sitting and standing is outlined above  PLEASE BRING AN YOUR BLOOD PRESSURE MACHINE TO CORRELATE WITH THE OFFICE MACHINE AT THIS NEXT SCHEDULED VISIT  Please go for fasting lab work 1-2 weeks prior to your appointment      6. General non medical recommendations:  AVOID SALT BUT NOT ADDING AN READING LABELS TO MAKE SURE THERE IS LOW-SALT IN THE FOOD THAT YOU ARE EATING  Goal is less than 2 g of sodium intake or less than 5 g of sodium chloride intake per day    Avoid nonsteroidal anti-inflammatory drugs such as Naprosyn, ibuprofen, Aleve, Advil, Celebrex, Meloxicam (Mobic) etc.  You can use Tylenol as needed if you do not have any liver condition to be concerned about    Avoid medications such as Sudafed or decongestants and antihistamines that contained the D component which is the decongestant. You can take antihistamines without the decongestant or D component. Try to avoid medications such as pantoprazole or  Protonix/Nexium or Esomeprazole)/Prilosec or omeprazole/Prevacid or lansoprazole/AcipHex or Rabeprazole.   If you are able to, use Pepcid as this is safer for your kidneys. Try to exercise at least 30 minutes 3 days a week to begin with with an ultimate goal of 5 days a week for at least 30 minutes      Please do not drink more than 2 glasses of alcohol/wine on a daily basis as this may contribute to your high blood pressure. Subjective: There has been no hospitalizations or acute illnesses since last visit. The patient overall is feeling well. No fevers, chills, or cough or colds.   Good appetite and good energy  No hematuria, dysuria, voiding symptoms or foamy urine  No gastrointestinal symptoms  No cardiovascular symptoms including swelling of the legs  No headaches, dizziness or lightheadedness  Blood pressure medications:  Toprol-XL 25 mg twice a day    Renal pertinent medications:  Protonix 40 mg once a day but not every day  Rare Motrin  Vitamin D 1000 units daily  Atorvastatin 10 mg daily    ROS:  See HPI, otherwise review of systems as completely reviewed with the patient are negative    Past Medical History:   Diagnosis Date   • Acute laryngitis 5/12/2022   • Anemia    • Chronic bilateral low back pain without sciatica 11/26/2019   • Chronic kidney disease    • Duodenal perforation ([address]) 10/7/2020   • Duodenal ulcer    • Enterococcus UTI 4/7/2016   • GERD (gastroesophageal reflux disease) 04/04/2016   • Greater trochanteric bursitis of right hip 9/18/2018   • 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   • Hypercalcemia    • Hypertension    • Left lumbar radiculopathy 4/7/2016   • Mixed hyperlipidemia    • Osteoarthritis of hip 04/04/2016   • Osteoporosis    • Sacral insufficiency fracture    • SI (sacroiliac) joint inflammation ([address]) 04/04/2016   • Substance addiction ([address])    • Vitamin D deficiency 3/11/2019     Past Surgical History:   Procedure Laterality Date   • BREAST EXCISIONAL BIOPSY Left 2000    benign   • COLONOSCOPY  2019   • JOINT REPLACEMENT      right hip 8/15   • MAMMO (HISTORICAL) 2019   • TONSILLECTOMY       Family History   Problem Relation Age of Onset   • No Known Problems Mother    • No Known Problems Father    • Breast cancer Daughter 48   • Breast cancer Maternal Aunt 79   • No Known Problems Sister    • No Known Problems Maternal Grandmother    • No Known Problems Maternal Grandfather    • No Known Problems Paternal Grandmother    • No Known Problems Paternal Grandfather    • No Known Problems Son    • No Known Problems Sister    • No Known Problems Sister       reports that she has never smoked. She has never used smokeless tobacco. She reports that she does not currently use drugs after having used the following drugs: Prescription. She reports that she does not drink alcohol. I COMPLETELY REVIEWED THE PAST MEDICAL HISTORY/PAST SURGICAL HISTORY/SOCIAL HISTORY/FAMILY HISTORY/AND MEDICATIONS  AND UPDATED ALL    Objective:     Vitals:   BP sitting on right: 142/70 with a heart rate of 84 and regular  BP standing on right: 150/70 with a heart rate of 80 and regular  Vitals:    12/08/23 0918   BP: 134/67   Pulse: 76       Weight (last 2 days)       Date/Time Weight    12/08/23 0918 61.7 (136)          Wt Readings from Last 3 Encounters:   12/08/23 61.7 kg (136 lb)   12/07/23 62.1 kg (137 lb)   12/06/23 62.1 kg (137 lb)       Body mass index is 25.7 kg/m².     Physical Exam: General:  No acute distress  Skin:  No acute rash  Eyes:  No scleral icterus, noninjected, no discharge from eyes  ENT:  Moist mucous membranes  Neck:  Supple, no jugular venous distention, trachea is midline, no lymphadenopathy and no thyromegaly  Back   No CVAT  Chest:  Clear to auscultation and percussion, good respiratory effort  CVS:  Regular rate and rhythm without a rub, or gallops grade 2 or 6 systolic ejection type murmur heard throughout the precordium  Abdomen:  Soft and nontender with normal bowel sounds  Extremities:  No cyanosis and no edema, moderate arthritic changes, normal range of motion  Neuro: Grossly intact  Psych:  Alert, oriented x3 and appropriate      Medications:    Current Outpatient Medications:   •  atorvastatin (LIPITOR) 10 mg tablet, Take 1 tablet (10 mg total) by mouth daily, Disp: 90 tablet, Rfl: 1  •  Cholecalciferol (Vitamin D-3) 25 MCG (1000 UT) CAPS, Take by mouth in the morning, Disp: , Rfl:   •  ibuprofen (MOTRIN) 800 mg tablet, , Disp: , Rfl:   •  metoprolol succinate (TOPROL-XL) 25 mg 24 hr tablet, Take 1 tablet (25 mg total) by mouth 2 (two) times a day, Disp: 180 tablet, Rfl: 3  •  pantoprazole (PROTONIX) 40 mg tablet, Take 1 tablet (40 mg total) by mouth 2 (two) times a day, Disp: 180 tablet, Rfl: 1  •  ketorolac (TORADOL) 10 mg tablet, Take 1 tablet (10 mg total) by mouth every 6 (six) hours as needed for moderate pain (Patient not taking: Reported on 12/6/2023), Disp: 20 tablet, Rfl: 0  •  Premarin vaginal cream, 3 (three) times a week (Patient not taking: Reported on 12/6/2023), Disp: , Rfl:     Current Facility-Administered Medications:   •  denosumab (PROLIA) subcutaneous injection 60 mg, 60 mg, Subcutaneous, Once, [employer]  •  lidocaine (XYLOCAINE) 1 % injection 1 mL, 1 mL, Injection, , Burton Deal, DPM, 1 mL at 12/07/23 0900  •  triamcinolone acetonide (KENALOG-40) 40 mg/mL injection 20 mg, 20 mg, Infiltration, , Burton Deal, DPM, 20 mg at 12/07/23 0900    Lab, Imaging and other studies: I have personally reviewed pertinent labs.   Laboratory Results:  Results for orders placed or performed in visit on 12/01/23   CBC and differential   Result Value Ref Range    WBC 5.57 4.31 - 10.16 Thousand/uL    RBC 3.68 (L) 3.81 - 5.12 Million/uL    Hemoglobin 11.3 (L) 11.5 - 15.4 g/dL    Hematocrit 35.4 34.8 - 46.1 %    MCV 96 82 - 98 fL    MCH 30.7 26.8 - 34.3 pg    MCHC 31.9 31.4 - 37.4 g/dL    RDW 13.2 11.6 - 15.1 %    MPV 11.0 8.9 - 12.7 fL    Platelets 794 720 - 728 Thousands/uL    nRBC 0 /100 WBCs    Neutrophils Relative 64 43 - 75 %    Immat GRANS % 0 0 - 2 % Lymphocytes Relative 24 14 - 44 %    Monocytes Relative 8 4 - 12 %    Eosinophils Relative 3 0 - 6 %    Basophils Relative 1 0 - 1 %    Neutrophils Absolute 3.54 1.85 - 7.62 Thousands/µL    Immature Grans Absolute 0.01 0.00 - 0.20 Thousand/uL    Lymphocytes Absolute 1.31 0.60 - 4.47 Thousands/µL    Monocytes Absolute 0.45 0.17 - 1.22 Thousand/µL    Eosinophils Absolute 0.19 0.00 - 0.61 Thousand/µL    Basophils Absolute 0.07 0.00 - 0.10 Thousands/µL   Comprehensive metabolic panel   Result Value Ref Range    Sodium 142 135 - 147 mmol/L    Potassium 3.8 3.5 - 5.3 mmol/L    Chloride 108 96 - 108 mmol/L    CO2 26 21 - 32 mmol/L    ANION GAP 8 mmol/L    BUN 18 5 - 25 mg/dL    Creatinine 1.15 0.60 - 1.30 mg/dL    Glucose, Fasting 94 65 - 99 mg/dL    Calcium 9.2 8.4 - 10.2 mg/dL    AST 17 13 - 39 U/L    ALT 10 7 - 52 U/L    Alkaline Phosphatase 49 34 - 104 U/L    Total Protein 6.5 6.4 - 8.4 g/dL    Albumin 4.2 3.5 - 5.0 g/dL    Total Bilirubin 0.47 0.20 - 1.00 mg/dL    eGFR 44 ml/min/1.73sq m   Ferritin   Result Value Ref Range    Ferritin 122 11 - 307 ng/mL   Iron Saturation %   Result Value Ref Range    Iron Saturation 29 15 - 50 %    TIBC 293 250 - 450 ug/dL    Iron 86 50 - 212 ug/dL    UIBC 207 155 - 355 ug/dL   Magnesium   Result Value Ref Range    Magnesium 2.1 1.9 - 2.7 mg/dL   Protein / creatinine ratio, urine   Result Value Ref Range    Creatinine, Ur 131.0 Reference range not established. mg/dL    Protein Urine Random 34 Reference range not established. mg/dL    Prot/Creat Ratio, Ur 0.26 (H) 0.00 - 0.10   Vitamin D Panel   Result Value Ref Range    25-HYDROXY VIT D 53 ng/mL    25-Hydroxy D2 <1.0 ng/mL    25-HYDROXY VIT D3 53 ng/mL   PTH, intact   Result Value Ref Range    PTH 15.5 12.0 - 88.0 pg/mL       Results from last 7 days   Lab Units 12/01/23  1023   WBC Thousand/uL 5.57   HEMOGLOBIN g/dL 11.3*   HEMATOCRIT % 35.4   PLATELETS Thousands/uL 273   POTASSIUM mmol/L 3.8   CHLORIDE mmol/L 108   CO2 mmol/L 26 BUN mg/dL 18   CREATININE mg/dL 1.15   CALCIUM mg/dL 9.2   MAGNESIUM mg/dL 2.1         Radiology review:   chest X-ray    Ultrasound      Portions of the record may have been created with voice recognition software. Occasional wrong word or "sound a like" substitutions may have occurred due to the inherent limitations of voice recognition software. Read the chart carefully and recognize, using context, where substitutions have occurred.

## 2023-12-12 ENCOUNTER — OFFICE VISIT (OUTPATIENT)
Dept: OBGYN CLINIC | Facility: HOSPITAL | Age: 83
End: 2023-12-12
Payer: COMMERCIAL

## 2023-12-12 ENCOUNTER — APPOINTMENT (OUTPATIENT)
Dept: PHYSICAL THERAPY | Facility: REHABILITATION | Age: 83
End: 2023-12-12
Payer: COMMERCIAL

## 2023-12-12 VITALS
DIASTOLIC BLOOD PRESSURE: 85 MMHG | SYSTOLIC BLOOD PRESSURE: 159 MMHG | BODY MASS INDEX: 25.68 KG/M2 | HEART RATE: 76 BPM | WEIGHT: 136 LBS | HEIGHT: 61 IN

## 2023-12-12 DIAGNOSIS — G89.29 CHRONIC LEFT SHOULDER PAIN: ICD-10-CM

## 2023-12-12 DIAGNOSIS — M12.812 ROTATOR CUFF ARTHROPATHY OF LEFT SHOULDER: ICD-10-CM

## 2023-12-12 DIAGNOSIS — G89.29 CHRONIC PAIN OF LEFT KNEE: ICD-10-CM

## 2023-12-12 DIAGNOSIS — M25.562 CHRONIC PAIN OF LEFT KNEE: ICD-10-CM

## 2023-12-12 DIAGNOSIS — M17.12 PRIMARY OSTEOARTHRITIS OF LEFT KNEE: ICD-10-CM

## 2023-12-12 DIAGNOSIS — M25.412 EFFUSION OF LEFT SHOULDER: ICD-10-CM

## 2023-12-12 DIAGNOSIS — M19.012 PRIMARY OSTEOARTHRITIS OF LEFT SHOULDER: ICD-10-CM

## 2023-12-12 DIAGNOSIS — M25.512 CHRONIC LEFT SHOULDER PAIN: ICD-10-CM

## 2023-12-12 DIAGNOSIS — M70.62 TROCHANTERIC BURSITIS OF BOTH HIPS: Primary | ICD-10-CM

## 2023-12-12 DIAGNOSIS — M70.61 TROCHANTERIC BURSITIS OF BOTH HIPS: Primary | ICD-10-CM

## 2023-12-12 PROCEDURE — 20610 DRAIN/INJ JOINT/BURSA W/O US: CPT | Performed by: ORTHOPAEDIC SURGERY

## 2023-12-12 PROCEDURE — 99214 OFFICE O/P EST MOD 30 MIN: CPT | Performed by: ORTHOPAEDIC SURGERY

## 2023-12-12 RX ORDER — LIDOCAINE HYDROCHLORIDE 10 MG/ML
2 INJECTION, SOLUTION INFILTRATION; PERINEURAL
Status: COMPLETED | OUTPATIENT
Start: 2023-12-12 | End: 2023-12-12

## 2023-12-12 RX ORDER — BUPIVACAINE HYDROCHLORIDE 2.5 MG/ML
2 INJECTION, SOLUTION INFILTRATION; PERINEURAL
Status: COMPLETED | OUTPATIENT
Start: 2023-12-12 | End: 2023-12-12

## 2023-12-12 RX ORDER — BETAMETHASONE SODIUM PHOSPHATE AND BETAMETHASONE ACETATE 3; 3 MG/ML; MG/ML
12 INJECTION, SUSPENSION INTRA-ARTICULAR; INTRALESIONAL; INTRAMUSCULAR; SOFT TISSUE
Status: COMPLETED | OUTPATIENT
Start: 2023-12-12 | End: 2023-12-12

## 2023-12-12 RX ADMIN — LIDOCAINE HYDROCHLORIDE 2 ML: 10 INJECTION, SOLUTION INFILTRATION; PERINEURAL at 10:15

## 2023-12-12 RX ADMIN — BETAMETHASONE SODIUM PHOSPHATE AND BETAMETHASONE ACETATE 12 MG: 3; 3 INJECTION, SUSPENSION INTRA-ARTICULAR; INTRALESIONAL; INTRAMUSCULAR; SOFT TISSUE at 10:15

## 2023-12-12 RX ADMIN — BUPIVACAINE HYDROCHLORIDE 2 ML: 2.5 INJECTION, SOLUTION INFILTRATION; PERINEURAL at 10:15

## 2023-12-12 NOTE — PROGRESS NOTES
Assessment:   Diagnosis ICD-10-CM Associated Orders   1. Trochanteric bursitis of both hips  M70.61 Large joint arthrocentesis: bilateral greater trochanteric bursa    M70.62       2. Primary osteoarthritis of left knee  M17.12 Large joint arthrocentesis: L knee      3. Chronic pain of left knee  M25.562 Large joint arthrocentesis: L knee    G89.29       4. Effusion of left shoulder  M25.412 Large joint arthrocentesis: L subacromial bursa      5. Rotator cuff arthropathy of left shoulder  M12.812 Large joint arthrocentesis: L subacromial bursa      6. Primary osteoarthritis of left shoulder  M19.012 Large joint arthrocentesis: L subacromial bursa      7. Chronic left shoulder pain  M25.512 Large joint arthrocentesis: L subacromial bursa    G89.29           Plan:  Diagnosis, treatment options and associated risks were discussed with the patient including no treatment, nonsurgical treatment and potential for surgical intervention. The patient was given the opportunity to ask questions regarding each. She was offered, excepted, performed an aspiration of her left shoulder with injections of cortisone to her left shoulder, left knee, bilateral trochanteric bursa areas today for symptomatic relief. She tolerated all procedures well. Ice and postinjection protocol advised. Weightbearing and activity as tolerated. Continue physical therapy as previously scheduled for her left shoulder. To do next visit:  Return in about 3 months (around 3/12/2024) for re-check. The above stated was discussed in layman's terms and the patient expressed understanding. All questions were answered to the patient's satisfaction.        Scribe Attestation      I,:  Olman Saenz am acting as a scribe while in the presence of the attending physician.:       I,:  Wendy Ramírez MD personally performed the services described in this documentation    as scribed in my presence.:               Subjective:   Marlow is a 81 y.o. female who presents today for repeat evaluation due to return of pain laterally at both hips and pain at her left knee. She is also complaining of pain laterally at her left upper extremity. She recently had x-rays and was started physical therapy. She cannot lay comfortably on her left side at night due to pain at her shoulder as well as pain laterally at her hip. She finds relief with interim injections of cortisone to both trochanteric bursa areas as well as her left knee her last being 3 months ago. She has a history of right hip arthroplasty and is pleased.        Review of systems negative unless otherwise specified in HPI  Review of Systems    Past Medical History:   Diagnosis Date    Acute laryngitis 5/12/2022    Anemia     Chronic bilateral low back pain without sciatica 11/26/2019    Chronic kidney disease     Duodenal perforation ([address]) 10/7/2020    Duodenal ulcer     Enterococcus UTI 4/7/2016    GERD (gastroesophageal reflux disease) 04/04/2016    Greater trochanteric bursitis of right hip 9/18/2018    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    Hypercalcemia     Hypertension     Left lumbar radiculopathy 4/7/2016    Mixed hyperlipidemia     Osteoarthritis of hip 04/04/2016    Osteoporosis     Sacral insufficiency fracture     SI (sacroiliac) joint inflammation ([address]) 04/04/2016    Substance addiction ([address])     Vitamin D deficiency 3/11/2019       Past Surgical History:   Procedure Laterality Date    BREAST EXCISIONAL BIOPSY Left 2000    benign    COLONOSCOPY  2019    JOINT REPLACEMENT      right hip 8/15    MAMMO (HISTORICAL)  2019    TONSILLECTOMY         Family History   Problem Relation Age of Onset    No Known Problems Mother     No Known Problems Father     Breast cancer Daughter 48    Breast cancer Maternal Aunt 79    No Known Problems Sister     No Known Problems Maternal Grandmother     No Known Problems Maternal Grandfather     No Known Problems Paternal Grandmother     No Known Problems Paternal Grandfather     No Known Problems Son     No Known Problems Sister     No Known Problems Sister        Social History     Occupational History    Not on file   Tobacco Use    Smoking status: Never    Smokeless tobacco: Never   Vaping Use    Vaping Use: Never used   Substance and Sexual Activity    Alcohol use: Never    Drug use: Not Currently     Types: Prescription     Comment: araseliing last used 1996    Sexual activity: Yes     Partners: Male     Birth control/protection: Post-menopausal         Current Outpatient Medications:     ibuprofen (MOTRIN) 800 mg tablet, , Disp: , Rfl:     atorvastatin (LIPITOR) 10 mg tablet, Take 1 tablet (10 mg total) by mouth daily, Disp: 90 tablet, Rfl: 1    Cholecalciferol (Vitamin D-3) 25 MCG (1000 UT) CAPS, Take by mouth in the morning, Disp: , Rfl:     ketorolac (TORADOL) 10 mg tablet, Take 1 tablet (10 mg total) by mouth every 6 (six) hours as needed for moderate pain (Patient not taking: Reported on 12/6/2023), Disp: 20 tablet, Rfl: 0    metoprolol succinate (TOPROL-XL) 25 mg 24 hr tablet, Take 1 tablet (25 mg total) by mouth 2 (two) times a day, Disp: 180 tablet, Rfl: 3    pantoprazole (PROTONIX) 40 mg tablet, Take 1 tablet (40 mg total) by mouth 2 (two) times a day, Disp: 180 tablet, Rfl: 1    Premarin vaginal cream, 3 (three) times a week (Patient not taking: Reported on 12/6/2023), Disp: , Rfl:     Current Facility-Administered Medications:     denosumab (PROLIA) subcutaneous injection 60 mg, 60 mg, Subcutaneous, Once, [employer]    lidocaine (XYLOCAINE) 1 % injection 1 mL, 1 mL, Injection, , Guevara Share, DPM, 1 mL at 12/07/23 0900    triamcinolone acetonide (KENALOG-40) 40 mg/mL injection 20 mg, 20 mg, Infiltration, , Guevara Share, DPM, 20 mg at 12/07/23 0900    Allergies   Allergen Reactions    Bactrim [Sulfamethoxazole-Trimethoprim] Other (See Comments)     Kidney failure            Vitals:    12/12/23 1015   BP: 159/85   Pulse: 76 Body mass index is 25.7 kg/m². Wt Readings from Last 3 Encounters:   12/12/23 61.7 kg (136 lb)   12/08/23 61.7 kg (136 lb)   12/07/23 62.1 kg (137 lb)       Objective:                    Left Knee Exam     Muscle Strength   The patient has normal left knee strength. Tenderness   The patient is experiencing tenderness in the medial joint line and lateral joint line. Range of Motion   Extension:  5   Flexion:  110 (with crepitation and stiffness)     Other   Erythema: absent  Sensation: normal  Swelling: mild      Right Hip Exam     Tenderness   The patient is experiencing tenderness in the greater trochanter. Muscle Strength   The patient has normal right hip strength (Pain laterally with resistance to abduction). Other   Erythema: absent  Sensation: normal    Comments:    Healed posterior-lateral incision  Painless arc of gentle PROM in all planes      Left Hip Exam     Tenderness   The patient is experiencing tenderness in the greater trochanter. Muscle Strength   The patient has normal left hip strength (Pain laterally with resisted abduction). Other   Erythema: absent  Sensation: normal    Comments:    Mild restriction of ROM but painless arc throughout      Left Shoulder Exam     Range of Motion   Forward flexion:  140     Muscle Strength   Supraspinatus: 3/5     Other   Erythema: absent  Sensation: normal     Comments:    (+) effusion            Diagnostics, reviewed and taken today if performed as documented:    None performed      Procedures, if performed today:    Large joint arthrocentesis: L subacromial bursa  Universal Protocol:  Consent: Verbal consent obtained. Risks and benefits: risks, benefits and alternatives were discussed  Consent given by: patient  Time out: Immediately prior to procedure a "time out" was called to verify the correct patient, procedure, equipment, support staff and site/side marked as required.   Timeout called at: 12/12/2023 10:44 AM.  Patient understanding: patient states understanding of the procedure being performed  Site marked: the operative site was marked  Patient identity confirmed: verbally with patient  Supporting Documentation  Indications: pain and diagnostic evaluation   Procedure Details  Location: shoulder - L subacromial bursa  Preparation: Patient was prepped and draped in the usual sterile fashion  Needle size: 18 G  Ultrasound guidance: no  Approach: posterior  Medications administered: 12 mg betamethasone acetate-betamethasone sodium phosphate 6 (3-3) mg/mL; 2 mL bupivacaine 0.25 %; 2 mL lidocaine 1 %    Aspirate amount: 5 mL  Aspirate: clear, yellow and blood-tinged  Patient tolerance: patient tolerated the procedure well with no immediate complications  Dressing:  Sterile dressing applied      Large joint arthrocentesis: L knee  Universal Protocol:  Consent: Verbal consent obtained. Risks and benefits: risks, benefits and alternatives were discussed  Consent given by: patient  Time out: Immediately prior to procedure a "time out" was called to verify the correct patient, procedure, equipment, support staff and site/side marked as required.   Timeout called at: 12/12/2023 10:44 AM.  Patient understanding: patient states understanding of the procedure being performed  Site marked: the operative site was marked  Patient identity confirmed: verbally with patient  Supporting Documentation  Indications: pain and diagnostic evaluation   Procedure Details  Location: knee - L knee  Preparation: Patient was prepped and draped in the usual sterile fashion  Needle size: 22 G  Ultrasound guidance: no  Approach: anterolateral  Medications administered: 12 mg betamethasone acetate-betamethasone sodium phosphate 6 (3-3) mg/mL; 2 mL bupivacaine 0.25 %; 2 mL lidocaine 1 %    Patient tolerance: patient tolerated the procedure well with no immediate complications  Dressing:  Sterile dressing applied      Large joint arthrocentesis: bilateral greater trochanteric bursa  Universal Protocol:  Consent: Verbal consent obtained. Risks and benefits: risks, benefits and alternatives were discussed  Consent given by: patient  Time out: Immediately prior to procedure a "time out" was called to verify the correct patient, procedure, equipment, support staff and site/side marked as required. Timeout called at: 12/12/2023 10:45 AM.  Patient understanding: patient states understanding of the procedure being performed  Site marked: the operative site was marked  Patient identity confirmed: verbally with patient  Supporting Documentation  Indications: pain and diagnostic evaluation   Procedure Details  Location: hip - bilateral greater trochanteric bursa  Preparation: Patient was prepped and draped in the usual sterile fashion  Needle size: 22 G  Ultrasound guidance: no  Approach: lateral    Medications (Right): 2 mL bupivacaine 0.25 %; 2 mL lidocaine 1 %; 12 mg betamethasone acetate-betamethasone sodium phosphate 6 (3-3) mg/mLMedications (Left): 2 mL bupivacaine 0.25 %; 2 mL lidocaine 1 %; 12 mg betamethasone acetate-betamethasone sodium phosphate 6 (3-3) mg/mL   Patient tolerance: patient tolerated the procedure well with no immediate complications  Dressing:  Sterile dressing applied            Portions of the record may have been created with voice recognition software. Occasional wrong word or "sound a like" substitutions may have occurred due to the inherent limitations of voice recognition software. Read the chart carefully and recognize, using context, where substitutions have occurred.

## 2023-12-13 ENCOUNTER — OFFICE VISIT (OUTPATIENT)
Dept: PHYSICAL THERAPY | Facility: REHABILITATION | Age: 83
End: 2023-12-13
Payer: COMMERCIAL

## 2023-12-13 DIAGNOSIS — M25.512 ACUTE PAIN OF LEFT SHOULDER: Primary | ICD-10-CM

## 2023-12-13 DIAGNOSIS — S46.212A STRAIN OF LEFT BICEPS, INITIAL ENCOUNTER: ICD-10-CM

## 2023-12-13 DIAGNOSIS — S46.219A BICEPS STRAIN, INITIAL ENCOUNTER: ICD-10-CM

## 2023-12-13 PROCEDURE — 97112 NEUROMUSCULAR REEDUCATION: CPT

## 2023-12-13 PROCEDURE — 97110 THERAPEUTIC EXERCISES: CPT

## 2023-12-13 NOTE — PROGRESS NOTES
Daily Note     Today's date: 2023  Patient name: Justine Sandhu  : 1940  MRN: [id number]  Referring provider: Erik Chow DO  Dx:   Encounter Diagnosis     ICD-10-CM    1. Acute pain of left shoulder  M25.512       2. Strain of left biceps, initial encounter  S46.212A       3. Biceps strain, initial encounter  S46.219A           Start Time: 1520  Stop Time: 1600  Total time in clinic (min): 40 minutes    Subjective: Pt mentioned that she visited Ortho yesterday and received an injection at her L shoulder. She mentioned this usually helps with her pain. Objective: See treatment diary below      Assessment: Tolerated treatment well. Patient would benefit from continued PT. Pt continues to be challenged by current exercise plan as she shows decreased external rotation strength at the L shoulder. She shows improving tolerance to overhead motions but she continues to be challenged especially with resistance exercises. Continue to progress as tolerated, 1:1 with William Alcantara DPT entirety of tx. Plan: Continue per plan of care. Precautions: PMH includes anemia, chronic BL LBP without sciatica, L lumbar radiculopathy, CKD, greater trochanteric bursitis of R hip, HTN, osteoporosis, sacral insufficiency fracture, SIJ inflammation. PSH includes right hip replacement on 8/15/2019. POC expires Unit limit Auth Expiration date PT/OT + Visit Limit?    24 BOMN 2023 BOMN         Visit/Unit Tracking  AUTH Status:  Date      Approved Used 1 2 3 4      Remaining             Pertinent Findings:      POC End Date: 24                                                                                          Test / Measure  2023    FOTO (Predicted 66) 56    L Shoulder Strength 3+ to 4/5    L Shoulder ROM Decreased active ER          Visit Number:  1 2 3 4         Manuals          Shoulder PROM  MC MC MC         Manually Resisted Elbow ROM Neuro Re-Ed             Shoulder Retraction 10x 10x           Cane Shoulder AAROM  10x, Cane Press up 2x10 10x, Cane Press up 2x10 10x, [employer] up 2x10         Shoulder Isometrics ER, 5x, 5s hold ER, 5x, 5s hold           No Money RTB, 10x RTB, 10x RTB, 15x RTB, 15x         Shoulder Flex/Abd   2#, 2x10 2#, 2x10         Dowel Elbow Flex/Ext             Elbow Flex/Ext Isometric Hold             Wrist Flex/Ext             Therabar Pronation/Supination             Hammer Pro/Sup                                       Ther Ex             HEP Review + Pt Education 10 min            UBE  3'/3' 3'/3' 3'/3'         Wall Slides 10x 10x 10x 2x8         BL Rows  3x8, GTB 3x10, BTB 3x10, MTB         TB Shoulder Ext  3x8, GTB 3x8, GTB 3x8, GTB         DB Shrugs  5# ea, 2x8 5# ea, 2x8 5# ea, 2x8         Shoulder Press   3x5, 2# 3x5, 2#         Shoulder Flex + Tband             Elbow Flex/Ext Stretch             Eccentric DB Curls  3x8, 2# ea 3x8, 2# ea          Tband Curls in Sitting             Hammer Curls                                                    Ther Activity                                       Gait Training                                       Modalities

## 2023-12-19 ENCOUNTER — OFFICE VISIT (OUTPATIENT)
Dept: PHYSICAL THERAPY | Facility: REHABILITATION | Age: 83
End: 2023-12-19
Payer: COMMERCIAL

## 2023-12-19 DIAGNOSIS — S46.212A STRAIN OF LEFT BICEPS, INITIAL ENCOUNTER: ICD-10-CM

## 2023-12-19 DIAGNOSIS — M25.512 ACUTE PAIN OF LEFT SHOULDER: Primary | ICD-10-CM

## 2023-12-19 DIAGNOSIS — S46.219A BICEPS STRAIN, INITIAL ENCOUNTER: ICD-10-CM

## 2023-12-19 PROCEDURE — 97112 NEUROMUSCULAR REEDUCATION: CPT

## 2023-12-19 PROCEDURE — 97140 MANUAL THERAPY 1/> REGIONS: CPT

## 2023-12-19 PROCEDURE — 97110 THERAPEUTIC EXERCISES: CPT

## 2023-12-19 NOTE — PROGRESS NOTES
Daily Note     Today's date: 2023  Patient name: Monik Garcia  : 1940  MRN: [id number]  Referring provider: Roe Myers DO  Dx:   Encounter Diagnosis     ICD-10-CM    1. Acute pain of left shoulder  M25.512       2. Strain of left biceps, initial encounter  S46.212A       3. Biceps strain, initial encounter  S46.219A           Start Time: 1520  Stop Time: 1615  Total time in clinic (min): 55 minutes    Subjective: Pt notes that her shoulder has been feeling okay and that she has been doing her exercises at home.       Objective: See treatment diary below      Assessment: Tolerated treatment well. Patient would benefit from continued PT. Pt responded well to increased resistance with shoulder press w/ dowel which shows improving functional UE strength. She continues to show most limitations with L shoulder ER strength as demonstrated by limitations with standing Tband ER. She responded well to Vcs for GHJ stability and hand/elbow positioning during overhead movements and showed improved technique following. 1:1 with Trell Metzger DPT entirety of tx.      Plan: Continue per plan of care.      Precautions: PMH includes anemia, chronic BL LBP without sciatica, L lumbar radiculopathy, CKD, greater trochanteric bursitis of R hip, HTN, osteoporosis, sacral insufficiency fracture, SIJ inflammation. PSH includes right hip replacement on 8/15/2019.   POC expires Unit limit Auth Expiration date PT/OT + Visit Limit?   24 BOMN 2023 BOMN         Visit/Unit Tracking  AUTH Status:  Date     Approved Used 1 2 3 4 5     Remaining             Pertinent Findings:      POC End Date: 24                                                                                          Test / Measure  2023    FOTO (Predicted 66) 56    L Shoulder Strength 3+ to 4/5    L Shoulder ROM Decreased active ER          Visit Number:  1 2 3 4 5        Manuals          Shoulder PROM  MC MC MC MC, Resisted ER/IR        Manually Resisted Elbow ROM                                       Neuro Re-Ed             Shoulder Retraction 10x 10x           Cane Shoulder AAROM  10x, Cane Press up 2x10 10x, Cane Press up 2x10 10x, Cane Press up 2x10 10x, Cane Press up 2x10        Shoulder Isometrics ER, 5x, 5s hold ER, 5x, 5s hold           No Money RTB, 10x RTB, 10x RTB, 15x RTB, 15x RTB, 15x        Shoulder Flex/Abd   2#, 2x10 2#, 2x10 2#, 2x10        Dowel Elbow Flex/Ext             Elbow Flex/Ext Isometric Hold             Wrist Flex/Ext             Therabar Pronation/Supination             Hammer Pro/Sup                                       Ther Ex             HEP Review + Pt Education 10 min            UBE  3'/3' 3'/3' 3'/3' 3'/3'        Wall Slides 10x 10x 10x 2x8 2x8        BL Rows  3x8, GTB 3x10, BTB 3x10, MTB 3x10, MTB        TB Shoulder Ext  3x8, GTB 3x8, GTB 3x8, GTB 3x8, GTB        DB Shrugs  5# ea, 2x8 5# ea, 2x8 5# ea, 2x8 5# ea, 2x8        Shoulder Press   3x5, 2# 3x5, 2# Dowel + 5# AW 3x6        Shoulder Flex + Tband             Elbow Flex/Ext Stretch             Eccentric DB Curls  3x8, 2# ea 3x8, 2# ea  3x8, 3# ea        Tband Curls in Sitting             Hammer Curls                                                    Ther Activity                                       Gait Training                                       Modalities

## 2023-12-27 ENCOUNTER — TELEPHONE (OUTPATIENT)
Dept: OBGYN CLINIC | Facility: HOSPITAL | Age: 83
End: 2023-12-27

## 2023-12-27 NOTE — TELEPHONE ENCOUNTER
Caller: Patient's daughter    Doctor: Nicole    Reason for call: Patient had her third metatarsal cuneiform joint and cuboid. Injected on 12/7/23.  Her daughter states that it is very discolored and she is concerned.  Please advise.     Call back#: [phone] spouse

## 2023-12-28 ENCOUNTER — OFFICE VISIT (OUTPATIENT)
Dept: URGENT CARE | Age: 83
End: 2023-12-28
Payer: COMMERCIAL

## 2023-12-28 ENCOUNTER — APPOINTMENT (OUTPATIENT)
Dept: RADIOLOGY | Age: 83
End: 2023-12-28
Payer: COMMERCIAL

## 2023-12-28 ENCOUNTER — TELEPHONE (OUTPATIENT)
Age: 83
End: 2023-12-28

## 2023-12-28 VITALS
WEIGHT: 136 LBS | DIASTOLIC BLOOD PRESSURE: 82 MMHG | RESPIRATION RATE: 20 BRPM | SYSTOLIC BLOOD PRESSURE: 153 MMHG | OXYGEN SATURATION: 100 % | HEART RATE: 86 BPM | TEMPERATURE: 98 F | HEIGHT: 61 IN | BODY MASS INDEX: 25.68 KG/M2

## 2023-12-28 DIAGNOSIS — S99.921A FOOT INJURY, RIGHT, INITIAL ENCOUNTER: ICD-10-CM

## 2023-12-28 DIAGNOSIS — S90.121A CONTUSION OF FIFTH TOE OF RIGHT FOOT, INITIAL ENCOUNTER: Primary | ICD-10-CM

## 2023-12-28 PROCEDURE — 99213 OFFICE O/P EST LOW 20 MIN: CPT | Performed by: STUDENT IN AN ORGANIZED HEALTH CARE EDUCATION/TRAINING PROGRAM

## 2023-12-28 PROCEDURE — 73630 X-RAY EXAM OF FOOT: CPT

## 2023-12-28 NOTE — TELEPHONE ENCOUNTER
I spoke with pt and recommended she go to the ER or urgent care per  as she is not able to take a picture and send through [employer]. She verbalized understanding and stated she will probably to urgent care to be evaluated.

## 2023-12-28 NOTE — TELEPHONE ENCOUNTER
I spoke with pt and recommended she go to the ER or urgent care per  as she is not able to take a picture and send through [employer]. She verbalized understanding and stated she will probably to urgent care to be evaluated.

## 2023-12-28 NOTE — TELEPHONE ENCOUNTER
Caller: Aruna Garcia    Doctor: Nicole Baldwin    Reason for call: He right pinky to is red and she is very concerned.  Can we please squeeze her in?  Thank you.    Call back#: [phone]/  Cell:

## 2023-12-28 NOTE — PROGRESS NOTES
[facility]        NAME: Monik Garcia is a 83 y.o. female  : 1940    MRN: [id number]  DATE: 2023  TIME: 2:24 PM    Assessment and Orders   Contusion of fifth toe of right foot, initial encounter [S90.121A]  1. Contusion of fifth toe of right foot, initial encounter        2. Foot injury, right, initial encounter  XR foot 3+ vw right            Plan and Discussion      Patient with bruising of the right fifth digit on the right foot with no known history and no tenderness.  Digit is warm with brisk capillary refill.  Possible distal ecchymosis from recent foot injection?  No acute abnormalities noted on x-ray of the right foot although significant osteoarthritis is appreciated.  Patient to follow-up with podiatrist.      Discussed ED precautions including (but not limited to)  Difficultly breathing or shortness of breath  Chest pain  Acutely worsening symptoms.     Risks and benefits discussed. Patient understands and agrees with the plan.     Follow up with PCP.     Chief Complaint     Chief Complaint   Patient presents with    Toe Pain     Pt states she had and injection from dr butcher on  and  3 weeks later pt has redness, warm to touch and swelling at her right pinky toe         History of Present Illness       HPI    Patient is an 83-year-old female presents with bruising of her right pinky toe.  Patient denies any injury.  Patient states that she did have a steroid injection just proximal to the fifth digit earlier this month.  Patient receives these injections for significant pain associated with osteoarthritis.  Patient also has osteoporosis for which she receives Prolia injections.  Patient states that she does not have any pain in the toe itself but does have pain at the joint injection site which is normal for her.  Denies recent history of COVID-19.    Review of Systems   Review of Systems  As stated above    Current Medications       Current Outpatient Medications:      atorvastatin (LIPITOR) 10 mg tablet, Take 1 tablet (10 mg total) by mouth daily, Disp: 90 tablet, Rfl: 1    Cholecalciferol (Vitamin D-3) 25 MCG (1000 UT) CAPS, Take by mouth in the morning, Disp: , Rfl:     ibuprofen (MOTRIN) 800 mg tablet, , Disp: , Rfl:     metoprolol succinate (TOPROL-XL) 25 mg 24 hr tablet, Take 1 tablet (25 mg total) by mouth 2 (two) times a day, Disp: 180 tablet, Rfl: 3    pantoprazole (PROTONIX) 40 mg tablet, Take 1 tablet (40 mg total) by mouth 2 (two) times a day, Disp: 180 tablet, Rfl: 1    ketorolac (TORADOL) 10 mg tablet, Take 1 tablet (10 mg total) by mouth every 6 (six) hours as needed for moderate pain (Patient not taking: Reported on 12/6/2023), Disp: 20 tablet, Rfl: 0    Premarin vaginal cream, 3 (three) times a week (Patient not taking: Reported on 12/6/2023), Disp: , Rfl:     Current Facility-Administered Medications:     denosumab (PROLIA) subcutaneous injection 60 mg, 60 mg, Subcutaneous, Once, Yamile Wallace PA-C    lidocaine (XYLOCAINE) 1 % injection 1 mL, 1 mL, Injection, , Pablo Rivera DPM, 1 mL at 12/07/23 0900    triamcinolone acetonide (KENALOG-40) 40 mg/mL injection 20 mg, 20 mg, Infiltration, , Pablo Rivera DPM, 20 mg at 12/07/23 0900    Current Allergies     Allergies as of 12/28/2023 - Reviewed 12/28/2023   Allergen Reaction Noted    Bactrim [sulfamethoxazole-trimethoprim] Other (See Comments) 11/15/2018            The following portions of the patient's history were reviewed and updated as appropriate: allergies, current medications, past family history, past medical history, past social history, past surgical history and problem list.     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    Hypercalcemia     Hypertension     Left lumbar radiculopathy 4/7/2016    Mixed hyperlipidemia     Osteoarthritis of hip 04/04/2016    Osteoporosis     Sacral insufficiency fracture     SI (sacroiliac) joint inflammation (HCC) 04/04/2016    Substance addiction (HCC)     Vitamin D deficiency 3/11/2019       Past Surgical History:   Procedure Laterality Date    BREAST EXCISIONAL BIOPSY Left 2000    benign    COLONOSCOPY  2019    JOINT REPLACEMENT      right hip 8/15    MAMMO (HISTORICAL)  2019    TONSILLECTOMY         Family History   Problem Relation Age of Onset    No Known Problems Mother     No Known Problems Father     Breast cancer Daughter 50    Breast cancer Maternal Aunt 70    No Known Problems Sister     No Known Problems Maternal Grandmother     No Known Problems Maternal Grandfather     No Known Problems Paternal Grandmother     No Known Problems Paternal Grandfather     No Known Problems Son     No Known Problems Sister     No Known Problems Sister          Medications have been verified.        Objective   /82   Pulse 86   Temp 98 °F (36.7 °C)   Resp 20   Ht 5' 1\" (1.549 m)   Wt 61.7 kg (136 lb)   SpO2 100%   BMI 25.70 kg/m²   No LMP recorded. Patient is postmenopausal.       Physical Exam     Physical Exam  Constitutional:       General: She is not in acute distress.  Cardiovascular:      Rate and Rhythm: Normal rate and regular rhythm.   Pulmonary:      Effort: Pulmonary effort is normal.   Musculoskeletal:        Feet:    Skin:     Findings: Bruising present.   Neurological:      General: No focal deficit present.      Mental Status: She is alert.               Maryam Smiley DO"

## 2024-01-03 ENCOUNTER — APPOINTMENT (OUTPATIENT)
Dept: PHYSICAL THERAPY | Facility: REHABILITATION | Age: 84
End: 2024-01-03
Payer: COMMERCIAL

## 2024-01-09 ENCOUNTER — APPOINTMENT (OUTPATIENT)
Dept: PHYSICAL THERAPY | Facility: REHABILITATION | Age: 84
End: 2024-01-09
Payer: COMMERCIAL

## 2024-01-09 ENCOUNTER — OFFICE VISIT (OUTPATIENT)
Dept: INTERNAL MEDICINE CLINIC | Age: 84
End: 2024-01-09
Payer: COMMERCIAL

## 2024-01-09 VITALS
OXYGEN SATURATION: 96 % | TEMPERATURE: 98 F | HEART RATE: 74 BPM | HEIGHT: 61 IN | BODY MASS INDEX: 25.3 KG/M2 | WEIGHT: 134 LBS | DIASTOLIC BLOOD PRESSURE: 66 MMHG | SYSTOLIC BLOOD PRESSURE: 114 MMHG

## 2024-01-09 DIAGNOSIS — R05.1 ACUTE COUGH: Primary | ICD-10-CM

## 2024-01-09 PROCEDURE — 99213 OFFICE O/P EST LOW 20 MIN: CPT

## 2024-01-09 RX ORDER — BENZONATATE 100 MG/1
100 CAPSULE ORAL 3 TIMES DAILY PRN
Qty: 20 CAPSULE | Refills: 0 | Status: SHIPPED | OUTPATIENT
Start: 2024-01-09

## 2024-01-09 NOTE — PROGRESS NOTES
Assessment/Plan:    1. Acute cough  Comments:  suspect resolving viral URI, exam WNL  will given tessalon perles prn cough  continue allegra, flonase   call if symptoms worsen  Go to ER with SOB  Orders:  -     benzonatate (TESSALON PERLES) 100 mg capsule; Take 1 capsule (100 mg total) by mouth 3 (three) times a day as needed for cough              M*Modal software was used to dictate this note.  It may contain errors with dictating incorrect words or incorrect spelling. Please contact the provider directly with any questions.    Subjective:      Patient ID: Monik Garcia is a 83 y.o. female.    Patient is an 83 year old female presenting to the office for cough and wheezing x 1 week. She states that the cough is intermittent and improving. She notes some wheezing at the onset of illness at night when she would lay down, but that has been improving as well.    Did not test for COVID, she is vaccinated    Treatments tried include Delsym, allegra    Cough  This is a new problem. The current episode started in the past 7 days. The problem has been gradually improving. The cough is Non-productive. Associated symptoms include postnasal drip and wheezing (with lying down). Pertinent negatives include no chest pain, chills, fever, headaches, rash, rhinorrhea, sore throat or shortness of breath.       The following portions of the patient's history were reviewed and updated as appropriate: allergies, current medications, past family history, past medical history, past social history, past surgical history, and problem list.    Review of Systems   Constitutional:  Negative for chills, fatigue and fever.   HENT:  Positive for postnasal drip. Negative for congestion, rhinorrhea, sinus pressure, sinus pain, sore throat and trouble swallowing.    Respiratory:  Positive for cough and wheezing (with lying down). Negative for chest tightness and shortness of breath.    Cardiovascular:  Negative for chest pain, palpitations and  leg swelling.   Gastrointestinal:  Negative for abdominal pain, nausea and vomiting.   Skin:  Negative for color change, rash and wound.   Neurological:  Negative for dizziness, weakness, light-headedness and headaches.         Past Medical History:   Diagnosis Date    Acute laryngitis 5/12/2022    Anemia     Chronic bilateral low back pain without sciatica 11/26/2019    Chronic kidney disease     Duodenal perforation ([facility]) 10/7/2020    Duodenal ulcer     Enterococcus UTI 4/7/2016    GERD (gastroesophageal reflux disease) 04/04/2016    Greater trochanteric bursitis of right hip 9/18/2018    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    Hypercalcemia     Hypertension     Left lumbar radiculopathy 4/7/2016    Mixed hyperlipidemia     Osteoarthritis of hip 04/04/2016    Osteoporosis     Sacral insufficiency fracture     SI (sacroiliac) joint inflammation ([facility]) 04/04/2016    Substance addiction ([facility])     Vitamin D deficiency 3/11/2019         Current Outpatient Medications:     atorvastatin (LIPITOR) 10 mg tablet, Take 1 tablet (10 mg total) by mouth daily, Disp: 90 tablet, Rfl: 1    benzonatate (TESSALON PERLES) 100 mg capsule, Take 1 capsule (100 mg total) by mouth 3 (three) times a day as needed for cough, Disp: 20 capsule, Rfl: 0    Cholecalciferol (Vitamin D-3) 25 MCG (1000 UT) CAPS, Take by mouth in the morning, Disp: , Rfl:     ibuprofen (MOTRIN) 800 mg tablet, , Disp: , Rfl:     metoprolol succinate (TOPROL-XL) 25 mg 24 hr tablet, Take 1 tablet (25 mg total) by mouth 2 (two) times a day, Disp: 180 tablet, Rfl: 3    pantoprazole (PROTONIX) 40 mg tablet, Take 1 tablet (40 mg total) by mouth 2 (two) times a day, Disp: 180 tablet, Rfl: 1    ketorolac (TORADOL) 10 mg tablet, Take 1 tablet (10 mg total) by mouth every 6 (six) hours as needed for moderate pain (Patient not taking: Reported on 12/6/2023), Disp: 20 tablet, Rfl: 0    Premarin vaginal cream, 3 (three) times a week (Patient not taking: Reported on  "12/6/2023), Disp: , Rfl:     Current Facility-Administered Medications:     denosumab (PROLIA) subcutaneous injection 60 mg, 60 mg, Subcutaneous, Once, Yamile Wallace PA-C    lidocaine (XYLOCAINE) 1 % injection 1 mL, 1 mL, Injection, , Pablo Rivera, YESYM, 1 mL at 12/07/23 0900    triamcinolone acetonide (KENALOG-40) 40 mg/mL injection 20 mg, 20 mg, Infiltration, , Pablo Rivera DPM, 20 mg at 12/07/23 0900    Allergies   Allergen Reactions    Bactrim [Sulfamethoxazole-Trimethoprim] Other (See Comments)     Kidney failure       Social History   Past Surgical History:   Procedure Laterality Date    BREAST EXCISIONAL BIOPSY Left 2000    benign    COLONOSCOPY  2019    JOINT REPLACEMENT      right hip 8/15    MAMMO (HISTORICAL)  2019    TONSILLECTOMY       Family History   Problem Relation Age of Onset    No Known Problems Mother     No Known Problems Father     Breast cancer Daughter 50    Breast cancer Maternal Aunt 70    No Known Problems Sister     No Known Problems Maternal Grandmother     No Known Problems Maternal Grandfather     No Known Problems Paternal Grandmother     No Known Problems Paternal Grandfather     No Known Problems Son     No Known Problems Sister     No Known Problems Sister        Objective:  /66 (BP Location: Left arm, Patient Position: Sitting, Cuff Size: Standard)   Pulse 74   Temp 98 °F (36.7 °C) (Temporal)   Ht 5' 1\" (1.549 m)   Wt 60.8 kg (134 lb)   SpO2 96%   BMI 25.32 kg/m²      Physical Exam  Vitals and nursing note reviewed.   Constitutional:       General: She is not in acute distress.     Appearance: Normal appearance. She is not ill-appearing.   HENT:      Head: Normocephalic and atraumatic.      Right Ear: Tympanic membrane, ear canal and external ear normal.      Left Ear: Tympanic membrane, ear canal and external ear normal.      Ears:      Comments: Cerumen bilaterally       Nose: Nose normal.      Mouth/Throat:      Mouth: Mucous membranes are moist.      Pharynx: " Oropharynx is clear. No posterior oropharyngeal erythema.   Eyes:      General: No scleral icterus.     Conjunctiva/sclera: Conjunctivae normal.      Pupils: Pupils are equal, round, and reactive to light.   Cardiovascular:      Rate and Rhythm: Normal rate and regular rhythm.      Heart sounds: Normal heart sounds. No murmur heard.     No friction rub. No gallop.   Pulmonary:      Effort: Pulmonary effort is normal. No respiratory distress.      Breath sounds: Normal breath sounds. No stridor. No wheezing, rhonchi or rales.      Comments: Cough dry sounding, infrequent    Chest:      Chest wall: No tenderness.   Abdominal:      Palpations: Abdomen is soft.      Tenderness: There is no abdominal tenderness.   Musculoskeletal:      Right lower leg: No edema.      Left lower leg: No edema.   Skin:     General: Skin is warm and dry.      Coloration: Skin is not pale.      Findings: No erythema.   Neurological:      General: No focal deficit present.      Mental Status: She is alert and oriented to person, place, and time. Mental status is at baseline.      Motor: No weakness.      Coordination: Coordination normal.   Psychiatric:         Mood and Affect: Mood normal.         Behavior: Behavior normal.

## 2024-01-09 NOTE — PATIENT INSTRUCTIONS
- Allegra daily  - Flonase over-the-counter, spray 1 spray in each nostril 2 times per day (AM & PM)  - Tessalon perles to be used as needed, up to 3 timers day as needed  - Make sure you rest and hydrate

## 2024-01-12 ENCOUNTER — OFFICE VISIT (OUTPATIENT)
Dept: PHYSICAL THERAPY | Facility: REHABILITATION | Age: 84
End: 2024-01-12
Payer: COMMERCIAL

## 2024-01-12 DIAGNOSIS — M25.512 ACUTE PAIN OF LEFT SHOULDER: Primary | ICD-10-CM

## 2024-01-12 DIAGNOSIS — S46.212A STRAIN OF LEFT BICEPS, INITIAL ENCOUNTER: ICD-10-CM

## 2024-01-12 DIAGNOSIS — S46.219A BICEPS STRAIN, INITIAL ENCOUNTER: ICD-10-CM

## 2024-01-12 PROCEDURE — 97112 NEUROMUSCULAR REEDUCATION: CPT

## 2024-01-12 PROCEDURE — 97110 THERAPEUTIC EXERCISES: CPT

## 2024-01-12 NOTE — PROGRESS NOTES
Daily Note     Today's date: 2024  Patient name: Monik Garcia  : 1940  MRN: [id number]  Referring provider: Roe Myers DO  Dx:   Encounter Diagnosis     ICD-10-CM    1. Acute pain of left shoulder  M25.512       2. Strain of left biceps, initial encounter  S46.212A       3. Biceps strain, initial encounter  S46.219A           Start Time: 1426  Stop Time: 1505  Total time in clinic (min): 39 minutes    Subjective: Pt notes that she has been sick so she has been unable to attend PT last week. She notes no significant changes in her shoulder pain but it has been less painful and she has been trying to do her exercises at home.      Objective: See treatment diary below      Assessment: Tolerated treatment well. Patient would benefit from continued PT. No new exercises were added as pt continues to be challenged by current plan. She continues to be most challenged with Tband ER due to weakness at the L shoulder. She shows improving tolerance to shoulder press as strength is slowly improving. She continues to respond well to Vcs for scapular stability and shoulder positioning during overhead movements and shows improved technique with cueing. 1:1 with Trell Metzger DPT entirety of tx.      Plan: Continue per plan of care.      Precautions: PMH includes anemia, chronic BL LBP without sciatica, L lumbar radiculopathy, CKD, greater trochanteric bursitis of R hip, HTN, osteoporosis, sacral insufficiency fracture, SIJ inflammation. PSH includes right hip replacement on 8/15/2019.   POC expires Unit limit Auth Expiration date PT/OT + Visit Limit?   24 BOMN 2023 BOMN         Visit/Unit Tracking  AUTH Status:  Date    Approved Used 1 2 3 4 5     Remaining             Pertinent Findings:      POC End Date: 24                                                                                          Test / Measure  2023    FOTO (Predicted 66) 56    L Shoulder  Strength 3+ to 4/5    L Shoulder ROM Decreased active ER          Visit Number:  1 2 3 4 5 6       Manuals 11/21 11/30 12/8 12/13 12/19 1/12       Shoulder PROM  MC MC MC MC, Resisted ER/IR MC, Resisted ER/IR       Manually Resisted Elbow ROM                                       Neuro Re-Ed             Shoulder Retraction 10x 10x           Cane Shoulder AAROM  10x, Cane Press up 2x10 10x, Cane Press up 2x10 10x, Cane Press up 2x10 10x, Cane Press up 2x10 10x, Cane Press up 2x10       Shoulder Isometrics ER, 5x, 5s hold ER, 5x, 5s hold           No Money RTB, 10x RTB, 10x RTB, 15x RTB, 15x RTB, 15x RTB, 15x       Shoulder Flex/Abd   2#, 2x10 2#, 2x10 2#, 2x10 2#, 2x10       Dowel Elbow Flex/Ext             Elbow Flex/Ext Isometric Hold             Wrist Flex/Ext             Therabar Pronation/Supination             Hammer Pro/Sup                                       Ther Ex             HEP Review + Pt Education 10 min            UBE  3'/3' 3'/3' 3'/3' 3'/3' 3'/3'       Wall Slides 10x 10x 10x 2x8 2x8 2x8       BL Rows  3x8, GTB 3x10, BTB 3x10, MTB 3x10, MTB 3x10, MTB       TB Shoulder Ext  3x8, GTB 3x8, GTB 3x8, GTB 3x8, GTB 3x8, GTB       DB Shrugs  5# ea, 2x8 5# ea, 2x8 5# ea, 2x8 5# ea, 2x8 5# ea, 2x8       Shoulder Press   3x5, 2# 3x5, 2# Dowel + 5# AW 3x6 Dowel + 5# AW 3x6       Shoulder Flex + Tband             Elbow Flex/Ext Stretch             Eccentric DB Curls  3x8, 2# ea 3x8, 2# ea  3x8, 3# ea 3x8, 3# ea       Tband Curls in Sitting             Hammer Curls                                                    Ther Activity                                       Gait Training                                       Modalities

## 2024-01-15 ENCOUNTER — OFFICE VISIT (OUTPATIENT)
Dept: INTERNAL MEDICINE CLINIC | Age: 84
End: 2024-01-15
Payer: COMMERCIAL

## 2024-01-15 VITALS
WEIGHT: 134 LBS | DIASTOLIC BLOOD PRESSURE: 82 MMHG | HEIGHT: 61 IN | HEART RATE: 73 BPM | TEMPERATURE: 97.8 F | BODY MASS INDEX: 25.3 KG/M2 | OXYGEN SATURATION: 96 % | SYSTOLIC BLOOD PRESSURE: 130 MMHG

## 2024-01-15 DIAGNOSIS — I49.8 OTHER CARDIAC ARRHYTHMIA: ICD-10-CM

## 2024-01-15 DIAGNOSIS — I10 ESSENTIAL HYPERTENSION: ICD-10-CM

## 2024-01-15 DIAGNOSIS — J06.9 URTI (ACUTE UPPER RESPIRATORY INFECTION): ICD-10-CM

## 2024-01-15 DIAGNOSIS — J20.8 ACUTE BRONCHITIS DUE TO OTHER SPECIFIED ORGANISMS: Primary | ICD-10-CM

## 2024-01-15 PROCEDURE — 93000 ELECTROCARDIOGRAM COMPLETE: CPT | Performed by: INTERNAL MEDICINE

## 2024-01-15 PROCEDURE — 93000 ELECTROCARDIOGRAM COMPLETE: CPT

## 2024-01-15 PROCEDURE — 99214 OFFICE O/P EST MOD 30 MIN: CPT | Performed by: INTERNAL MEDICINE

## 2024-01-15 RX ORDER — SACCHAROMYCES BOULARDII 250 MG
250 CAPSULE ORAL 2 TIMES DAILY
Qty: 20 CAPSULE | Refills: 0 | Status: SHIPPED | OUTPATIENT
Start: 2024-01-15

## 2024-01-15 RX ORDER — CEFDINIR 300 MG/1
300 CAPSULE ORAL EVERY 12 HOURS SCHEDULED
Qty: 20 CAPSULE | Refills: 0 | Status: SHIPPED | OUTPATIENT
Start: 2024-01-15 | End: 2024-01-25

## 2024-01-15 NOTE — PATIENT INSTRUCTIONS
- Drink plenty of fluids  - Get plenty of rest  - You may use salt water gargles for your sore throat  - You may use over the counter tylenol or Ibuprofen (motrin or Advil) for any headache, muscle aches and fever  - Please take your antibiotics with meals and also take a probiotic as long as you are on your antibiotics  - Call the office if your symptoms persist beyond a total of 7-10 days

## 2024-01-15 NOTE — PROGRESS NOTES
Assessment/Plan:    Upper respiratory tract infection with acute bronchitis  - likely viral with bacterial superinfection vs bacterial  - we will start pt on cefdinir 300 mg twice daily for 10 days, patient may use over-the-counter Flonase nasal spray for rhinitis and benzonatate for dry cough  -We will also order a chest x-ray and patient may go for the test if her symptoms continue  - Pt was counseled to use OTC tylenol or ibuprofen with meals for aches and pains, warm salt water gargles for sore throat and was encouraged to drink lots of fluids, get plenty of rest and wash her hands liberally.  - pt was also counseled to take antibiotics with food and we will also prescribe a probiotic as long as she is taking antibiotics  - She should return to the clinic if her symptoms worsen or do not improve.    Cardiac arrhythmia  -Point-of-care EKG showed an irregular rhythm with P waves, and premature supraventricular complexes with a first-degree AV block  -Patient is asymptomatic but we will order a CBC, CMP, TSH and magnesium level  -We will follow-up with the results    Essential hypertension  -Stable  -Continue with metoprolol and a low-salt diet     Diagnoses and all orders for this visit:    Acute bronchitis due to other specified organisms  -     cefdinir (OMNICEF) 300 mg capsule; Take 1 capsule (300 mg total) by mouth every 12 (twelve) hours for 10 days  -     saccharomyces boulardii (FLORASTOR) 250 mg capsule; Take 1 capsule (250 mg total) by mouth 2 (two) times a day  -     XR chest pa & lateral; Future  -     TSH, 3rd generation with Free T4 reflex; Future  -     Magnesium; Future  -     Comprehensive metabolic panel; Future  -     CBC and differential; Future    Other cardiac arrhythmia  -     POCT ECG  -     TSH, 3rd generation with Free T4 reflex; Future  -     Magnesium; Future  -     Comprehensive metabolic panel; Future  -     CBC and differential; Future    URTI (acute upper respiratory infection)  -      TSH, 3rd generation with Free T4 reflex; Future  -     Magnesium; Future  -     Comprehensive metabolic panel; Future  -     CBC and differential; Future    Essential hypertension          Subjective:      Patient ID: Monik Garcia is a 83 y.o. female.    HPI  Pt presents with complaints that she has been feeling sick for the past 2 weeks.  Started with a cough and now has associated nasal congestion, rhinorrhea, occasional wheezing and arthralgia which is chronic and unchanged.    She states that the cough is dry without phlegm   Feels congested in the chest sometimes  Worse when she lies down at home  Mild wheezing on and off  She denies any fever, chills, night sweats, headache, dizziness, pnd, sore throat, ear ache, sinus pain or pressure, chest pain, sob, palpitations, nausea, vomiting, diarrhea, constipation, hematochezia, hematuria, melena stools,myalgias, feelings of anxiety, depression or insomnia.  Of note, she works in the hospital.  She never smoked.      The following portions of the patient's history were reviewed and updated as appropriate: She  has a past medical history of Acute laryngitis (5/12/2022), Anemia, Chronic bilateral low back pain without sciatica (11/26/2019), Chronic kidney disease, Duodenal perforation (HCC) (10/7/2020), Duodenal ulcer, Enterococcus UTI (4/7/2016), GERD (gastroesophageal reflux disease) (04/04/2016), Greater trochanteric bursitis of right hip (9/18/2018), History of total right hip replacement (09/18/2018), Hypercalcemia, Hypertension, Left lumbar radiculopathy (4/7/2016), Mixed hyperlipidemia, Osteoarthritis of hip (04/04/2016), Osteoporosis, Sacral insufficiency fracture, SI (sacroiliac) joint inflammation (HCC) (04/04/2016), Substance addiction ([facility]), and Vitamin D deficiency (3/11/2019).  She   Patient Active Problem List    Diagnosis Date Noted   • Strain of left biceps 10/19/2023   • Spondylolisthesis, lumbar region 07/06/2023   • Low back pain with sciatica  07/06/2023   • Senile osteoporosis 11/28/2021   • Raynaud's disease without gangrene 11/28/2021   • Lumbar spondylosis 11/28/2021   • Herpes simplex 10/14/2021   • Ruptured Bakers cyst 08/29/2021   • Screening for colon cancer 07/26/2019   • Chronic kidney disease, stage III (moderate) (HCC) 03/11/2019   • Dyslipidemia 03/11/2019   • Iron deficiency 03/11/2019   • Vitamin D deficiency 03/11/2019   • Right hip pain 02/26/2019   • 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  Her family history includes Breast cancer (age of onset: 50) in her daughter; Breast cancer (age of onset: 70) in her maternal aunt; No Known Problems in her father, maternal grandfather, maternal grandmother, mother, paternal grandfather, paternal grandmother, sister, sister, sister, and son.  She  reports that she has never smoked. She has never used smokeless tobacco. She reports that she does not currently use drugs after having used the following drugs: Prescription. She reports that she does not drink alcohol.  Current Outpatient Medications   Medication Sig Dispense Refill   • atorvastatin (LIPITOR) 10 mg tablet Take 1 tablet (10 mg total) by mouth daily 90 tablet 1   • benzonatate (TESSALON PERLES) 100 mg capsule Take 1 capsule (100 mg total) by mouth 3 (three) times a day as needed for cough 20 capsule 0   • cefdinir (OMNICEF) 300 mg capsule Take 1 capsule (300 mg total) by mouth every 12 (twelve) hours for 10 days 20 capsule 0   • Cholecalciferol (Vitamin D-3) 25 MCG (1000 UT) CAPS Take by mouth in the morning     • ibuprofen (MOTRIN) 800 mg  tablet      • metoprolol succinate (TOPROL-XL) 25 mg 24 hr tablet Take 1 tablet (25 mg total) by mouth 2 (two) times a day 180 tablet 3   • pantoprazole (PROTONIX) 40 mg tablet Take 1 tablet (40 mg total) by mouth 2 (two) times a day 180 tablet 1   • saccharomyces boulardii (FLORASTOR) 250 mg capsule Take 1 capsule (250 mg total) by mouth 2 (two) times a day 20 capsule 0   • ketorolac (TORADOL) 10 mg tablet Take 1 tablet (10 mg total) by mouth every 6 (six) hours as needed for moderate pain (Patient not taking: Reported on 12/6/2023) 20 tablet 0   • Premarin vaginal cream 3 (three) times a week (Patient not taking: Reported on 12/6/2023)       Current Facility-Administered Medications   Medication Dose Route Frequency Provider Last Rate Last Admin   • denosumab (PROLIA) subcutaneous injection 60 mg  60 mg Subcutaneous Once Yamile Wallace PA-C       • lidocaine (XYLOCAINE) 1 % injection 1 mL  1 mL Injection  Pablo Rivera DPM   1 mL at 12/07/23 0900   • triamcinolone acetonide (KENALOG-40) 40 mg/mL injection 20 mg  20 mg Infiltration  Pablo Rivera DPM   20 mg at 12/07/23 0900     Current Outpatient Medications on File Prior to Visit   Medication Sig   • atorvastatin (LIPITOR) 10 mg tablet Take 1 tablet (10 mg total) by mouth daily   • benzonatate (TESSALON PERLES) 100 mg capsule Take 1 capsule (100 mg total) by mouth 3 (three) times a day as needed for cough   • Cholecalciferol (Vitamin D-3) 25 MCG (1000 UT) CAPS Take by mouth in the morning   • ibuprofen (MOTRIN) 800 mg tablet    • metoprolol succinate (TOPROL-XL) 25 mg 24 hr tablet Take 1 tablet (25 mg total) by mouth 2 (two) times a day   • pantoprazole (PROTONIX) 40 mg tablet Take 1 tablet (40 mg total) by mouth 2 (two) times a day   • ketorolac (TORADOL) 10 mg tablet Take 1 tablet (10 mg total) by mouth every 6 (six) hours as needed for moderate pain (Patient not taking: Reported on 12/6/2023)   • Premarin vaginal cream 3 (three) times a week (Patient not  "taking: Reported on 12/6/2023)     Current Facility-Administered Medications on File Prior to Visit   Medication   • denosumab (PROLIA) subcutaneous injection 60 mg   • lidocaine (XYLOCAINE) 1 % injection 1 mL   • triamcinolone acetonide (KENALOG-40) 40 mg/mL injection 20 mg     She is allergic to bactrim [sulfamethoxazole-trimethoprim]..    Review of Systems   Constitutional:  Negative for activity change, chills, fatigue, fever and unexpected weight change.   HENT:  Positive for congestion and rhinorrhea. Negative for ear pain, postnasal drip, sinus pressure and sore throat.    Eyes:  Negative for pain.   Respiratory:  Positive for cough and wheezing. Negative for choking, chest tightness and shortness of breath.    Cardiovascular:  Negative for chest pain, palpitations and leg swelling.   Gastrointestinal:  Negative for abdominal pain, constipation, diarrhea, nausea and vomiting.   Genitourinary:  Negative for dysuria and hematuria.   Musculoskeletal:  Positive for arthralgias (chronic and unchanged). Negative for back pain, gait problem, joint swelling, myalgias and neck stiffness.   Skin:  Negative for pallor and rash.   Neurological:  Negative for dizziness, tremors, seizures, syncope, light-headedness and headaches.   Hematological:  Negative for adenopathy.   Psychiatric/Behavioral:  Negative for behavioral problems.          Objective:      /82 (BP Location: Left arm, Patient Position: Sitting, Cuff Size: Standard)   Pulse 73   Temp 97.8 °F (36.6 °C) (Temporal)   Ht 5' 1\" (1.549 m)   Wt 60.8 kg (134 lb)   SpO2 96%   BMI 25.32 kg/m²          Physical Exam  Constitutional:       General: She is not in acute distress.     Appearance: She is well-developed. She is not diaphoretic.   HENT:      Head: Normocephalic and atraumatic.      Right Ear: External ear normal. There is impacted cerumen.      Left Ear: External ear normal. There is impacted cerumen.      Nose: Mucosal edema and congestion " present.      Mouth/Throat:      Mouth: Mucous membranes are dry.      Pharynx: Posterior oropharyngeal erythema present. No oropharyngeal exudate.   Eyes:      General: No scleral icterus.        Right eye: No discharge.         Left eye: No discharge.      Conjunctiva/sclera: Conjunctivae normal.      Pupils: Pupils are equal, round, and reactive to light.   Neck:      Thyroid: No thyromegaly.      Vascular: No JVD.      Trachea: No tracheal deviation.   Cardiovascular:      Rate and Rhythm: Normal rate. Rhythm irregular.      Heart sounds: Murmur (2/6 systolic murmur maximal in the aortic valve region with an irregular rhythm) heard.      Systolic murmur is present with a grade of 2/6.      No friction rub. No gallop.   Pulmonary:      Effort: Pulmonary effort is normal. No respiratory distress.      Breath sounds: Examination of the right-lower field reveals rales. Examination of the left-lower field reveals rales. Rales present. No wheezing.   Chest:      Chest wall: No tenderness.   Abdominal:      General: Bowel sounds are normal. There is no distension.      Palpations: Abdomen is soft. There is no mass.      Tenderness: There is no abdominal tenderness. There is no guarding or rebound.   Musculoskeletal:         General: No tenderness or deformity. Normal range of motion.      Cervical back: Normal range of motion and neck supple.   Lymphadenopathy:      Cervical: No cervical adenopathy.   Skin:     General: Skin is warm and dry.      Coloration: Skin is not pale.      Findings: No erythema or rash.   Neurological:      Mental Status: She is alert and oriented to person, place, and time.      Cranial Nerves: No cranial nerve deficit.      Motor: No abnormal muscle tone.      Coordination: Coordination normal.      Deep Tendon Reflexes: Reflexes are normal and symmetric.   Psychiatric:         Behavior: Behavior normal.           Appointment on 12/01/2023   Component Date Value Ref Range Status   • WBC  12/01/2023 5.57  4.31 - 10.16 Thousand/uL Final   • RBC 12/01/2023 3.68 (L)  3.81 - 5.12 Million/uL Final   • Hemoglobin 12/01/2023 11.3 (L)  11.5 - 15.4 g/dL Final   • Hematocrit 12/01/2023 35.4  34.8 - 46.1 % Final   • MCV 12/01/2023 96  82 - 98 fL Final   • MCH 12/01/2023 30.7  26.8 - 34.3 pg Final   • MCHC 12/01/2023 31.9  31.4 - 37.4 g/dL Final   • RDW 12/01/2023 13.2  11.6 - 15.1 % Final   • MPV 12/01/2023 11.0  8.9 - 12.7 fL Final   • Platelets 12/01/2023 273  149 - 390 Thousands/uL Final   • nRBC 12/01/2023 0  /100 WBCs Final   • Neutrophils Relative 12/01/2023 64  43 - 75 % Final   • Immat GRANS % 12/01/2023 0  0 - 2 % Final   • Lymphocytes Relative 12/01/2023 24  14 - 44 % Final   • Monocytes Relative 12/01/2023 8  4 - 12 % Final   • Eosinophils Relative 12/01/2023 3  0 - 6 % Final   • Basophils Relative 12/01/2023 1  0 - 1 % Final   • Neutrophils Absolute 12/01/2023 3.54  1.85 - 7.62 Thousands/µL Final   • Immature Grans Absolute 12/01/2023 0.01  0.00 - 0.20 Thousand/uL Final   • Lymphocytes Absolute 12/01/2023 1.31  0.60 - 4.47 Thousands/µL Final   • Monocytes Absolute 12/01/2023 0.45  0.17 - 1.22 Thousand/µL Final   • Eosinophils Absolute 12/01/2023 0.19  0.00 - 0.61 Thousand/µL Final   • Basophils Absolute 12/01/2023 0.07  0.00 - 0.10 Thousands/µL Final   • Sodium 12/01/2023 142  135 - 147 mmol/L Final   • Potassium 12/01/2023 3.8  3.5 - 5.3 mmol/L Final   • Chloride 12/01/2023 108  96 - 108 mmol/L Final   • CO2 12/01/2023 26  21 - 32 mmol/L Final   • ANION GAP 12/01/2023 8  mmol/L Final   • BUN 12/01/2023 18  5 - 25 mg/dL Final   • Creatinine 12/01/2023 1.15  0.60 - 1.30 mg/dL Final    Standardized to IDMS reference method   • Glucose, Fasting 12/01/2023 94  65 - 99 mg/dL Final   • Calcium 12/01/2023 9.2  8.4 - 10.2 mg/dL Final   • AST 12/01/2023 17  13 - 39 U/L Final   • ALT 12/01/2023 10  7 - 52 U/L Final    Specimen collection should occur prior to Sulfasalazine administration due to the potential  for falsely depressed results.    • Alkaline Phosphatase 12/01/2023 49  34 - 104 U/L Final   • Total Protein 12/01/2023 6.5  6.4 - 8.4 g/dL Final   • Albumin 12/01/2023 4.2  3.5 - 5.0 g/dL Final   • Total Bilirubin 12/01/2023 0.47  0.20 - 1.00 mg/dL Final    Use of this assay is not recommended for patients undergoing treatment with eltrombopag due to the potential for falsely elevated results.  N-acetyl-p-benzoquinone imine (metabolite of Acetaminophen) will generate erroneously low results in samples for patients that have taken an overdose of Acetaminophen.   • eGFR 12/01/2023 44  ml/min/1.73sq m Final   • Ferritin 12/01/2023 122  11 - 307 ng/mL Final   • Iron Saturation 12/01/2023 29  15 - 50 % Final   • TIBC 12/01/2023 293  250 - 450 ug/dL Final   • Iron 12/01/2023 86  50 - 212 ug/dL Final    Patients treated with metal-binding drugs (ie. Deferoxamine) may have depressed iron values.   • UIBC 12/01/2023 207  155 - 355 ug/dL Final   • Magnesium 12/01/2023 2.1  1.9 - 2.7 mg/dL Final   • Creatinine, Ur 12/01/2023 131.0  Reference range not established. mg/dL Final   • Protein Urine Random 12/01/2023 34  Reference range not established. mg/dL Final   • Prot/Creat Ratio, Ur 12/01/2023 0.26 (H)  0.00 - 0.10 Final   • 25-HYDROXY VIT D 12/01/2023 53  ng/mL Final    Reference Range:  All Ages: Target levels 30 - 100   • 25-Hydroxy D2 12/01/2023 <1.0  ng/mL Final    This test was developed and its performance characteristics  determined by [facility]. It has not been cleared or approved  by the Food and Drug Administration.   • 25-HYDROXY VIT D3 12/01/2023 53  ng/mL Final    This test was developed and its performance characteristics  determined by [facility]. It has not been cleared or approved  by the Food and Drug Administration.   • PTH 12/01/2023 15.5  12.0 - 88.0 pg/mL Final   Lab Requisition on 10/04/2023   Component Date Value Ref Range Status   • Urine Culture 10/04/2023 >100,000 cfu/ml Proteus mirabilis (A)   Final    Lab Requisition on 07/05/2023   Component Date Value Ref Range Status   • Urine Culture 07/05/2023 >100,000 cfu/ml Escherichia coli (A)   Final    This organism has been edited. The previous result was Gram Negative [facility] Enter[facility] Like on 7/6/2023 at 1454 EDT.   Appointment on 05/22/2023   Component Date Value Ref Range Status   • Sodium 05/22/2023 138  135 - 147 mmol/L Final   • Potassium 05/22/2023 3.9  3.5 - 5.3 mmol/L Final   • Chloride 05/22/2023 112 (H)  96 - 108 mmol/L Final   • CO2 05/22/2023 25  21 - 32 mmol/L Final   • ANION GAP 05/22/2023 1 (L)  4 - 13 mmol/L Final   • BUN 05/22/2023 14  5 - 25 mg/dL Final   • Creatinine 05/22/2023 1.13  0.60 - 1.30 mg/dL Final    Standardized to IDMS reference method   • Glucose, Fasting 05/22/2023 91  65 - 99 mg/dL Final    Specimen collection should occur prior to Sulfasalazine administration due to the potential for falsely depressed results. Specimen collection should occur prior to Sulfapyridine administration due to the potential for falsely elevated results.   • Calcium 05/22/2023 9.6  8.3 - 10.1 mg/dL Final   • AST 05/22/2023 14  5 - 45 U/L Final    Specimen collection should occur prior to Sulfasalazine administration due to the potential for falsely depressed results.    • ALT 05/22/2023 17  12 - 78 U/L Final    Specimen collection should occur prior to Sulfasalazine and/or Sulfapyridine administration due to the potential for falsely depressed results.    • Alkaline Phosphatase 05/22/2023 69  46 - 116 U/L Final   • Total Protein 05/22/2023 7.2  6.4 - 8.4 g/dL Final   • Albumin 05/22/2023 3.6  3.5 - 5.0 g/dL Final   • Total Bilirubin 05/22/2023 0.35  0.20 - 1.00 mg/dL Final    Use of this assay is not recommended for patients undergoing treatment with eltrombopag due to the potential for falsely elevated results.   • eGFR 05/22/2023 45  ml/min/1.73sq m Final   • WBC 05/22/2023 6.41  4.31 - 10.16 Thousand/uL Final   • RBC 05/22/2023 3.85  3.81 - 5.12 Million/uL  Final   • Hemoglobin 05/22/2023 11.5  11.5 - 15.4 g/dL Final   • Hematocrit 05/22/2023 35.3  34.8 - 46.1 % Final   • MCV 05/22/2023 92  82 - 98 fL Final   • MCH 05/22/2023 29.9  26.8 - 34.3 pg Final   • MCHC 05/22/2023 32.6  31.4 - 37.4 g/dL Final   • RDW 05/22/2023 13.7  11.6 - 15.1 % Final   • Platelets 05/22/2023 311  149 - 390 Thousands/uL Final   • MPV 05/22/2023 10.5  8.9 - 12.7 fL Final   • Total CK 05/22/2023 63  26 - 192 U/L Final   • Magnesium 05/22/2023 2.4  1.6 - 2.6 mg/dL Final   • Phosphorus 05/22/2023 3.2  2.3 - 4.1 mg/dL Final   • PTH 05/22/2023 17.3  12.0 - 88.0 pg/mL Final   • 25-HYDROXY VIT D 05/22/2023 45  ng/mL Final    Reference Range:  All Ages: Target levels 30 - 100   • 25-Hydroxy D2 05/22/2023 <1.0  ng/mL Final    This test was developed and its performance characteristics  determined by [facility]. It has not been cleared or approved  by the Food and Drug Administration.   • 25-HYDROXY VIT D3 05/22/2023 45  ng/mL Final    This test was developed and its performance characteristics  determined by [facility]. It has not been cleared or approved  by the Food and Drug Administration.   • Creatinine, Ur 05/22/2023 99.3  mg/dL Final   • Protein Urine Random 05/22/2023 27  mg/dL Final   • Prot/Creat Ratio, Ur 05/22/2023 0.27 (H)  0.00 - 0.10 Final   • Cholesterol 05/22/2023 192  See Comment mg/dL Final    Cholesterol:         Pediatric <18 Years        Desirable          <170 mg/dL      Borderline High    170-199 mg/dL      High               >=200 mg/dL        Adult >=18 Years            Desirable         <200 mg/dL      Borderline High   200-239 mg/dL      High              >239 mg/dL     • Triglycerides 05/22/2023 50  See Comment mg/dL Final    Triglyceride:     0-9Y            <75mg/dL     10Y-17Y         <90 mg/dL       >=18Y     Normal          <150 mg/dL     Borderline High 150-199 mg/dL     High            200-499 mg/dL        Very High       >499 mg/dL    Specimen collection should occur prior  to N-Acetylcysteine or Metamizole administration due to the potential for falsely depressed results.   • HDL, Direct 05/22/2023 100  >=50 mg/dL Final    Specimen collection should occur prior to Metamizole administration due to the potential for falsley depressed results.   • LDL Calculated 05/22/2023 82  0 - 100 mg/dL Final    LDL Cholesterol:     Optimal           <100 mg/dl     Near Optimal      100-129 mg/dl     Above Optimal       Borderline High 130-159 mg/dl       High            160-189 mg/dl       Very High       >189 mg/dl         This screening LDL is a calculated result.   It does not have the accuracy of the Direct Measured LDL in the monitoring of patients with hyperlipidemia and/or statin therapy.   Direct Measure LDL ([id number]) must be ordered separately in these patients.   Appointment on 03/22/2023   Component Date Value Ref Range Status   • Hemoglobin A1C 03/22/2023 5.6  Normal 3.8-5.6%; PreDiabetic 5.7-6.4%; Diabetic >=6.5%; Glycemic control for adults with diabetes <7.0% % Final   • EAG 03/22/2023 114  mg/dl Final   • Cholesterol 03/22/2023 216 (H)  See Comment mg/dL Final    Cholesterol:         Pediatric <18 Years        Desirable          <170 mg/dL      Borderline High    170-199 mg/dL      High               >=200 mg/dL        Adult >=18 Years            Desirable         <200 mg/dL      Borderline High   200-239 mg/dL      High              >239 mg/dL     • Triglycerides 03/22/2023 102  See Comment mg/dL Final    Triglyceride:     0-9Y            <75mg/dL     10Y-17Y         <90 mg/dL       >=18Y     Normal          <150 mg/dL     Borderline High 150-199 mg/dL     High            200-499 mg/dL        Very High       >499 mg/dL    Specimen collection should occur prior to N-Acetylcysteine or Metamizole administration due to the potential for falsely depressed results.   • HDL, Direct 03/22/2023 104  >=50 mg/dL Final    Specimen collection should occur prior to Metamizole administration due to  the potential for falsley depressed results.   • LDL Calculated 03/22/2023 92  0 - 100 mg/dL Final    LDL Cholesterol:     Optimal           <100 mg/dl     Near Optimal      100-129 mg/dl     Above Optimal       Borderline High 130-159 mg/dl       High            160-189 mg/dl       Very High       >189 mg/dl         This screening LDL is a calculated result.   It does not have the accuracy of the Direct Measured LDL in the monitoring of patients with hyperlipidemia and/or statin therapy.   Direct Measure LDL ([id number]) must be ordered separately in these patients.   • Non-HDL-Chol (CHOL-HDL) 03/22/2023 112  mg/dl Final

## 2024-01-16 ENCOUNTER — HOSPITAL ENCOUNTER (OUTPATIENT)
Dept: RADIOLOGY | Facility: HOSPITAL | Age: 84
Discharge: HOME | End: 2024-01-16
Attending: INTERNAL MEDICINE
Payer: COMMERCIAL

## 2024-01-16 DIAGNOSIS — J20.8 ACUTE BRONCHITIS DUE TO OTHER SPECIFIED ORGANISMS: ICD-10-CM

## 2024-01-16 PROCEDURE — 71046 X-RAY EXAM CHEST 2 VIEWS: CPT

## 2024-01-17 ENCOUNTER — OFFICE VISIT (OUTPATIENT)
Dept: PHYSICAL THERAPY | Facility: REHABILITATION | Age: 84
End: 2024-01-17
Payer: COMMERCIAL

## 2024-01-17 DIAGNOSIS — S46.212A STRAIN OF LEFT BICEPS, INITIAL ENCOUNTER: ICD-10-CM

## 2024-01-17 DIAGNOSIS — M25.512 ACUTE PAIN OF LEFT SHOULDER: Primary | ICD-10-CM

## 2024-01-17 PROCEDURE — 97112 NEUROMUSCULAR REEDUCATION: CPT

## 2024-01-17 PROCEDURE — 97110 THERAPEUTIC EXERCISES: CPT

## 2024-01-17 NOTE — PROGRESS NOTES
Daily Note     Today's date: 2024  Patient name: Monik Garcia  : 1940  MRN: [id number]  Referring provider: Roe Myers DO  Dx:   Encounter Diagnosis     ICD-10-CM    1. Acute pain of left shoulder  M25.512       2. Strain of left biceps, initial encounter  S46.212A           Start Time: 0930  Stop Time: 1010  Total time in clinic (min): 40 minutes    Subjective: Pt notes no significant changes since last visit and mentioned that she had some slight soreness following last tx session.       Objective: See treatment diary below      Assessment: Tolerated treatment well. Patient would benefit from continued PT. Pt was unable to progress with resistance with shoulder press as LUE strength continues to be decreased. She responded well to introduction to SL ER as ER strength continues to be most limited. She continues to respond well to verbal cues for elbow and shoulder positioning during UE movements and shows improved technique following cueing. Continue to progress as tolerated. Supervised by Jered Francois from 930-945, 1:1 with Trell Metzger DPT entirety rest of tx.      Plan: Continue per plan of care.      Precautions: PMH includes anemia, chronic BL LBP without sciatica, L lumbar radiculopathy, CKD, greater trochanteric bursitis of R hip, HTN, osteoporosis, sacral insufficiency fracture, SIJ inflammation. PSH includes right hip replacement on 8/15/2019.   POC expires Unit limit Auth Expiration date PT/OT + Visit Limit?   24 BOMN 2023 BOMN         Visit/Unit Tracking  AUTH Status:  Date    Approved Used 1 2 3 4 5 6 7    Remaining              Pertinent Findings:      POC End Date: 24                                                                                          Test / Measure  2023    FOTO (Predicted 66) 56    L Shoulder Strength 3+ to 4/5    L Shoulder ROM Decreased active ER          Visit Number:  1 2 3 4 5 6 7       Manuals 11/21 11/30 12/8 12/13 12/19 1/12 1/17      Shoulder PROM  MC MC MC MC, Resisted ER/IR MC, Resisted ER/IR MC, Resisted ER/IR      Manually Resisted Elbow ROM                                       Neuro Re-Ed             Shoulder Retraction 10x 10x           Cane Shoulder AAROM  10x, Cane Press up 2x10 10x, Cane Press up 2x10 10x, Cane Press up 2x10 10x, Cane Press up 2x10 10x, Cane Press up 2x10 10x, Cane Press up 2x10 2#      Shoulder Isometrics ER, 5x, 5s hold ER, 5x, 5s hold           No Money RTB, 10x RTB, 10x RTB, 15x RTB, 15x RTB, 15x RTB, 15x RTB, 15x      Shoulder Flex/Abd   2#, 2x10 2#, 2x10 2#, 2x10 2#, 2x10 2#, 2x10      Dowel Elbow Flex/Ext             Elbow Flex/Ext Isometric Hold             Wrist Flex/Ext             Therabar Pronation/Supination             Hammer Pro/Sup                                       Ther Ex             HEP Review + Pt Education 10 min            UBE  3'/3' 3'/3' 3'/3' 3'/3' 3'/3' 3'/3'      Wall Slides 10x 10x 10x 2x8 2x8 2x8       BL Rows  3x8, GTB 3x10, BTB 3x10, MTB 3x10, MTB 3x10, MTB 3x10, MTB      TB Shoulder Ext  3x8, GTB 3x8, GTB 3x8, GTB 3x8, GTB 3x8, GTB 3x8, BTB      DB Shrugs  5# ea, 2x8 5# ea, 2x8 5# ea, 2x8 5# ea, 2x8 5# ea, 2x8 5# ea, 2x8      Shoulder Press   3x5, 2# 3x5, 2# Dowel + 5# AW 3x6 Dowel + 5# AW 3x6 Dowel + 5# AW 3x6      Shoulder Flex + Tband             Elbow Flex/Ext Stretch             Eccentric DB Curls  3x8, 2# ea 3x8, 2# ea  3x8, 3# ea 3x8, 3# ea 3x8, 3# ea      Tband Curls in Sitting             Hammer Curls                                                    Ther Activity                                       Gait Training                                       Modalities

## 2024-01-18 ENCOUNTER — APPOINTMENT (OUTPATIENT)
Dept: LAB | Facility: CLINIC | Age: 84
End: 2024-01-18
Payer: COMMERCIAL

## 2024-01-18 ENCOUNTER — APPOINTMENT (OUTPATIENT)
Dept: PHYSICAL THERAPY | Facility: REHABILITATION | Age: 84
End: 2024-01-18
Payer: COMMERCIAL

## 2024-01-18 DIAGNOSIS — J06.9 URTI (ACUTE UPPER RESPIRATORY INFECTION): ICD-10-CM

## 2024-01-18 DIAGNOSIS — I49.8 OTHER CARDIAC ARRHYTHMIA: ICD-10-CM

## 2024-01-18 DIAGNOSIS — J20.8 ACUTE BRONCHITIS DUE TO OTHER SPECIFIED ORGANISMS: ICD-10-CM

## 2024-01-18 LAB
ALBUMIN SERPL BCP-MCNC: 3.7 G/DL (ref 3.5–5)
ALP SERPL-CCNC: 54 U/L (ref 34–104)
ALT SERPL W P-5'-P-CCNC: 13 U/L (ref 7–52)
ANION GAP SERPL CALCULATED.3IONS-SCNC: 12 MMOL/L
AST SERPL W P-5'-P-CCNC: 16 U/L (ref 13–39)
BASOPHILS # BLD AUTO: 0.05 THOUSANDS/ÂΜL (ref 0–0.1)
BASOPHILS NFR BLD AUTO: 1 % (ref 0–1)
BILIRUB SERPL-MCNC: 0.31 MG/DL (ref 0.2–1)
BUN SERPL-MCNC: 15 MG/DL (ref 5–25)
CALCIUM SERPL-MCNC: 9.4 MG/DL (ref 8.4–10.2)
CHLORIDE SERPL-SCNC: 110 MMOL/L (ref 96–108)
CO2 SERPL-SCNC: 21 MMOL/L (ref 21–32)
CREAT SERPL-MCNC: 1.11 MG/DL (ref 0.6–1.3)
EOSINOPHIL # BLD AUTO: 0.09 THOUSAND/ÂΜL (ref 0–0.61)
EOSINOPHIL NFR BLD AUTO: 1 % (ref 0–6)
ERYTHROCYTE [DISTWIDTH] IN BLOOD BY AUTOMATED COUNT: 13 % (ref 11.6–15.1)
GFR SERPL CREATININE-BSD FRML MDRD: 46 ML/MIN/1.73SQ M
GLUCOSE SERPL-MCNC: 117 MG/DL (ref 65–140)
HCT VFR BLD AUTO: 34.2 % (ref 34.8–46.1)
HGB BLD-MCNC: 10.8 G/DL (ref 11.5–15.4)
IMM GRANULOCYTES # BLD AUTO: 0.14 THOUSAND/UL (ref 0–0.2)
IMM GRANULOCYTES NFR BLD AUTO: 1 % (ref 0–2)
LYMPHOCYTES # BLD AUTO: 1.27 THOUSANDS/ÂΜL (ref 0.6–4.47)
LYMPHOCYTES NFR BLD AUTO: 12 % (ref 14–44)
MAGNESIUM SERPL-MCNC: 1.7 MG/DL (ref 1.9–2.7)
MCH RBC QN AUTO: 29.8 PG (ref 26.8–34.3)
MCHC RBC AUTO-ENTMCNC: 31.6 G/DL (ref 31.4–37.4)
MCV RBC AUTO: 94 FL (ref 82–98)
MONOCYTES # BLD AUTO: 0.67 THOUSAND/ÂΜL (ref 0.17–1.22)
MONOCYTES NFR BLD AUTO: 6 % (ref 4–12)
NEUTROPHILS # BLD AUTO: 8.38 THOUSANDS/ÂΜL (ref 1.85–7.62)
NEUTS SEG NFR BLD AUTO: 79 % (ref 43–75)
NRBC BLD AUTO-RTO: 0 /100 WBCS
PLATELET # BLD AUTO: 426 THOUSANDS/UL (ref 149–390)
PMV BLD AUTO: 10.2 FL (ref 8.9–12.7)
POTASSIUM SERPL-SCNC: 3.7 MMOL/L (ref 3.5–5.3)
PROT SERPL-MCNC: 6.2 G/DL (ref 6.4–8.4)
RBC # BLD AUTO: 3.63 MILLION/UL (ref 3.81–5.12)
SODIUM SERPL-SCNC: 143 MMOL/L (ref 135–147)
TSH SERPL DL<=0.05 MIU/L-ACNC: 3.18 UIU/ML (ref 0.45–4.5)
WBC # BLD AUTO: 10.6 THOUSAND/UL (ref 4.31–10.16)

## 2024-01-18 PROCEDURE — 84443 ASSAY THYROID STIM HORMONE: CPT

## 2024-01-18 PROCEDURE — 80053 COMPREHEN METABOLIC PANEL: CPT

## 2024-01-18 PROCEDURE — 83735 ASSAY OF MAGNESIUM: CPT

## 2024-01-18 PROCEDURE — 36415 COLL VENOUS BLD VENIPUNCTURE: CPT

## 2024-01-18 PROCEDURE — 85025 COMPLETE CBC W/AUTO DIFF WBC: CPT

## 2024-01-19 DIAGNOSIS — E83.42 HYPOMAGNESEMIA: Primary | ICD-10-CM

## 2024-01-25 ENCOUNTER — OFFICE VISIT (OUTPATIENT)
Dept: PHYSICAL THERAPY | Facility: REHABILITATION | Age: 84
End: 2024-01-25
Payer: COMMERCIAL

## 2024-01-25 DIAGNOSIS — M25.512 ACUTE PAIN OF LEFT SHOULDER: Primary | ICD-10-CM

## 2024-01-25 DIAGNOSIS — S46.212A STRAIN OF LEFT BICEPS, INITIAL ENCOUNTER: ICD-10-CM

## 2024-01-25 PROCEDURE — 97110 THERAPEUTIC EXERCISES: CPT

## 2024-01-25 PROCEDURE — 97112 NEUROMUSCULAR REEDUCATION: CPT

## 2024-01-25 NOTE — PROGRESS NOTES
Daily Note     Today's date: 2024  Patient name: Monik Garcia  : 1940  MRN: [id number]  Referring provider: Roe Myers DO  Dx:   Encounter Diagnosis     ICD-10-CM    1. Acute pain of left shoulder  M25.512       2. Strain of left biceps, initial encounter  S46.212A           Start Time: 1055  Stop Time: 1133  Total time in clinic (min): 38 minutes    Subjective: Pt notes that her shoulder has been feeling a little bit better and she has been able to sleep on her L side with less pain.       Objective: See treatment diary below      Assessment: Tolerated treatment well. Patient would benefit from continued PT. Pt was introduced to supine bench press to improve pressing strength and responded well without excessive increase in pain or soreness. She continues to be most challenged with shoulder press overhead as LUE strength continues to be limited. She shows compensation at the L shoulder during overhead pressing movement with excessive abduction and shows slight improvement in technique with Vcs for for elbow and shoulder positioning. Continue to progress as tolerated, 1:1 with Trell Metzger DPT entirety of tx.      Plan: Continue per plan of care.      Precautions: PMH includes anemia, chronic BL LBP without sciatica, L lumbar radiculopathy, CKD, greater trochanteric bursitis of R hip, HTN, osteoporosis, sacral insufficiency fracture, SIJ inflammation. PSH includes right hip replacement on 8/15/2019.   POC expires Unit limit Auth Expiration date PT/OT + Visit Limit?   24 BOMN 2023 BOMN         Visit/Unit Tracking  AUTH Status:  Date    Approved Used 1 2 3 4 5 6 7 8    Remaining               Pertinent Findings:      POC End Date: 24                                                                                          Test / Measure  2023    FOTO (Predicted 66) 56    L Shoulder Strength 3+ to 4/5    L Shoulder ROM Decreased  active ER          Visit Number:  1 2 3 4 5 6 7 8     Manuals 11/21 11/30 12/8 12/13 12/19 1/12 1/17 1/25     Shoulder PROM  MC MC MC MC, Resisted ER/IR MC, Resisted ER/IR MC, Resisted ER/IR MC, Resisted ER/IR     Manually Resisted Elbow ROM                                       Neuro Re-Ed             Shoulder Retraction 10x 10x           Cane Shoulder AAROM  10x, Cane Press up 2x10 10x, Cane Press up 2x10 10x, Cane Press up 2x10 10x, Cane Press up 2x10 10x, Cane Press up 2x10 10x, Cane Press up 2x10 2# 15x, cane + 2#     Supine Bench Press        3x8, Cane + 4#     Shoulder Isometrics ER, 5x, 5s hold ER, 5x, 5s hold           No Money RTB, 10x RTB, 10x RTB, 15x RTB, 15x RTB, 15x RTB, 15x RTB, 15x RTB, 15x     Shoulder Flex/Abd   2#, 2x10 2#, 2x10 2#, 2x10 2#, 2x10 2#, 2x10 2#, 2x10 flex, abd no wt     Dowel Elbow Flex/Ext             Elbow Flex/Ext Isometric Hold             Wrist Flex/Ext             Therabar Pronation/Supination             Hammer Pro/Sup                                       Ther Ex             HEP Review + Pt Education 10 min            UBE  3'/3' 3'/3' 3'/3' 3'/3' 3'/3' 3'/3' 3'/3'     Wall Slides 10x 10x 10x 2x8 2x8 2x8       BL Rows  3x8, GTB 3x10, BTB 3x10, MTB 3x10, MTB 3x10, MTB 3x10, MTB 3x10, MTB Uni LUE     TB Shoulder Ext  3x8, GTB 3x8, GTB 3x8, GTB 3x8, GTB 3x8, GTB 3x8, BTB 3x8, BTB     DB Shrugs  5# ea, 2x8 5# ea, 2x8 5# ea, 2x8 5# ea, 2x8 5# ea, 2x8 5# ea, 2x8 5# ea, 2x10     Shoulder Press   3x5, 2# 3x5, 2# Dowel + 5# AW 3x6 Dowel + 5# AW 3x6 Dowel + 5# AW 3x6 Dowel + 5# AW 3x6     Shoulder Flex + Tband             Elbow Flex/Ext Stretch             Eccentric DB Curls  3x8, 2# ea 3x8, 2# ea  3x8, 3# ea 3x8, 3# ea 3x8, 3# ea 3x8, 3# ea     Tband Curls in Sitting             Hammer Curls                                                    Ther Activity                                       Gait Training                                       Modalities

## 2024-01-29 DIAGNOSIS — I12.9 HYPERTENSIVE CHRONIC KIDNEY DISEASE WITH STAGE 1 THROUGH STAGE 4 CHRONIC KIDNEY DISEASE, OR UNSPECIFIED CHRONIC KIDNEY DISEASE: ICD-10-CM

## 2024-01-29 RX ORDER — METOPROLOL SUCCINATE 25 MG/1
25 TABLET, EXTENDED RELEASE ORAL 2 TIMES DAILY
Qty: 180 TABLET | Refills: 3 | Status: SHIPPED | OUTPATIENT
Start: 2024-01-29

## 2024-01-29 NOTE — TELEPHONE ENCOUNTER
Received a call from patient stating requesting a refill of metoprolol succinate 25 mg, take 1 tablet two times a day.    Sent to her pharmacy[facility]"" [facility] .

## 2024-01-31 ENCOUNTER — APPOINTMENT (OUTPATIENT)
Dept: LAB | Facility: CLINIC | Age: 84
End: 2024-01-31
Payer: COMMERCIAL

## 2024-01-31 DIAGNOSIS — E83.42 HYPOMAGNESEMIA: ICD-10-CM

## 2024-01-31 LAB — MAGNESIUM SERPL-MCNC: 2.1 MG/DL (ref 1.9–2.7)

## 2024-01-31 PROCEDURE — 83735 ASSAY OF MAGNESIUM: CPT

## 2024-02-01 ENCOUNTER — TELEPHONE (OUTPATIENT)
Dept: NEPHROLOGY | Facility: CLINIC | Age: 84
End: 2024-02-01

## 2024-02-01 ENCOUNTER — OFFICE VISIT (OUTPATIENT)
Dept: PHYSICAL THERAPY | Facility: REHABILITATION | Age: 84
End: 2024-02-01
Payer: COMMERCIAL

## 2024-02-01 DIAGNOSIS — M25.512 ACUTE PAIN OF LEFT SHOULDER: Primary | ICD-10-CM

## 2024-02-01 DIAGNOSIS — S46.212A STRAIN OF LEFT BICEPS, INITIAL ENCOUNTER: ICD-10-CM

## 2024-02-01 DIAGNOSIS — N18.31 STAGE 3A CHRONIC KIDNEY DISEASE (HCC): Primary | ICD-10-CM

## 2024-02-01 DIAGNOSIS — S46.219A BICEPS STRAIN, INITIAL ENCOUNTER: ICD-10-CM

## 2024-02-01 DIAGNOSIS — R79.89 ELEVATED SERUM CREATININE: Primary | ICD-10-CM

## 2024-02-01 DIAGNOSIS — E87.6 HYPOKALEMIA: ICD-10-CM

## 2024-02-01 PROCEDURE — 97110 THERAPEUTIC EXERCISES: CPT

## 2024-02-01 PROCEDURE — 97112 NEUROMUSCULAR REEDUCATION: CPT

## 2024-02-01 NOTE — TELEPHONE ENCOUNTER
Spoke with patient's , Luca, about the following, he expressed understanding and thanked us for the call:    ----- Message from Evgeny Bellamy MD sent at 1/31/2024  3:00 PM EST -----  Creatinine slightly higher than usual potassium borderline low    Recommend:  1.  High potassium diet  2.  Make sure patient is not taking any NSAIDs Aleve Advil etc.  3.  Repeat a basic metabolic profile in  2 weeks with good hydration and on the high potassium diet

## 2024-02-01 NOTE — PROGRESS NOTES
Daily Note     Today's date: 2024  Patient name: Monik Garcia  : 1940  MRN: [id number]  Referring provider: Roe Myers DO  Dx:   Encounter Diagnosis     ICD-10-CM    1. Acute pain of left shoulder  M25.512       2. Biceps strain, initial encounter  S46.219A       3. Strain of left biceps, initial encounter  S46.212A           Start Time: 09  Stop Time: 1015  Total time in clinic (min): 45 minutes    Subjective: Pt notes that her shoulder has been feeling okay and reports no significant change since last visit.       Objective: See treatment diary below      Assessment: Tolerated treatment well. Patient would benefit from continued PT. Pt continues to be challenged by decreased strength at the LUE as demonstrated by shoulder and elbow compensation with resistance movements. She continues to be most challenged with active ER movements due to deficits with strength and muscle recruitment. She responds well to Vcs for elbow and shoulder positioning during overhead movements and shows decreased compensatory patterns with cueing. Continue to progress as tolerated, 1:1 with Trell Metzger DPT entirety of tx.      Plan: Continue per plan of care.      Precautions: PMH includes anemia, chronic BL LBP without sciatica, L lumbar radiculopathy, CKD, greater trochanteric bursitis of R hip, HTN, osteoporosis, sacral insufficiency fracture, SIJ inflammation. PSH includes right hip replacement on 8/15/2019.   POC expires Unit limit Auth Expiration date PT/OT + Visit Limit?   24 BOMN 2023 BOMN         Visit/Unit Tracking  AUTH Status:  Date    Approved Used 1 2 3 4 5 6 7 8 9    Remaining                Pertinent Findings:      POC End Date: 24                                                                                          Test / Measure  2023    FOTO (Predicted 66) 56    L Shoulder Strength 3+ to 4/5    L Shoulder ROM Decreased active  ER          Visit Number:  1 2 3 4 5 6 7 8 9    Manuals 11/21 11/30 12/8 12/13 12/19 1/12 1/17 1/25 2/1    Shoulder PROM  MC MC MC MC, Resisted ER/IR MC, Resisted ER/IR MC, Resisted ER/IR MC, Resisted ER/IR MC, Resisted ER/IR    Manually Resisted Elbow ROM                                       Neuro Re-Ed             Shoulder Retraction 10x 10x           Cane Shoulder AAROM  10x, Cane Press up 2x10 10x, Cane Press up 2x10 10x, Cane Press up 2x10 10x, Cane Press up 2x10 10x, Cane Press up 2x10 10x, Cane Press up 2x10 2# 15x, cane + 2# 15x, cane + 2#    Supine Bench Press        3x8, Cane + 4# 3x8, Cane + 4#    Shoulder Isometrics ER, 5x, 5s hold ER, 5x, 5s hold           No Money RTB, 10x RTB, 10x RTB, 15x RTB, 15x RTB, 15x RTB, 15x RTB, 15x RTB, 15x     Shoulder Flex/Abd   2#, 2x10 2#, 2x10 2#, 2x10 2#, 2x10 2#, 2x10 2#, 2x10 flex, abd no wt 2#, 2x10 flex, abd no wt    Dowel Elbow Flex/Ext             Elbow Flex/Ext Isometric Hold             Wrist Flex/Ext             Therabar Pronation/Supination             Hammer Pro/Sup                                       Ther Ex             HEP Review + Pt Education 10 min            UBE  3'/3' 3'/3' 3'/3' 3'/3' 3'/3' 3'/3' 3'/3' 3'/3'    Wall Slides 10x 10x 10x 2x8 2x8 2x8       BL Rows  3x8, GTB 3x10, BTB 3x10, MTB 3x10, MTB 3x10, MTB 3x10, MTB 3x10, MTB Uni LUE 16#, 3x8 BL    TB Shoulder Ext  3x8, GTB 3x8, GTB 3x8, GTB 3x8, GTB 3x8, GTB 3x8, BTB 3x8, BTB 3x10, 8#    DB Shrugs  5# ea, 2x8 5# ea, 2x8 5# ea, 2x8 5# ea, 2x8 5# ea, 2x8 5# ea, 2x8 5# ea, 2x10 5# ea, 2x10    Shoulder Press   3x5, 2# 3x5, 2# Dowel + 5# AW 3x6 Dowel + 5# AW 3x6 Dowel + 5# AW 3x6 Dowel + 5# AW 3x6 Dowel + 5# AW 3x6    Shoulder Flex + Tband             Elbow Flex/Ext Stretch             Eccentric DB Curls  3x8, 2# ea 3x8, 2# ea  3x8, 3# ea 3x8, 3# ea 3x8, 3# ea 3x8, 3# ea 3x8, 3# ea    Tband Curls in Sitting             Hammer Curls                                                    Ther Activity                                        Gait Training                                       Modalities

## 2024-02-01 NOTE — TELEPHONE ENCOUNTER
Pt contacted office in regard to call received, pt received message. I will mail to pt information on high potassium diet and pt was advised to repeat BMP in 2 weeks, nothing further needed at this time.

## 2024-02-09 ENCOUNTER — APPOINTMENT (OUTPATIENT)
Dept: PHYSICAL THERAPY | Facility: REHABILITATION | Age: 84
End: 2024-02-09
Payer: COMMERCIAL

## 2024-02-12 ENCOUNTER — APPOINTMENT (OUTPATIENT)
Dept: LAB | Facility: CLINIC | Age: 84
End: 2024-02-12
Payer: COMMERCIAL

## 2024-02-12 DIAGNOSIS — N18.31 STAGE 3A CHRONIC KIDNEY DISEASE (HCC): ICD-10-CM

## 2024-02-12 DIAGNOSIS — R79.89 ELEVATED SERUM CREATININE: ICD-10-CM

## 2024-02-12 DIAGNOSIS — E87.6 HYPOKALEMIA: ICD-10-CM

## 2024-02-12 DIAGNOSIS — E78.5 DYSLIPIDEMIA: ICD-10-CM

## 2024-02-12 LAB
ANION GAP SERPL CALCULATED.3IONS-SCNC: 7 MMOL/L
BUN SERPL-MCNC: 14 MG/DL (ref 5–25)
CALCIUM SERPL-MCNC: 9.2 MG/DL (ref 8.4–10.2)
CHLORIDE SERPL-SCNC: 107 MMOL/L (ref 96–108)
CO2 SERPL-SCNC: 27 MMOL/L (ref 21–32)
CREAT SERPL-MCNC: 1.19 MG/DL (ref 0.6–1.3)
GFR SERPL CREATININE-BSD FRML MDRD: 42 ML/MIN/1.73SQ M
GLUCOSE P FAST SERPL-MCNC: 100 MG/DL (ref 65–99)
POTASSIUM SERPL-SCNC: 4.4 MMOL/L (ref 3.5–5.3)
SODIUM SERPL-SCNC: 141 MMOL/L (ref 135–147)

## 2024-02-12 PROCEDURE — 80048 BASIC METABOLIC PNL TOTAL CA: CPT

## 2024-02-12 PROCEDURE — 36415 COLL VENOUS BLD VENIPUNCTURE: CPT

## 2024-02-12 RX ORDER — ATORVASTATIN CALCIUM 10 MG/1
10 TABLET, FILM COATED ORAL DAILY
Qty: 90 TABLET | Refills: 1 | Status: SHIPPED | OUTPATIENT
Start: 2024-02-12

## 2024-02-13 ENCOUNTER — TELEPHONE (OUTPATIENT)
Dept: NEPHROLOGY | Facility: CLINIC | Age: 84
End: 2024-02-13

## 2024-02-13 NOTE — TELEPHONE ENCOUNTER
Called patient and went over the following information:    Labs better and back to baseline no changes.    Patient verbally understood and had no further questions at this time.

## 2024-02-13 NOTE — TELEPHONE ENCOUNTER
----- Message from Evgeny Bellamy MD sent at 2/13/2024  7:16 AM EST -----  Labs better and back to baseline no changes  ----- Message -----  From: Lab, Background User  Sent: 2/12/2024   6:05 PM EST  To: Evgeny Bellamy MD

## 2024-02-15 ENCOUNTER — OFFICE VISIT (OUTPATIENT)
Dept: PODIATRY | Facility: CLINIC | Age: 84
End: 2024-02-15
Payer: COMMERCIAL

## 2024-02-15 VITALS — HEIGHT: 61 IN | RESPIRATION RATE: 18 BRPM | BODY MASS INDEX: 25.32 KG/M2

## 2024-02-15 DIAGNOSIS — I73.9 PERIPHERAL VASCULAR DISEASE, UNSPECIFIED (HCC): ICD-10-CM

## 2024-02-15 DIAGNOSIS — L84 CORNS: ICD-10-CM

## 2024-02-15 DIAGNOSIS — B35.1 ONYCHOMYCOSIS: Primary | ICD-10-CM

## 2024-02-15 PROCEDURE — 11055 PARING/CUTG B9 HYPRKER LES 1: CPT | Performed by: PODIATRIST

## 2024-02-15 PROCEDURE — 11720 DEBRIDE NAIL 1-5: CPT | Performed by: PODIATRIST

## 2024-02-15 NOTE — PROGRESS NOTES
Established patient with class findings presents for mycotic toenail lesion care.  Cortisone injection provided at last visit was not very helpful.  Skin is discolored and additional injections not advised.  Vascular exam:  DP 0/4 bilateral; PT 0 4 bilateral  Derm exam:  Each second and third toenail is thickened and yellow with subungual debris.  Soft corn right second toe  Diagnoses: Mycotic toenails second and third toe bilateral; PVD; hyperkeratotic lesion right second toe treatment:  Debrided each mycotic toenail reducing nails in thickness and removing devitalized tissue and subungual debris.  Electrical and manual debridement performed trimmed remaining elongated toenails.  Trimmed soft corn right second toe.    Lesion Destruction    Date/Time: 2/15/2024 9:00 AM    Performed by: Pablo Rivera DPM  Authorized by: Pablo Rivera DPM    Procedure Details - Lesion Destruction:     Number of Lesions:  1  Lesion 1:     Body area:  Lower extremity    Lower extremity location:  R second toe    Malignancy: benign hyperkeratotic lesion      Destruction method: scissors used for extraction

## 2024-02-16 ENCOUNTER — OFFICE VISIT (OUTPATIENT)
Dept: PHYSICAL THERAPY | Facility: REHABILITATION | Age: 84
End: 2024-02-16
Payer: COMMERCIAL

## 2024-02-16 DIAGNOSIS — S46.219A BICEPS STRAIN, INITIAL ENCOUNTER: ICD-10-CM

## 2024-02-16 DIAGNOSIS — M25.512 ACUTE PAIN OF LEFT SHOULDER: Primary | ICD-10-CM

## 2024-02-16 DIAGNOSIS — S46.212A STRAIN OF LEFT BICEPS, INITIAL ENCOUNTER: ICD-10-CM

## 2024-02-16 PROCEDURE — 97112 NEUROMUSCULAR REEDUCATION: CPT

## 2024-02-16 PROCEDURE — 97110 THERAPEUTIC EXERCISES: CPT

## 2024-02-16 NOTE — PROGRESS NOTES
Daily Note     Today's date: 2024  Patient name: Monik Garcia  : 1940  MRN: [id number]  Referring provider: Roe Myers DO  Dx:   Encounter Diagnosis     ICD-10-CM    1. Acute pain of left shoulder  M25.512       2. Biceps strain, initial encounter  S46.219A       3. Strain of left biceps, initial encounter  S46.212A           Start Time: 935  Stop Time: 1015  Total time in clinic (min): 40 minutes    Subjective: Pt notes that her shoulder has been feeling okay and she would like to take a break from physical therapy for a few weeks due to being busy with work.       Objective: See treatment diary below      Assessment: Tolerated treatment well. Patient would benefit from continued PT. Pt continues to show most limitations with external rotation strength but shows improved ROM at the L shoulder especially with abd and ER. Her HEP was updated to include further strengthening exercises and responded well without excessive increase in pain or soreness. She continues to respond well to Vcs shoulder and elbow positioning during active ER strength movements and shows improved movement quality following. 1:1 with Trell Metzger DPT entirety of tx.      Plan: Potential discharge next visit.    Access Code: [email or web address]  URL: [email or web address]  Date: 2024  Prepared by: Trell Metzger    Exercises  - Standing Isometric Shoulder External Rotation with Doorway and Towel Roll  - 1 x daily - 7 x weekly - 3 sets - 10 reps  - Seated Shoulder Row with Anchored Resistance  - 1 x daily - 7 x weekly - 3 sets - 10 reps  - Standing Shoulder External Rotation with Resistance  - 1 x daily - 7 x weekly - 3 sets - 10 reps  - Seated Elbow Flexion with Resistance Under Foot  - 1 x daily - 7 x weekly - 3 sets - 10 reps  - Seated Shoulder Horizontal Abduction with Resistance  - 1 x daily - 7 x weekly - 3 sets - 10 reps     Precautions: PMH includes anemia, chronic BL LBP without sciatica, L lumbar  radiculopathy, CKD, greater trochanteric bursitis of R hip, HTN, osteoporosis, sacral insufficiency fracture, SIJ inflammation. PSH includes right hip replacement on 8/15/2019.   POC expires Unit limit Auth Expiration date PT/OT + Visit Limit?   1/30/24 BOMN 12/31/2023 BOMN         Visit/Unit Tracking  AUTH Status:  Date 11/21 11/30 12/8 12/13 12/19 1/12 1/17 1/25 2/1 2/16   Approved Used 1 2 3 4 5 6 7 8 9 10    Remaining                 Pertinent Findings:      POC End Date: 1/30/24                                                                                          Test / Measure  11/21/2023    FOTO (Predicted 66) 56    L Shoulder Strength 3+ to 4/5    L Shoulder ROM Decreased active ER          Visit Number:  1 2 3 4 5 6 7 8 9 10   Manuals 11/21 11/30 12/8 12/13 12/19 1/12 1/17 1/25 2/1 2/16   Shoulder PROM  MC MC MC MC, Resisted ER/IR MC, Resisted ER/IR MC, Resisted ER/IR MC, Resisted ER/IR MC, Resisted ER/IR MC, Resisted ER/IR   Manually Resisted Elbow ROM                                       Neuro Re-Ed             Shoulder Retraction 10x 10x           Cane Shoulder AAROM  10x, Cane Press up 2x10 10x, Cane Press up 2x10 10x, Cane Press up 2x10 10x, Cane Press up 2x10 10x, Cane Press up 2x10 10x, Cane Press up 2x10 2# 15x, cane + 2# 15x, cane + 2# 15x, cane + 2#   Supine Bench Press        3x8, Cane + 4# 3x8, Cane + 4# 3x8, Cane + 4#   Shoulder Isometrics ER, 5x, 5s hold ER, 5x, 5s hold           No Money RTB, 10x RTB, 10x RTB, 15x RTB, 15x RTB, 15x RTB, 15x RTB, 15x RTB, 15x     Shoulder Flex/Abd   2#, 2x10 2#, 2x10 2#, 2x10 2#, 2x10 2#, 2x10 2#, 2x10 flex, abd no wt 2#, 2x10 flex, abd no wt 2#, 2x10 flex, abd no wt   Dowel Elbow Flex/Ext             Elbow Flex/Ext Isometric Hold             Wrist Flex/Ext             Therabar Pronation/Supination             Hammer Pro/Sup                                       Ther Ex             HEP Review + Pt Education 10 min         5 min HEP    UBE  3'/3' 3'/3'  3'/3' 3'/3' 3'/3' 3'/3' 3'/3' 3'/3' 3'/3'   Wall Slides 10x 10x 10x 2x8 2x8 2x8       BL Rows  3x8, GTB 3x10, BTB 3x10, MTB 3x10, MTB 3x10, MTB 3x10, MTB 3x10, MTB Uni LUE 16#, 3x8 BL 16#, 3x8 BL   TB Shoulder Ext  3x8, GTB 3x8, GTB 3x8, GTB 3x8, GTB 3x8, GTB 3x8, BTB 3x8, BTB 3x10, 8# 3x10, 10#   DB Shrugs  5# ea, 2x8 5# ea, 2x8 5# ea, 2x8 5# ea, 2x8 5# ea, 2x8 5# ea, 2x8 5# ea, 2x10 5# ea, 2x10 5# ea, 2x10   Shoulder Press   3x5, 2# 3x5, 2# Dowel + 5# AW 3x6 Dowel + 5# AW 3x6 Dowel + 5# AW 3x6 Dowel + 5# AW 3x6 Dowel + 5# AW 3x6 Dowel + 5# AW 3x6   Shoulder Flex + Tband             Elbow Flex/Ext Stretch             Eccentric DB Curls  3x8, 2# ea 3x8, 2# ea  3x8, 3# ea 3x8, 3# ea 3x8, 3# ea 3x8, 3# ea 3x8, 3# ea 3x8, RTB   Tband Curls in Sitting             Hammer Curls                                                    Ther Activity                                       Gait Training                                       Modalities

## 2024-02-21 PROBLEM — Z12.11 SCREENING FOR COLON CANCER: Status: RESOLVED | Noted: 2019-07-26 | Resolved: 2024-02-21

## 2024-02-26 ENCOUNTER — TELEPHONE (OUTPATIENT)
Age: 84
End: 2024-02-26

## 2024-02-26 NOTE — TELEPHONE ENCOUNTER
Caller: Aruna Garcia    Doctor/Office: /[facility]    #: [phone]    Escalation: Care Are the bunion pads that were on back order in the office yet? Please call and advise. Thank you

## 2024-02-27 ENCOUNTER — TELEPHONE (OUTPATIENT)
Dept: PODIATRY | Facility: CLINIC | Age: 84
End: 2024-02-27

## 2024-03-08 ENCOUNTER — APPOINTMENT (OUTPATIENT)
Dept: PHYSICAL THERAPY | Facility: REHABILITATION | Age: 84
End: 2024-03-08
Payer: COMMERCIAL

## 2024-03-12 ENCOUNTER — LAB REQUISITION (OUTPATIENT)
Dept: LAB | Facility: HOSPITAL | Age: 84
End: 2024-03-12
Payer: COMMERCIAL

## 2024-03-12 DIAGNOSIS — N30.10 INTERSTITIAL CYSTITIS (CHRONIC) WITHOUT HEMATURIA: ICD-10-CM

## 2024-03-12 PROCEDURE — 87186 SC STD MICRODIL/AGAR DIL: CPT | Performed by: OBSTETRICS & GYNECOLOGY

## 2024-03-12 PROCEDURE — 87077 CULTURE AEROBIC IDENTIFY: CPT | Performed by: OBSTETRICS & GYNECOLOGY

## 2024-03-12 PROCEDURE — 87086 URINE CULTURE/COLONY COUNT: CPT | Performed by: OBSTETRICS & GYNECOLOGY

## 2024-03-14 ENCOUNTER — APPOINTMENT (OUTPATIENT)
Dept: PHYSICAL THERAPY | Facility: REHABILITATION | Age: 84
End: 2024-03-14
Payer: COMMERCIAL

## 2024-03-14 LAB — BACTERIA UR CULT: ABNORMAL

## 2024-03-19 ENCOUNTER — OFFICE VISIT (OUTPATIENT)
Dept: OBGYN CLINIC | Facility: HOSPITAL | Age: 84
End: 2024-03-19
Payer: COMMERCIAL

## 2024-03-19 VITALS
BODY MASS INDEX: 26.06 KG/M2 | DIASTOLIC BLOOD PRESSURE: 76 MMHG | WEIGHT: 138 LBS | HEIGHT: 61 IN | HEART RATE: 85 BPM | SYSTOLIC BLOOD PRESSURE: 147 MMHG

## 2024-03-19 DIAGNOSIS — M70.61 TROCHANTERIC BURSITIS OF BOTH HIPS: Primary | ICD-10-CM

## 2024-03-19 DIAGNOSIS — G89.29 CHRONIC LEFT SHOULDER PAIN: ICD-10-CM

## 2024-03-19 DIAGNOSIS — G89.29 CHRONIC PAIN OF LEFT KNEE: ICD-10-CM

## 2024-03-19 DIAGNOSIS — M70.62 TROCHANTERIC BURSITIS OF BOTH HIPS: Primary | ICD-10-CM

## 2024-03-19 DIAGNOSIS — M25.562 CHRONIC PAIN OF LEFT KNEE: ICD-10-CM

## 2024-03-19 DIAGNOSIS — M25.512 CHRONIC LEFT SHOULDER PAIN: ICD-10-CM

## 2024-03-19 PROCEDURE — 20610 DRAIN/INJ JOINT/BURSA W/O US: CPT | Performed by: ORTHOPAEDIC SURGERY

## 2024-03-19 PROCEDURE — 99214 OFFICE O/P EST MOD 30 MIN: CPT | Performed by: ORTHOPAEDIC SURGERY

## 2024-03-19 RX ORDER — BETAMETHASONE SODIUM PHOSPHATE AND BETAMETHASONE ACETATE 3; 3 MG/ML; MG/ML
12 INJECTION, SUSPENSION INTRA-ARTICULAR; INTRALESIONAL; INTRAMUSCULAR; SOFT TISSUE
Status: COMPLETED | OUTPATIENT
Start: 2024-03-19 | End: 2024-03-19

## 2024-03-19 RX ORDER — BUPIVACAINE HYDROCHLORIDE 2.5 MG/ML
2 INJECTION, SOLUTION INFILTRATION; PERINEURAL
Status: COMPLETED | OUTPATIENT
Start: 2024-03-19 | End: 2024-03-19

## 2024-03-19 RX ORDER — DOXYCYCLINE 100 MG/1
100 TABLET ORAL 2 TIMES DAILY
COMMUNITY
Start: 2024-03-11

## 2024-03-19 RX ORDER — LIDOCAINE HYDROCHLORIDE 10 MG/ML
2 INJECTION, SOLUTION INFILTRATION; PERINEURAL
Status: COMPLETED | OUTPATIENT
Start: 2024-03-19 | End: 2024-03-19

## 2024-03-19 RX ORDER — NITROFURANTOIN MACROCRYSTALS 50 MG/1
CAPSULE ORAL
COMMUNITY
Start: 2024-03-08 | End: 2024-03-22 | Stop reason: ALTCHOICE

## 2024-03-19 RX ADMIN — BUPIVACAINE HYDROCHLORIDE 2 ML: 2.5 INJECTION, SOLUTION INFILTRATION; PERINEURAL at 10:30

## 2024-03-19 RX ADMIN — BETAMETHASONE SODIUM PHOSPHATE AND BETAMETHASONE ACETATE 12 MG: 3; 3 INJECTION, SUSPENSION INTRA-ARTICULAR; INTRALESIONAL; INTRAMUSCULAR; SOFT TISSUE at 10:30

## 2024-03-19 RX ADMIN — LIDOCAINE HYDROCHLORIDE 2 ML: 10 INJECTION, SOLUTION INFILTRATION; PERINEURAL at 10:30

## 2024-03-19 NOTE — PROGRESS NOTES
83 y.o.female presents for evaluation.  She has return of anterior lateral left shoulder pain with reduced motion, she has returned to weightbearing pain in the left knee.  She also describes return of lateral hip pain bilaterally..  She does have low back pain and bilateral extremity pain, her lateral based hip pain is present laterally and occurs in the absence of groin and or thigh pain    Review of Systems  Review of systems negative unless otherwise specified in HPI    Past Medical History  Past Medical History:   Diagnosis Date    Acute laryngitis 5/12/2022    Anemia     Chronic bilateral low back pain without sciatica 11/26/2019    Chronic kidney disease     Duodenal perforation (HCC) 10/7/2020    Duodenal ulcer     Enterococcus UTI 4/7/2016    GERD (gastroesophageal reflux disease) 04/04/2016    Greater trochanteric bursitis of right hip 9/18/2018    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    Hypercalcemia     Hypertension     Left lumbar radiculopathy 4/7/2016    Mixed hyperlipidemia     Osteoarthritis of hip 04/04/2016    Osteoporosis     Sacral insufficiency fracture     SI (sacroiliac) joint inflammation ([facility]) 04/04/2016    Substance addiction ([facility])     Vitamin D deficiency 3/11/2019       Past Surgical History  Past Surgical History:   Procedure Laterality Date    BREAST EXCISIONAL BIOPSY Left 2000    benign    COLONOSCOPY  2019    JOINT REPLACEMENT      right hip 8/15    MAMMO (HISTORICAL)  2019    TONSILLECTOMY         Current Medications  Current Outpatient Medications on File Prior to Visit   Medication Sig Dispense Refill    atorvastatin (LIPITOR) 10 mg tablet Take 1 tablet (10 mg total) by mouth daily 90 tablet 1    benzonatate (TESSALON PERLES) 100 mg capsule Take 1 capsule (100 mg total) by mouth 3 (three) times a day as needed for cough 20 capsule 0    Cholecalciferol (Vitamin D-3) 25 MCG (1000 UT) CAPS Take by mouth in the morning      doxycycline (ADOXA) 100 MG tablet        ibuprofen (MOTRIN) 800 mg tablet       Magnesium Oxide 240 MG PACK Take 240 mg by mouth 2 (two) times a day 30 each 0    metoprolol succinate (TOPROL-XL) 25 mg 24 hr tablet Take 1 tablet (25 mg total) by mouth 2 (two) times a day 180 tablet 3    nitrofurantoin (MACRODANTIN) 50 mg capsule       pantoprazole (PROTONIX) 40 mg tablet Take 1 tablet (40 mg total) by mouth 2 (two) times a day 180 tablet 1    ketorolac (TORADOL) 10 mg tablet Take 1 tablet (10 mg total) by mouth every 6 (six) hours as needed for moderate pain (Patient not taking: Reported on 12/6/2023) 20 tablet 0    Premarin vaginal cream 3 (three) times a week (Patient not taking: Reported on 12/6/2023)      saccharomyces boulardii (FLORASTOR) 250 mg capsule Take 1 capsule (250 mg total) by mouth 2 (two) times a day (Patient not taking: Reported on 3/19/2024) 20 capsule 0     Current Facility-Administered Medications on File Prior to Visit   Medication Dose Route Frequency Provider Last Rate Last Admin    denosumab (PROLIA) subcutaneous injection 60 mg  60 mg Subcutaneous Once Yamile Wallace PA-C        lidocaine (XYLOCAINE) 1 % injection 1 mL  1 mL Injection  Pablo Rivera DPM   1 mL at 12/07/23 0900    triamcinolone acetonide (KENALOG-40) 40 mg/mL injection 20 mg  20 mg Infiltration  Pablo Rivera DPM   20 mg at 12/07/23 0900       Recent Labs (HCT,HGB,PT,INR,ESR,CRP,GLU,HgA1C)  0   Lab Value Date/Time    HCT 34.2 (L) 01/18/2024 1144    HCT 37.9 11/20/2015 0737    HGB 10.8 (L) 01/18/2024 1144    HGB 12.3 11/20/2015 0737    WBC 10.60 (H) 01/18/2024 1144    WBC 6.17 11/20/2015 0737    INR 1.02 08/28/2021 0041    INR 1.03 08/18/2015 0912    ESR 14 10/14/2019 1113    GLUCOSE 100 11/20/2015 0737    HGBA1C 5.6 03/22/2023 1217    HGBA1C 5.8 (H) 06/16/2015 0941         Physical exam  General: Awake, Alert, Oriented  Eyes: Pupils equal, round and reactive to light  Heart: regular rate and rhythm  Lungs: No audible wheezing  Abdomen: soft  Left shoulder is not  effused.  There is restriction of active motion there is glenohumeral crepitation with motion  Left knee is in valgus.  There is no effusion.  Is bony enlargement tenderness laterally.  There is crepitation flexion-extension.  There is no palp warmth of the synovium  Right hip has a healed partial scar.  There is tenderness patient with a trochanteric flare  Left hip has no scars.  The soft tissue envelope is intact.  There is tenderness patient with a trochanteric flare.  There is no groin or thigh pain with motion    Imaging  No new x-rays accompany her    Procedure  Injections of corticosteroid are indicated for the left shoulder joint, left knee joint, and bilateral trochanteric bursa's.  They are documented below      Large joint arthrocentesis: L knee  Universal Protocol:  Consent given by: patient  Supporting Documentation  Indications: pain   Procedure Details  Location: knee - L knee  Needle size: 22 G  Medications administered: 2 mL bupivacaine 0.25 %; 12 mg betamethasone acetate-betamethasone sodium phosphate 6 (3-3) mg/mL    Patient tolerance: patient tolerated the procedure well with no immediate complications  Dressing:  Sterile dressing applied      Large joint arthrocentesis  Universal Protocol:  Consent given by: patient  Supporting Documentation  Indications: pain   Procedure Details  Location: shoulder -   Needle size: 22 G  Approach: posterior  Medications administered: 2 mL bupivacaine 0.25 %; 12 mg betamethasone acetate-betamethasone sodium phosphate 6 (3-3) mg/mL    Patient tolerance: patient tolerated the procedure well with no immediate complications  Dressing:  Sterile dressing applied      Large joint arthrocentesis: R greater trochanteric bursa  Universal Protocol:  Consent given by: patient  Supporting Documentation  Indications: pain   Procedure Details  Location: hip - R greater trochanteric bursa  Needle size: 22 G  Medications administered: 2 mL bupivacaine 0.25 %; 2 mL lidocaine 1  %; 12 mg betamethasone acetate-betamethasone sodium phosphate 6 (3-3) mg/mL    Patient tolerance: patient tolerated the procedure well with no immediate complications  Dressing:  Sterile dressing applied      Large joint arthrocentesis: L greater trochanteric bursa  Universal Protocol:  Consent given by: patient  Supporting Documentation  Indications: pain   Procedure Details  Location: hip - L greater trochanteric bursa  Needle size: 22 G  Approach: lateral  Medications administered: 2 mL bupivacaine 0.25 %; 2 mL lidocaine 1 %; 12 mg betamethasone acetate-betamethasone sodium phosphate 6 (3-3) mg/mL    Patient tolerance: patient tolerated the procedure well with no immediate complications  Dressing:  Sterile dressing applied            Assessment/Plan:   83 y.o.female returns of left shoulder pain, left knee pain, bilateral hip pain.  The hip pain is stemming from trochanteric bursitis.  All diagnoses were discussed with the patient.  Treatment option including risk, benefits, alternative discussed in detail.  Injections of corticosteroid are indicated for pain relief purposes to foreign body locations.  They are advised, accepted, and administered as outlined above.  Office follow-up in 3 months time is my recommendation

## 2024-03-20 ENCOUNTER — APPOINTMENT (OUTPATIENT)
Dept: LAB | Facility: CLINIC | Age: 84
End: 2024-03-20
Payer: COMMERCIAL

## 2024-03-20 ENCOUNTER — TRANSCRIBE ORDERS (OUTPATIENT)
Dept: LAB | Facility: CLINIC | Age: 84
End: 2024-03-20

## 2024-03-20 DIAGNOSIS — Z00.8 HEALTH EXAMINATION IN POPULATION SURVEYS: ICD-10-CM

## 2024-03-20 DIAGNOSIS — I10 ESSENTIAL HYPERTENSION: ICD-10-CM

## 2024-03-20 DIAGNOSIS — E78.5 DYSLIPIDEMIA: ICD-10-CM

## 2024-03-20 DIAGNOSIS — Z00.8 HEALTH EXAMINATION IN POPULATION SURVEYS: Primary | ICD-10-CM

## 2024-03-20 LAB
ALBUMIN SERPL BCP-MCNC: 4.3 G/DL (ref 3.5–5)
ALP SERPL-CCNC: 59 U/L (ref 34–104)
ALT SERPL W P-5'-P-CCNC: 9 U/L (ref 7–52)
ANION GAP SERPL CALCULATED.3IONS-SCNC: 11 MMOL/L (ref 4–13)
AST SERPL W P-5'-P-CCNC: 16 U/L (ref 13–39)
BILIRUB SERPL-MCNC: 0.42 MG/DL (ref 0.2–1)
BUN SERPL-MCNC: 24 MG/DL (ref 5–25)
CALCIUM SERPL-MCNC: 9.2 MG/DL (ref 8.4–10.2)
CHLORIDE SERPL-SCNC: 109 MMOL/L (ref 96–108)
CHOLEST SERPL-MCNC: 203 MG/DL
CO2 SERPL-SCNC: 20 MMOL/L (ref 21–32)
CREAT SERPL-MCNC: 1.04 MG/DL (ref 0.6–1.3)
ERYTHROCYTE [DISTWIDTH] IN BLOOD BY AUTOMATED COUNT: 13.6 % (ref 11.6–15.1)
EST. AVERAGE GLUCOSE BLD GHB EST-MCNC: 120 MG/DL
GFR SERPL CREATININE-BSD FRML MDRD: 50 ML/MIN/1.73SQ M
GLUCOSE P FAST SERPL-MCNC: 136 MG/DL (ref 65–99)
HBA1C MFR BLD: 5.8 %
HCT VFR BLD AUTO: 35.4 % (ref 34.8–46.1)
HDLC SERPL-MCNC: 108 MG/DL
HGB BLD-MCNC: 11.3 G/DL (ref 11.5–15.4)
LDLC SERPL CALC-MCNC: 86 MG/DL (ref 0–100)
MCH RBC QN AUTO: 30.2 PG (ref 26.8–34.3)
MCHC RBC AUTO-ENTMCNC: 31.9 G/DL (ref 31.4–37.4)
MCV RBC AUTO: 95 FL (ref 82–98)
NONHDLC SERPL-MCNC: 95 MG/DL
PLATELET # BLD AUTO: 287 THOUSANDS/UL (ref 149–390)
PMV BLD AUTO: 10.9 FL (ref 8.9–12.7)
POTASSIUM SERPL-SCNC: 3.8 MMOL/L (ref 3.5–5.3)
PROT SERPL-MCNC: 6.8 G/DL (ref 6.4–8.4)
RBC # BLD AUTO: 3.74 MILLION/UL (ref 3.81–5.12)
SODIUM SERPL-SCNC: 140 MMOL/L (ref 135–147)
TRIGL SERPL-MCNC: 43 MG/DL
WBC # BLD AUTO: 13.54 THOUSAND/UL (ref 4.31–10.16)

## 2024-03-20 PROCEDURE — 83036 HEMOGLOBIN GLYCOSYLATED A1C: CPT

## 2024-03-20 PROCEDURE — 85027 COMPLETE CBC AUTOMATED: CPT

## 2024-03-20 PROCEDURE — 36415 COLL VENOUS BLD VENIPUNCTURE: CPT

## 2024-03-20 PROCEDURE — 80053 COMPREHEN METABOLIC PANEL: CPT

## 2024-03-20 PROCEDURE — 80061 LIPID PANEL: CPT

## 2024-03-21 ENCOUNTER — OFFICE VISIT (OUTPATIENT)
Dept: PHYSICAL THERAPY | Facility: REHABILITATION | Age: 84
End: 2024-03-21
Payer: COMMERCIAL

## 2024-03-21 DIAGNOSIS — S46.212A STRAIN OF LEFT BICEPS, INITIAL ENCOUNTER: ICD-10-CM

## 2024-03-21 DIAGNOSIS — M25.512 ACUTE PAIN OF LEFT SHOULDER: Primary | ICD-10-CM

## 2024-03-21 PROCEDURE — 97110 THERAPEUTIC EXERCISES: CPT

## 2024-03-21 PROCEDURE — 97112 NEUROMUSCULAR REEDUCATION: CPT

## 2024-03-21 PROCEDURE — 97164 PT RE-EVAL EST PLAN CARE: CPT

## 2024-03-21 NOTE — PROGRESS NOTES
PT Discharge Summary     Today's date: 3/21/2024  Patient name: Monik Gacria  : 1940  MRN: [id number]  Referring provider: Roe Myers DO  Dx:   Encounter Diagnosis     ICD-10-CM    1. Acute pain of left shoulder  M25.512       2. Strain of left biceps, initial encounter  S46.212A           Start Time: 1510  Stop Time: 1540  Total time in clinic (min): 30 minutes  Assessment  Assessment details: Monik Garcia is a 83 y.o. female attending physical therapy for strain of L biceps, biceps strain, and acute pain of L shoulder. Pt has been responding well to physical therapy as displayed by improved ROM, strength, and overhead endurance. She continues to display limitations with active ER ROM and strength but mentioned that this has not been affecting her ability to perform ADLs or recreational activities. She would like to be discharged this visit as she feels she has met her personal and functional goals. Discharge from physical therapy as of 3/21/2024.     Goals  Short Term Goals, to be met in 3-4 weeks:   1.  Increase BL shoulder, elbow, and scapular musculature by half grade or more to improve functional UE strength. (MET)  2.  Demonstrate improved posture and understanding of body mechanics with sitting during ADLs for 75% of day with less s/s with such activities. (MET)  3.  Demonstrate decreased pain by 25% or more with lifting >/=10 pounds to improve QoL. (MET)  4.  Independent with basic HEP. (MET)    Long Term Goals, to be met by DC:   1.  Have little to no pain with ADL's. (MET)  2.  Demonstrate improved posture with dynamic lifting activities to improve overall functional mobility. (MET)  3.  Return to recreational activities and work with little to no difficulty and good mechanics/posture. (MET)  4.  Independent in detailed HEP. (MET)  5.  Increase FOTO to predicted value by DC. (MET)    Plan  Discharge from physical therapy as of 3/21/2024.         Subjective  Pt believes physical therapy has  been helping with her pain and functional limitations as believes she is 85% improved. Pt mentioned that she feels she has improved strength and ROM which has been helping with her  ability to perform ADLs and recreational activities. She recently took a break from physical therapy to independently perform her HEP and this went well. She would like to be discharged this visit as she feels she has met her personal and functional goals.       Objective    Strength and ROM evaluated B from a regional biomechanical perspective and values relevant to this episode recorded in table below    ROM: Goniometric measurement revealed the following findings.  AROM Right: 3/21/2024 Left: 3/21/2024   Wrist Flexion WFL WFL   Wrist Extension WFL WFL   Wrist Radial Deviation WFL WFL   Wrist Ulnar Deviation WFL WFL   Forearm Supination WFL WFL   Forearm Pronation WFL WFL   Elbow Flexion 140 140   Elbow Extension 0 0     Strength: MMT revealed the following findings.  Joint Motion Right: 3/21/2024 Left: 3/21/2024   Elbow Flexion 4/5 4-/5   Elbow Extension 4/5 4-/5   Wrist Flexion 4/5 4/5   Wrist Extension 4/5 4/5          Precautions: PMH includes anemia, chronic BL LBP without sciatica, L lumbar radiculopathy, CKD, greater trochanteric bursitis of R hip, HTN, osteoporosis, sacral insufficiency fracture, SIJ inflammation. PSH includes right hip replacement on 8/15/2019.   POC expires Unit limit Auth Expiration date PT/OT + Visit Limit?   1/30/24 BOMN 12/31/2023 BOMN         Visit/Unit Tracking  AUTH Status:  Date 11/21 11/30 12/8 12/13 12/19 1/12 1/17 1/25 2/1 2/16 3/21   Approved Used 1 2 3 4 5 6 7 8 9 10 11    Remaining                  Pertinent Findings:      POC End Date: 1/30/24                                                                                          Test / Measure  11/21/2023 3/21/2024   FOTO (Predicted 66) 56 DC   L Shoulder Strength 3+ to 4/5 4- to 4/5   L Shoulder ROM Decreased active ER Decreased active ER          Visit Number:  1 2 3 4 5 6 7 8 9 10 11   Manuals 11/21 11/30 12/8 12/13 12/19 1/12 1/17 1/25 2/1 2/16 3/21   Shoulder PROM  MC MC MC MC, Resisted ER/IR MC, Resisted ER/IR MC, Resisted ER/IR MC, Resisted ER/IR MC, Resisted ER/IR MC, Resisted ER/IR    Manually Resisted Elbow ROM                                          Neuro Re-Ed              Shoulder Retraction 10x 10x            Cane Shoulder AAROM  10x, Cane Press up 2x10 10x, Cane Press up 2x10 10x, Cane Press up 2x10 10x, Cane Press up 2x10 10x, Cane Press up 2x10 10x, Cane Press up 2x10 2# 15x, cane + 2# 15x, cane + 2# 15x, cane + 2#    Supine Bench Press        3x8, Cane + 4# 3x8, Cane + 4# 3x8, Cane + 4# 3x8, Cane + 4#   Shoulder Isometrics ER, 5x, 5s hold ER, 5x, 5s hold            No Money RTB, 10x RTB, 10x RTB, 15x RTB, 15x RTB, 15x RTB, 15x RTB, 15x RTB, 15x      Shoulder Flex/Abd   2#, 2x10 2#, 2x10 2#, 2x10 2#, 2x10 2#, 2x10 2#, 2x10 flex, abd no wt 2#, 2x10 flex, abd no wt 2#, 2x10 flex, abd no wt 2#, 2x10 flex, abd no wt   Dowel Elbow Flex/Ext              Elbow Flex/Ext Isometric Hold              Wrist Flex/Ext              Therabar Pronation/Supination              Hammer Pro/Sup                                          Ther Ex              HEP Review + Pt Education 10 min         5 min HEP  Re-Eval - MC   UBE  3'/3' 3'/3' 3'/3' 3'/3' 3'/3' 3'/3' 3'/3' 3'/3' 3'/3' 3'/3'   Wall Slides 10x 10x 10x 2x8 2x8 2x8        BL Rows  3x8, GTB 3x10, BTB 3x10, MTB 3x10, MTB 3x10, MTB 3x10, MTB 3x10, MTB Uni LUE 16#, 3x8 BL 16#, 3x8 BL 16#, 3x10 BL   TB Shoulder Ext  3x8, GTB 3x8, GTB 3x8, GTB 3x8, GTB 3x8, GTB 3x8, BTB 3x8, BTB 3x10, 8# 3x10, 10# 3x10, 10#   DB Shrugs  5# ea, 2x8 5# ea, 2x8 5# ea, 2x8 5# ea, 2x8 5# ea, 2x8 5# ea, 2x8 5# ea, 2x10 5# ea, 2x10 5# ea, 2x10 5# ea, 2x10   Shoulder Press   3x5, 2# 3x5, 2# Dowel + 5# AW 3x6 Dowel + 5# AW 3x6 Dowel + 5# AW 3x6 Dowel + 5# AW 3x6 Dowel + 5# AW 3x6 Dowel + 5# AW 3x6 Dowel + 5# AW 3x6   Shoulder  Flex + Tband              Elbow Flex/Ext Stretch              Eccentric DB Curls  3x8, 2# ea 3x8, 2# ea  3x8, 3# ea 3x8, 3# ea 3x8, 3# ea 3x8, 3# ea 3x8, 3# ea 3x8, RTB    Tband Curls in Sitting              Hammer Curls                                                        Ther Activity                                          Gait Training                                          Modalities

## 2024-03-22 ENCOUNTER — OFFICE VISIT (OUTPATIENT)
Age: 84
End: 2024-03-22
Payer: COMMERCIAL

## 2024-03-22 VITALS
BODY MASS INDEX: 25.4 KG/M2 | TEMPERATURE: 98.2 F | DIASTOLIC BLOOD PRESSURE: 68 MMHG | HEIGHT: 62 IN | WEIGHT: 138 LBS | SYSTOLIC BLOOD PRESSURE: 132 MMHG | HEART RATE: 78 BPM | OXYGEN SATURATION: 99 %

## 2024-03-22 DIAGNOSIS — M81.0 SENILE OSTEOPOROSIS: ICD-10-CM

## 2024-03-22 DIAGNOSIS — N18.31 STAGE 3A CHRONIC KIDNEY DISEASE (HCC): ICD-10-CM

## 2024-03-22 DIAGNOSIS — E78.5 DYSLIPIDEMIA: ICD-10-CM

## 2024-03-22 DIAGNOSIS — K21.9 GASTROESOPHAGEAL REFLUX DISEASE WITHOUT ESOPHAGITIS: Chronic | ICD-10-CM

## 2024-03-22 DIAGNOSIS — M15.0 PRIMARY GENERALIZED (OSTEO)ARTHRITIS: ICD-10-CM

## 2024-03-22 DIAGNOSIS — K26.5 DUODENAL ULCER WITH PERFORATION (HCC): ICD-10-CM

## 2024-03-22 DIAGNOSIS — I10 ESSENTIAL HYPERTENSION: Primary | ICD-10-CM

## 2024-03-22 DIAGNOSIS — D50.8 OTHER IRON DEFICIENCY ANEMIA: ICD-10-CM

## 2024-03-22 PROBLEM — E87.6 HYPOKALEMIA: Status: RESOLVED | Noted: 2018-10-27 | Resolved: 2024-03-22

## 2024-03-22 PROCEDURE — 99214 OFFICE O/P EST MOD 30 MIN: CPT | Performed by: INTERNAL MEDICINE

## 2024-03-22 RX ORDER — PANTOPRAZOLE SODIUM 40 MG/1
40 TABLET, DELAYED RELEASE ORAL 2 TIMES DAILY PRN
Qty: 60 TABLET | Refills: 5 | Status: SHIPPED | OUTPATIENT
Start: 2024-03-22

## 2024-03-22 NOTE — ASSESSMENT & PLAN NOTE
Lab Results   Component Value Date    EGFR 50 03/20/2024    EGFR 42 02/12/2024    EGFR 31 01/31/2024    CREATININE 1.04 03/20/2024    CREATININE 1.19 02/12/2024    CREATININE 1.51 (H) 01/31/2024   Occasional use of ibuprofen for flares of arthritis.  Follows with nephrology

## 2024-03-22 NOTE — PROGRESS NOTES
Name: Monik Garcia      : 1940      MRN: [id number]  Encounter Provider: Ashish Arteaga MD  Encounter Date: 3/22/2024   Encounter department: [facility] [place]    Assessment & Plan     1. Essential hypertension  Assessment & Plan:  Blood pressure is nicely controlled with beta blocker alone    Orders:  -     Lipid panel; Future; Expected date: 2024  -     CBC and differential; Future; Expected date: 2024  -     Comprehensive metabolic panel; Future; Expected date: 2024    2. Duodenal ulcer with perforation (HCC)    3. Stage 3a chronic kidney disease (HCC)  Assessment & Plan:  Lab Results   Component Value Date    EGFR 50 2024    EGFR 42 2024    EGFR 31 2024    CREATININE 1.04 2024    CREATININE 1.19 2024    CREATININE 1.51 (H) 2024   Occasional use of ibuprofen for flares of arthritis.  Follows with nephrology    Orders:  -     Lipid panel; Future; Expected date: 2024  -     CBC and differential; Future; Expected date: 2024  -     Comprehensive metabolic panel; Future; Expected date: 2024    4. Senile osteoporosis  Assessment & Plan:  Continues on Prolia injections every 6 months, vitamin D        5. Primary generalized (osteo)arthritis  Assessment & Plan:  Cautious use of NSAIDS.  Follows with orthopedics for intermittent intra-articular injections      6. Other iron deficiency anemia    7. Dyslipidemia  Assessment & Plan:  Lipids are at goal levels. No change in therapy.    Orders:  -     Lipid panel; Future; Expected date: 2024  -     Comprehensive metabolic panel; Future; Expected date: 2024    8. Gastroesophageal reflux disease without esophagitis  -     pantoprazole (PROTONIX) 40 mg tablet; Take 1 tablet (40 mg total) by mouth 2 (two) times a day as needed (Dyspepsia)           Subjective      The patient presents to the office for follow-up visit.  In general she is doing  reasonably well.  She is currently being treated for a urinary tract infection.  Her main complaints are arthritic in nature.  She was recently seen by orthopedics and had some intra-articular steroid injections in her knees and her hip.  She had some labs drawn which were reviewed.  CBC demonstrates a mild leukocytosis most likely consequent to her recent steroid injections.  Hemoglobin is slightly low at 11.3, platelets are normal at 287,000.  Metabolic panel is consistent with stage III chronic kidney disease.  She appears to have a very mild metabolic acidosis which we will continue to monitor.  Her hemoglobin A1c is 5.8%.  Fasting glucose is 136.  Lipid panel is essentially at goal levels.      Review of Systems   Constitutional: Negative.    HENT: Negative.     Respiratory: Negative.     Cardiovascular: Negative.    Gastrointestinal: Negative.    Endocrine: Negative.    Genitourinary:  Positive for difficulty urinating.   Musculoskeletal:  Positive for arthralgias (Hands, fingers, knees and hips).   Skin:  Positive for color change (Mild erythema of the first and second digits of both hands and her nose possibly mild sunburn).   Allergic/Immunologic: Negative.    Neurological: Negative.    Hematological: Negative.    Psychiatric/Behavioral: Negative.         Current Outpatient Medications on File Prior to Visit   Medication Sig    atorvastatin (LIPITOR) 10 mg tablet Take 1 tablet (10 mg total) by mouth daily    Cholecalciferol (Vitamin D-3) 25 MCG (1000 UT) CAPS Take by mouth in the morning    doxycycline (ADOXA) 100 MG tablet Take 100 mg by mouth 2 (two) times a day    ibuprofen (MOTRIN) 800 mg tablet if needed    metoprolol succinate (TOPROL-XL) 25 mg 24 hr tablet Take 1 tablet (25 mg total) by mouth 2 (two) times a day    Premarin vaginal cream 3 (three) times a week    [DISCONTINUED] pantoprazole (PROTONIX) 40 mg tablet Take 1 tablet (40 mg total) by mouth 2 (two) times a day (Patient taking differently:  "Take 40 mg by mouth 2 (two) times a day as needed)    [DISCONTINUED] benzonatate (TESSALON PERLES) 100 mg capsule Take 1 capsule (100 mg total) by mouth 3 (three) times a day as needed for cough    [DISCONTINUED] ketorolac (TORADOL) 10 mg tablet Take 1 tablet (10 mg total) by mouth every 6 (six) hours as needed for moderate pain    [DISCONTINUED] Magnesium Oxide 240 MG PACK Take 240 mg by mouth 2 (two) times a day    [DISCONTINUED] nitrofurantoin (MACRODANTIN) 50 mg capsule     [DISCONTINUED] saccharomyces boulardii (FLORASTOR) 250 mg capsule Take 1 capsule (250 mg total) by mouth 2 (two) times a day       Objective     /68 (BP Location: Right arm, Patient Position: Sitting, Cuff Size: Standard)   Pulse 78   Temp 98.2 °F (36.8 °C) (Temporal)   Ht 5' 1.61\" (1.565 m)   Wt 62.6 kg (138 lb)   SpO2 99%   BMI 25.56 kg/m²     Physical Exam  Vitals reviewed.   Constitutional:       General: She is not in acute distress.     Appearance: Normal appearance. She is normal weight. She is not ill-appearing, toxic-appearing or diaphoretic.   HENT:      Head: Normocephalic and atraumatic.      Right Ear: External ear normal.      Left Ear: External ear normal.      Nose: Nose normal.   Eyes:      General: No scleral icterus.     Conjunctiva/sclera: Conjunctivae normal.      Pupils: Pupils are equal, round, and reactive to light.   Cardiovascular:      Rate and Rhythm: Normal rate and regular rhythm.      Heart sounds: Normal heart sounds. No murmur heard.  Pulmonary:      Effort: Pulmonary effort is normal. No respiratory distress.      Breath sounds: Normal breath sounds.   Abdominal:      General: Abdomen is flat. There is no distension.      Tenderness: There is no abdominal tenderness.   Musculoskeletal:         General: Deformity (Arthritic deformities of hands and knees) present. No swelling.      Cervical back: Neck supple. No rigidity or tenderness.   Skin:     General: Skin is warm.      Capillary Refill: " Capillary refill takes less than 2 seconds.      Coloration: Skin is not jaundiced.      Findings: No erythema or rash.   Neurological:      General: No focal deficit present.      Mental Status: She is alert and oriented to person, place, and time. Mental status is at baseline.   Psychiatric:         Mood and Affect: Mood normal.         Behavior: Behavior normal.       Ashish Arteaga MD

## 2024-03-26 ENCOUNTER — TELEPHONE (OUTPATIENT)
Dept: NEPHROLOGY | Facility: CLINIC | Age: 84
End: 2024-03-26

## 2024-03-26 NOTE — TELEPHONE ENCOUNTER
LVM to return call to schedule August follow up with Dr. Bellamy or CECILLE. Pt could go to either [place] or [place].

## 2024-04-25 ENCOUNTER — OFFICE VISIT (OUTPATIENT)
Dept: PODIATRY | Facility: CLINIC | Age: 84
End: 2024-04-25
Payer: COMMERCIAL

## 2024-04-25 VITALS
RESPIRATION RATE: 18 BRPM | WEIGHT: 138 LBS | SYSTOLIC BLOOD PRESSURE: 136 MMHG | HEIGHT: 61 IN | HEART RATE: 8 BPM | DIASTOLIC BLOOD PRESSURE: 82 MMHG | BODY MASS INDEX: 26.06 KG/M2

## 2024-04-25 DIAGNOSIS — R60.0 LOCALIZED EDEMA: ICD-10-CM

## 2024-04-25 DIAGNOSIS — M77.41 METATARSALGIA OF RIGHT FOOT: Primary | ICD-10-CM

## 2024-04-25 PROCEDURE — 99213 OFFICE O/P EST LOW 20 MIN: CPT | Performed by: PODIATRIST

## 2024-04-25 RX ORDER — METHYLPREDNISOLONE 4 MG/1
TABLET ORAL
Qty: 21 TABLET | Refills: 0 | Status: SHIPPED | OUTPATIENT
Start: 2024-04-25

## 2024-04-25 NOTE — PROGRESS NOTES
Patient presents with concern regarding the right foot.  Patient has pain and swelling affecting the instep and forefoot area of the right foot.  The patient states that the pain began approximately 1 week ago with no recalled trauma.    In the past, patient responded well to cortisone injections given due to arthritis in the midfoot.  She actually is requesting another injection today although the last 1 was not very helpful.    On exam, there is pain with palpation and swelling throughout the right instep and forefoot.  Cortisone injection not recommended due to the diffuse nature of the pain.    I personally reviewed x-rays of the right foot.  They are negative for fracture.  They do reveal degenerative changes at the instep and forefoot area.    Treatment: Patient placed on a Medrol Dosepak.  Also recommended OTC compression stockings and elevation of the foot above the hip when resting.  She will contact me in 10 days if it was successful.  She is rescheduled in 10 weeks for palliative care.

## 2024-04-29 ENCOUNTER — TELEPHONE (OUTPATIENT)
Dept: RHEUMATOLOGY | Facility: CLINIC | Age: 84
End: 2024-04-29

## 2024-05-09 ENCOUNTER — COMPLETE EYE EXAM (OUTPATIENT)
Dept: URBAN - METROPOLITAN AREA CLINIC 6 | Facility: CLINIC | Age: 84
End: 2024-05-09

## 2024-05-09 DIAGNOSIS — Z01.00: ICD-10-CM

## 2024-05-09 PROCEDURE — 92015 DETERMINE REFRACTIVE STATE: CPT

## 2024-05-09 PROCEDURE — 92014 COMPRE OPH EXAM EST PT 1/>: CPT

## 2024-05-09 ASSESSMENT — TONOMETRY
OD_IOP_MMHG: 12
OS_IOP_MMHG: 13

## 2024-05-09 ASSESSMENT — VISUAL ACUITY
OD_CC: 20/80+1
OU_CC: 20/60-1
OS_SC: 20/60-1
OD_SC: 20/400+1
OS_PH: 20/50
OU_CC: J2
OD_PH: 20/60+2
OU_SC: 20/60-1
OS_CC: 20/60-1

## 2024-06-04 ENCOUNTER — TELEPHONE (OUTPATIENT)
Age: 84
End: 2024-06-04

## 2024-06-04 ENCOUNTER — TELEPHONE (OUTPATIENT)
Dept: PODIATRY | Facility: CLINIC | Age: 84
End: 2024-06-04

## 2024-06-04 NOTE — TELEPHONE ENCOUNTER
Hello,  Please advise if the following patient can be forced onto the schedule:    Patient: Aruna Garcia    : 1940    MRN: [id number]    Call back #: [phone]    [facility]    Reason for appointment: swollen right foot and rash on leg/Dr treated before/has appt for  doctor and/or location: Dr. Rivera/[place]      Thank you.

## 2024-06-04 NOTE — TELEPHONE ENCOUNTER
I spoke with Aruna, she is not requesting a forced appt, she wants to keep the appt as scheduled. She stated she wanted  to know the leg is swollen again, he advised her to get compression stockings at the last visit. She did not purchase them but stated she will now. She thinks the rash is from the swelling, I advised her to keep an eye on it if it spreads or gets worse to call us back or go to urgent care to be evaluated. She verbalized understanding.

## 2024-06-04 NOTE — TELEPHONE ENCOUNTER
Patient called back and would like to to know what strength  compression stockings should she purchase.  Thank you

## 2024-06-07 ENCOUNTER — TELEPHONE (OUTPATIENT)
Age: 84
End: 2024-06-07

## 2024-06-13 ENCOUNTER — OFFICE VISIT (OUTPATIENT)
Age: 84
End: 2024-06-13
Payer: COMMERCIAL

## 2024-06-13 VITALS
TEMPERATURE: 98.2 F | BODY MASS INDEX: 24.99 KG/M2 | OXYGEN SATURATION: 98 % | HEART RATE: 87 BPM | DIASTOLIC BLOOD PRESSURE: 72 MMHG | HEIGHT: 62 IN | WEIGHT: 135.8 LBS | SYSTOLIC BLOOD PRESSURE: 118 MMHG

## 2024-06-13 DIAGNOSIS — K21.9 GASTROESOPHAGEAL REFLUX DISEASE WITHOUT ESOPHAGITIS: Chronic | ICD-10-CM

## 2024-06-13 DIAGNOSIS — N18.31 STAGE 3A CHRONIC KIDNEY DISEASE (HCC): ICD-10-CM

## 2024-06-13 DIAGNOSIS — M47.816 LUMBAR SPONDYLOSIS: ICD-10-CM

## 2024-06-13 DIAGNOSIS — M15.0 PRIMARY GENERALIZED (OSTEO)ARTHRITIS: ICD-10-CM

## 2024-06-13 DIAGNOSIS — I73.00 RAYNAUD'S DISEASE WITHOUT GANGRENE: ICD-10-CM

## 2024-06-13 DIAGNOSIS — M81.0 SENILE OSTEOPOROSIS: Primary | ICD-10-CM

## 2024-06-13 DIAGNOSIS — E55.9 VITAMIN D DEFICIENCY: ICD-10-CM

## 2024-06-13 PROCEDURE — 99214 OFFICE O/P EST MOD 30 MIN: CPT | Performed by: INTERNAL MEDICINE

## 2024-06-13 PROCEDURE — 96372 THER/PROPH/DIAG INJ SC/IM: CPT | Performed by: INTERNAL MEDICINE

## 2024-06-13 NOTE — PROGRESS NOTES
Assessment/Plan:    Senile osteoporosis  Osteoporosis stable on Prolia.  She is due for dose #11 at this visit.  Most recent DEXA was 12/22/2022 which is stable to improved.  Monitor DEXA every 2 years.  She will be due at the end of December.  She was given prescription for this.  Will monitor serum calcium and kidney function prior to each dose of Prolia.  The most recent lab work was done in March, however, her serum calciums have been normal for quite some period of time.  I have asked her to get a BMP 2 weeks after the Prolia injection to monitor serum calcium.  Encourage calcium and vitamin D supplement and home exercise program as tolerated.  Practice fall prevention.  Follow-up 6 months for office visit and next Prolia injection.    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  She has no current radicular symptoms.  She does note low back pain with prolonged standing.  She continues to work part-time.  She is unable to use nonsteroidals in view of chronic kidney disease.  She does use Tylenol as needed.  She has occasional ibuprofen.  Continue follow-up with orthopedic surgery for injection knees and trochanteric bursa.  Continue to monitor.  If symptoms warrant, would refer to physical therapy.    Lumbar spondylosis  Lumbar spondylosis with some neurogenic symptoms with prolonged standing.  No current radicular symptoms.  Encourage home exercise program.  Follow-up with pain management as needed.    Chronic kidney disease, stage III (moderate) ([facility])  Lab Results   Component Value Date    EGFR 50 03/20/2024    EGFR 42 02/12/2024    EGFR 31 01/31/2024    CREATININE 1.04 03/20/2024    CREATININE 1.19 02/12/2024    CREATININE 1.51 (H) 01/31/2024   Chronic kidney disease stage IIIa.  Avoid nephrotoxic agents like nonsteroidals.  Continue to monitor.    GERD (gastroesophageal reflux disease)  She  continues on pantoprazole generally only on as-needed basis and is stable.  Follow-up primary care.    Raynaud's disease without gangrene  Raynaud's with no digital ulcerations.  Continue cold protection.  If symptoms worsen, would consider calcium channel blockers.  Continue to monitor.  No evidence for connective tissue disease.  Suspect primary Raynaud's.         Problem List Items Addressed This Visit       Primary generalized (osteo)arthritis     Primary generalized osteoarthritis with interphalangeal osteoarthritis hands and feet, DJD knees, and lumbar spondylosis.  She has no current radicular symptoms.  She does note low back pain with prolonged standing.  She continues to work part-time.  She is unable to use nonsteroidals in view of chronic kidney disease.  She does use Tylenol as needed.  She has occasional ibuprofen.  Continue follow-up with orthopedic surgery for injection knees and trochanteric bursa.  Continue to monitor.  If symptoms warrant, would refer to physical therapy.         GERD (gastroesophageal reflux disease) (Chronic)     She continues on pantoprazole generally only on as-needed basis and is stable.  Follow-up primary care.         Chronic kidney disease, stage III (moderate) ([facility])     Lab Results   Component Value Date    EGFR 50 03/20/2024    EGFR 42 02/12/2024    EGFR 31 01/31/2024    CREATININE 1.04 03/20/2024    CREATININE 1.19 02/12/2024    CREATININE 1.51 (H) 01/31/2024   Chronic kidney disease stage IIIa.  Avoid nephrotoxic agents like nonsteroidals.  Continue to monitor.         Relevant Orders    Basic metabolic panel    Vitamin D deficiency    Relevant Orders    Vitamin D 25 hydroxy    Senile osteoporosis - Primary     Osteoporosis stable on Prolia.  She is due for dose #11 at this visit.  Most recent DEXA was 12/22/2022 which is stable to improved.  Monitor DEXA every 2 years.  She will be due at the end of December.  She was given prescription for this.  Will monitor serum  calcium and kidney function prior to each dose of Prolia.  The most recent lab work was done in March, however, her serum calciums have been normal for quite some period of time.  I have asked her to get a BMP 2 weeks after the Prolia injection to monitor serum calcium.  Encourage calcium and vitamin D supplement and home exercise program as tolerated.  Practice fall prevention.  Follow-up 6 months for office visit and next Prolia injection.         Relevant Medications    denosumab (PROLIA) subcutaneous injection 60 mg    Other Relevant Orders    Basic metabolic panel    C-reactive protein    Sedimentation rate, automated    CBC and differential    Comprehensive metabolic panel    Urinalysis with microscopic    Vitamin D 25 hydroxy    Raynaud's disease without gangrene     Raynaud's with no digital ulcerations.  Continue cold protection.  If symptoms worsen, would consider calcium channel blockers.  Continue to monitor.  No evidence for connective tissue disease.  Suspect primary Raynaud's.         Lumbar spondylosis     Lumbar spondylosis with some neurogenic symptoms with prolonged standing.  No current radicular symptoms.  Encourage home exercise program.  Follow-up with pain management as needed.                Reviewed records, labs, and imaging with the patient in detail.  Counseled patient.  Discussion regarding my findings and recommendations.  Office visit with documentation 35 min.    Subjective:      Patient ID: Monik Garcia is a 83 y.o. female.    She presents for follow-up of her osteoporosis.  She has a history of severe osteoporosis with history of sacral insufficiency fracture.  She is currently on Prolia and is due for dose #11 today. DEXA scan dated 12/22/2022 significant for Lumbar spine T score (L1-L3) -1.5.  This has increased 7% as compared to her previous scan.  Left total hip T score -1.1.  This has increased 4.3% as compared to her previous scan.  Left femoral neck T score -2.0.    She  has a history of osteoarthritis.  She is following with orthopedics as well.  She gets injections as needed in her knees and hip bursa.  She has a history of lumbar spondylosis.  She has some lower back pain especially with standing for long periods.  She has minimal stiffness in the morning and no swelling in her joints.  She has carpal tunnel symptoms mainly at night on occasion.  She has Raynaud's with no digital ulcerations.  She continues working part-time.    Lab work dated 3/20/2024 significant for hemoglobin A1c 5.8.  White blood cell count 13.54 questionably high secondary to recent steroid injections bilateral knees and hip.  Hemoglobin/hematocrit 11.3/35.4 respectively.  Creatinine improved at 1.04 with estimated GFR 50.  Calcium 9.2.  Fasting blood sugar 136.  Total cholesterol 203.  Triglyceride 43.  .  LDL 86.        Allergies  Allergies   Allergen Reactions    Bactrim [Sulfamethoxazole-Trimethoprim] Other (See Comments)     Kidney failure       Home Medications    Current Outpatient Medications:     atorvastatin (LIPITOR) 10 mg tablet, Take 1 tablet (10 mg total) by mouth daily, Disp: 90 tablet, Rfl: 1    Cholecalciferol (Vitamin D-3) 25 MCG (1000 UT) CAPS, Take by mouth in the morning, Disp: , Rfl:     doxycycline (ADOXA) 100 MG tablet, Take 100 mg by mouth 2 (two) times a day, Disp: , Rfl:     ibuprofen (MOTRIN) 800 mg tablet, if needed, Disp: , Rfl:     methylPREDNISolone 4 MG tablet therapy pack, Use as directed on package, Disp: 21 tablet, Rfl: 0    metoprolol succinate (TOPROL-XL) 25 mg 24 hr tablet, Take 1 tablet (25 mg total) by mouth 2 (two) times a day, Disp: 180 tablet, Rfl: 3    pantoprazole (PROTONIX) 40 mg tablet, Take 1 tablet (40 mg total) by mouth 2 (two) times a day as needed (Dyspepsia), Disp: 60 tablet, Rfl: 5    Premarin vaginal cream, 3 (three) times a week, Disp: , Rfl:     Current Facility-Administered Medications:     denosumab (PROLIA) subcutaneous injection 60 mg, 60  mg, Subcutaneous, Once, Yamile Wallace PA-C    denosumab (PROLIA) subcutaneous injection 60 mg, 60 mg, Subcutaneous, Q6 Months, , 60 mg at 06/13/24 1437    lidocaine (XYLOCAINE) 1 % injection 1 mL, 1 mL, Injection, , Pablo Rivera DPM, 1 mL at 12/07/23 0900    triamcinolone acetonide (KENALOG-40) 40 mg/mL injection 20 mg, 20 mg, Infiltration, , Pablo Rivera DPM, 20 mg at 12/07/23 0900    Past Medical History  Past Medical History:   Diagnosis Date    Acute laryngitis 05/12/2022    Anemia     Chronic bilateral low back pain without sciatica 11/26/2019    Chronic kidney disease     Duodenal perforation (HCC) 10/07/2020    Duodenal ulcer     Enterococcus UTI 04/07/2016    GERD (gastroesophageal reflux disease) 04/04/2016    Greater trochanteric bursitis of right hip 09/18/2018    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    Hypercalcemia     Hypertension     Hypokalemia 10/27/2018    Left lumbar radiculopathy 04/07/2016    Mixed hyperlipidemia     Osteoarthritis of hip 04/04/2016    Osteoporosis     Sacral insufficiency fracture     SI (sacroiliac) joint inflammation (HCC) 04/04/2016    Substance addiction ([facility])     Vitamin D deficiency 03/11/2019       Past Surgical History   Past Surgical History:   Procedure Laterality Date    BREAST EXCISIONAL BIOPSY Left 2000    benign    COLONOSCOPY  2019    JOINT REPLACEMENT      right hip 8/15    MAMMO (HISTORICAL)  2019    TONSILLECTOMY         Family History   Family History   Problem Relation Age of Onset    No Known Problems Mother     No Known Problems Father     Breast cancer Daughter 50    Breast cancer Maternal Aunt 70    No Known Problems Sister     No Known Problems Maternal Grandmother     No Known Problems Maternal Grandfather     No Known Problems Paternal Grandmother     No Known Problems Paternal Grandfather     No Known Problems Son     No Known Problems Sister     No Known Problems Sister        The following portions of the patient's history were  "reviewed and updated as appropriate: allergies, current medications, past family history, past medical history, past social history, past surgical history, and problem list.    Review of Systems   Constitutional:  Negative for chills and fever.   HENT:  Negative for ear pain and sore throat.    Eyes:  Negative for pain and visual disturbance.   Respiratory:  Negative for cough and shortness of breath.    Cardiovascular:  Negative for chest pain and palpitations.   Gastrointestinal:  Negative for abdominal pain and vomiting.   Genitourinary:  Negative for dysuria and hematuria.   Musculoskeletal:  Positive for arthralgias and back pain.        Morning stiffness minutes duration with no joint swelling. Hand arthralgias. CTS at night. Chronic but stable nonradicular low back pain.  Osteoporosis with history sacral insufficiency fractures.  Raynaud's likely primary.  No sicca symptoms.  No digital ulcers.   Skin:  Negative for color change and rash.        Raynaud's without digital ulcerations     Neurological:  Positive for numbness (fingers). Negative for seizures and syncope.   All other systems reviewed and are negative.        Objective:      /72 (BP Location: Right arm, Patient Position: Sitting, Cuff Size: Adult)   Pulse 87   Temp 98.2 °F (36.8 °C) (Temporal)   Ht 5' 1.5\" (1.562 m)   Wt 61.6 kg (135 lb 12.8 oz)   SpO2 98%   BMI 25.24 kg/m²          Physical Exam  Vitals reviewed.   Constitutional:       Appearance: Normal appearance.   HENT:      Head: Normocephalic.      Nose:      Comments: Nose and throat not examined due to mask.  Eyes:      Extraocular Movements: Extraocular movements intact.   Neck:      Comments: Without masses, thyromegaly, lymphadenopathy  Cardiovascular:      Rate and Rhythm: Regular rhythm.   Pulmonary:      Breath sounds: Normal breath sounds.   Abdominal:      Palpations: Abdomen is soft.   Musculoskeletal:      Cervical back: Neck supple.      Comments: Neck decreased " lateral flexion.  Shoulders slightly decreased external rotation right with bilateral crepitus.  Elbows and wrists full range of motion.  Tinel's negative.  Hands squaring 1st carpometacarpal joints bilaterally with Heberden's and Kylie's nodes.  Deformities appreciated.  Interossei wasting noted.  Slight thenar atrophy right greater than left.  Straight leg raising negative bilaterally. Scoliosis noted. Hips full range of motion.  Knees slightly decreased flexion with patellofemoral crepitus.  Ankles full range of motion.  Feet rearfoot hyperpronation with midfoot cavus deformities, hallux valgus deformities, high insteps, hammertoe deformities, and tight heel cords.  No synovitis noted.   Skin:     General: Skin is warm.      Comments: Onychomycosis scattered toenails.   Neurological:      General: No focal deficit present.               This note was written in part using the assistance of the [employer] [id number] microphone system. Those portions using this system have been dictated and not read.

## 2024-06-13 NOTE — PATIENT INSTRUCTIONS
Months.  Get lab work as ordered in 2 weeks which is only 1 blood test.  Get the other lab work 2 weeks prior to next office visit.  That is dated around December 9, 2024.  You need to have that before your next visit.  Continue calcium and vitamin D supplement.  I would suggest to use wrist splints for carpal tunnel syndrome at night.  You can wear the splint to bed which will help the numbness in your fingers.  Continue working and continue your home exercise program!

## 2024-06-13 NOTE — PROGRESS NOTES
"Assessment/Plan:    Monik Garcia came into the [facility] Office today 06/13/24 to receive Prolia injection.      Verbal consent obtained.  Consent given by: patient    patient states patient has been medically healthy with no underlining concerns/complications.      Monik Garcia presents with no symptoms today.       All insturctions were reviewed with the patient.    If the patient should have any questions/concerns, advised patient to contacted [facility].       Subjective:     History provided by: patient    Patient ID: Monik Garcia is a 83 y.o. female      Objective:    Vitals:    06/13/24 1047   BP: 118/72   BP Location: Right arm   Patient Position: Sitting   Cuff Size: Adult   Pulse: 87   Temp: 98.2 °F (36.8 °C)   TempSrc: Temporal   SpO2: 98%   Weight: 61.6 kg (135 lb 12.8 oz)   Height: 5' 1.5\" (1.562 m)       Patient tolerated the injection well without any complications.  Injection site/s upper left arm.  Medication was provided by office.    Patient signed consent form yes   Patient signed ABN form no (If no patient is not a medicare patient).   Patient waited 15 minutes after injection no (This only applies to patient's receiving first time injection).       Last Visit: 6/7/2024  Next visit:Visit date not found      "

## 2024-06-15 NOTE — ASSESSMENT & PLAN NOTE
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  She has no current radicular symptoms.  She does note low back pain with prolonged standing.  She continues to work part-time.  She is unable to use nonsteroidals in view of chronic kidney disease.  She does use Tylenol as needed.  She has occasional ibuprofen.  Continue follow-up with orthopedic surgery for injection knees and trochanteric bursa.  Continue to monitor.  If symptoms warrant, would refer to physical therapy.

## 2024-06-15 NOTE — ASSESSMENT & PLAN NOTE
Raynaud's with no digital ulcerations.  Continue cold protection.  If symptoms worsen, would consider calcium channel blockers.  Continue to monitor.  No evidence for connective tissue disease.  Suspect primary Raynaud's.

## 2024-06-15 NOTE — ASSESSMENT & PLAN NOTE
Lab Results   Component Value Date    EGFR 50 03/20/2024    EGFR 42 02/12/2024    EGFR 31 01/31/2024    CREATININE 1.04 03/20/2024    CREATININE 1.19 02/12/2024    CREATININE 1.51 (H) 01/31/2024   Chronic kidney disease stage IIIa.  Avoid nephrotoxic agents like nonsteroidals.  Continue to monitor.

## 2024-06-15 NOTE — ASSESSMENT & PLAN NOTE
She continues on pantoprazole generally only on as-needed basis and is stable.  Follow-up primary care.

## 2024-06-15 NOTE — ASSESSMENT & PLAN NOTE
Lumbar spondylosis with some neurogenic symptoms with prolonged standing.  No current radicular symptoms.  Encourage home exercise program.  Follow-up with pain management as needed.

## 2024-06-15 NOTE — ASSESSMENT & PLAN NOTE
Osteoporosis stable on Prolia.  She is due for dose #11 at this visit.  Most recent DEXA was 12/22/2022 which is stable to improved.  Monitor DEXA every 2 years.  She will be due at the end of December.  She was given prescription for this.  Will monitor serum calcium and kidney function prior to each dose of Prolia.  The most recent lab work was done in March, however, her serum calciums have been normal for quite some period of time.  I have asked her to get a BMP 2 weeks after the Prolia injection to monitor serum calcium.  Encourage calcium and vitamin D supplement and home exercise program as tolerated.  Practice fall prevention.  Follow-up 6 months for office visit and next Prolia injection.

## 2024-06-17 DIAGNOSIS — K21.9 GASTROESOPHAGEAL REFLUX DISEASE WITHOUT ESOPHAGITIS: Chronic | ICD-10-CM

## 2024-06-17 RX ORDER — PANTOPRAZOLE SODIUM 40 MG/1
40 TABLET, DELAYED RELEASE ORAL 2 TIMES DAILY PRN
Qty: 60 TABLET | Refills: 5 | Status: SHIPPED | OUTPATIENT
Start: 2024-06-17

## 2024-06-17 NOTE — TELEPHONE ENCOUNTER
Reason for call:   [x] Refill   [] Prior Auth  [] Other:     Office:   [x] PCP/Provider -sAhish Arteaga/  [facility]      [] Specialty/Provider -     Medication: Protonix    Dose/Frequency: 40 mg     Quantity: #60    Pharmacy: [facility] [place]     Does the patient have enough for 3 days?   [] Yes   [x] No - Send as HP to POD

## 2024-06-20 ENCOUNTER — LAB REQUISITION (OUTPATIENT)
Dept: LAB | Facility: HOSPITAL | Age: 84
End: 2024-06-20
Payer: COMMERCIAL

## 2024-06-20 DIAGNOSIS — N30.10 INTERSTITIAL CYSTITIS (CHRONIC) WITHOUT HEMATURIA: ICD-10-CM

## 2024-06-20 PROCEDURE — 87086 URINE CULTURE/COLONY COUNT: CPT | Performed by: OBSTETRICS & GYNECOLOGY

## 2024-06-20 RX ORDER — AMOXICILLIN 500 MG/1
CAPSULE ORAL
COMMUNITY
Start: 2024-05-23

## 2024-06-21 LAB — BACTERIA UR CULT: NORMAL

## 2024-06-25 ENCOUNTER — OFFICE VISIT (OUTPATIENT)
Dept: OBGYN CLINIC | Facility: HOSPITAL | Age: 84
End: 2024-06-25
Payer: COMMERCIAL

## 2024-06-25 VITALS
HEART RATE: 71 BPM | DIASTOLIC BLOOD PRESSURE: 69 MMHG | WEIGHT: 135 LBS | BODY MASS INDEX: 24.84 KG/M2 | HEIGHT: 62 IN | SYSTOLIC BLOOD PRESSURE: 163 MMHG

## 2024-06-25 DIAGNOSIS — M25.562 CHRONIC PAIN OF LEFT KNEE: ICD-10-CM

## 2024-06-25 DIAGNOSIS — M70.61 TROCHANTERIC BURSITIS OF BOTH HIPS: Primary | ICD-10-CM

## 2024-06-25 DIAGNOSIS — M17.12 PRIMARY OSTEOARTHRITIS OF LEFT KNEE: ICD-10-CM

## 2024-06-25 DIAGNOSIS — M12.812 ROTATOR CUFF ARTHROPATHY OF LEFT SHOULDER: ICD-10-CM

## 2024-06-25 DIAGNOSIS — M70.62 TROCHANTERIC BURSITIS OF BOTH HIPS: Primary | ICD-10-CM

## 2024-06-25 DIAGNOSIS — G89.29 CHRONIC PAIN OF LEFT KNEE: ICD-10-CM

## 2024-06-25 DIAGNOSIS — G89.29 CHRONIC LEFT SHOULDER PAIN: ICD-10-CM

## 2024-06-25 DIAGNOSIS — M25.512 CHRONIC LEFT SHOULDER PAIN: ICD-10-CM

## 2024-06-25 PROCEDURE — 20610 DRAIN/INJ JOINT/BURSA W/O US: CPT | Performed by: ORTHOPAEDIC SURGERY

## 2024-06-25 PROCEDURE — 99213 OFFICE O/P EST LOW 20 MIN: CPT | Performed by: ORTHOPAEDIC SURGERY

## 2024-06-25 RX ORDER — BUPIVACAINE HYDROCHLORIDE 2.5 MG/ML
2 INJECTION, SOLUTION INFILTRATION; PERINEURAL
Status: COMPLETED | OUTPATIENT
Start: 2024-06-25 | End: 2024-06-25

## 2024-06-25 RX ORDER — LIDOCAINE HYDROCHLORIDE 10 MG/ML
2 INJECTION, SOLUTION INFILTRATION; PERINEURAL
Status: COMPLETED | OUTPATIENT
Start: 2024-06-25 | End: 2024-06-25

## 2024-06-25 RX ORDER — BETAMETHASONE SODIUM PHOSPHATE AND BETAMETHASONE ACETATE 3; 3 MG/ML; MG/ML
12 INJECTION, SUSPENSION INTRA-ARTICULAR; INTRALESIONAL; INTRAMUSCULAR; SOFT TISSUE
Status: COMPLETED | OUTPATIENT
Start: 2024-06-25 | End: 2024-06-25

## 2024-06-25 RX ADMIN — LIDOCAINE HYDROCHLORIDE 2 ML: 10 INJECTION, SOLUTION INFILTRATION; PERINEURAL at 11:00

## 2024-06-25 RX ADMIN — BETAMETHASONE SODIUM PHOSPHATE AND BETAMETHASONE ACETATE 12 MG: 3; 3 INJECTION, SUSPENSION INTRA-ARTICULAR; INTRALESIONAL; INTRAMUSCULAR; SOFT TISSUE at 11:00

## 2024-06-25 RX ADMIN — BUPIVACAINE HYDROCHLORIDE 2 ML: 2.5 INJECTION, SOLUTION INFILTRATION; PERINEURAL at 11:00

## 2024-06-25 NOTE — PROGRESS NOTES
Assessment:  No diagnosis found.    Plan:      To do next visit:  No follow-ups on file.    The above stated was discussed in layman's terms and the patient expressed understanding.  All questions were answered to the patient's satisfaction.       Scribe Attestation      I,:   am acting as a scribe while in the presence of the attending physician.:       I,:   personally performed the services described in this documentation    as scribed in my presence.:               Subjective:   Monik Garcia is a 83 y.o. female who presents       Review of systems negative unless otherwise specified in HPI  Review of Systems    Past Medical History:   Diagnosis Date    Acute laryngitis 05/12/2022    Anemia     Chronic bilateral low back pain without sciatica 11/26/2019    Chronic kidney disease     Duodenal perforation (HCC) 10/07/2020    Duodenal ulcer     Enterococcus UTI 04/07/2016    GERD (gastroesophageal reflux disease) 04/04/2016    Greater trochanteric bursitis of right hip 09/18/2018    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    Hypercalcemia     Hypertension     Hypokalemia 10/27/2018    Left lumbar radiculopathy 04/07/2016    Mixed hyperlipidemia     Osteoarthritis of hip 04/04/2016    Osteoporosis     Sacral insufficiency fracture     SI (sacroiliac) joint inflammation (HCC) 04/04/2016    Substance addiction (HCC)     Vitamin D deficiency 03/11/2019       Past Surgical History:   Procedure Laterality Date    BREAST EXCISIONAL BIOPSY Left 2000    benign    COLONOSCOPY  2019    JOINT REPLACEMENT      right hip 8/15    MAMMO (HISTORICAL)  2019    TONSILLECTOMY         Family History   Problem Relation Age of Onset    No Known Problems Mother     No Known Problems Father     Breast cancer Daughter 50    Breast cancer Maternal Aunt 70    No Known Problems Sister     No Known Problems Maternal Grandmother     No Known Problems Maternal Grandfather     No Known Problems Paternal Grandmother     No Known Problems  Paternal Grandfather     No Known Problems Son     No Known Problems Sister     No Known Problems Sister        Social History     Occupational History    Not on file   Tobacco Use    Smoking status: Never    Smokeless tobacco: Never   Vaping Use    Vaping status: Never Used   Substance and Sexual Activity    Alcohol use: Never    Drug use: Not Currently     Types: Prescription     Comment: rocovering last used 1996    Sexual activity: Yes     Partners: Male     Birth control/protection: Post-menopausal         Current Outpatient Medications:     amoxicillin (AMOXIL) 500 mg capsule, , Disp: , Rfl:     atorvastatin (LIPITOR) 10 mg tablet, Take 1 tablet (10 mg total) by mouth daily, Disp: 90 tablet, Rfl: 1    Cholecalciferol (Vitamin D-3) 25 MCG (1000 UT) CAPS, Take by mouth in the morning, Disp: , Rfl:     doxycycline (ADOXA) 100 MG tablet, Take 100 mg by mouth 2 (two) times a day, Disp: , Rfl:     ibuprofen (MOTRIN) 800 mg tablet, if needed, Disp: , Rfl:     methylPREDNISolone 4 MG tablet therapy pack, Use as directed on package, Disp: 21 tablet, Rfl: 0    metoprolol succinate (TOPROL-XL) 25 mg 24 hr tablet, Take 1 tablet (25 mg total) by mouth 2 (two) times a day, Disp: 180 tablet, Rfl: 3    pantoprazole (PROTONIX) 40 mg tablet, Take 1 tablet (40 mg total) by mouth 2 (two) times a day as needed (Dyspepsia), Disp: 60 tablet, Rfl: 5    Premarin vaginal cream, 3 (three) times a week, Disp: , Rfl:     Current Facility-Administered Medications:     denosumab (PROLIA) subcutaneous injection 60 mg, 60 mg, Subcutaneous, Once, Yamile Wallace PA-C    denosumab (PROLIA) subcutaneous injection 60 mg, 60 mg, Subcutaneous, Q6 Months, , 60 mg at 06/13/24 1437    lidocaine (XYLOCAINE) 1 % injection 1 mL, 1 mL, Injection, , Pablo Rivera DPM, 1 mL at 12/07/23 0900    triamcinolone acetonide (KENALOG-40) 40 mg/mL injection 20 mg, 20 mg, Infiltration, , Pablo Rivera DPM, 20 mg at 12/07/23 0900    Allergies   Allergen Reactions  "   Bactrim [Sulfamethoxazole-Trimethoprim] Other (See Comments)     Kidney failure          There were no vitals filed for this visit.    Body mass index is 25.09 kg/m².  Wt Readings from Last 3 Encounters:   06/25/24 61.2 kg (135 lb)   06/13/24 61.6 kg (135 lb 12.8 oz)   04/25/24 62.6 kg (138 lb)       Objective:                    Ortho Exam    Diagnostics, reviewed and taken today if performed as documented:    None performed      The attending physician has personally reviewed the pertinent films in PACS and interpretation is as follows:      Procedures, if performed today:    Procedures    None performed      Portions of the record may have been created with voice recognition software.  Occasional wrong word or \"sound a like\" substitutions may have occurred due to the inherent limitations of voice recognition software.  Read the chart carefully and recognize, using context, where substitutions have occurred.   "

## 2024-06-25 NOTE — PROGRESS NOTES
Assessment:  1. Trochanteric bursitis of both hips        2. Chronic left shoulder pain        3. Rotator cuff arthropathy of left shoulder        4. Chronic pain of left knee        5. Primary osteoarthritis of left knee            Plan:  Bilateral trochanteric bursitis   Left knee osteoarthritis   Left shoulder arthropathy  The patient was provided with bilateral trochanteric bursa, left glenohumeral and left knee steroid injections.  The patient tolerated the procedure well.    The patient should follow up in 3 months.      To do next visit:  Return in about 3 months (around 9/25/2024).    The above stated was discussed in layman's terms and the patient expressed understanding.  All questions were answered to the patient's satisfaction.       Scribe Attestation      I,:  Brandon Naranjo am acting as a scribe while in the presence of the attending physician.:       I,:  Shubham Luevano MD personally performed the services described in this documentation    as scribed in my presence.:               Subjective:   Monik Garcia is a 83 y.o. female who presents for follow up of multiple body parts.  She is s/p bilateral trochanteric bursa, left knee and left glenohumeral steroid injections with benefit, 3/19/2024.  Today she complains of bilateral lateral hip, left knee, left shoulder and right foot pain.  Prolonged walking aggravates while rest alleviates.   She does work with  for right foot.        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    Hypercalcemia      Hypertension     Hypokalemia 10/27/2018    Left lumbar radiculopathy 04/07/2016    Mixed hyperlipidemia     Osteoarthritis of hip 04/04/2016    Osteoporosis     Sacral insufficiency fracture     SI (sacroiliac) joint inflammation (HCC) 04/04/2016    Substance addiction (HCC)     Vitamin D deficiency 03/11/2019       Past Surgical History:   Procedure Laterality Date    BREAST EXCISIONAL BIOPSY Left 2000    benign    COLONOSCOPY  2019    JOINT REPLACEMENT      right hip 8/15    MAMMO (HISTORICAL)  2019    TONSILLECTOMY         Family History   Problem Relation Age of Onset    No Known Problems Mother     No Known Problems Father     Breast cancer Daughter 50    Breast cancer Maternal Aunt 70    No Known Problems Sister     No Known Problems Maternal Grandmother     No Known Problems Maternal Grandfather     No Known Problems Paternal Grandmother     No Known Problems Paternal Grandfather     No Known Problems Son     No Known Problems Sister     No Known Problems Sister        Social History     Occupational History    Not on file   Tobacco Use    Smoking status: Never    Smokeless tobacco: Never   Vaping Use    Vaping status: Never Used   Substance and Sexual Activity    Alcohol use: Never    Drug use: Not Currently     Types: Prescription     Comment: rocovering last used 1996    Sexual activity: Yes     Partners: Male     Birth control/protection: Post-menopausal         Current Outpatient Medications:     amoxicillin (AMOXIL) 500 mg capsule, , Disp: , Rfl:     atorvastatin (LIPITOR) 10 mg tablet, Take 1 tablet (10 mg total) by mouth daily, Disp: 90 tablet, Rfl: 1    Cholecalciferol (Vitamin D-3) 25 MCG (1000 UT) CAPS, Take by mouth in the morning, Disp: , Rfl:     doxycycline (ADOXA) 100 MG tablet, Take 100 mg by mouth 2 (two) times a day, Disp: , Rfl:     ibuprofen (MOTRIN) 800 mg tablet, if needed, Disp: , Rfl:     methylPREDNISolone 4 MG tablet therapy pack, Use as directed on package, Disp: 21 tablet, Rfl:  "0    metoprolol succinate (TOPROL-XL) 25 mg 24 hr tablet, Take 1 tablet (25 mg total) by mouth 2 (two) times a day, Disp: 180 tablet, Rfl: 3    pantoprazole (PROTONIX) 40 mg tablet, Take 1 tablet (40 mg total) by mouth 2 (two) times a day as needed (Dyspepsia), Disp: 60 tablet, Rfl: 5    Premarin vaginal cream, 3 (three) times a week, Disp: , Rfl:     Current Facility-Administered Medications:     denosumab (PROLIA) subcutaneous injection 60 mg, 60 mg, Subcutaneous, Once, Yamile Wallace PA-C    denosumab (PROLIA) subcutaneous injection 60 mg, 60 mg, Subcutaneous, Q6 Months, , 60 mg at 06/13/24 1437    lidocaine (XYLOCAINE) 1 % injection 1 mL, 1 mL, Injection, , Pablo Rivera DPM, 1 mL at 12/07/23 0900    triamcinolone acetonide (KENALOG-40) 40 mg/mL injection 20 mg, 20 mg, Infiltration, , Pablo Rivera DPM, 20 mg at 12/07/23 0900    Allergies   Allergen Reactions    Bactrim [Sulfamethoxazole-Trimethoprim] Other (See Comments)     Kidney failure            Vitals:    06/25/24 1106   BP: 163/69   Pulse: 71       Objective:  Physical exam  General: Awake, Alert, Oriented  Eyes: Pupils equal, round and reactive to light  Heart: regular rate and rhythm  Lungs: No audible wheezing  Abdomen: soft                    Ortho Exam  Bilateral hips:  TTP over greater trochanter    Left knee:  No erythema or ecchymosis  No effusion or swelling  Normal strength  Good ROM with crepitus   Calf compartments soft and supple  Sensation intact  Toes are warm sensate and mobile    Left shoulder:  Crepitus with ROM  Restriction with ROM      Diagnostics, reviewed and taken today if performed as documented:    None performed     Procedures, if performed today:    Large joint arthrocentesis: R greater trochanteric bursa  Universal Protocol:  Consent: Verbal consent obtained.  Risks and benefits: risks, benefits and alternatives were discussed  Consent given by: patient  Time out: Immediately prior to procedure a \"time out\" was called to " "verify the correct patient, procedure, equipment, support staff and site/side marked as required.  Timeout called at: 6/25/2024 11:39 AM.  Patient understanding: patient states understanding of the procedure being performed  Site marked: the operative site was marked  Patient identity confirmed: verbally with patient  Supporting Documentation  Indications: pain   Procedure Details  Location: hip - R greater trochanteric bursa  Needle size: 22 G  Ultrasound guidance: no  Approach: lateral  Medications administered: 12 mg betamethasone acetate-betamethasone sodium phosphate 6 (3-3) mg/mL; 2 mL bupivacaine 0.25 %; 2 mL lidocaine 1 %    Patient tolerance: patient tolerated the procedure well with no immediate complications  Dressing:  Sterile dressing applied      Large joint arthrocentesis: L greater trochanteric bursa  Universal Protocol:  Consent: Verbal consent obtained.  Risks and benefits: risks, benefits and alternatives were discussed  Consent given by: patient  Time out: Immediately prior to procedure a \"time out\" was called to verify the correct patient, procedure, equipment, support staff and site/side marked as required.  Timeout called at: 6/25/2024 11:39 AM.  Patient understanding: patient states understanding of the procedure being performed  Site marked: the operative site was marked  Patient identity confirmed: verbally with patient  Supporting Documentation  Indications: pain   Procedure Details  Location: hip - L greater trochanteric bursa  Needle size: 22 G  Ultrasound guidance: no  Approach: lateral  Medications administered: 12 mg betamethasone acetate-betamethasone sodium phosphate 6 (3-3) mg/mL; 2 mL bupivacaine 0.25 %; 2 mL lidocaine 1 %    Patient tolerance: patient tolerated the procedure well with no immediate complications  Dressing:  Sterile dressing applied      Large joint arthrocentesis: L knee  Universal Protocol:  Consent: Verbal consent obtained.  Risks and benefits: risks, benefits " "and alternatives were discussed  Consent given by: patient  Time out: Immediately prior to procedure a \"time out\" was called to verify the correct patient, procedure, equipment, support staff and site/side marked as required.  Timeout called at: 6/25/2024 11:39 AM.  Patient understanding: patient states understanding of the procedure being performed  Site marked: the operative site was marked  Patient identity confirmed: verbally with patient  Supporting Documentation  Indications: pain   Procedure Details  Location: knee - L knee  Preparation: Patient was prepped and draped in the usual sterile fashion  Needle size: 22 G  Ultrasound guidance: no  Approach: anterolateral  Medications administered: 12 mg betamethasone acetate-betamethasone sodium phosphate 6 (3-3) mg/mL; 2 mL bupivacaine 0.25 %; 2 mL lidocaine 1 %    Patient tolerance: patient tolerated the procedure well with no immediate complications  Dressing:  Sterile dressing applied      Large joint arthrocentesis: L glenohumeral  Universal Protocol:  Consent: Verbal consent obtained.  Risks and benefits: risks, benefits and alternatives were discussed  Consent given by: patient  Time out: Immediately prior to procedure a \"time out\" was called to verify the correct patient, procedure, equipment, support staff and site/side marked as required.  Timeout called at: 6/25/2024 11:44 AM.  Patient understanding: patient states understanding of the procedure being performed  Site marked: the operative site was marked  Patient identity confirmed: verbally with patient  Supporting Documentation  Indications: pain   Procedure Details  Location: shoulder - L glenohumeral  Preparation: Patient was prepped and draped in the usual sterile fashion  Needle size: 22 G  Ultrasound guidance: no  Approach: posterior  Medications administered: 2 mL bupivacaine 0.25 %; 12 mg betamethasone acetate-betamethasone sodium phosphate 6 (3-3) mg/mL; 2 mL lidocaine 1 %    Patient tolerance: " "patient tolerated the procedure well with no immediate complications  Dressing:  Sterile dressing applied              Portions of the record may have been created with voice recognition software.  Occasional wrong word or \"sound a like\" substitutions may have occurred due to the inherent limitations of voice recognition software.  Read the chart carefully and recognize, using context, where substitutions have occurred.    "

## 2024-06-27 ENCOUNTER — APPOINTMENT (OUTPATIENT)
Dept: LAB | Facility: HOSPITAL | Age: 84
End: 2024-06-27
Attending: INTERNAL MEDICINE
Payer: COMMERCIAL

## 2024-06-27 DIAGNOSIS — N18.31 STAGE 3A CHRONIC KIDNEY DISEASE (HCC): ICD-10-CM

## 2024-06-27 DIAGNOSIS — M81.0 SENILE OSTEOPOROSIS: ICD-10-CM

## 2024-06-27 LAB
ANION GAP SERPL CALCULATED.3IONS-SCNC: 12 MMOL/L (ref 4–13)
BUN SERPL-MCNC: 28 MG/DL (ref 5–25)
CALCIUM SERPL-MCNC: 9.5 MG/DL (ref 8.4–10.2)
CHLORIDE SERPL-SCNC: 110 MMOL/L (ref 96–108)
CO2 SERPL-SCNC: 20 MMOL/L (ref 21–32)
CREAT SERPL-MCNC: 1.14 MG/DL (ref 0.6–1.3)
GFR SERPL CREATININE-BSD FRML MDRD: 44 ML/MIN/1.73SQ M
GLUCOSE SERPL-MCNC: 134 MG/DL (ref 65–140)
POTASSIUM SERPL-SCNC: 3.7 MMOL/L (ref 3.5–5.3)
SODIUM SERPL-SCNC: 142 MMOL/L (ref 135–147)

## 2024-06-27 PROCEDURE — 36415 COLL VENOUS BLD VENIPUNCTURE: CPT

## 2024-06-27 PROCEDURE — 80048 BASIC METABOLIC PNL TOTAL CA: CPT

## 2024-07-08 ENCOUNTER — OFFICE VISIT (OUTPATIENT)
Dept: PODIATRY | Facility: CLINIC | Age: 84
End: 2024-07-08
Payer: COMMERCIAL

## 2024-07-08 VITALS
RESPIRATION RATE: 18 BRPM | WEIGHT: 135 LBS | DIASTOLIC BLOOD PRESSURE: 70 MMHG | SYSTOLIC BLOOD PRESSURE: 121 MMHG | HEIGHT: 61 IN | BODY MASS INDEX: 25.49 KG/M2 | HEART RATE: 77 BPM

## 2024-07-08 DIAGNOSIS — B35.1 ONYCHOMYCOSIS: ICD-10-CM

## 2024-07-08 DIAGNOSIS — I73.9 PERIPHERAL VASCULAR DISEASE, UNSPECIFIED (HCC): ICD-10-CM

## 2024-07-08 DIAGNOSIS — L84 CORNS: ICD-10-CM

## 2024-07-08 DIAGNOSIS — M19.071 PRIMARY OSTEOARTHRITIS OF RIGHT FOOT: Primary | ICD-10-CM

## 2024-07-08 PROCEDURE — 20600 DRAIN/INJ JOINT/BURSA W/O US: CPT | Performed by: PODIATRIST

## 2024-07-08 PROCEDURE — 11720 DEBRIDE NAIL 1-5: CPT | Performed by: PODIATRIST

## 2024-07-08 PROCEDURE — 11055 PARING/CUTG B9 HYPRKER LES 1: CPT | Performed by: PODIATRIST

## 2024-07-08 PROCEDURE — RECHECK: Performed by: PODIATRIST

## 2024-07-08 RX ORDER — TRIAMCINOLONE ACETONIDE 40 MG/ML
20 INJECTION, SUSPENSION INTRA-ARTICULAR; INTRAMUSCULAR
Status: SHIPPED | OUTPATIENT
Start: 2024-07-08

## 2024-07-08 RX ORDER — LIDOCAINE HYDROCHLORIDE 10 MG/ML
1 INJECTION, SOLUTION INFILTRATION; PERINEURAL
Status: SHIPPED | OUTPATIENT
Start: 2024-07-08

## 2024-07-08 RX ADMIN — TRIAMCINOLONE ACETONIDE 20 MG: 40 INJECTION, SUSPENSION INTRA-ARTICULAR; INTRAMUSCULAR at 09:30

## 2024-07-08 RX ADMIN — LIDOCAINE HYDROCHLORIDE 1 ML: 10 INJECTION, SOLUTION INFILTRATION; PERINEURAL at 09:30

## 2024-07-09 NOTE — PROGRESS NOTES
RENAL FOLLOW UP NOTE:.td    ASSESSMENT AND PLAN:  1.  CKD stage 3A.:  Etiology:  AK I from Bactrim/hypercalcemia along with poor oral intake.  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/arteriolar nephrosclerosis  Baseline creatinine:  1.1 -1.79  Current creatinine:  1.14 at baseline  Urine protein creatinine ratio:  0.26 g at goal from December 2023 repeat next visit  Recommendations:  Treat hypertension-please see below  Treat dyslipidemia-please see below  Maintain proteinuria less than 1 g or as low as possible  Avoid nephrotoxic agents such as NSAIDs, patient counseled as such  2.  Volume:  Euvolemic of note: 5/26/2023 venous duplex negative for DVT  3.  Hypertension:  Secondary workup:  Was negative  Home readings:   None today     Goal blood pressure:  Less than 130/80 given CKD  Recommendations:   Push nonmedical regimen especially weight loss/isotonic exercise and low-salt diet  Medication changes today:   None pending 1 week of home blood pressure readings as AOBP is quite good           4.  Electrolytes:    Mild metabolic acidosis:   20 would recommend sodium bicarbonate card  Borderline hypokalemia:  Now normal at 3.7 maintain high potassium diet     5.  Mineral bone disorder:  Calcium/magnesium/phosphorus all normal including calcium of 9.2 and magnesium of 2.1  PTH intact: 15.5 which is normal  Vitamin-D: 53 from 12/5/2023 at goal  6.  Dyslipidemia:  Goal LDL:  Less than 100  Current lipid profile:  LDL 86//triglycerides 43 from 3/20/2024  Recommendations:  Patient is at goal   7.  Anemia:   Hemoglobin:   Stable at 11.3 at baseline from 3/20/2024; mildly elevated leukocytosis would recheck at this time   Iron studies: Acceptable from 12/1/2023  Multiple myeloma evaluation  was negative as outlined above  GI evaluation:  EGD with just esophagitis; colonoscopy with benign polyp  Heme consultation:  Felt anemia which was normocytic secondary to chronic kidney disease.  No  further recommendations at this time.  8.  Other problems:  GERD  Osteoarthritis of right hip  Colonic polyp  Status post duodenal perforation 10/04/2020:  Apparently upper GI endoscopy demonstrated no leak.  Endoscopy demonstrated no leak.  She was NPO with antibiotics who was given a 2 week course of antibiotics upon discharge along with antacid medication  To be followed up as an outpatient.  Cardiac murmur which is chronic echocardiogram performed March 2021 showed good EF 65% and mild TR and mild aortic regurgitation  The patient has left lower extremity edema she is not sure when this began there is no symptoms or signs of a potential clot however given the unilateral edema I would want to make sure this could be certainly incompetent veins.  I will order a stat venous duplex.     GI health maintenance: Due July 2024 due to sessile polyp removal in 2019 I did message Dr. Mattson to follow-up in this regards patient is aware    PATIENT INSTRUCTIONS:    Patient Instructions   Visit summary:  - Overall kidney function and labs look great  - We are going to check some lab work at your convenience nonfasting at this time  - I will also have you send in a week of blood pressure readings.  As discussed I would recommend purchasing a new blood pressure monitor prior to your next appointment in 6 months    Also I contacted Dr. Mattson regarding colonoscopy which you are due for this year last one was July 2019 and you had polyps at that time they recommended a 5-year follow-up        1. Medication changes today:  No medication changes today    2.  General instructions:  Continue to avoid salt and exercise as you are able to    3.  Please go for non fasting  lab work in the morning over the next week or so at your convenience    4.  Please take 1 week a blood pressure readings at this time    AS FOLLOWS  MORNING AND EVENING, SITTING as follows:  TAKE THE MORNING READINGS BEFORE ANY MEDICATIONS AND WHEN YOU ARE  RELAXED FOR SEVERAL MINUTES  TAKE THE EVENING READINGS:  BETWEEN 7-10 P.M.; PRIOR TO ANY MEDICATIONS; AT LEAST IN OUR  FROM DINNER; AND CERTAINLY AFTER RELAXING FOR A FEW MINUTES  PLEASE INCLUDE HEART RATE WITH YOUR BLOOD PRESSURE READINGS  When taking standing readings, keep your arm supported at heart level and not dangling  Make sure you are sitting with your back supported and feet on the ground and do not cross your legs or feet  Make sure you have not taken any coffee or caffeine products or exercised or smoke cigarettes at least 30 minutes before taking your blood pressure  Then please mail these readings into the office        5.  Follow-up in 6 months  Please bring in 1 week a blood pressure readings morning evening, sitting and standing is outlined above  PLEASE BRING AN YOUR BLOOD PRESSURE MACHINE TO CORRELATE WITH THE OFFICE MACHINE AT THIS NEXT SCHEDULED VISIT  Please go for fasting lab work 1-2 weeks prior to your appointment      6.  General non medical recommendations:  AVOID SALT BUT NOT ADDING AN READING LABELS TO MAKE SURE THERE IS LOW-SALT IN THE FOOD THAT YOU ARE EATING  Goal is less than 2 g of sodium intake or less than 5 g of sodium chloride intake per day    Avoid nonsteroidal anti-inflammatory drugs such as Naprosyn, ibuprofen, Aleve, Advil, Celebrex, Meloxicam (Mobic) etc.  You can use Tylenol as needed if you do not have any liver condition to be concerned about    Avoid medications such as Sudafed or decongestants and antihistamines that contained the D component which is the decongestant.  You can take antihistamines without the decongestant or D component.    Try to avoid medications such as pantoprazole or  Protonix/Nexium or Esomeprazole)/Prilosec or omeprazole/Prevacid or lansoprazole/AcipHex or Rabeprazole.  If you are able to, use Pepcid as this is safer for your kidneys.    Try to exercise at least 30 minutes 3 days a week to begin with with an ultimate goal of 5 days a  week for at least 30 minutes    Please do not drink more than 2 glasses of alcohol/wine on a daily basis as this may contribute to your high blood pressure.            Subjective:   There has been no hospitalizations or acute illnesses since last visit.  The patient overall is feeling well.  No fevers, chills, or cough or colds.  Good appetite and good energy  No hematuria, dysuria, voiding symptoms or foamy urine  No gastrointestinal symptoms  No cardiovascular symptoms including swelling of the legs  No headaches, dizziness or lightheadedness  Blood pressure medications:  Toprol-XL 25 mg twice a day    Renal pertinent medications:  Vitamin D 1000 units daily  Atorvastatin 10 mg daily    ROS:  See HPI, otherwise review of systems as completely reviewed with the patient are negative    Past Medical History:   Diagnosis Date    Acute laryngitis 05/12/2022    Anemia     Chronic bilateral low back pain without sciatica 11/26/2019    Chronic kidney disease     Duodenal perforation (HCC) 10/07/2020    Duodenal ulcer     Enterococcus UTI 04/07/2016    GERD (gastroesophageal reflux disease) 04/04/2016    Greater trochanteric bursitis of right hip 09/18/2018    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    Hypercalcemia     Hypertension     Hypokalemia 10/27/2018    Left lumbar radiculopathy 04/07/2016    Mixed hyperlipidemia     Osteoarthritis of hip 04/04/2016    Osteoporosis     Sacral insufficiency fracture     SI (sacroiliac) joint inflammation (HCC) 04/04/2016    Substance addiction ([facility])     Vitamin D deficiency 03/11/2019     Past Surgical History:   Procedure Laterality Date    BREAST EXCISIONAL BIOPSY Left 2000    benign    COLONOSCOPY  2019    JOINT REPLACEMENT      right hip 8/15    MAMMO (HISTORICAL)  2019    TONSILLECTOMY       Family History   Problem Relation Age of Onset    No Known Problems Mother     No Known Problems Father     Breast cancer Daughter 50    Breast cancer Maternal Aunt 70    No  Known Problems Sister     No Known Problems Maternal Grandmother     No Known Problems Maternal Grandfather     No Known Problems Paternal Grandmother     No Known Problems Paternal Grandfather     No Known Problems Son     No Known Problems Sister     No Known Problems Sister       reports that she has never smoked. She has never used smokeless tobacco. She reports that she does not currently use drugs after having used the following drugs: Prescription. She reports that she does not drink alcohol.    I COMPLETELY REVIEWED THE PAST MEDICAL HISTORY/PAST SURGICAL HISTORY/SOCIAL HISTORY/FAMILY HISTORY/AND MEDICATIONS  AND UPDATED ALL    Objective:     Vitals:   BP sitting on left: 134/62 with a heart rate of 84 and regular same on right  BP standing on right: 130/72 with a heart rate of 92 and regular  Vitals:    07/12/24 0847   BP: 114/51   Pulse: 82       Weight (last 2 days)       Date/Time Weight    07/12/24 0847 60.8 (134)          Wt Readings from Last 3 Encounters:   07/12/24 60.8 kg (134 lb)   07/08/24 61.2 kg (135 lb)   06/25/24 61.2 kg (135 lb)       Body mass index is 25.32 kg/m².    Physical Exam: General:  No acute distress  Skin:  No acute rash  Eyes:  No scleral icterus, noninjected, no discharge from eyes  ENT:  Moist mucous membranes  Neck:  Supple, no jugular venous distention, trachea is midline, no lymphadenopathy and no thyromegaly  Back   No CVAT  Chest:  Clear to auscultation and percussion, good respiratory effort  CVS:  Regular rate and rhythm without a rub, or gallops or murmurs  Abdomen:  Soft and nontender with normal bowel sounds  Extremities:  No cyanosis and no edema, severe arthritic changes, normal range of motion  Neuro:  Grossly intact  Psych:  Alert, oriented x3 and appropriate      Medications:    Current Outpatient Medications:     atorvastatin (LIPITOR) 10 mg tablet, Take 1 tablet (10 mg total) by mouth daily, Disp: 90 tablet, Rfl: 1    Cholecalciferol (Vitamin D-3) 25 MCG (1000  UT) CAPS, Take by mouth in the morning, Disp: , Rfl:     ibuprofen (MOTRIN) 800 mg tablet, if needed, Disp: , Rfl:     metoprolol succinate (TOPROL-XL) 25 mg 24 hr tablet, Take 1 tablet (25 mg total) by mouth 2 (two) times a day, Disp: 180 tablet, Rfl: 3    pantoprazole (PROTONIX) 40 mg tablet, Take 1 tablet (40 mg total) by mouth 2 (two) times a day as needed (Dyspepsia), Disp: 60 tablet, Rfl: 5    Premarin vaginal cream, 3 (three) times a week, Disp: , Rfl:     amoxicillin (AMOXIL) 500 mg capsule, , Disp: , Rfl:     doxycycline (ADOXA) 100 MG tablet, Take 100 mg by mouth 2 (two) times a day (Patient not taking: Reported on 7/12/2024), Disp: , Rfl:     methylPREDNISolone 4 MG tablet therapy pack, Use as directed on package (Patient not taking: Reported on 7/12/2024), Disp: 21 tablet, Rfl: 0    Current Facility-Administered Medications:     denosumab (PROLIA) subcutaneous injection 60 mg, 60 mg, Subcutaneous, Once, Yamile Wallace PA-C    denosumab (PROLIA) subcutaneous injection 60 mg, 60 mg, Subcutaneous, Q6 Months, , 60 mg at 06/13/24 1437    lidocaine (XYLOCAINE) 1 % injection 1 mL, 1 mL, Injection, , Pablo Rivera DPM, 1 mL at 12/07/23 0900    lidocaine (XYLOCAINE) 1 % injection 1 mL, 1 mL, Injection, , , 1 mL at 07/08/24 0930    triamcinolone acetonide (KENALOG-40) 40 mg/mL injection 20 mg, 20 mg, Infiltration, , Pablo Rivera DPM, 20 mg at 12/07/23 0900    triamcinolone acetonide (KENALOG-40) 40 mg/mL injection 20 mg, 20 mg, Infiltration, , , 20 mg at 07/08/24 0930    Lab, Imaging and other studies: I have personally reviewed pertinent labs.  Laboratory Results:  Results for orders placed or performed in visit on 06/27/24   Basic metabolic panel   Result Value Ref Range    Sodium 142 135 - 147 mmol/L    Potassium 3.7 3.5 - 5.3 mmol/L    Chloride 110 (H) 96 - 108 mmol/L    CO2 20 (L) 21 - 32 mmol/L    ANION GAP 12 4 - 13 mmol/L    BUN 28 (H) 5 - 25 mg/dL    Creatinine 1.14 0.60 - 1.30 mg/dL    Glucose 134  "65 - 140 mg/dL    Calcium 9.5 8.4 - 10.2 mg/dL    eGFR 44 ml/min/1.73sq m             Invalid input(s): \"ALBUMIN\"      Radiology review:   chest X-ray    Ultrasound      Portions of the record may have been created with voice recognition software.  Occasional wrong word or \"sound a like\" substitutions may have occurred due to the inherent limitations of voice recognition software.  Read the chart carefully and recognize, using context, where substitutions have occurred.                    "

## 2024-07-12 ENCOUNTER — OFFICE VISIT (OUTPATIENT)
Dept: NEPHROLOGY | Facility: CLINIC | Age: 84
End: 2024-07-12
Payer: COMMERCIAL

## 2024-07-12 VITALS
HEART RATE: 82 BPM | HEIGHT: 61 IN | SYSTOLIC BLOOD PRESSURE: 114 MMHG | WEIGHT: 134 LBS | DIASTOLIC BLOOD PRESSURE: 51 MMHG | BODY MASS INDEX: 25.3 KG/M2

## 2024-07-12 DIAGNOSIS — E61.1 IRON DEFICIENCY: ICD-10-CM

## 2024-07-12 DIAGNOSIS — I12.9 PARENCHYMAL RENAL HYPERTENSION, STAGE 1 THROUGH STAGE 4 OR UNSPECIFIED CHRONIC KIDNEY DISEASE: Primary | ICD-10-CM

## 2024-07-12 DIAGNOSIS — E55.9 VITAMIN D DEFICIENCY: ICD-10-CM

## 2024-07-12 DIAGNOSIS — E78.5 DYSLIPIDEMIA: ICD-10-CM

## 2024-07-12 DIAGNOSIS — N18.31 STAGE 3A CHRONIC KIDNEY DISEASE (HCC): ICD-10-CM

## 2024-07-12 PROCEDURE — 99214 OFFICE O/P EST MOD 30 MIN: CPT | Performed by: INTERNAL MEDICINE

## 2024-07-12 NOTE — LETTER
July 12, 2024     Ashish Arteaga MD  [address] [place] [address]    Patient: Monik Garcia   YOB: 1940   Date of Visit: 7/12/2024       Dear Dr. Arteaga:    Thank you for referring Monik Garcia to me for evaluation. Below are my notes for this consultation.    If you have questions, please do not hesitate to call me. I look forward to following your patient along with you.         Sincerely,        Evgeny Bellamy MD        CC: No Recipients    Evgeny Bellamy MD  7/12/2024  9:23 AM  Sign when Signing Visit  RENAL FOLLOW UP NOTE:.td    ASSESSMENT AND PLAN:  1.  CKD stage 3A.:  Etiology:  AK I from Bactrim/hypercalcemia along with poor oral intake.  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/arteriolar nephrosclerosis  Baseline creatinine:  1.1 -1.79  Current creatinine:  1.14 at baseline  Urine protein creatinine ratio:  0.26 g at goal from December 2023 repeat next visit  Recommendations:  Treat hypertension-please see below  Treat dyslipidemia-please see below  Maintain proteinuria less than 1 g or as low as possible  Avoid nephrotoxic agents such as NSAIDs, patient counseled as such  2.  Volume:  Euvolemic of note: 5/26/2023 venous duplex negative for DVT  3.  Hypertension:  Secondary workup:  Was negative  Home readings:   None today     Goal blood pressure:  Less than 130/80 given CKD  Recommendations:   Push nonmedical regimen especially weight loss/isotonic exercise and low-salt diet  Medication changes today:   None pending 1 week of home blood pressure readings as AOBP is quite good           4.  Electrolytes:    Mild metabolic acidosis:   20 would recommend sodium bicarbonate card  Borderline hypokalemia:  Now normal at 3.7 maintain high potassium diet     5.  Mineral bone disorder:  Calcium/magnesium/phosphorus all normal including calcium of 9.2 and magnesium of 2.1  PTH intact: 15.5 which is normal  Vitamin-D: 53 from 12/5/2023 at  goal  6.  Dyslipidemia:  Goal LDL:  Less than 100  Current lipid profile:  LDL 86//triglycerides 43 from 3/20/2024  Recommendations:  Patient is at goal   7.  Anemia:   Hemoglobin:   Stable at 11.3 at baseline from 3/20/2024; mildly elevated leukocytosis would recheck at this time   Iron studies: Acceptable from 12/1/2023  Multiple myeloma evaluation  was negative as outlined above  GI evaluation:  EGD with just esophagitis; colonoscopy with benign polyp  Heme consultation:  Felt anemia which was normocytic secondary to chronic kidney disease.  No further recommendations at this time.  8.  Other problems:  GERD  Osteoarthritis of right hip  Colonic polyp  Status post duodenal perforation 10/04/2020:  Apparently upper GI endoscopy demonstrated no leak.  Endoscopy demonstrated no leak.  She was NPO with antibiotics who was given a 2 week course of antibiotics upon discharge along with antacid medication  To be followed up as an outpatient.  Cardiac murmur which is chronic echocardiogram performed March 2021 showed good EF 65% and mild TR and mild aortic regurgitation  The patient has left lower extremity edema she is not sure when this began there is no symptoms or signs of a potential clot however given the unilateral edema I would want to make sure this could be certainly incompetent veins.  I will order a stat venous duplex.     GI health maintenance: Due July 2024 due to sessile polyp removal in 2019 I did message Dr. Mattson to follow-up in this regards patient is aware    PATIENT INSTRUCTIONS:    Patient Instructions   Visit summary:  - Overall kidney function and labs look great  - We are going to check some lab work at your convenience nonfasting at this time  - I will also have you send in a week of blood pressure readings.  As discussed I would recommend purchasing a new blood pressure monitor prior to your next appointment in 6 months    Also I contacted Dr. Mattson regarding colonoscopy which  you are due for this year last one was July 2019 and you had polyps at that time they recommended a 5-year follow-up        1. Medication changes today:  No medication changes today    2.  General instructions:  Continue to avoid salt and exercise as you are able to    3.  Please go for non fasting  lab work in the morning over the next week or so at your convenience    4.  Please take 1 week a blood pressure readings at this time    AS FOLLOWS  MORNING AND EVENING, SITTING as follows:  TAKE THE MORNING READINGS BEFORE ANY MEDICATIONS AND WHEN YOU ARE RELAXED FOR SEVERAL MINUTES  TAKE THE EVENING READINGS:  BETWEEN 7-10 P.M.; PRIOR TO ANY MEDICATIONS; AT LEAST IN OUR  FROM DINNER; AND CERTAINLY AFTER RELAXING FOR A FEW MINUTES  PLEASE INCLUDE HEART RATE WITH YOUR BLOOD PRESSURE READINGS  When taking standing readings, keep your arm supported at heart level and not dangling  Make sure you are sitting with your back supported and feet on the ground and do not cross your legs or feet  Make sure you have not taken any coffee or caffeine products or exercised or smoke cigarettes at least 30 minutes before taking your blood pressure  Then please mail these readings into the office        5.  Follow-up in 6 months  Please bring in 1 week a blood pressure readings morning evening, sitting and standing is outlined above  PLEASE BRING AN YOUR BLOOD PRESSURE MACHINE TO CORRELATE WITH THE OFFICE MACHINE AT THIS NEXT SCHEDULED VISIT  Please go for fasting lab work 1-2 weeks prior to your appointment      6.  General non medical recommendations:  AVOID SALT BUT NOT ADDING AN READING LABELS TO MAKE SURE THERE IS LOW-SALT IN THE FOOD THAT YOU ARE EATING  Goal is less than 2 g of sodium intake or less than 5 g of sodium chloride intake per day    Avoid nonsteroidal anti-inflammatory drugs such as Naprosyn, ibuprofen, Aleve, Advil, Celebrex, Meloxicam (Mobic) etc.  You can use Tylenol as needed if you do not have any liver  condition to be concerned about    Avoid medications such as Sudafed or decongestants and antihistamines that contained the D component which is the decongestant.  You can take antihistamines without the decongestant or D component.    Try to avoid medications such as pantoprazole or  Protonix/Nexium or Esomeprazole)/Prilosec or omeprazole/Prevacid or lansoprazole/AcipHex or Rabeprazole.  If you are able to, use Pepcid as this is safer for your kidneys.    Try to exercise at least 30 minutes 3 days a week to begin with with an ultimate goal of 5 days a week for at least 30 minutes    Please do not drink more than 2 glasses of alcohol/wine on a daily basis as this may contribute to your high blood pressure.            Subjective:   There has been no hospitalizations or acute illnesses since last visit.  The patient overall is feeling well.  No fevers, chills, or cough or colds.  Good appetite and good energy  No hematuria, dysuria, voiding symptoms or foamy urine  No gastrointestinal symptoms  No cardiovascular symptoms including swelling of the legs  No headaches, dizziness or lightheadedness  Blood pressure medications:  Toprol-XL 25 mg twice a day    Renal pertinent medications:  Vitamin D 1000 units daily  Atorvastatin 10 mg daily    ROS:  See HPI, otherwise review of systems as completely reviewed with the patient are negative    Past Medical History:   Diagnosis Date   • Acute laryngitis 05/12/2022   • Anemia    • Chronic bilateral low back pain without sciatica 11/26/2019   • Chronic kidney disease    • Duodenal perforation (HCC) 10/07/2020   • Duodenal ulcer    • Enterococcus UTI 04/07/2016   • GERD (gastroesophageal reflux disease) 04/04/2016   • Greater trochanteric bursitis of right hip 09/18/2018   • 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   • Hypercalcemia    • Hypertension    • Hypokalemia 10/27/2018   • Left lumbar radiculopathy 04/07/2016   • Mixed hyperlipidemia    • Osteoarthritis of hip  04/04/2016   • Osteoporosis    • Sacral insufficiency fracture    • SI (sacroiliac) joint inflammation (HCC) 04/04/2016   • Substance addiction (HCC)    • Vitamin D deficiency 03/11/2019     Past Surgical History:   Procedure Laterality Date   • BREAST EXCISIONAL BIOPSY Left 2000    benign   • COLONOSCOPY  2019   • JOINT REPLACEMENT      right hip 8/15   • MAMMO (HISTORICAL)  2019   • TONSILLECTOMY       Family History   Problem Relation Age of Onset   • No Known Problems Mother    • No Known Problems Father    • Breast cancer Daughter 50   • Breast cancer Maternal Aunt 70   • No Known Problems Sister    • No Known Problems Maternal Grandmother    • No Known Problems Maternal Grandfather    • No Known Problems Paternal Grandmother    • No Known Problems Paternal Grandfather    • No Known Problems Son    • No Known Problems Sister    • No Known Problems Sister       reports that she has never smoked. She has never used smokeless tobacco. She reports that she does not currently use drugs after having used the following drugs: Prescription. She reports that she does not drink alcohol.    I COMPLETELY REVIEWED THE PAST MEDICAL HISTORY/PAST SURGICAL HISTORY/SOCIAL HISTORY/FAMILY HISTORY/AND MEDICATIONS  AND UPDATED ALL    Objective:     Vitals:   BP sitting on left: 134/62 with a heart rate of 84 and regular same on right  BP standing on right: 130/72 with a heart rate of 92 and regular  Vitals:    07/12/24 0847   BP: 114/51   Pulse: 82       Weight (last 2 days)       Date/Time Weight    07/12/24 0847 60.8 (134)          Wt Readings from Last 3 Encounters:   07/12/24 60.8 kg (134 lb)   07/08/24 61.2 kg (135 lb)   06/25/24 61.2 kg (135 lb)       Body mass index is 25.32 kg/m².    Physical Exam: General:  No acute distress  Skin:  No acute rash  Eyes:  No scleral icterus, noninjected, no discharge from eyes  ENT:  Moist mucous membranes  Neck:  Supple, no jugular venous distention, trachea is midline, no lymphadenopathy  and no thyromegaly  Back   No CVAT  Chest:  Clear to auscultation and percussion, good respiratory effort  CVS:  Regular rate and rhythm without a rub, or gallops or murmurs  Abdomen:  Soft and nontender with normal bowel sounds  Extremities:  No cyanosis and no edema, severe arthritic changes, normal range of motion  Neuro:  Grossly intact  Psych:  Alert, oriented x3 and appropriate      Medications:    Current Outpatient Medications:   •  atorvastatin (LIPITOR) 10 mg tablet, Take 1 tablet (10 mg total) by mouth daily, Disp: 90 tablet, Rfl: 1  •  Cholecalciferol (Vitamin D-3) 25 MCG (1000 UT) CAPS, Take by mouth in the morning, Disp: , Rfl:   •  ibuprofen (MOTRIN) 800 mg tablet, if needed, Disp: , Rfl:   •  metoprolol succinate (TOPROL-XL) 25 mg 24 hr tablet, Take 1 tablet (25 mg total) by mouth 2 (two) times a day, Disp: 180 tablet, Rfl: 3  •  pantoprazole (PROTONIX) 40 mg tablet, Take 1 tablet (40 mg total) by mouth 2 (two) times a day as needed (Dyspepsia), Disp: 60 tablet, Rfl: 5  •  Premarin vaginal cream, 3 (three) times a week, Disp: , Rfl:   •  amoxicillin (AMOXIL) 500 mg capsule, , Disp: , Rfl:   •  doxycycline (ADOXA) 100 MG tablet, Take 100 mg by mouth 2 (two) times a day (Patient not taking: Reported on 7/12/2024), Disp: , Rfl:   •  methylPREDNISolone 4 MG tablet therapy pack, Use as directed on package (Patient not taking: Reported on 7/12/2024), Disp: 21 tablet, Rfl: 0    Current Facility-Administered Medications:   •  denosumab (PROLIA) subcutaneous injection 60 mg, 60 mg, Subcutaneous, Once, Yamile Wallace PA-C  •  denosumab (PROLIA) subcutaneous injection 60 mg, 60 mg, Subcutaneous, Q6 Months, , 60 mg at 06/13/24 1437  •  lidocaine (XYLOCAINE) 1 % injection 1 mL, 1 mL, Injection, , Pablo Rivera DPM, 1 mL at 12/07/23 0900  •  lidocaine (XYLOCAINE) 1 % injection 1 mL, 1 mL, Injection, , , 1 mL at 07/08/24 0930  •  triamcinolone acetonide (KENALOG-40) 40 mg/mL injection 20 mg, 20 mg, Infiltration,  ", Pablo Rivera, DPWESLEY, 20 mg at 12/07/23 0900  •  triamcinolone acetonide (KENALOG-40) 40 mg/mL injection 20 mg, 20 mg, Infiltration, , , 20 mg at 07/08/24 0930    Lab, Imaging and other studies: I have personally reviewed pertinent labs.  Laboratory Results:  Results for orders placed or performed in visit on 06/27/24   Basic metabolic panel   Result Value Ref Range    Sodium 142 135 - 147 mmol/L    Potassium 3.7 3.5 - 5.3 mmol/L    Chloride 110 (H) 96 - 108 mmol/L    CO2 20 (L) 21 - 32 mmol/L    ANION GAP 12 4 - 13 mmol/L    BUN 28 (H) 5 - 25 mg/dL    Creatinine 1.14 0.60 - 1.30 mg/dL    Glucose 134 65 - 140 mg/dL    Calcium 9.5 8.4 - 10.2 mg/dL    eGFR 44 ml/min/1.73sq m             Invalid input(s): \"ALBUMIN\"      Radiology review:   chest X-ray    Ultrasound      Portions of the record may have been created with voice recognition software.  Occasional wrong word or \"sound a like\" substitutions may have occurred due to the inherent limitations of voice recognition software.  Read the chart carefully and recognize, using context, where substitutions have occurred.                    "

## 2024-07-12 NOTE — PATIENT INSTRUCTIONS
Visit summary:  - Overall kidney function and labs look great  - We are going to check some lab work at your convenience nonfasting at this time  - I will also have you send in a week of blood pressure readings.  As discussed I would recommend purchasing a new blood pressure monitor prior to your next appointment in 6 months    Also I contacted Dr. Mattson regarding colonoscopy which you are due for this year last one was July 2019 and you had polyps at that time they recommended a 5-year follow-up        1. Medication changes today:  No medication changes today    2.  General instructions:  Continue to avoid salt and exercise as you are able to    3.  Please go for non fasting  lab work in the morning over the next week or so at your convenience    4.  Please take 1 week a blood pressure readings at this time    AS FOLLOWS  MORNING AND EVENING, SITTING as follows:  TAKE THE MORNING READINGS BEFORE ANY MEDICATIONS AND WHEN YOU ARE RELAXED FOR SEVERAL MINUTES  TAKE THE EVENING READINGS:  BETWEEN 7-10 P.M.; PRIOR TO ANY MEDICATIONS; AT LEAST IN OUR  FROM DINNER; AND CERTAINLY AFTER RELAXING FOR A FEW MINUTES  PLEASE INCLUDE HEART RATE WITH YOUR BLOOD PRESSURE READINGS  When taking standing readings, keep your arm supported at heart level and not dangling  Make sure you are sitting with your back supported and feet on the ground and do not cross your legs or feet  Make sure you have not taken any coffee or caffeine products or exercised or smoke cigarettes at least 30 minutes before taking your blood pressure  Then please mail these readings into the office        5.  Follow-up in 6 months  Please bring in 1 week a blood pressure readings morning evening, sitting and standing is outlined above  PLEASE BRING AN YOUR BLOOD PRESSURE MACHINE TO CORRELATE WITH THE OFFICE MACHINE AT THIS NEXT SCHEDULED VISIT  Please go for fasting lab work 1-2 weeks prior to your appointment      6.  General non medical  recommendations:  AVOID SALT BUT NOT ADDING AN READING LABELS TO MAKE SURE THERE IS LOW-SALT IN THE FOOD THAT YOU ARE EATING  Goal is less than 2 g of sodium intake or less than 5 g of sodium chloride intake per day    Avoid nonsteroidal anti-inflammatory drugs such as Naprosyn, ibuprofen, Aleve, Advil, Celebrex, Meloxicam (Mobic) etc.  You can use Tylenol as needed if you do not have any liver condition to be concerned about    Avoid medications such as Sudafed or decongestants and antihistamines that contained the D component which is the decongestant.  You can take antihistamines without the decongestant or D component.    Try to avoid medications such as pantoprazole or  Protonix/Nexium or Esomeprazole)/Prilosec or omeprazole/Prevacid or lansoprazole/AcipHex or Rabeprazole.  If you are able to, use Pepcid as this is safer for your kidneys.    Try to exercise at least 30 minutes 3 days a week to begin with with an ultimate goal of 5 days a week for at least 30 minutes    Please do not drink more than 2 glasses of alcohol/wine on a daily basis as this may contribute to your high blood pressure.

## 2024-07-15 ENCOUNTER — HOSPITAL ENCOUNTER (OUTPATIENT)
Dept: RADIOLOGY | Age: 84
Discharge: HOME | End: 2024-07-15
Payer: COMMERCIAL

## 2024-07-15 VITALS — HEIGHT: 61 IN | WEIGHT: 134 LBS | BODY MASS INDEX: 25.3 KG/M2

## 2024-07-15 DIAGNOSIS — Z12.31 VISIT FOR SCREENING MAMMOGRAM: ICD-10-CM

## 2024-07-15 PROCEDURE — 77063 BREAST TOMOSYNTHESIS BI: CPT

## 2024-07-15 PROCEDURE — 77067 SCR MAMMO BI INCL CAD: CPT

## 2024-07-16 ENCOUNTER — TELEPHONE (OUTPATIENT)
Age: 84
End: 2024-07-16

## 2024-07-16 NOTE — TELEPHONE ENCOUNTER
CE 5 yr recall, last fc 8/30/19 tub ad x1, BMI 25       Attempted to call patient, line busy. Will attempted at a later date.

## 2024-07-17 ENCOUNTER — FOLLOW UP (OUTPATIENT)
Dept: URBAN - METROPOLITAN AREA CLINIC 6 | Facility: CLINIC | Age: 84
End: 2024-07-17

## 2024-07-17 DIAGNOSIS — H40.033: ICD-10-CM

## 2024-07-17 DIAGNOSIS — H25.813: ICD-10-CM

## 2024-07-17 PROCEDURE — 92012 INTRM OPH EXAM EST PATIENT: CPT

## 2024-07-17 PROCEDURE — 92132 CPTRZD OPH DX IMG ANT SGM: CPT

## 2024-07-17 PROCEDURE — 92020 GONIOSCOPY: CPT

## 2024-07-17 ASSESSMENT — VISUAL ACUITY
OD_CC: 20/50-2
OS_PH: 20/70
OS_CC: 20/80-2

## 2024-07-17 ASSESSMENT — TONOMETRY
OS_IOP_MMHG: 14
OD_IOP_MMHG: 13

## 2024-07-17 NOTE — TELEPHONE ENCOUNTER
Attempted to contact pt, however was unable to reach; a v/m has been left for pt to contact ofc to schedule.

## 2024-07-19 ENCOUNTER — APPOINTMENT (OUTPATIENT)
Dept: LAB | Facility: CLINIC | Age: 84
End: 2024-07-19
Payer: COMMERCIAL

## 2024-07-19 DIAGNOSIS — N18.31 STAGE 3A CHRONIC KIDNEY DISEASE (HCC): ICD-10-CM

## 2024-07-19 DIAGNOSIS — E87.6 HYPOKALEMIA: ICD-10-CM

## 2024-07-19 DIAGNOSIS — I12.9 PARENCHYMAL RENAL HYPERTENSION, STAGE 1 THROUGH STAGE 4 OR UNSPECIFIED CHRONIC KIDNEY DISEASE: ICD-10-CM

## 2024-07-19 DIAGNOSIS — E78.5 DYSLIPIDEMIA: ICD-10-CM

## 2024-07-19 DIAGNOSIS — E61.1 IRON DEFICIENCY: ICD-10-CM

## 2024-07-19 DIAGNOSIS — E55.9 VITAMIN D DEFICIENCY: ICD-10-CM

## 2024-07-19 LAB
ALBUMIN SERPL BCG-MCNC: 3.8 G/DL (ref 3.5–5)
ALP SERPL-CCNC: 54 U/L (ref 34–104)
ALT SERPL W P-5'-P-CCNC: 10 U/L (ref 7–52)
ANION GAP SERPL CALCULATED.3IONS-SCNC: 6 MMOL/L (ref 4–13)
AST SERPL W P-5'-P-CCNC: 12 U/L (ref 13–39)
BILIRUB SERPL-MCNC: 0.47 MG/DL (ref 0.2–1)
BUN SERPL-MCNC: 24 MG/DL (ref 5–25)
CALCIUM SERPL-MCNC: 9.2 MG/DL (ref 8.4–10.2)
CHLORIDE SERPL-SCNC: 111 MMOL/L (ref 96–108)
CO2 SERPL-SCNC: 22 MMOL/L (ref 21–32)
CREAT SERPL-MCNC: 1.35 MG/DL (ref 0.6–1.3)
ERYTHROCYTE [DISTWIDTH] IN BLOOD BY AUTOMATED COUNT: 14.2 % (ref 11.6–15.1)
FERRITIN SERPL-MCNC: 113 NG/ML (ref 11–307)
GFR SERPL CREATININE-BSD FRML MDRD: 36 ML/MIN/1.73SQ M
GLUCOSE SERPL-MCNC: 93 MG/DL (ref 65–140)
HCT VFR BLD AUTO: 36.8 % (ref 34.8–46.1)
HGB BLD-MCNC: 11.7 G/DL (ref 11.5–15.4)
IRON SATN MFR SERPL: 25 % (ref 15–50)
IRON SERPL-MCNC: 75 UG/DL (ref 50–212)
MAGNESIUM SERPL-MCNC: 2 MG/DL (ref 1.9–2.7)
MCH RBC QN AUTO: 30.2 PG (ref 26.8–34.3)
MCHC RBC AUTO-ENTMCNC: 31.8 G/DL (ref 31.4–37.4)
MCV RBC AUTO: 95 FL (ref 82–98)
PHOSPHATE SERPL-MCNC: 2.9 MG/DL (ref 2.3–4.1)
PLATELET # BLD AUTO: 209 THOUSANDS/UL (ref 149–390)
PMV BLD AUTO: 10.8 FL (ref 8.9–12.7)
POTASSIUM SERPL-SCNC: 3.5 MMOL/L (ref 3.5–5.3)
PROT SERPL-MCNC: 6.2 G/DL (ref 6.4–8.4)
PTH-INTACT SERPL-MCNC: 13.9 PG/ML (ref 12–88)
RBC # BLD AUTO: 3.87 MILLION/UL (ref 3.81–5.12)
SODIUM SERPL-SCNC: 139 MMOL/L (ref 135–147)
TIBC SERPL-MCNC: 296 UG/DL (ref 250–450)
UIBC SERPL-MCNC: 221 UG/DL (ref 155–355)
WBC # BLD AUTO: 7.89 THOUSAND/UL (ref 4.31–10.16)

## 2024-07-19 PROCEDURE — 83970 ASSAY OF PARATHORMONE: CPT

## 2024-07-19 PROCEDURE — 82728 ASSAY OF FERRITIN: CPT

## 2024-07-19 PROCEDURE — 85027 COMPLETE CBC AUTOMATED: CPT

## 2024-07-19 PROCEDURE — 83735 ASSAY OF MAGNESIUM: CPT

## 2024-07-19 PROCEDURE — 83540 ASSAY OF IRON: CPT

## 2024-07-19 PROCEDURE — 83550 IRON BINDING TEST: CPT

## 2024-07-19 PROCEDURE — 84100 ASSAY OF PHOSPHORUS: CPT

## 2024-07-19 PROCEDURE — 36415 COLL VENOUS BLD VENIPUNCTURE: CPT

## 2024-07-19 PROCEDURE — 80053 COMPREHEN METABOLIC PANEL: CPT

## 2024-07-22 ENCOUNTER — TELEPHONE (OUTPATIENT)
Dept: NEPHROLOGY | Facility: CLINIC | Age: 84
End: 2024-07-22

## 2024-07-22 DIAGNOSIS — N18.31 STAGE 3A CHRONIC KIDNEY DISEASE (HCC): Primary | ICD-10-CM

## 2024-07-22 NOTE — TELEPHONE ENCOUNTER
Spoke with patient about the following, she expressed understanding and thanked us for the call:    ----- Message from Evgeny Bellamy MD sent at 7/21/2024  9:22 AM EDT -----  In general labs look good potassium borderline low creatinine slightly higher  Recommend  1.  High potassium diet  2.  Recheck a basic metabolic profile in the next couple weeks at her convenience nonfasting good hydration  ----- Message -----  From: Lab, Background User  Sent: 7/19/2024  11:52 AM EDT  To: Evgeny Bellamy MD

## 2024-07-31 ENCOUNTER — LAB REQUISITION (OUTPATIENT)
Dept: LAB | Facility: HOSPITAL | Age: 84
End: 2024-07-31
Payer: COMMERCIAL

## 2024-07-31 DIAGNOSIS — N30.10 INTERSTITIAL CYSTITIS (CHRONIC) WITHOUT HEMATURIA: ICD-10-CM

## 2024-07-31 PROCEDURE — G0145 SCR C/V CYTO,THINLAYER,RESCR: HCPCS | Performed by: OBSTETRICS & GYNECOLOGY

## 2024-07-31 PROCEDURE — 87086 URINE CULTURE/COLONY COUNT: CPT | Performed by: OBSTETRICS & GYNECOLOGY

## 2024-08-01 ENCOUNTER — LAB REQUISITION (OUTPATIENT)
Dept: LAB | Facility: HOSPITAL | Age: 84
End: 2024-08-01
Payer: COMMERCIAL

## 2024-08-01 DIAGNOSIS — Z01.419 ENCOUNTER FOR GYNECOLOGICAL EXAMINATION (GENERAL) (ROUTINE) WITHOUT ABNORMAL FINDINGS: ICD-10-CM

## 2024-08-01 LAB — BACTERIA UR CULT: NORMAL

## 2024-08-06 LAB
LAB AP GYN PRIMARY INTERPRETATION: NORMAL
Lab: NORMAL

## 2024-08-15 DIAGNOSIS — E78.5 DYSLIPIDEMIA: ICD-10-CM

## 2024-08-15 NOTE — TELEPHONE ENCOUNTER
Reason for call:   [x] Refill   [] Prior Auth  [] Other:     Office:   [x] PCP/Provider - Dr Arteaga   [] Specialty/Provider -     Medication: atorvastatin     Dose/Frequency: 10 mg daily     Quantity: 90D     Pharmacy: [facility]     Does the patient have enough for 3 days?   [x] Yes   [] No - Send as HP to POD

## 2024-08-16 RX ORDER — ATORVASTATIN CALCIUM 10 MG/1
10 TABLET, FILM COATED ORAL DAILY
Qty: 100 TABLET | Refills: 1 | Status: SHIPPED | OUTPATIENT
Start: 2024-08-16

## 2024-08-19 NOTE — ASSESSMENT & PLAN NOTE
Chief Complaint: .Isela Tripp is here today for   Chief Complaint   Patient presents with    Physical          Medications: medications verified, no change    Refills needed today?  YES    Latex allergy or sensitivity: No known latex allergy     Patient would like communication of their results via:  [employer]    Cell Phone:   Telephone Information:   Mobile [phone]     Okay to leave a message containing results? Yes    Patient's current [facility] status: Active.    Advanced directives: discussed    Care Teams Verified: No Changes    Depression Screen: yes    Tobacco history: verified       Health Maintenance       COVID-19 Vaccine (6 - 2023-24 season)  Overdue since 9/1/2023    DTaP/Tdap/Td Vaccine (2 - Td or Tdap)  Overdue since 7/9/2024    Breast Cancer Screening (Every 2 Years)  Due since 8/2/2024    Depression Screening (Yearly)  Due soon on 2/12/2025           Following review of the above:  Patient is not proceeding with: COVID-19    Note: Refer to final orders and clinician documentation.             Stable  Cont PPI bid

## 2024-08-22 PROCEDURE — 87086 URINE CULTURE/COLONY COUNT: CPT | Performed by: OBSTETRICS & GYNECOLOGY

## 2024-08-22 PROCEDURE — 87186 SC STD MICRODIL/AGAR DIL: CPT | Performed by: OBSTETRICS & GYNECOLOGY

## 2024-08-22 PROCEDURE — 87077 CULTURE AEROBIC IDENTIFY: CPT | Performed by: OBSTETRICS & GYNECOLOGY

## 2024-08-22 PROCEDURE — 87106 FUNGI IDENTIFICATION YEAST: CPT | Performed by: OBSTETRICS & GYNECOLOGY

## 2024-08-23 ENCOUNTER — LAB REQUISITION (OUTPATIENT)
Dept: LAB | Facility: HOSPITAL | Age: 84
End: 2024-08-23
Payer: COMMERCIAL

## 2024-08-23 DIAGNOSIS — N30.10 INTERSTITIAL CYSTITIS (CHRONIC) WITHOUT HEMATURIA: ICD-10-CM

## 2024-08-25 LAB
BACTERIA UR CULT: ABNORMAL
BACTERIA UR CULT: ABNORMAL

## 2024-09-05 ENCOUNTER — TELEPHONE (OUTPATIENT)
Age: 84
End: 2024-09-05

## 2024-09-05 NOTE — TELEPHONE ENCOUNTER
Pt requesting to speak to Lidia in the office. She forgot to ask if she can be seen in January after her extraction in December.Instead of 3 month follow up.

## 2024-09-05 NOTE — TELEPHONE ENCOUNTER
Spoke with patient.  Explained that she may not have the Prolia at the time of her January appointment with Dr. Kaiser.  The dentist would like her to wait for three months after her tooth extraction on 12-29 to receive the next dose of Prolia.

## 2024-09-06 ENCOUNTER — TELEPHONE (OUTPATIENT)
Age: 84
End: 2024-09-06

## 2024-09-06 NOTE — TELEPHONE ENCOUNTER
----- Message -----  From: Lidia Valera  Sent: 9/5/2024  11:45 AM EDT  To: Tracy Kaiser MD    Patient stopped by the office.  She brought with her a letter from the dentist, Michael Awadallah, DMD.  She is apparently having dental extraction on December 9th (per patient) and he wants her Prolia held for 3 months post extraction.  She was supposed to have an appointment with you (and also have Prolia) in early January.  I kept the OV in place but deleted the Prolia for the January visit.  The letter from the dentist is scanned into media section in her chart.  Please note there is a paragraph also asking you for recommendations on patient antiresorption and possible drug holiday?    The patient additionally states she was researching hand braces.  She was told by one vendor ( [facility]) that they only make braces that go all the way up her arms, but she wants short ones.  She dropped off a list of vendors and wants to know if you know who does only the shorter braces?  This sheet is additionally scanned into media as well.    MD Lidia Solomon  I know nothing about the vendors. Perhaps Dr. Luevano does but if she’s looking for wrist splints for CTS, she can get OTC wrist splints at any pharmacy.  The recommended time to wait after extractions for Prolia is 4 weeks. If she delays the Prolia more than 4 weeks after the due date, there is a potential increased risk of vertebral fractures. I will try to contact the oral surgeon, but she should also discuss with him.     9-6-24 - Called and left message for patient regarding what Dr. Kaiser said about the wrist splints.  I additionally mentioned that Dr. Kaiser will be reaching out to the oral surgeon regarding the Prolia.

## 2024-09-16 ENCOUNTER — OFFICE VISIT (OUTPATIENT)
Dept: PODIATRY | Facility: CLINIC | Age: 84
End: 2024-09-16
Payer: COMMERCIAL

## 2024-09-16 VITALS — HEIGHT: 61 IN | BODY MASS INDEX: 23.98 KG/M2 | WEIGHT: 127 LBS | RESPIRATION RATE: 18 BRPM

## 2024-09-16 DIAGNOSIS — I73.9 PERIPHERAL VASCULAR DISEASE, UNSPECIFIED (HCC): Primary | ICD-10-CM

## 2024-09-16 PROCEDURE — 11719 TRIM NAIL(S) ANY NUMBER: CPT | Performed by: PODIATRIST

## 2024-09-16 PROCEDURE — RECHECK: Performed by: PODIATRIST

## 2024-09-16 NOTE — PROGRESS NOTES
Established patient with class findings presents for nail care.  Cortisone injection very helpful given last visit for pain right midfoot.  No soft corns noted today.  Vascular exam:  DP  0/4 bilateral; PT  0 4 bilateral   Dermatological exam:  Each toenail is thick and  dystrophic.  Diagnosis:  PVD  Treatment: Trimmed multiple dystrophic toenails.  Reappoint 10 weeks for palliative care.    Nail removal    Date/Time: 9/16/2024 10:00 AM    Performed by: Pablo Rivera DPM  Authorized by: Pablo iRvera DPM    Nails trimmed:     Number of nails trimmed:  10

## 2024-09-18 ENCOUNTER — OFFICE VISIT (OUTPATIENT)
Dept: INTERNAL MEDICINE CLINIC | Age: 84
End: 2024-09-18
Payer: COMMERCIAL

## 2024-09-18 VITALS
BODY MASS INDEX: 24.41 KG/M2 | HEART RATE: 94 BPM | SYSTOLIC BLOOD PRESSURE: 114 MMHG | OXYGEN SATURATION: 99 % | TEMPERATURE: 97.8 F | WEIGHT: 129.2 LBS | DIASTOLIC BLOOD PRESSURE: 78 MMHG

## 2024-09-18 DIAGNOSIS — B00.1 HERPES LABIALIS: ICD-10-CM

## 2024-09-18 DIAGNOSIS — L24.0 IRRITANT CONTACT DERMATITIS DUE TO DETERGENT: ICD-10-CM

## 2024-09-18 DIAGNOSIS — L01.00 IMPETIGO: Primary | ICD-10-CM

## 2024-09-18 DIAGNOSIS — E55.9 VITAMIN D DEFICIENCY: ICD-10-CM

## 2024-09-18 PROCEDURE — 99214 OFFICE O/P EST MOD 30 MIN: CPT | Performed by: INTERNAL MEDICINE

## 2024-09-18 RX ORDER — ACYCLOVIR 50 MG/G
OINTMENT TOPICAL EVERY 6 HOURS SCHEDULED
Qty: 30 G | Refills: 0 | Status: SHIPPED | OUTPATIENT
Start: 2024-09-18

## 2024-09-18 RX ORDER — MUPIROCIN 20 MG/G
OINTMENT TOPICAL 3 TIMES DAILY
Qty: 50 G | Refills: 0 | Status: SHIPPED | OUTPATIENT
Start: 2024-09-18

## 2024-09-18 NOTE — PROGRESS NOTES
Assessment/Plan:    Lip lesions/impetigo  -Lesions are concerning for impetigo  -Will prescribe Bactroban ointment    Herpes labialis  -Patient admits to a history of herpes labialis and it is possible that the lesion on her lip started with HSV 1 outbreak that got secondarily infected  -Will prescribe acyclovir 5% ointment for her to use as needed    Contact dermatitis  -Likely secondary to excessive handwashing at work and may be also related to the close she was  -She was counseled to keep her hands very well moisturized with an oil based cream like Vaseline or Eucerin  -If symptoms continue, we will refer patient to dermatology    Vitamin D deficiency  -Stable  -Continue with multivitamins     Diagnoses and all orders for this visit:    Impetigo  -     mupirocin (BACTROBAN) 2 % ointment; Apply topically 3 (three) times a day    Herpes labialis  -     acyclovir (ZOVIRAX) 5 % ointment; Apply topically every 6 (six) hours    Irritant contact dermatitis due to detergent    Vitamin D deficiency           Subjective:      Patient ID: Monik Garcia is a 84 y.o. adult.    HPI    Patient presents with complaints of pain and lesions of her lower lip with swelling that started a week or two ago.  She states that she believes that the inflammation started from a sunburn.   Has used lip balm on her lips   Was given a lip balm at [facility] but her symptoms have not improved.  No change in her lip stick - has been using the same one    Of note, she also has redness of the tip of the nose.  She states that she takes once a day womens vitamins daily.    She also complains that she has redness of the dorsum of the hands that she states started a week or so ago -she does admit that it has occurred before and comes and goes.  Of note, she works in a [employer] and states that they wear gloves and wash their hands multiple times at work.  She admits that the rash on the hands goes away when she uses a healing cream.     She  denies any fever, chills, night sweats, headache, dizziness, nasal congestion, runny nose, pnd, sore throat, ear ache, sinus pain or pressure, wheezing, cough, chest pain, sob, palpitations, nausea, vomiting, diarrhea, constipation, hematochezia, hematuria, melena stools, arthralgias, myalgias, feelings of anxiety, depression or insomnia.        The following portions of the patient's history were reviewed and updated as appropriate: She  has a past medical history of Acute laryngitis (05/12/2022), Anemia, Chronic bilateral low back pain without sciatica (11/26/2019), Chronic kidney disease, Duodenal perforation ([facility]) (10/07/2020), Duodenal ulcer, Enterococcus UTI (04/07/2016), GERD (gastroesophageal reflux disease) (04/04/2016), Greater trochanteric bursitis of right hip (09/18/2018), History of total right hip replacement (09/18/2018), Hypercalcemia, Hypertension, Hypokalemia (10/27/2018), Left lumbar radiculopathy (04/07/2016), Mixed hyperlipidemia, Osteoarthritis of hip (04/04/2016), Osteoporosis, Sacral insufficiency fracture, SI (sacroiliac) joint inflammation ([facility]) (04/04/2016), Substance addiction ([facility]), and Vitamin D deficiency (03/11/2019).  She   Patient Active Problem List    Diagnosis Date Noted    Impetigo 09/18/2024    Herpes labialis 09/18/2024    Strain of left biceps 10/19/2023    Spondylolisthesis, lumbar region 07/06/2023    Low back pain with sciatica 07/06/2023    Senile osteoporosis 11/28/2021    Raynaud's disease without gangrene 11/28/2021    Lumbar spondylosis 11/28/2021    Herpes simplex 10/14/2021    Ruptured Bakers cyst 08/29/2021    Chronic kidney disease, stage III (moderate) ([facility]) 03/11/2019    Dyslipidemia 03/11/2019    Iron deficiency 03/11/2019    Vitamin D deficiency 03/11/2019    Right hip pain 02/26/2019    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  Her family history includes Breast cancer (age of onset: 50) in her daughter; Breast cancer (age of onset: 70) in her maternal aunt; No Known Problems in her father, maternal grandfather, maternal grandmother, mother, paternal grandfather, paternal grandmother, sister, sister, sister, and son.  She  reports that she has never smoked. She has never used smokeless tobacco. She reports that she does not currently use drugs after having used the following drugs: Prescription. She reports that she does not drink alcohol.  Current Outpatient Medications   Medication Sig Dispense Refill    acyclovir (ZOVIRAX) 5 % ointment Apply topically every 6 (six) hours 30 g 0    atorvastatin (LIPITOR) 10 mg tablet Take 1 tablet (10 mg total) by mouth daily 100 tablet 1    Cholecalciferol (Vitamin D-3) 25 MCG (1000 UT) CAPS Take by mouth in the morning      ibuprofen (MOTRIN) 800 mg tablet if needed      metoprolol succinate (TOPROL-XL) 25 mg 24 hr tablet Take 1 tablet (25 mg total) by mouth 2 (two) times a day 180 tablet 3    mupirocin (BACTROBAN) 2 % ointment Apply topically 3 (three) times a day 50 g 0    pantoprazole (PROTONIX) 40 mg tablet Take 1 tablet (40 mg total) by mouth 2 (two) times a day as needed (Dyspepsia) 60 tablet 5    Premarin vaginal cream 3 (three) times a week      amoxicillin (AMOXIL) 500 mg capsule  (Patient not taking: Reported on 7/12/2024)      doxycycline (ADOXA) 100 MG tablet Take 100 mg by mouth 2 (two) times a day (Patient not taking: Reported on 7/12/2024)      methylPREDNISolone 4 MG tablet therapy pack Use as directed on package (Patient not taking: Reported on 7/12/2024) 21 tablet 0     Current Facility-Administered Medications   Medication Dose Route Frequency  Provider Last Rate Last Admin    denosumab (PROLIA) subcutaneous injection 60 mg  60 mg Subcutaneous Once Yamile Wallace PA-C        denosumab (PROLIA) subcutaneous injection 60 mg  60 mg Subcutaneous Q6 Months    60 mg at 06/13/24 1437    lidocaine (XYLOCAINE) 1 % injection 1 mL  1 mL Injection  Pablo Rivera DPM   1 mL at 12/07/23 0900    lidocaine (XYLOCAINE) 1 % injection 1 mL  1 mL Injection     1 mL at 07/08/24 0930    triamcinolone acetonide (KENALOG-40) 40 mg/mL injection 20 mg  20 mg Infiltration  Pablo Rivera DPM   20 mg at 12/07/23 0900    triamcinolone acetonide (KENALOG-40) 40 mg/mL injection 20 mg  20 mg Infiltration     20 mg at 07/08/24 0930     Current Outpatient Medications on File Prior to Visit   Medication Sig    atorvastatin (LIPITOR) 10 mg tablet Take 1 tablet (10 mg total) by mouth daily    Cholecalciferol (Vitamin D-3) 25 MCG (1000 UT) CAPS Take by mouth in the morning    ibuprofen (MOTRIN) 800 mg tablet if needed    metoprolol succinate (TOPROL-XL) 25 mg 24 hr tablet Take 1 tablet (25 mg total) by mouth 2 (two) times a day    pantoprazole (PROTONIX) 40 mg tablet Take 1 tablet (40 mg total) by mouth 2 (two) times a day as needed (Dyspepsia)    Premarin vaginal cream 3 (three) times a week    amoxicillin (AMOXIL) 500 mg capsule  (Patient not taking: Reported on 7/12/2024)    doxycycline (ADOXA) 100 MG tablet Take 100 mg by mouth 2 (two) times a day (Patient not taking: Reported on 7/12/2024)    methylPREDNISolone 4 MG tablet therapy pack Use as directed on package (Patient not taking: Reported on 7/12/2024)     Current Facility-Administered Medications on File Prior to Visit   Medication    denosumab (PROLIA) subcutaneous injection 60 mg    denosumab (PROLIA) subcutaneous injection 60 mg    lidocaine (XYLOCAINE) 1 % injection 1 mL    lidocaine (XYLOCAINE) 1 % injection 1 mL    triamcinolone acetonide (KENALOG-40) 40 mg/mL injection 20 mg    triamcinolone acetonide (KENALOG-40) 40 mg/mL  injection 20 mg     She is allergic to bactrim [sulfamethoxazole-trimethoprim]..    Review of Systems   Constitutional:  Negative for activity change, chills, fatigue, fever and unexpected weight change.   HENT:  Negative for ear pain, postnasal drip, rhinorrhea, sinus pressure and sore throat.    Eyes:  Negative for pain.   Respiratory:  Negative for cough, choking, chest tightness, shortness of breath and wheezing.    Cardiovascular:  Negative for chest pain, palpitations and leg swelling.   Gastrointestinal:  Negative for abdominal pain, constipation, diarrhea, nausea and vomiting.   Genitourinary:  Negative for dysuria and hematuria.   Musculoskeletal:  Negative for arthralgias, back pain, gait problem, joint swelling, myalgias and neck stiffness.   Skin:  Positive for color change (Redness of the hands) and rash (on the hands and the lips). Negative for pallor.   Neurological:  Negative for dizziness, tremors, seizures, syncope, light-headedness and headaches.   Hematological:  Negative for adenopathy.   Psychiatric/Behavioral:  Negative for behavioral problems.          Objective:      /78 (BP Location: Left arm, Patient Position: Sitting, Cuff Size: Standard)   Pulse 94   Temp 97.8 °F (36.6 °C) (Temporal)   Wt 58.6 kg (129 lb 3.2 oz)   SpO2 99%   BMI 24.41 kg/m²          Physical Exam  Constitutional:       General: She is not in acute distress.     Appearance: She is well-developed. She is not diaphoretic.   HENT:      Head: Normocephalic and atraumatic.      Right Ear: External ear normal.      Left Ear: External ear normal.      Nose: Nose normal.      Mouth/Throat:      Lips: Lesions (erythematous papular lesions with ulcers and tafoya crust concerning for impetigo with swelling of the lower lip) present.      Pharynx: No oropharyngeal exudate.     Eyes:      General: No scleral icterus.        Right eye: No discharge.         Left eye: No discharge.      Conjunctiva/sclera: Conjunctivae  normal.      Pupils: Pupils are equal, round, and reactive to light.   Neck:      Thyroid: No thyromegaly.      Vascular: No JVD.      Trachea: No tracheal deviation.   Cardiovascular:      Rate and Rhythm: Normal rate and regular rhythm.      Heart sounds: Normal heart sounds. No murmur heard.     No friction rub. No gallop.   Pulmonary:      Effort: Pulmonary effort is normal. No respiratory distress.      Breath sounds: Normal breath sounds. No wheezing or rales.   Chest:      Chest wall: No tenderness.   Abdominal:      General: Bowel sounds are normal. There is no distension.      Palpations: Abdomen is soft. There is no mass.      Tenderness: There is no abdominal tenderness. There is no guarding or rebound.   Musculoskeletal:         General: No tenderness. Normal range of motion.      Right hand: Deformity (Deformity of the bilateral hands with swelling of the interphalangeal joints and erythema of the dorsum of the hands bilaterally with dryness and scaling) present.      Left hand: Deformity present.      Cervical back: Normal range of motion and neck supple.   Lymphadenopathy:      Cervical: No cervical adenopathy.   Skin:     General: Skin is warm and dry.      Coloration: Skin is not pale.      Findings: No erythema or rash.   Neurological:      Mental Status: She is alert and oriented to person, place, and time.      Cranial Nerves: No cranial nerve deficit.      Motor: No abnormal muscle tone.      Coordination: Coordination normal.      Deep Tendon Reflexes: Reflexes are normal and symmetric.   Psychiatric:         Behavior: Behavior normal.           Lab Requisition on 08/22/2024   Component Date Value Ref Range Status    Urine Culture 08/22/2024 50,000-59,000 cfu/ml Escherichia coli (A)   Final    This organism has been edited. The previous result was Gram Negative Jameson Enteric Like on 8/24/2024 at 0731 EDT.    Urine Culture 08/22/2024 20,000-29,000 cfu/ml Candida lusitaniae (A)   Final   Lab  Requisition on 07/31/2024   Component Date Value Ref Range Status    Case Report 07/31/2024    Final                    Value:Gynecologic Cytology Report                       Case: [id number]                                  Authorizing Provider:  Silvia Frazier MD          Collected:           07/31/2024                   Ordering Location:     [facility]      Received:            08/01/2024 [address] [place] [address] Specialty                                                                                  Laboratory                                                                   First Screen:          Gaby Siddiqui, CT                                                         Specimen:    LIQUID-BASED PAP, SCREENING, Cervix                                                        Primary Interpretation 07/31/2024 Negative for intraepithelial lesion or malignancy   Final    Specimen Adequacy 07/31/2024 Satisfactory for evaluation. Endocervical/transformation zone component present.   Final    Additional Information 07/31/2024    Final                    Value:[employer] FDA approved ,  and ThinPrep Imaging Duo System are utilized with strict adherence to the 's instruction manual to prepare gynecologic and non-gynecologic cytology specimens for the production of ThinPrep slides as well as for gynecologic ThinPrep imaging. These processes have been validated by our laboratory and/or by the .  The Pap test is not a diagnostic procedure and should not be used as the sole means to detect cervical cancer. It is only a screening procedure to aid in the detection of cervical cancer and its precursors. Both false-negative and false-positive results have been experienced. Your patient's test result should be interpreted in this context together with the history and clinical findings.      Gross Description 07/31/2024    Final                    Value:ALICE  20 ml , colorless, cloudy received in a ThinPrep vial.      LMP 07/31/2024    Final    Unknown   Lab Requisition on 07/31/2024   Component Date Value Ref Range Status    Urine Culture 07/31/2024 No Growth <1000 cfu/mL   Final   Appointment on 07/19/2024   Component Date Value Ref Range Status    Magnesium 07/19/2024 2.0  1.9 - 2.7 mg/dL Final    Sodium 07/19/2024 139  135 - 147 mmol/L Final    Potassium 07/19/2024 3.5  3.5 - 5.3 mmol/L Final    Chloride 07/19/2024 111 (H)  96 - 108 mmol/L Final    CO2 07/19/2024 22  21 - 32 mmol/L Final    ANION GAP 07/19/2024 6  4 - 13 mmol/L Final    BUN 07/19/2024 24  5 - 25 mg/dL Final    Creatinine 07/19/2024 1.35 (H)  0.60 - 1.30 mg/dL Final    Standardized to IDMS reference method    Glucose 07/19/2024 93  65 - 140 mg/dL Final    If the patient is fasting, the ADA then defines impaired fasting glucose as > 100 mg/dL and diabetes as > or equal to 123 mg/dL.    Calcium 07/19/2024 9.2  8.4 - 10.2 mg/dL Final    AST 07/19/2024 12 (L)  13 - 39 U/L Final    ALT 07/19/2024 10  7 - 52 U/L Final    Specimen collection should occur prior to Sulfasalazine administration due to the potential for falsely depressed results.     Alkaline Phosphatase 07/19/2024 54  34 - 104 U/L Final    Total Protein 07/19/2024 6.2 (L)  6.4 - 8.4 g/dL Final    Albumin 07/19/2024 3.8  3.5 - 5.0 g/dL Final    Total Bilirubin 07/19/2024 0.47  0.20 - 1.00 mg/dL Final    Use of this assay is not recommended for patients undergoing treatment with eltrombopag due to the potential for falsely elevated results.  N-acetyl-p-benzoquinone imine (metabolite of Acetaminophen) will generate erroneously low results in samples for patients that have taken an overdose of Acetaminophen.    eGFR 07/19/2024 36  ml/min/1.73sq m Final    WBC 07/19/2024 7.89  4.31 - 10.16 Thousand/uL Final    RBC 07/19/2024 3.87  3.81 - 5.12 Million/uL Final    Hemoglobin 07/19/2024 11.7  11.5 - 15.4 g/dL Final    Hematocrit 07/19/2024 36.8  34.8 -  46.1 % Final    MCV 07/19/2024 95  82 - 98 fL Final    MCH 07/19/2024 30.2  26.8 - 34.3 pg Final    MCHC 07/19/2024 31.8  31.4 - 37.4 g/dL Final    RDW 07/19/2024 14.2  11.6 - 15.1 % Final    Platelets 07/19/2024 209  149 - 390 Thousands/uL Final    MPV 07/19/2024 10.8  8.9 - 12.7 fL Final    Ferritin 07/19/2024 113  11 - 307 ng/mL Final    Phosphorus 07/19/2024 2.9  2.3 - 4.1 mg/dL Final    PTH 07/19/2024 13.9  12.0 - 88.0 pg/mL Final    Iron Saturation 07/19/2024 25  15 - 50 % Final    TIBC 07/19/2024 296  250 - 450 ug/dL Final    Iron 07/19/2024 75  50 - 212 ug/dL Final    Patients treated with metal-binding drugs (ie. Deferoxamine) may have depressed iron values.    UIBC 07/19/2024 221  155 - 355 ug/dL Final   Appointment on 06/27/2024   Component Date Value Ref Range Status    Sodium 06/27/2024 142  135 - 147 mmol/L Final    Potassium 06/27/2024 3.7  3.5 - 5.3 mmol/L Final    Chloride 06/27/2024 110 (H)  96 - 108 mmol/L Final    CO2 06/27/2024 20 (L)  21 - 32 mmol/L Final    ANION GAP 06/27/2024 12  4 - 13 mmol/L Final    BUN 06/27/2024 28 (H)  5 - 25 mg/dL Final    Creatinine 06/27/2024 1.14  0.60 - 1.30 mg/dL Final    Standardized to IDMS reference method    Glucose 06/27/2024 134  65 - 140 mg/dL Final    If the patient is fasting, the ADA then defines impaired fasting glucose as > 100 mg/dL and diabetes as > or equal to 123 mg/dL.    Calcium 06/27/2024 9.5  8.4 - 10.2 mg/dL Final    eGFR 06/27/2024 44  ml/min/1.73sq m Final   Lab Requisition on 06/20/2024   Component Date Value Ref Range Status    Urine Culture 06/20/2024 No Growth <1000 cfu/mL   Final   Appointment on 03/20/2024   Component Date Value Ref Range Status    Hemoglobin A1C 03/20/2024 5.8 (H)  Normal 4.0-5.6%; PreDiabetic 5.7-6.4%; Diabetic >=6.5%; Glycemic control for adults with diabetes <7.0% % Final    EAG 03/20/2024 120  mg/dl Final   Appointment on 03/20/2024   Component Date Value Ref Range Status    WBC 03/20/2024 13.54 (H)  4.31 - 10.16  Thousand/uL Final    RBC 03/20/2024 3.74 (L)  3.81 - 5.12 Million/uL Final    Hemoglobin 03/20/2024 11.3 (L)  11.5 - 15.4 g/dL Final    Hematocrit 03/20/2024 35.4  34.8 - 46.1 % Final    MCV 03/20/2024 95  82 - 98 fL Final    MCH 03/20/2024 30.2  26.8 - 34.3 pg Final    MCHC 03/20/2024 31.9  31.4 - 37.4 g/dL Final    RDW 03/20/2024 13.6  11.6 - 15.1 % Final    Platelets 03/20/2024 287  149 - 390 Thousands/uL Final    MPV 03/20/2024 10.9  8.9 - 12.7 fL Final    Sodium 03/20/2024 140  135 - 147 mmol/L Final    Potassium 03/20/2024 3.8  3.5 - 5.3 mmol/L Final    Chloride 03/20/2024 109 (H)  96 - 108 mmol/L Final    CO2 03/20/2024 20 (L)  21 - 32 mmol/L Final    ANION GAP 03/20/2024 11  4 - 13 mmol/L Final    BUN 03/20/2024 24  5 - 25 mg/dL Final    Creatinine 03/20/2024 1.04  0.60 - 1.30 mg/dL Final    Standardized to IDMS reference method    Glucose, Fasting 03/20/2024 136 (H)  65 - 99 mg/dL Final    Calcium 03/20/2024 9.2  8.4 - 10.2 mg/dL Final    AST 03/20/2024 16  13 - 39 U/L Final    ALT 03/20/2024 9  7 - 52 U/L Final    Specimen collection should occur prior to Sulfasalazine administration due to the potential for falsely depressed results.     Alkaline Phosphatase 03/20/2024 59  34 - 104 U/L Final    Total Protein 03/20/2024 6.8  6.4 - 8.4 g/dL Final    Albumin 03/20/2024 4.3  3.5 - 5.0 g/dL Final    Total Bilirubin 03/20/2024 0.42  0.20 - 1.00 mg/dL Final    Use of this assay is not recommended for patients undergoing treatment with eltrombopag due to the potential for falsely elevated results.  N-acetyl-p-benzoquinone imine (metabolite of Acetaminophen) will generate erroneously low results in samples for patients that have taken an overdose of Acetaminophen.    eGFR 03/20/2024 50  ml/min/1.73sq m Final    Cholesterol 03/20/2024 203 (H)  See Comment mg/dL Final    Cholesterol:         Pediatric <18 Years        Desirable          <170 mg/dL      Borderline High    170-199 mg/dL      High               >=200  mg/dL        Adult >=18 Years            Desirable         <200 mg/dL      Borderline High   200-239 mg/dL      High              >239 mg/dL      Triglycerides 03/20/2024 43  See Comment mg/dL Final    Triglyceride:     0-9Y            <75mg/dL     10Y-17Y         <90 mg/dL       >=18Y     Normal          <150 mg/dL     Borderline High 150-199 mg/dL     High            200-499 mg/dL        Very High       >499 mg/dL    Specimen collection should occur prior to Metamizole administration due to the potential for falsely depressed results.    HDL, Direct 03/20/2024 108  >=50 mg/dL Final    LDL Calculated 03/20/2024 86  0 - 100 mg/dL Final    LDL Cholesterol:     Optimal           <100 mg/dl     Near Optimal      100-129 mg/dl     Above Optimal       Borderline High 130-159 mg/dl       High            160-189 mg/dl       Very High       >189 mg/dl         This screening LDL is a calculated result.   It does not have the accuracy of the Direct Measured LDL in the monitoring of patients with hyperlipidemia and/or statin therapy.   Direct Measure LDL (ZGL516) must be ordered separately in these patients.    Non-HDL-Chol (CHOL-HDL) 03/20/2024 95  mg/dl Final   Lab Requisition on 03/12/2024   Component Date Value Ref Range Status    Urine Culture 03/12/2024 >100,000 cfu/ml Escherichia coli (A)   Final   Appointment on 02/12/2024   Component Date Value Ref Range Status    Sodium 02/12/2024 141  135 - 147 mmol/L Final    Potassium 02/12/2024 4.4  3.5 - 5.3 mmol/L Final    Chloride 02/12/2024 107  96 - 108 mmol/L Final    CO2 02/12/2024 27  21 - 32 mmol/L Final    ANION GAP 02/12/2024 7  mmol/L Final    BUN 02/12/2024 14  5 - 25 mg/dL Final    Creatinine 02/12/2024 1.19  0.60 - 1.30 mg/dL Final    Standardized to IDMS reference method    Glucose, Fasting 02/12/2024 100 (H)  65 - 99 mg/dL Final    Calcium 02/12/2024 9.2  8.4 - 10.2 mg/dL Final    eGFR 02/12/2024 42  ml/min/1.73sq m Final   There may be more visits with results  that are not included.

## 2024-09-30 ENCOUNTER — OFFICE VISIT (OUTPATIENT)
Age: 84
End: 2024-09-30
Payer: COMMERCIAL

## 2024-09-30 VITALS
HEIGHT: 62 IN | WEIGHT: 131.8 LBS | BODY MASS INDEX: 24.25 KG/M2 | TEMPERATURE: 97.3 F | HEART RATE: 58 BPM | DIASTOLIC BLOOD PRESSURE: 60 MMHG | OXYGEN SATURATION: 99 % | SYSTOLIC BLOOD PRESSURE: 120 MMHG

## 2024-09-30 DIAGNOSIS — K21.9 GASTROESOPHAGEAL REFLUX DISEASE WITHOUT ESOPHAGITIS: Chronic | ICD-10-CM

## 2024-09-30 DIAGNOSIS — M15.0 PRIMARY GENERALIZED (OSTEO)ARTHRITIS: ICD-10-CM

## 2024-09-30 DIAGNOSIS — L24.0 IRRITANT CONTACT DERMATITIS DUE TO DETERGENT: Primary | ICD-10-CM

## 2024-09-30 DIAGNOSIS — M43.16 SPONDYLOLISTHESIS, LUMBAR REGION: ICD-10-CM

## 2024-09-30 DIAGNOSIS — B00.1 HERPES LABIALIS: ICD-10-CM

## 2024-09-30 PROBLEM — S46.212A STRAIN OF LEFT BICEPS: Status: RESOLVED | Noted: 2023-10-19 | Resolved: 2024-09-30

## 2024-09-30 PROBLEM — L01.00 IMPETIGO: Status: RESOLVED | Noted: 2024-09-18 | Resolved: 2024-09-30

## 2024-09-30 LAB
DME PARACHUTE DELIVERY DATE ACTUAL: NORMAL
DME PARACHUTE DELIVERY DATE EXPECTED: NORMAL
DME PARACHUTE DELIVERY DATE REQUESTED: NORMAL
DME PARACHUTE ITEM DESCRIPTION: NORMAL
DME PARACHUTE ITEM DESCRIPTION: NORMAL
DME PARACHUTE ORDER STATUS: NORMAL
DME PARACHUTE SUPPLIER NAME: NORMAL
DME PARACHUTE SUPPLIER PHONE: NORMAL

## 2024-09-30 PROCEDURE — 99214 OFFICE O/P EST MOD 30 MIN: CPT | Performed by: INTERNAL MEDICINE

## 2024-09-30 NOTE — ASSESSMENT & PLAN NOTE
Symptoms are stable and controlled with the use of pantoprazole.  Patient uses ibuprofen cautiously

## 2024-09-30 NOTE — ASSESSMENT & PLAN NOTE
Generalized osteoarthritis of the hips and back.  Patient is requesting a rollator walker to assist with ambulation.

## 2024-09-30 NOTE — PROGRESS NOTES
Ambulatory Visit  Name: Monik Garcia      : 1940      MRN: [id number]  Encounter Provider: Ashish Arteaga MD  Encounter Date: 2024   Encounter department: [facility]    Assessment & Plan  Irritant contact dermatitis due to detergent  Encouraged continued use of moisturizing creams       Gastroesophageal reflux disease without esophagitis  Symptoms are stable and controlled with the use of pantoprazole.  Patient uses ibuprofen cautiously       Primary generalized (osteo)arthritis  Generalized osteoarthritis of the hips and back.  Patient is requesting a rollator walker to assist with ambulation.  Spondylolisthesis, lumbar region  Rollator walker ordered for the patient.  Orders:    Rollator    Herpes labialis  Herpes labialis has improved with a combination of mupirocin ointment and acyclovir          History of Present Illness     Patient presents to the office for follow-up visit.  She has some ongoing issues with her back, her hips.  She is scheduled for some intra-articular injections in both hips in the coming weeks.  She has not had any recent lab studies.  She is also complaining of some erythema and skin irritation involving the dorsum of her hands which she attributes to her exposure to moisture and the use of gloves at work.  She was also treated for impetigo versus a possible outbreak of herpes simplex labialis.  This has resolved with treatment.          Review of Systems   Constitutional: Negative.    HENT: Negative.     Eyes: Negative.    Respiratory: Negative.     Cardiovascular: Negative.    Gastrointestinal: Negative.    Endocrine: Negative.    Genitourinary: Negative.    Musculoskeletal:  Positive for arthralgias (Bilateral hips) and back pain (History of lumbar spondylolisthesis).   Skin:  Positive for rash (Dorsum of both hands).   Allergic/Immunologic: Negative.    Neurological: Negative.    Psychiatric/Behavioral: Negative.    "          Objective     /60 (BP Location: Left arm, Patient Position: Sitting, Cuff Size: Standard)   Pulse 58   Temp (!) 97.3 °F (36.3 °C) (Temporal)   Ht 5' 2.4\" (1.585 m)   Wt 59.8 kg (131 lb 12.8 oz)   SpO2 99%   BMI 23.80 kg/m²     Physical Exam  Vitals reviewed.   Constitutional:       Appearance: She is well-developed and normal weight.   HENT:      Head: Normocephalic and atraumatic.      Right Ear: External ear normal.      Left Ear: External ear normal.      Nose: Nose normal.      Mouth/Throat:      Mouth: Mucous membranes are moist.   Eyes:      General: No scleral icterus.     Conjunctiva/sclera: Conjunctivae normal.      Pupils: Pupils are equal, round, and reactive to light.   Neck:      Vascular: No carotid bruit.   Cardiovascular:      Rate and Rhythm: Normal rate and regular rhythm.      Heart sounds: Normal heart sounds. No murmur heard.  Pulmonary:      Effort: Pulmonary effort is normal. No respiratory distress.      Breath sounds: Normal breath sounds.   Abdominal:      General: Abdomen is flat. There is no distension.   Musculoskeletal:         General: No swelling, tenderness or deformity. Normal range of motion.      Cervical back: Normal range of motion and neck supple.      Right lower leg: No edema.      Left lower leg: No edema.   Skin:     General: Skin is warm and dry.      Coloration: Skin is not jaundiced.      Findings: No bruising, erythema or rash (On the dorsal aspect of both hands involving the metacarpophalangeal joints, proximal fingers and the dorsum of the hands).   Neurological:      General: No focal deficit present.      Mental Status: She is alert and oriented to person, place, and time. Mental status is at baseline.   Psychiatric:         Mood and Affect: Mood normal.         Behavior: Behavior normal.         Thought Content: Thought content normal.         Judgment: Judgment normal.         "

## 2024-10-01 ENCOUNTER — OFFICE VISIT (OUTPATIENT)
Dept: OBGYN CLINIC | Facility: HOSPITAL | Age: 84
End: 2024-10-01

## 2024-10-01 VITALS
SYSTOLIC BLOOD PRESSURE: 116 MMHG | WEIGHT: 132 LBS | DIASTOLIC BLOOD PRESSURE: 69 MMHG | HEART RATE: 81 BPM | HEIGHT: 62 IN | BODY MASS INDEX: 24.29 KG/M2

## 2024-10-01 DIAGNOSIS — M17.12 PRIMARY OSTEOARTHRITIS OF LEFT KNEE: ICD-10-CM

## 2024-10-01 DIAGNOSIS — M25.562 CHRONIC PAIN OF LEFT KNEE: ICD-10-CM

## 2024-10-01 DIAGNOSIS — M70.62 TROCHANTERIC BURSITIS OF BOTH HIPS: Primary | ICD-10-CM

## 2024-10-01 DIAGNOSIS — M70.61 TROCHANTERIC BURSITIS OF BOTH HIPS: Primary | ICD-10-CM

## 2024-10-01 DIAGNOSIS — G89.29 CHRONIC PAIN OF LEFT KNEE: ICD-10-CM

## 2024-10-01 RX ORDER — BETAMETHASONE SODIUM PHOSPHATE AND BETAMETHASONE ACETATE 3; 3 MG/ML; MG/ML
12 INJECTION, SUSPENSION INTRA-ARTICULAR; INTRALESIONAL; INTRAMUSCULAR; SOFT TISSUE
Status: COMPLETED | OUTPATIENT
Start: 2024-10-01 | End: 2024-10-01

## 2024-10-01 RX ORDER — LIDOCAINE HYDROCHLORIDE 10 MG/ML
2 INJECTION, SOLUTION INFILTRATION; PERINEURAL
Status: COMPLETED | OUTPATIENT
Start: 2024-10-01 | End: 2024-10-01

## 2024-10-01 RX ORDER — BUPIVACAINE HYDROCHLORIDE 2.5 MG/ML
2 INJECTION, SOLUTION INFILTRATION; PERINEURAL
Status: COMPLETED | OUTPATIENT
Start: 2024-10-01 | End: 2024-10-01

## 2024-10-01 RX ADMIN — BUPIVACAINE HYDROCHLORIDE 2 ML: 2.5 INJECTION, SOLUTION INFILTRATION; PERINEURAL at 11:00

## 2024-10-01 RX ADMIN — BETAMETHASONE SODIUM PHOSPHATE AND BETAMETHASONE ACETATE 12 MG: 3; 3 INJECTION, SUSPENSION INTRA-ARTICULAR; INTRALESIONAL; INTRAMUSCULAR; SOFT TISSUE at 11:00

## 2024-10-01 RX ADMIN — LIDOCAINE HYDROCHLORIDE 2 ML: 10 INJECTION, SOLUTION INFILTRATION; PERINEURAL at 11:00

## 2024-10-01 NOTE — PROGRESS NOTES
Assessment:  1. Trochanteric bursitis of both hips        2. Chronic pain of left knee        3. Primary osteoarthritis of left knee              Plan:  Bilateral trochanteric bursitis and Left knee osteoarthritis   The patient was provided with bilateral trochanteric bursa and left knee steroid injections.  The patient tolerated the procedure well.    The patient should follow up in 3 months.      To do next visit:  Return in about 3 months (around 1/1/2025).    The above stated was discussed in layman's terms and the patient expressed understanding.  All questions were answered to the patient's satisfaction.       Scribe Attestation      I,:  Brandon Naranjo MA am acting as a scribe while in the presence of the attending physician.:       I,:  Shubham Luevano MD personally performed the services described in this documentation    as scribed in my presence.:               Subjective:   Monik Garcia is a 84 y.o. adult who presents for follow up of multiple body parts.  She is s/p bilateral trochanteric bursa, left knee and left glenohumeral steroid injections with benefit, 6/25/2024.  Today she complains of bilateral lateral hip and left knee pain.  Prolonged walking aggravates while rest alleviates.   She does work with  for right foot.        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    Hypercalcemia     Hypertension     Hypokalemia 10/27/2018    Impetigo 09/18/2024    Left lumbar radiculopathy 04/07/2016    Mixed hyperlipidemia     Osteoarthritis of hip  04/04/2016    Osteoporosis     Sacral insufficiency fracture     SI (sacroiliac) joint inflammation (HCC) 04/04/2016    Strain of left biceps 10/19/2023    Substance addiction (HCC)     Vitamin D deficiency 03/11/2019       Past Surgical History:   Procedure Laterality Date    BREAST EXCISIONAL BIOPSY Left 2000    benign    COLONOSCOPY  2019    JOINT REPLACEMENT      right hip 8/15    MAMMO (HISTORICAL)  2019    TONSILLECTOMY         Family History   Problem Relation Age of Onset    No Known Problems Mother     No Known Problems Father     Breast cancer Daughter 50    Breast cancer Maternal Aunt 70    No Known Problems Sister     No Known Problems Maternal Grandmother     No Known Problems Maternal Grandfather     No Known Problems Paternal Grandmother     No Known Problems Paternal Grandfather     No Known Problems Son     No Known Problems Sister     No Known Problems Sister        Social History     Occupational History    Not on file   Tobacco Use    Smoking status: Never    Smokeless tobacco: Never   Vaping Use    Vaping status: Never Used   Substance and Sexual Activity    Alcohol use: Never    Drug use: Not Currently     Types: Prescription     Comment: rocovering last used 1996    Sexual activity: Yes     Partners: Male     Birth control/protection: Post-menopausal         Current Outpatient Medications:     acyclovir (ZOVIRAX) 5 % ointment, Apply topically every 6 (six) hours, Disp: 30 g, Rfl: 0    atorvastatin (LIPITOR) 10 mg tablet, Take 1 tablet (10 mg total) by mouth daily, Disp: 100 tablet, Rfl: 1    Cholecalciferol (Vitamin D-3) 25 MCG (1000 UT) CAPS, Take by mouth in the morning, Disp: , Rfl:     ibuprofen (MOTRIN) 800 mg tablet, if needed, Disp: , Rfl:     metoprolol succinate (TOPROL-XL) 25 mg 24 hr tablet, Take 1 tablet (25 mg total) by mouth 2 (two) times a day, Disp: 180 tablet, Rfl: 3    pantoprazole (PROTONIX) 40 mg tablet, Take 1 tablet (40 mg total) by mouth 2 (two) times a day as needed  "(Dyspepsia), Disp: 60 tablet, Rfl: 5    Premarin vaginal cream, 3 (three) times a week, Disp: , Rfl:     Current Facility-Administered Medications:     denosumab (PROLIA) subcutaneous injection 60 mg, 60 mg, Subcutaneous, Once, Yamile Wallace PA-C    denosumab (PROLIA) subcutaneous injection 60 mg, 60 mg, Subcutaneous, Q6 Months, , 60 mg at 06/13/24 1437    lidocaine (XYLOCAINE) 1 % injection 1 mL, 1 mL, Injection, , Pablo Rivera, YESYM, 1 mL at 12/07/23 0900    lidocaine (XYLOCAINE) 1 % injection 1 mL, 1 mL, Injection, , , 1 mL at 07/08/24 0930    triamcinolone acetonide (KENALOG-40) 40 mg/mL injection 20 mg, 20 mg, Infiltration, , Pablo Rivera, DPM, 20 mg at 12/07/23 0900    triamcinolone acetonide (KENALOG-40) 40 mg/mL injection 20 mg, 20 mg, Infiltration, , , 20 mg at 07/08/24 0930    Allergies   Allergen Reactions    Bactrim [Sulfamethoxazole-Trimethoprim] Other (See Comments)     Kidney failure            Vitals:    10/01/24 1053   BP: 116/69   Pulse: 81       Objective:  Physical exam  General: Awake, Alert, Oriented  Eyes: Pupils equal, round and reactive to light  Heart: regular rate and rhythm  Lungs: No audible wheezing  Abdomen: soft                    Ortho Exam  Bilateral hips:  TTP over greater trochanter    Left knee:  No erythema or ecchymosis  No effusion or swelling  Normal strength  Good ROM with crepitus   Calf compartments soft and supple  Sensation intact  Toes are warm sensate and mobile        Diagnostics, reviewed and taken today if performed as documented:    None performed     Procedures, if performed today:    Large joint arthrocentesis: R greater trochanteric bursa  Universal Protocol:  Consent: Verbal consent obtained.  Risks and benefits: risks, benefits and alternatives were discussed  Consent given by: patient  Time out: Immediately prior to procedure a \"time out\" was called to verify the correct patient, procedure, equipment, support staff and site/side marked as " "required.  Timeout called at: 10/1/2024 11:17 AM.  Patient understanding: patient states understanding of the procedure being performed  Site marked: the operative site was marked  Patient identity confirmed: verbally with patient  Supporting Documentation  Indications: pain   Procedure Details  Location: hip - R greater trochanteric bursa  Needle size: 22 G  Ultrasound guidance: no  Approach: lateral  Medications administered: 12 mg betamethasone acetate-betamethasone sodium phosphate 6 (3-3) mg/mL; 2 mL bupivacaine 0.25 %; 2 mL lidocaine 1 %    Patient tolerance: patient tolerated the procedure well with no immediate complications  Dressing:  Sterile dressing applied      Large joint arthrocentesis: L greater trochanteric bursa  Universal Protocol:  Consent: Verbal consent obtained.  Risks and benefits: risks, benefits and alternatives were discussed  Consent given by: patient  Time out: Immediately prior to procedure a \"time out\" was called to verify the correct patient, procedure, equipment, support staff and site/side marked as required.  Timeout called at: 10/1/2024 11:17 AM.  Patient understanding: patient states understanding of the procedure being performed  Site marked: the operative site was marked  Patient identity confirmed: verbally with patient  Supporting Documentation  Indications: pain   Procedure Details  Location: hip - L greater trochanteric bursa  Needle size: 22 G  Ultrasound guidance: no  Approach: lateral  Medications administered: 12 mg betamethasone acetate-betamethasone sodium phosphate 6 (3-3) mg/mL; 2 mL bupivacaine 0.25 %; 2 mL lidocaine 1 %    Patient tolerance: patient tolerated the procedure well with no immediate complications  Dressing:  Sterile dressing applied      Large joint arthrocentesis: L knee  Universal Protocol:  Consent: Verbal consent obtained.  Risks and benefits: risks, benefits and alternatives were discussed  Consent given by: patient  Time out: Immediately prior " "to procedure a \"time out\" was called to verify the correct patient, procedure, equipment, support staff and site/side marked as required.  Timeout called at: 10/1/2024 11:17 AM.  Patient understanding: patient states understanding of the procedure being performed  Site marked: the operative site was marked  Patient identity confirmed: verbally with patient  Supporting Documentation  Indications: pain   Procedure Details  Location: knee - L knee  Preparation: Patient was prepped and draped in the usual sterile fashion  Needle size: 22 G  Ultrasound guidance: no  Approach: anterolateral  Medications administered: 12 mg betamethasone acetate-betamethasone sodium phosphate 6 (3-3) mg/mL; 2 mL bupivacaine 0.25 %; 2 mL lidocaine 1 %    Patient tolerance: patient tolerated the procedure well with no immediate complications  Dressing:  Sterile dressing applied              Portions of the record may have been created with voice recognition software.  Occasional wrong word or \"sound a like\" substitutions may have occurred due to the inherent limitations of voice recognition software.  Read the chart carefully and recognize, using context, where substitutions have occurred.    "

## 2024-11-04 ENCOUNTER — OFFICE VISIT (OUTPATIENT)
Dept: GASTROENTEROLOGY | Facility: CLINIC | Age: 84
End: 2024-11-04
Payer: COMMERCIAL

## 2024-11-04 VITALS
SYSTOLIC BLOOD PRESSURE: 122 MMHG | DIASTOLIC BLOOD PRESSURE: 78 MMHG | HEIGHT: 62 IN | BODY MASS INDEX: 23.74 KG/M2 | TEMPERATURE: 97.5 F | WEIGHT: 129 LBS

## 2024-11-04 DIAGNOSIS — K21.9 GASTROESOPHAGEAL REFLUX DISEASE WITHOUT ESOPHAGITIS: Chronic | ICD-10-CM

## 2024-11-04 DIAGNOSIS — Z86.0100 PERSONAL HISTORY OF COLON POLYPS, UNSPECIFIED: Primary | ICD-10-CM

## 2024-11-04 PROCEDURE — 99244 OFF/OP CNSLTJ NEW/EST MOD 40: CPT | Performed by: PHYSICIAN ASSISTANT

## 2024-11-04 RX ORDER — NITROFURANTOIN MONOHYDRATE/MACROCRYSTALLINE 25; 75 MG/1; MG/1
CAPSULE ORAL
COMMUNITY
Start: 2024-10-28

## 2024-11-04 RX ORDER — DOXYCYCLINE 100 MG/1
CAPSULE ORAL
COMMUNITY
Start: 2024-10-09

## 2024-11-04 RX ORDER — PANTOPRAZOLE SODIUM 40 MG/1
40 TABLET, DELAYED RELEASE ORAL DAILY
Qty: 90 TABLET | Refills: 5 | Status: SHIPPED | OUTPATIENT
Start: 2024-11-04

## 2024-11-04 NOTE — PROGRESS NOTES
Ambulatory Visit  Name: Monik Garcia      : 1940      MRN: [id number]  Encounter Provider: Binta Hayden PA-C  Encounter Date: 2024   Encounter department: [facility] MICHELETBERNARDAMATT Valdovinos is an 85 y/o female with HTN, HLD, CKD who presents for colon cancer screening.   Assessment & Plan  Personal history of colon polyps, unspecified  Last colonoscopy in 2019 noted 1 polyp that was TA on pathology.  It was recommended she gets this repeated in 5 years however she is 84 years old at this time. Pt says due to her age, she prefers to hold off on any colonoscopies at this time. She denies constitutional symptoms, changes in bowel habits, overt GI bleeding, family hx of colon-rectal cancer.   -explained that due to her age and lack of alarm symptoms, I am ok with deferring colonoscopy for now. I offered cologuard testing but she deferred this, as well.   -Explained the patient that if she starts to experience any unintentional weight loss, chills and fevers of unknown origin, change in bowel habits, overt GI bleeding then I would recommend she give us a call to discuss colonoscopy at that time    Pt says she would like a refill of her iburpoen. She says she only uses this once in a while for her arthritis. She says she will call her PCP for this.          History of Present Illness     Monik Garcia is a 84 y.o. adult who presents for colon cancer screening. logy.  It was recommended she gets this repeated in 5 years however she is 84 years old at this time. Pt says due to her age, she prefers to hold off on any colonoscopies at this time. She denies unintentional weight loss, fevers, chills, night sweats, abdominal pain, n/v, heartburn, trouble swallowing, constipation, diarrhea, bloody or black BM. Pt denies prior abdominal surgical hx. Pt is not on blood thinners.     History obtained from : patient  Review of Systems   Constitutional:  Negative for chills and fever.    HENT:  Negative for ear pain and sore throat.    Eyes:  Negative for pain and visual disturbance.   Respiratory:  Negative for cough and shortness of breath.    Cardiovascular:  Negative for chest pain and palpitations.   Gastrointestinal:  Negative for abdominal distention, abdominal pain, anal bleeding, blood in stool, constipation, diarrhea, nausea, rectal pain and vomiting.   Genitourinary:  Negative for dysuria and hematuria.   Musculoskeletal:  Negative for arthralgias and back pain.   Skin:  Negative for color change and rash.   Neurological:  Negative for seizures and syncope.   All other systems reviewed and are negative.    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    Hypercalcemia     Hypertension     Hypokalemia 10/27/2018    Impetigo 09/18/2024    Left lumbar radiculopathy 04/07/2016    Mixed hyperlipidemia     Osteoarthritis of hip 04/04/2016    Osteoporosis     Sacral insufficiency fracture     SI (sacroiliac) joint inflammation ([facility]) 04/04/2016    Strain of left biceps 10/19/2023    Substance addiction ([facility])     Vitamin D deficiency 03/11/2019     Past Surgical History:   Procedure Laterality Date    BREAST EXCISIONAL BIOPSY Left 2000    benign    COLONOSCOPY  2019    JOINT REPLACEMENT      right hip 8/15    MAMMO (HISTORICAL)  2019    TONSILLECTOMY       Family History   Problem Relation Age of Onset    No Known Problems Mother     No Known Problems Father     Breast cancer Daughter 50    Breast cancer Maternal Aunt 70    No Known Problems  Sister     No Known Problems Maternal Grandmother     No Known Problems Maternal Grandfather     No Known Problems Paternal Grandmother     No Known Problems Paternal Grandfather     No Known Problems Son     No Known Problems Sister     No Known Problems Sister      Current Outpatient Medications on File Prior to Visit   Medication Sig Dispense Refill    acyclovir (ZOVIRAX) 5 % ointment Apply topically every 6 (six) hours 30 g 0    atorvastatin (LIPITOR) 10 mg tablet Take 1 tablet (10 mg total) by mouth daily 100 tablet 1    Cholecalciferol (Vitamin D-3) 25 MCG (1000 UT) CAPS Take by mouth in the morning      doxycycline monohydrate (MONODOX) 100 mg capsule       ibuprofen (MOTRIN) 800 mg tablet if needed      Macrobid 100 MG capsule 1 capsule with food. Start taking 10/28-10/30 Orally one a day for 3 days      metoprolol succinate (TOPROL-XL) 25 mg 24 hr tablet Take 1 tablet (25 mg total) by mouth 2 (two) times a day 180 tablet 3    Multiple Vitamins-Minerals (ONE A DAY WOMEN 50 PLUS PO) One A Day Women 50 Plus      Premarin vaginal cream 3 (three) times a week      [DISCONTINUED] pantoprazole (PROTONIX) 40 mg tablet Take 1 tablet (40 mg total) by mouth 2 (two) times a day as needed (Dyspepsia) 60 tablet 5     Current Facility-Administered Medications on File Prior to Visit   Medication Dose Route Frequency Provider Last Rate Last Admin    denosumab (PROLIA) subcutaneous injection 60 mg  60 mg Subcutaneous Once Yamile Wallace PA-C        denosumab (PROLIA) subcutaneous injection 60 mg  60 mg Subcutaneous Q6 Months    60 mg at 06/13/24 1437    lidocaine (XYLOCAINE) 1 % injection 1 mL  1 mL Injection  Pablo Rivera DPM   1 mL at 12/07/23 0900    lidocaine (XYLOCAINE) 1 % injection 1 mL  1 mL Injection     1 mL at 07/08/24 0930    triamcinolone acetonide (KENALOG-40) 40 mg/mL injection 20 mg  20 mg Infiltration  Pablo Rivera DPM   20 mg at 12/07/23 0900    triamcinolone acetonide (KENALOG-40) 40 mg/mL injection  20 mg  20 mg Infiltration     20 mg at 07/08/24 0930     Allergies   Allergen Reactions    Bactrim [Sulfamethoxazole-Trimethoprim] Other (See Comments)     Kidney failure      Current Outpatient Medications on File Prior to Visit   Medication Sig Dispense Refill    acyclovir (ZOVIRAX) 5 % ointment Apply topically every 6 (six) hours 30 g 0    atorvastatin (LIPITOR) 10 mg tablet Take 1 tablet (10 mg total) by mouth daily 100 tablet 1    Cholecalciferol (Vitamin D-3) 25 MCG (1000 UT) CAPS Take by mouth in the morning      doxycycline monohydrate (MONODOX) 100 mg capsule       ibuprofen (MOTRIN) 800 mg tablet if needed      Macrobid 100 MG capsule 1 capsule with food. Start taking 10/28-10/30 Orally one a day for 3 days      metoprolol succinate (TOPROL-XL) 25 mg 24 hr tablet Take 1 tablet (25 mg total) by mouth 2 (two) times a day 180 tablet 3    Multiple Vitamins-Minerals (ONE A DAY WOMEN 50 PLUS PO) One A Day Women 50 Plus      Premarin vaginal cream 3 (three) times a week      [DISCONTINUED] pantoprazole (PROTONIX) 40 mg tablet Take 1 tablet (40 mg total) by mouth 2 (two) times a day as needed (Dyspepsia) 60 tablet 5     Current Facility-Administered Medications on File Prior to Visit   Medication Dose Route Frequency Provider Last Rate Last Admin    denosumab (PROLIA) subcutaneous injection 60 mg  60 mg Subcutaneous Once Yamile Wallace PA-C        denosumab (PROLIA) subcutaneous injection 60 mg  60 mg Subcutaneous Q6 Months    60 mg at 06/13/24 1437    lidocaine (XYLOCAINE) 1 % injection 1 mL  1 mL Injection  Pablo Rivera DPM   1 mL at 12/07/23 0900    lidocaine (XYLOCAINE) 1 % injection 1 mL  1 mL Injection     1 mL at 07/08/24 0930    triamcinolone acetonide (KENALOG-40) 40 mg/mL injection 20 mg  20 mg Infiltration  Pablo Rivera DPM   20 mg at 12/07/23 0900    triamcinolone acetonide (KENALOG-40) 40 mg/mL injection 20 mg  20 mg Infiltration     20 mg at 07/08/24 0930      Social History     Tobacco Use     Smoking status: Never    Smokeless tobacco: Never   Vaping Use    Vaping status: Never Used   Substance and Sexual Activity    Alcohol use: Never    Drug use: Not Currently     Types: Prescription     Comment: rocovering last used 1996    Sexual activity: Yes     Partners: Male     Birth control/protection: Post-menopausal         Objective     There were no vitals taken for this visit.    Physical Exam  Constitutional:       Appearance: Normal appearance.   Cardiovascular:      Rate and Rhythm: Normal rate and regular rhythm.   Pulmonary:      Breath sounds: Normal breath sounds.   Abdominal:      General: Bowel sounds are normal. There is no distension.      Palpations: There is no mass.      Tenderness: There is no abdominal tenderness. There is no guarding or rebound.   Neurological:      Mental Status: She is alert.       Administrative Statements   I have spent a total time of 20 minutes in caring for this patient on the day of the visit/encounter including Diagnostic results, Prognosis, Risks and benefits of tx options, Instructions for management, Patient and family education, Risk factor reductions, Impressions, Counseling / Coordination of care, Documenting in the medical record, Reviewing / ordering tests, medicine, procedures  , Obtaining or reviewing history  , and Communicating with other healthcare professionals .

## 2024-11-05 ENCOUNTER — TELEPHONE (OUTPATIENT)
Dept: NEPHROLOGY | Facility: CLINIC | Age: 84
End: 2024-11-05

## 2024-11-05 NOTE — TELEPHONE ENCOUNTER
LVM with next appt timeframe - 3/21/25 at 2pm with Dr. Bellamy in the [facility]. Appt reminder card mailed to home.

## 2024-11-08 ENCOUNTER — TELEPHONE (OUTPATIENT)
Age: 84
End: 2024-11-08

## 2024-11-08 NOTE — TELEPHONE ENCOUNTER
ICU 12 as PCU overflow; [email or web address] Sent note to Dr. Kaiser asking how long we need to wait until after her surgery to schedule the Prolia.  Will reach out to the patient once we receive an answer.

## 2024-11-08 NOTE — TELEPHONE ENCOUNTER
Pt calling to r/s her 1/7 f/u / prolia as she is having surgery 1/6. No available apts until June, she would like a cb to discuss when she can come in

## 2024-11-25 ENCOUNTER — OFFICE VISIT (OUTPATIENT)
Dept: PODIATRY | Facility: CLINIC | Age: 84
End: 2024-11-25
Payer: COMMERCIAL

## 2024-11-25 VITALS — WEIGHT: 129 LBS | HEIGHT: 62 IN | BODY MASS INDEX: 23.74 KG/M2 | RESPIRATION RATE: 18 BRPM

## 2024-11-25 DIAGNOSIS — I73.9 PERIPHERAL VASCULAR DISEASE, UNSPECIFIED (HCC): ICD-10-CM

## 2024-11-25 DIAGNOSIS — B35.1 ONYCHOMYCOSIS: Primary | ICD-10-CM

## 2024-11-25 PROCEDURE — 11720 DEBRIDE NAIL 1-5: CPT | Performed by: PODIATRIST

## 2024-11-25 PROCEDURE — RECHECK: Performed by: PODIATRIST

## 2024-11-25 NOTE — PROGRESS NOTES
Established patient with class findings presents for mycotic toenail care.  Silipos pads had relief pain on the second toes both feet.  Vascular exam:  DP 0/4 bilateral; PT 0 4 bilateral  Derm exam:  Each second and third toenail toenail is thickened and yellow with subungual debris; remainder of nails elongated.  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  Electrical and manual debridement performed.  Trimmed remaining elongated toenails.  Dispensed large Silipos pads for each hallux.

## 2024-12-09 ENCOUNTER — RESULTS FOLLOW-UP (OUTPATIENT)
Dept: NEPHROLOGY | Facility: CLINIC | Age: 84
End: 2024-12-09

## 2024-12-09 ENCOUNTER — TRANSCRIBE ORDERS (OUTPATIENT)
Dept: LAB | Facility: CLINIC | Age: 84
End: 2024-12-09

## 2024-12-09 ENCOUNTER — APPOINTMENT (OUTPATIENT)
Dept: LAB | Facility: CLINIC | Age: 84
End: 2024-12-09
Payer: COMMERCIAL

## 2024-12-09 DIAGNOSIS — E78.5 DYSLIPIDEMIA: ICD-10-CM

## 2024-12-09 DIAGNOSIS — R39.15 URGENCY OF URINATION: Primary | ICD-10-CM

## 2024-12-09 DIAGNOSIS — N18.31 STAGE 3A CHRONIC KIDNEY DISEASE (HCC): ICD-10-CM

## 2024-12-09 DIAGNOSIS — R39.15 URGENCY OF URINATION: ICD-10-CM

## 2024-12-09 DIAGNOSIS — Z01.812 PRE-OPERATIVE LABORATORY EXAMINATION: ICD-10-CM

## 2024-12-09 DIAGNOSIS — I10 ESSENTIAL HYPERTENSION: ICD-10-CM

## 2024-12-09 LAB
ALBUMIN SERPL BCG-MCNC: 3.8 G/DL (ref 3.5–5)
ALP SERPL-CCNC: 69 U/L (ref 34–104)
ALT SERPL W P-5'-P-CCNC: 8 U/L (ref 7–52)
ANION GAP SERPL CALCULATED.3IONS-SCNC: 5 MMOL/L (ref 4–13)
AST SERPL W P-5'-P-CCNC: 13 U/L (ref 5–45)
BASOPHILS # BLD AUTO: 0.06 THOUSANDS/ÂΜL (ref 0–0.1)
BASOPHILS NFR BLD AUTO: 1 % (ref 0–1)
BILIRUB SERPL-MCNC: 0.4 MG/DL (ref 0.2–1)
BUN SERPL-MCNC: 13 MG/DL (ref 5–25)
CALCIUM SERPL-MCNC: 9.1 MG/DL (ref 8.4–10.2)
CHLORIDE SERPL-SCNC: 106 MMOL/L (ref 96–108)
CHOLEST SERPL-MCNC: 181 MG/DL (ref ?–200)
CO2 SERPL-SCNC: 28 MMOL/L (ref 21–32)
CREAT SERPL-MCNC: 0.96 MG/DL (ref 0.6–1.3)
EOSINOPHIL # BLD AUTO: 0.18 THOUSAND/ÂΜL (ref 0–0.61)
EOSINOPHIL NFR BLD AUTO: 3 % (ref 0–6)
ERYTHROCYTE [DISTWIDTH] IN BLOOD BY AUTOMATED COUNT: 12.8 % (ref 11.6–15.1)
GLUCOSE P FAST SERPL-MCNC: 88 MG/DL (ref 65–99)
HCT VFR BLD AUTO: 35 % (ref 36.5–46.1)
HDLC SERPL-MCNC: 84 MG/DL
HGB BLD-MCNC: 11 G/DL (ref 12–15.4)
IMM GRANULOCYTES # BLD AUTO: 0.03 THOUSAND/UL (ref 0–0.2)
IMM GRANULOCYTES NFR BLD AUTO: 0 % (ref 0–2)
LDLC SERPL CALC-MCNC: 83 MG/DL (ref 0–100)
LYMPHOCYTES # BLD AUTO: 1.73 THOUSANDS/ÂΜL (ref 0.6–4.47)
LYMPHOCYTES NFR BLD AUTO: 24 % (ref 14–44)
MCH RBC QN AUTO: 30.5 PG (ref 26.8–34.3)
MCHC RBC AUTO-ENTMCNC: 31.4 G/DL (ref 31.4–37.4)
MCV RBC AUTO: 97 FL (ref 82–98)
MONOCYTES # BLD AUTO: 0.6 THOUSAND/ÂΜL (ref 0.17–1.22)
MONOCYTES NFR BLD AUTO: 8 % (ref 4–12)
NEUTROPHILS # BLD AUTO: 4.67 THOUSANDS/ÂΜL (ref 1.85–7.62)
NEUTS SEG NFR BLD AUTO: 64 % (ref 43–75)
NRBC BLD AUTO-RTO: 0 /100 WBCS
PLATELET # BLD AUTO: 277 THOUSANDS/UL (ref 149–390)
PMV BLD AUTO: 10.9 FL (ref 8.9–12.7)
POTASSIUM SERPL-SCNC: 3.9 MMOL/L (ref 3.5–5.3)
PROT SERPL-MCNC: 6.8 G/DL (ref 6.4–8.4)
RBC # BLD AUTO: 3.61 MILLION/UL (ref 3.88–5.12)
SODIUM SERPL-SCNC: 139 MMOL/L (ref 135–147)
TRIGL SERPL-MCNC: 70 MG/DL (ref ?–150)
WBC # BLD AUTO: 7.27 THOUSAND/UL (ref 4.31–10.16)

## 2024-12-09 PROCEDURE — 85025 COMPLETE CBC W/AUTO DIFF WBC: CPT

## 2024-12-09 PROCEDURE — 36415 COLL VENOUS BLD VENIPUNCTURE: CPT

## 2024-12-09 PROCEDURE — 80053 COMPREHEN METABOLIC PANEL: CPT

## 2024-12-09 NOTE — TELEPHONE ENCOUNTER
LM for patient about the following, asked her to call back if she has any questions:    ----- Message from Evgeny Bellamy MD sent at 12/9/2024  2:12 PM EST -----  Labs look good  ----- Message -----  From: Lab, Background User  Sent: 12/9/2024   2:00 PM EST  To: Evgeny Bellamy MD

## 2024-12-10 ENCOUNTER — TELEPHONE (OUTPATIENT)
Dept: PODIATRY | Facility: CLINIC | Age: 84
End: 2024-12-10

## 2024-12-17 ENCOUNTER — CONSULT (OUTPATIENT)
Age: 84
End: 2024-12-17
Payer: COMMERCIAL

## 2024-12-17 VITALS
WEIGHT: 135 LBS | SYSTOLIC BLOOD PRESSURE: 126 MMHG | OXYGEN SATURATION: 98 % | HEIGHT: 62 IN | TEMPERATURE: 99.1 F | DIASTOLIC BLOOD PRESSURE: 74 MMHG | HEART RATE: 80 BPM | BODY MASS INDEX: 24.84 KG/M2

## 2024-12-17 DIAGNOSIS — I10 ESSENTIAL HYPERTENSION: ICD-10-CM

## 2024-12-17 DIAGNOSIS — M25.551 BILATERAL HIP PAIN: Primary | ICD-10-CM

## 2024-12-17 DIAGNOSIS — S81.802A WOUND OF LEFT LOWER EXTREMITY, INITIAL ENCOUNTER: ICD-10-CM

## 2024-12-17 DIAGNOSIS — M25.552 BILATERAL HIP PAIN: Primary | ICD-10-CM

## 2024-12-17 DIAGNOSIS — M47.816 LUMBAR SPONDYLOSIS: ICD-10-CM

## 2024-12-17 DIAGNOSIS — N18.31 STAGE 3A CHRONIC KIDNEY DISEASE (HCC): ICD-10-CM

## 2024-12-17 DIAGNOSIS — Z01.818 PREOP EXAMINATION: ICD-10-CM

## 2024-12-17 PROCEDURE — 93000 ELECTROCARDIOGRAM COMPLETE: CPT | Performed by: INTERNAL MEDICINE

## 2024-12-17 PROCEDURE — 99214 OFFICE O/P EST MOD 30 MIN: CPT | Performed by: INTERNAL MEDICINE

## 2024-12-17 RX ORDER — IBUPROFEN 800 MG/1
800 TABLET, FILM COATED ORAL AS NEEDED
Qty: 30 TABLET | Refills: 0 | Status: SHIPPED | OUTPATIENT
Start: 2024-12-17

## 2024-12-17 RX ORDER — MUPIROCIN 20 MG/G
OINTMENT TOPICAL 3 TIMES DAILY
Qty: 30 G | Refills: 1 | Status: SHIPPED | OUTPATIENT
Start: 2024-12-17

## 2024-12-18 NOTE — PROGRESS NOTES
Name: Monik Garcia      : 1940      MRN: [id number]  Encounter Provider: Ashish Arteaga MD  Encounter Date: 2024   Encounter department: [facility]  :  Assessment & Plan  Bilateral hip pain    Orders:    ibuprofen (MOTRIN) 800 mg tablet; Take 1 tablet (800 mg total) by mouth if needed for mild pain    Lumbar spondylosis  Ibuprofen was renewed    Orders:    ibuprofen (MOTRIN) 800 mg tablet; Take 1 tablet (800 mg total) by mouth if needed for mild pain    Preop examination  There are no contraindications to general anesthesia and planned surgery.  Orders:    POCT ECG    Essential hypertension  Blood pressure is stable and nicely controlled         Stage 3a chronic kidney disease (HCC)  Lab Results   Component Value Date    EGFR 36 2024    EGFR 44 2024    EGFR 50 2024    CREATININE 0.96 2024    CREATININE 1.35 (H) 2024    CREATININE 1.14 2024            Wound of left lower extremity, initial encounter    Orders:    mupirocin (BACTROBAN) 2 % ointment; Apply topically 3 (three) times a day To left lower leg.           History of Present Illness     The patient presents to the office for preoperative evaluation.  She is scheduled to undergo A&P colporrhaphy, colposuspension, cystoscopy and injection of bulking agent into the urethra.  She has no complaints of chest congestion, cough, fever or chills, nausea or vomiting, chest pain, dyspepsia, orthopnea, PND.  EKG obtained in the office was within normal limits.  Preoperative lab work was reviewed.  Hemoglobin is 11.0,Hematocrit is 35, white blood cell count 7270, platelets 277,000.  Electrocardiogram is within normal limits.  CMP is completely normal.  Calculated EGFR is 56.  Lipid panel is at goal.      Review of Systems   Constitutional: Negative.  Negative for activity change, appetite change, chills, diaphoresis, fatigue, fever and unexpected weight change.   HENT:  "Negative.     Eyes: Negative.    Respiratory: Negative.     Cardiovascular: Negative.    Gastrointestinal: Negative.    Endocrine: Negative.    Genitourinary: Negative.    Musculoskeletal: Negative.    Skin: Negative.    Neurological: Negative.    Hematological: Negative.    Psychiatric/Behavioral:  The patient is not nervous/anxious.        Objective   /74 (BP Location: Left arm, Patient Position: Sitting, Cuff Size: Standard)   Pulse 80   Temp 99.1 °F (37.3 °C) (Temporal)   Ht 5' 2\" (1.575 m)   Wt 61.2 kg (135 lb)   SpO2 98%   BMI 24.69 kg/m²      Physical Exam  Vitals reviewed.   Constitutional:       General: She is not in acute distress.     Appearance: Normal appearance. She is well-developed and normal weight. She is not ill-appearing, toxic-appearing or diaphoretic.   HENT:      Head: Normocephalic and atraumatic.      Right Ear: External ear normal.      Left Ear: External ear normal.      Nose: Nose normal.      Mouth/Throat:      Mouth: Mucous membranes are moist.      Pharynx: Oropharynx is clear.   Eyes:      General: No scleral icterus.     Conjunctiva/sclera: Conjunctivae normal.      Pupils: Pupils are equal, round, and reactive to light.   Cardiovascular:      Rate and Rhythm: Normal rate and regular rhythm.      Pulses: Normal pulses.      Heart sounds: Normal heart sounds. No murmur heard.  Pulmonary:      Effort: Pulmonary effort is normal. No respiratory distress.      Breath sounds: Normal breath sounds.   Abdominal:      General: Abdomen is flat. There is no distension.      Palpations: Abdomen is soft.      Tenderness: There is no abdominal tenderness.   Musculoskeletal:         General: No swelling or tenderness. Normal range of motion.      Cervical back: Normal range of motion and neck supple.      Right lower leg: No edema.      Left lower leg: No edema.   Skin:     General: Skin is warm and dry.      Coloration: Skin is not jaundiced.      Findings: No bruising, erythema or " rash.   Neurological:      General: No focal deficit present.      Mental Status: She is alert and oriented to person, place, and time. Mental status is at baseline.   Psychiatric:         Mood and Affect: Mood normal.         Behavior: Behavior normal.         Thought Content: Thought content normal.         Judgment: Judgment normal.

## 2024-12-18 NOTE — ASSESSMENT & PLAN NOTE
Ibuprofen was renewed    Orders:    ibuprofen (MOTRIN) 800 mg tablet; Take 1 tablet (800 mg total) by mouth if needed for mild pain

## 2024-12-18 NOTE — ASSESSMENT & PLAN NOTE
Lab Results   Component Value Date    EGFR 36 07/19/2024    EGFR 44 06/27/2024    EGFR 50 03/20/2024    CREATININE 0.96 12/09/2024    CREATININE 1.35 (H) 07/19/2024    CREATININE 1.14 06/27/2024

## 2024-12-20 NOTE — PRE-PROCEDURE INSTRUCTIONS
Pre-Surgery Instructions:   Medication Instructions    acyclovir (ZOVIRAX) 5 % ointment Hold day of surgery.    atorvastatin (LIPITOR) 10 mg tablet Take day of surgery.    Cholecalciferol (Vitamin D-3) 25 MCG (1000 UT) CAPS Stop taking 7 days prior to surgery.    metoprolol succinate (TOPROL-XL) 25 mg 24 hr tablet Take day of surgery.    Multiple Vitamins-Minerals (ONE A DAY WOMEN 50 PLUS PO) Stop taking 7 days prior to surgery.    pantoprazole (PROTONIX) 40 mg tablet Uses PRN- OK to take day of surgery    Premarin vaginal cream Instructions provided by MD    Medication instructions for day surgery reviewed. Please use only a sip of water to take your instructed medications. Avoid all over the counter vitamins, supplements and NSAIDS for one week prior to surgery per anesthesia guidelines. Tylenol is ok to take as needed.     You will receive a call one business day prior to surgery with an arrival time and hospital directions. If your surgery is scheduled on a Monday, the hospital will be calling you on the Friday prior to your surgery. If you have not heard from anyone by 8pm, please call the hospital supervisor through the hospital  at [phone]. ([place] [phone] or [place] [phone]).    Do not eat or drink anything after midnight the night before your surgery, including candy, mints, lifesavers, or chewing gum. Do not drink alcohol 24hrs before your surgery. Try not to smoke at least 24hrs before your surgery.       Follow the pre surgery showering instructions as listed in the “My Surgical Experience Booklet” or otherwise provided by your surgeon's office. Do not use a blade to shave the surgical area 1 week before surgery. It is okay to use a clean electric clippers up to 24 hours before surgery. Do not apply any lotions, creams, including makeup, cologne, deodorant, or perfumes after showering on the day of your surgery. Do not use dry shampoo, hair spray, hair gel, or any type of hair  products.     No contact lenses, eye make-up, or artificial eyelashes. Remove nail polish, including gel polish, and any artificial, gel, or acrylic nails if possible. Remove all jewelry including rings and body piercing jewelry.     Wear causal clothing that is easy to take on and off. Consider your type of surgery.    Keep any valuables, jewelry, piercings at home. Please bring any specially ordered equipment (sling, braces) if indicated.    Arrange for a responsible person to drive you to and from the hospital on the day of your surgery. Please confirm the visitor policy for the day of your procedure when you receive your phone call with an arrival time.     Call the surgeon's office with any new illnesses, exposures, or additional questions prior to surgery.    Please reference your “My Surgical Experience Booklet” for additional information to prepare for your upcoming surgery.

## 2024-12-30 ENCOUNTER — INTERMEDIATE (OUTPATIENT)
Dept: URBAN - METROPOLITAN AREA CLINIC 6 | Facility: CLINIC | Age: 84
End: 2024-12-30

## 2024-12-30 DIAGNOSIS — H40.033: ICD-10-CM

## 2024-12-30 DIAGNOSIS — H25.813: ICD-10-CM

## 2024-12-30 PROCEDURE — 92012 INTRM OPH EXAM EST PATIENT: CPT

## 2024-12-30 PROCEDURE — 92020 GONIOSCOPY: CPT

## 2024-12-30 ASSESSMENT — VISUAL ACUITY
OS_CC: 20/60-1
OD_PH: 20/50
OD_CC: 20/70-1
OS_PH: 20/40

## 2024-12-30 ASSESSMENT — TONOMETRY
OS_IOP_MMHG: 13
OD_IOP_MMHG: 14

## 2025-01-02 PROBLEM — N81.2 INCOMPLETE UTEROVAGINAL PROLAPSE: Status: ACTIVE | Noted: 2025-01-02

## 2025-01-02 RX ORDER — IBUPROFEN 600 MG/1
600 TABLET, FILM COATED ORAL EVERY 6 HOURS PRN
Status: CANCELLED | OUTPATIENT
Start: 2025-01-02

## 2025-01-02 NOTE — DISCHARGE INSTRUCTIONS
Apical Vaginal Repair (EnPlace System)    WHAT YOU NEED TO KNOW:   An apical vaginal repair is a procedure to lift the cervix, vagina  and surrounding structures to the normal anatomical position. This can help prevent you from having a bulge sensation in the vagina. The procedure is also called an EnPlace procedure.      DISCHARGE INSTRUCTIONS:   Medicines:   Pain medicine:    You may need medicine to take away or decrease pain.     Learn how to take your medicine. Ask what medicine and how much you should take. Be sure you know how, when, and how often to take it.    Do not wait until the pain is severe before you take your medicine. Tell caregivers if your pain does not decrease.    Pain medicine can make you dizzy or sleepy. Prevent falls by calling someone when you get out of bed or if you need help.  Wait until 1/7/25 at the end of the day for a Sitz bath    Antibiotics:  This medicine is given to fight or prevent an infection caused by bacteria. Always take your antibiotics exactly as ordered by your healthcare provider. Do not stop taking your medicine unless directed by your healthcare provider. Never save antibiotics or take leftover antibiotics that were given to you for another illness.    Take your medicine as directed.  Contact your healthcare provider if you think your medicine is not helping or if you have side effects. Tell him or her if you are allergic to any medicine. Keep a list of the medicines, vitamins, and herbs you take. Include the amounts, and when and why you take them. Bring the list or the pill bottles to follow-up visits. Carry your medicine list with you in case of an emergency.  Follow up with your healthcare provider as directed:  Write down your questions so you remember to ask them during your visits.   Self-care:     Sex:  Do not have sex until your healthcare provider says it is okay.    Kegel exercises:  To do kegel exercises, squeeze your pelvic floor muscles for 5 to 10  seconds, then release. Regular kegel exercises will help your pelvic floor muscles become stronger. This will help prevent you from leaking urine. Ask your healthcare provider when to start these exercises and how often to do them.    Sanitary pad:  Change your sanitary pad regularly. Keep track of how often you change the pad.    Plummer catheter:  Keep the bag below your waist. This will help prevent infection and other problems caused by urine flowing back into your bladder. Do not pull on the catheter because this can cause pain and bleeding, and the catheter could come out. Keep the catheter tubing free of kinks so your urine will flow into the bag. Your healthcare provider will remove the catheter as soon as possible, to help prevent infection.    Wound care:  When you are allowed to bathe or shower, carefully wash your vaginal area with soap and water.     Do not put pressure on your abdomen:  This will help prevent damage to your surgery area. Do not strain, lift heavy objects, or stand for a very long time. Do not perform strenuous exercises, such as running and weight lifting.    Activity:  You may need to start walking within a few days after your procedure. Ask your healthcare provider when to start and how long you should walk. Ask about any other exercises that may be right for you.    Support socks:  You may need to wear support socks. These are tight socks that help increase the circulation in your legs until you are more active. This helps prevent blood clots.  Contact your healthcare provider if:   You soak a sanitary pad with blood every hour for 4 hours.    You have vaginal pain that does not go away even after you take pain medicine.    You have pus or a foul-smelling discharge from your genital area.     You see blood in your urine.    You have pain during sex.    You have a fever, chills, a cough, or feel weak and achy.    You have nausea and vomiting.    You have questions or concerns about  your condition or care.  Seek care immediately or call 911 if:   You feel something is bulging out into your vagina or rectum and not going back in.    You cannot urinate.    Your arm or leg feels warm, tender, and painful. It may look swollen and red.    You suddenly feel lightheaded and short of breath.    You have chest pain. You may have more pain when you take a deep breath or cough. You may cough up blood.  © 2017 [employer] Information is for End User's use only and may not be sold, redistributed or otherwise used for commercial purposes. All illustrations and images included in CareNotes® are the copyrighted property of [employer] or [employer].  The above information is an  only. It is not intended as medical advice for individual conditions or treatments. Talk to your doctor, nurse or pharmacist before following any medical regimen to see if it is safe and effective for you.    Urethral Buling Procedure   WHAT YOU NEED TO KNOW:   A urethral bulking procedure is surgery to treat urinary incontinence in women.  You may have small amounts of bleeding with urination or small black beads in your urine for up to a week after your surgery. Use sanitary pads. . You may have some pelvic discomfort or trouble urinating.     DISCHARGE INSTRUCTIONS:   Call 911 for any of the following:   You have sudden trouble breathing.    Seek care immediately if:   Your bleeding gets worse.    You have yellow or foul smelling discharge from your vagina.    You cannot urinate, or you are urinating less than what is normal for you.    You feel confused.  Contact your healthcare provider if:   You have a fever.    You do not feel like you are able to empty your bladder completely when you urinate.    You feel the need to urinate very suddenly.    You have burning or stinging when you urinate.    You have blood in your urine.    Your skin is itchy, swollen, or you have a rash.    You  have questions or concerns about your condition or care.  Medicines:   Prescription pain medicine  may be given. Ask your how to take this medicine safely.    Take your medicine as directed.  Contact your healthcare provider if you think your medicine is not helping or if you have side effects. Tell him or her if you are allergic to any medicine. Keep a list of the medicines, vitamins, and herbs you take. Include the amounts, and when and why you take them. Bring the list or the pill bottles to follow-up visits. Carry your medicine list with you in case of an emergency.  Self-catheterization:  You may need to put a catheter into your bladder after you urinate to empty any remaining urine. A catheter is a small rubber tube used to drain urine. Healthcare providers will teach you how to put the catheter in safely. This may be needed until you are completely emptying your bladder.  Plummer catheter:  You may have a Plummer catheter for a short period of time. The Plummer is a tube put into your bladder to drain urine into a bag. Keep the bag below your waist. This will prevent urine from flowing back into your bladder and causing an infection or other problems. Also, keep the tube free of kinks so the urine will drain properly. Do not pull on the catheter. This can cause pain and bleeding, and may cause the catheter to come out.   Activity:  Do not lift heavy objects for 6 weeks after your procedure. Do not have intercourse for 4 to 6 weeks. Do not use a tampon for 4 weeks. Ask your healthcare provider when you can return to work or your usual activities.  Do pelvic muscle exercises:  These are also called Kegel exercises. These exercises help strengthen your pelvic muscles and help prevent urine leakage. Tighten the muscles of your pelvis and hold them tight for 5 seconds, then relax for 5 seconds. Gradually work up to tightening them for 10 seconds and relaxing for 10 seconds. Do this 3 times each day.  Keep a record:   Keep a record of when you urinate and if you leak any urine. Write down what you were doing when you leaked urine, such as coughing or sneezing. Bring the log to your follow-up visits.  Prevent constipation:  Drink liquids as directed. You may need to drink more water than usual to soften your bowel movements. Eat a variety of healthy foods, especially fruit and foods high in fiber. You may need to use an over-the-counter bowel movement softener.  Follow up with your healthcare provider as directed:  You may need a test to check how much urine remains in your bladder after you urinate. This will help show how the sling is working. Write down your questions so you remember to ask them during your visits.  © 2017 [employer] Information is for End User's use only and may not be sold, redistributed or otherwise used for commercial purposes. All illustrations and images included in CareNotes® are the copyrighted property of [employer] or [employer].  The above information is an  only. It is not intended as medical advice for individual conditions or treatments. Talk to your doctor, nurse or pharmacist before following any medical regimen to see if it is safe and effective for you.

## 2025-01-02 NOTE — DISCHARGE INSTR - AVS FIRST PAGE
Post-Urogynecologic Surgery Discharge Instructions:  1. Nothing in the vagina until after your postoperative appointment  2. You may take stairs one at a time, touching each step with both feet for the first few days, then as tolerated.  3. Call the office for fever greater than 100.4'F, heavy vaginal bleeding, or increasing pain.  4. Activity as tolerated.  5. Please remove the vaginal packing in the morning on POD#3, Thursday  6. Please take the following for postoperative bowel regimen: colace 100 mg twice daily  7. Do not use topical estrogen until six weeks postoperatively    Post Operative Pain Management:  If you have cramping or mild pain you may take 600 mg Ibuprofen every 6 hours to relieve.     If you continue to have residual mild pain not entirely relieved by Ibuprofen then you may take 650 mg of tylenol every 6 hours.       If you have any questions regarding your prescriptions please call your doctor.

## 2025-01-03 ENCOUNTER — ANESTHESIA EVENT (OUTPATIENT)
Dept: PERIOP | Facility: HOSPITAL | Age: 85
End: 2025-01-03
Payer: COMMERCIAL

## 2025-01-06 ENCOUNTER — ANESTHESIA (OUTPATIENT)
Dept: PERIOP | Facility: HOSPITAL | Age: 85
End: 2025-01-06
Payer: COMMERCIAL

## 2025-01-06 ENCOUNTER — HOSPITAL ENCOUNTER (OUTPATIENT)
Facility: HOSPITAL | Age: 85
Setting detail: OUTPATIENT SURGERY
Discharge: HOME | End: 2025-01-06
Attending: OBSTETRICS & GYNECOLOGY | Admitting: OBSTETRICS & GYNECOLOGY
Payer: COMMERCIAL

## 2025-01-06 VITALS
RESPIRATION RATE: 20 BRPM | HEART RATE: 67 BPM | OXYGEN SATURATION: 98 % | HEIGHT: 62 IN | BODY MASS INDEX: 24.63 KG/M2 | SYSTOLIC BLOOD PRESSURE: 134 MMHG | TEMPERATURE: 97.3 F | DIASTOLIC BLOOD PRESSURE: 63 MMHG | WEIGHT: 133.82 LBS

## 2025-01-06 DIAGNOSIS — G89.18 POSTOPERATIVE PAIN: Primary | ICD-10-CM

## 2025-01-06 DIAGNOSIS — N81.2 INCOMPLETE UTEROVAGINAL PROLAPSE: ICD-10-CM

## 2025-01-06 PROCEDURE — L8606 SYNTHETIC IMPLNT URINARY 1ML: HCPCS | Performed by: OBSTETRICS & GYNECOLOGY

## 2025-01-06 PROCEDURE — 51798 US URINE CAPACITY MEASURE: CPT | Performed by: OBSTETRICS & GYNECOLOGY

## 2025-01-06 DEVICE — BULKAMID URETHRAL BULKING SYSTEM 2ML
Type: IMPLANTABLE DEVICE | Status: FUNCTIONAL
Brand: BULKAMID

## 2025-01-06 DEVICE — IMPLANTABLE DEVICE
Type: IMPLANTABLE DEVICE | Site: BLADDER | Status: FUNCTIONAL
Brand: ENPLACE

## 2025-01-06 RX ORDER — MAGNESIUM HYDROXIDE 1200 MG/15ML
LIQUID ORAL AS NEEDED
Status: DISCONTINUED | OUTPATIENT
Start: 2025-01-06 | End: 2025-01-06 | Stop reason: HOSPADM

## 2025-01-06 RX ORDER — OXYCODONE HYDROCHLORIDE 5 MG/1
2.5 TABLET ORAL EVERY 4 HOURS PRN
Qty: 3 TABLET | Refills: 0 | Status: SHIPPED | OUTPATIENT
Start: 2025-01-06

## 2025-01-06 RX ORDER — SODIUM CHLORIDE 9 MG/ML
INJECTION INTRAVENOUS AS NEEDED
Status: DISCONTINUED | OUTPATIENT
Start: 2025-01-06 | End: 2025-01-06 | Stop reason: HOSPADM

## 2025-01-06 RX ORDER — METHOCARBAMOL 500 MG/1
500 TABLET, FILM COATED ORAL ONCE
Status: COMPLETED | OUTPATIENT
Start: 2025-01-06 | End: 2025-01-06

## 2025-01-06 RX ORDER — FENTANYL CITRATE/PF 50 MCG/ML
25 SYRINGE (ML) INJECTION
Status: DISCONTINUED | OUTPATIENT
Start: 2025-01-06 | End: 2025-01-06 | Stop reason: HOSPADM

## 2025-01-06 RX ORDER — FENTANYL CITRATE 50 UG/ML
INJECTION, SOLUTION INTRAMUSCULAR; INTRAVENOUS AS NEEDED
Status: DISCONTINUED | OUTPATIENT
Start: 2025-01-06 | End: 2025-01-06

## 2025-01-06 RX ORDER — CEFAZOLIN SODIUM 2 G/50ML
2000 SOLUTION INTRAVENOUS ONCE
Status: COMPLETED | OUTPATIENT
Start: 2025-01-06 | End: 2025-01-06

## 2025-01-06 RX ORDER — HYDROMORPHONE HCL/PF 1 MG/ML
0.2 SYRINGE (ML) INJECTION
Status: DISCONTINUED | OUTPATIENT
Start: 2025-01-06 | End: 2025-01-06 | Stop reason: HOSPADM

## 2025-01-06 RX ORDER — PHENYLEPHRINE HCL IN 0.9% NACL 1 MG/10 ML
SYRINGE (ML) INTRAVENOUS AS NEEDED
Status: DISCONTINUED | OUTPATIENT
Start: 2025-01-06 | End: 2025-01-06

## 2025-01-06 RX ORDER — OXYCODONE HYDROCHLORIDE 5 MG/1
5 TABLET ORAL EVERY 4 HOURS PRN
Refills: 0 | Status: DISCONTINUED | OUTPATIENT
Start: 2025-01-06 | End: 2025-01-06 | Stop reason: HOSPADM

## 2025-01-06 RX ORDER — ONDANSETRON 2 MG/ML
4 INJECTION INTRAMUSCULAR; INTRAVENOUS ONCE AS NEEDED
Status: DISCONTINUED | OUTPATIENT
Start: 2025-01-06 | End: 2025-01-06 | Stop reason: HOSPADM

## 2025-01-06 RX ORDER — ONDANSETRON 2 MG/ML
4 INJECTION INTRAMUSCULAR; INTRAVENOUS EVERY 6 HOURS PRN
Status: DISCONTINUED | OUTPATIENT
Start: 2025-01-06 | End: 2025-01-06 | Stop reason: HOSPADM

## 2025-01-06 RX ORDER — BUPIVACAINE HYDROCHLORIDE 2.5 MG/ML
INJECTION, SOLUTION EPIDURAL; INFILTRATION; INTRACAUDAL AS NEEDED
Status: DISCONTINUED | OUTPATIENT
Start: 2025-01-06 | End: 2025-01-06 | Stop reason: HOSPADM

## 2025-01-06 RX ORDER — ACETAMINOPHEN 325 MG/1
975 TABLET ORAL ONCE
Status: COMPLETED | OUTPATIENT
Start: 2025-01-06 | End: 2025-01-06

## 2025-01-06 RX ORDER — PROPOFOL 10 MG/ML
INJECTION, EMULSION INTRAVENOUS AS NEEDED
Status: DISCONTINUED | OUTPATIENT
Start: 2025-01-06 | End: 2025-01-06

## 2025-01-06 RX ORDER — DOCUSATE SODIUM 100 MG/1
100 CAPSULE, LIQUID FILLED ORAL 2 TIMES DAILY
Start: 2025-01-06

## 2025-01-06 RX ORDER — ACETAMINOPHEN 325 MG/1
975 TABLET ORAL EVERY 6 HOURS PRN
Status: DISCONTINUED | OUTPATIENT
Start: 2025-01-06 | End: 2025-01-06 | Stop reason: HOSPADM

## 2025-01-06 RX ORDER — PROPOFOL 10 MG/ML
INJECTION, EMULSION INTRAVENOUS CONTINUOUS PRN
Status: DISCONTINUED | OUTPATIENT
Start: 2025-01-06 | End: 2025-01-06

## 2025-01-06 RX ORDER — POLYETHYLENE GLYCOL 3350 17 G/17G
17 POWDER, FOR SOLUTION ORAL DAILY PRN
Start: 2025-01-06

## 2025-01-06 RX ORDER — LIDOCAINE HYDROCHLORIDE AND EPINEPHRINE 10; 10 MG/ML; UG/ML
INJECTION, SOLUTION INFILTRATION; PERINEURAL AS NEEDED
Status: DISCONTINUED | OUTPATIENT
Start: 2025-01-06 | End: 2025-01-06 | Stop reason: HOSPADM

## 2025-01-06 RX ORDER — ALBUTEROL SULFATE 0.83 MG/ML
2.5 SOLUTION RESPIRATORY (INHALATION) ONCE AS NEEDED
Status: DISCONTINUED | OUTPATIENT
Start: 2025-01-06 | End: 2025-01-06 | Stop reason: HOSPADM

## 2025-01-06 RX ORDER — ACETAMINOPHEN 325 MG/1
650 TABLET ORAL EVERY 6 HOURS PRN
Start: 2025-01-06

## 2025-01-06 RX ORDER — SODIUM CHLORIDE, SODIUM LACTATE, POTASSIUM CHLORIDE, CALCIUM CHLORIDE 600; 310; 30; 20 MG/100ML; MG/100ML; MG/100ML; MG/100ML
125 INJECTION, SOLUTION INTRAVENOUS CONTINUOUS
Status: DISCONTINUED | OUTPATIENT
Start: 2025-01-06 | End: 2025-01-06 | Stop reason: HOSPADM

## 2025-01-06 RX ADMIN — FENTANYL CITRATE 25 MCG: 50 INJECTION INTRAMUSCULAR; INTRAVENOUS at 13:04

## 2025-01-06 RX ADMIN — CEFAZOLIN SODIUM 2000 MG: 2 SOLUTION INTRAVENOUS at 12:50

## 2025-01-06 RX ADMIN — SODIUM CHLORIDE 4 MCG: 9 INJECTION, SOLUTION INTRAVENOUS at 13:03

## 2025-01-06 RX ADMIN — Medication 2.5 MG: at 16:34

## 2025-01-06 RX ADMIN — PROPOFOL 50 MG: 10 INJECTION, EMULSION INTRAVENOUS at 12:44

## 2025-01-06 RX ADMIN — PROPOFOL 30 MG: 10 INJECTION, EMULSION INTRAVENOUS at 13:06

## 2025-01-06 RX ADMIN — ACETAMINOPHEN 975 MG: 325 TABLET, FILM COATED ORAL at 15:59

## 2025-01-06 RX ADMIN — SODIUM CHLORIDE 8 MCG: 9 INJECTION, SOLUTION INTRAVENOUS at 12:48

## 2025-01-06 RX ADMIN — FENTANYL CITRATE 25 MCG: 50 INJECTION INTRAMUSCULAR; INTRAVENOUS at 12:59

## 2025-01-06 RX ADMIN — PROPOFOL 100 MCG/KG/MIN: 10 INJECTION, EMULSION INTRAVENOUS at 12:45

## 2025-01-06 RX ADMIN — PROPOFOL 30 MG: 10 INJECTION, EMULSION INTRAVENOUS at 12:55

## 2025-01-06 RX ADMIN — METHOCARBAMOL 500 MG: 500 TABLET ORAL at 16:45

## 2025-01-06 RX ADMIN — SODIUM CHLORIDE 4 MCG: 9 INJECTION, SOLUTION INTRAVENOUS at 13:17

## 2025-01-06 RX ADMIN — FENTANYL CITRATE 25 MCG: 50 INJECTION INTRAMUSCULAR; INTRAVENOUS at 12:44

## 2025-01-06 RX ADMIN — PROPOFOL 30 MG: 10 INJECTION, EMULSION INTRAVENOUS at 13:25

## 2025-01-06 RX ADMIN — FENTANYL CITRATE 25 MCG: 50 INJECTION INTRAMUSCULAR; INTRAVENOUS at 13:55

## 2025-01-06 RX ADMIN — PROPOFOL 20 MG: 10 INJECTION, EMULSION INTRAVENOUS at 13:57

## 2025-01-06 RX ADMIN — SODIUM CHLORIDE, SODIUM LACTATE, POTASSIUM CHLORIDE, AND CALCIUM CHLORIDE 125 ML/HR: .6; .31; .03; .02 INJECTION, SOLUTION INTRAVENOUS at 10:15

## 2025-01-06 RX ADMIN — BENZOCAINE AND LEVOMENTHOL 1 APPLICATION: 200; 5 SPRAY TOPICAL at 17:23

## 2025-01-06 RX ADMIN — ACETAMINOPHEN 975 MG: 325 TABLET, FILM COATED ORAL at 10:08

## 2025-01-06 RX ADMIN — Medication 100 MCG: at 13:15

## 2025-01-06 RX ADMIN — FENTANYL CITRATE 25 MCG: 50 INJECTION INTRAMUSCULAR; INTRAVENOUS at 14:05

## 2025-01-06 RX ADMIN — FENTANYL CITRATE 25 MCG: 50 INJECTION INTRAMUSCULAR; INTRAVENOUS at 12:50

## 2025-01-06 RX ADMIN — FENTANYL CITRATE 25 MCG: 50 INJECTION INTRAMUSCULAR; INTRAVENOUS at 13:25

## 2025-01-06 NOTE — OP NOTE
OPERATIVE REPORT  PATIENT NAME: Monik Garcia    :  1940  MRN: [id number]  Pt Location: [facility]OM 04    SURGERY DATE: 2025    Surgeons and Role:     * Juan David Mcbride MD - Primary    Preop Diagnosis:  Incomplete uterovaginal prolapse [N81.2]  Cystocele, midline [N81.11]  Rectocele [N81.6]  Pelvic muscle wasting [N81.84]  Other female genital prolapse [N81.89]  Urethral disorder, unspecified [N36.9]    Post-Op Diagnosis Codes:     * Incomplete uterovaginal prolapse [N81.2]     * Cystocele, midline [N81.11]     * Rectocele [N81.6]     * Pelvic muscle wasting [N81.84]     * Other female genital prolapse [N81.89]     * Urethral disorder, unspecified [N36.9]    Procedure(s):  COLPOSUSPENSION VAGINAL EXTRAPERITONEAL(ENPLACE); EUA  A&P COLPORRHAPHY  CYSTO  INJECTION BULKING AGENT URETHRAL    Specimen(s):  None    Estimated Blood Loss:   Minimal    Drains:  None    Anesthesia Type:   IV Sedation with Anesthesia    Operative Indications:  Incomplete uterovaginal prolapse [N81.2]  Cystocele, midline [N81.11]  Rectocele [N81.6]  Pelvic muscle wasting [N81.84]  Other female genital prolapse [N81.89]  Urethral disorder, unspecified [N36.9]    Operative Findings:  Normal appearing external female genitalia  Relaxed vaginal outlet, narrow introitus, vaginal atrophy  Enplace anchor location - Right at 60 yard line, Left at 10 yard line      Complications:   None    Procedure and Technique:  Appropriate preoperative antibiotics chosen per ACOG guidelines were given.  Bilateral SCDs were placed in the lower extremities for DVT prevention prior to the institution of anesthesia.    The patient was identified in the holding area by the operating room staff and attending physician. She was taken to the operating room where anesthesia was instituted without complications. She was placed in the dorsal lithotomy position with the legs in Arnaldo stirrups with care taken to avoid excessive flexion or extension of her lower  extremities. The patient was prepped and draped in the usual sterile fashion. A Plummer catheter was inserted.    A finger was placed in the lateral vagina, with careful palpation of the ischial spines and sacrospinous ligaments bilaterally.  The right finger sleeve was then applied to the surgeon's index finger.  These landmarks were then again palpated with the finger sleeve in place.  Location along the sacrospinous ligament approximately in the middle 1/3 of the ligament as well as the lower 1/3 of the ligament was carefully palpated and the finger sleeve port was loaded with a 7 inch 22G spinal needle to inject the trocar site initially with Marcaine/Lidocaine/Epinephrine local totaling 1.5 cc. The trocar device loaded with the 0 Prolene suture was brought into the field. The trocar device was inserted into the finger sleeve port and the device was used to place the 0 Prolene suture at this location while the surgeons finger was firmly pressed against the ligament, approximately 2 cm medial to the ischial spine using the sacrum as a guide.  Once the suture was anchored in place, which was confirmed by tenting of the ligament with gentle tugging, all instruments were removed from the vagina.  Rectal exam confirmed proper location as well. On the contralateral side, the same procedure was performed; however, the suture was not anchored in the ligament. The anchor was retrieved and again deployed with the same method mentioned previously; however, the suture again was found to be in the same location and was not in the sacrospinous ligament. The anchor was again retrieved and reloaded. Another 0.75cc of local was injected again into the sacrospinous ligament. The trocar device was deployed this time without the finger sleeve port, and was deployed 2cm medial to the ischial spine. The suture was found to be at the 10 meter line.     Next local was injected into the cervico-vaginal junction and a 3 cm anterior vaginal  epithelial incision was made along the anterior cervico-vaginal junction, until the cervical stroma was encountered. Next, one strand of the Prolene suture was passed backwards using a large whitman needle through its entering point in the vaginal wall and medially through the cervical stromal tissue. The second strand on the same side was passed caudally and proximal to the cervical os using the same technique. The exact same procedure was performed on the contralateral side.     Attention was turned to the anterior wall where a persistent cystocele was noted. Two Allis clamps were applied horizontally along the vaginal incision to grasp the dependent portion of the cystocele for traction. The epithelium was further  from the vaginal muscularis sharply with tenotomy and metzenbaum scissors and bluntly with peanut sponges and a raytec. Excess vaginal mucosal skin was trimmed. The vaginal wall was then plicated in a vertical imbricating fashion using 2-0 PDS. We then began closure of the anterior vaginal epithelial incision with a 2-0 Vicryl sutures in a running locked stitch from lateral to medial with two separate sutures from each vaginal apex. The incision was intentionally left open after the first few throws from each side. The EnPlace prolene sutures were then tied down separately, one at a time until proper apical support was obtained and then they were tied together pushing the prolapse up to the anatomic position of the vaginal apex. The 2-0 PDS stitches were then tied down. We then finished closure of the anterior vaginal epithelial incision with the two 2-0 Vicryl sutures in a running locked stitch from lateral to medial. The incision was inspected and noted to be hemostatic.      Attention was turned to the urethral bulking portion of the procedure. The pro catheter was removed.    The 0 degree Bulkamid hysteroscope was then injected gently and advanced to the bladder neck. The needle with the  bulking agent was advanced and the 2 cm camilla was noted and advanced to the bladder. It was pulled back 2 cm into the urethra and the injecting needle was advanced to about 1 cm deep into the urethral tissue. Bulking agent was injected circumferentially at about 6 sites roughly 0.3-0.5 mL on each site and good coaptation was noted. About 1.9 mL of Bulkamid was injected. The scope was then withdrawn gently.     Attention was then turned to the posterior compartment where 2 Allis clamps were placed in the posterior fourchette over the mucocutaneous border. Dilute Marcaine solution was injected into the perineum. A guadalupe-shaped incision was made extending from the vaginal mucosa onto the perineum. The overlying skin was removed en bloc. We then began closure of the posterior colporrhaphy with a 2-0 Vicryl suture in a running locked stitch. We reapproximated the perineal body with a 0-Vicryl interrupted suture. We closed the remainder of the colporrhaphy to the level of the hymen. We closed the perineal skin with 2-0 Vicryl in a subcuticular fashion.    No bladder, ureteral, viscus, or solid organ injury were noted at the end of the procedure. Irrigation was performed. We completed the procedure. There were no complications.The sponge, needle and instrument count were correct x2. The patient tolerated the procedure well and went to the recovery room in stable condition and she is stable at the moment of this dictation.    Dr. Mcbride was present for the entire procedure.       Patient Disposition:  PACU     SIGNATURE: Jennifer Ruiz MD  DATE: January 6, 2025  TIME: 4:48 PM

## 2025-01-06 NOTE — QUICK NOTE
Called to patient bedside as patient is complaining of 10/10 pain in vaginal area. On external exam, there is minimal bleeding and stitches are intact. On gentle digital exam of internal stitches patient states this is the region of pain. She was able to void 25cc which improved the pain to a 9/10. She has only received tylenol for pain. She feels the need to void and bladder scan shows 70cc retained.    After receiving robaxin and liseth 2.5mg, 30 minutes later pain is improved to an 8/10. She was able to void another 25 cc. We are also going to try giving tucks pads and dermoplast spray. Patient is motivated for discharge tonight and would like to see if the conservative measures help. 2.5mg roxicodone will be sent to the pharmacy by Dr. Byrd.    Jennifer WATTS PGY3

## 2025-01-06 NOTE — ANESTHESIA PREPROCEDURE EVALUATION
Procedure:  COLPOSUSPENSION VAGINAL EXTRAPERITONEAL(ENPLACE); EUA (Vagina )  A&P COLPORRHAPHY (Perineum)  CYSTO (Bladder)  INJECTION BULKING AGENT URETHRAL (Urethra)    Relevant Problems   ANESTHESIA (within normal limits)      CARDIO   (+) Essential hypertension   (+) Parenchymal renal hypertension   (+) Raynaud's disease without gangrene      GI/HEPATIC   (+) GERD (gastroesophageal reflux disease)      /RENAL   (+) Chronic kidney disease, stage III (moderate) (HCC)   (+) Parenchymal renal hypertension      HEMATOLOGY   (+) Anemia      MUSCULOSKELETAL   (+) Low back pain with sciatica   (+) Lumbar spondylosis   (+) Primary generalized (osteo)arthritis        Physical Exam    Airway    Mallampati score: II  TM Distance: >3 FB  Neck ROM: full     Dental   No notable dental hx     Cardiovascular  Rhythm: regular, Rate: normal, Cardiovascular exam normal    Pulmonary  Pulmonary exam normal Breath sounds clear to auscultation    Other Findings  post-pubertal.      Anesthesia Plan  ASA Score- 2     Anesthesia Type- IV sedation with anesthesia with ASA Monitors.         Additional Monitors:     Airway Plan:     Comment: GA as backup.       Plan Factors-Exercise tolerance (METS): >4 METS.    Chart reviewed. EKG reviewed.  Existing labs reviewed. Patient summary reviewed.    Patient is not a current smoker.              Induction- intravenous.    Postoperative Plan-         Informed Consent- Anesthetic plan and risks discussed with patient.

## 2025-01-06 NOTE — ANESTHESIA POSTPROCEDURE EVALUATION
Post-Op Assessment Note    CV Status:  Stable    Pain management: adequate    Multimodal analgesia used between 6 hours prior to anesthesia start to PACU discharge    Mental Status:  Arousable   Hydration Status:  Stable   PONV Controlled:  None   Airway Patency:  Patent  Two or more mitigation strategies used for obstructive sleep apnea   Post Op Vitals Reviewed: Yes    No anethesia notable event occurred.    Staff: CRNA           Last Filed PACU Vitals:  Vitals Value Taken Time   Temp 37    Pulse 80 01/06/25 1431   /68 01/06/25 1429   Resp 27 01/06/25 1431   SpO2 95 % 01/06/25 1431   Vitals shown include unfiled device data.

## 2025-01-10 NOTE — ANESTHESIA POSTPROCEDURE EVALUATION
Post-Op Assessment Note    Last Filed PACU Vitals:  Vitals Value Taken Time   Temp 98 °F (36.7 °C) 01/06/25 1458   Pulse 84 01/06/25 1506   /82 01/06/25 1459   Resp 30 01/06/25 1506   SpO2 98 % 01/06/25 1506   Vitals shown include unfiled device data.    Modified Raman:     Vitals Value Taken Time   Activity 2 01/06/25 1458   Respiration 2 01/06/25 1458   Circulation 2 01/06/25 1458   Consciousness 2 01/06/25 1458   Oxygen Saturation 2 01/06/25 1458     Modified Raman Score: 10

## 2025-01-14 ENCOUNTER — OFFICE VISIT (OUTPATIENT)
Dept: OBGYN CLINIC | Facility: HOSPITAL | Age: 85
End: 2025-01-14
Payer: COMMERCIAL

## 2025-01-14 VITALS — BODY MASS INDEX: 24.48 KG/M2 | HEIGHT: 62 IN | WEIGHT: 133 LBS

## 2025-01-14 DIAGNOSIS — M19.012 PRIMARY OSTEOARTHRITIS OF LEFT SHOULDER: ICD-10-CM

## 2025-01-14 DIAGNOSIS — M70.61 GREATER TROCHANTERIC BURSITIS OF BOTH HIPS: Primary | ICD-10-CM

## 2025-01-14 DIAGNOSIS — M70.62 GREATER TROCHANTERIC BURSITIS OF BOTH HIPS: Primary | ICD-10-CM

## 2025-01-14 DIAGNOSIS — M12.812 ROTATOR CUFF ARTHROPATHY OF LEFT SHOULDER: ICD-10-CM

## 2025-01-14 DIAGNOSIS — M17.0 PRIMARY OSTEOARTHRITIS OF BOTH KNEES: ICD-10-CM

## 2025-01-14 PROCEDURE — 99213 OFFICE O/P EST LOW 20 MIN: CPT | Performed by: ORTHOPAEDIC SURGERY

## 2025-01-14 PROCEDURE — 20610 DRAIN/INJ JOINT/BURSA W/O US: CPT

## 2025-01-14 RX ORDER — LIDOCAINE HYDROCHLORIDE 10 MG/ML
4 INJECTION, SOLUTION INFILTRATION; PERINEURAL
Status: COMPLETED | OUTPATIENT
Start: 2025-01-14 | End: 2025-01-14

## 2025-01-14 RX ORDER — BETAMETHASONE SODIUM PHOSPHATE AND BETAMETHASONE ACETATE 3; 3 MG/ML; MG/ML
12 INJECTION, SUSPENSION INTRA-ARTICULAR; INTRALESIONAL; INTRAMUSCULAR; SOFT TISSUE
Status: COMPLETED | OUTPATIENT
Start: 2025-01-14 | End: 2025-01-14

## 2025-01-14 RX ADMIN — LIDOCAINE HYDROCHLORIDE 4 ML: 10 INJECTION, SOLUTION INFILTRATION; PERINEURAL at 10:15

## 2025-01-14 RX ADMIN — BETAMETHASONE SODIUM PHOSPHATE AND BETAMETHASONE ACETATE 12 MG: 3; 3 INJECTION, SUSPENSION INTRA-ARTICULAR; INTRALESIONAL; INTRAMUSCULAR; SOFT TISSUE at 10:15

## 2025-01-14 NOTE — PROGRESS NOTES
Assessment:   Diagnosis ICD-10-CM Associated Orders   1. Greater trochanteric bursitis of both hips  M70.61 Large joint arthrocentesis: bilateral greater trochanteric bursa    M70.62 lidocaine (XYLOCAINE) 1 % injection 4 mL     lidocaine (XYLOCAINE) 1 % injection 4 mL     betamethasone acetate-betamethasone sodium phosphate (CELESTONE) injection 12 mg     betamethasone acetate-betamethasone sodium phosphate (CELESTONE) injection 12 mg      2. Primary osteoarthritis of both knees  M17.0 Large joint arthrocentesis: bilateral knee     lidocaine (XYLOCAINE) 1 % injection 4 mL     lidocaine (XYLOCAINE) 1 % injection 4 mL     betamethasone acetate-betamethasone sodium phosphate (CELESTONE) injection 12 mg     betamethasone acetate-betamethasone sodium phosphate (CELESTONE) injection 12 mg      3. Primary osteoarthritis of left shoulder  M19.012 Large joint arthrocentesis: L glenohumeral     lidocaine (XYLOCAINE) 1 % injection 4 mL     betamethasone acetate-betamethasone sodium phosphate (CELESTONE) injection 12 mg      4. Rotator cuff arthropathy of left shoulder  M12.812 Large joint arthrocentesis: L glenohumeral     lidocaine (XYLOCAINE) 1 % injection 4 mL     betamethasone acetate-betamethasone sodium phosphate (CELESTONE) injection 12 mg          Plan:  84 y.o. female with known osteoarthritis of bilateral knees, chronic greater trochanteric bursitis of bilateral hips as well as left shoulder osteoarthritis  Patient continues to find intermittent pain relief from injections of corticosteroid  Last injections provided about 3 months of pain relief  Offered, accepted, provided with repeat injections of corticosteroid to bilateral knees, bilateral greater trochanteric bursa, left glenohumeral joint  Patient tolerated procedures well  Ice and postinjection protocol advised  Continue physical activity as tolerated  Follow-up in about 3 months    The above stated was discussed in layman's terms and the patient expressed  understanding.  All questions were answered to the patient's satisfaction.     To do next visit:  Return in about 3 months (around 4/14/2025).      Subjective:   Monik Garcia is a 84 y.o. female who presents for follow-up of bilateral knees, bilateral hips, and left shoulder.  Patient has known osteoarthritis of bilateral knees as well as the left shoulder which has been treated in the past with intermittent injections of corticosteroid.  She continues to respond favorably to injections admits to about 3 months of symptomatic pain relief with each injection.  Patient also has known chronic greater trochanteric bursitis of bilateral hips which also responds well to intermittent injections of corticosteroid.  Repeat injections provided today patient tolerated all procedures well.  Pain score: 7/10    Review of systems negative unless otherwise specified in HPI    Past Medical History:   Diagnosis Date   • Acute laryngitis 05/12/2022   • Anemia    • Chronic bilateral low back pain without sciatica 11/26/2019   • Chronic kidney disease    • Duodenal perforation ([facility]) 10/07/2020   • Duodenal ulcer    • Enterococcus UTI 04/07/2016   • GERD (gastroesophageal reflux disease) 04/04/2016   • Greater trochanteric bursitis of right hip 09/18/2018   • 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   • Hypercalcemia    • Hypertension    • Hypokalemia 10/27/2018   • Impetigo 09/18/2024   • Left lumbar radiculopathy 04/07/2016   • Mixed hyperlipidemia    • Osteoarthritis of hip 04/04/2016   • Osteoporosis    • Sacral insufficiency fracture    • SI (sacroiliac) joint inflammation ([facility]) 04/04/2016   • Strain of left biceps 10/19/2023   • Substance addiction ([facility])    • Vitamin D deficiency 03/11/2019       Past Surgical History:   Procedure Laterality Date   • BREAST EXCISIONAL BIOPSY Left 2000    benign   • COLONOSCOPY  2019   • CYSTOSCOPY W/ DEFLUX INJECTION N/A 1/6/2025    Procedure: INJECTION BULKING AGENT URETHRAL;  Surgeon:  Juan David Mcbride MD;  Location: [facility];  Service: UroGynecology          • JOINT REPLACEMENT      right hip 8/15   • MAMMO (HISTORICAL)  2019   • IN CMBND ANTERPOST COLPORRAPHY W/CYSTO N/A 1/6/2025    Procedure: A&P COLPORRHAPHY;  Surgeon: Juan David Mcbride MD;  Location: [facility];  Service: UroGynecology          • IN COLPOPEXY VAGINAL EXTRAPERITONEAL APPROACH N/A 1/6/2025    Procedure: COLPOSUSPENSION VAGINAL EXTRAPERITONEAL(ENPLACE); EUA; ANTERIOR POSTERIOR COLORRPHY, URETHRAL BULKING INJECTION, CYSTO;  Surgeon: Juan David Mcbride MD;  Location: [facility];  Service: UroGynecology          • IN CYSTOURETHROSCOPY N/A 1/6/2025    Procedure: CYSTO;  Surgeon: Juan David Mcbride MD;  Location: [facility];  Service: UroGynecology          • TONSILLECTOMY         Family History   Problem Relation Age of Onset   • No Known Problems Mother    • No Known Problems Father    • Breast cancer Daughter 50   • Breast cancer Maternal Aunt 70   • No Known Problems Sister    • No Known Problems Maternal Grandmother    • No Known Problems Maternal Grandfather    • No Known Problems Paternal Grandmother    • No Known Problems Paternal Grandfather    • No Known Problems Son    • No Known Problems Sister    • No Known Problems Sister        Social History     Occupational History   • Not on file   Tobacco Use   • Smoking status: Never   • Smokeless tobacco: Never   Vaping Use   • Vaping status: Never Used   Substance and Sexual Activity   • Alcohol use: Never   • Drug use: Not Currently     Types: Prescription     Comment: samaraovering last used 1996   • Sexual activity: Yes     Partners: Male     Birth control/protection: Post-menopausal         Current Outpatient Medications:   •  acetaminophen (TYLENOL) 325 mg tablet, Take 2 tablets (650 mg total) by mouth every 6 (six) hours as needed for mild pain, Disp: , Rfl:   •  acyclovir (ZOVIRAX) 5 % ointment, Apply topically every 6 (six) hours (Patient taking  differently: Apply topically 3 (three) times a day as needed), Disp: 30 g, Rfl: 0  •  atorvastatin (LIPITOR) 10 mg tablet, Take 1 tablet (10 mg total) by mouth daily, Disp: 100 tablet, Rfl: 1  •  Cholecalciferol (Vitamin D-3) 25 MCG (1000 UT) CAPS, Take by mouth in the morning, Disp: , Rfl:   •  docusate sodium (COLACE) 100 mg capsule, Take 1 capsule (100 mg total) by mouth 2 (two) times a day, Disp: , Rfl:   •  ibuprofen (MOTRIN) 800 mg tablet, Take 1 tablet (800 mg total) by mouth if needed for mild pain, Disp: 30 tablet, Rfl: 0  •  metoprolol succinate (TOPROL-XL) 25 mg 24 hr tablet, Take 1 tablet (25 mg total) by mouth 2 (two) times a day, Disp: 180 tablet, Rfl: 3  •  Multiple Vitamins-Minerals (ONE A DAY WOMEN 50 PLUS PO), One A Day Women 50 Plus, Disp: , Rfl:   •  mupirocin (BACTROBAN) 2 % ointment, Apply topically 3 (three) times a day To left lower leg., Disp: 30 g, Rfl: 1  •  oxyCODONE (Roxicodone) 5 immediate release tablet, Take 0.5 tablets (2.5 mg total) by mouth every 4 (four) hours as needed for moderate pain for up to 6 doses Max Daily Amount: 15 mg, Disp: 3 tablet, Rfl: 0  •  pantoprazole (PROTONIX) 40 mg tablet, Take 1 tablet (40 mg total) by mouth daily As needed for heartburn, Disp: 90 tablet, Rfl: 5  •  polyethylene glycol (MIRALAX) 17 g packet, Take 17 g by mouth daily as needed (constipation), Disp: , Rfl:   •  [Paused] Premarin vaginal cream, 3 (three) times a week, Disp: , Rfl:     Current Facility-Administered Medications:   •  denosumab (PROLIA) subcutaneous injection 60 mg, 60 mg, Subcutaneous, Once, Yamile Wallace PA-C  •  denosumab (PROLIA) subcutaneous injection 60 mg, 60 mg, Subcutaneous, Q6 Months, , 60 mg at 06/13/24 5577  •  lidocaine (XYLOCAINE) 1 % injection 1 mL, 1 mL, Injection, , Pablo Rivera DPM, 1 mL at 12/07/23 0900  •  lidocaine (XYLOCAINE) 1 % injection 1 mL, 1 mL, Injection, , , 1 mL at 07/08/24 0930  •  triamcinolone acetonide (KENALOG-40) 40 mg/mL injection 20 mg, 20  mg, Infiltration, , Pablo Rivera DPM, 20 mg at 12/07/23 0900  •  triamcinolone acetonide (KENALOG-40) 40 mg/mL injection 20 mg, 20 mg, Infiltration, , , 20 mg at 07/08/24 0930    Allergies   Allergen Reactions   • Bactrim [Sulfamethoxazole-Trimethoprim] Other (See Comments)     Kidney failure          There were no vitals filed for this visit.    Objective:  Physical exam  General: Awake, Alert, Oriented  Eyes: Pupils equal, round and reactive to light  Heart: regular rate and rhythm  Lungs: No audible wheezing  Abdomen: soft                    Right Knee Exam     Tenderness   The patient is experiencing tenderness in the lateral joint line and medial joint line.    Range of Motion   Extension:  normal   Flexion:  normal Right knee flexion: With crepitation and stiffness.    Other   Erythema: absent  Scars: absent  Swelling: mild  Effusion: no effusion present      Left Knee Exam     Tenderness   The patient is experiencing tenderness in the lateral joint line and medial joint line.    Range of Motion   Extension:  normal   Flexion:  normal Left knee flexion: With crepitation and stiffness.    Other   Erythema: absent  Scars: absent  Swelling: mild  Effusion: no effusion present      Right Hip Exam     Tenderness   The patient is experiencing tenderness in the greater trochanter.    Other   Erythema: absent  Scars: present      Left Hip Exam     Tenderness   The patient is experiencing tenderness in the greater trochanter.    Other   Erythema: absent  Scars: absent      Left Shoulder Exam     Range of Motion   Active abduction:  abnormal   External rotation:  abnormal   Forward flexion:  abnormal     Other   Erythema: absent  Scars: absent             Diagnostics, reviewed and taken today if performed as documented:    None performed        Procedures, if performed today:    Large joint arthrocentesis: bilateral knee  Universal Protocol:  Consent: Verbal consent obtained.  Risks and benefits: risks, benefits and  alternatives were discussed  Consent given by: patient  Site marked: the operative site was marked  Supporting Documentation  Indications: pain   Procedure Details  Location: knee - bilateral knee  Needle size: 22 G    Medications (Right): 4 mL lidocaine 1 %; 12 mg betamethasone acetate-betamethasone sodium phosphate 6 (3-3) mg/mLMedications (Left): 4 mL lidocaine 1 %; 12 mg betamethasone acetate-betamethasone sodium phosphate 6 (3-3) mg/mL   Patient tolerance: patient tolerated the procedure well with no immediate complications  Dressing:  Sterile dressing applied      Large joint arthrocentesis: bilateral greater trochanteric bursa  Universal Protocol:  Consent: Verbal consent obtained.  Risks and benefits: risks, benefits and alternatives were discussed  Consent given by: patient  Site marked: the operative site was marked  Supporting Documentation  Indications: pain   Procedure Details  Location: hip - bilateral greater trochanteric bursa  Needle size: 22 G    Medications (Right): 4 mL lidocaine 1 %; 12 mg betamethasone acetate-betamethasone sodium phosphate 6 (3-3) mg/mLMedications (Left): 4 mL lidocaine 1 %; 12 mg betamethasone acetate-betamethasone sodium phosphate 6 (3-3) mg/mL   Patient tolerance: patient tolerated the procedure well with no immediate complications  Dressing:  Sterile dressing applied      Large joint arthrocentesis: L glenohumeral  Universal Protocol:  Consent: Verbal consent obtained.  Risks and benefits: risks, benefits and alternatives were discussed  Consent given by: patient  Site marked: the operative site was marked  Supporting Documentation  Indications: pain   Procedure Details  Location: shoulder - L glenohumeral  Needle size: 22 G  Medications administered: 12 mg betamethasone acetate-betamethasone sodium phosphate 6 (3-3) mg/mL; 4 mL lidocaine 1 %    Patient tolerance: patient tolerated the procedure well with no immediate complications  Dressing:  Sterile dressing  "applied            Portions of the record may have been created with voice recognition software.  Occasional wrong word or \"sound a like\" substitutions may have occurred due to the inherent limitations of voice recognition software.  Read the chart carefully and recognize, using context, where substitutions have occurred.      "

## 2025-01-20 ENCOUNTER — OFFICE VISIT (OUTPATIENT)
Age: 85
End: 2025-01-20
Payer: COMMERCIAL

## 2025-01-20 VITALS
WEIGHT: 134.6 LBS | OXYGEN SATURATION: 100 % | HEIGHT: 62 IN | TEMPERATURE: 97.7 F | HEART RATE: 75 BPM | DIASTOLIC BLOOD PRESSURE: 64 MMHG | BODY MASS INDEX: 24.77 KG/M2 | SYSTOLIC BLOOD PRESSURE: 116 MMHG

## 2025-01-20 DIAGNOSIS — Z79.899 ENCOUNTER FOR LONG-TERM (CURRENT) USE OF MEDICATIONS: ICD-10-CM

## 2025-01-20 DIAGNOSIS — M47.816 LUMBAR SPONDYLOSIS: ICD-10-CM

## 2025-01-20 DIAGNOSIS — I73.00 RAYNAUD'S DISEASE WITHOUT GANGRENE: ICD-10-CM

## 2025-01-20 DIAGNOSIS — G56.03 BILATERAL CARPAL TUNNEL SYNDROME: Primary | ICD-10-CM

## 2025-01-20 DIAGNOSIS — N81.2 INCOMPLETE UTEROVAGINAL PROLAPSE: ICD-10-CM

## 2025-01-20 DIAGNOSIS — M81.0 SENILE OSTEOPOROSIS: ICD-10-CM

## 2025-01-20 DIAGNOSIS — M15.0 PRIMARY GENERALIZED (OSTEO)ARTHRITIS: ICD-10-CM

## 2025-01-20 DIAGNOSIS — M06.09 SERONEGATIVE ARTHROPATHY OF MULTIPLE SITES (HCC): ICD-10-CM

## 2025-01-20 DIAGNOSIS — E55.9 VITAMIN D DEFICIENCY: ICD-10-CM

## 2025-01-20 DIAGNOSIS — M15.4 EROSIVE OSTEOARTHRITIS OF HANDS, BILATERAL: ICD-10-CM

## 2025-01-20 DIAGNOSIS — N18.31 STAGE 3A CHRONIC KIDNEY DISEASE (HCC): ICD-10-CM

## 2025-01-20 DIAGNOSIS — K21.9 GASTROESOPHAGEAL REFLUX DISEASE WITHOUT ESOPHAGITIS: ICD-10-CM

## 2025-01-20 PROCEDURE — 99214 OFFICE O/P EST MOD 30 MIN: CPT | Performed by: INTERNAL MEDICINE

## 2025-01-20 PROCEDURE — 96372 THER/PROPH/DIAG INJ SC/IM: CPT | Performed by: INTERNAL MEDICINE

## 2025-01-20 NOTE — PROGRESS NOTES
"Assessment/Plan:    Monik Garcia came into the [facility] Rheumatology Office today 01/20/25 to receive Prolia injection.      Verbal consent obtained.  Consent given by: patient    patient states patient has been medically healthy with no underlining concerns/complications.      Monik Garica presents with no symptoms today.       All insturctions were reviewed with the patient.    If the patient should have any questions/concerns, advised patient to contacted [facility] Office.       Subjective:     History provided by: patient    Patient ID: Monik Garcia is a 84 y.o. female      Objective:    Vitals:    01/20/25 1127   BP: 116/64   Pulse: 75   Temp: 97.7 °F (36.5 °C)   TempSrc: Tympanic   SpO2: 100%   Weight: 61.1 kg (134 lb 9.6 oz)   Height: 5' 1.75\" (1.568 m)       Patient tolerated the injection well without any complications.  Injection site/s Upper Left arm.  Medication was provided by Office.    Patient signed consent form yes   Patient signed ABN form yes (If no patient is not a medicare patient).   Patient waited 15 minutes after injection no (This only applies to patient's receiving first time injection).       Last Visit: 9/6/2024  Next visit:Visit date not found      "

## 2025-01-20 NOTE — PATIENT INSTRUCTIONS
I gave you a prescription for lab work to get in 2 weeks.  That will recheck your kidney function and also check your vitamin D.  I also gave you lab work to get 2 weeks before your next visit in 6 months.  You are due for your DEXA I gave you a prescription for that.  Continue your home exercise program.  Follow-up with your primary care physician as well as the urogynecologist and nephrologist.  Follow-up with Dr. Luevano for knee and hip injections.  Ask him about the gel injections to see if you are a candidate for that for your knees.  Schedule a musculoskeletal ultrasound of your hands and wrists at [facility] so that we can take a closer look at your arthritis and make sure there is no element of inflammation.  Schedule your visits with the occupational therapist for your hands.  They will also give you splints for your carpal tunnel symptoms.  Make sure that you use gloves for whether 60 degrees or less.  That will help to protect your hands.  I gave you more extensive lab work for your visit in 6 months.

## 2025-01-20 NOTE — PROGRESS NOTES
Assessment/Plan:    Senile osteoporosis  Osteoporosis stable on Prolia.  She is due for dose #12 at this visit.  Most recent DEXA was 12/22/2022 which is stable to improved.  Monitor DEXA every 2 years.  She has not yet gone for follow-up DEXA to monitor.  Will give updated prescription for updated DEXA.  Will monitor serum calcium and kidney function prior to each dose of Prolia. Encourage calcium and vitamin D supplement and home exercise program as tolerated.  Practice fall prevention.  Follow-up 6 months for office visit and next Prolia injection.    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  She had recent bilateral knee injections and bilateral trochanteric bursa injections per orthopedic surgeon.  She also had left shoulder subacromial bursa injection for rotator cuff arthropathy.  Injections were completed 1/14/2025 with benefit.  She does note low back pain with prolonged standing but has no current radicular symptoms.  She continues to work part-time.  She is unable to use nonsteroidals in view of chronic kidney disease.  She does use Tylenol as needed.  She has rare ibuprofen.  Continue follow-up with orthopedic surgery for injection knees and trochanteric bursa.  Continue to monitor.  If symptoms warrant, would refer to physical therapy.    Lumbar spondylosis  Lumbar spondylosis with some neurogenic symptoms with prolonged standing.  No current radicular symptoms.  Encourage home exercise program.  Follow-up with pain management as needed.    Seronegative arthropathy of multiple sites (HCC)  Seronegative arthritis with chronic deformities.  Rule out erosive osteoarthritis.  She does have hand arthralgias as well as carpal tunnel symptoms particularly at night.  Will refer for laboratory evaluation to include serologies.  Refer for musculoskeletal ultrasound for further evaluation to rule  out erosive osteoarthritis.  Refer to occupational therapy for treatment of probable erosive osteoarthritis as well as carpal tunnel syndrome.  I did tell her likely the occupational therapist will provide her with wrist splints to wear nightly for carpal tunnel symptoms.  Follow-up 6 months or sooner if needed.    Erosive osteoarthritis of hands, bilateral  Bilateral hand arthralgias with significant deformities DIP and PIP joints consistent with erosive osteoarthritis versus inflammatory osteoarthritis.  Refer for musculoskeletal ultrasound to evaluate for inflammation.  Refer for updated serologies.  Refer for occupational therapy for treatment of hand arthralgias in addition to carpal tunnel symptoms with evidence of mild thenar atrophy on exam.  She does have carpal tunnel symptoms at night.  Suspect that the occupational therapist will give her wrist splints for nighttime use for carpal tunnel symptoms.  Continue to monitor.    Bilateral carpal tunnel syndrome  Bilateral carpal tunnel syndrome with symptoms mainly at night.  Slight thenar atrophy noted on exam right greater than left.  Patient was referred for occupational therapy for treatment and fabrication of wrist splints to be worn nightly for carpal tunnel symptoms.  Continue to monitor.    Raynaud's disease without gangrene  Raynaud's with no digital ulcerations.  Continue cold protection.  If symptoms worsen, would consider calcium channel blockers.  No evidence clinically for connective tissue disease.  Suspect primary Raynaud's.  Continue to monitor.    GERD (gastroesophageal reflux disease)  She continues on pantoprazole with benefit.  Follow-up primary care.    Incomplete uterovaginal prolapse  She is status post surgery on 1/6/2025 with good postop course.  She will follow-up with urogynecologist as scheduled.    Chronic kidney disease, stage III (moderate) ([facility])  Lab Results   Component Value Date    EGFR 36 07/19/2024    EGFR 44 06/27/2024     EGFR 50 03/20/2024    CREATININE 0.96 12/09/2024    CREATININE 1.35 (H) 07/19/2024    CREATININE 1.14 06/27/2024   Chronic kidney disease stage IIIb, with improvement in creatinine on most recent lab work dated 12/9/2024.  Avoid nephrotoxic agents like nonsteroidals.  She rarely uses ibuprofen.  Encouraged Tylenol use with a maximum dose of 2 tablets of arthritis strength Tylenol every 12 hours as needed.  Follow-up nephrology.  Continue to monitor.    Vitamin D deficiency  Monitor vitamin D yearly.  Continue vitamin D supplement.  Continue to monitor.         Problem List Items Addressed This Visit     Primary generalized (osteo)arthritis    Primary generalized osteoarthritis with interphalangeal osteoarthritis hands and feet, DJD knees, and lumbar spondylosis.  She had recent bilateral knee injections and bilateral trochanteric bursa injections per orthopedic surgeon.  She also had left shoulder subacromial bursa injection for rotator cuff arthropathy.  Injections were completed 1/14/2025 with benefit.  She does note low back pain with prolonged standing but has no current radicular symptoms.  She continues to work part-time.  She is unable to use nonsteroidals in view of chronic kidney disease.  She does use Tylenol as needed.  She has rare ibuprofen.  Continue follow-up with orthopedic surgery for injection knees and trochanteric bursa.  Continue to monitor.  If symptoms warrant, would refer to physical therapy.         GERD (gastroesophageal reflux disease) (Chronic)    She continues on pantoprazole with benefit.  Follow-up primary care.         Chronic kidney disease, stage III (moderate) ([facility])    Lab Results   Component Value Date    EGFR 36 07/19/2024    EGFR 44 06/27/2024    EGFR 50 03/20/2024    CREATININE 0.96 12/09/2024    CREATININE 1.35 (H) 07/19/2024    CREATININE 1.14 06/27/2024   Chronic kidney disease stage IIIb, with improvement in creatinine on most recent lab work dated 12/9/2024.  Avoid  nephrotoxic agents like nonsteroidals.  She rarely uses ibuprofen.  Encouraged Tylenol use with a maximum dose of 2 tablets of arthritis strength Tylenol every 12 hours as needed.  Follow-up nephrology.  Continue to monitor.         Relevant Orders    Basic metabolic panel    Comprehensive metabolic panel    CBC and differential    C-reactive protein    Vitamin D deficiency    Monitor vitamin D yearly.  Continue vitamin D supplement.  Continue to monitor.         Relevant Orders    Vitamin D 25 hydroxy    Senile osteoporosis    Osteoporosis stable on Prolia.  She is due for dose #12 at this visit.  Most recent DEXA was 12/22/2022 which is stable to improved.  Monitor DEXA every 2 years.  She has not yet gone for follow-up DEXA to monitor.  Will give updated prescription for updated DEXA.  Will monitor serum calcium and kidney function prior to each dose of Prolia. Encourage calcium and vitamin D supplement and home exercise program as tolerated.  Practice fall prevention.  Follow-up 6 months for office visit and next Prolia injection.         Relevant Orders    DXA bone density spine hip and pelvis    Comprehensive metabolic panel    Raynaud's disease without gangrene    Raynaud's with no digital ulcerations.  Continue cold protection.  If symptoms worsen, would consider calcium channel blockers.  No evidence clinically for connective tissue disease.  Suspect primary Raynaud's.  Continue to monitor.         Relevant Orders    AILYN by IFA Reflex for Rheumatologist    AILYN Screen w/Reflex Cascade    Lumbar spondylosis    Lumbar spondylosis with some neurogenic symptoms with prolonged standing.  No current radicular symptoms.  Encourage home exercise program.  Follow-up with pain management as needed.         Incomplete uterovaginal prolapse    She is status post surgery on 1/6/2025 with good postop course.  She will follow-up with urogynecologist as scheduled.         Erosive osteoarthritis of hands, bilateral     Bilateral hand arthralgias with significant deformities DIP and PIP joints consistent with erosive osteoarthritis versus inflammatory osteoarthritis.  Refer for musculoskeletal ultrasound to evaluate for inflammation.  Refer for updated serologies.  Refer for occupational therapy for treatment of hand arthralgias in addition to carpal tunnel symptoms with evidence of mild thenar atrophy on exam.  She does have carpal tunnel symptoms at night.  Suspect that the occupational therapist will give her wrist splints for nighttime use for carpal tunnel symptoms.  Continue to monitor.         Relevant Orders    [facility]    Ambulatory Referral to Occupational Therapy    Bilateral carpal tunnel syndrome - Primary    Bilateral carpal tunnel syndrome with symptoms mainly at night.  Slight thenar atrophy noted on exam right greater than left.  Patient was referred for occupational therapy for treatment and fabrication of wrist splints to be worn nightly for carpal tunnel symptoms.  Continue to monitor.         Relevant Orders    Ambulatory Referral to Occupational Therapy    Seronegative arthropathy of multiple sites (HCC)    Seronegative arthritis with chronic deformities.  Rule out erosive osteoarthritis.  She does have hand arthralgias as well as carpal tunnel symptoms particularly at night.  Will refer for laboratory evaluation to include serologies.  Refer for musculoskeletal ultrasound for further evaluation to rule out erosive osteoarthritis.  Refer to occupational therapy for treatment of probable erosive osteoarthritis as well as carpal tunnel syndrome.  I did tell her likely the occupational therapist will provide her with wrist splints to wear nightly for carpal tunnel symptoms.  Follow-up 6 months or sooner if needed.         Relevant Orders    [facility]    Ambulatory Referral to Occupational Therapy    RF Screen w/ Reflex to Titer    Cyclic citrul peptide antibody, IgG    AILYN by IFA Reflex for  Rheumatologist    AILYN Screen w/Reflex [place]   Other Visit Diagnoses       Encounter for long-term (current) use of medications        Relevant Orders    CBC and differential                Reviewed records, labs, and imaging with the patient in detail.  Counseled patient.  Discussion regarding my findings and recommendations.  Office visit with documentation 35 min.    Subjective:      Patient ID: Monik Garcia is a 84 y.o. female.    She presents for follow-up of her osteoporosis.  She has a history of severe osteoporosis with history of sacral insufficiency fracture.  She is currently on Prolia and is due for dose #12 at this office visit.  Patient did have a dental extraction approximately a month ago.  Her Prolia was delayed due to the dental extraction.  Review of DEXA scan dated 12/22/2022 significant for Lumbar spine T score (L1-L3) -1.5.  This has increased 7% as compared to her previous scan.  Left total hip T score -1.1.  This has increased 4.3% as compared to her previous scan.  Left femoral neck T score -2.0.    She has a history of osteoarthritis and follows with orthopedic surgeon.  She recently had bilateral knee injections and bilateral trochanteric bursa injections in addition to left shoulder subacromial bursa injection for rotator cuff arthropathy on 1/14/2025 per orthopedic surgeon.  She has a history of lumbar spondylosis.  She has some lower back pain especially with standing for long periods.  She has minimal stiffness in the morning and no swelling in her joints.  She has carpal tunnel symptoms mainly at night on occasion.  She has Raynaud's with no digital ulcerations.  She continues working part-time.  She recently had surgery with the urogynecologist for incomplete uterovaginal prolapse on 1/6/2025.  She is healing well and is due for follow-up with urogynecologist in the near future.  She follows with podiatry for onychomycosis treated with debridement.    Lab work dated 12/9/2024  significant for calcium 9.1.  Creatinine 0.96.  Hemoglobin 11.  Hematocrit 35.  Total cholesterol 181.  Triglycerides 70.  HDL 84.  LDL 83.  Review of lab work dated 3/20/2024 significant for hemoglobin A1c 5.8.  White blood cell count 13.54 questionably high secondary to recent steroid injections bilateral knees and hip.  Hemoglobin/hematocrit 11.3/35.4 respectively.  Creatinine improved at 1.04 with estimated GFR 50.  Calcium 9.2.  Fasting blood sugar 136.  Total cholesterol 203.  Triglyceride 43.  .  LDL 86.      Allergies  Allergies   Allergen Reactions   • Bactrim [Sulfamethoxazole-Trimethoprim] Other (See Comments)     Kidney failure       Home Medications    Current Outpatient Medications:   •  acetaminophen (TYLENOL) 325 mg tablet, Take 2 tablets (650 mg total) by mouth every 6 (six) hours as needed for mild pain, Disp: , Rfl:   •  acyclovir (ZOVIRAX) 5 % ointment, Apply topically every 6 (six) hours (Patient taking differently: Apply topically 3 (three) times a day as needed), Disp: 30 g, Rfl: 0  •  atorvastatin (LIPITOR) 10 mg tablet, Take 1 tablet (10 mg total) by mouth daily, Disp: 100 tablet, Rfl: 1  •  Cholecalciferol (Vitamin D-3) 25 MCG (1000 UT) CAPS, Take by mouth in the morning, Disp: , Rfl:   •  docusate sodium (COLACE) 100 mg capsule, Take 1 capsule (100 mg total) by mouth 2 (two) times a day, Disp: , Rfl:   •  ibuprofen (MOTRIN) 800 mg tablet, Take 1 tablet (800 mg total) by mouth if needed for mild pain, Disp: 30 tablet, Rfl: 0  •  metoprolol succinate (TOPROL-XL) 25 mg 24 hr tablet, Take 1 tablet (25 mg total) by mouth 2 (two) times a day, Disp: 180 tablet, Rfl: 3  •  Multiple Vitamins-Minerals (ONE A DAY WOMEN 50 PLUS PO), One A Day Women 50 Plus, Disp: , Rfl:   •  mupirocin (BACTROBAN) 2 % ointment, Apply topically 3 (three) times a day To left lower leg., Disp: 30 g, Rfl: 1  •  oxyCODONE (Roxicodone) 5 immediate release tablet, Take 0.5 tablets (2.5 mg total) by mouth every 4 (four)  hours as needed for moderate pain for up to 6 doses Max Daily Amount: 15 mg, Disp: 3 tablet, Rfl: 0  •  pantoprazole (PROTONIX) 40 mg tablet, Take 1 tablet (40 mg total) by mouth daily As needed for heartburn, Disp: 90 tablet, Rfl: 5  •  polyethylene glycol (MIRALAX) 17 g packet, Take 17 g by mouth daily as needed (constipation), Disp: , Rfl:   •  [Paused] Premarin vaginal cream, 3 (three) times a week, Disp: , Rfl:     Current Facility-Administered Medications:   •  denosumab (PROLIA) subcutaneous injection 60 mg, 60 mg, Subcutaneous, Once, Yamile Wallace PA-C  •  lidocaine (XYLOCAINE) 1 % injection 1 mL, 1 mL, Injection, , Pablo Rivera DPM, 1 mL at 12/07/23 0900  •  lidocaine (XYLOCAINE) 1 % injection 1 mL, 1 mL, Injection, , , 1 mL at 07/08/24 0930  •  triamcinolone acetonide (KENALOG-40) 40 mg/mL injection 20 mg, 20 mg, Infiltration, , Pablo Rivera DPM, 20 mg at 12/07/23 0900  •  triamcinolone acetonide (KENALOG-40) 40 mg/mL injection 20 mg, 20 mg, Infiltration, , , 20 mg at 07/08/24 0930    Past Medical History  Past Medical History:   Diagnosis Date   • Acute laryngitis 05/12/2022   • Anemia    • Chronic bilateral low back pain without sciatica 11/26/2019   • Chronic kidney disease    • Duodenal perforation ([facility]) 10/07/2020   • Duodenal ulcer    • Enterococcus UTI 04/07/2016   • GERD (gastroesophageal reflux disease) 04/04/2016   • Greater trochanteric bursitis of right hip 09/18/2018   • 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   • Hypercalcemia    • Hypertension    • Hypokalemia 10/27/2018   • Impetigo 09/18/2024   • Left lumbar radiculopathy 04/07/2016   • Mixed hyperlipidemia    • Osteoarthritis of hip 04/04/2016   • Osteoporosis    • Sacral insufficiency fracture    • SI (sacroiliac) joint inflammation ([facility]) 04/04/2016   • Strain of left biceps 10/19/2023   • Substance addiction ([facility])    • Vitamin D deficiency 03/11/2019       Past Surgical History   Past Surgical History:   Procedure  Laterality Date   • BREAST EXCISIONAL BIOPSY Left 2000    benign   • COLONOSCOPY  2019   • CYSTOSCOPY W/ DEFLUX INJECTION N/A 1/6/2025    Procedure: INJECTION BULKING AGENT URETHRAL;  Surgeon: Juan David Mcbride MD;  Location: [facility];  Service: UroGynecology          • JOINT REPLACEMENT      right hip 8/15   • MAMMO (HISTORICAL)  2019   • NH CMBND ANTERPOST COLPORRAPHY W/CYSTO N/A 1/6/2025    Procedure: A&P COLPORRHAPHY;  Surgeon: Juan David Mcbride MD;  Location: [facility];  Service: UroGynecology          • NH COLPOPEXY VAGINAL EXTRAPERITONEAL APPROACH N/A 1/6/2025    Procedure: COLPOSUSPENSION VAGINAL EXTRAPERITONEAL(ENPLACE); EUA; ANTERIOR POSTERIOR COLORRPHY, URETHRAL BULKING INJECTION, CYSTO;  Surgeon: Juan David Mcbride MD;  Location: [facility];  Service: UroGynecology          • NH CYSTOURETHROSCOPY N/A 1/6/2025    Procedure: CYSTO;  Surgeon: Juan David Mcbride MD;  Location: [facility];  Service: UroGynecology          • TONSILLECTOMY         Family History   Family History   Problem Relation Age of Onset   • No Known Problems Mother    • No Known Problems Father    • Breast cancer Daughter 50   • Breast cancer Maternal Aunt 70   • No Known Problems Sister    • No Known Problems Maternal Grandmother    • No Known Problems Maternal Grandfather    • No Known Problems Paternal Grandmother    • No Known Problems Paternal Grandfather    • No Known Problems Son    • No Known Problems Sister    • No Known Problems Sister        The following portions of the patient's history were reviewed and updated as appropriate: allergies, current medications, past family history, past medical history, past social history, past surgical history, and problem list.    Review of Systems   Constitutional:  Negative for chills and fever.   HENT:  Negative for ear pain and sore throat.    Eyes:  Negative for pain and visual disturbance.   Respiratory:  Negative for cough and shortness of breath.   "  Cardiovascular:  Negative for chest pain and palpitations.   Gastrointestinal:  Negative for abdominal pain and vomiting.   Genitourinary:  Negative for dysuria and hematuria.   Musculoskeletal:  Positive for arthralgias and back pain.        Morning stiffness minutes duration with no joint swelling. Hand arthralgias. CTS at night. Chronic but stable nonradicular low back pain.  History of recurrent trochanteric bursitis and knee arthralgias from bilateral knee DJD.  Recent left shoulder injection for rotator cuff arthropathy.  Osteoporosis with history sacral insufficiency fractures.  Raynaud's likely primary.  No sicca symptoms.  No digital ulcers.   Skin:  Negative for color change and rash.        Raynaud's without digital ulcerations     Neurological:  Positive for numbness (fingers). Negative for seizures and syncope.   All other systems reviewed and are negative.        Objective:      /64   Pulse 75   Temp 97.7 °F (36.5 °C) (Tympanic)   Ht 5' 1.75\" (1.568 m)   Wt 61.1 kg (134 lb 9.6 oz)   SpO2 100%   BMI 24.82 kg/m²          Physical Exam  Vitals reviewed.   Constitutional:       Appearance: Normal appearance.   HENT:      Head: Normocephalic.      Nose:      Comments: Nose and throat not examined due to mask.  Eyes:      Extraocular Movements: Extraocular movements intact.   Neck:      Comments: Without masses, thyromegaly, lymphadenopathy  Cardiovascular:      Rate and Rhythm: Regular rhythm.   Pulmonary:      Breath sounds: Normal breath sounds.   Abdominal:      Palpations: Abdomen is soft.   Musculoskeletal:      Cervical back: Neck supple.      Comments: Neck decreased lateral flexion.  Shoulders slightly decreased external rotation right>left with bilateral crepitus.  Elbows and wrists full range of motion.  Tinel's negative.  Hands squaring 1st carpometacarpal joints bilaterally with Heberden's and Kylie's nodes.  Deformities appreciated.  Interossei wasting noted.  Slight thenar " atrophy right greater than left.  Straight leg raising negative bilaterally. Scoliosis noted. Hips full range of motion.  Knees slightly decreased flexion with patellofemoral crepitus.  Ankles full range of motion.  Feet rearfoot hyperpronation with midfoot cavus deformities, hallux valgus deformities, high insteps, hammertoe deformities, and tight heel cords.  No synovitis noted.   Skin:     General: Skin is warm.      Comments: Onychomycosis scattered toenails.   Neurological:      General: No focal deficit present.               This note was written in part using the assistance of the [employer] [id number] microphone system. Those portions using this system have been dictated and not read.

## 2025-01-21 ENCOUNTER — TELEPHONE (OUTPATIENT)
Age: 85
End: 2025-01-21

## 2025-01-21 NOTE — TELEPHONE ENCOUNTER
Caller: Aruna Garcia    Doctor and/or Office: Nicole/Nicolasa IRENE#: [phone]     Escalation: Aruna needs more pads to cover her bunion.  Can we provided them to her?  She will come in to pick them up. Can someone reach out to her?  Thank you.

## 2025-01-21 NOTE — TELEPHONE ENCOUNTER
"If she is referring to the U shaped or oval \"callus pads\" or \"off loading pads\" they can be purchased online and some pharmacy's like [facility] or [facility] carry them but they may have to be purchased online as well or she can to inquire if they have them in stock for store ."

## 2025-01-23 NOTE — ASSESSMENT & PLAN NOTE
Bilateral carpal tunnel syndrome with symptoms mainly at night.  Slight thenar atrophy noted on exam right greater than left.  Patient was referred for occupational therapy for treatment and fabrication of wrist splints to be worn nightly for carpal tunnel symptoms.  Continue to monitor.

## 2025-01-23 NOTE — ASSESSMENT & PLAN NOTE
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  She had recent bilateral knee injections and bilateral trochanteric bursa injections per orthopedic surgeon.  She also had left shoulder subacromial bursa injection for rotator cuff arthropathy.  Injections were completed 1/14/2025 with benefit.  She does note low back pain with prolonged standing but has no current radicular symptoms.  She continues to work part-time.  She is unable to use nonsteroidals in view of chronic kidney disease.  She does use Tylenol as needed.  She has rare ibuprofen.  Continue follow-up with orthopedic surgery for injection knees and trochanteric bursa.  Continue to monitor.  If symptoms warrant, would refer to physical therapy.

## 2025-01-23 NOTE — ASSESSMENT & PLAN NOTE
She is status post surgery on 1/6/2025 with good postop course.  She will follow-up with urogynecologist as scheduled.

## 2025-01-23 NOTE — ASSESSMENT & PLAN NOTE
Osteoporosis stable on Prolia.  She is due for dose #12 at this visit.  Most recent DEXA was 12/22/2022 which is stable to improved.  Monitor DEXA every 2 years.  She has not yet gone for follow-up DEXA to monitor.  Will give updated prescription for updated DEXA.  Will monitor serum calcium and kidney function prior to each dose of Prolia. Encourage calcium and vitamin D supplement and home exercise program as tolerated.  Practice fall prevention.  Follow-up 6 months for office visit and next Prolia injection.

## 2025-01-23 NOTE — ASSESSMENT & PLAN NOTE
Lab Results   Component Value Date    EGFR 36 07/19/2024    EGFR 44 06/27/2024    EGFR 50 03/20/2024    CREATININE 0.96 12/09/2024    CREATININE 1.35 (H) 07/19/2024    CREATININE 1.14 06/27/2024   Chronic kidney disease stage IIIb, with improvement in creatinine on most recent lab work dated 12/9/2024.  Avoid nephrotoxic agents like nonsteroidals.  She rarely uses ibuprofen.  Encouraged Tylenol use with a maximum dose of 2 tablets of arthritis strength Tylenol every 12 hours as needed.  Follow-up nephrology.  Continue to monitor.

## 2025-01-23 NOTE — ASSESSMENT & PLAN NOTE
Seronegative arthritis with chronic deformities.  Rule out erosive osteoarthritis.  She does have hand arthralgias as well as carpal tunnel symptoms particularly at night.  Will refer for laboratory evaluation to include serologies.  Refer for musculoskeletal ultrasound for further evaluation to rule out erosive osteoarthritis.  Refer to occupational therapy for treatment of probable erosive osteoarthritis as well as carpal tunnel syndrome.  I did tell her likely the occupational therapist will provide her with wrist splints to wear nightly for carpal tunnel symptoms.  Follow-up 6 months or sooner if needed.

## 2025-01-23 NOTE — ASSESSMENT & PLAN NOTE
Raynaud's with no digital ulcerations.  Continue cold protection.  If symptoms worsen, would consider calcium channel blockers.  No evidence clinically for connective tissue disease.  Suspect primary Raynaud's.  Continue to monitor.

## 2025-01-23 NOTE — ASSESSMENT & PLAN NOTE
Bilateral hand arthralgias with significant deformities DIP and PIP joints consistent with erosive osteoarthritis versus inflammatory osteoarthritis.  Refer for musculoskeletal ultrasound to evaluate for inflammation.  Refer for updated serologies.  Refer for occupational therapy for treatment of hand arthralgias in addition to carpal tunnel symptoms with evidence of mild thenar atrophy on exam.  She does have carpal tunnel symptoms at night.  Suspect that the occupational therapist will give her wrist splints for nighttime use for carpal tunnel symptoms.  Continue to monitor.

## 2025-01-28 ENCOUNTER — EVALUATION (OUTPATIENT)
Dept: OCCUPATIONAL THERAPY | Age: 85
End: 2025-01-28
Payer: COMMERCIAL

## 2025-01-28 DIAGNOSIS — M15.4 EROSIVE OSTEOARTHRITIS OF HANDS, BILATERAL: ICD-10-CM

## 2025-01-28 DIAGNOSIS — M06.09 SERONEGATIVE ARTHROPATHY OF MULTIPLE SITES (HCC): Primary | ICD-10-CM

## 2025-01-28 DIAGNOSIS — G56.03 BILATERAL CARPAL TUNNEL SYNDROME: ICD-10-CM

## 2025-01-28 PROCEDURE — 97165 OT EVAL LOW COMPLEX 30 MIN: CPT

## 2025-01-28 PROCEDURE — 97760 ORTHOTIC MGMT&TRAING 1ST ENC: CPT

## 2025-01-28 NOTE — PROGRESS NOTES
"OT Evaluation     Today's date: 2025  Patient name: Monik Garcia  : 1940  MRN: [id number]  Referring provider: Tracy Kaiser MD  Dx:   Encounter Diagnosis     ICD-10-CM    1. Seronegative arthropathy of multiple sites (HCC)  M06.09 Ambulatory Referral to Occupational Therapy      2. Erosive osteoarthritis of hands, bilateral  M15.4 Ambulatory Referral to Occupational Therapy      3. Bilateral carpal tunnel syndrome  G56.03 Ambulatory Referral to Occupational Therapy          Start Time: 915  Stop Time:   Total time in clinic (min): 60 minutes    Assessment  Impairments: impaired physical strength, lacks appropriate home exercise program and pain with function    Assessment details: Pt presents for OT eval with B hand arthritis and carpal tunnel syndrome. Subjectively, she denies much pain today but does report a \"prickly\" feeling in her fingers that is worse at night. Pt feels she has weakness and pain when trying to hold heavier pots/pans and difficulty doing household chores. Pt stated she trialed OTC wrist cock ups however she did not find them comfortable. Fabricated custom wrist cock ups to wear at night only. Pt required max verbal and physical cues to don splints appropriately. She will require assistance to put them on. Objectively, pt has visible advanced arthritic changes to multiple joints of both hands. Unable to provoke carpal tunnel symptoms with testing. Pt would benefit from a few OT follow ups to discuss symptom management, adaptive equipment, modalities, and activity modification. OT recommending 1x/week for 2-3 weeks. Pt understands/agrees with current POC.   Understanding of Dx/Px/POC: good     Prognosis: good    Goals  Short term goals:  To be achieved within 2-3 visits from start of care on 25     Patient will demonstrate independence with recommended AROM and stretching HEPs.   Patient will demonstrate independence with postural/positional awareness and body " "/ergonomic correction recommendations.  Patient will be instructed on the benefits and effective use of appropriate modalities for edema control, soft-tissue extensibility, and pain management.   Patient will be instructed on the benefits and effective use of appropriate adaptive equipment and/or bracing devices.   Patient will be report improved functional participation with implementation of recommended pacing, activity modifications, and symptom management strategies.   Patient will report readiness for d/c from OT.         Plan  Patient would benefit from: custom splinting and skilled occupational therapy  Referral necessary: Yes  Planned modality interventions: thermotherapy: hydrocollator packs    Planned therapy interventions: IADL retraining, activity modification, ADL retraining, behavior modification, body mechanics training, flexibility, functional ROM exercises, graded activity, graded exercise, home exercise program, therapeutic exercise, therapeutic activities, stretching, strengthening, orthotic fitting/training, orthotic management and training, patient education, manual therapy and joint mobilization    Frequency: 1x week  Duration in weeks: 3  Plan of Care beginning date: 1/28/2025  Plan of Care expiration date: 4/22/2025  Treatment plan discussed with: patient        Subjective Evaluation    History of Present Illness  Mechanism of injury: Pt is a 83 yo RHD female who presents to OT for eval with bilateral hand pain and numbness due to osteoarthritis and carpal tunnel syndrome. Describes a \"prickly\" feeling in the fingers at night. No pain reported with the fingers at today's visit.     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       Quality of life: good    Pain  No pain reported  Current pain rating: " 0          Objective      Sensation intact bilaterally to light touch    Special Tests: negative Phalen's, negative Tinel's, negative Durkan's. Visible arthritic changes.         AROM   WFL wrist, digit and thumb AROM           Precautions: B hand pain; osteoarthritis; CTS    Manuals  1/28 eval                 STM                                                                                         Ther Ex                       Wrist AROM                  Prayer stretch              Tendon glides              Thumb AROM                                                                                                                                                Ther Activity                   Adaptive equipment Edu                  Joint protection                Activity modification                                                                                   Neuro Re-Ed                                                               Ortho Fit                  B/L wrist cock up 35'                                            Modalities                  HP                   Paraffin

## 2025-01-29 ENCOUNTER — TELEPHONE (OUTPATIENT)
Age: 85
End: 2025-01-29

## 2025-01-29 DIAGNOSIS — M17.0 PRIMARY OSTEOARTHRITIS OF BOTH KNEES: Primary | ICD-10-CM

## 2025-01-29 DIAGNOSIS — E78.5 DYSLIPIDEMIA: ICD-10-CM

## 2025-01-29 RX ORDER — ATORVASTATIN CALCIUM 10 MG/1
10 TABLET, FILM COATED ORAL DAILY
Qty: 90 TABLET | Refills: 1 | Status: SHIPPED | OUTPATIENT
Start: 2025-01-29

## 2025-01-29 NOTE — TELEPHONE ENCOUNTER
Reason for call:   [x] Refill   [] Prior Auth  [] Other:     Office:   [x] PCP/Provider - [facility]   [] Specialty/Provider -     Medication:   ~ atorvastatin (LIPITOR) 10 mg tablet - Take 1 tablet (10 mg total) by mouth daily     Pharmacy:   [facility] - [place], [place] - [address]     Does the patient have enough for 3 days?   [x] Yes   [] No - Send as HP to POD

## 2025-01-29 NOTE — TELEPHONE ENCOUNTER
Caller: Patient     Doctor: Chloé     Reason for call: Patient states that her Rheumatologist suggested she ask the doctor about getting bilat knee visco injections.   Please advise     Call back#: [phone]

## 2025-01-31 ENCOUNTER — APPOINTMENT (OUTPATIENT)
Dept: LAB | Facility: CLINIC | Age: 85
End: 2025-01-31
Payer: COMMERCIAL

## 2025-01-31 DIAGNOSIS — N18.31 STAGE 3A CHRONIC KIDNEY DISEASE (HCC): ICD-10-CM

## 2025-01-31 DIAGNOSIS — M81.0 SENILE OSTEOPOROSIS: ICD-10-CM

## 2025-01-31 LAB
25(OH)D3 SERPL-MCNC: 45.3 NG/ML (ref 30–100)
ALBUMIN SERPL BCG-MCNC: 3.9 G/DL (ref 3.5–5)
ALP SERPL-CCNC: 80 U/L (ref 34–104)
ALT SERPL W P-5'-P-CCNC: 15 U/L (ref 7–52)
ANION GAP SERPL CALCULATED.3IONS-SCNC: 8 MMOL/L (ref 4–13)
AST SERPL W P-5'-P-CCNC: 16 U/L (ref 13–39)
BILIRUB SERPL-MCNC: 0.54 MG/DL (ref 0.2–1)
BUN SERPL-MCNC: 26 MG/DL (ref 5–25)
CALCIUM SERPL-MCNC: 9.6 MG/DL (ref 8.4–10.2)
CHLORIDE SERPL-SCNC: 104 MMOL/L (ref 96–108)
CO2 SERPL-SCNC: 27 MMOL/L (ref 21–32)
CREAT SERPL-MCNC: 1.4 MG/DL (ref 0.6–1.3)
GFR SERPL CREATININE-BSD FRML MDRD: 34 ML/MIN/1.73SQ M
GLUCOSE SERPL-MCNC: 63 MG/DL (ref 65–140)
POTASSIUM SERPL-SCNC: 4.7 MMOL/L (ref 3.5–5.3)
PROT SERPL-MCNC: 7.1 G/DL (ref 6.4–8.4)
SODIUM SERPL-SCNC: 139 MMOL/L (ref 135–147)

## 2025-01-31 PROCEDURE — 82306 VITAMIN D 25 HYDROXY: CPT

## 2025-01-31 PROCEDURE — 80053 COMPREHEN METABOLIC PANEL: CPT

## 2025-02-01 ENCOUNTER — RESULTS FOLLOW-UP (OUTPATIENT)
Age: 85
End: 2025-02-01

## 2025-02-03 ENCOUNTER — OFFICE VISIT (OUTPATIENT)
Dept: PODIATRY | Facility: CLINIC | Age: 85
End: 2025-02-03
Payer: COMMERCIAL

## 2025-02-03 DIAGNOSIS — I73.9 PERIPHERAL VASCULAR DISEASE, UNSPECIFIED (HCC): ICD-10-CM

## 2025-02-03 DIAGNOSIS — B35.1 ONYCHOMYCOSIS: Primary | ICD-10-CM

## 2025-02-03 PROCEDURE — RECHECK: Performed by: PODIATRIST

## 2025-02-03 PROCEDURE — 11720 DEBRIDE NAIL 1-5: CPT | Performed by: PODIATRIST

## 2025-02-03 NOTE — PROGRESS NOTES
Established patient with class findings presents for mycotic toenail care.  Vascular exam:  DP 0/4 bilateral; PT 0 4 bilateral  Derm exam: Each second and third toenail thickened and yellow with subungual debris  Diagnoses: Mycotic toenails second and third toes bilateral; PVD  Treatment:  Debrided each mycotic toenail reducing nails in thickness and removing devitalized tissue and subungual debris.  Electrical and manual debridement performed.  Trimmed remaining elongated toenails.

## 2025-02-06 ENCOUNTER — APPOINTMENT (OUTPATIENT)
Dept: OCCUPATIONAL THERAPY | Age: 85
End: 2025-02-06
Payer: COMMERCIAL

## 2025-02-07 ENCOUNTER — RESULTS FOLLOW-UP (OUTPATIENT)
Age: 85
End: 2025-02-07

## 2025-02-07 ENCOUNTER — HOSPITAL ENCOUNTER (OUTPATIENT)
Dept: ULTRASOUND IMAGING | Facility: HOSPITAL | Age: 85
Discharge: HOME | End: 2025-02-07
Attending: INTERNAL MEDICINE
Payer: COMMERCIAL

## 2025-02-07 DIAGNOSIS — M15.4 EROSIVE OSTEOARTHRITIS OF HANDS, BILATERAL: ICD-10-CM

## 2025-02-07 DIAGNOSIS — M06.09 SERONEGATIVE ARTHROPATHY OF MULTIPLE SITES (HCC): ICD-10-CM

## 2025-02-07 PROCEDURE — 76882 US LMTD JT/FCL EVL NVASC XTR: CPT

## 2025-02-13 ENCOUNTER — OFFICE VISIT (OUTPATIENT)
Dept: OCCUPATIONAL THERAPY | Age: 85
End: 2025-02-13
Payer: COMMERCIAL

## 2025-02-13 ENCOUNTER — APPOINTMENT (OUTPATIENT)
Dept: OCCUPATIONAL THERAPY | Age: 85
End: 2025-02-13
Payer: COMMERCIAL

## 2025-02-13 DIAGNOSIS — M15.4 EROSIVE OSTEOARTHRITIS OF HANDS, BILATERAL: ICD-10-CM

## 2025-02-13 DIAGNOSIS — M06.09 SERONEGATIVE ARTHROPATHY OF MULTIPLE SITES (HCC): Primary | ICD-10-CM

## 2025-02-13 DIAGNOSIS — G56.03 BILATERAL CARPAL TUNNEL SYNDROME: ICD-10-CM

## 2025-02-13 PROCEDURE — 97110 THERAPEUTIC EXERCISES: CPT

## 2025-02-13 NOTE — PROGRESS NOTES
"OT Discharge     Today's date: 2025  Patient name: Monik Garcia  : 1940  MRN: [id number]  Referring provider: Tracy Kaiser MD  Dx:   Encounter Diagnosis     ICD-10-CM    1. Seronegative arthropathy of multiple sites (HCC)  M06.09       2. Erosive osteoarthritis of hands, bilateral  M15.4       3. Bilateral carpal tunnel syndrome  G56.03           Start Time: 955  Stop Time: 1025  Total time in clinic (min): 30 minutes    Subjective: \"I have carpal tunnel.\" Patient reports she would like a HEP and do her exercises at home only.       Objective: See treatment diary below      Assessment: Tolerated treatment well. Educated pt on AROM and stretching exercises for her arthritis and carpal tunnel. Pt demonstrated understanding. Pt reports she is unable to sleep while wearing the wrist cock ups. Reviewed activity modification strategies and joint protection techniques for both OA and CTS. Pt will trial HEP. No additional therapy at this time.       Plan:  Discharge from OT.      Precautions: B hand pain; osteoarthritis; CTS    Manuals   eval                 STM                                                                                         Ther Ex        performed on each side               Wrist AROM    x20               Prayer stretch  3x30s            Tendon glides  X20 full fist and platform            Thumb AROM  Opposition, flex/ext, abduction, circumduction                                                                                                                                              Ther Activity                   Adaptive equipment Edu    reviewed              Joint protection  reviewed              Activity modification  reviewed                                                                                 Neuro Re-Ed                                                               Ortho Fit                  B/L wrist cock up 35'                                     "        Modalities                  HP    5'               Paraffin

## 2025-02-14 ENCOUNTER — TELEPHONE (OUTPATIENT)
Dept: NEPHROLOGY | Facility: CLINIC | Age: 85
End: 2025-02-14

## 2025-02-14 NOTE — TELEPHONE ENCOUNTER
Isaac for pt offering sooner appt w/Dr. Bellamy 2/25 in [facility] in any available opening - needs labs

## 2025-02-17 NOTE — TELEPHONE ENCOUNTER
Pt called office and was unavailable on 2/25, however we were able to move her appt up to 3/7 at 10:30am with Dr Bellamy in the [facility]. Advised pt to complete labs prior to this appt.

## 2025-02-19 ENCOUNTER — TELEPHONE (OUTPATIENT)
Dept: OBGYN CLINIC | Facility: CLINIC | Age: 85
End: 2025-02-19

## 2025-02-19 NOTE — TELEPHONE ENCOUNTER
Spoke with pt's  to let him know appt 2/24 will need to be rs due to dr yossi has emergency and unable to come in. He let me know she will call back to rs

## 2025-02-25 NOTE — ASSESSMENT & PLAN NOTE
Seems at goal but she will send in a week readings at this time  Orders:    Basic metabolic panel; Future    Magnesium; Future    Basic metabolic panel; Future    Magnesium; Future    Comprehensive metabolic panel; Future    CBC; Future    Lipid Panel with Direct LDL reflex; Future    Magnesium; Future    Protein / creatinine ratio, urine; Future

## 2025-02-25 NOTE — ASSESSMENT & PLAN NOTE
Has been at goal no changes  Orders:    Basic metabolic panel; Future    Magnesium; Future    Basic metabolic panel; Future    Magnesium; Future    Comprehensive metabolic panel; Future    CBC; Future    Lipid Panel with Direct LDL reflex; Future    Magnesium; Future    Protein / creatinine ratio, urine; Future

## 2025-02-25 NOTE — ASSESSMENT & PLAN NOTE
At goal no changes  Orders:    Basic metabolic panel; Future    Magnesium; Future    Basic metabolic panel; Future    Magnesium; Future    Comprehensive metabolic panel; Future    CBC; Future    Lipid Panel with Direct LDL reflex; Future    Magnesium; Future    Protein / creatinine ratio, urine; Future

## 2025-02-25 NOTE — PROGRESS NOTES
Name: Monik Garcia      : 1940      MRN: [id number]  Encounter Provider: Evgeny Bellamy MD  Encounter Date: 3/7/2025   Encounter department: [facility] [place]  :  Assessment & Plan  Stage 3a chronic kidney disease (HCC)  Above baseline we will recheck with good hydration   Orders:    Basic metabolic panel; Future    Magnesium; Future    Basic metabolic panel; Future    Magnesium; Future    Comprehensive metabolic panel; Future    CBC; Future    Lipid Panel with Direct LDL reflex; Future    Magnesium; Future    Protein / creatinine ratio, urine; Future    Parenchymal renal hypertension, stage 1 through stage 4 or unspecified chronic kidney disease  Seems at goal but she will send in a week readings at this time  Orders:    Basic metabolic panel; Future    Magnesium; Future    Basic metabolic panel; Future    Magnesium; Future    Comprehensive metabolic panel; Future    CBC; Future    Lipid Panel with Direct LDL reflex; Future    Magnesium; Future    Protein / creatinine ratio, urine; Future    Dyslipidemia  Has been at goal no changes  Orders:    Basic metabolic panel; Future    Magnesium; Future    Basic metabolic panel; Future    Magnesium; Future    Comprehensive metabolic panel; Future    CBC; Future    Lipid Panel with Direct LDL reflex; Future    Magnesium; Future    Protein / creatinine ratio, urine; Future    Vitamin D deficiency  At goal no changes  Orders:    Basic metabolic panel; Future    Magnesium; Future    Basic metabolic panel; Future    Magnesium; Future    Comprehensive metabolic panel; Future    CBC; Future    Lipid Panel with Direct LDL reflex; Future    Magnesium; Future    Protein / creatinine ratio, urine; Future        History of Present Illness   HPI  Monik Garcia is a 84 y.o. female who presents with follow-up regarding CKD 3 A  There has been no hospitalizations or acute illnesses since last visit    Patient's status post ureterovaginal prolapse  cystocele rectocele surgery with: Colposuspension vaginal extraperitoneal EUA AAP colporrhaphy injection bulking agent urethral.  From 1/6/2025  The patient overall is feeling well.  Good appetite and good energy  No fevers, chills, or cough or colds.  Status post influenza a few weeks ago  No hematuria, dysuria, voiding symptoms or foamy urine  No gastrointestinal symptoms  No cardiovascular symptoms including swelling of the legs  No headaches, dizziness or lightheadedness  Blood pressure medications:  Toprol-XL 25 mg twice a day    Renal pertinent medications:  Atorvastatin 10 mg daily  Vitamin D 1000 units daily  Rare Motrin at most once a week  Pantoprazole 40 mg daily      Review of Systems  Please see HPI, otherwise the review of systems as completely reviewed with the patient are negative    Pertinent Medical History   1.  CKD stage 3A.:  Etiology:  AK I from Bactrim/hypercalcemia along with poor oral intake.  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  Volume:  Euvolemic of note: 5/26/2023 venous duplex negative for DVT  3.  Hypertension:  Secondary workup:  Was negative  Home readings:   none today     Goal blood pressure:  Less than 130/80 given CKD  Recommendations:   Push nonmedical regimen especially weight loss/isotonic exercise and low-salt diet  Medication changes today:   I will have her send in a week of blood pressure readings at this time           4.  Electrolytes:    All acceptable at this time     5.  Mineral bone  disorder:  Calcium/magnesium/phosphorus all normal including calcium of 9.6  PTH intact: 15.5 which is normal  Vitamin-D: 45.3 from 1/31/2025  6.  Dyslipidemia:  Goal LDL:  Less than 100  Current lipid profile:  LDL 86//triglycerides 43 from 3/20/2024  Recommendations:  Patient is at goal   7.  Anemia:   Hemoglobin:   Stable at 11.3 at baseline from 3/20/2024; mildly elevated leukocytosis would recheck at this time   Iron studies: Acceptable from 12/1/2023  Multiple myeloma evaluation  was negative as outlined above  GI evaluation:  EGD with just esophagitis; colonoscopy with benign polyp  Heme consultation:  Felt anemia which was normocytic secondary to chronic kidney disease.  No further recommendations at this time.  8.  Other problems:  GERD  Osteoarthritis of right hip  Colonic polyp  Status post duodenal perforation 10/04/2020:  Apparently upper GI endoscopy demonstrated no leak.  Endoscopy demonstrated no leak.  She was NPO with antibiotics who was given a 2 week course of antibiotics upon discharge along with antacid medication  To be followed up as an outpatient.  Cardiac murmur which is chronic echocardiogram performed March 2021 showed good EF 65% and mild TR and mild aortic regurgitation  The patient has left lower extremity edema she is not sure when this began there is no symptoms or signs of a potential clot venous duplex in the past from 5/26/2023 negative  Patient's status post ureterovaginal prolapse cystocele rectocele surgery with: Colposuspension vaginal extraperitoneal EUA AAP colporrhaphy injection bulking agent urethral.  From 1/6/2025     GI health maintenance: Seen by Dr. Hayden who recommended no further colonoscopy at this time  Based on age and asymptomatic         Medical History Reviewed by provider this encounter:     .  Past Medical History   Past Medical History:   Diagnosis Date    Acute laryngitis 05/12/2022    Anemia     Chronic bilateral low back pain without sciatica  11/26/2019    Chronic kidney disease     Duodenal perforation (HCC) 10/07/2020    Duodenal ulcer     Enterococcus UTI 04/07/2016    GERD (gastroesophageal reflux disease) 04/04/2016    Greater trochanteric bursitis of right hip 09/18/2018    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    Hypercalcemia     Hypertension     Hypokalemia 10/27/2018    Impetigo 09/18/2024    Left lumbar radiculopathy 04/07/2016    Mixed hyperlipidemia     Osteoarthritis of hip 04/04/2016    Osteoporosis     Sacral insufficiency fracture     SI (sacroiliac) joint inflammation (HCC) 04/04/2016    Strain of left biceps 10/19/2023    Substance addiction ([facility])     Vitamin D deficiency 03/11/2019     Past Surgical History:   Procedure Laterality Date    BREAST EXCISIONAL BIOPSY Left 2000    benign    COLONOSCOPY  2019    CYSTOSCOPY W/ DEFLUX INJECTION N/A 1/6/2025    Procedure: INJECTION BULKING AGENT URETHRAL;  Surgeon: Juan David Mcbride MD;  Location: [facility];  Service: UroGynecology           JOINT REPLACEMENT      right hip 8/15    MAMMO (HISTORICAL)  2019    NJ CMBND ANTERPOST COLPORRAPHY W/CYSTO N/A 1/6/2025    Procedure: A&P COLPORRHAPHY;  Surgeon: Juan David Mcbride MD;  Location: [facility];  Service: UroGynecology           NJ COLPOPEXY VAGINAL EXTRAPERITONEAL APPROACH N/A 1/6/2025    Procedure: COLPOSUSPENSION VAGINAL EXTRAPERITONEAL(ENPLACE); EUA; ANTERIOR POSTERIOR COLORRPHY, URETHRAL BULKING INJECTION, CYSTO;  Surgeon: Juan David Mcbride MD;  Location: [facility];  Service: UroGynecology           NJ CYSTOURETHROSCOPY N/A 1/6/2025    Procedure: CYSTO;  Surgeon: Juan David Mcbride MD;  Location: [facility];  Service: UroGynecology           TONSILLECTOMY       Family History   Problem Relation Age of Onset    No Known Problems Mother     No Known Problems Father     Breast cancer Daughter 50    Breast cancer Maternal Aunt 70    No Known Problems Sister     No Known Problems Maternal Grandmother     No  Known Problems Maternal Grandfather     No Known Problems Paternal Grandmother     No Known Problems Paternal Grandfather     No Known Problems Son     No Known Problems Sister     No Known Problems Sister       reports that she has never smoked. She has never used smokeless tobacco. She reports that she does not currently use drugs after having used the following drugs: Prescription. She reports that she does not drink alcohol.  Current Outpatient Medications   Medication Instructions    acetaminophen (TYLENOL) 650 mg, Oral, Every 6 hours PRN    acyclovir (ZOVIRAX) 5 % ointment Topical, Every 6 hours scheduled    atorvastatin (LIPITOR) 10 mg, Oral, Daily    Cholecalciferol (Vitamin D-3) 25 MCG (1000 UT) CAPS Daily    docusate sodium (COLACE) 100 mg, Oral, 2 times daily    ibuprofen (MOTRIN) 800 mg, Oral, As needed    metoprolol succinate (TOPROL-XL) 25 mg, Oral, 2 times daily    Multiple Vitamins-Minerals (ONE A DAY WOMEN 50 PLUS PO) One A Day Women 50 Plus    mupirocin (BACTROBAN) 2 % ointment Topical, 3 times daily, To left lower leg.    oxyCODONE (ROXICODONE) 2.5 mg, Oral, Every 4 hours PRN    pantoprazole (PROTONIX) 40 mg, Oral, Daily, As needed for heartburn    polyethylene glycol (MIRALAX) 17 g, Oral, Daily PRN    Premarin vaginal cream 3 times weekly     Allergies   Allergen Reactions    Bactrim [Sulfamethoxazole-Trimethoprim] Other (See Comments)     Kidney failure      Current Outpatient Medications on File Prior to Visit   Medication Sig Dispense Refill    acetaminophen (TYLENOL) 325 mg tablet Take 2 tablets (650 mg total) by mouth every 6 (six) hours as needed for mild pain      acyclovir (ZOVIRAX) 5 % ointment Apply topically every 6 (six) hours (Patient taking differently: Apply topically 3 (three) times a day as needed) 30 g 0    atorvastatin (LIPITOR) 10 mg tablet Take 1 tablet (10 mg total) by mouth daily 90 tablet 1    Cholecalciferol (Vitamin D-3) 25 MCG (1000 UT) CAPS Take by mouth in the morning       docusate sodium (COLACE) 100 mg capsule Take 1 capsule (100 mg total) by mouth 2 (two) times a day      ibuprofen (MOTRIN) 800 mg tablet Take 1 tablet (800 mg total) by mouth if needed for mild pain 30 tablet 0    metoprolol succinate (TOPROL-XL) 25 mg 24 hr tablet Take 1 tablet (25 mg total) by mouth 2 (two) times a day 180 tablet 3    Multiple Vitamins-Minerals (ONE A DAY WOMEN 50 PLUS PO) One A Day Women 50 Plus      mupirocin (BACTROBAN) 2 % ointment Apply topically 3 (three) times a day To left lower leg. 30 g 1    oxyCODONE (Roxicodone) 5 immediate release tablet Take 0.5 tablets (2.5 mg total) by mouth every 4 (four) hours as needed for moderate pain for up to 6 doses Max Daily Amount: 15 mg 3 tablet 0    pantoprazole (PROTONIX) 40 mg tablet Take 1 tablet (40 mg total) by mouth daily As needed for heartburn 90 tablet 5    polyethylene glycol (MIRALAX) 17 g packet Take 17 g by mouth daily as needed (constipation)      Premarin vaginal cream 3 (three) times a week       Current Facility-Administered Medications on File Prior to Visit   Medication Dose Route Frequency Provider Last Rate Last Admin    denosumab (PROLIA) subcutaneous injection 60 mg  60 mg Subcutaneous Once Yamile Wallace PA-C        lidocaine (XYLOCAINE) 1 % injection 1 mL  1 mL Injection  Pablo Rivera DPM   1 mL at 12/07/23 0900    lidocaine (XYLOCAINE) 1 % injection 1 mL  1 mL Injection     1 mL at 07/08/24 0930    triamcinolone acetonide (KENALOG-40) 40 mg/mL injection 20 mg  20 mg Infiltration  Pablo Rivera DPM   20 mg at 12/07/23 0900    triamcinolone acetonide (KENALOG-40) 40 mg/mL injection 20 mg  20 mg Infiltration     20 mg at 07/08/24 0930      Social History     Tobacco Use    Smoking status: Never    Smokeless tobacco: Never   Vaping Use    Vaping status: Never Used   Substance and Sexual Activity    Alcohol use: Never    Drug use: Not Currently     Types: Prescription     Comment: rocovering last used 1996    Sexual  activity: Yes     Partners: Male     Birth control/protection: Post-menopausal        Objective   /53 (BP Location: Right arm, Patient Position: Sitting, Cuff Size: Adult)   Pulse 88   Wt 61.2 kg (135 lb)   BMI 24.89 kg/m²      Physical Exam  BP sitting on left: 130/62 with a heart rate of 80 and regular  BP standing on left: 132/70 with a heart rate of 96 and regular  Physical Exam: General:  No acute distress  Skin:  No acute rash  Eyes:  No scleral icterus and noninjected  ENT:  Moist mucous membranes  Neck:  Supple, no jugular venous distention, trachea midline, overall appearance is normal  Chest:  Clear to auscultation  CVS:  Regular rate and rhythm, without a rub or gallops; grade 2 or 6 systolic ejection type murmur heard best at the right upper sternal border  Abdomen:  Normal bowel sounds, soft and nontender and nondistended  Extremities:  No edema, and no cyanosis, chronic arthritic changes  Neuro:  No gross focality  Psych:  Alert and oriented and appropriate

## 2025-02-25 NOTE — ASSESSMENT & PLAN NOTE
Above baseline we will recheck with good hydration   Orders:    Basic metabolic panel; Future    Magnesium; Future    Basic metabolic panel; Future    Magnesium; Future    Comprehensive metabolic panel; Future    CBC; Future    Lipid Panel with Direct LDL reflex; Future    Magnesium; Future    Protein / creatinine ratio, urine; Future

## 2025-03-07 ENCOUNTER — OFFICE VISIT (OUTPATIENT)
Dept: NEPHROLOGY | Facility: CLINIC | Age: 85
End: 2025-03-07
Payer: COMMERCIAL

## 2025-03-07 VITALS
HEART RATE: 88 BPM | SYSTOLIC BLOOD PRESSURE: 114 MMHG | WEIGHT: 135 LBS | DIASTOLIC BLOOD PRESSURE: 53 MMHG | BODY MASS INDEX: 24.89 KG/M2

## 2025-03-07 DIAGNOSIS — E78.5 DYSLIPIDEMIA: ICD-10-CM

## 2025-03-07 DIAGNOSIS — N18.31 STAGE 3A CHRONIC KIDNEY DISEASE (HCC): ICD-10-CM

## 2025-03-07 DIAGNOSIS — E55.9 VITAMIN D DEFICIENCY: ICD-10-CM

## 2025-03-07 DIAGNOSIS — I12.9 PARENCHYMAL RENAL HYPERTENSION, STAGE 1 THROUGH STAGE 4 OR UNSPECIFIED CHRONIC KIDNEY DISEASE: Primary | ICD-10-CM

## 2025-03-07 PROCEDURE — 99214 OFFICE O/P EST MOD 30 MIN: CPT | Performed by: INTERNAL MEDICINE

## 2025-03-07 NOTE — PATIENT INSTRUCTIONS
Visit summary:  - Overall doing well, kidney number or creatinine slightly higher than it has been at times I think that might have been related to the flu etc.  We are just simply going to repeat labs at this time for stability    - I also believe your blood pressure is at goal but I would like you to send in a week readings now morning evening just sitting would be fine before taking medications        1. Medication changes today:  None today    2.  General instructions:  Avoid salt  Remain active, try to exercise at least 3 days a week of some form of exercise for at least 30 minutes such as walking eventually 5 days a week is ultimate goal for more the better    3.  Please go for non fasting  lab work at this time    4.  At this time as outlined above please take 1 week a blood pressure readings at this time    AS FOLLOWS  MORNING AND EVENING, SITTING AND STANDING as follows:  TAKE THE MORNING READINGS BEFORE ANY MEDICATIONS AND WHEN YOU ARE RELAXED FOR SEVERAL MINUTES  TAKE THE EVENING READINGS:  BETWEEN 7-10 P.M.; PRIOR TO ANY MEDICATIONS; AT LEAST IN OUR  FROM DINNER; AND CERTAINLY AFTER RELAXING FOR A FEW MINUTES  PLEASE INCLUDE HEART RATE WITH YOUR BLOOD PRESSURE READINGS  When taking standing readings, keep your arm supported at heart level and not dangling  Make sure you are sitting with your back supported and feet on the ground and do not cross your legs or feet  Make sure you have not taken any coffee or caffeine products or exercised or smoke cigarettes at least 30 minutes before taking your blood pressure  Then please mail these readings into the office      5.  In 3 months:  Please go for nonfasting lab work but in the morning  Please take 1 week a blood pressure readings as outlined above and mail those into the office      6.  Follow-up in 6 months  Please bring in 1 week a blood pressure readings morning evening, sitting and standing is outlined above  PLEASE BRING AN YOUR BLOOD PRESSURE  MACHINE TO CORRELATE WITH THE OFFICE MACHINE AT THIS NEXT SCHEDULED VISIT  Please go for fasting lab work 1-2 weeks prior to your appointment      7.  General non medical recommendations:  AVOID SALT BUT NOT ADDING AN READING LABELS TO MAKE SURE THERE IS LOW-SALT IN THE FOOD THAT YOU ARE EATING  Goal is less than 2 g of sodium intake or less than 5 g of sodium chloride intake per day    Avoid nonsteroidal anti-inflammatory drugs such as Naprosyn, ibuprofen, Aleve, Advil, Celebrex, Meloxicam (Mobic) etc.  You can use Tylenol as needed if you do not have any liver condition to be concerned about    Avoid medications such as Sudafed or decongestants and antihistamines that contained the D component which is the decongestant.  You can take antihistamines without the decongestant or D component.    Try to avoid medications such as pantoprazole or  Protonix/Nexium or Esomeprazole)/Prilosec or omeprazole/Prevacid or lansoprazole/AcipHex or Rabeprazole.  If you are able to, use Pepcid as this is safer for your kidneys.    Try to exercise at least 30 minutes 3 days a week to begin with with an ultimate goal of 5 days a week for at least 30 minutes    Please do not drink more than 2 glasses of alcohol/wine on a daily basis as this may contribute to your high blood pressure.

## 2025-03-07 NOTE — LETTER
2025     Ashish Arteaga MD  [address] [place] [address]    Patient: Monik Garcia   YOB: 1940   Date of Visit: 3/7/2025       Dear Dr. Arteaga:    Thank you for referring Monik Garcia to me for evaluation. Below are my notes for this consultation.    If you have questions, please do not hesitate to call me. I look forward to following your patient along with you.         Sincerely,        Evgeny Bellamy MD        CC: No Recipients    Evgeny Bellamy MD  3/7/2025 10:56 AM  Sign when Signing Visit  Name: Monik Garcia      : 1940      MRN: [id number]  Encounter Provider: Evgeny Bellamy MD  Encounter Date: 3/7/2025   Encounter department: [facility]  :  Assessment & Plan  Stage 3a chronic kidney disease (HCC)  Above baseline we will recheck with good hydration   Orders:  •  Basic metabolic panel; Future  •  Magnesium; Future  •  Basic metabolic panel; Future  •  Magnesium; Future  •  Comprehensive metabolic panel; Future  •  CBC; Future  •  Lipid Panel with Direct LDL reflex; Future  •  Magnesium; Future  •  Protein / creatinine ratio, urine; Future    Parenchymal renal hypertension, stage 1 through stage 4 or unspecified chronic kidney disease  Seems at goal but she will send in a week readings at this time  Orders:  •  Basic metabolic panel; Future  •  Magnesium; Future  •  Basic metabolic panel; Future  •  Magnesium; Future  •  Comprehensive metabolic panel; Future  •  CBC; Future  •  Lipid Panel with Direct LDL reflex; Future  •  Magnesium; Future  •  Protein / creatinine ratio, urine; Future    Dyslipidemia  Has been at goal no changes  Orders:  •  Basic metabolic panel; Future  •  Magnesium; Future  •  Basic metabolic panel; Future  •  Magnesium; Future  •  Comprehensive metabolic panel; Future  •  CBC; Future  •  Lipid Panel with Direct LDL reflex; Future  •  Magnesium; Future  •  Protein / creatinine ratio, urine;  Future    Vitamin D deficiency  At goal no changes  Orders:  •  Basic metabolic panel; Future  •  Magnesium; Future  •  Basic metabolic panel; Future  •  Magnesium; Future  •  Comprehensive metabolic panel; Future  •  CBC; Future  •  Lipid Panel with Direct LDL reflex; Future  •  Magnesium; Future  •  Protein / creatinine ratio, urine; Future        History of Present Illness  HPI  Monik Garcia is a 84 y.o. female who presents with follow-up regarding CKD 3 A  There has been no hospitalizations or acute illnesses since last visit    Patient's status post ureterovaginal prolapse cystocele rectocele surgery with: Colposuspension vaginal extraperitoneal EUA AAP colporrhaphy injection bulking agent urethral.  From 1/6/2025  The patient overall is feeling well.  Good appetite and good energy  No fevers, chills, or cough or colds.  Status post influenza a few weeks ago  No hematuria, dysuria, voiding symptoms or foamy urine  No gastrointestinal symptoms  No cardiovascular symptoms including swelling of the legs  No headaches, dizziness or lightheadedness  Blood pressure medications:  Toprol-XL 25 mg twice a day    Renal pertinent medications:  Atorvastatin 10 mg daily  Vitamin D 1000 units daily  Rare Motrin at most once a week  Pantoprazole 40 mg daily      Review of Systems  Please see HPI, otherwise the review of systems as completely reviewed with the patient are negative    Pertinent Medical History  1.  CKD stage 3A.:  Etiology:  AK I from Bactrim/hypercalcemia along with poor oral intake.  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/arteriolar nephrosclerosis  Baseline creatinine:  1.1 -1.79  Current creatinine: 1.40 on the higher end from 1/31/2025  Urine protein creatinine ratio:  0.26 g at goal from December 2023 repeat next visit  Recommendations:  Treat hypertension-please see below  Treat dyslipidemia-please see below  Maintain proteinuria less than 1 g or as low as possible  Avoid  nephrotoxic agents such as NSAIDs, patient counseled as such  Repeat BMP to demonstrate stability   2.  Volume:  Euvolemic of note: 5/26/2023 venous duplex negative for DVT  3.  Hypertension:  Secondary workup:  Was negative  Home readings:   none today     Goal blood pressure:  Less than 130/80 given CKD  Recommendations:   Push nonmedical regimen especially weight loss/isotonic exercise and low-salt diet  Medication changes today:   I will have her send in a week of blood pressure readings at this time           4.  Electrolytes:    All acceptable at this time     5.  Mineral bone disorder:  Calcium/magnesium/phosphorus all normal including calcium of 9.6  PTH intact: 15.5 which is normal  Vitamin-D: 45.3 from 1/31/2025  6.  Dyslipidemia:  Goal LDL:  Less than 100  Current lipid profile:  LDL 86//triglycerides 43 from 3/20/2024  Recommendations:  Patient is at goal   7.  Anemia:   Hemoglobin:   Stable at 11.3 at baseline from 3/20/2024; mildly elevated leukocytosis would recheck at this time   Iron studies: Acceptable from 12/1/2023  Multiple myeloma evaluation  was negative as outlined above  GI evaluation:  EGD with just esophagitis; colonoscopy with benign polyp  Heme consultation:  Felt anemia which was normocytic secondary to chronic kidney disease.  No further recommendations at this time.  8.  Other problems:  GERD  Osteoarthritis of right hip  Colonic polyp  Status post duodenal perforation 10/04/2020:  Apparently upper GI endoscopy demonstrated no leak.  Endoscopy demonstrated no leak.  She was NPO with antibiotics who was given a 2 week course of antibiotics upon discharge along with antacid medication  To be followed up as an outpatient.  Cardiac murmur which is chronic echocardiogram performed March 2021 showed good EF 65% and mild TR and mild aortic regurgitation  The patient has left lower extremity edema she is not sure when this began there is no symptoms or signs of a potential clot venous  duplex in the past from 5/26/2023 negative  Patient's status post ureterovaginal prolapse cystocele rectocele surgery with: Colposuspension vaginal extraperitoneal EUA AAP colporrhaphy injection bulking agent urethral.  From 1/6/2025     GI health maintenance: Seen by Dr. Hayden who recommended no further colonoscopy at this time  Based on age and asymptomatic         Medical History Reviewed by provider this encounter:     .  Past Medical History  Past Medical History:   Diagnosis Date   • Acute laryngitis 05/12/2022   • Anemia    • Chronic bilateral low back pain without sciatica 11/26/2019   • Chronic kidney disease    • Duodenal perforation (HCC) 10/07/2020   • Duodenal ulcer    • Enterococcus UTI 04/07/2016   • GERD (gastroesophageal reflux disease) 04/04/2016   • Greater trochanteric bursitis of right hip 09/18/2018   • 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   • Hypercalcemia    • Hypertension    • Hypokalemia 10/27/2018   • Impetigo 09/18/2024   • Left lumbar radiculopathy 04/07/2016   • Mixed hyperlipidemia    • Osteoarthritis of hip 04/04/2016   • Osteoporosis    • Sacral insufficiency fracture    • SI (sacroiliac) joint inflammation ([facility]) 04/04/2016   • Strain of left biceps 10/19/2023   • Substance addiction ([facility])    • Vitamin D deficiency 03/11/2019     Past Surgical History:   Procedure Laterality Date   • BREAST EXCISIONAL BIOPSY Left 2000    benign   • COLONOSCOPY  2019   • CYSTOSCOPY W/ DEFLUX INJECTION N/A 1/6/2025    Procedure: INJECTION BULKING AGENT URETHRAL;  Surgeon: Juan David Mcbride MD;  Location: [facility];  Service: UroGynecology          • JOINT REPLACEMENT      right hip 8/15   • MAMMO (HISTORICAL)  2019   • NM CMBND ANTERPOST COLPORRAPHY W/CYSTO N/A 1/6/2025    Procedure: A&P COLPORRHAPHY;  Surgeon: Juan David Mcbride MD;  Location: [facility];  Service: UroGynecology          • NM COLPOPEXY VAGINAL EXTRAPERITONEAL APPROACH N/A 1/6/2025    Procedure:  COLPOSUSPENSION VAGINAL EXTRAPERITONEAL(ENPLACE); EUA; ANTERIOR POSTERIOR COLORRPHY, URETHRAL BULKING INJECTION, CYSTO;  Surgeon: Juan David Mcbride MD;  [facility];  Service: UroGynecology          • IN CYSTOURETHROSCOPY N/A 1/6/2025    Procedure: CYSTO;  Surgeon: Juan David Mcbride MD;  [facility];  Service: UroGynecology          • TONSILLECTOMY       Family History   Problem Relation Age of Onset   • No Known Problems Mother    • No Known Problems Father    • Breast cancer Daughter 50   • Breast cancer Maternal Aunt 70   • No Known Problems Sister    • No Known Problems Maternal Grandmother    • No Known Problems Maternal Grandfather    • No Known Problems Paternal Grandmother    • No Known Problems Paternal Grandfather    • No Known Problems Son    • No Known Problems Sister    • No Known Problems Sister       reports that she has never smoked. She has never used smokeless tobacco. She reports that she does not currently use drugs after having used the following drugs: Prescription. She reports that she does not drink alcohol.  Current Outpatient Medications   Medication Instructions   • acetaminophen (TYLENOL) 650 mg, Oral, Every 6 hours PRN   • acyclovir (ZOVIRAX) 5 % ointment Topical, Every 6 hours scheduled   • atorvastatin (LIPITOR) 10 mg, Oral, Daily   • Cholecalciferol (Vitamin D-3) 25 MCG (1000 UT) CAPS Daily   • docusate sodium (COLACE) 100 mg, Oral, 2 times daily   • ibuprofen (MOTRIN) 800 mg, Oral, As needed   • metoprolol succinate (TOPROL-XL) 25 mg, Oral, 2 times daily   • Multiple Vitamins-Minerals (ONE A DAY WOMEN 50 PLUS PO) One A Day Women 50 Plus   • mupirocin (BACTROBAN) 2 % ointment Topical, 3 times daily, To left lower leg.   • oxyCODONE (ROXICODONE) 2.5 mg, Oral, Every 4 hours PRN   • pantoprazole (PROTONIX) 40 mg, Oral, Daily, As needed for heartburn   • polyethylene glycol (MIRALAX) 17 g, Oral, Daily PRN   • Premarin vaginal cream 3 times weekly      Allergies   Allergen Reactions   • Bactrim [Sulfamethoxazole-Trimethoprim] Other (See Comments)     Kidney failure      Current Outpatient Medications on File Prior to Visit   Medication Sig Dispense Refill   • acetaminophen (TYLENOL) 325 mg tablet Take 2 tablets (650 mg total) by mouth every 6 (six) hours as needed for mild pain     • acyclovir (ZOVIRAX) 5 % ointment Apply topically every 6 (six) hours (Patient taking differently: Apply topically 3 (three) times a day as needed) 30 g 0   • atorvastatin (LIPITOR) 10 mg tablet Take 1 tablet (10 mg total) by mouth daily 90 tablet 1   • Cholecalciferol (Vitamin D-3) 25 MCG (1000 UT) CAPS Take by mouth in the morning     • docusate sodium (COLACE) 100 mg capsule Take 1 capsule (100 mg total) by mouth 2 (two) times a day     • ibuprofen (MOTRIN) 800 mg tablet Take 1 tablet (800 mg total) by mouth if needed for mild pain 30 tablet 0   • metoprolol succinate (TOPROL-XL) 25 mg 24 hr tablet Take 1 tablet (25 mg total) by mouth 2 (two) times a day 180 tablet 3   • Multiple Vitamins-Minerals (ONE A DAY WOMEN 50 PLUS PO) One A Day Women 50 Plus     • mupirocin (BACTROBAN) 2 % ointment Apply topically 3 (three) times a day To left lower leg. 30 g 1   • oxyCODONE (Roxicodone) 5 immediate release tablet Take 0.5 tablets (2.5 mg total) by mouth every 4 (four) hours as needed for moderate pain for up to 6 doses Max Daily Amount: 15 mg 3 tablet 0   • pantoprazole (PROTONIX) 40 mg tablet Take 1 tablet (40 mg total) by mouth daily As needed for heartburn 90 tablet 5   • polyethylene glycol (MIRALAX) 17 g packet Take 17 g by mouth daily as needed (constipation)     • Premarin vaginal cream 3 (three) times a week       Current Facility-Administered Medications on File Prior to Visit   Medication Dose Route Frequency Provider Last Rate Last Admin   • denosumab (PROLIA) subcutaneous injection 60 mg  60 mg Subcutaneous Once Yamile Wallace PA-C       • lidocaine (XYLOCAINE) 1 % injection  1 mL  1 mL Injection  Pablo Rivera DPWESLEY   1 mL at 12/07/23 0900   • lidocaine (XYLOCAINE) 1 % injection 1 mL  1 mL Injection     1 mL at 07/08/24 0930   • triamcinolone acetonide (KENALOG-40) 40 mg/mL injection 20 mg  20 mg Infiltration  Pablo Rivera, DPM   20 mg at 12/07/23 0900   • triamcinolone acetonide (KENALOG-40) 40 mg/mL injection 20 mg  20 mg Infiltration     20 mg at 07/08/24 0930      Social History     Tobacco Use   • Smoking status: Never   • Smokeless tobacco: Never   Vaping Use   • Vaping status: Never Used   Substance and Sexual Activity   • Alcohol use: Never   • Drug use: Not Currently     Types: Prescription     Comment: rocovering last used 1996   • Sexual activity: Yes     Partners: Male     Birth control/protection: Post-menopausal        Objective  /53 (BP Location: Right arm, Patient Position: Sitting, Cuff Size: Adult)   Pulse 88   Wt 61.2 kg (135 lb)   BMI 24.89 kg/m²      Physical Exam  BP sitting on left: 130/62 with a heart rate of 80 and regular  BP standing on left: 132/70 with a heart rate of 96 and regular  Physical Exam: General:  No acute distress  Skin:  No acute rash  Eyes:  No scleral icterus and noninjected  ENT:  Moist mucous membranes  Neck:  Supple, no jugular venous distention, trachea midline, overall appearance is normal  Chest:  Clear to auscultation  CVS:  Regular rate and rhythm, without a rub or gallops; grade 2 or 6 systolic ejection type murmur heard best at the right upper sternal border  Abdomen:  Normal bowel sounds, soft and nontender and nondistended  Extremities:  No edema, and no cyanosis, chronic arthritic changes  Neuro:  No gross focality  Psych:  Alert and oriented and appropriate

## 2025-03-10 ENCOUNTER — APPOINTMENT (OUTPATIENT)
Dept: LAB | Facility: CLINIC | Age: 85
End: 2025-03-10
Payer: COMMERCIAL

## 2025-03-10 ENCOUNTER — TELEPHONE (OUTPATIENT)
Age: 85
End: 2025-03-10

## 2025-03-10 ENCOUNTER — RESULTS FOLLOW-UP (OUTPATIENT)
Dept: NEPHROLOGY | Facility: CLINIC | Age: 85
End: 2025-03-10

## 2025-03-10 ENCOUNTER — TRANSCRIBE ORDERS (OUTPATIENT)
Dept: LAB | Facility: CLINIC | Age: 85
End: 2025-03-10

## 2025-03-10 DIAGNOSIS — E78.5 DYSLIPIDEMIA: ICD-10-CM

## 2025-03-10 DIAGNOSIS — N18.31 STAGE 3A CHRONIC KIDNEY DISEASE (HCC): ICD-10-CM

## 2025-03-10 DIAGNOSIS — Z00.8 ENCOUNTER FOR OTHER GENERAL EXAMINATION: Primary | ICD-10-CM

## 2025-03-10 DIAGNOSIS — D50.8 OTHER IRON DEFICIENCY ANEMIA: Primary | ICD-10-CM

## 2025-03-10 DIAGNOSIS — E55.9 VITAMIN D DEFICIENCY: ICD-10-CM

## 2025-03-10 DIAGNOSIS — I12.9 PARENCHYMAL RENAL HYPERTENSION, STAGE 1 THROUGH STAGE 4 OR UNSPECIFIED CHRONIC KIDNEY DISEASE: ICD-10-CM

## 2025-03-10 NOTE — TELEPHONE ENCOUNTER
Phone call from [facility] lab, patient was there this morning and they accidentally canceled her order for CBC and platelets ordered by Dr. Bellamy. Please put order back in the system.  Thank you

## 2025-03-10 NOTE — TELEPHONE ENCOUNTER
Left a message about lab results ,no changes.            ----- Message from Evgeny Bellamy MD sent at 3/10/2025  2:04 PM EDT -----  Labs look great!  ----- Message -----  From: Lab, Background User  Sent: 3/10/2025  12:50 PM EDT  To: Evgeny Bellamy MD

## 2025-03-11 ENCOUNTER — TELEPHONE (OUTPATIENT)
Age: 85
End: 2025-03-11

## 2025-03-11 NOTE — TELEPHONE ENCOUNTER
Patient called stating she got 2 calls now from a Liane for outreach about labs for Dr. Huber. She said she thought she took care of these labs. She is asking that Dr. Kaiser's reviews and calls here with an update.

## 2025-03-13 ENCOUNTER — OFFICE VISIT (OUTPATIENT)
Age: 85
End: 2025-03-13
Payer: COMMERCIAL

## 2025-03-13 VITALS
HEART RATE: 79 BPM | TEMPERATURE: 98.6 F | BODY MASS INDEX: 25.3 KG/M2 | WEIGHT: 134 LBS | HEIGHT: 61 IN | OXYGEN SATURATION: 98 % | DIASTOLIC BLOOD PRESSURE: 70 MMHG | SYSTOLIC BLOOD PRESSURE: 118 MMHG

## 2025-03-13 DIAGNOSIS — M15.4 EROSIVE OSTEOARTHRITIS OF HANDS, BILATERAL: ICD-10-CM

## 2025-03-13 DIAGNOSIS — I10 ESSENTIAL HYPERTENSION: Primary | ICD-10-CM

## 2025-03-13 DIAGNOSIS — Z00.00 ANNUAL PHYSICAL EXAM: ICD-10-CM

## 2025-03-13 PROBLEM — E55.9 VITAMIN D DEFICIENCY: Status: RESOLVED | Noted: 2019-03-11 | Resolved: 2025-03-13

## 2025-03-13 PROCEDURE — 99213 OFFICE O/P EST LOW 20 MIN: CPT | Performed by: INTERNAL MEDICINE

## 2025-03-13 PROCEDURE — 99397 PER PM REEVAL EST PAT 65+ YR: CPT | Performed by: INTERNAL MEDICINE

## 2025-03-13 NOTE — PROGRESS NOTES
Adult Annual Physical  Name: Monik Garcia      : 1940      MRN: [id number]  Encounter Provider: Ashish Arteaga MD  Encounter Date: 3/13/2025   Encounter department: [facility] [place]    Assessment & Plan  Essential hypertension  Blood pressure is nicely controlled on current medications.       Erosive osteoarthritis of hands, bilateral  Continues to follow with rheumatology.  She does take ibuprofen 800 mg as needed.  She continues on pantoprazole daily.       Annual physical exam         Preventive Screenings:  - Diabetes Screening: screening up-to-date  - Cholesterol Screening: screening up-to-date   - Hepatitis C screening: screening not indicated   - HIV screening: screening not indicated   - Cervical cancer screening: screening not indicated   - Breast cancer screening: screening up-to-date   - Colon cancer screening: screening not indicated   - Lung cancer screening: screening not indicated   - Prostate cancer screening: screening not indicated   - Osteoporosis screening: has osteoporosis and screening not indicated     The patient presents to the office for 6-month follow-up for hypertension and then an annual physical.  She recently underwent gynecologic surgery which went very well and she is very happy with the results.  She has no other complaints.  Some recent labs were reviewed.  There were no major abnormalities noted.  Ferritin levels, CMP, lipid profile, magnesium levels along with vitamin D levels were all within normal limits.  The levels have been checked 2 months ago and were normal.  The patient continues on vitamin D supplementation daily.  There is no need to continue to check vitamin D levels as long as she is taking supplements.     Immunizations:  - Immunizations due: Tdap and Zoster (Shingrix)         History of Present Illness     Adult Annual Physical:  Patient presents for annual physical. The patient presents for a follow up visit which  "will include an annual physical..     Diet and Physical Activity:  - Diet/Nutrition: no special diet.  - Exercise: walking.    Depression Screening:  - PHQ-2 Score: 0    General Health:  - Sleep: 4-6 hours of sleep on average and sleeps well.  - Hearing: normal hearing bilateral ears.  - Vision: wears glasses and most recent eye exam < 1 year ago.  - Dental: regular dental visits and brushes teeth twice daily.    /GYN Health:  - Follows with GYN: yes.   - Menopause: postmenopausal.   - History of STDs: no  - Contraception:. N/A      Review of Systems   Constitutional: Negative.    HENT: Negative.     Eyes: Negative.    Respiratory: Negative.     Cardiovascular: Negative.    Gastrointestinal: Negative.    Endocrine: Negative.    Genitourinary: Negative.    Musculoskeletal:  Positive for arthralgias, back pain and joint swelling. Negative for gait problem, myalgias, neck pain and neck stiffness.   Skin: Negative.    Allergic/Immunologic: Negative.    Neurological: Negative.    Psychiatric/Behavioral: Negative.           Objective   /70 (BP Location: Right arm, Patient Position: Sitting, Cuff Size: Standard)   Pulse 79   Temp 98.6 °F (37 °C) (Temporal)   Ht 5' 1\" (1.549 m)   Wt 60.8 kg (134 lb)   SpO2 98%   BMI 25.32 kg/m²     Physical Exam  Vitals reviewed.   Constitutional:       General: She is not in acute distress.     Appearance: Normal appearance. She is normal weight. She is not ill-appearing, toxic-appearing or diaphoretic.   HENT:      Head: Normocephalic and atraumatic.      Right Ear: Tympanic membrane, ear canal and external ear normal. There is no impacted cerumen.      Left Ear: Tympanic membrane, ear canal and external ear normal. There is no impacted cerumen.      Nose: Nose normal.      Mouth/Throat:      Mouth: Mucous membranes are moist.      Pharynx: Oropharynx is clear.   Eyes:      General: No scleral icterus.     Conjunctiva/sclera: Conjunctivae normal.      Pupils: Pupils are " equal, round, and reactive to light.   Neck:      Vascular: No carotid bruit.   Cardiovascular:      Rate and Rhythm: Normal rate and regular rhythm.      Heart sounds: Murmur (soft 1/6 systolic murmur) heard.   Pulmonary:      Effort: No respiratory distress.      Breath sounds: No stridor. No wheezing.   Abdominal:      General: Abdomen is flat. Bowel sounds are normal. There is no distension.      Tenderness: There is no abdominal tenderness.   Musculoskeletal:      Cervical back: Neck supple. No rigidity or tenderness.      Right lower leg: Edema (Trace pretibial) present.      Left lower leg: Edema (Trace pretibial) present.   Lymphadenopathy:      Cervical: No cervical adenopathy.   Skin:     General: Skin is warm.      Coloration: Skin is not jaundiced.      Findings: No bruising, erythema or rash.   Neurological:      General: No focal deficit present.      Mental Status: She is alert and oriented to person, place, and time. Mental status is at baseline.   Psychiatric:         Mood and Affect: Mood normal.         Behavior: Behavior normal.         Thought Content: Thought content normal.         Judgment: Judgment normal.

## 2025-03-13 NOTE — PATIENT INSTRUCTIONS
"Patient Education     Routine physical for adults   The Basics   Written by the doctors and editors at [facility]   What is a physical? -- A physical is a routine visit, or \"check-up,\" with your doctor. You might also hear it called a \"wellness visit\" or \"preventive visit.\"  During each visit, the doctor will:   Ask about your physical and mental health   Ask about your habits, behaviors, and lifestyle   Do an exam   Give you vaccines if needed   Talk to you about any medicines you take   Give advice about your health   Answer your questions  Getting regular check-ups is an important part of taking care of your health. It can help your doctor find and treat any problems you have. But it's also important for preventing health problems.  A routine physical is different from a \"sick visit.\" A sick visit is when you see a doctor because of a health concern or problem. Since physicals are scheduled ahead of time, you can think about what you want to ask the doctor.  How often should I get a physical? -- It depends on your age and health. In general, for people age 21 years and older:   If you are younger than 50 years, you might be able to get a physical every 3 years.   If you are 50 years or older, your doctor might recommend a physical every year.  If you have an ongoing health condition, like diabetes or high blood pressure, your doctor will probably want to see you more often.  What happens during a physical? -- In general, each visit will include:   Physical exam - The doctor or nurse will check your height, weight, heart rate, and blood pressure. They will also look at your eyes and ears. They will ask about how you are feeling and whether you have any symptoms that bother you.   Medicines - It's a good idea to bring a list of all the medicines you take to each doctor visit. Your doctor will talk to you about your medicines and answer any questions. Tell them if you are having any side effects that bother you. You " "should also tell them if you are having trouble paying for any of your medicines.   Habits and behaviors - This includes:   Your diet   Your exercise habits   Whether you smoke, drink alcohol, or use drugs   Whether you are sexually active   Whether you feel safe at home  Your doctor will talk to you about things you can do to improve your health and lower your risk of health problems. They will also offer help and support. For example, if you want to quit smoking, they can give you advice and might prescribe medicines. If you want to improve your diet or get more physical activity, they can help you with this, too.   Lab tests, if needed - The tests you get will depend on your age and situation. For example, your doctor might want to check your:   Cholesterol   Blood sugar   Iron level   Vaccines - The recommended vaccines will depend on your age, health, and what vaccines you already had. Vaccines are very important because they can prevent certain serious or deadly infections.   Discussion of screening - \"Screening\" means checking for diseases or other health problems before they cause symptoms. Your doctor can recommend screening based on your age, risk, and preferences. This might include tests to check for:   Cancer, such as breast, prostate, cervical, ovarian, colorectal, prostate, lung, or skin cancer   Sexually transmitted infections, such as chlamydia and gonorrhea   Mental health conditions like depression and anxiety  Your doctor will talk to you about the different types of screening tests. They can help you decide which screenings to have. They can also explain what the results might mean.   Answering questions - The physical is a good time to ask the doctor or nurse questions about your health. If needed, they can refer you to other doctors or specialists, too.  Adults older than 65 years often need other care, too. As you get older, your doctor will talk to you about:   How to prevent falling at " home   Hearing or vision tests   Memory testing   How to take your medicines safely   Making sure that you have the help and support you need at home  All topics are updated as new evidence becomes available and our peer review process is complete.  This topic retrieved from [employer] on: May 02, 2024.  Topic 980398 Version 1.0  Release: 32.4.3 - C32.122  © 2024 UpToDate, Inc. and/or its affiliates. All rights reserved.  Consumer Information Use and Disclaimer   Disclaimer: This generalized information is a limited summary of diagnosis, treatment, and/or medication information. It is not meant to be comprehensive and should be used as a tool to help the user understand and/or assess potential diagnostic and treatment options. It does NOT include all information about conditions, treatments, medications, side effects, or risks that may apply to a specific patient. It is not intended to be medical advice or a substitute for the medical advice, diagnosis, or treatment of a health care provider based on the health care provider's examination and assessment of a patient's specific and unique circumstances. Patients must speak with a health care provider for complete information about their health, medical questions, and treatment options, including any risks or benefits regarding use of medications. This information does not endorse any treatments or medications as safe, effective, or approved for treating a specific patient. UpToDate, Inc. and its affiliates disclaim any warranty or liability relating to this information or the use thereof.The use of this information is governed by the Terms of Use, available at https://www.[employer].com/en/know/clinical-effectiveness-terms. 2024© UpToDate, Inc. and its affiliates and/or licensors. All rights reserved.  Copyright   © 2024 UpToDate, Inc. and/or its affiliates. All rights reserved.

## 2025-03-13 NOTE — ASSESSMENT & PLAN NOTE
Continues to follow with rheumatology.  She does take ibuprofen 800 mg as needed.  She continues on pantoprazole daily.

## 2025-03-13 NOTE — TELEPHONE ENCOUNTER
Called and left a message for the patient to let her know that the call she received was for her labs from Nephrology. All her labs that were ordered by Dr. Kaiser have already been completed. I advised patient to call us back if she had any further questions for us.

## 2025-03-20 ENCOUNTER — APPOINTMENT (OUTPATIENT)
Dept: LAB | Facility: CLINIC | Age: 85
End: 2025-03-20

## 2025-03-20 DIAGNOSIS — D50.8 OTHER IRON DEFICIENCY ANEMIA: ICD-10-CM

## 2025-03-20 DIAGNOSIS — Z00.8 ENCOUNTER FOR OTHER GENERAL EXAMINATION: ICD-10-CM

## 2025-03-20 LAB
CHOLEST SERPL-MCNC: 170 MG/DL (ref ?–200)
EST. AVERAGE GLUCOSE BLD GHB EST-MCNC: 126 MG/DL
HBA1C MFR BLD: 6 %
HDLC SERPL-MCNC: 84 MG/DL
LDLC SERPL CALC-MCNC: 74 MG/DL (ref 0–100)
NONHDLC SERPL-MCNC: 86 MG/DL
TRIGL SERPL-MCNC: 58 MG/DL (ref ?–150)

## 2025-03-20 PROCEDURE — 36415 COLL VENOUS BLD VENIPUNCTURE: CPT

## 2025-03-20 PROCEDURE — 83036 HEMOGLOBIN GLYCOSYLATED A1C: CPT

## 2025-03-20 PROCEDURE — 80061 LIPID PANEL: CPT

## 2025-04-01 ENCOUNTER — APPOINTMENT (OUTPATIENT)
Dept: LAB | Facility: CLINIC | Age: 85
End: 2025-04-01
Payer: COMMERCIAL

## 2025-04-01 DIAGNOSIS — N39.0 URINARY TRACT INFECTION WITHOUT HEMATURIA, SITE UNSPECIFIED: ICD-10-CM

## 2025-04-01 PROCEDURE — 87086 URINE CULTURE/COLONY COUNT: CPT

## 2025-04-02 LAB — BACTERIA UR CULT: NORMAL

## 2025-04-14 ENCOUNTER — OFFICE VISIT (OUTPATIENT)
Dept: PODIATRY | Facility: CLINIC | Age: 85
End: 2025-04-14
Payer: COMMERCIAL

## 2025-04-14 DIAGNOSIS — M19.071 PRIMARY OSTEOARTHRITIS OF RIGHT FOOT: Primary | ICD-10-CM

## 2025-04-14 PROCEDURE — RECHECK: Performed by: PODIATRIST

## 2025-04-14 PROCEDURE — 20600 DRAIN/INJ JOINT/BURSA W/O US: CPT | Performed by: PODIATRIST

## 2025-04-14 RX ORDER — TRIAMCINOLONE ACETONIDE 40 MG/ML
20 INJECTION, SUSPENSION INTRA-ARTICULAR; INTRAMUSCULAR
Status: SHIPPED | OUTPATIENT
Start: 2025-04-14

## 2025-04-14 RX ORDER — LIDOCAINE HYDROCHLORIDE 10 MG/ML
1 INJECTION, SOLUTION INFILTRATION; PERINEURAL
Status: SHIPPED | OUTPATIENT
Start: 2025-04-14

## 2025-04-14 RX ADMIN — TRIAMCINOLONE ACETONIDE 20 MG: 40 INJECTION, SUSPENSION INTRA-ARTICULAR; INTRAMUSCULAR at 11:00

## 2025-04-14 RX ADMIN — LIDOCAINE HYDROCHLORIDE 1 ML: 10 INJECTION, SOLUTION INFILTRATION; PERINEURAL at 11:00

## 2025-04-14 NOTE — PROGRESS NOTES
Patient presents with pain on the dorsum of the right foot secondary to osteoarthritis.  Patient has swelling in this area.  In the past, patient did well with cortisone injection and another is desired.    On exam, pain with palpation instep right foot along the 2nd, 3rd and 4th metatarsal bases.    Treatment: Injected right foot with 0.5 cc Kenalog 40 along with 1 cc 1% Xylocaine.  Reappoint 6 weeks.    Small joint arthrocentesis: R intertarsal  Universal Protocol:  procedure performed by consultantConsent: Verbal consent obtained.  Risks and benefits: risks, benefits and alternatives were discussed  Consent given by: patient  Patient understanding: patient states understanding of the procedure being performed  Patient identity confirmed: verbally with patient  Supporting Documentation  Indications: pain and joint swelling   Procedure Details  Location: foot - R intertarsal  Needle size: 25 G  Ultrasound guidance: no  Approach: dorsal  Medications administered: 1 mL lidocaine 1 %; 20 mg triamcinolone acetonide 40 mg/mL

## 2025-04-15 ENCOUNTER — PROCEDURE VISIT (OUTPATIENT)
Dept: OBGYN CLINIC | Facility: HOSPITAL | Age: 85
End: 2025-04-15
Payer: COMMERCIAL

## 2025-04-15 VITALS — HEIGHT: 61 IN | BODY MASS INDEX: 24.55 KG/M2 | WEIGHT: 130 LBS

## 2025-04-15 DIAGNOSIS — M70.61 GREATER TROCHANTERIC BURSITIS OF BOTH HIPS: ICD-10-CM

## 2025-04-15 DIAGNOSIS — M70.62 GREATER TROCHANTERIC BURSITIS OF BOTH HIPS: ICD-10-CM

## 2025-04-15 DIAGNOSIS — M17.0 PRIMARY OSTEOARTHRITIS OF BOTH KNEES: Primary | ICD-10-CM

## 2025-04-15 DIAGNOSIS — M12.812 ROTATOR CUFF ARTHROPATHY OF LEFT SHOULDER: ICD-10-CM

## 2025-04-15 PROCEDURE — 20610 DRAIN/INJ JOINT/BURSA W/O US: CPT | Performed by: ORTHOPAEDIC SURGERY

## 2025-04-15 PROCEDURE — 99214 OFFICE O/P EST MOD 30 MIN: CPT | Performed by: ORTHOPAEDIC SURGERY

## 2025-04-15 RX ORDER — FLUCONAZOLE 150 MG/1
TABLET ORAL
COMMUNITY
Start: 2025-01-22

## 2025-04-15 RX ORDER — LIDOCAINE HYDROCHLORIDE 10 MG/ML
2 INJECTION, SOLUTION INFILTRATION; PERINEURAL
Status: COMPLETED | OUTPATIENT
Start: 2025-04-15 | End: 2025-04-15

## 2025-04-15 RX ORDER — BUPIVACAINE HYDROCHLORIDE 2.5 MG/ML
2 INJECTION, SOLUTION INFILTRATION; PERINEURAL
Status: COMPLETED | OUTPATIENT
Start: 2025-04-15 | End: 2025-04-15

## 2025-04-15 RX ORDER — BETAMETHASONE SODIUM PHOSPHATE AND BETAMETHASONE ACETATE 3; 3 MG/ML; MG/ML
12 INJECTION, SUSPENSION INTRA-ARTICULAR; INTRALESIONAL; INTRAMUSCULAR; SOFT TISSUE
Status: COMPLETED | OUTPATIENT
Start: 2025-04-15 | End: 2025-04-15

## 2025-04-15 RX ORDER — LIDOCAINE HYDROCHLORIDE 10 MG/ML
4 INJECTION, SOLUTION INFILTRATION; PERINEURAL
Status: COMPLETED | OUTPATIENT
Start: 2025-04-15 | End: 2025-04-15

## 2025-04-15 RX ORDER — NITROFURANTOIN MONOHYDRATE/MACROCRYSTALLINE 25; 75 MG/1; MG/1
CAPSULE ORAL EVERY 12 HOURS
COMMUNITY
Start: 2025-02-20

## 2025-04-15 RX ADMIN — BETAMETHASONE SODIUM PHOSPHATE AND BETAMETHASONE ACETATE 12 MG: 3; 3 INJECTION, SUSPENSION INTRA-ARTICULAR; INTRALESIONAL; INTRAMUSCULAR; SOFT TISSUE at 10:15

## 2025-04-15 RX ADMIN — BUPIVACAINE HYDROCHLORIDE 2 ML: 2.5 INJECTION, SOLUTION INFILTRATION; PERINEURAL at 10:15

## 2025-04-15 RX ADMIN — LIDOCAINE HYDROCHLORIDE 2 ML: 10 INJECTION, SOLUTION INFILTRATION; PERINEURAL at 10:15

## 2025-04-15 RX ADMIN — LIDOCAINE HYDROCHLORIDE 4 ML: 10 INJECTION, SOLUTION INFILTRATION; PERINEURAL at 10:15

## 2025-04-15 NOTE — PROGRESS NOTES
Encounter Provider: Shubham Luevano MD   Encounter Date: 04/15/25  Encounter department: [facility] [place]         ASSESSMENT & PLAN:  Assessment & Plan  Primary osteoarthritis of both knees  See other diagnoses     Greater trochanteric bursitis of both hips  See other diagnoses     Rotator cuff arthropathy of left shoulder  The patient was provided with bilateral knee Durolane, bilateral trochanteric bursa and left subacromial steroid injections.  The patient tolerated the procedure well.    The patient should follow up in 3 months.       Other orders    Large joint arthrocentesis    Large joint arthrocentesis    Large joint arthrocentesis    Large joint arthrocentesis    Large joint arthrocentesis      To do next visit:  Return in about 3 months (around 7/15/2025).    Scribe Attestation      I,:  Brandon Naranjo MA am acting as a scribe while in the presence of the attending physician.:       I,:  Shubham Luevano MD personally performed the services described in this documentation    as scribed in my presence.:             ____________________________________________________  CHIEF COMPLAINT:  Chief Complaint   Patient presents with    Right Knee - Follow-up     Durolane    Left Knee - Follow-up     Durolane    Right Hip - Follow-up    Left Hip - Follow-up    Left Shoulder - Follow-up         SUBJECTIVE:  Monik Garcia is a 84 y.o. female who presents for follow up of bilateral knees, hips and left shoulder and Durolane.  Today she complains of bilateral generalized knee pain, lateral bilateral hip pain and left shoulder pain.  Most activity aggravates while rest alleviates.   The patient rates their pain at 7/10 and above at times.            PAST MEDICAL HISTORY:  Past Medical History:   Diagnosis Date    Acute laryngitis 05/12/2022    Anemia     Chronic bilateral low back pain without sciatica 11/26/2019    Chronic kidney disease     Duodenal perforation (HCC)  10/07/2020    Duodenal ulcer     Enterococcus UTI 04/07/2016    GERD (gastroesophageal reflux disease) 04/04/2016    Greater trochanteric bursitis of right hip 09/18/2018    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    Hypercalcemia     Hypertension     Hypokalemia 10/27/2018    Impetigo 09/18/2024    Left lumbar radiculopathy 04/07/2016    Mixed hyperlipidemia     Osteoarthritis of hip 04/04/2016    Osteoporosis     Sacral insufficiency fracture     SI (sacroiliac) joint inflammation ([facility]) 04/04/2016    Strain of left biceps 10/19/2023    Substance addiction ([facility])     Vitamin D deficiency 03/11/2019       PAST SURGICAL HISTORY:  Past Surgical History:   Procedure Laterality Date    BREAST EXCISIONAL BIOPSY Left 2000    benign    COLONOSCOPY  2019    CYSTOSCOPY W/ DEFLUX INJECTION N/A 1/6/2025    Procedure: INJECTION BULKING AGENT URETHRAL;  Surgeon: Juan David Mcbride MD;  [facility]: [facility];  Service: UroGynecology           JOINT REPLACEMENT      right hip 8/15    MAMMO (HISTORICAL)  2019    RI CMBND ANTERPOST COLPORRAPHY W/CYSTO N/A 1/6/2025    Procedure: A&P COLPORRHAPHY;  Surgeon: Juan David Mcbride MD;  [facility];  Service: UroGynecology           RI COLPOPEXY VAGINAL EXTRAPERITONEAL APPROACH N/A 1/6/2025    Procedure: COLPOSUSPENSION VAGINAL EXTRAPERITONEAL(ENPLACE); EUA; ANTERIOR POSTERIOR COLORRPHY, URETHRAL BULKING INJECTION, CYSTO;  Surgeon: Juan David Mcbride MD;  [facility];  Service: UroGynecology           RI CYSTOURETHROSCOPY N/A 1/6/2025    Procedure: CYSTO;  Surgeon: Juan David Mcbride MD;  [facility];  Service: UroGynecology           TONSILLECTOMY         FAMILY HISTORY:  Family History   Problem Relation Age of Onset    No Known Problems Mother     No Known Problems Father     Breast cancer Daughter 50    Breast cancer Maternal Aunt 70    No Known Problems Sister     No Known Problems Maternal Grandmother     No Known Problems  Maternal Grandfather     No Known Problems Paternal Grandmother     No Known Problems Paternal Grandfather     No Known Problems Son     No Known Problems Sister     No Known Problems Sister        SOCIAL HISTORY:  Social History     Tobacco Use    Smoking status: Never    Smokeless tobacco: Never   Vaping Use    Vaping status: Never Used   Substance Use Topics    Alcohol use: Never    Drug use: Not Currently     Types: Prescription     Comment: rocovering last used 1996       MEDICATIONS:    Current Outpatient Medications:     fluconazole (Diflucan) 150 mg tablet, 1 tablet Orally twice. Take one tablet today and take second tablet in 3 days for 3 days, Disp: , Rfl:     Macrobid 100 MG capsule, Every 12 hours, Disp: , Rfl:     acetaminophen (TYLENOL) 325 mg tablet, Take 2 tablets (650 mg total) by mouth every 6 (six) hours as needed for mild pain, Disp: , Rfl:     acyclovir (ZOVIRAX) 5 % ointment, Apply topically every 6 (six) hours, Disp: 30 g, Rfl: 0    atorvastatin (LIPITOR) 10 mg tablet, Take 1 tablet (10 mg total) by mouth daily, Disp: 90 tablet, Rfl: 1    Cholecalciferol (Vitamin D-3) 25 MCG (1000 UT) CAPS, Take by mouth in the morning, Disp: , Rfl:     ibuprofen (MOTRIN) 800 mg tablet, Take 1 tablet (800 mg total) by mouth if needed for mild pain, Disp: 30 tablet, Rfl: 0    metoprolol succinate (TOPROL-XL) 25 mg 24 hr tablet, Take 1 tablet (25 mg total) by mouth 2 (two) times a day, Disp: 180 tablet, Rfl: 3    Multiple Vitamins-Minerals (ONE A DAY WOMEN 50 PLUS PO), One A Day Women 50 Plus, Disp: , Rfl:     mupirocin (BACTROBAN) 2 % ointment, Apply topically 3 (three) times a day To left lower leg., Disp: 30 g, Rfl: 1    pantoprazole (PROTONIX) 40 mg tablet, Take 1 tablet (40 mg total) by mouth daily As needed for heartburn, Disp: 90 tablet, Rfl: 5    Premarin vaginal cream, 3 (three) times a week, Disp: , Rfl:     Current Facility-Administered Medications:     denosumab (PROLIA) subcutaneous injection 60 mg,  "60 mg, Subcutaneous, Once, Yamile Wallace PA-C    lidocaine (XYLOCAINE) 1 % injection 1 mL, 1 mL, Injection, , Pablo Rivera, YESYM, 1 mL at 12/07/23 0900    lidocaine (XYLOCAINE) 1 % injection 1 mL, 1 mL, Injection, , , 1 mL at 07/08/24 0930    lidocaine (XYLOCAINE) 1 % injection 1 mL, 1 mL, Injection, , , 1 mL at 04/14/25 1100    triamcinolone acetonide (KENALOG-40) 40 mg/mL injection 20 mg, 20 mg, Infiltration, , Pablo Rivera, DPM, 20 mg at 12/07/23 0900    triamcinolone acetonide (KENALOG-40) 40 mg/mL injection 20 mg, 20 mg, Infiltration, , , 20 mg at 07/08/24 0930    triamcinolone acetonide (Kenalog-40) 40 mg/mL injection 20 mg, 20 mg, Infiltration, , , 20 mg at 04/14/25 1100    ALLERGIES:  Allergies   Allergen Reactions    Bactrim [Sulfamethoxazole-Trimethoprim] Other (See Comments)     Kidney failure       LABS:  HgA1c:   Lab Results   Component Value Date    HGBA1C 6.0 (H) 03/20/2025     BMP:   Lab Results   Component Value Date    GLUCOSE 100 11/20/2015    CALCIUM 9.5 03/10/2025     11/20/2015    K 4.2 03/10/2025    CO2 25 03/10/2025     03/10/2025    BUN 21 03/10/2025    CREATININE 1.23 03/10/2025     CBC: No components found for: \"CBC\"    _____________________________________________________  PHYSICAL EXAMINATION:  Vital signs: Ht 5' 1\" (1.549 m)   Wt 59 kg (130 lb)   BMI 24.56 kg/m²   General: No acute distress, awake and alert  Psychiatric: Mood and affect appear appropriate  HEENT: Trachea Midline, No torticollis, no apparent facial trauma  Cardiovascular: No audible murmurs; Extremities appear perfused  Pulmonary: No audible wheezing or stridor  Skin: No open lesions; see further details (if any) below    Ortho Exam:  Bilateral hips:  TTP over greater trochanter     Bilateral knees:  No erythema or ecchymosis  No effusion or swelling  Normal strength  Good ROM with crepitus   Calf compartments soft and supple  Sensation intact  Toes are warm sensate and mobile     Left " "shoulder:  Crepitus with ROM  Restriction with ROM        _____________________________________________________  STUDIES REVIEWED:    None performed     PROCEDURES PERFORMED:  Large joint arthrocentesis: R knee  Universal Protocol:  Consent: Verbal consent obtained.  Risks and benefits: risks, benefits and alternatives were discussed  Consent given by: patient  Time out: Immediately prior to procedure a \"time out\" was called to verify the correct patient, procedure, equipment, support staff and site/side marked as required.  Timeout called at: 4/15/2025 10:09 AM.  Patient understanding: patient states understanding of the procedure being performed  Site marked: the operative site was marked  Patient identity confirmed: verbally with patient  Supporting Documentation  Indications: pain   Procedure Details  Location: knee - R knee  Preparation: Patient was prepped and draped in the usual sterile fashion  Needle size: 22 G  Ultrasound guidance: no  Approach: anterolateral  Medications administered: 4 mL lidocaine 1 %; 3 mL sodium hyaluronate 60 MG/3ML    Patient tolerance: patient tolerated the procedure well with no immediate complications  Dressing:  Sterile dressing applied      Large joint arthrocentesis: L knee  Universal Protocol:  Consent: Verbal consent obtained.  Risks and benefits: risks, benefits and alternatives were discussed  Consent given by: patient  Time out: Immediately prior to procedure a \"time out\" was called to verify the correct patient, procedure, equipment, support staff and site/side marked as required.  Timeout called at: 4/15/2025 10:10 AM.  Patient understanding: patient states understanding of the procedure being performed  Site marked: the operative site was marked  Patient identity confirmed: verbally with patient  Supporting Documentation  Indications: pain   Procedure Details  Location: knee - L knee  Preparation: Patient was prepped and draped in the usual sterile fashion  Needle " "size: 22 G  Ultrasound guidance: no  Approach: anterolateral  Medications administered: 4 mL lidocaine 1 %; 3 mL sodium hyaluronate 60 MG/3ML    Patient tolerance: patient tolerated the procedure well with no immediate complications  Dressing:  Sterile dressing applied      Large joint arthrocentesis: R greater trochanteric bursa  Universal Protocol:  Consent: Verbal consent obtained.  Risks and benefits: risks, benefits and alternatives were discussed  Consent given by: patient  Time out: Immediately prior to procedure a \"time out\" was called to verify the correct patient, procedure, equipment, support staff and site/side marked as required.  Timeout called at: 4/15/2025 10:10 AM.  Patient understanding: patient states understanding of the procedure being performed  Site marked: the operative site was marked  Patient identity confirmed: verbally with patient  Supporting Documentation  Indications: pain   Procedure Details  Location: hip - R greater trochanteric bursa  Needle size: 22 G  Ultrasound guidance: no  Approach: lateral  Medications administered: 12 mg betamethasone acetate-betamethasone sodium phosphate 6 (3-3) mg/mL; 2 mL bupivacaine 0.25 %; 2 mL lidocaine 1 %    Patient tolerance: patient tolerated the procedure well with no immediate complications  Dressing:  Sterile dressing applied      Large joint arthrocentesis: L greater trochanteric bursa  Universal Protocol:  Consent: Verbal consent obtained.  Risks and benefits: risks, benefits and alternatives were discussed  Consent given by: patient  Time out: Immediately prior to procedure a \"time out\" was called to verify the correct patient, procedure, equipment, support staff and site/side marked as required.  Timeout called at: 4/15/2025 10:10 AM.  Patient understanding: patient states understanding of the procedure being performed  Site marked: the operative site was marked  Patient identity confirmed: verbally with patient  Supporting " "Documentation  Indications: pain   Procedure Details  Location: hip - L greater trochanteric bursa  Needle size: 22 G  Ultrasound guidance: no  Approach: lateral  Medications administered: 12 mg betamethasone acetate-betamethasone sodium phosphate 6 (3-3) mg/mL; 2 mL bupivacaine 0.25 %; 2 mL lidocaine 1 %    Patient tolerance: patient tolerated the procedure well with no immediate complications  Dressing:  Sterile dressing applied      Large joint arthrocentesis: L subacromial bursa  Universal Protocol:  Consent: Verbal consent obtained.  Risks and benefits: risks, benefits and alternatives were discussed  Consent given by: patient  Time out: Immediately prior to procedure a \"time out\" was called to verify the correct patient, procedure, equipment, support staff and site/side marked as required.  Timeout called at: 4/15/2025 10:11 AM.  Patient understanding: patient states understanding of the procedure being performed  Site marked: the operative site was marked  Patient identity confirmed: verbally with patient  Supporting Documentation  Indications: pain   Procedure Details  Location: shoulder - L subacromial bursa  Needle size: 25 G  Ultrasound guidance: no  Approach: posterolateral  Medications administered: 2 mL bupivacaine 0.25 %; 12 mg betamethasone acetate-betamethasone sodium phosphate 6 (3-3) mg/mL; 2 mL lidocaine 1 %    Patient tolerance: patient tolerated the procedure well with no immediate complications  Dressing:  Sterile dressing applied                  Portions of the record may have been created with voice recognition software.  Occasional wrong word or \"sound a like\" substitutions may have occurred due to the inherent limitations of voice recognition software.  Read the chart carefully and recognize, using context, where substitutions have occurred.        "

## 2025-05-29 ENCOUNTER — OFFICE VISIT (OUTPATIENT)
Dept: PODIATRY | Facility: CLINIC | Age: 85
End: 2025-05-29
Payer: COMMERCIAL

## 2025-05-29 VITALS — BODY MASS INDEX: 24.55 KG/M2 | WEIGHT: 130 LBS | HEIGHT: 61 IN | RESPIRATION RATE: 18 BRPM

## 2025-05-29 DIAGNOSIS — I73.9 PERIPHERAL VASCULAR DISEASE, UNSPECIFIED (HCC): ICD-10-CM

## 2025-05-29 DIAGNOSIS — B35.1 ONYCHOMYCOSIS: Primary | ICD-10-CM

## 2025-05-29 PROCEDURE — 11720 DEBRIDE NAIL 1-5: CPT | Performed by: PODIATRIST

## 2025-05-29 PROCEDURE — RECHECK: Performed by: PODIATRIST

## 2025-05-29 NOTE — PROGRESS NOTES
"Name: Monik Garcia      : 1940      MRN: [id number]  Encounter Provider: Pablo Rivera DPM  Encounter Date: 2025   Encounter department: [facility] [place]    Treatment consisted of debridement of 2nd and 3rd toenails reducing nails in thickness and removing devitalized tissue and debris.  Electrical and manual debridement performed.  :  Assessment & Plan  Onychomycosis         Peripheral vascular disease, unspecified (HCC)             History of Present Illness   HPI  Monik Garcia is a 84 y.o. female who presents for nail care.  2nd and 3rd toenails of both feet are thick with subungual debris.  Patient is comfortable following cortisone injection right instep at last visit which was 6 weeks ago.      Review of Systems       Objective   Resp 18   Ht 5' 1\" (1.549 m)   Wt 59 kg (130 lb)   BMI 24.56 kg/m²      Physical Exam    Cardiovascular:      Comments: No palpable pedal pulses bilateral    Skin:     Comments: 2nd and 3rd toenails bilateral are thick and yellow with subungual debris               "

## 2025-06-05 ENCOUNTER — TELEPHONE (OUTPATIENT)
Dept: NEPHROLOGY | Facility: CLINIC | Age: 85
End: 2025-06-05

## 2025-06-05 NOTE — TELEPHONE ENCOUNTER
Spoke with Luca  regarding  upcoming appointment on 9/4/2025 with CYDNEY Dobbs in [place], [place]      Unfortunately, due to a change in the provider's schedule we need to change your appointment.    We rescheduled to Monday 10/20 11 am with Melissa Sifuentes. He will have her call back to confirm.

## 2025-06-23 DIAGNOSIS — I12.9 HYPERTENSIVE CHRONIC KIDNEY DISEASE WITH STAGE 1 THROUGH STAGE 4 CHRONIC KIDNEY DISEASE, OR UNSPECIFIED CHRONIC KIDNEY DISEASE: ICD-10-CM

## 2025-06-23 DIAGNOSIS — E78.5 DYSLIPIDEMIA: ICD-10-CM

## 2025-06-23 RX ORDER — ATORVASTATIN CALCIUM 10 MG/1
10 TABLET, FILM COATED ORAL DAILY
Qty: 90 TABLET | Refills: 1 | Status: SHIPPED | OUTPATIENT
Start: 2025-06-23

## 2025-06-23 RX ORDER — METOPROLOL SUCCINATE 25 MG/1
25 TABLET, EXTENDED RELEASE ORAL 2 TIMES DAILY
Qty: 180 TABLET | Refills: 1 | Status: SHIPPED | OUTPATIENT
Start: 2025-06-23

## 2025-06-23 NOTE — TELEPHONE ENCOUNTER
Reason for call:   [x] Refill   [] Prior Auth  [] Other:     Office:   [x] PCP/Provider - [facility]   [] Specialty/Provider -     Medication: atorvastatin (LIPITOR) 10 mg tablet     Dose/Frequency: 10 mg daily    Quantity: 90    Pharmacy: [facility]   Does the patient have enough for 3 days?   [x] Yes   [] No - Send as HP to POD    Mail Away Pharmacy   Does the patient have enough for 10 days?   [] Yes   [] No - Send as HP to POD

## 2025-06-23 NOTE — TELEPHONE ENCOUNTER
Reason for call:   [x] Refill   [] Prior Auth  [] Other:     Office:   [] PCP/Provider -   [x[facility]     Medication: metoprolol succinate (TOPROL-XL) 25 mg 24 hr tablet     Dose/Frequency: 25 mg, 2 times daily     Quantity: 90    Pharmacy: [facility]   Does the patient have enough for 3 days?   [x] Yes   [] No - Send as HP to POD    Mail Away Pharmacy   Does the patient have enough for 10 days?   [] Yes   [] No - Send as HP to POD

## 2025-06-30 NOTE — PROGRESS NOTES
Subjective      Monik Garcia is a 84 y.o. female who presents for annual GYN exam.     GYN:  Menopausal. Denies VB.  2025 had urogyn procedure for vaginal repair/rectocele/uterine prolapse.  Denies vaginal discharge, labial erythema or lesions, dyspareunia.  Patient is  sexually active with . Her  is 86!      OB:  OB History    Para Term  AB Living   2 2 2      SAB IAB Ectopic Multiple Live Births       2      # Outcome Date GA Lbr Jimbo/2nd Weight Sex Type Anes PTL Lv   2 Term            1 Term                  :  Denies dysuria, urinary frequency or urgency.  Denies hematuria, flank pain, incontinence.  She reports frequent nocturia.     Breast:  Denies breast mass, skin changes, dimpling, reddening, nipple retraction.  denies breast discharge.  Patient does  have a family history of breast, endometrial, colon, or ovarian ca.     Cancer-related family history includes Breast cancer (age of onset: 50) in her daughter; Breast cancer (age of onset: 70) in her maternal aunt.    Past Medical History[1]    Past Surgical History[2]      General:  BMI: 24  Work: [employer]   Safety: Feels safe at home     Social History[3]    Screening:  Cervical cancer: last pap smear in 2024 : nilm.   Breast cancer: last mammogram in 07/15/2024. B1  Colon cancer: last colonoscopy in 2019      Review of Systems   Constitutional:  Negative for fatigue.   Eyes:  Negative for photophobia and visual disturbance.   Respiratory:  Negative for cough and shortness of breath.    Cardiovascular:  Negative for chest pain and palpitations.   Gastrointestinal:  Negative for abdominal pain, blood in stool, constipation, diarrhea, nausea and rectal pain.   Genitourinary:  Negative for dyspareunia, dysuria, flank pain, frequency, genital sores, menstrual problem, pelvic pain, urgency, vaginal bleeding, vaginal discharge and vaginal pain.   Musculoskeletal:  Negative for arthralgias and back pain.  "  Skin:  Negative for rash.   Neurological:  Negative for weakness and headaches.          Objective      /88 (BP Location: Left arm, Patient Position: Sitting, Cuff Size: Standard)   Ht 5' 1\" (1.549 m)   Wt 58.1 kg (128 lb)   BMI 24.19 kg/m²   Physical Exam  Vitals and nursing note reviewed.   Constitutional:       Appearance: Normal appearance.   HENT:      Head: Normocephalic.     Eyes:      Conjunctiva/sclera: Conjunctivae normal.       Cardiovascular:      Rate and Rhythm: Normal rate and regular rhythm.      Heart sounds: Normal heart sounds.   Pulmonary:      Effort: Pulmonary effort is normal.      Breath sounds: Normal breath sounds.   Chest:   Breasts:     Right: Normal. No inverted nipple, mass, nipple discharge, skin change or tenderness.      Left: Normal. No inverted nipple, mass, nipple discharge, skin change or tenderness.   Abdominal:      General: Abdomen is flat.      Palpations: Abdomen is soft. There is no mass.      Tenderness: There is no abdominal tenderness. There is no right CVA tenderness or left CVA tenderness.   Genitourinary:     General: Normal vulva.      Exam position: Lithotomy position.      Pubic Area: No rash or pubic lice.       Labia:         Right: No rash or tenderness.         Left: No rash or tenderness.       Urethra: No prolapse or urethral pain.      Vagina: Normal. No vaginal discharge.      Cervix: Normal.      Uterus: Normal.       Adnexa: Right adnexa normal and left adnexa normal.        Right: No mass or tenderness.          Left: No mass or tenderness.       Musculoskeletal:         General: Normal range of motion.      Cervical back: Normal range of motion. No tenderness.      Right lower leg: No edema.      Left lower leg: No edema.   Lymphadenopathy:      Cervical: No cervical adenopathy.      Upper Body:      Right upper body: No supraclavicular or axillary adenopathy.      Left upper body: No supraclavicular or axillary adenopathy.      Lower Body: " No right inguinal adenopathy. No left inguinal adenopathy.     Skin:     General: Skin is warm and dry.      Findings: No rash.     Neurological:      Mental Status: She is alert and oriented to person, place, and time.     Psychiatric:         Mood and Affect: Mood normal.         Behavior: Behavior normal.         Thought Content: Thought content normal.         Judgment: Judgment normal.                 Assessment/Plan  Problem List Items Addressed This Visit       Lumbar spondylosis    Relevant Medications    ibuprofen (MOTRIN) 800 mg tablet     Other Visit Diagnoses         Well woman exam    -  Primary      Bilateral hip pain        Relevant Medications    ibuprofen (MOTRIN) 800 mg tablet            Denies GYN concerns today  Birth control: n/a  Cervical cancer screening: deferred  Breast Cancer screening: UTD, 2025 scheduled  Colon cancer Screening: UTD  STD screening: declines  Reviewed healthy lifestyle and safe sex practices  RTO for annual exam or PRN      CYDNEY Sims  OB/GYN  7/7/2025  10:53 AM          [1]   Past Medical History:  Diagnosis Date    Acute laryngitis 05/12/2022    Anemia     Chronic bilateral low back pain without sciatica 11/26/2019    Chronic kidney disease     Duodenal perforation (HCC) 10/07/2020    Duodenal ulcer     Enterococcus UTI 04/07/2016    GERD (gastroesophageal reflux disease) 04/04/2016    Greater trochanteric bursitis of right hip 09/18/2018    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    Hypercalcemia     Hypertension     Hypokalemia 10/27/2018    Impetigo 09/18/2024    Left lumbar radiculopathy 04/07/2016    Mixed hyperlipidemia     Osteoarthritis of hip 04/04/2016    Osteoporosis     Sacral insufficiency fracture     SI (sacroiliac) joint inflammation ([facility]) 04/04/2016    Strain of left biceps 10/19/2023    Substance addiction ([facility])     Vitamin D deficiency 03/11/2019   [2]   Past Surgical History:  Procedure Laterality Date    BREAST EXCISIONAL BIOPSY  Left 2000    benign    COLONOSCOPY  2019    CYSTOSCOPY W/ DEFLUX INJECTION N/A 1/6/2025    Procedure: INJECTION BULKING AGENT URETHRAL;  Surgeon: Juan David Mcbride MD;  Location: [facility];  Service: UroGynecology           JOINT REPLACEMENT      right hip 8/15    MAMMO (HISTORICAL)  2019    NJ CMBND ANTERPOST COLPORRAPHY W/CYSTO N/A 1/6/2025    Procedure: A&P COLPORRHAPHY;  Surgeon: Juan David Mcbride MD;  Location: [facility];  Service: UroGynecology           NJ COLPOPEXY VAGINAL EXTRAPERITONEAL APPROACH N/A 1/6/2025    Procedure: COLPOSUSPENSION VAGINAL EXTRAPERITONEAL(ENPLACE); EUA; ANTERIOR POSTERIOR COLORRPHY, URETHRAL BULKING INJECTION, CYSTO;  Surgeon: Juan David Mcbride MD;  Location: [facility];  Service: UroGynecology           NJ CYSTOURETHROSCOPY N/A 1/6/2025    Procedure: CYSTO;  Surgeon: Juan David Mcbride MD;  Location: [facility];  Service: UroGynecology           TONSILLECTOMY     [3]   Social History  Tobacco Use    Smoking status: Never    Smokeless tobacco: Never   Vaping Use    Vaping status: Never Used   Substance Use Topics    Alcohol use: Never    Drug use: Not Currently     Types: Prescription     Comment: araseliing last used 1996

## 2025-07-07 ENCOUNTER — TELEPHONE (OUTPATIENT)
Age: 85
End: 2025-07-07

## 2025-07-07 ENCOUNTER — ANNUAL EXAM (OUTPATIENT)
Dept: OBGYN CLINIC | Facility: CLINIC | Age: 85
End: 2025-07-07
Payer: COMMERCIAL

## 2025-07-07 VITALS
DIASTOLIC BLOOD PRESSURE: 88 MMHG | BODY MASS INDEX: 24.17 KG/M2 | SYSTOLIC BLOOD PRESSURE: 132 MMHG | WEIGHT: 128 LBS | HEIGHT: 61 IN

## 2025-07-07 DIAGNOSIS — M25.551 BILATERAL HIP PAIN: ICD-10-CM

## 2025-07-07 DIAGNOSIS — M25.552 BILATERAL HIP PAIN: ICD-10-CM

## 2025-07-07 DIAGNOSIS — Z01.419 WELL WOMAN EXAM: Primary | ICD-10-CM

## 2025-07-07 DIAGNOSIS — M47.816 LUMBAR SPONDYLOSIS: ICD-10-CM

## 2025-07-07 PROCEDURE — S0610 ANNUAL GYNECOLOGICAL EXAMINA: HCPCS

## 2025-07-07 RX ORDER — IBUPROFEN 800 MG/1
800 TABLET, FILM COATED ORAL AS NEEDED
Qty: 30 TABLET | Refills: 3 | Status: SHIPPED | OUTPATIENT
Start: 2025-07-07

## 2025-07-15 ENCOUNTER — OFFICE VISIT (OUTPATIENT)
Dept: OBGYN CLINIC | Facility: HOSPITAL | Age: 85
End: 2025-07-15
Payer: COMMERCIAL

## 2025-07-15 VITALS — WEIGHT: 127 LBS | BODY MASS INDEX: 23.98 KG/M2 | HEIGHT: 61 IN

## 2025-07-15 DIAGNOSIS — Z96.641 HISTORY OF TOTAL RIGHT HIP ARTHROPLASTY: ICD-10-CM

## 2025-07-15 DIAGNOSIS — M19.011 PRIMARY OSTEOARTHRITIS OF BOTH SHOULDERS: ICD-10-CM

## 2025-07-15 DIAGNOSIS — M17.0 PRIMARY OSTEOARTHRITIS OF BOTH KNEES: Primary | ICD-10-CM

## 2025-07-15 DIAGNOSIS — M70.61 GREATER TROCHANTERIC BURSITIS OF BOTH HIPS: ICD-10-CM

## 2025-07-15 DIAGNOSIS — M70.62 GREATER TROCHANTERIC BURSITIS OF BOTH HIPS: ICD-10-CM

## 2025-07-15 DIAGNOSIS — M19.012 PRIMARY OSTEOARTHRITIS OF BOTH SHOULDERS: ICD-10-CM

## 2025-07-15 DIAGNOSIS — M12.812 ROTATOR CUFF ARTHROPATHY OF LEFT SHOULDER: ICD-10-CM

## 2025-07-15 PROCEDURE — 99214 OFFICE O/P EST MOD 30 MIN: CPT | Performed by: ORTHOPAEDIC SURGERY

## 2025-07-15 PROCEDURE — 20610 DRAIN/INJ JOINT/BURSA W/O US: CPT | Performed by: ORTHOPAEDIC SURGERY

## 2025-07-15 RX ORDER — LIDOCAINE HYDROCHLORIDE 10 MG/ML
4 INJECTION, SOLUTION INFILTRATION; PERINEURAL
Status: COMPLETED | OUTPATIENT
Start: 2025-07-15 | End: 2025-07-15

## 2025-07-15 RX ORDER — BETAMETHASONE SODIUM PHOSPHATE AND BETAMETHASONE ACETATE 3; 3 MG/ML; MG/ML
12 INJECTION, SUSPENSION INTRA-ARTICULAR; INTRALESIONAL; INTRAMUSCULAR; SOFT TISSUE
Status: COMPLETED | OUTPATIENT
Start: 2025-07-15 | End: 2025-07-15

## 2025-07-15 RX ADMIN — LIDOCAINE HYDROCHLORIDE 4 ML: 10 INJECTION, SOLUTION INFILTRATION; PERINEURAL at 10:00

## 2025-07-15 RX ADMIN — BETAMETHASONE SODIUM PHOSPHATE AND BETAMETHASONE ACETATE 12 MG: 3; 3 INJECTION, SUSPENSION INTRA-ARTICULAR; INTRALESIONAL; INTRAMUSCULAR; SOFT TISSUE at 10:00

## 2025-07-22 ENCOUNTER — OFFICE VISIT (OUTPATIENT)
Age: 85
End: 2025-07-22
Payer: COMMERCIAL

## 2025-07-22 VITALS
BODY MASS INDEX: 23.79 KG/M2 | DIASTOLIC BLOOD PRESSURE: 60 MMHG | HEART RATE: 76 BPM | WEIGHT: 126 LBS | HEIGHT: 61 IN | TEMPERATURE: 97.9 F | OXYGEN SATURATION: 97 % | SYSTOLIC BLOOD PRESSURE: 110 MMHG

## 2025-07-22 DIAGNOSIS — M15.4 EROSIVE OSTEOARTHRITIS OF HANDS, BILATERAL: ICD-10-CM

## 2025-07-22 DIAGNOSIS — M47.816 LUMBAR SPONDYLOSIS: ICD-10-CM

## 2025-07-22 DIAGNOSIS — K21.9 GASTROESOPHAGEAL REFLUX DISEASE WITHOUT ESOPHAGITIS: ICD-10-CM

## 2025-07-22 DIAGNOSIS — G56.03 BILATERAL CARPAL TUNNEL SYNDROME: ICD-10-CM

## 2025-07-22 DIAGNOSIS — N81.2 INCOMPLETE UTEROVAGINAL PROLAPSE: ICD-10-CM

## 2025-07-22 DIAGNOSIS — Z79.899 ENCOUNTER FOR LONG-TERM (CURRENT) USE OF MEDICATIONS: ICD-10-CM

## 2025-07-22 DIAGNOSIS — M35.9 UNDIFFERENTIATED CONNECTIVE TISSUE DISEASE (HCC): ICD-10-CM

## 2025-07-22 DIAGNOSIS — M81.0 SENILE OSTEOPOROSIS: Primary | ICD-10-CM

## 2025-07-22 DIAGNOSIS — M15.0 PRIMARY GENERALIZED (OSTEO)ARTHRITIS: ICD-10-CM

## 2025-07-22 DIAGNOSIS — I73.00 RAYNAUD'S DISEASE WITHOUT GANGRENE: ICD-10-CM

## 2025-07-22 DIAGNOSIS — M06.09 SERONEGATIVE ARTHROPATHY OF MULTIPLE SITES (HCC): ICD-10-CM

## 2025-07-22 DIAGNOSIS — N18.31 STAGE 3A CHRONIC KIDNEY DISEASE (HCC): ICD-10-CM

## 2025-07-22 PROCEDURE — 99214 OFFICE O/P EST MOD 30 MIN: CPT | Performed by: INTERNAL MEDICINE

## 2025-07-22 PROCEDURE — 96372 THER/PROPH/DIAG INJ SC/IM: CPT | Performed by: INTERNAL MEDICINE

## 2025-07-22 NOTE — PATIENT INSTRUCTIONS
I gave you a bunch of lab work to get in 2 weeks at [facility].  I also gave you lab work for 6 months from now.  You can ask the  to print everything out for you with the dates.  Schedule your bone density study for whenever you can get in.  I will let you know what the results are.  If you are having any significant joint symptoms, you could use arthritis strength Tylenol or an occasional Advil or ibuprofen.  It does not look like you have active inflammation at this time, but rather more osteoarthritis.  Continue follow-up with Dr. Luevano for the injections in your joints.  Continue home exercise program as tolerated.  Continue calcium and vitamin D supplement.  We will see you back in 6 months for your next injection.

## 2025-07-22 NOTE — PROGRESS NOTES
"Assessment/Plan:    Monik Garcia came into the [facility] today 07/22/25 to receive Prolia injection.      Verbal consent obtained.  Consent given by: patient    patient states patient has been medically healthy with no underlining concerns/complications.      Monik Garcia presents with no symptoms today.       All insturctions were reviewed with the patient.    If the patient should have any questions/concerns, advised patient to contacted [facility].       Subjective:     History provided by: patient    Patient ID: Monik Garcia is a 84 y.o. female      Objective:    Vitals:    07/22/25 1226   BP: 110/60   Pulse: 76   Temp: 97.9 °F (36.6 °C)   TempSrc: Tympanic   SpO2: 97%   Weight: 57.2 kg (126 lb)   Height: 5' 1\" (1.549 m)       Patient tolerated the injection well without any complications.  Injection site/s Left Arm.  Number of Dose N/A  Medication was provided by Office.          Patient signed consent form yes   Patient signed ABN form no (If no patient is not a medicare patient).   Patient waited 15 minutes after injection no (This only applies to patient's receiving first time injection).       Last Visit: Visit date not found  Next visit:Visit date not found     "

## 2025-07-22 NOTE — PROGRESS NOTES
Assessment/Plan:    1. Senile osteoporosis  -     Comprehensive metabolic panel; Future; Expected date: 08/05/2025  -     Comprehensive metabolic panel; Future; Expected date: 01/22/2026  -     DXA bone density spine hip and pelvis; Future; Expected date: 07/22/2025  -     denosumab (PROLIA) subcutaneous injection 60 mg  2. Erosive osteoarthritis of hands, bilateral  -     C-reactive protein; Future; Expected date: 08/05/2025  -     Sedimentation rate, automated; Future; Expected date: 08/05/2025  3. Seronegative arthropathy of multiple sites (HCC)  -     C-reactive protein; Future; Expected date: 08/05/2025  -     Sedimentation rate, automated; Future; Expected date: 08/05/2025  -     CBC and differential; Future; Expected date: 08/05/2025  -     Comprehensive metabolic panel; Future; Expected date: 08/05/2025  -     AILYN Screen w/Reflex Cascade; Future; Expected date: 08/05/2025  -     AILYN by IFA (No Reflex)for Rheumatologist; Future; Expected date: 08/05/2025  -     RHEUMATOID FACTOR; Future; Expected date: 08/05/2025  -     Cyclic citrul peptide antibody, IgG; Future; Expected date: 08/05/2025  -     C-reactive protein; Future; Expected date: 01/22/2026  -     Sedimentation rate, automated; Future; Expected date: 01/22/2026  -     CBC and differential; Future; Expected date: 01/22/2026  -     Comprehensive metabolic panel; Future; Expected date: 01/22/2026  4. Raynaud's disease without gangrene  5. Primary generalized (osteo)arthritis  6. Bilateral carpal tunnel syndrome  7. Lumbar spondylosis  8. Gastroesophageal reflux disease without esophagitis  9. Stage 3a chronic kidney disease (HCC)  10. Encounter for long-term (current) use of medications  -     C-reactive protein; Future; Expected date: 08/05/2025  -     CBC and differential; Future; Expected date: 08/05/2025  -     Comprehensive metabolic panel; Future; Expected date: 08/05/2025  -     C-reactive protein; Future; Expected date: 01/22/2026  -      Sedimentation rate, automated; Future; Expected date: 01/22/2026  -     CBC and differential; Future; Expected date: 01/22/2026  -     Comprehensive metabolic panel; Future; Expected date: 01/22/2026  11. Undifferentiated connective tissue disease (HCC)  -     C-reactive protein; Future; Expected date: 08/05/2025  -     Sedimentation rate, automated; Future; Expected date: 08/05/2025  -     CBC and differential; Future; Expected date: 08/05/2025  -     Comprehensive metabolic panel; Future; Expected date: 08/05/2025  -     Urinalysis with microscopic; Future; Expected date: 08/05/2025  -     AILYN Screen w/Reflex Cascade; Future; Expected date: 08/05/2025  -     AILYN by IFA (No Reflex)for Rheumatologist; Future; Expected date: 08/05/2025  -     RHEUMATOID FACTOR; Future; Expected date: 08/05/2025  -     Cyclic citrul peptide antibody, IgG; Future; Expected date: 08/05/2025  12. Incomplete uterovaginal prolapse  -     Urinalysis with microscopic; Future; Expected date: 08/05/2025      Assessment & Plan  1. Osteoporosis.  - Classified as severe osteoporosis due to history of sacral insufficiency fracture.  - Continues on Prolia and is due for dose #13 at this visit.  - Last DEXA scan on 12/22/2022 showed lumbar spine T-score L1-3 of -1.5, left total hip T-score of -1.1, and left femoral neck T-score of -2.0.  - Follow-up DEXA scan ordered; Prolia injection administered today; lab work ordered for 2 weeks from now and again 2 weeks before next visit in view of low GFR.    2.  Seronegative arthropathy of multiple sites  - Seronegative arthritis with chronic deformities with no evidence of active inflammation or synovitis on recent musculoskeletal ultrasound from 2/7/2025.  Rule out inflammatory osteoarthritis/erosive osteoarthritis.  The patient did not go for serologies as ordered at the last office visit, so these will be reordered, and I suspect they will be negative.  - She did complete occupational therapy with  benefit and is using wrist splints for carpal tunnel syndrome with benefit.  - Follow-up 6 months or sooner if needed.  Continue to monitor.    3.  Primary generalized osteoarthritis.  -Primary generalized osteoarthritis with interphalangeal osteoarthritis hands and feet, DJD knees, and lumbar spondylosis.  History of shoulder rotator cuff arthropathy.  - History of recent knee, shoulder, and trochanteric bursa injections per orthopedic surgeon.  - Ultrasound of hands and wrists on 02/07/2025 showed chronic changes with no active inflammation.  - Advised to monitor symptoms; can take only an occasional Advil or ibuprofen in view of chronic kidney disease, with preference for arthritis strength Tylenol for more severe pain.  - Follow-up with orthopedic surgeon as scheduled.    4. Lumbar spondylosis.  - Reports low back pain when standing for long periods, consistent with neurogenic symptoms, relieved by sitting and using a brace.  - No current radicular symptoms.  - Encouraged gentle home exercise and stretching program.  Follow-up with pain management as needed.    5. Carpal tunnel syndrome.  - Experiences occasional numbness and tingling in fingers at night secondary to carpal tunnel syndrome with slight thenar atrophy noted on exam right greater than left.  Did complete occupational therapy with benefit.  - Uses wrist splints for management with success.  Continue to monitor.    6.  Raynaud's phenomenon  - Raynaud's with no digital ulcerations.  Continue cold protection.  If symptoms worsen would consider calcium channel blockers.  Suspect primary Raynaud's with no evidence clinically for connective tissue disease.  Patient was referred for serologies to include complete AILYN profile to rule out unlikely possibility of connective tissue disease.  - Continue to monitor.    7.  GERD  - Continue pantoprazole in view of benefit.  Follow-up primary care.    8.  Chronic kidney disease stage IIIb  - Chronic kidney disease  stage IIIb with estimated GFR 40 on lab work from March of this year.  Avoid nephrotoxic agents like nonsteroidals.  She has rare use of ibuprofen for more severe pain.  Encourage arthritis strength Tylenol with maximum dose of 1 to 2 tablets every 12 hours as needed.  Follow-up nephrology.  Continue to monitor.    9.  Incomplete uterovaginal prolapse  - Patient is status post surgical repair on 1/6/2025 with stop course.  Follow-up urogynecologist as scheduled.    Orders Placed This Encounter   Procedures   • DXA bone density spine hip and pelvis   • C-reactive protein   • Sedimentation rate, automated   • CBC and differential   • Comprehensive metabolic panel   • Urinalysis with microscopic   • AILYN Screen w/Reflex Cascade   • AILYN by IFA (No Reflex)for Rheumatologist   • RHEUMATOID FACTOR   • Cyclic citrul peptide antibody, IgG   • C-reactive protein   • Sedimentation rate, automated   • CBC and differential   • Comprehensive metabolic panel          Reviewed records, labs, and imaging with the patient in detail.  Counseled patient.  Discussion regarding my findings and recommendations.  Office visit with documentation 35 min.    Subjective:      Patient ID: Monik Garcia is a 84 y.o. female.    History of Present Illness  The patient is an 84-year-old female who presents for a follow-up of her osteoporosis. She is further classified as having severe osteoporosis due to a history of sacral insufficiency fracture. She continues on Prolia and is due for dose number 13. Her last DEXA scan was on 12/22/2022, which showed a lumbar spine T-score of -1.5, a left total hip T-score of -1.1, and a left femoral neck T-score of -2.0. She has not yet scheduled her follow-up DEXA scan.    She reports experiencing back pain when standing for extended periods, which does not radiate down her legs. The pain subsides when she sits or uses a brace. She has a history of lumbar spondylosis and reports no significant morning stiffness  or joint swelling.    She occasionally experiences numbness and tingling in her fingers at night, likely due to carpal tunnel syndrome, and uses wrist splints for this condition. She also has Raynaud's disease, which causes her fingers to change color in cold weather, and she manages this with cold protection.    She has a history of osteoarthritis and follows up with orthopedic surgery. She has had knee injections, trochanteric bursa injections, and a left shoulder subacromial bursa injection for rotator cuff arthropathy, most recently on 07/15/2025, at which time Dr. Luevano injected both her knees, shoulders, and greater trochanteric bursae. They decided to continue with cortisone injections for her knees as she gets better relief from them then viscosupplementation. She had a hip replacement 10 years ago.    She underwent surgery for incomplete uterovaginal prolapse by a urogynecologist in 01/2025 and will see the nurse practitioner next week for a 5-month follow-up. She sees a podiatrist for fungal nails that were debrided.    Occupation: Works part-time in a [employer]    PERTINENT PAST SURGICAL HISTORY:  - Hip replacement 10 years ago  - Surgery for incomplete uterovaginal prolapse in 01/2025  HPI    Results  Imaging   - DEXA scan: 12/22/2022, Lumbar spine T-score L1-3, -1.5 with an increase of 7% as compared to the prior study, left total hip T-score -1.1 with an increase of 4.3% as compared to the prior study and left femoral neck T-score of -2.0.   - Ultrasound of both hands and wrists: 02/07/2025, Scattered areas of synovial hypertrophy at the right 2nd and 5th MCP joints, left second MCP joint, and left radiocarpal/intercarpal joints with no inflammation or synovitis and there was a lot of osteoarthritis. It showed chronic changes with no active inflammation.       Allergies  Allergies   Allergen Reactions   • Bactrim [Sulfamethoxazole-Trimethoprim] Other (See Comments)     Kidney failure       Home  "Medications  Current Medications[1]    Past Medical History  Past Medical History[1]    Past Surgical History   Past Surgical History[1]    Family History   Family History[1]    The following portions of the patient's history were reviewed and updated as appropriate: allergies, current medications, past family history, past medical history, past social history, past surgical history, and problem list.    Review of Systems   Constitutional:  Negative for chills and fever.   HENT:  Negative for ear pain and sore throat.    Eyes:  Negative for pain and visual disturbance.   Respiratory:  Negative for cough and shortness of breath.    Cardiovascular:  Negative for chest pain and palpitations.   Gastrointestinal:  Negative for abdominal pain and vomiting.   Genitourinary:  Negative for dysuria and hematuria.   Musculoskeletal:  Positive for arthralgias and back pain.        Morning stiffness minutes duration with no joint swelling. Hand arthralgias. CTS at night. Chronic but stable nonradicular low back pain.  History of recurrent trochanteric bursitis and knee arthralgias from bilateral knee DJD.  Recent left shoulder injection for rotator cuff arthropathy.  Osteoporosis with history sacral insufficiency fractures.  Raynaud's likely primary.  No sicca symptoms.  No digital ulcers.   Skin:  Negative for color change and rash.        Raynaud's without digital ulcerations     Neurological:  Positive for numbness (fingers). Negative for seizures and syncope.   All other systems reviewed and are negative.        Objective:  /60   Pulse 76   Temp 97.9 °F (36.6 °C) (Tympanic)   Ht 5' 1\" (1.549 m)   Wt 57.2 kg (126 lb)   SpO2 97%   BMI 23.81 kg/m²        Physical Exam    Physical Exam  Vitals reviewed.   Constitutional:       Appearance: Normal appearance.   HENT:      Head: Normocephalic.      Nose:      Comments: Nose and throat not examined due to mask.    Eyes:      Extraocular Movements: Extraocular movements " intact.     Neck:      Comments: Without masses, thyromegaly, lymphadenopathy  Cardiovascular:      Rate and Rhythm: Regular rhythm.   Pulmonary:      Breath sounds: Normal breath sounds.   Abdominal:      Palpations: Abdomen is soft.     Musculoskeletal:      Cervical back: Neck supple.      Comments: Neck decreased lateral flexion.  Shoulders slightly decreased external rotation right>left with bilateral crepitus.  Elbows and wrists full range of motion.  Tinel's negative.  Hands squaring 1st carpometacarpal joints bilaterally with Heberden's and Kylie's nodes.  Deformities appreciated.  Interossei wasting noted.  Slight thenar atrophy right greater than left.  Straight leg raising negative bilaterally. Scoliosis noted. Hips full range of motion.  Knees slightly decreased flexion with patellofemoral crepitus.  Ankles full range of motion.  Feet rearfoot hyperpronation with midfoot cavus deformities, hallux valgus deformities, high insteps, hammertoe deformities, and tight heel cords.  No synovitis noted.     Skin:     General: Skin is warm.      Comments: Onychomycosis scattered toenails.     Neurological:      General: No focal deficit present.                 This note was written in part using the assistance of the [email or web address] Direct [id number] microphone system. Those portions using this system have been dictated and not read.         [1]    Current Outpatient Medications:   •  acetaminophen (TYLENOL) 325 mg tablet, Take 2 tablets (650 mg total) by mouth every 6 (six) hours as needed for mild pain, Disp: , Rfl:   •  acyclovir (ZOVIRAX) 5 % ointment, Apply topically every 6 (six) hours, Disp: 30 g, Rfl: 0  •  atorvastatin (LIPITOR) 10 mg tablet, Take 1 tablet (10 mg total) by mouth daily, Disp: 90 tablet, Rfl: 1  •  Cholecalciferol (Vitamin D-3) 25 MCG (1000 UT) CAPS, Take by mouth in the morning, Disp: , Rfl:   •  ibuprofen (MOTRIN) 800 mg tablet, Take 1 tablet (800 mg total) by mouth if  needed for mild pain, Disp: 30 tablet, Rfl: 3  •  metoprolol succinate (TOPROL-XL) 25 mg 24 hr tablet, Take 1 tablet (25 mg total) by mouth 2 (two) times a day, Disp: 180 tablet, Rfl: 1  •  Multiple Vitamins-Minerals (ONE A DAY WOMEN 50 PLUS PO), , Disp: , Rfl:   •  mupirocin (BACTROBAN) 2 % ointment, Apply topically 3 (three) times a day To left lower leg., Disp: 30 g, Rfl: 1  •  pantoprazole (PROTONIX) 40 mg tablet, Take 1 tablet (40 mg total) by mouth daily As needed for heartburn, Disp: 90 tablet, Rfl: 5  •  Premarin vaginal cream, 3 (three) times a week, Disp: , Rfl:   •  fluconazole (Diflucan) 150 mg tablet, 1 tablet Orally twice. Take one tablet today and take second tablet in 3 days for 3 days (Patient not taking: Reported on 7/22/2025), Disp: , Rfl:   •  Macrobid 100 MG capsule, Every 12 hours (Patient not taking: Reported on 7/22/2025), Disp: , Rfl:     Current Facility-Administered Medications:   •  denosumab (PROLIA) subcutaneous injection 60 mg, 60 mg, Subcutaneous, Once, Yamile Wallace PA-C  •  denosumab (PROLIA) subcutaneous injection 60 mg, 60 mg, Subcutaneous, Q6 Months, , 60 mg at 07/22/25 1358  •  lidocaine (XYLOCAINE) 1 % injection 1 mL, 1 mL, Injection, , Pablo Rivera DPM, 1 mL at 12/07/23 0900  •  lidocaine (XYLOCAINE) 1 % injection 1 mL, 1 mL, Injection, , , 1 mL at 07/08/24 0930  •  lidocaine (XYLOCAINE) 1 % injection 1 mL, 1 mL, Injection, , , 1 mL at 04/14/25 1100  •  triamcinolone acetonide (KENALOG-40) 40 mg/mL injection 20 mg, 20 mg, Infiltration, , Pablo Rivera DPM, 20 mg at 12/07/23 0900  •  triamcinolone acetonide (KENALOG-40) 40 mg/mL injection 20 mg, 20 mg, Infiltration, , , 20 mg at 07/08/24 0930  •  triamcinolone acetonide (Kenalog-40) 40 mg/mL injection 20 mg, 20 mg, Infiltration, , , 20 mg at 04/14/25 1100[1]  Past Medical History:  Diagnosis Date   • Acute laryngitis 05/12/2022   • Anemia    • Chronic bilateral low back pain without sciatica 11/26/2019   • Chronic kidney  disease    • Duodenal perforation ([facility]) 10/07/2020   • Duodenal ulcer    • Enterococcus UTI 04/07/2016   • GERD (gastroesophageal reflux disease) 04/04/2016   • Greater trochanteric bursitis of right hip 09/18/2018   • 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   • Hypercalcemia    • Hypertension    • Hypokalemia 10/27/2018   • Impetigo 09/18/2024   • Left lumbar radiculopathy 04/07/2016   • Mixed hyperlipidemia    • Osteoarthritis of hip 04/04/2016   • Osteoporosis    • Sacral insufficiency fracture    • SI (sacroiliac) joint inflammation ([facility]) 04/04/2016   • Strain of left biceps 10/19/2023   • Substance addiction ([facility])    • Vitamin D deficiency 03/11/2019   [1]  Past Surgical History:  Procedure Laterality Date   • BREAST EXCISIONAL BIOPSY Left 2000    benign   • COLONOSCOPY  2019   • CYSTOSCOPY W/ DEFLUX INJECTION N/A 1/6/2025    Procedure: INJECTION BULKING AGENT URETHRAL;  Surgeon: Juan David Mcbride MD;  Location: [facility];  Service: UroGynecology          • JOINT REPLACEMENT      right hip 8/15   • MAMMO (HISTORICAL)  2019   • PA CMBND ANTERPOST COLPORRAPHY W/CYSTO N/A 1/6/2025    Procedure: A&P COLPORRHAPHY;  Surgeon: Juan David Mcbride MD;  Location: [facility];  Service: UroGynecology          • PA COLPOPEXY VAGINAL EXTRAPERITONEAL APPROACH N/A 1/6/2025    Procedure: COLPOSUSPENSION VAGINAL EXTRAPERITONEAL(ENPLACE); EUA; ANTERIOR POSTERIOR COLORRPHY, URETHRAL BULKING INJECTION, CYSTO;  Surgeon: Juan David Mcbride MD;  Location: [facility];  Service: UroGynecology          • PA CYSTOURETHROSCOPY N/A 1/6/2025    Procedure: CYSTO;  Surgeon: Juan David Mcbride MD;  Location: [facility];  Service: UroGynecology          • TONSILLECTOMY     [1]  Family History  Problem Relation Name Age of Onset   • No Known Problems Mother     • No Known Problems Father     • Breast cancer Daughter  50   • Breast cancer Maternal Aunt  70   • No Known Problems Sister     • No Known Problems Maternal  Grandmother     • No Known Problems Maternal Grandfather     • No Known Problems Paternal Grandmother     • No Known Problems Paternal Grandfather     • No Known Problems Son     • No Known Problems Sister     • No Known Problems Sister

## 2025-07-23 PROBLEM — M35.9 UNDIFFERENTIATED CONNECTIVE TISSUE DISEASE (HCC): Status: ACTIVE | Noted: 2025-07-23

## 2025-08-05 ENCOUNTER — TELEPHONE (OUTPATIENT)
Age: 85
End: 2025-08-05

## 2025-08-11 ENCOUNTER — APPOINTMENT (OUTPATIENT)
Dept: LAB | Facility: CLINIC | Age: 85
End: 2025-08-11
Attending: INTERNAL MEDICINE
Payer: COMMERCIAL

## 2025-08-14 ENCOUNTER — PROCEDURE VISIT (OUTPATIENT)
Dept: PODIATRY | Facility: CLINIC | Age: 85
End: 2025-08-14
Payer: COMMERCIAL

## 2025-08-14 PROBLEM — L84 CORNS: Status: ACTIVE | Noted: 2025-08-14

## 2025-08-14 PROBLEM — I73.9 PERIPHERAL VASCULAR DISEASE, UNSPECIFIED (HCC): Status: ACTIVE | Noted: 2025-08-14

## 2025-08-15 ENCOUNTER — RESULTS FOLLOW-UP (OUTPATIENT)
Age: 85
End: 2025-08-15

## (undated) DEVICE — CATH FOLEY 18FR 5ML 2 WAY UNCOATED SILICONE

## (undated) DEVICE — GLOVE PI ULTRA TOUCH SZ.8.0

## (undated) DEVICE — SPONGE CHERRY 1/2IN

## (undated) DEVICE — DECANTER: Brand: UNBRANDED

## (undated) DEVICE — ELECTROSURGICAL DEVICE HOLSTER;FOR USE WITH MAXIMUM PEAK VOLTAGE OF 4000 V: Brand: FORCE TRIVERSE

## (undated) DEVICE — PACKING VAGINAL 2 IN

## (undated) DEVICE — PREMIUM DRY TRAY LF: Brand: MEDLINE INDUSTRIES, INC.

## (undated) DEVICE — CYSTO TUBING SINGLE IRRIGATION

## (undated) DEVICE — NEEDLE SPINAL 22G X 3.5IN  QUINCKE

## (undated) DEVICE — TUBING SUCTION 5MM X 12 FT

## (undated) DEVICE — BULB SYRINGE,IRRIGATION WITH PROTECTIVE CAP: Brand: DOVER

## (undated) DEVICE — UNDER BUTTOCKS DRAPE W/FLUID CONTROL POUCH: Brand: CONVERTORS

## (undated) DEVICE — INTENT TO BE USED WITH SUTURE MATERIAL FOR TISSUE CLOSURE: Brand: RICHARD-ALLAN® NEEDLE 1/2 CIRCLE TAPER

## (undated) DEVICE — SYRINGE 1ML TB 25G X 5/8 NON SAFETY

## (undated) DEVICE — MEDI-VAC YANKAUER SUCTION HANDLE W/BULBOUS AND CONTROL VENT: Brand: CARDINAL HEALTH

## (undated) DEVICE — CAUTERY TIP POLISHER: Brand: DEVON

## (undated) DEVICE — SUT VICRYL 2-0 SH 27 IN UNDYED J417H

## (undated) DEVICE — SUT PDS II 2-0 CT-2 27 IN Z333H

## (undated) DEVICE — BONEE® NEEDLE FOR BLADDER INJECTION CH FR 05, 22G, 35 CM, BOX OF 1: Brand: PORGES COLOPLAST

## (undated) DEVICE — ALLENTOWN DR  LUCENTE S LAP PK: Brand: CARDINAL HEALTH

## (undated) DEVICE — SCD SEQUENTIAL COMPRESSION COMFORT SLEEVE MEDIUM KNEE LENGTH: Brand: KENDALL SCD

## (undated) DEVICE — SYRINGE 3ML LL

## (undated) DEVICE — INJECTION NEEDLE, SPINAL TIP: Brand: DURASPHERE ®

## (undated) DEVICE — SMOKE EVACUATION TUBING WITH 8 IN INTEGRAL WAND AND SPONGE GUARD: Brand: BUFFALO FILTER

## (undated) DEVICE — IV FLUSH NSS 10ML POSIFLUSH

## (undated) DEVICE — BETHLEHEM UNIVERSAL MINOR VAG: Brand: CARDINAL HEALTH

## (undated) DEVICE — NEEDLE SPINAL 22G X 7IN QUINCKE

## (undated) DEVICE — 2000CC GUARDIAN II: Brand: GUARDIAN

## (undated) DEVICE — NEEDLE HYPO 23G X 1-1/2 IN